# Patient Record
Sex: MALE | Race: WHITE | NOT HISPANIC OR LATINO | Employment: OTHER | URBAN - NONMETROPOLITAN AREA
[De-identification: names, ages, dates, MRNs, and addresses within clinical notes are randomized per-mention and may not be internally consistent; named-entity substitution may affect disease eponyms.]

---

## 2017-04-25 ENCOUNTER — TRANSCRIBE ORDERS (OUTPATIENT)
Dept: ADMINISTRATIVE | Facility: HOSPITAL | Age: 57
End: 2017-04-25

## 2017-04-25 DIAGNOSIS — R10.32 LLQ ABDOMINAL PAIN: ICD-10-CM

## 2017-04-25 DIAGNOSIS — R93.2 ABNORMAL FINDINGS ON IMAGING OF BILIARY TRACT: Primary | ICD-10-CM

## 2017-05-10 ENCOUNTER — HOSPITAL ENCOUNTER (OUTPATIENT)
Dept: CT IMAGING | Facility: HOSPITAL | Age: 57
Discharge: HOME | End: 2017-05-10
Attending: INTERNAL MEDICINE | Admitting: INTERNAL MEDICINE

## 2017-05-10 DIAGNOSIS — R93.2 ABNORMAL FINDINGS ON IMAGING OF BILIARY TRACT: ICD-10-CM

## 2017-05-10 DIAGNOSIS — R10.32 LLQ ABDOMINAL PAIN: ICD-10-CM

## 2017-05-10 LAB — CREAT BLDA-MCNC: 0.8 MG/DL (ref 0.6–1.3)

## 2017-05-10 PROCEDURE — 82565 ASSAY OF CREATININE: CPT

## 2017-05-10 PROCEDURE — 0 IOPAMIDOL 61 % SOLUTION: Performed by: INTERNAL MEDICINE

## 2017-05-10 PROCEDURE — 74170 CT ABD WO CNTRST FLWD CNTRST: CPT

## 2017-05-10 PROCEDURE — 74170 CT ABD WO CNTRST FLWD CNTRST: CPT | Performed by: RADIOLOGY

## 2017-05-10 RX ADMIN — IOPAMIDOL 100 ML: 612 INJECTION, SOLUTION INTRAVENOUS at 09:30

## 2018-02-12 ENCOUNTER — OFFICE VISIT (OUTPATIENT)
Dept: UROLOGY | Facility: CLINIC | Age: 58
End: 2018-02-12

## 2018-02-12 VITALS — HEIGHT: 68 IN | BODY MASS INDEX: 46.11 KG/M2 | WEIGHT: 304.24 LBS

## 2018-02-12 DIAGNOSIS — N13.8 BPH WITH OBSTRUCTION/LOWER URINARY TRACT SYMPTOMS: ICD-10-CM

## 2018-02-12 DIAGNOSIS — N40.1 BPH WITH OBSTRUCTION/LOWER URINARY TRACT SYMPTOMS: ICD-10-CM

## 2018-02-12 DIAGNOSIS — R53.83 FATIGUE, UNSPECIFIED TYPE: ICD-10-CM

## 2018-02-12 DIAGNOSIS — N42.9 DISORDER OF PROSTATE: ICD-10-CM

## 2018-02-12 DIAGNOSIS — N52.02 CORPORO-VENOUS OCCLUSIVE ERECTILE DYSFUNCTION: ICD-10-CM

## 2018-02-12 DIAGNOSIS — R35.0 FREQUENT URINATION: Primary | ICD-10-CM

## 2018-02-12 DIAGNOSIS — R79.89 LOW TESTOSTERONE IN MALE: ICD-10-CM

## 2018-02-12 LAB
ANION GAP SERPL CALCULATED.3IONS-SCNC: 3.8 MMOL/L (ref 3.6–11.2)
BILIRUB BLD-MCNC: NEGATIVE MG/DL
BUN BLD-MCNC: 12 MG/DL (ref 7–21)
BUN/CREAT SERPL: 17.1 (ref 7–25)
CALCIUM SPEC-SCNC: 8.5 MG/DL (ref 7.7–10)
CHLORIDE SERPL-SCNC: 106 MMOL/L (ref 99–112)
CLARITY, POC: CLEAR
CO2 SERPL-SCNC: 31.2 MMOL/L (ref 24.3–31.9)
COLOR UR: YELLOW
CREAT BLD-MCNC: 0.7 MG/DL (ref 0.43–1.29)
GFR SERPL CREATININE-BSD FRML MDRD: 117 ML/MIN/1.73
GLUCOSE BLD-MCNC: 95 MG/DL (ref 70–110)
GLUCOSE UR STRIP-MCNC: NEGATIVE MG/DL
KETONES UR QL: NEGATIVE
LEUKOCYTE EST, POC: NEGATIVE
NITRITE UR-MCNC: NEGATIVE MG/ML
OSMOLALITY SERPL CALC.SUM OF ELEC: 280.8 MOSM/KG (ref 273–305)
PH UR: 6 [PH] (ref 5–8)
POTASSIUM BLD-SCNC: 4.3 MMOL/L (ref 3.5–5.3)
PROT UR STRIP-MCNC: NEGATIVE MG/DL
PSA SERPL-MCNC: 0.23 NG/ML (ref 0–4)
RBC # UR STRIP: NEGATIVE /UL
SODIUM BLD-SCNC: 141 MMOL/L (ref 135–153)
SP GR UR: 1 (ref 1–1.03)
TESTOST SERPL-MCNC: 357.01 NG/DL (ref 86.98–780.1)
UROBILINOGEN UR QL: NORMAL

## 2018-02-12 PROCEDURE — 80048 BASIC METABOLIC PNL TOTAL CA: CPT | Performed by: UROLOGY

## 2018-02-12 PROCEDURE — 99204 OFFICE O/P NEW MOD 45 MIN: CPT | Performed by: UROLOGY

## 2018-02-12 PROCEDURE — 81002 URINALYSIS NONAUTO W/O SCOPE: CPT | Performed by: UROLOGY

## 2018-02-12 PROCEDURE — 84153 ASSAY OF PSA TOTAL: CPT | Performed by: UROLOGY

## 2018-02-12 PROCEDURE — 84403 ASSAY OF TOTAL TESTOSTERONE: CPT | Performed by: UROLOGY

## 2018-02-12 RX ORDER — TAMSULOSIN HYDROCHLORIDE 0.4 MG/1
1 CAPSULE ORAL NIGHTLY
Qty: 30 CAPSULE | Refills: 3 | Status: SHIPPED | OUTPATIENT
Start: 2018-02-12

## 2018-02-12 RX ORDER — TAMSULOSIN HYDROCHLORIDE 0.4 MG/1
1 CAPSULE ORAL NIGHTLY
Qty: 30 CAPSULE | Refills: 3 | Status: SHIPPED | OUTPATIENT
Start: 2018-02-12 | End: 2018-02-12 | Stop reason: SDUPTHER

## 2018-02-12 NOTE — PROGRESS NOTES
"Chief Complaint:          Chief Complaint   Patient presents with   • Urinary Urgency   • Urinary Incontinence       HPI:   57 y.o. male.  57-year-old white male who is referred for evaluation of frequent urination and dribbling.  He gets up 2 times at night for the last 2 years.  He has no dysuria, he has daytime frequency in the range of 4 times.  What is most troublesome is to consistent dribbling and decreased force of stream with hesitancy he's never had treatment.  He is not a smoker.  He's never tried alpha blockade.  He never had a PSA.  He has no family history of prostate cancer.  His uncle has colon polyps.  He had a gastric sleeve surgery 8 months ago and is lost 70 pounds he has no significant medical problems he's concerned about signs and symptoms consistent with low testosterone. KATALINA-androgen deficiency in the age male questionnaire  The patient was queried regarding the androgen deficiency in the age male questionnaire.  This is a validated questionnaire that was performed on a set of 314 [place] male physicians when it was positive it correlated directly with a 94% chance of low testosterone.  Patient indicates there is a decrease in libido or sex drive, a lack of energy, Decreased  strength and endurance, a decreased \"enjoyment of life\", sad and grumpy feelings with significant difficulty maintaining erections.  He is also been a recent deterioration regarding work performance.  I'm going to check a testosterone level as well.He has not had intermittent relations with his wife for years.Erectile dysfunction-we discussed the anatomy and physiology of the penis and the endothelium.  We discussed the various forms of erectile dysfunction including peripheral vascular occlusive disease, postoperative, secondary to radiation treatments of the prostate, and arterial inflow.  We discussed the various treatment options available including oral medication and its various forms.  We discussed the use of " both generic and non-generic Viagra.  We discussed Cialis and a longer half-life of 17 hours as well as the other 2 medications.  We discussed cost involved with this including the fact that the generic is much cheaper but is taken has multiple pills because they are 20 mg dosages.  We did discuss the other alternatives including Penile injections, vacuum erection devices and surgical intervention reserved for only the most severe cases.  We discussed the need for testosterone in about 20% of cases of erectile dysfunction.    Past Medical History:        Past Medical History:   Diagnosis Date   • Diabetes mellitus    • Hypertension          Current Meds:     Current Outpatient Prescriptions   Medication Sig Dispense Refill   • amLODIPine (NORVASC) 10 MG tablet Take  by mouth Daily.     • baclofen (LIORESAL) 20 MG tablet Take  by mouth 3 (Three) Times a Day.     • glipiZIDE (GLUCOTROL) 10 MG tablet Take  by mouth Daily.     • HYDROcodone-acetaminophen (NORCO)  MG per tablet Take  by mouth 4 (Four) Times a Day.     • hydrOXYzine (VISTARIL) 25 MG capsule Take  by mouth 3 (Three) Times a Day.     • Liraglutide (VICTOZA) 18 MG/3ML solution pen-injector Inject  under the skin.     • lisinopril (PRINIVIL,ZESTRIL) 10 MG tablet Take  by mouth Daily.     • metFORMIN (GLUCOPHAGE) 1000 MG tablet Take  by mouth 2 (Two) Times a Day.     • metoprolol tartrate (LOPRESSOR) 50 MG tablet Take  by mouth 2 (Two) Times a Day.     • PARoxetine (PAXIL) 20 MG tablet Take  by mouth.     • rOPINIRole (REQUIP) 2 MG tablet Take  by mouth.     • simvastatin (ZOCOR) 80 MG tablet Take  by mouth Daily.       No current facility-administered medications for this visit.         Allergies:      Allergies   Allergen Reactions   • Codeine Nausea And Vomiting   • Morphine And Related Nausea And Vomiting        Past Surgical History:     No past surgical history on file.      Social History:     Social History     Social History   • Marital status:       Spouse name: N/A   • Number of children: N/A   • Years of education: N/A     Occupational History   • Not on file.     Social History Main Topics   • Smoking status: Not on file   • Smokeless tobacco: Not on file   • Alcohol use Not on file   • Drug use: Not on file   • Sexual activity: Not on file     Other Topics Concern   • Not on file     Social History Narrative       Family History:     History reviewed. No pertinent family history.    Review of Systems:     Review of Systems   Constitutional: Positive for fatigue.   HENT: Negative.    Eyes: Negative.    Respiratory: Negative.    Cardiovascular: Negative.    Gastrointestinal: Negative.    Endocrine: Negative.    Genitourinary: Positive for urgency.   Musculoskeletal: Negative.    Allergic/Immunologic: Negative.    Neurological: Negative.    Hematological: Negative.    Psychiatric/Behavioral: Negative.    All other systems reviewed and are negative.      Physical Exam:     Physical Exam   Constitutional: He is oriented to person, place, and time. He appears well-developed and well-nourished.   HENT:   Head: Normocephalic and atraumatic.   Eyes: Conjunctivae and EOM are normal. Pupils are equal, round, and reactive to light.   Neck: Normal range of motion.   Cardiovascular: Normal rate, regular rhythm, normal heart sounds and intact distal pulses.    Pulmonary/Chest: Effort normal and breath sounds normal.   Abdominal: Soft. Bowel sounds are normal.   Genitourinary: Rectum normal, prostate normal and penis normal.   Genitourinary Comments: Soft nontender abdomen with no organomegaly, rigidity, or tenderness.  He has normal external genitalia and uncircumcised phallus with a freely movable foreskin bilaterally descended testes without masses there is no inguinal hernias adenopathy or abnormalities he had good rectal tone and a large smooth firm prostate.  There is no nodularity or any suspicious rectal abnormalities.  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   Neurological: He is alert and oriented to person, place, and time. He has normal reflexes.   Skin: Skin is warm and dry.   Psychiatric: He has a normal mood and affect. His behavior is normal. Judgment and thought content normal.   Nursing note and vitals reviewed.      I have reviewed the following portions of the patient's history: allergies, current medications, past family history, past medical history, past social history, past surgical history, problem list and ROS and confirm it's accurate.    Procedure:       Assessment/Plan:   BPH: Discussed the pathophysiology of BPH and obstruction.  We discussed the static and dynamic effect effects of BPH as well as using 5 alpha reductase inhibitors versus alpha blockade.  We discussed the indications for transurethral surgery as well.  And/ or other therapeutic options available including all of the newer techniques.  I'm going to start him on Flomax with a discussion of the risks, benefits and side effects including retrograde ejaculation  Erectile dysfunction-we discussed the anatomy and physiology of the penis and the endothelium.  We discussed the various forms of erectile dysfunction including peripheral vascular occlusive disease, postoperative, secondary to radiation treatments of the prostate, and arterial inflow.  We discussed the various treatment options available including oral medication and its various forms.  We discussed the use of both generic and non-generic Viagra.  We discussed Cialis and a longer half-life of 17 hours as well as the other 2 medications.  We discussed cost involved with this including the fact that the generic is much cheaper but is taken has multiple pills because they are 20 mg dosages.  We did discuss the other alternatives including Penile injections, vacuum erection devices and surgical intervention reserved for only the most severe cases.   We discussed the need for testosterone in about 20% of cases of erectile dysfunction.  Low TestosteroneThis pleasant male patient presents today with signs and symptoms that are consistent with low testosterone he has positive Sher questionnaire by history this includes both the sexual and nonsexual side effects.  Sexual side effects include inability to achieve and maintain an erection, in ability to maintain his erection and decreased interest and sexual activity.  Nonsexual symptomatology includes fatigue, difficulty completing a job, tiredness.  He has a discussion of the various forms testosterone available including parenteral, topical, and the form of a patch.  We discussed the efficacy of the gels, and the injections.  As well as the cost and benefits analysis.  We discussed the the studies a talked about heart disease and its effect on prostate cancer both of which are negligible.  He gives verbal consent to proceed with treatment.  He understands the risks and benefits of length he also completed his attempts at fertility he understands the partial effect on spermatogenesis.  PSA testing-I am recommending a PSA blood test that stands for prostate specific antigen.  I discussed the pathophysiology of PSA testing indicating its use in the diagnosis and management of prostate cancer.  I discussed the normal range being 0-4 but more appropriately being much closer to 0-2 in a normal male.  I discussed the fact that after certain age we don't recommend PSA testing especially in view of numerous comorbidities.  That this will not be a useful test.  I discussed many of the things that can artificially raised PSA including a recent infection, urinary tract infection, recent sexual intercourse.  Where even the type of movement such as manipulation of the prostate  riding a bicycle.  After all this is taken into account when the test is reviewed  the most important use of PSA which is the velocity measurement in  other words the change of PSA with time as a very important factor in the use and that we look for greater than 20% rise over a year to help us make the prediction of prostate cancer.  I also discussed that the use with prostate cancer indicating that after a radical prostatectomy the PSA should be 0 and any rise indicates an early biochemical recurrence  BMI PACKET GIVEN          This document has been electronically signed by KERI MARTINEZ MD February 12, 2018 11:09 AM

## 2018-02-26 ENCOUNTER — OFFICE VISIT (OUTPATIENT)
Dept: UROLOGY | Facility: CLINIC | Age: 58
End: 2018-02-26

## 2018-02-26 VITALS — BODY MASS INDEX: 37.13 KG/M2 | HEIGHT: 68 IN | WEIGHT: 245 LBS

## 2018-02-26 DIAGNOSIS — N52.02 CORPORO-VENOUS OCCLUSIVE ERECTILE DYSFUNCTION: Primary | ICD-10-CM

## 2018-02-26 DIAGNOSIS — N13.8 BPH WITH OBSTRUCTION/LOWER URINARY TRACT SYMPTOMS: ICD-10-CM

## 2018-02-26 DIAGNOSIS — N40.1 BPH WITH OBSTRUCTION/LOWER URINARY TRACT SYMPTOMS: ICD-10-CM

## 2018-02-26 PROCEDURE — 99214 OFFICE O/P EST MOD 30 MIN: CPT | Performed by: UROLOGY

## 2018-02-26 NOTE — PROGRESS NOTES
"Chief Complaint:          Chief Complaint   Patient presents with   • Urinary Frequency       HPI:   57 y.o. male.  57-year-old white male who is referred for evaluation of frequent urination and dribbling.  He gets up 2 times at night for the last 2 years.  He has no dysuria, he has daytime frequency in the range of 4 times.  What is most troublesome is to consistent dribbling and decreased force of stream with hesitancy he's never had treatment.  He is not a smoker.  He's never tried alpha blockade.  He never had a PSA.  He has no family history of prostate cancer.  His uncle has colon polyps.  He had a gastric sleeve surgery 8 months ago and is lost 70 pounds he has no significant medical problems he's concerned about signs and symptoms consistent with low testosterone. KATALINA-androgen deficiency in the age male questionnaire  The patient was queried regarding the androgen deficiency in the age male questionnaire.  This is a validated questionnaire that was performed on a set of 314 [place] male physicians when it was positive it correlated directly with a 94% chance of low testosterone.  Patient indicates there is a decrease in libido or sex drive, a lack of energy, Decreased  strength and endurance, a decreased \"enjoyment of life\", sad and grumpy feelings with significant difficulty maintaining erections.  He is also been a recent deterioration regarding work performance.  I'm going to check a testosterone level as well.He has not had intermittent relations with his wife for years.Erectile dysfunction-we discussed the anatomy and physiology of the penis and the endothelium.  We discussed the various forms of erectile dysfunction including peripheral vascular occlusive disease, postoperative, secondary to radiation treatments of the prostate, and arterial inflow.  We discussed the various treatment options available including oral medication and its various forms.  We discussed the use of both generic and " non-generic Viagra.  We discussed Cialis and a longer half-life of 17 hours as well as the other 2 medications.  We discussed cost involved with this including the fact that the generic is much cheaper but is taken has multiple pills because they are 20 mg dosages.  We did discuss the other alternatives including Penile injections, vacuum erection devices and surgical intervention reserved for only the most severe cases.  We discussed the need for testosterone in about 20% of cases of erectile dysfunction.  He returns today for follow-up.  He has urinary frequency.  I reviewed his labs showing a PSA of 0.230, a testosterone of 357, a creatinine of 0.7, CT showing 2 benign liver hemangiomas but nothing else with the Flomax is been dramatically gets up once at night he still can't get an erection after discussion he wants to proceed with penile injections.Penile injection--the patient is interested in an attempt at a pharmacological penile injection for severe organic erectile dysfunction having failed the traditional oral therapy and having been offered a vacuum erection device.  After an appropriate informed consent including the significant risk of priapism, pain,, and lack of efficacy of the penis was prepped and draped.  Appropriate anatomy was demonstrated including the dorsal nerve complex at 12:00 on the dorsum of the penis and the urethra at 6:00 we recommend injections between 9 and 10:00 on the right side and to 3:00 on the left side up and down the shaft into the corporal body we utilized a 27-gauge needle and began our dose of 0.1 cc of Trymex compound.  Observed for 30 minutes before its results.  We discussed the grading on a 10 scale indicating a 5 would be tumescence with poor axial rigidity.  I indicated that when the patient goes home though be about a 30-40% improvement.  It was emphasized that the erection should last no more than 2 hours and anything more than that should prompt an immediate call  to the office.  We also talked about pharmacological manipulation if the erection lasts for prolonged period including oral Sudafed or terbutaline.  We talked about pharmacological reduction of the erection using phenylephrine.  The patient wishes to proceed.    Past Medical History:        Past Medical History:   Diagnosis Date   • Diabetes mellitus    • Hypertension          Current Meds:     Current Outpatient Prescriptions   Medication Sig Dispense Refill   • amLODIPine (NORVASC) 10 MG tablet Take  by mouth Daily.     • baclofen (LIORESAL) 20 MG tablet Take  by mouth 3 (Three) Times a Day.     • glipiZIDE (GLUCOTROL) 10 MG tablet Take  by mouth Daily.     • HYDROcodone-acetaminophen (NORCO)  MG per tablet Take  by mouth 4 (Four) Times a Day.     • hydrOXYzine (VISTARIL) 25 MG capsule Take  by mouth 3 (Three) Times a Day.     • Liraglutide (VICTOZA) 18 MG/3ML solution pen-injector Inject  under the skin.     • lisinopril (PRINIVIL,ZESTRIL) 10 MG tablet Take  by mouth Daily.     • metFORMIN (GLUCOPHAGE) 1000 MG tablet Take  by mouth 2 (Two) Times a Day.     • metoprolol tartrate (LOPRESSOR) 50 MG tablet Take  by mouth 2 (Two) Times a Day.     • PARoxetine (PAXIL) 20 MG tablet Take  by mouth.     • rOPINIRole (REQUIP) 2 MG tablet Take  by mouth.     • simvastatin (ZOCOR) 80 MG tablet Take  by mouth Daily.     • tamsulosin (FLOMAX) 0.4 MG capsule 24 hr capsule Take 1 capsule by mouth Every Night. 30 capsule 3     No current facility-administered medications for this visit.         Allergies:      Allergies   Allergen Reactions   • Codeine Nausea And Vomiting   • Morphine And Related Nausea And Vomiting        Past Surgical History:     No past surgical history on file.      Social History:     Social History     Social History   • Marital status:      Spouse name: N/A   • Number of children: N/A   • Years of education: N/A     Occupational History   • Not on file.     Social History Main Topics   •  Smoking status: Not on file   • Smokeless tobacco: Not on file   • Alcohol use Not on file   • Drug use: Not on file   • Sexual activity: Not on file     Other Topics Concern   • Not on file     Social History Narrative       Family History:     History reviewed. No pertinent family history.    Review of Systems:     Review of Systems   Constitutional: Negative.    HENT: Negative.    Eyes: Negative.    Respiratory: Negative.    Cardiovascular: Negative.    Gastrointestinal: Negative.    Endocrine: Negative.    Musculoskeletal: Negative.    Allergic/Immunologic: Negative.    Neurological: Negative.    Hematological: Negative.    Psychiatric/Behavioral: Negative.        Physical Exam:     Physical Exam   Constitutional: He is oriented to person, place, and time. He appears well-developed and well-nourished.   HENT:   Head: Normocephalic and atraumatic.   Eyes: Conjunctivae and EOM are normal. Pupils are equal, round, and reactive to light.   Neck: Normal range of motion.   Cardiovascular: Normal rate, regular rhythm, normal heart sounds and intact distal pulses.    Pulmonary/Chest: Effort normal and breath sounds normal.   Abdominal: Soft. Bowel sounds are normal.   Musculoskeletal: Normal range of motion.   Neurological: He is alert and oriented to person, place, and time. He has normal reflexes.   Skin: Skin is warm and dry.   Psychiatric: He has a normal mood and affect. His behavior is normal. Judgment and thought content normal.   Nursing note and vitals reviewed.      I have reviewed the following portions of the patient's history: allergies, current medications, past family history, past medical history, past social history, past surgical history, problem list and ROS and confirm it's accurate.      Procedure:       Assessment/Plan:   Erectile dysfunction-we discussed the anatomy and physiology of the penis and the endothelium.  We discussed the various forms of erectile dysfunction including peripheral  vascular occlusive disease, postoperative, secondary to radiation treatments of the prostate, and arterial inflow.  We discussed the various treatment options available including oral medication and its various forms.  We discussed the use of both generic and non-generic Viagra.  We discussed Cialis and a longer half-life of 17 hours as well as the other 2 medications.  We discussed cost involved with this including the fact that the generic is much cheaper but is taken has multiple pills because they are 20 mg dosages.  We did discuss the other alternatives including Penile injections, vacuum erection devices and surgical intervention reserved for only the most severe cases.  We discussed the need for testosterone in about 20% of cases of erectile dysfunction.Penile injection--the patient is interested in an attempt at a pharmacological penile injection for severe organic erectile dysfunction having failed the traditional oral therapy and having been offered a vacuum erection device.  After an appropriate informed consent including the significant risk of priapism, pain,, and lack of efficacy of the penis was prepped and draped.  Appropriate anatomy was demonstrated including the dorsal nerve complex at 12:00 on the dorsum of the penis and the urethra at 6:00 we recommend injections between 9 and 10:00 on the right side and to 3:00 on the left side up and down the shaft into the corporal body we utilized a 27-gauge needle and began our dose of 0.1 cc of Trymex compound.  Observed for 30 minutes before its results.  We discussed the grading on a 10 scale indicating a 5 would be tumescence with poor axial rigidity.  I indicated that when the patient goes home though be about a 30-40% improvement.  It was emphasized that the erection should last no more than 2 hours and anything more than that should prompt an immediate call to the office.  We also talked about pharmacological manipulation if the erection lasts for  prolonged period including oral Sudafed or terbutaline.  We talked about pharmacological reduction of the erection using phenylephrine.  The patient wishes to proceed.  BPH: Discussed the pathophysiology of BPH and obstruction.  We discussed the static and dynamic effect effects of BPH as well as using 5 alpha reductase inhibitors versus alpha blockade.  We discussed the indications for transurethral surgery as well.  And/ or other therapeutic options available including all of the newer techniques.  Continue alpha blockade.             This document has been electronically signed by KERI MARTINEZ MD February 26, 2018 9:45 AM

## 2018-03-12 ENCOUNTER — PROCEDURE VISIT (OUTPATIENT)
Dept: UROLOGY | Facility: CLINIC | Age: 58
End: 2018-03-12

## 2018-03-12 DIAGNOSIS — I86.1 SCROTAL VARICES: Primary | ICD-10-CM

## 2018-03-12 PROCEDURE — 17110 DESTRUCTION B9 LES UP TO 14: CPT | Performed by: UROLOGY

## 2018-03-12 NOTE — PROGRESS NOTES
Chief Complaint:          Chief Complaint   Patient presents with   • Erectile Dysfunction       HPI:   57 y.o. male.  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 They cause considerable pain when they bleed intermittently.  An informed consent was obtained please see procedure note.  He is unable to afford the penile injection medication.    Past Medical History:        Past Medical History:   Diagnosis Date   • Diabetes mellitus    • Hypertension          Current Meds:     Current Outpatient Prescriptions   Medication Sig Dispense Refill   • amLODIPine (NORVASC) 10 MG tablet Take  by mouth Daily.     • baclofen (LIORESAL) 20 MG tablet Take  by mouth 3 (Three) Times a Day.     • glipiZIDE (GLUCOTROL) 10 MG tablet Take  by mouth Daily.     • HYDROcodone-acetaminophen (NORCO)  MG per tablet Take  by mouth 4 (Four) Times a Day.     • hydrOXYzine (VISTARIL) 25 MG capsule Take  by mouth 3 (Three) Times a Day.     • Liraglutide (VICTOZA) 18 MG/3ML solution pen-injector Inject  under the skin.     • lisinopril (PRINIVIL,ZESTRIL) 10 MG tablet Take  by mouth Daily.     • metFORMIN (GLUCOPHAGE) 1000 MG tablet Take  by mouth 2 (Two) Times a Day.     • metoprolol tartrate (LOPRESSOR) 50 MG tablet Take  by mouth 2 (Two) Times a Day.     • PARoxetine (PAXIL) 20 MG tablet Take  by mouth.     • rOPINIRole (REQUIP) 2 MG tablet Take  by mouth.     • simvastatin (ZOCOR) 80 MG tablet Take  by mouth Daily.     • tamsulosin (FLOMAX) 0.4 MG capsule 24 hr capsule Take 1 capsule by mouth Every Night. 30 capsule 3     No current facility-administered medications for this visit.         Allergies:      Allergies   Allergen Reactions   • Codeine Nausea And Vomiting   • Morphine And Related Nausea And Vomiting        Past Surgical History:     No past surgical history on file.      Social History:     Social History     Social History   • Marital status:       Spouse name: N/A   • Number of children: N/A   • Years of education: N/A     Occupational History   • Not on file.     Social History Main Topics   • Smoking status: Never Smoker   • Smokeless tobacco: Not on file   • Alcohol use No   • Drug use: No   • Sexual activity: Not on file     Other Topics Concern   • Not on file     Social History Narrative   • No narrative on file       Family History:     Family History   Problem Relation Age of Onset   • No Known Problems Father    • No Known Problems Mother        Review of Systems:     Review of Systems   Constitutional: Negative.    HENT: Negative.    Eyes: Negative.    Respiratory: Negative.    Cardiovascular: Negative.    Gastrointestinal: Negative.    Endocrine: Negative.    Musculoskeletal: Negative.    Allergic/Immunologic: Negative.    Neurological: Negative.    Hematological: Negative.    Psychiatric/Behavioral: Negative.        Physical Exam:     Physical Exam   Constitutional: He is oriented to person, place, and time. He appears well-developed and well-nourished.   HENT:   Head: Normocephalic and atraumatic.   Eyes: Conjunctivae and EOM are normal. Pupils are equal, round, and reactive to light.   Neck: Normal range of motion.   Cardiovascular: Normal rate, regular rhythm, normal heart sounds and intact distal pulses.    Pulmonary/Chest: Effort normal and breath sounds normal.   Abdominal: Soft. Bowel sounds are normal.   Musculoskeletal: Normal range of motion.   Neurological: He is alert and oriented to person, place, and time. He has normal reflexes.   Skin: Skin is warm and dry.   Psychiatric: He has a normal mood and affect. His behavior is normal. Judgment and thought content normal.   Nursing note and vitals reviewed.      I have reviewed the following portions of the patient's history: allergies, current medications, past family history, past medical history, past social history, past surgical history, problem list and ROS and confirm  it's accurate.      Procedure:     After prep and drape in a sterile fashion and appropriate informed consent including the risk of anesthesia, bleeding, infection etc. he is brought to the operative suite in the office.  The area was prepped and anesthetized with 10 cc of 1% plain Xylocaine and I fulgurated 12 angiocatheter tome was without complication bacitracin was applied there was no bleeding seen back in 2 weeks  Assessment/Plan:   Angiokeratoma's of Chandrakant on the scrotal sac status post fulguration without complication           This document has been electronically signed by KERI MARTINEZ MD March 12, 2018 8:16 AM

## 2018-03-15 PROBLEM — I86.1 SCROTAL VARICES: Status: ACTIVE | Noted: 2018-03-15

## 2018-03-19 ENCOUNTER — OFFICE VISIT (OUTPATIENT)
Dept: UROLOGY | Facility: CLINIC | Age: 58
End: 2018-03-19

## 2018-03-19 VITALS
BODY MASS INDEX: 37.09 KG/M2 | DIASTOLIC BLOOD PRESSURE: 89 MMHG | WEIGHT: 244.71 LBS | HEIGHT: 68 IN | HEART RATE: 87 BPM | SYSTOLIC BLOOD PRESSURE: 128 MMHG

## 2018-03-19 DIAGNOSIS — I86.1 SCROTAL VARICES: Primary | ICD-10-CM

## 2018-03-19 PROCEDURE — 99024 POSTOP FOLLOW-UP VISIT: CPT | Performed by: UROLOGY

## 2018-03-19 NOTE — PROGRESS NOTES
Chief Complaint:          Chief Complaint   Patient presents with   • Erectile Dysfunction     2 week follow up       HPI:   57 y.o. male.  57-year-old white male status post fulguration of 12 angiokeratoma of Chandrakant he is doing well without complication.  There is no erythema, induration or tenderness.  He still is tired with many of the manifestations of a positive Sher questionnaire but his testosterone was in the  lower normal range at 357 I do not recommend replacement.  Clearly, he has 70 pound weight loss as a result of his gastric bypass and he will even better after he reaches his goal.    Past Medical History:        Past Medical History:   Diagnosis Date   • Diabetes mellitus    • Hypertension          Current Meds:     Current Outpatient Prescriptions   Medication Sig Dispense Refill   • amLODIPine (NORVASC) 10 MG tablet Take  by mouth Daily.     • baclofen (LIORESAL) 20 MG tablet Take  by mouth 3 (Three) Times a Day.     • glipiZIDE (GLUCOTROL) 10 MG tablet Take  by mouth Daily.     • HYDROcodone-acetaminophen (NORCO)  MG per tablet Take  by mouth 4 (Four) Times a Day.     • hydrOXYzine (VISTARIL) 25 MG capsule Take  by mouth 3 (Three) Times a Day.     • Liraglutide (VICTOZA) 18 MG/3ML solution pen-injector Inject  under the skin.     • lisinopril (PRINIVIL,ZESTRIL) 10 MG tablet Take  by mouth Daily.     • metFORMIN (GLUCOPHAGE) 1000 MG tablet Take  by mouth 2 (Two) Times a Day.     • metoprolol tartrate (LOPRESSOR) 50 MG tablet Take  by mouth 2 (Two) Times a Day.     • PARoxetine (PAXIL) 20 MG tablet Take  by mouth.     • rOPINIRole (REQUIP) 2 MG tablet Take  by mouth.     • simvastatin (ZOCOR) 80 MG tablet Take  by mouth Daily.     • tamsulosin (FLOMAX) 0.4 MG capsule 24 hr capsule Take 1 capsule by mouth Every Night. 30 capsule 3     No current facility-administered medications for this visit.         Allergies:      Allergies   Allergen Reactions   • Codeine Nausea And Vomiting   • Morphine  And Related Nausea And Vomiting        Past Surgical History:     No past surgical history on file.      Social History:     Social History     Social History   • Marital status:      Spouse name: N/A   • Number of children: N/A   • Years of education: N/A     Occupational History   • Not on file.     Social History Main Topics   • Smoking status: Never Smoker   • Smokeless tobacco: Not on file   • Alcohol use No   • Drug use: No   • Sexual activity: Not on file     Other Topics Concern   • Not on file     Social History Narrative   • No narrative on file       Family History:     Family History   Problem Relation Age of Onset   • No Known Problems Father    • No Known Problems Mother        Review of Systems:     Review of Systems   Constitutional: Negative.    HENT: Negative.    Eyes: Negative.    Respiratory: Negative.    Cardiovascular: Negative.    Gastrointestinal: Negative.    Endocrine: Negative.    Musculoskeletal: Negative.    Allergic/Immunologic: Negative.    Neurological: Negative.    Hematological: Negative.    Psychiatric/Behavioral: Negative.        Physical Exam:     Physical Exam   Constitutional: He is oriented to person, place, and time. He appears well-developed and well-nourished.   HENT:   Head: Normocephalic and atraumatic.   Eyes: Conjunctivae and EOM are normal. Pupils are equal, round, and reactive to light.   Neck: Normal range of motion.   Cardiovascular: Normal rate, regular rhythm, normal heart sounds and intact distal pulses.    Pulmonary/Chest: Effort normal and breath sounds normal.   Abdominal: Soft. Bowel sounds are normal.   Genitourinary: Penis normal.   Genitourinary Comments: Healing scrotal sac   Musculoskeletal: Normal range of motion.   Neurological: He is alert and oriented to person, place, and time. He has normal reflexes.   Skin: Skin is warm and dry.   Psychiatric: He has a normal mood and affect. His behavior is normal. Judgment and thought content normal.    Nursing note and vitals reviewed.      I have reviewed the following portions of the patient's history: allergies, current medications, past family history, past medical history, past social history, past surgical history, problem list and ROS and confirm it's accurate.      Procedure:       Assessment/Plan:   Status post fulguration of scrotal varices     Discussed the patient's BMI with him. BMI is above normal parameters. Follow-up plan includes:  educational material.        This document has been electronically signed by KERI MARTINEZ MD March 19, 2018 8:52 AM

## 2018-04-20 ENCOUNTER — TRANSCRIBE ORDERS (OUTPATIENT)
Dept: ADMINISTRATIVE | Facility: HOSPITAL | Age: 58
End: 2018-04-20

## 2018-04-20 DIAGNOSIS — M54.16 LUMBAR RADICULOPATHY: Primary | ICD-10-CM

## 2018-04-21 ENCOUNTER — HOSPITAL ENCOUNTER (OUTPATIENT)
Dept: MRI IMAGING | Facility: HOSPITAL | Age: 58
Discharge: HOME | End: 2018-04-21
Admitting: NURSE PRACTITIONER

## 2018-04-21 DIAGNOSIS — M54.16 LUMBAR RADICULOPATHY: ICD-10-CM

## 2018-04-21 PROCEDURE — 72148 MRI LUMBAR SPINE W/O DYE: CPT | Performed by: RADIOLOGY

## 2018-04-21 PROCEDURE — 72148 MRI LUMBAR SPINE W/O DYE: CPT

## 2018-07-13 DIAGNOSIS — M25.562 LEFT KNEE PAIN, UNSPECIFIED CHRONICITY: Primary | ICD-10-CM

## 2018-07-17 ENCOUNTER — HOSPITAL ENCOUNTER (OUTPATIENT)
Dept: GENERAL RADIOLOGY | Facility: HOSPITAL | Age: 58
Discharge: HOME | End: 2018-07-17
Attending: ORTHOPAEDIC SURGERY | Admitting: ORTHOPAEDIC SURGERY

## 2018-07-17 ENCOUNTER — OFFICE VISIT (OUTPATIENT)
Dept: ORTHOPEDIC SURGERY | Facility: CLINIC | Age: 58
End: 2018-07-17

## 2018-07-17 VITALS
BODY MASS INDEX: 37.13 KG/M2 | WEIGHT: 245 LBS | HEART RATE: 60 BPM | DIASTOLIC BLOOD PRESSURE: 72 MMHG | SYSTOLIC BLOOD PRESSURE: 120 MMHG | HEIGHT: 68 IN

## 2018-07-17 DIAGNOSIS — M17.32 POST-TRAUMATIC OSTEOARTHRITIS OF LEFT KNEE: Primary | ICD-10-CM

## 2018-07-17 DIAGNOSIS — M25.562 PAIN IN BOTH KNEES, UNSPECIFIED CHRONICITY: ICD-10-CM

## 2018-07-17 DIAGNOSIS — M25.562 LEFT KNEE PAIN, UNSPECIFIED CHRONICITY: ICD-10-CM

## 2018-07-17 DIAGNOSIS — M25.561 PAIN IN BOTH KNEES, UNSPECIFIED CHRONICITY: ICD-10-CM

## 2018-07-17 PROCEDURE — 73560 X-RAY EXAM OF KNEE 1 OR 2: CPT

## 2018-07-17 PROCEDURE — 73560 X-RAY EXAM OF KNEE 1 OR 2: CPT | Performed by: RADIOLOGY

## 2018-07-17 PROCEDURE — 99204 OFFICE O/P NEW MOD 45 MIN: CPT | Performed by: ORTHOPAEDIC SURGERY

## 2018-07-17 RX ORDER — GABAPENTIN 600 MG/1
600 TABLET ORAL 3 TIMES DAILY
COMMUNITY
End: 2021-03-18 | Stop reason: DRUGHIGH

## 2018-07-17 RX ORDER — OMEPRAZOLE 20 MG/1
20 CAPSULE, DELAYED RELEASE ORAL DAILY
COMMUNITY
End: 2022-04-05 | Stop reason: HOSPADM

## 2018-07-17 RX ORDER — FERROUS SULFATE 325(65) MG
325 TABLET ORAL
COMMUNITY
End: 2020-01-29 | Stop reason: ALTCHOICE

## 2018-07-17 RX ORDER — URSODIOL 300 MG/1
300 CAPSULE ORAL 2 TIMES DAILY
COMMUNITY
End: 2020-01-29 | Stop reason: ALTCHOICE

## 2018-07-17 RX ORDER — MULTIVIT WITH MINERALS/LUTEIN
250 TABLET ORAL DAILY
COMMUNITY
End: 2020-07-07

## 2018-07-17 RX ORDER — MELOXICAM 7.5 MG/1
7.5 TABLET ORAL 2 TIMES DAILY
COMMUNITY
End: 2021-03-18

## 2018-07-17 RX ORDER — AMITRIPTYLINE HYDROCHLORIDE 50 MG/1
50 TABLET, FILM COATED ORAL NIGHTLY
Status: ON HOLD | COMMUNITY
End: 2020-07-20

## 2018-07-17 NOTE — PROGRESS NOTES
Patient: Ang Jackson    YOB: 1960        History of Present Illness: 57-year-old white male who presents for evaluation of his left knee.  She had a lifetime history of problems left knee status post a injury during high school athletics.  Apparently at that time he had surgery.  But he always had complaints of his knee, giving out popping out of place.  While over the last 10 years or so it doesn't pop out as much she just has increasing pain and catching of the knee.  States she gets significant swelling of It for Too Long.  He's Had Multiple Injections Which Would Give Him Temporary Relief but Never Taken Leg Pain Completely.  He takes Mobic daily.  He is also on Norco 10 4 times a day to a pain clinic because of back problems along with his left hip and knee.  He's had a right hip replacement done in the past which sounds like he was complicated by a postoperative infection.  Denies any specific paresthesias or swelling in his feet.  Patientl apparently has undergone gastric bypass surgery and is lost a significant amount of weight over the past year and it's helped his knee and hip some but he still finding his left knee increasingly unstable and debilitating    Past Medical History:   Diagnosis Date   • Diabetes mellitus (CMS/HCC)    • Hypertension         Social History     Social History   • Marital status:      Spouse name: N/A   • Number of children: N/A   • Years of education: N/A     Occupational History   • Not on file.     Social History Main Topics   • Smoking status: Never Smoker   • Smokeless tobacco: Never Used   • Alcohol use No   • Drug use: No   • Sexual activity: Defer     Other Topics Concern   • Not on file     Social History Narrative   • No narrative on file           Physical Exam: 58 y.o. male  General Appearance:    Alert and oriented x 3, cooperative, in no acute distress                   Vitals:    07/17/18 1414   BP: 120/72   Pulse: 60   Weight: 111 kg (245  "lb)   Height: 172.7 cm (68\")          Somewhat overweight white male no apparent acute distress.  His left knee has a range of motion about 5° to 110°.  His right knee goes into full extension with flexion to about 110°.  Is no varus or valgus instability bilaterally.  He has a positive Lockman's grossly on the left knee compared to the right.  There is no focal motor deficits are noted he has a well-healed transverse scar on the medial aspect of his left knee.  There is no obvious effusion noted in either knee today.  He does have positive crepitus of the left knee.  There is no calf tenderness or lower extremity swelling noted bilaterally.  Feet are grossly neurovascularly intact.  He does have limited range of motion with pain at extremes of his left hip      Radiology:     Patient brought x-rays with him today which I reviewed of the left showed hip shows moderate to severe osteoarthritis no acute findings.  The left knee shows again moderate to severe osteoarthritis without acute findings.  He does have a varus deformity.  I repeated 1 x-ray today just to get a standing view and is essentially bone-on-bone of the medial compartment of his left knee along with the other compartmental changes        Assessment/Plan: Chronic left knee pain and instability likely secondary to severe posttraumatic arthritis from an old anterior crucial ligament injury when he was a teenager.  Have discussed the situation with the patient.  Patient would like to proceed with a total knee replacement.  We discussed the risk and benefits including infection, bleeding, prolonged irritation, stiffness, failure to relieve all his symptoms, failure of prosthesis, blood loss, local nerve damage, blood clots, loss of life and limb etc. patient appears be where the risk and benefits and would like to proceed.  We are going to get medical clearance from his family practitioner if we get that we will attentively plan to do this on August " 6.          Discussion/Summary:                This chart was completed utilizing the dragon speech recognition software.  Grammatical errors, random word insertions, pronoun errors, and incomplete sentences or occasional consequences of the system due to software limitations, ambient noise, and hardware issues.  Any questions or concerns about the content, text, or information contained within the body of this dictation should be directly addressed to the physician for clarification        This document was signed by Nico Bazan M.D. July 17, 2018 3:19 PM

## 2018-07-20 ENCOUNTER — TELEPHONE (OUTPATIENT)
Dept: ORTHOPEDIC SURGERY | Facility: CLINIC | Age: 58
End: 2018-07-20

## 2018-07-20 NOTE — TELEPHONE ENCOUNTER
Left message for patient to call back, he is scheduled for surgery on 8-6-18.  We need to get his medical clearance, he was to see his PCP Mamta Ortega.  Cannot move forward with surgery with out clearance.  Spoke with Mamta Ortega's office, patient has an appointment  On  7-24-18.

## 2018-07-25 ENCOUNTER — PREP FOR SURGERY (OUTPATIENT)
Dept: OTHER | Facility: HOSPITAL | Age: 58
End: 2018-07-25

## 2018-07-25 DIAGNOSIS — M17.12 PRIMARY OSTEOARTHRITIS OF LEFT KNEE: Primary | ICD-10-CM

## 2018-07-25 RX ORDER — OXYCODONE HCL 10 MG/1
10 TABLET, FILM COATED, EXTENDED RELEASE ORAL ONCE
Status: CANCELLED | OUTPATIENT
Start: 2018-08-06 | End: 2018-08-06

## 2018-07-25 RX ORDER — PREGABALIN 75 MG/1
75 CAPSULE ORAL ONCE
Status: CANCELLED | OUTPATIENT
Start: 2018-08-06 | End: 2018-08-06

## 2018-07-25 NOTE — H&P
History and Physical    Patient: Ang Jackson  YOB: 1960  Date of encounter: 07/25/2018      History of Present Illness:   Ang Jackson is a 58 y.o. white male who presents for evaluation of his left knee.  She had a lifetime history of problems left knee status post a injury during high school athletics.  Apparently at that time he had surgery.  But he always had complaints of his knee, giving out popping out of place.  While over the last 10 years or so it doesn't pop out as much she just has increasing pain and catching of the knee.  States she gets significant swelling of It for Too Long.  He's Had Multiple Injections Which Would Give Him Temporary Relief but Never Taken Leg Pain Completely.  He takes Mobic daily.  He is also on Norco 10 4 times a day to a pain clinic because of back problems along with his left hip and knee.  He's had a right hip replacement done in the past which sounds like he was complicated by a postoperative infection.  Denies any specific paresthesias or swelling in his feet.  Patientl apparently has undergone gastric bypass surgery and is lost a significant amount of weight over the past year and it's helped his knee and hip some but he still finding his left knee increasingly unstable and debilitating    PMH:   Patient Active Problem List   Diagnosis   • Diabetes mellitus (CMS/HCC)   • Hypertension   • Pre-op evaluation   • BPH with obstruction/lower urinary tract symptoms   • Corporo-venous occlusive erectile dysfunction   • Low testosterone in male   • Scrotal varices   • Post-traumatic osteoarthritis of left knee       PSH:  Past Surgical History:   Procedure Laterality Date   • GASTRIC SLEEVE LAPAROSCOPIC     • HIP SURGERY Right 2009   • KNEE SURGERY Left     arthroscopy        Allergies:     Allergies   Allergen Reactions   • Codeine Nausea And Vomiting   • Morphine And Related Nausea And Vomiting       Medications:     Current Outpatient Prescriptions:   •   amitriptyline (ELAVIL) 50 MG tablet, Take 50 mg by mouth Every Night., Disp: , Rfl:   •  amLODIPine (NORVASC) 10 MG tablet, Take  by mouth Daily., Disp: , Rfl:   •  baclofen (LIORESAL) 20 MG tablet, Take  by mouth 3 (Three) Times a Day., Disp: , Rfl:   •  Calcium Carbonate-Vit D-Min (CALCIUM 1200 PO), Take  by mouth., Disp: , Rfl:   •  ferrous sulfate 325 (65 FE) MG tablet, Take 325 mg by mouth Daily With Breakfast., Disp: , Rfl:   •  gabapentin (NEURONTIN) 600 MG tablet, Take 600 mg by mouth 3 (Three) Times a Day., Disp: , Rfl:   •  HYDROcodone-acetaminophen (NORCO)  MG per tablet, Take  by mouth 4 (Four) Times a Day., Disp: , Rfl:   •  hydrOXYzine (VISTARIL) 25 MG capsule, Take  by mouth 3 (Three) Times a Day., Disp: , Rfl:   •  lisinopril (PRINIVIL,ZESTRIL) 10 MG tablet, Take  by mouth Daily., Disp: , Rfl:   •  LORATADINE PO, Take  by mouth., Disp: , Rfl:   •  meloxicam (MOBIC) 7.5 MG tablet, Take 7.5 mg by mouth Daily., Disp: , Rfl:   •  metFORMIN (GLUCOPHAGE) 1000 MG tablet, Take  by mouth 2 (Two) Times a Day., Disp: , Rfl:   •  metoprolol tartrate (LOPRESSOR) 50 MG tablet, Take  by mouth 2 (Two) Times a Day., Disp: , Rfl:   •  Multiple Vitamins-Minerals (MENS MULTIPLUS PO), Take  by mouth., Disp: , Rfl:   •  omeprazole (priLOSEC) 20 MG capsule, Take 20 mg by mouth Daily., Disp: , Rfl:   •  rOPINIRole (REQUIP) 2 MG tablet, Take  by mouth., Disp: , Rfl:   •  tamsulosin (FLOMAX) 0.4 MG capsule 24 hr capsule, Take 1 capsule by mouth Every Night., Disp: 30 capsule, Rfl: 3  •  ursodiol (ACTIGALL) 300 MG capsule, Take 300 mg by mouth 2 (Two) Times a Day., Disp: , Rfl:   •  vitamin C (ASCORBIC ACID) 250 MG tablet, Take 250 mg by mouth Daily., Disp: , Rfl:     Social History:     Social History     Occupational History   • Not on file.     Social History Main Topics   • Smoking status: Never Smoker   • Smokeless tobacco: Never Used   • Alcohol use No   • Drug use: No   • Sexual activity: Defer      Social History  "    Social History Narrative   • No narrative on file       Family History:     Family History   Problem Relation Age of Onset   • Heart disease Father    • Hypertension Father    • Cancer Mother    • Diabetes Brother    • Hypertension Brother    • Gout Paternal Grandmother    • Diabetes Paternal Grandmother        Review of Systems:   Review of Systems   Constitutional: Negative.    HENT: Positive for hearing loss.    Eyes: Negative.    Respiratory: Positive for apnea.    Cardiovascular: Negative.    Gastrointestinal: Negative.    Endocrine: Negative.    Genitourinary: Negative.    Musculoskeletal: Positive for arthralgias, gait problem, joint swelling and myalgias.   Skin: Negative.    Neurological: Positive for numbness.   Hematological: Negative.    Psychiatric/Behavioral: Positive for dysphoric mood and sleep disturbance. The patient is nervous/anxious.        Physical Exam:   Constitutional: Patient is oriented to person, place, and time. Patient appears well-developed and well-nourished. No acute distress.   Vitals:     07/17/18 1414   BP: 120/72   Pulse: 60   Weight: 111 kg (245 lb)   Height: 172.7 cm (68\")       HENT:   Head: Normocephalic and atraumatic.   Right Ear: External ear normal.   Left Ear: External ear normal.   Eyes: EOM are normal. Right eye exhibits no discharge. Left eye exhibits no discharge.   Neck: Normal range of motion. Neck supple.   Cardiovascular: Regular rhythm and normal heart sounds.    No murmur heard.  Pulmonary/Chest: Effort normal. No respiratory distress.Clear to ascultation bilaterally.  Abdominal: Soft.   Musculoskeletal:His left knee has a range of motion about 5° to 110°.  His right knee goes into full extension with flexion to about 110°.  Is no varus or valgus instability bilaterally.  He has a positive Lockman's grossly on the left knee compared to the right.  There is no focal motor deficits are noted he has a well-healed transverse scar on the medial aspect of his " left knee.  There is no obvious effusion noted in either knee today.  He does have positive crepitus of the left knee.  There is no calf tenderness or lower extremity swelling noted bilaterally.  Feet are grossly neurovascularly intact.  He does have limited range of motion with pain at extremes of his left hip    Neurological: Patient is alert and oriented to person, place, and time.   Skin: Skin is warm and dry. No rash noted. Patient is not diaphoretic.   Psychiatric: Patinet has a normal mood and affect. Patients behavior is normal. Thought content normal.     Radiology:     Patient brought x-rays with him today which I reviewed of the left showed hip shows moderate to severe osteoarthritis no acute findings.  The left knee shows again moderate to severe osteoarthritis without acute findings.  He does have a varus deformity.  I repeated 1 x-ray today just to get a standing view and is essentially bone-on-bone of the medial compartment of his left knee along with the other compartmental changes    Assessment    ICD-10-CM ICD-9-CM   1. Primary osteoarthritis of left knee M17.12 715.16       Plan:  Chronic left knee pain and instability likely secondary to severe posttraumatic arthritis from an old anterior crucial ligament injury when he was a teenager.  Have discussed the situation with the patient.  Patient would like to proceed with a total knee replacement.  We discussed the risk and benefits including infection, bleeding, prolonged irritation, stiffness, failure to relieve all his symptoms, failure of prosthesis, blood loss, local nerve damage, blood clots, loss of life and limb etc. patient appears be where the risk and benefits and would like to proceed.  We are going to get medical clearance from his family practitioner if we get that we will attentively plan to do this on August 6.    Written by, Tosin YAO, acting as a scribe for Dr. Bazan

## 2018-07-27 ENCOUNTER — TELEPHONE (OUTPATIENT)
Dept: ORTHOPEDIC SURGERY | Facility: CLINIC | Age: 58
End: 2018-07-27

## 2018-07-27 NOTE — TELEPHONE ENCOUNTER
Patient called and cancelled SX scheduled for 8/6/18, he wants to wait till Sept to reschedule.   Anabell in SX Scheduling was notified of the cancellation. Patient is scheduled 9-4-18 @ 1:00 for SX update. A voicemail was left for patient to return my call 7/27/18 for the SX update appointment.

## 2018-08-01 ENCOUNTER — APPOINTMENT (OUTPATIENT)
Dept: PREADMISSION TESTING | Facility: HOSPITAL | Age: 58
End: 2018-08-01

## 2018-08-16 ENCOUNTER — OFFICE VISIT (OUTPATIENT)
Dept: NEUROSURGERY | Facility: CLINIC | Age: 58
End: 2018-08-16

## 2018-08-16 VITALS
SYSTOLIC BLOOD PRESSURE: 113 MMHG | DIASTOLIC BLOOD PRESSURE: 69 MMHG | HEIGHT: 68 IN | RESPIRATION RATE: 20 BRPM | HEART RATE: 53 BPM | BODY MASS INDEX: 35.61 KG/M2 | WEIGHT: 235 LBS | OXYGEN SATURATION: 99 % | TEMPERATURE: 97.3 F

## 2018-08-16 DIAGNOSIS — M50.30 DEGENERATIVE DISC DISEASE, CERVICAL: ICD-10-CM

## 2018-08-16 DIAGNOSIS — M51.36 DEGENERATIVE DISC DISEASE, LUMBAR: ICD-10-CM

## 2018-08-16 DIAGNOSIS — M48.061 SPINAL STENOSIS OF LUMBAR REGION WITHOUT NEUROGENIC CLAUDICATION: Primary | ICD-10-CM

## 2018-08-16 PROCEDURE — 99203 OFFICE O/P NEW LOW 30 MIN: CPT | Performed by: NEUROLOGICAL SURGERY

## 2018-08-16 NOTE — PATIENT INSTRUCTIONS
call Dr. Gan on a Monday or Tuesday with an update.   Ask for  Thais  and leave a message for  Dr. Gan.  He will call you back at the end of the day as soon as he can.     [phone]

## 2018-08-16 NOTE — PROGRESS NOTES
Ang Jackson  1960  [id number]      Chief Complaint   Patient presents with   • Neck Pain   • Back Pain   • Arm Pain   • Tingling       HISTORY OF PRESENT ILLNESS:  [This is a 58-year-old male who is well-known to my neurosurgical service.  I've seen him intermittently over the past years.  His diagnosis at been primarily that of degenerative disc disease.  He has a genetic predisposition.  The symptoms now have progressed.  He has significant degenerative change in his left knee.  It has been proposed that he had this replaced.  He also has developed symptoms of neurogenic claudication.  Lumbar MRI was performed is referred for neurosurgical consultation once again. ]    Past Medical History:   Diagnosis Date   • Arthritis    • Diabetes mellitus (CMS/HCC)    • Headache    • Hypertension    • Low back pain        Past Surgical History:   Procedure Laterality Date   • GASTRIC SLEEVE LAPAROSCOPIC     • HIP SURGERY Right 2009   • KNEE SURGERY Left     arthroscopy        Family History   Problem Relation Age of Onset   • Heart disease Father    • Hypertension Father    • Cancer Mother    • Diabetes Brother    • Hypertension Brother    • Gout Paternal Grandmother    • Diabetes Paternal Grandmother        Social History     Social History   • Marital status:      Spouse name: N/A   • Number of children: N/A   • Years of education: N/A     Occupational History   • Not on file.     Social History Main Topics   • Smoking status: Never Smoker   • Smokeless tobacco: Never Used   • Alcohol use No   • Drug use: No   • Sexual activity: Defer     Other Topics Concern   • Not on file     Social History Narrative   • No narrative on file       Allergies   Allergen Reactions   • Codeine Nausea And Vomiting   • Morphine And Related Nausea And Vomiting         Current Outpatient Prescriptions:   •  amitriptyline (ELAVIL) 50 MG tablet, Take 50 mg by mouth Every Night., Disp: , Rfl:   •  amLODIPine (NORVASC) 10 MG tablet,  Take  by mouth Daily., Disp: , Rfl:   •  baclofen (LIORESAL) 20 MG tablet, Take  by mouth 3 (Three) Times a Day., Disp: , Rfl:   •  Calcium Carbonate-Vit D-Min (CALCIUM 1200 PO), Take  by mouth., Disp: , Rfl:   •  ferrous sulfate 325 (65 FE) MG tablet, Take 325 mg by mouth Daily With Breakfast., Disp: , Rfl:   •  gabapentin (NEURONTIN) 600 MG tablet, Take 600 mg by mouth 3 (Three) Times a Day., Disp: , Rfl:   •  HYDROcodone-acetaminophen (NORCO)  MG per tablet, Take  by mouth 4 (Four) Times a Day., Disp: , Rfl:   •  hydrOXYzine (VISTARIL) 25 MG capsule, Take  by mouth 3 (Three) Times a Day., Disp: , Rfl:   •  lisinopril (PRINIVIL,ZESTRIL) 10 MG tablet, Take  by mouth Daily., Disp: , Rfl:   •  LORATADINE PO, Take  by mouth., Disp: , Rfl:   •  meloxicam (MOBIC) 7.5 MG tablet, Take 7.5 mg by mouth Daily., Disp: , Rfl:   •  metFORMIN (GLUCOPHAGE) 1000 MG tablet, Take  by mouth 2 (Two) Times a Day., Disp: , Rfl:   •  metoprolol tartrate (LOPRESSOR) 50 MG tablet, Take  by mouth 2 (Two) Times a Day., Disp: , Rfl:   •  Multiple Vitamins-Minerals (MENS MULTIPLUS PO), Take  by mouth., Disp: , Rfl:   •  omeprazole (priLOSEC) 20 MG capsule, Take 20 mg by mouth Daily., Disp: , Rfl:   •  rOPINIRole (REQUIP) 2 MG tablet, Take  by mouth., Disp: , Rfl:   •  tamsulosin (FLOMAX) 0.4 MG capsule 24 hr capsule, Take 1 capsule by mouth Every Night., Disp: 30 capsule, Rfl: 3  •  ursodiol (ACTIGALL) 300 MG capsule, Take 300 mg by mouth 2 (Two) Times a Day., Disp: , Rfl:   •  vitamin C (ASCORBIC ACID) 250 MG tablet, Take 250 mg by mouth Daily., Disp: , Rfl:     Review of Systems   Constitutional: Positive for activity change.   HENT: Positive for tinnitus.    Respiratory: Positive for apnea.    Musculoskeletal: Positive for arthralgias, back pain, joint swelling, myalgias and neck pain.   Neurological: Positive for speech difficulty and headaches.   Psychiatric/Behavioral: The patient is nervous/anxious.    All other systems reviewed  "and are negative.      Vitals:    08/16/18 1010   BP: 113/69   BP Location: Left arm   Patient Position: Sitting   Pulse: 53   Resp: 20   Temp: 97.3 °F (36.3 °C)   SpO2: 99%   Weight: 107 kg (235 lb)   Height: 172.7 cm (68\")       Neurological Examination: Mental status/speech: The patient is alert and oriented.  Speech is clear without aphysia or dysarthria.  No overt cognitive deficits.    Cranial nerve examination:    Olfaction: Smell is intact.  Vision: Vision is intact without visual field abnormalities.  Funduscopic examination is normal.  No pupillary irregularity.  Ocular motor examination: The extraocular muscles are intact.  There is no diplopia.  The pupil is round and reactive to both light and accommodation.  There is no nystagmus.  Facial movement/sensation: There is no facial weakness.  Sensation is intact in the first, second, and third divisions of the trigeminal nerve.  The corneal reflex is intact.  Auditory: Hearing is intact to finger rub bilaterally.  Cranial nerves IX, X, XI, XII: Phonation is normal.  No dysphagia.  Tongue is protruded in the midline without atrophy.  The gag reflex is intact.  Shoulder shrug is normal.    Musculoligamentous ligamentous examination: He has limitation range of motion as anticipated.  His pain with active and passive range of motion of his left knee.  His strength is excellent.  There is no weakness, sensory loss or reflex asymmetry.            Medical Decision Making:     Diagnostic Data Set:  The lumbar MRI is most notable.  He has high-grade spinal stenosis L4-L5 and L5-S1      Assessment:  Neurogenic claudication secondary to spinal stenosis          Recommendations:  I have discussed his therapeutic options which are quite few.  Surgically would necessitate laminectomies of L4 and L5 which would be quite helpful and alleviate his symptoms.  He needs to get his left knee replaced first.  Following recovery should his symptoms warrant I would recommend " laminectomies.  He will call and let me know.  Unfortunately there is little less to do for him as he has a surgically correctable abnormality.        I greatly appreciate the opportunity to see and evaluate this individual.  If you have questions or concerns regarding issues that I may have overlooked please call me at any time: [phone].  Cornelio Gan M.D.  [facility]  [address] [place] [address].  [place] [id number]

## 2018-09-12 ENCOUNTER — TELEPHONE (OUTPATIENT)
Dept: ORTHOPEDIC SURGERY | Facility: CLINIC | Age: 58
End: 2018-09-12

## 2018-09-12 NOTE — TELEPHONE ENCOUNTER
Patient called on 9-5-18 stating that he was putting SX on hold, Left Total Knee Arthroplasty. He will call and schedule SX update  when he is ready to proceed.

## 2019-06-15 ENCOUNTER — APPOINTMENT (OUTPATIENT)
Dept: CT IMAGING | Facility: HOSPITAL | Age: 59
End: 2019-06-15

## 2019-06-15 ENCOUNTER — APPOINTMENT (OUTPATIENT)
Dept: GENERAL RADIOLOGY | Facility: HOSPITAL | Age: 59
End: 2019-06-15

## 2019-06-15 ENCOUNTER — HOSPITAL ENCOUNTER (EMERGENCY)
Facility: HOSPITAL | Age: 59
Discharge: HOME | End: 2019-06-15
Attending: FAMILY MEDICINE | Admitting: FAMILY MEDICINE

## 2019-06-15 VITALS
RESPIRATION RATE: 18 BRPM | HEIGHT: 68 IN | HEART RATE: 68 BPM | TEMPERATURE: 98.2 F | BODY MASS INDEX: 30.31 KG/M2 | SYSTOLIC BLOOD PRESSURE: 138 MMHG | DIASTOLIC BLOOD PRESSURE: 31 MMHG | WEIGHT: 200 LBS | OXYGEN SATURATION: 100 %

## 2019-06-15 DIAGNOSIS — S89.92XA INJURY OF LEFT PATELLA, INITIAL ENCOUNTER: Primary | ICD-10-CM

## 2019-06-15 PROCEDURE — 99283 EMERGENCY DEPT VISIT LOW MDM: CPT

## 2019-06-15 PROCEDURE — 73564 X-RAY EXAM KNEE 4 OR MORE: CPT | Performed by: RADIOLOGY

## 2019-06-15 PROCEDURE — 73564 X-RAY EXAM KNEE 4 OR MORE: CPT

## 2019-06-15 PROCEDURE — 96372 THER/PROPH/DIAG INJ SC/IM: CPT

## 2019-06-15 PROCEDURE — 25010000002 MORPHINE PER 10 MG: Performed by: FAMILY MEDICINE

## 2019-06-15 PROCEDURE — 25010000002 KETOROLAC TROMETHAMINE PER 15 MG: Performed by: FAMILY MEDICINE

## 2019-06-15 PROCEDURE — 73700 CT LOWER EXTREMITY W/O DYE: CPT | Performed by: RADIOLOGY

## 2019-06-15 PROCEDURE — 73700 CT LOWER EXTREMITY W/O DYE: CPT

## 2019-06-15 PROCEDURE — 25010000002 BUTORPHANOL PER 1 MG: Performed by: FAMILY MEDICINE

## 2019-06-15 RX ORDER — MORPHINE SULFATE 2 MG/ML
4 INJECTION, SOLUTION INTRAMUSCULAR; INTRAVENOUS ONCE
Status: DISCONTINUED | OUTPATIENT
Start: 2019-06-15 | End: 2019-06-15 | Stop reason: HOSPADM

## 2019-06-15 RX ORDER — KETOROLAC TROMETHAMINE 30 MG/ML
60 INJECTION, SOLUTION INTRAMUSCULAR; INTRAVENOUS ONCE
Status: COMPLETED | OUTPATIENT
Start: 2019-06-15 | End: 2019-06-15

## 2019-06-15 RX ADMIN — KETOROLAC TROMETHAMINE 60 MG: 60 INJECTION, SOLUTION INTRAMUSCULAR at 17:20

## 2019-06-15 RX ADMIN — BUTORPHANOL TARTRATE 4 MG: 2 INJECTION, SOLUTION INTRAMUSCULAR; INTRAVENOUS at 20:31

## 2019-10-27 ENCOUNTER — TRANSCRIBE ORDERS (OUTPATIENT)
Dept: ADMINISTRATIVE | Facility: HOSPITAL | Age: 59
End: 2019-10-27

## 2019-10-27 ENCOUNTER — HOSPITAL ENCOUNTER (OUTPATIENT)
Dept: GENERAL RADIOLOGY | Facility: HOSPITAL | Age: 59
Discharge: HOME | End: 2019-10-27
Admitting: PHYSICIAN ASSISTANT

## 2019-10-27 ENCOUNTER — HOSPITAL ENCOUNTER (OUTPATIENT)
Dept: GENERAL RADIOLOGY | Facility: HOSPITAL | Age: 59
End: 2019-10-27

## 2019-10-27 ENCOUNTER — HOSPITAL ENCOUNTER (OUTPATIENT)
Dept: GENERAL RADIOLOGY | Facility: HOSPITAL | Age: 59
Discharge: HOME | End: 2019-10-27

## 2019-10-27 DIAGNOSIS — M25.569 KNEE PAIN, UNSPECIFIED CHRONICITY, UNSPECIFIED LATERALITY: ICD-10-CM

## 2019-10-27 DIAGNOSIS — M25.552 PAIN OF LEFT HIP JOINT: ICD-10-CM

## 2019-10-27 DIAGNOSIS — M25.551 RIGHT HIP PAIN: Primary | ICD-10-CM

## 2019-10-27 PROCEDURE — 73522 X-RAY EXAM HIPS BI 3-4 VIEWS: CPT | Performed by: RADIOLOGY

## 2019-10-27 PROCEDURE — 73562 X-RAY EXAM OF KNEE 3: CPT | Performed by: RADIOLOGY

## 2019-10-27 PROCEDURE — 73522 X-RAY EXAM HIPS BI 3-4 VIEWS: CPT

## 2019-10-27 PROCEDURE — 73562 X-RAY EXAM OF KNEE 3: CPT

## 2020-01-07 ENCOUNTER — APPOINTMENT (OUTPATIENT)
Dept: GENERAL RADIOLOGY | Facility: HOSPITAL | Age: 60
End: 2020-01-07

## 2020-01-07 ENCOUNTER — HOSPITAL ENCOUNTER (EMERGENCY)
Facility: HOSPITAL | Age: 60
Discharge: HOME | End: 2020-01-07
Attending: EMERGENCY MEDICINE | Admitting: EMERGENCY MEDICINE

## 2020-01-07 VITALS
RESPIRATION RATE: 20 BRPM | OXYGEN SATURATION: 97 % | HEIGHT: 68 IN | HEART RATE: 78 BPM | SYSTOLIC BLOOD PRESSURE: 124 MMHG | DIASTOLIC BLOOD PRESSURE: 66 MMHG | BODY MASS INDEX: 30.54 KG/M2 | WEIGHT: 201.5 LBS | TEMPERATURE: 99.7 F

## 2020-01-07 DIAGNOSIS — J10.1 INFLUENZA A: Primary | ICD-10-CM

## 2020-01-07 LAB
ALBUMIN SERPL-MCNC: 4.17 G/DL (ref 3.5–5.2)
ALBUMIN/GLOB SERPL: 1.6 G/DL
ALP SERPL-CCNC: 63 U/L (ref 39–117)
ALT SERPL W P-5'-P-CCNC: 21 U/L (ref 1–41)
ANION GAP SERPL CALCULATED.3IONS-SCNC: 11 MMOL/L (ref 5–15)
AST SERPL-CCNC: 24 U/L (ref 1–40)
BASOPHILS # BLD AUTO: 0.01 10*3/MM3 (ref 0–0.2)
BASOPHILS NFR BLD AUTO: 0.3 % (ref 0–1.5)
BILIRUB SERPL-MCNC: 0.4 MG/DL (ref 0.2–1.2)
BUN BLD-MCNC: 14 MG/DL (ref 6–20)
BUN/CREAT SERPL: 19.7 (ref 7–25)
CALCIUM SPEC-SCNC: 8.7 MG/DL (ref 8.6–10.5)
CHLORIDE SERPL-SCNC: 102 MMOL/L (ref 98–107)
CO2 SERPL-SCNC: 26 MMOL/L (ref 22–29)
CREAT BLD-MCNC: 0.71 MG/DL (ref 0.76–1.27)
DEPRECATED RDW RBC AUTO: 45.3 FL (ref 37–54)
EOSINOPHIL # BLD AUTO: 0.02 10*3/MM3 (ref 0–0.4)
EOSINOPHIL NFR BLD AUTO: 0.5 % (ref 0.3–6.2)
ERYTHROCYTE [DISTWIDTH] IN BLOOD BY AUTOMATED COUNT: 13.2 % (ref 12.3–15.4)
FLUAV AG NPH QL: POSITIVE
FLUBV AG NPH QL IA: NEGATIVE
GFR SERPL CREATININE-BSD FRML MDRD: 114 ML/MIN/1.73
GLOBULIN UR ELPH-MCNC: 2.6 GM/DL
GLUCOSE BLD-MCNC: 76 MG/DL (ref 65–99)
HCT VFR BLD AUTO: 38.1 % (ref 37.5–51)
HGB BLD-MCNC: 12.6 G/DL (ref 13–17.7)
HOLD SPECIMEN: NORMAL
HOLD SPECIMEN: NORMAL
IMM GRANULOCYTES # BLD AUTO: 0.02 10*3/MM3 (ref 0–0.05)
IMM GRANULOCYTES NFR BLD AUTO: 0.5 % (ref 0–0.5)
LYMPHOCYTES # BLD AUTO: 0.62 10*3/MM3 (ref 0.7–3.1)
LYMPHOCYTES NFR BLD AUTO: 16.3 % (ref 19.6–45.3)
MCH RBC QN AUTO: 30.4 PG (ref 26.6–33)
MCHC RBC AUTO-ENTMCNC: 33.1 G/DL (ref 31.5–35.7)
MCV RBC AUTO: 92 FL (ref 79–97)
MONOCYTES # BLD AUTO: 0.57 10*3/MM3 (ref 0.1–0.9)
MONOCYTES NFR BLD AUTO: 15 % (ref 5–12)
NEUTROPHILS # BLD AUTO: 2.57 10*3/MM3 (ref 1.7–7)
NEUTROPHILS NFR BLD AUTO: 67.4 % (ref 42.7–76)
NRBC BLD AUTO-RTO: 0 /100 WBC (ref 0–0.2)
PLATELET # BLD AUTO: 147 10*3/MM3 (ref 140–450)
PMV BLD AUTO: 9.2 FL (ref 6–12)
POTASSIUM BLD-SCNC: 3.6 MMOL/L (ref 3.5–5.2)
PROT SERPL-MCNC: 6.8 G/DL (ref 6–8.5)
RBC # BLD AUTO: 4.14 10*6/MM3 (ref 4.14–5.8)
SODIUM BLD-SCNC: 139 MMOL/L (ref 136–145)
TROPONIN T SERPL-MCNC: <0.01 NG/ML (ref 0–0.03)
WBC NRBC COR # BLD: 3.81 10*3/MM3 (ref 3.4–10.8)
WHOLE BLOOD HOLD SPECIMEN: NORMAL
WHOLE BLOOD HOLD SPECIMEN: NORMAL

## 2020-01-07 PROCEDURE — 85025 COMPLETE CBC W/AUTO DIFF WBC: CPT | Performed by: EMERGENCY MEDICINE

## 2020-01-07 PROCEDURE — 71046 X-RAY EXAM CHEST 2 VIEWS: CPT | Performed by: RADIOLOGY

## 2020-01-07 PROCEDURE — 84484 ASSAY OF TROPONIN QUANT: CPT | Performed by: EMERGENCY MEDICINE

## 2020-01-07 PROCEDURE — 87804 INFLUENZA ASSAY W/OPTIC: CPT | Performed by: PHYSICIAN ASSISTANT

## 2020-01-07 PROCEDURE — 25010000002 KETOROLAC TROMETHAMINE PER 15 MG: Performed by: PHYSICIAN ASSISTANT

## 2020-01-07 PROCEDURE — 80053 COMPREHEN METABOLIC PANEL: CPT | Performed by: EMERGENCY MEDICINE

## 2020-01-07 PROCEDURE — 99283 EMERGENCY DEPT VISIT LOW MDM: CPT

## 2020-01-07 PROCEDURE — 93005 ELECTROCARDIOGRAM TRACING: CPT | Performed by: EMERGENCY MEDICINE

## 2020-01-07 PROCEDURE — 71046 X-RAY EXAM CHEST 2 VIEWS: CPT

## 2020-01-07 PROCEDURE — 96374 THER/PROPH/DIAG INJ IV PUSH: CPT

## 2020-01-07 RX ORDER — OSELTAMIVIR PHOSPHATE 75 MG/1
75 CAPSULE ORAL ONCE
Status: COMPLETED | OUTPATIENT
Start: 2020-01-07 | End: 2020-01-07

## 2020-01-07 RX ORDER — SODIUM CHLORIDE 0.9 % (FLUSH) 0.9 %
10 SYRINGE (ML) INJECTION AS NEEDED
Status: DISCONTINUED | OUTPATIENT
Start: 2020-01-07 | End: 2020-01-07 | Stop reason: HOSPADM

## 2020-01-07 RX ORDER — KETOROLAC TROMETHAMINE 30 MG/ML
30 INJECTION, SOLUTION INTRAMUSCULAR; INTRAVENOUS ONCE
Status: COMPLETED | OUTPATIENT
Start: 2020-01-07 | End: 2020-01-07

## 2020-01-07 RX ORDER — ASPIRIN 325 MG
325 TABLET ORAL ONCE
Status: COMPLETED | OUTPATIENT
Start: 2020-01-07 | End: 2020-01-07

## 2020-01-07 RX ORDER — OSELTAMIVIR PHOSPHATE 75 MG/1
75 CAPSULE ORAL 2 TIMES DAILY
Qty: 10 CAPSULE | Refills: 0 | Status: SHIPPED | OUTPATIENT
Start: 2020-01-07 | End: 2020-01-29

## 2020-01-07 RX ADMIN — OSELTAMIVIR PHOSPHATE 75 MG: 75 CAPSULE ORAL at 19:39

## 2020-01-07 RX ADMIN — SODIUM CHLORIDE 1000 ML: 9 INJECTION, SOLUTION INTRAVENOUS at 19:39

## 2020-01-07 RX ADMIN — ASPIRIN 325 MG: 325 TABLET ORAL at 18:50

## 2020-01-07 RX ADMIN — KETOROLAC TROMETHAMINE 30 MG: 30 INJECTION, SOLUTION INTRAMUSCULAR; INTRAVENOUS at 19:39

## 2020-01-08 NOTE — ED PROVIDER NOTES
Subjective     History provided by:  Patient  Chest Pain   Pain location:  Substernal area  Pain quality: aching and tightness    Pain radiates to:  Does not radiate  Pain severity:  Mild  Onset quality:  Sudden  Duration:  2 days  Timing:  Intermittent  Progression:  Waxing and waning  Chronicity:  New  Context: at rest    Relieved by:  Nothing  Associated symptoms: cough and fever    Associated symptoms: no abdominal pain    Associated symptoms comment:  Body aches.       Review of Systems   Constitutional: Positive for fever.   HENT: Negative.    Respiratory: Positive for cough.    Cardiovascular: Positive for chest pain.   Gastrointestinal: Negative.  Negative for abdominal pain.   Endocrine: Negative.    Genitourinary: Negative.  Negative for dysuria.   Musculoskeletal: Positive for myalgias.   Skin: Negative.    Neurological: Negative.    Psychiatric/Behavioral: Negative.    All other systems reviewed and are negative.      Past Medical History:   Diagnosis Date   • Arthritis    • Diabetes mellitus (CMS/HCC)    • Headache    • Hypertension    • Low back pain        Allergies   Allergen Reactions   • Codeine Nausea And Vomiting   • Morphine And Related Nausea And Vomiting       Past Surgical History:   Procedure Laterality Date   • GASTRIC SLEEVE LAPAROSCOPIC     • HIP SURGERY Right 2009   • KNEE SURGERY Left     arthroscopy        Family History   Problem Relation Age of Onset   • Heart disease Father    • Hypertension Father    • Cancer Mother    • Diabetes Brother    • Hypertension Brother    • Gout Paternal Grandmother    • Diabetes Paternal Grandmother        Social History     Socioeconomic History   • Marital status:      Spouse name: Not on file   • Number of children: Not on file   • Years of education: Not on file   • Highest education level: Not on file   Tobacco Use   • Smoking status: Never Smoker   • Smokeless tobacco: Never Used   Substance and Sexual Activity   • Alcohol use: No   • Drug  use: No   • Sexual activity: Defer           Objective   Physical Exam   Constitutional: He is oriented to person, place, and time. He appears well-developed and well-nourished. No distress.   HENT:   Head: Normocephalic and atraumatic.   Right Ear: External ear normal.   Left Ear: External ear normal.   Nose: Nose normal.   Eyes: Conjunctivae are normal.   Neck: Normal range of motion. Neck supple. No JVD present. No tracheal deviation present.   Cardiovascular: Normal rate, regular rhythm and normal heart sounds.   No murmur heard.  Pulmonary/Chest: Effort normal and breath sounds normal. No respiratory distress. He has no wheezes.   Abdominal: Soft. Bowel sounds are normal. There is no tenderness.   Musculoskeletal: Normal range of motion. He exhibits no edema or deformity.   Neurological: He is alert and oriented to person, place, and time. No cranial nerve deficit.   Skin: Skin is warm and dry. No rash noted. He is not diaphoretic. No erythema. No pallor.   Psychiatric: He has a normal mood and affect. His behavior is normal. Thought content normal.   Nursing note and vitals reviewed.      Procedures           ED Course  ED Course as of Jan 08 1543   Tue Jan 07, 2020   1849 CXR rad interpreted:  No radiographic evidence of acute cardiac or pulmonary disease.    [RB]   1950 Secondary to acuity requested attending Dr. Tse to examine patient.     [RB]   2001 I have seen patient and agree with plan    [ROSMERY]      ED Course User Index  [ROSMERY] Heron Tse MD  [RB] Taz Cervantes PA                                             HEART Score (for prediction of 6-week risk of major adverse cardiac event) reviewed and/or performed as part of the patient evaluation and treatment planning process.  The result associated with this review/performance is: 2       MDM  Number of Diagnoses or Management Options  Influenza A: new and requires workup     Amount and/or Complexity of Data Reviewed  Clinical lab tests: ordered  and reviewed  Tests in the radiology section of CPT®: ordered and reviewed  Discuss the patient with other providers: yes    Risk of Complications, Morbidity, and/or Mortality  Presenting problems: moderate  Diagnostic procedures: moderate  Management options: low    Patient Progress  Patient progress: stable      Final diagnoses:   Influenza A            Taz Cervantes PA  01/07/20 1957       Taz Cervantes PA  01/08/20 1544

## 2020-01-29 ENCOUNTER — LAB (OUTPATIENT)
Dept: LAB | Facility: HOSPITAL | Age: 60
End: 2020-01-29

## 2020-01-29 ENCOUNTER — OFFICE VISIT (OUTPATIENT)
Dept: ORTHOPEDIC SURGERY | Facility: CLINIC | Age: 60
End: 2020-01-29

## 2020-01-29 VITALS
HEIGHT: 68 IN | HEART RATE: 62 BPM | DIASTOLIC BLOOD PRESSURE: 77 MMHG | SYSTOLIC BLOOD PRESSURE: 132 MMHG | BODY MASS INDEX: 30.62 KG/M2 | WEIGHT: 202 LBS

## 2020-01-29 DIAGNOSIS — M25.551 CHRONIC HIP PAIN, RIGHT: ICD-10-CM

## 2020-01-29 DIAGNOSIS — G89.29 CHRONIC HIP PAIN, RIGHT: ICD-10-CM

## 2020-01-29 DIAGNOSIS — Z96.641 STATUS POST TOTAL REPLACEMENT OF RIGHT HIP: ICD-10-CM

## 2020-01-29 DIAGNOSIS — Z96.641 STATUS POST TOTAL REPLACEMENT OF RIGHT HIP: Primary | ICD-10-CM

## 2020-01-29 PROCEDURE — 82495 ASSAY OF CHROMIUM: CPT

## 2020-01-29 PROCEDURE — 36415 COLL VENOUS BLD VENIPUNCTURE: CPT

## 2020-01-29 PROCEDURE — 83018 HEAVY METAL QUAN EACH NES: CPT

## 2020-01-29 PROCEDURE — 99213 OFFICE O/P EST LOW 20 MIN: CPT | Performed by: ORTHOPAEDIC SURGERY

## 2020-01-29 NOTE — PROGRESS NOTES
Follow-up Visit         Patient: Ang Jackson  YOB: 1960  Date of Encounter: 01/29/2020      Chief  Complaint:   Chief Complaint   Patient presents with   • Right Knee - Follow-up, Pain   • Left Knee - Follow-up, Pain   • Right Hip - Follow-up, Pain   • Left Hip - new-problem, Pain         HPI:  Ang Jackson, 59 y.o. male presents for evaluation of bilateral knee pain primarily left and bilateral hip pain.  He is undergone right total hip arthroplasty in [place] about 5 years ago.  He had staph infection following this required readmission with surgery to his right hip unclear whether it was exchanged.  He was maintained on IV antibiotics approximately 6 months.  His infection eventually cleared.  He has had serum chromium level drawn slightly which was elevated.  Second problem is left hip pain and he is known to have advanced osteoarthritis left hip.  He is considering left hip replacement.  Bilateral knee pain with left worse than the right.  He has had ACL injury in the past and is gone on to develop significant traumatic arthritis.  Wishes to consider left hip replacement and left knee replacement.  He remains concerned about his elevated chromium level.    Medical History:  Patient Active Problem List   Diagnosis   • Diabetes mellitus (CMS/HCC)   • Hypertension   • Pre-op evaluation   • BPH with obstruction/lower urinary tract symptoms   • Corporo-venous occlusive erectile dysfunction   • Low testosterone in male   • Scrotal varices   • Post-traumatic osteoarthritis of left knee   • Primary osteoarthritis of left knee     Past Medical History:   Diagnosis Date   • Arthritis    • Diabetes mellitus (CMS/HCC)    • Headache    • Hypertension    • Low back pain        Social History:  Social History     Socioeconomic History   • Marital status:      Spouse name: Not on file   • Number of children: Not on file   • Years of education: Not on file   • Highest education level: Not on file    Tobacco Use   • Smoking status: Never Smoker   • Smokeless tobacco: Never Used   Substance and Sexual Activity   • Alcohol use: No   • Drug use: No   • Sexual activity: Defer       Surgical History:  Past Surgical History:   Procedure Laterality Date   • CARPAL TUNNEL RELEASE Bilateral    • GASTRIC SLEEVE LAPAROSCOPIC     • HIP SURGERY Right 2009   • KNEE SURGERY Left     arthroscopy        Radiology:   Previous radiographs of pelvis shows advanced osteoarthritis left hip and right total hip arthroplasty which does not appear to be low on metal.  No obvious loosening.  Radiographs of left hip show advanced osteoarthritis from previous.  Radiographs of bilateral knees shows advanced osteoarthritis left knee mild osteoarthritis right knee.  Examination:   Examination right hip reveals mild discomfort at the extremes of rotation he has equal leg lengths full flexion.  Left hip evaluation reveals moderate discomfort with flexion and limited internal and external rotation of 30 degrees.  Knee evaluation shows mild flexion contracture further flexion to 120 degrees he has positive Lockman.  Mild swelling and moderate medial lateral joint line tenderness.  Neurovascular grossly intact.  Assessment & Plan:   59 y.o. male presents with concerns of right hip status post total hip arthroplasty we will obtain serum chromium and cobalt levels and obtain his operative reports from his hip surgery.  He is a candidate for both left total hip arthroplasty and left total knee replacement.  He is not certain at this point which one he would prefer to do first.  We will request serum cobalt and chrome levels and give him an opportunity to decision REguarding possible knee replacement possible hip replacement.  He has experienced significant weight loss approximately 120 pounds after gastric procedure.         Diagnosis Plan   1. Status post total replacement of right hip  Chromium Level    [place]   2. Chronic hip pain, right   Chromium Level    [place]         Procedures        Cc:  Mamta Ortega PA-C              This document has been electronically signed by Ang Boudreaux MD   January 29, 2020 9:04 AM

## 2020-01-31 LAB
COBALT SERPL-MCNC: 4.7 UG/L (ref 0–0.9)
CR SERPL-MCNC: 2.4 UG/L (ref 0.1–2.1)

## 2020-04-01 ENCOUNTER — OFFICE VISIT (OUTPATIENT)
Dept: ORTHOPEDIC SURGERY | Facility: CLINIC | Age: 60
End: 2020-04-01

## 2020-04-01 VITALS — HEIGHT: 68 IN | WEIGHT: 201.94 LBS | BODY MASS INDEX: 30.61 KG/M2

## 2020-04-01 DIAGNOSIS — G89.29 CHRONIC HIP PAIN, RIGHT: ICD-10-CM

## 2020-04-01 DIAGNOSIS — M17.32 POST-TRAUMATIC OSTEOARTHRITIS OF LEFT KNEE: ICD-10-CM

## 2020-04-01 DIAGNOSIS — M25.551 CHRONIC HIP PAIN, RIGHT: ICD-10-CM

## 2020-04-01 DIAGNOSIS — Z96.641 STATUS POST TOTAL REPLACEMENT OF RIGHT HIP: Primary | ICD-10-CM

## 2020-04-01 PROCEDURE — 99213 OFFICE O/P EST LOW 20 MIN: CPT | Performed by: ORTHOPAEDIC SURGERY

## 2020-04-01 NOTE — PROGRESS NOTES
Follow-up Visit         Patient: Ang Jackson  YOB: 1960  Date of Encounter: 04/01/2020      Chief  Complaint:   Chief Complaint   Patient presents with   • Right Hip - Pain, Follow-up   • Left Knee - Pain, Follow-up           HPI:  Ang Jackson, 59 y.o. male presents in follow-up with right hip pain left hip pain left knee pain.  He had right hip replaced in [place] per Dr. Zhou about 5 years ago he had staph infection following this.  He continues to experience pain right hip he localizes anteriorly and laterally.  He is unsure of the type of hip implant 5 years ago at [facility].  Last visit we requested cobalt chromium levels.  His second complaint is left hip pain and he is known to have osteoarthritis.  He has been considering left hip replacement he also has arthritis left knee.        Medical History:  Patient Active Problem List   Diagnosis   • Diabetes mellitus (CMS/HCC)   • Hypertension   • Pre-op evaluation   • BPH with obstruction/lower urinary tract symptoms   • Corporo-venous occlusive erectile dysfunction   • Low testosterone in male   • Scrotal varices   • Post-traumatic osteoarthritis of left knee   • Primary osteoarthritis of left knee     Past Medical History:   Diagnosis Date   • Arthritis    • Diabetes mellitus (CMS/HCC)    • Headache    • Hypertension    • Low back pain            Social History:  Social History     Socioeconomic History   • Marital status:      Spouse name: Not on file   • Number of children: Not on file   • Years of education: Not on file   • Highest education level: Not on file   Tobacco Use   • Smoking status: Never Smoker   • Smokeless tobacco: Never Used   Substance and Sexual Activity   • Alcohol use: No   • Drug use: No   • Sexual activity: Defer           Surgical History:  Past Surgical History:   Procedure Laterality Date   • CARPAL TUNNEL RELEASE Bilateral    • GASTRIC SLEEVE LAPAROSCOPIC     • HIP SURGERY Right 2009   • KNEE  SURGERY Left     arthroscopy          Examination:   Examination right hip reveals mild discomfort at the extremes of motion with equal leg lengths.  He also has moderate internal and external rotation left hip.      Radiographs:  EXAMINATION: XR HIPS BILATERAL W OR WO PELVIS 3-4 VIEW-      CLINICAL INDICATION:HIP PAIN; M25.551-Pain in right hip     COMPARISON: None      TECHNIQUE: AP pelvis, 2 view bilateral hip series     FINDINGS:   Right hip arthroplasty. No radiographic evidence of hardware  complication.     Severe osteoarthritis left hip joint. Sacroiliitis changes.     IMPRESSION:     1. No fracture or dislocation.  2. Severe osteoarthritis left hip.  3. Right hip arthroplasty.     This report was finalized on 10/27/2019 3:23 PM by Dr. Jason Kruse MD.       Labs:  Chromium Level   Specimen Information: Blood        Component  Ref Range & Units 2mo ago   Chromium, Plasma  0.1 - 2.1 ug/L 2.4High     Comment:                                 Detection Limit = 0.1   Resulting Agency [facility]      Narrative   Performed by: [facility]   Test(s) 071546-Chromium, Plasma  was developed and its performance characteristics determined  by [facility]. It has not been cleared or approved by the Food  and Drug Administration.  Performed at:  [address]  [facility]  [id number]  : Meli Bryan MD, Phone:  [phone]      Specimen Collected: 01/29/20 09:41 Last Resulted: 01/31/20 18:07            [place]   Specimen Information: Blood        Component  Ref Range & Units 2mo ago   Cobalt  0.0 - 0.9 ug/L 4.7High     Comment:                        Occupational Exposure: LEO 1.0                                   Detection Limit = 1.0   Resulting Agency [facility]      Narrative   Performed by: [facility]   Test(s) 339764-Sdckzs, Plasma  was developed and its performance characteristics determined  by [facility]. It has not been cleared or approved by the Food  and Drug Administration.  Performed  at:  01 - [facility]  [address] [place], [place]  [id number]  : Meil Bryan MD, Phone:  [phone]      Specimen Collected: 01/29/20 09:41 Last Resulted: 01/31/20 16:09             Assessment & Plan:   59 y.o. male presents with chronic pain to his right hip following total hip arthroplasty with postoperative infection.  He has moderate osteoarthritis left hip and moderate osteoarthritis left knee.  Regarding possible left total hip arthroplasty he is referred to Dr. Patel [facility].  He will also obtain Dr. Patel's opinion regarding chronic right hip pain with moderately elevated cobalt chromium levels.  Reviewing his operative report and radiagraphs he does not appear to have metal-on-metal hip replacement.         Diagnosis Plan   1. Chronic hip pain, right                 Cc:  Mamta Ortega PA-C              This document has been electronically signed by Ang Boudreaux MD   April 1, 2020 11:15

## 2020-05-22 ENCOUNTER — LAB (OUTPATIENT)
Dept: LAB | Facility: HOSPITAL | Age: 60
End: 2020-05-22

## 2020-05-22 ENCOUNTER — OFFICE VISIT (OUTPATIENT)
Dept: ORTHOPEDIC SURGERY | Facility: CLINIC | Age: 60
End: 2020-05-22

## 2020-05-22 VITALS — OXYGEN SATURATION: 99 % | BODY MASS INDEX: 31.07 KG/M2 | HEIGHT: 68 IN | HEART RATE: 80 BPM | WEIGHT: 205 LBS

## 2020-05-22 DIAGNOSIS — Z96.649 PAIN DUE TO TOTAL HIP REPLACEMENT, INITIAL ENCOUNTER (HCC): Primary | ICD-10-CM

## 2020-05-22 DIAGNOSIS — Z96.641 HISTORY OF TOTAL RIGHT HIP REPLACEMENT: ICD-10-CM

## 2020-05-22 DIAGNOSIS — T84.84XA PAIN DUE TO TOTAL HIP REPLACEMENT, INITIAL ENCOUNTER (HCC): Primary | ICD-10-CM

## 2020-05-22 DIAGNOSIS — T84.84XA PAIN DUE TO TOTAL HIP REPLACEMENT, INITIAL ENCOUNTER (HCC): ICD-10-CM

## 2020-05-22 DIAGNOSIS — Z96.641 STATUS POST TOTAL REPLACEMENT OF RIGHT HIP: ICD-10-CM

## 2020-05-22 DIAGNOSIS — Z96.649 PAIN DUE TO TOTAL HIP REPLACEMENT, INITIAL ENCOUNTER (HCC): ICD-10-CM

## 2020-05-22 LAB
BASOPHILS # BLD AUTO: 0.02 10*3/MM3 (ref 0–0.2)
BASOPHILS NFR BLD AUTO: 0.4 % (ref 0–1.5)
CRP SERPL-MCNC: 0.04 MG/DL (ref 0–0.5)
DEPRECATED RDW RBC AUTO: 45.2 FL (ref 37–54)
EOSINOPHIL # BLD AUTO: 0.2 10*3/MM3 (ref 0–0.4)
EOSINOPHIL NFR BLD AUTO: 3.8 % (ref 0.3–6.2)
ERYTHROCYTE [DISTWIDTH] IN BLOOD BY AUTOMATED COUNT: 13.1 % (ref 12.3–15.4)
ERYTHROCYTE [SEDIMENTATION RATE] IN BLOOD: <1 MM/HR (ref 0–20)
HCT VFR BLD AUTO: 40.6 % (ref 37.5–51)
HGB BLD-MCNC: 12.9 G/DL (ref 13–17.7)
IMM GRANULOCYTES # BLD AUTO: 0.02 10*3/MM3 (ref 0–0.05)
IMM GRANULOCYTES NFR BLD AUTO: 0.4 % (ref 0–0.5)
LYMPHOCYTES # BLD AUTO: 2.2 10*3/MM3 (ref 0.7–3.1)
LYMPHOCYTES NFR BLD AUTO: 41.7 % (ref 19.6–45.3)
MCH RBC QN AUTO: 30.2 PG (ref 26.6–33)
MCHC RBC AUTO-ENTMCNC: 31.8 G/DL (ref 31.5–35.7)
MCV RBC AUTO: 95.1 FL (ref 79–97)
MONOCYTES # BLD AUTO: 0.44 10*3/MM3 (ref 0.1–0.9)
MONOCYTES NFR BLD AUTO: 8.3 % (ref 5–12)
NEUTROPHILS # BLD AUTO: 2.39 10*3/MM3 (ref 1.7–7)
NEUTROPHILS NFR BLD AUTO: 45.4 % (ref 42.7–76)
NRBC BLD AUTO-RTO: 0 /100 WBC (ref 0–0.2)
PLATELET # BLD AUTO: 183 10*3/MM3 (ref 140–450)
PMV BLD AUTO: 9.9 FL (ref 6–12)
RBC # BLD AUTO: 4.27 10*6/MM3 (ref 4.14–5.8)
WBC NRBC COR # BLD: 5.27 10*3/MM3 (ref 3.4–10.8)

## 2020-05-22 PROCEDURE — 85652 RBC SED RATE AUTOMATED: CPT

## 2020-05-22 PROCEDURE — 85025 COMPLETE CBC W/AUTO DIFF WBC: CPT

## 2020-05-22 PROCEDURE — 99214 OFFICE O/P EST MOD 30 MIN: CPT | Performed by: ORTHOPAEDIC SURGERY

## 2020-05-22 PROCEDURE — 36415 COLL VENOUS BLD VENIPUNCTURE: CPT

## 2020-05-22 PROCEDURE — 86140 C-REACTIVE PROTEIN: CPT

## 2020-05-22 NOTE — PROGRESS NOTES
Orthopaedic Clinic Note: Hip New Patient    Chief Complaint   Patient presents with   • Right Hip - Pain        HPI    Ang Jackson is a 59 y.o. male who presents with right hip pain for 6 year(s).  He originally underwent total hip arthroplasty with metal-on-metal construct by Dr. Joce Zhou in 2009.  He subsequently developed acute postoperative infection and underwent irrigation and debridement without component exchange within 30 days of his index procedure.  He was treated with a prolonged course of antibiotics to treat his infection and subsequently reportedly had no symptoms up until about 6 years ago when he started developing progressive lateral based hip pain with occasional referred pain to the groin.  He denies any gross fevers chills or constitutional symptoms.  He rates the pain a 9/10 on the pain scale.Pain is described as dull, aching and throbbing. Associated symptoms include pain, popping, grinding and stiffness.  The pain is worse with walking, standing, sitting, climbing stairs and sleeping; resting, ice, heat and pain medication and/or NSAID improve the pain. Previous treatments have included: cane/walker, NSAIDS and physical therapy since symptom onset. Although some transient relief was reported with these interventions, these conservative measures have failed and symptoms have persisted. The patient is limited in daily activities and has had a significant decrease in quality of life as a result. He denies fevers, chills, or constitutional symptoms    I have reviewed the following portions of the patient's history:History of Present Illness    Past Medical History:   Diagnosis Date   • Arthritis    • Diabetes mellitus (CMS/HCC)    • Headache    • Hypertension    • Low back pain       Past Surgical History:   Procedure Laterality Date   • CARPAL TUNNEL RELEASE Bilateral    • GASTRIC SLEEVE LAPAROSCOPIC     • HAND SURGERY     • HIP SURGERY Right 2009   • KNEE SURGERY Left     arthroscopy        Family History   Problem Relation Age of Onset   • Heart disease Father    • Hypertension Father    • Osteoarthritis Father    • Cancer Mother    • Diabetes Brother    • Hypertension Brother    • Gout Paternal Grandmother    • Diabetes Paternal Grandmother      Social History     Socioeconomic History   • Marital status:      Spouse name: Not on file   • Number of children: Not on file   • Years of education: Not on file   • Highest education level: Not on file   Tobacco Use   • Smoking status: Never Smoker   • Smokeless tobacco: Never Used   Substance and Sexual Activity   • Alcohol use: No   • Drug use: No   • Sexual activity: Defer      Current Outpatient Medications on File Prior to Visit   Medication Sig Dispense Refill   • gabapentin (NEURONTIN) 600 MG tablet Take 600 mg by mouth 3 (Three) Times a Day.     • HYDROcodone-acetaminophen (NORCO)  MG per tablet Take  by mouth 4 (Four) Times a Day.     • hydrOXYzine (VISTARIL) 25 MG capsule Take  by mouth 3 (Three) Times a Day.     • lisinopril (PRINIVIL,ZESTRIL) 5 MG tablet Take 5 mg by mouth Daily.     • LORATADINE PO Take  by mouth.     • meloxicam (MOBIC) 7.5 MG tablet Take 7.5 mg by mouth Daily.     • omeprazole (priLOSEC) 20 MG capsule Take 20 mg by mouth Daily.     • rOPINIRole (REQUIP) 2 MG tablet Take  by mouth.     • tamsulosin (FLOMAX) 0.4 MG capsule 24 hr capsule Take 1 capsule by mouth Every Night. 30 capsule 3   • amitriptyline (ELAVIL) 50 MG tablet Take 50 mg by mouth Every Night.     • amLODIPine (NORVASC) 10 MG tablet Take  by mouth Daily.     • Multiple Vitamins-Minerals (MENS MULTIPLUS PO) Take  by mouth.     • vitamin C (ASCORBIC ACID) 250 MG tablet Take 250 mg by mouth Daily.       No current facility-administered medications on file prior to visit.       Allergies   Allergen Reactions   • Codeine Nausea And Vomiting   • Morphine And Related Nausea And Vomiting        Review of Systems   Constitutional: Positive for activity  "change and fatigue.   HENT: Negative.    Eyes: Negative.    Respiratory: Negative.    Cardiovascular: Negative.    Gastrointestinal: Negative.    Endocrine: Positive for cold intolerance.   Genitourinary: Negative.    Musculoskeletal: Positive for back pain, joint swelling, neck pain and neck stiffness.   Skin: Negative.    Allergic/Immunologic: Negative.    Neurological: Positive for weakness and numbness.   Hematological: Negative.    Psychiatric/Behavioral: Positive for sleep disturbance. The patient is nervous/anxious.    Answers for HPI/ROS submitted by the patient on 5/20/2020   What is the primary reason for your visit?: Physical       The patient's Review of Systems was personally reviewed and confirmed as accurate.    The following portions of the patient's history were reviewed and updated as appropriate: allergies, current medications, past family history, past medical history, past social history, past surgical history and problem list.    Physical Exam  Pulse 80, height 172.7 cm (67.99\"), weight 93 kg (205 lb), SpO2 99 %.    Body mass index is 31.18 kg/m².    GENERAL APPEARANCE: awake, alert & oriented x 3, in no acute distress and well developed, well nourished  PSYCH: normal affect  LUNGS:  breathing nonlabored  EYES: sclera anicteric  CARDIOVASCULAR: palpable dorsalis pedis, palpable posterior tibial bilaterally. Capillary refill less than 2 seconds  EXTREMITIES: no clubbing, cyanosis  GAIT:  Antalgic           Right Hip Exam:  RANGE OF MOTION:   FLEXION CONTRACTURE: None   FLEXION: 110 degrees   INTERNAL ROTATION: 20 degrees at 90 degrees of flexion   EXTERNAL ROTATION: 40 degrees at 90 degrees of flexion    PAIN WITH HIP MOTION: Yes, localized to groin and lateral trochanter  PAIN WITH LOGROLL: no  STINCHFIELD TEST: Mildly positive    KNEE EXAM: full knee ROM (0-120 degrees), stable to varus and valgus stress at terminal extension and 30 degrees flexion     STRENGTH:  5/5 hip adduction, abduction, " flexion. 5/5 strength knee flexion, extension. 5/5 strength ankle dorsiflexion and plantarflexion.     GREATER TROCHANTER BURSAL PAIN:  no     REFLEXES:   PATELLAR 2+/4   ACHILLES 2+/4    CLONUS: negative  STRAIGHT LEG TEST:   negative    SENSATION TO LIGHT TOUCH:  DEEP PERONEAL/SUPERFICIAL PERONEAL/SURAL/SAPHENOUS/TIBIAL:  intact    EDEMA:   no  ERYTHEMA:  no  WOUNDS/INCISIONS: Well-healed posterior lateral incision.      Left Hip Exam:   RANGE OF MOTION:   FLEXION CONTRACTURE: None  FLEXION: 100 degrees  INTERNAL ROTATION: neutral degrees at 90 degrees of flexion   EXTERNAL ROTATION: 30 degrees at 90 degrees of flexion    PAIN WITH HIP MOTION: yes  PAIN WITH LOGROLL: yes  STINCHFIELD TEST: positive    KNEE EXAM: full knee ROM (0-120 degrees), stable to varus and valgus stress at terminal extension and 30 degrees flexion     STRENGTH:  5/5 hip adduction, abduction, flexion. 5/5 knee flexion, extension. 5/5 ankle dorsiflexion and plantarflexion.     GREATER TROCHANTER BURSAL PAIN:  yes     REFLEXES:   PATELLAR 2+/4   ACHILLES 2+/4    CLONUS: no  STRAIGHT LEG TEST:   negative    SENSATION TO LIGHT TOUCH:  DEEP PERONEAL/SUPERFICIAL PERONEAL/SURAL/SAPHENOUS/TIBIAL:  intact    EDEMA:   no  ERYTHEMA:  no  WOUNDS/INCISIONS:  no      ------------------------------------------------------------------    LEG LENGTHS:  equal  _____________________________________________________  _____________________________________________________    RADIOGRAPHIC FINDINGS:   Indication: Right hip pain    Comparison: Todays xrays were compared to previous xrays from 5/16/2013    AP pelvis, hip 2 views: Right: Radiographs demonstrate a total hip arthroplasty in place with metal-on-metal construct.  There are lucencies adjacent to the proximal portion of the femoral prosthesis.  These lucencies were also present in radiographs in 2013.  The overall appearance of the femoral stem remains grossly unchanged compared to those 2013 films with no  evidence of subsidence.  Acetabular component also remains well-positioned with no evidence of lucency.; Left: advanced, end-stage osteoarthritis with bone on bone articulation, subchondral sclerosis, and subchondral cysts, there are marginal osteophytes visualized at the femoral head-neck junction and acetabular margins      Labs were also personally reviewed.  Cobalt is elevated at 4.7 with a range of 0-0.9 is normal.  Chromium is elevated at 2.4 with a normal range of 0.1-2.1.    Assessment/Plan:   Diagnosis Plan   1. Pain due to total hip replacement, initial encounter (CMS/[facility])  CBC Auto Differential    C-reactive Protein    Sedimentation Rate   2. Status post total replacement of right hip     3. History of total right hip replacement  XR Hip With or Without Pelvis 2 - 3 View Right     I discussed with the patient that he does appear to have intra-articular type of pain with his total joint.  Given his history of an acute postoperative infection, it is unclear what the etiology of his pain is at this point.  He does demonstrate elevated metal ion labs concerning for possible metal-on-metal source of pain.  Alternatively, he could have an occult infection that is resulting in this progressive pain.  He has had no work-up for infection therefore I recommend proceeding with baseline lab work including sed rate, CRP, CBC with differential.  He is agreeable to that.  Based on the results of that, aspiration of the hip joint may need to be performed prior to determining what revision surgery would be warranted.  I explained that he is at high risk for possible future infection with revision surgery due to his prior history of infection.  Nevertheless, given his 6-year progressive and ongoing pain, I do believe that some intervention is warranted at this time after a thorough work-up has been performed.  I will see him back in 1 week to discuss the results of his initial lab work-up.    Jb Patel,  MD  05/22/20  16:43

## 2020-05-29 ENCOUNTER — OFFICE VISIT (OUTPATIENT)
Dept: ORTHOPEDIC SURGERY | Facility: CLINIC | Age: 60
End: 2020-05-29

## 2020-05-29 ENCOUNTER — HOSPITAL ENCOUNTER (EMERGENCY)
Facility: HOSPITAL | Age: 60
Discharge: HOME | End: 2020-05-29
Attending: FAMILY MEDICINE | Admitting: FAMILY MEDICINE

## 2020-05-29 VITALS
WEIGHT: 209 LBS | DIASTOLIC BLOOD PRESSURE: 69 MMHG | TEMPERATURE: 98.7 F | OXYGEN SATURATION: 97 % | SYSTOLIC BLOOD PRESSURE: 148 MMHG | RESPIRATION RATE: 16 BRPM | HEIGHT: 68 IN | HEART RATE: 72 BPM | BODY MASS INDEX: 31.67 KG/M2

## 2020-05-29 DIAGNOSIS — Z96.649 PAIN DUE TO TOTAL HIP REPLACEMENT, SUBSEQUENT ENCOUNTER: Primary | ICD-10-CM

## 2020-05-29 DIAGNOSIS — T84.84XD PAIN DUE TO TOTAL HIP REPLACEMENT, SUBSEQUENT ENCOUNTER: Primary | ICD-10-CM

## 2020-05-29 DIAGNOSIS — L02.11 ABSCESS, NECK: Primary | ICD-10-CM

## 2020-05-29 PROCEDURE — 99283 EMERGENCY DEPT VISIT LOW MDM: CPT

## 2020-05-29 RX ORDER — SULFAMETHOXAZOLE AND TRIMETHOPRIM 800; 160 MG/1; MG/1
1 TABLET ORAL 2 TIMES DAILY
Qty: 20 TABLET | Refills: 0 | Status: SHIPPED | OUTPATIENT
Start: 2020-05-29 | End: 2020-07-07

## 2020-05-29 RX ORDER — HYDROCODONE BITARTRATE AND ACETAMINOPHEN 5; 325 MG/1; MG/1
1 TABLET ORAL ONCE
Status: COMPLETED | OUTPATIENT
Start: 2020-05-29 | End: 2020-05-29

## 2020-05-29 RX ORDER — LIDOCAINE HYDROCHLORIDE 10 MG/ML
5 INJECTION, SOLUTION EPIDURAL; INFILTRATION; INTRACAUDAL; PERINEURAL ONCE
Status: COMPLETED | OUTPATIENT
Start: 2020-05-29 | End: 2020-05-29

## 2020-05-29 RX ORDER — SULFAMETHOXAZOLE AND TRIMETHOPRIM 800; 160 MG/1; MG/1
1 TABLET ORAL ONCE
Status: COMPLETED | OUTPATIENT
Start: 2020-05-29 | End: 2020-05-29

## 2020-05-29 RX ADMIN — SULFAMETHOXAZOLE AND TRIMETHOPRIM 160 MG: 800; 160 TABLET ORAL at 17:41

## 2020-05-29 RX ADMIN — LIDOCAINE HYDROCHLORIDE 5 ML: 10 INJECTION, SOLUTION EPIDURAL; INFILTRATION; INTRACAUDAL; PERINEURAL at 17:41

## 2020-05-29 RX ADMIN — HYDROCODONE BITARTRATE AND ACETAMINOPHEN 1 TABLET: 5; 325 TABLET ORAL at 17:41

## 2020-05-29 NOTE — PROGRESS NOTES
Orthopaedic Clinic Note: Hip Established Patient    Chief Complaint   Patient presents with   • Follow-up     1 week- Pain due to total hip replacement- labs    You have chosen to receive care through a telephone visit. Do you consent to use a telephone visit for your medical care today? Yes      HPI    It has been 1  week(s) since Mr. Jackson's last visit. He returns for telephone encounter today to discuss the results of his lab work.  His sed rate and CRP and white blood cell counts were all within normal limits with sed rate less than 1, CRP 0.04 and white blood count within normal limits.  He does have a history of MSSA infection which was treated with antibiotics and debridement approximately 30 days postoperatively.  He has had progressive ongoing pain for approximately 6 years localized to the groin.  He does have elevated cobalt and chromium labs.  He is wishing to discuss additional treatment for his ongoing pain.      Past Medical History:   Diagnosis Date   • Arthritis    • Diabetes mellitus (CMS/HCC)    • Headache    • Hypertension    • Low back pain       Past Surgical History:   Procedure Laterality Date   • CARPAL TUNNEL RELEASE Bilateral    • GASTRIC SLEEVE LAPAROSCOPIC     • HAND SURGERY     • HIP SURGERY Right 2009   • KNEE SURGERY Left     arthroscopy       Family History   Problem Relation Age of Onset   • Heart disease Father    • Hypertension Father    • Osteoarthritis Father    • Cancer Mother    • Diabetes Brother    • Hypertension Brother    • Gout Paternal Grandmother    • Diabetes Paternal Grandmother      Social History     Socioeconomic History   • Marital status:      Spouse name: Not on file   • Number of children: Not on file   • Years of education: Not on file   • Highest education level: Not on file   Tobacco Use   • Smoking status: Never Smoker   • Smokeless tobacco: Never Used   Substance and Sexual Activity   • Alcohol use: No   • Drug use: No   • Sexual activity: Defer       Current Outpatient Medications on File Prior to Visit   Medication Sig Dispense Refill   • amitriptyline (ELAVIL) 50 MG tablet Take 50 mg by mouth Every Night.     • amLODIPine (NORVASC) 10 MG tablet Take  by mouth Daily.     • gabapentin (NEURONTIN) 600 MG tablet Take 600 mg by mouth 3 (Three) Times a Day.     • HYDROcodone-acetaminophen (NORCO)  MG per tablet Take  by mouth 4 (Four) Times a Day.     • hydrOXYzine (VISTARIL) 25 MG capsule Take  by mouth 3 (Three) Times a Day.     • lisinopril (PRINIVIL,ZESTRIL) 5 MG tablet Take 5 mg by mouth Daily.     • LORATADINE PO Take  by mouth.     • meloxicam (MOBIC) 7.5 MG tablet Take 7.5 mg by mouth Daily.     • Multiple Vitamins-Minerals (MENS MULTIPLUS PO) Take  by mouth.     • omeprazole (priLOSEC) 20 MG capsule Take 20 mg by mouth Daily.     • rOPINIRole (REQUIP) 2 MG tablet Take  by mouth.     • tamsulosin (FLOMAX) 0.4 MG capsule 24 hr capsule Take 1 capsule by mouth Every Night. 30 capsule 3   • vitamin C (ASCORBIC ACID) 250 MG tablet Take 250 mg by mouth Daily.       No current facility-administered medications on file prior to visit.       Allergies   Allergen Reactions   • Codeine Nausea And Vomiting   • Morphine And Related Nausea And Vomiting        Review of Systems   Constitutional: Negative.    HENT: Negative.    Eyes: Negative.    Respiratory: Negative.    Cardiovascular: Negative.    Gastrointestinal: Negative.    Endocrine: Negative.    Genitourinary: Negative.    Musculoskeletal: Positive for arthralgias.   Skin: Negative.    Allergic/Immunologic: Negative.    Neurological: Negative.    Hematological: Negative.    Psychiatric/Behavioral: Negative.         The patient's Review of Systems was personally reviewed and confirmed as accurate.    Physical Exam    no physical exam secondary to telephone encounter    _________________________________________________________________  _________________________________________________________________    RADIOGRAPHIC FINDINGS:   No new imaging today    Assessment/Plan:   Diagnosis Plan   1. Pain due to total hip replacement, subsequent encounter  NM Bone Scan 3 Phase     I reviewed the clinical labs with the patient explained that everything is within normal limits thus indicating low probability of infection.  Given the radiographic appearance of the lysis around the femoral component, I am concerned about femoral component loosening.  I will order a triphasic bone scan to evaluate the femoral component.  I discussed possibilities for revision including head liner exchange versus explant and complete revision versus partial explant of the femur component if it is found to be loose.  We will see him back after the triphasic bone scan to discuss the results and how to proceed.    Review of his primary surgery operative report shows [place] hip system with a 58 cluster cup, size 11 standard stem, 40+1.5 mm neck.    This visit has been rescheduled as a phone visit to comply with patient safety concerns in accordance with CDC recommendations. Total time of discussion was 5 minutes 15 seconds minutes.    Jb Patel MD  05/29/20  15:02

## 2020-06-12 ENCOUNTER — HOSPITAL ENCOUNTER (OUTPATIENT)
Dept: NUCLEAR MEDICINE | Facility: HOSPITAL | Age: 60
Discharge: HOME | End: 2020-06-12

## 2020-06-12 DIAGNOSIS — T84.84XD PAIN DUE TO TOTAL HIP REPLACEMENT, SUBSEQUENT ENCOUNTER: ICD-10-CM

## 2020-06-12 DIAGNOSIS — Z96.649 PAIN DUE TO TOTAL HIP REPLACEMENT, SUBSEQUENT ENCOUNTER: ICD-10-CM

## 2020-06-12 PROCEDURE — 78315 BONE IMAGING 3 PHASE: CPT

## 2020-06-12 PROCEDURE — A9503 TC99M MEDRONATE: HCPCS | Performed by: ORTHOPAEDIC SURGERY

## 2020-06-12 PROCEDURE — 0 TECHNETIUM MEDRONATE KIT: Performed by: ORTHOPAEDIC SURGERY

## 2020-06-12 RX ORDER — TC 99M MEDRONATE 20 MG/10ML
20.5 INJECTION, POWDER, LYOPHILIZED, FOR SOLUTION INTRAVENOUS
Status: COMPLETED | OUTPATIENT
Start: 2020-06-12 | End: 2020-06-12

## 2020-06-12 RX ADMIN — Medication 20.5 MILLICURIE: at 11:50

## 2020-06-18 ENCOUNTER — OFFICE VISIT (OUTPATIENT)
Dept: ORTHOPEDIC SURGERY | Facility: CLINIC | Age: 60
End: 2020-06-18

## 2020-06-18 DIAGNOSIS — Z96.649 PAIN DUE TO TOTAL HIP REPLACEMENT, SUBSEQUENT ENCOUNTER: Primary | ICD-10-CM

## 2020-06-18 DIAGNOSIS — T84.84XD PAIN DUE TO TOTAL HIP REPLACEMENT, SUBSEQUENT ENCOUNTER: Primary | ICD-10-CM

## 2020-06-18 PROBLEM — T84.84XA PAIN DUE TO TOTAL HIP REPLACEMENT: Status: ACTIVE | Noted: 2020-06-18

## 2020-06-18 PROCEDURE — 99442 PR PHYS/QHP TELEPHONE EVALUATION 11-20 MIN: CPT | Performed by: ORTHOPAEDIC SURGERY

## 2020-06-18 RX ORDER — PREGABALIN 75 MG/1
75 CAPSULE ORAL ONCE
Status: CANCELLED | OUTPATIENT
Start: 2020-06-18 | End: 2020-06-18

## 2020-06-18 RX ORDER — MELOXICAM 7.5 MG/1
15 TABLET ORAL ONCE
Status: CANCELLED | OUTPATIENT
Start: 2020-06-18 | End: 2020-06-18

## 2020-06-18 RX ORDER — ACETAMINOPHEN 325 MG/1
1000 TABLET ORAL ONCE
Status: CANCELLED | OUTPATIENT
Start: 2020-06-18 | End: 2020-06-18

## 2020-06-18 NOTE — PROGRESS NOTES
You have chosen to receive care through a telephone visit. Do you consent to use a telephone visit for your medical care today?YES    Orthopaedic Clinic Note: Hip Established Patient    Chief Complaint   Patient presents with   • Follow-up     3 week recheck Bone Scan (6/12/20)- Pain due to total hip replacement        HPI    It has been 3  week(s) since Mr. Jackson's last visit. He returns for telephone encounter today to discuss his bone scan results.  He continues to have significant pain localized to the groin.  He is also having some pain into the thigh region.  He states that this pain has been ongoing since about 2013 and has gotten progressively worse.  He rates his pain a 9/10 on the pain scale.  He is overall doing about the same and is continue to have difficulty with daily activities due to his ongoing pain.     Past Medical History:   Diagnosis Date   • Arthritis    • Diabetes mellitus (CMS/HCC)    • Headache    • Hypertension    • Low back pain       Past Surgical History:   Procedure Laterality Date   • CARPAL TUNNEL RELEASE Bilateral    • GASTRIC SLEEVE LAPAROSCOPIC     • HAND SURGERY     • HIP SURGERY Right 2009   • KNEE SURGERY Left     arthroscopy       Family History   Problem Relation Age of Onset   • Heart disease Father    • Hypertension Father    • Osteoarthritis Father    • Cancer Mother    • Diabetes Brother    • Hypertension Brother    • Gout Paternal Grandmother    • Diabetes Paternal Grandmother      Social History     Socioeconomic History   • Marital status:      Spouse name: Not on file   • Number of children: Not on file   • Years of education: Not on file   • Highest education level: Not on file   Tobacco Use   • Smoking status: Never Smoker   • Smokeless tobacco: Never Used   Substance and Sexual Activity   • Alcohol use: No   • Drug use: No   • Sexual activity: Defer      Current Outpatient Medications on File Prior to Visit   Medication Sig Dispense Refill   • amitriptyline  (ELAVIL) 50 MG tablet Take 50 mg by mouth Every Night.     • amLODIPine (NORVASC) 10 MG tablet Take  by mouth Daily.     • gabapentin (NEURONTIN) 600 MG tablet Take 600 mg by mouth 3 (Three) Times a Day.     • HYDROcodone-acetaminophen (NORCO)  MG per tablet Take  by mouth 4 (Four) Times a Day.     • hydrOXYzine (VISTARIL) 25 MG capsule Take  by mouth 3 (Three) Times a Day.     • lisinopril (PRINIVIL,ZESTRIL) 5 MG tablet Take 5 mg by mouth Daily.     • LORATADINE PO Take  by mouth.     • meloxicam (MOBIC) 7.5 MG tablet Take 7.5 mg by mouth Daily.     • Multiple Vitamins-Minerals (MENS MULTIPLUS PO) Take  by mouth.     • omeprazole (priLOSEC) 20 MG capsule Take 20 mg by mouth Daily.     • rOPINIRole (REQUIP) 2 MG tablet Take  by mouth.     • sulfamethoxazole-trimethoprim (BACTRIM DS,SEPTRA DS) 800-160 MG per tablet Take 1 tablet by mouth 2 (Two) Times a Day. 20 tablet 0   • tamsulosin (FLOMAX) 0.4 MG capsule 24 hr capsule Take 1 capsule by mouth Every Night. 30 capsule 3   • vitamin C (ASCORBIC ACID) 250 MG tablet Take 250 mg by mouth Daily.       No current facility-administered medications on file prior to visit.       Allergies   Allergen Reactions   • Codeine Nausea And Vomiting   • Morphine And Related Nausea And Vomiting        Review of Systems   Constitutional: Negative.    HENT: Negative.    Eyes: Negative.    Respiratory: Negative.    Cardiovascular: Negative.    Gastrointestinal: Negative.    Endocrine: Negative.    Genitourinary: Negative.    Musculoskeletal: Positive for arthralgias.   Skin: Negative.    Allergic/Immunologic: Negative.    Neurological: Negative.    Hematological: Negative.    Psychiatric/Behavioral: Negative.         The patient's Review of Systems was personally reviewed and confirmed as accurate.    Physical Exam  No new imaging  today  _________________________________________________________________  _________________________________________________________________    RADIOGRAPHIC FINDINGS:   Three-phase bone scan for 6/12/2020 was personally reviewed.  Bone scan demonstrates subtle tracer uptake in the proximal femur adjacent to the femoral stem.  Comparison to results from 2013 reveal no significant changes.    Assessment/Plan:   Diagnosis Plan   1. Pain due to total hip replacement, subsequent encounter  Case Request    CBC and Differential    Basic metabolic panel    Protime-INR    APTT    Hemoglobin A1c    Urinalysis With Culture If Indicated -    ECG 12 Lead    Nicotine & Metabolite, Quant    ceFAZolin (ANCEF) 2 g in sodium chloride 0.9 % 100 mL IVPB    acetaminophen (TYLENOL) tablet 975 mg    meloxicam (MOBIC) tablet 15 mg    pregabalin (LYRICA) capsule 75 mg    mupirocin (BACTROBAN) 2 % nasal ointment 1 application    Tranexamic Acid 1,000 mg in sodium chloride 0.9 % 100 mL    Tranexamic Acid 1,000 mg in sodium chloride 0.9 % 100 mL    Case Request     Had an extensive discussion with patient regarding his ongoing pain in his hip.  Although the bone scan does show some tracer uptake about the proximal femur, his femoral stem remains grossly unchanged compared to 2013 radiographs.  There is perhaps some slight subtle increase in the lucency at the proximal femur, but the stem has not subsided or change in position since 2009.  I discussed with him that surgical intervention would at least entail revision of the articulation from a metal-on-metal articulation to likely a ceramic on polyethylene articulation.  At the time of surgery, we could also explore the femoral component to determine if it is loose.  Loosening of the femoral component may be another source of his pain and if so, revision of the femoral stem would also be necessary.  He expressed understanding.  We will schedule him for surgery next available.    The patient has  clinical and radiographic evidence of failed right hip arthroplasty secondary to metal-on-metal articulation and possible aseptic loosening. Conservative measures have been tried, but have failed to adequately treat or improve the patient's symptoms. Pain is restricting the patient's daily activities as well as quality of life. The recommendation at this time is to proceed with a revision right total hip arthroplasty with the goal to improve patient function and pain and address the underlying problems with the current arthroplasty construct. The possible intraoperative acquisition and evaluation process for tissue and fluid cultures and frozen tissue sections by Pathology for acute inflammation was outlined. Despite low clinical evidence for infection pre-operatively, the possibility of finding evidence of infection during or after surgery was discussed. In the case of finding evidence for infection intra-operatively, appropriate treatment would involve removing all the arthroplasty implants then debriding the bone, joint, and synovial lining. An antibiotic spacer would then be placed in the joint space to treat the infection for at least 8-12 weeks, followed by joint reconstruction if/when there is adequate clinical verification the infection has been eradicated. If cultures taken during the procedure show evidence of bacterial growth, then at least a 6 week course of antibiotics will be given. It was explained that positive cultures can also occur when intra-operative findings do not indicate infection, and that antibiotic treatment may be required in some situations after a revision has been done. The risks, benefits, potential complications, and alternatives were discussed with the patient in detail. Risks included but were not limited to bleeding, infection, anesthesia risks, damage to neurovascular structures, osteolysis, aseptic loosening, instability, dislocation, pain, continued pain, iatrogenic fracture,  leg length discrepancy, possible need for future surgery including the potential for amputation, blood clots, myocardial infarction, stroke, and death. Taylor-operative blood management and the potential for blood transfusion were discussed with risks and options clearly outlined. Specific details of the surgical procedure, hospitalization, recovery, rehabilitation, and long-term precautions were also presented. Pre-operative teaching was provided. Implant/prosthesis selection was outlined, and the many options available were explained; the final choice will be made at the time of the procedure to match the anatomy and condition of the bone, ligaments, tendons, and muscles. Given this instruction, the patient elected to proceed with the revision right total hip arthroplasty. The patient will be seen by pre-admission testing for pre-operative optimization and risk assessment and will be scheduled for surgery once this is completed.    The patient is considered standard risk for DVT based on patient risk factors and will be placed on aspirin postoperatively for DVT prophylaxis.      This visit has been rescheduled as a phone visit to comply with patient safety concerns in accordance with CDC recommendations. Total time of discussion was 11 minutes 2 seconds.      Jb Patel MD  06/18/20  07:55

## 2020-06-22 ENCOUNTER — TELEPHONE (OUTPATIENT)
Dept: ORTHOPEDIC SURGERY | Facility: CLINIC | Age: 60
End: 2020-06-22

## 2020-06-22 NOTE — TELEPHONE ENCOUNTER
CALLED PATIENT ON [facility] [employer] [facility] [employer] TO SCHEDULE R VALERI REVISION SURGERY, NO ANSWER, LVM TO RETURN MY CALL.

## 2020-07-07 ENCOUNTER — APPOINTMENT (OUTPATIENT)
Dept: PREADMISSION TESTING | Facility: HOSPITAL | Age: 60
End: 2020-07-07

## 2020-07-07 VITALS — HEIGHT: 68 IN | WEIGHT: 216.5 LBS | BODY MASS INDEX: 32.81 KG/M2

## 2020-07-07 DIAGNOSIS — Z96.649 PAIN DUE TO TOTAL HIP REPLACEMENT, SUBSEQUENT ENCOUNTER: ICD-10-CM

## 2020-07-07 DIAGNOSIS — T84.84XD PAIN DUE TO TOTAL HIP REPLACEMENT, SUBSEQUENT ENCOUNTER: ICD-10-CM

## 2020-07-07 LAB
ANION GAP SERPL CALCULATED.3IONS-SCNC: 9 MMOL/L (ref 5–15)
APTT PPP: 36.5 SECONDS (ref 24–37)
BACTERIA UR QL AUTO: ABNORMAL /HPF
BASOPHILS # BLD AUTO: 0.02 10*3/MM3 (ref 0–0.2)
BASOPHILS NFR BLD AUTO: 0.4 % (ref 0–1.5)
BILIRUB UR QL STRIP: NEGATIVE
BUN SERPL-MCNC: 19 MG/DL (ref 6–20)
BUN/CREAT SERPL: 29.2 (ref 7–25)
CALCIUM SPEC-SCNC: 8.4 MG/DL (ref 8.6–10.5)
CHLORIDE SERPL-SCNC: 107 MMOL/L (ref 98–107)
CLARITY UR: CLEAR
CO2 SERPL-SCNC: 29 MMOL/L (ref 22–29)
COLOR UR: ABNORMAL
CREAT SERPL-MCNC: 0.65 MG/DL (ref 0.76–1.27)
DEPRECATED RDW RBC AUTO: 46.8 FL (ref 37–54)
EOSINOPHIL # BLD AUTO: 0.16 10*3/MM3 (ref 0–0.4)
EOSINOPHIL NFR BLD AUTO: 3.3 % (ref 0.3–6.2)
ERYTHROCYTE [DISTWIDTH] IN BLOOD BY AUTOMATED COUNT: 13.2 % (ref 12.3–15.4)
GFR SERPL CREATININE-BSD FRML MDRD: 126 ML/MIN/1.73
GLUCOSE SERPL-MCNC: 85 MG/DL (ref 65–99)
GLUCOSE UR STRIP-MCNC: NEGATIVE MG/DL
HBA1C MFR BLD: 5.2 % (ref 4.8–5.6)
HCT VFR BLD AUTO: 39.4 % (ref 37.5–51)
HGB BLD-MCNC: 12.6 G/DL (ref 13–17.7)
HGB UR QL STRIP.AUTO: NEGATIVE
HYALINE CASTS UR QL AUTO: ABNORMAL /LPF
IMM GRANULOCYTES # BLD AUTO: 0.01 10*3/MM3 (ref 0–0.05)
IMM GRANULOCYTES NFR BLD AUTO: 0.2 % (ref 0–0.5)
INR PPP: 0.99 (ref 0.85–1.16)
KETONES UR QL STRIP: ABNORMAL
LEUKOCYTE ESTERASE UR QL STRIP.AUTO: ABNORMAL
LYMPHOCYTES # BLD AUTO: 2.08 10*3/MM3 (ref 0.7–3.1)
LYMPHOCYTES NFR BLD AUTO: 42.9 % (ref 19.6–45.3)
MCH RBC QN AUTO: 30.5 PG (ref 26.6–33)
MCHC RBC AUTO-ENTMCNC: 32 G/DL (ref 31.5–35.7)
MCV RBC AUTO: 95.4 FL (ref 79–97)
MONOCYTES # BLD AUTO: 0.49 10*3/MM3 (ref 0.1–0.9)
MONOCYTES NFR BLD AUTO: 10.1 % (ref 5–12)
NEUTROPHILS NFR BLD AUTO: 2.09 10*3/MM3 (ref 1.7–7)
NEUTROPHILS NFR BLD AUTO: 43.1 % (ref 42.7–76)
NITRITE UR QL STRIP: NEGATIVE
NRBC BLD AUTO-RTO: 0 /100 WBC (ref 0–0.2)
PH UR STRIP.AUTO: 7 [PH] (ref 5–8)
PLATELET # BLD AUTO: 180 10*3/MM3 (ref 140–450)
PMV BLD AUTO: 9.6 FL (ref 6–12)
POTASSIUM SERPL-SCNC: 4 MMOL/L (ref 3.5–5.2)
PROT UR QL STRIP: ABNORMAL
PROTHROMBIN TIME: 12.8 SECONDS (ref 11.5–14)
RBC # BLD AUTO: 4.13 10*6/MM3 (ref 4.14–5.8)
RBC # UR: ABNORMAL /HPF
REF LAB TEST METHOD: ABNORMAL
SODIUM SERPL-SCNC: 145 MMOL/L (ref 136–145)
SP GR UR STRIP: 1.03 (ref 1–1.03)
SQUAMOUS #/AREA URNS HPF: ABNORMAL /HPF
UROBILINOGEN UR QL STRIP: ABNORMAL
WBC # BLD AUTO: 4.85 10*3/MM3 (ref 3.4–10.8)
WBC UR QL AUTO: ABNORMAL /HPF

## 2020-07-07 PROCEDURE — 85025 COMPLETE CBC W/AUTO DIFF WBC: CPT | Performed by: ORTHOPAEDIC SURGERY

## 2020-07-07 PROCEDURE — 81001 URINALYSIS AUTO W/SCOPE: CPT | Performed by: ORTHOPAEDIC SURGERY

## 2020-07-07 PROCEDURE — G0480 DRUG TEST DEF 1-7 CLASSES: HCPCS | Performed by: ORTHOPAEDIC SURGERY

## 2020-07-07 PROCEDURE — 87086 URINE CULTURE/COLONY COUNT: CPT | Performed by: ORTHOPAEDIC SURGERY

## 2020-07-07 PROCEDURE — 36415 COLL VENOUS BLD VENIPUNCTURE: CPT

## 2020-07-07 PROCEDURE — 93005 ELECTROCARDIOGRAM TRACING: CPT

## 2020-07-07 PROCEDURE — 85730 THROMBOPLASTIN TIME PARTIAL: CPT | Performed by: ORTHOPAEDIC SURGERY

## 2020-07-07 PROCEDURE — 83036 HEMOGLOBIN GLYCOSYLATED A1C: CPT | Performed by: ORTHOPAEDIC SURGERY

## 2020-07-07 PROCEDURE — 80048 BASIC METABOLIC PNL TOTAL CA: CPT | Performed by: ORTHOPAEDIC SURGERY

## 2020-07-07 PROCEDURE — 93010 ELECTROCARDIOGRAM REPORT: CPT | Performed by: INTERNAL MEDICINE

## 2020-07-07 PROCEDURE — 85610 PROTHROMBIN TIME: CPT | Performed by: ORTHOPAEDIC SURGERY

## 2020-07-07 RX ORDER — MULTIVIT WITH MINERALS/LUTEIN
1000 TABLET ORAL DAILY
COMMUNITY

## 2020-07-07 ASSESSMENT — HOOS JR
HOOS JR SCORE: 39.902
HOOS JR SCORE: 16

## 2020-07-07 NOTE — PAT
Verified with patient that they received a prescription for Bactroban and Chlorhexidine shower from the office.  Reinforced with them to fill the prescription if they haven't already.  Verbal and written instructions given regarding proper use of the Bactroban and Chlorhexidine to patient and/or famlily during PAT visit. Patient/family also instructed to complete checklist and return it to Pre-op on the day of surgery.  Patient and/or family verbalized understanding.    Patient to apply Chlorhexadine wipes  to surgical area (as instructed) the night before procedure and the AM of procedure. Wipes provided.    Per Anesthesia Request, patient instructed not to take their ACE/ARB medications on the AM of surgery.    Patient received joint booklet and education. Gave instructions to patient on how to join [employer] for joint class.

## 2020-07-08 LAB — BACTERIA SPEC AEROBE CULT: NO GROWTH

## 2020-07-11 LAB
COTININE UR-MCNC: <1 NG/ML
NICOTINE SERPL-MCNC: <1 NG/ML

## 2020-07-17 ENCOUNTER — APPOINTMENT (OUTPATIENT)
Dept: PREADMISSION TESTING | Facility: HOSPITAL | Age: 60
End: 2020-07-17

## 2020-07-17 ENCOUNTER — TELEPHONE (OUTPATIENT)
Dept: ORTHOPEDIC SURGERY | Facility: CLINIC | Age: 60
End: 2020-07-17

## 2020-07-17 PROCEDURE — U0002 COVID-19 LAB TEST NON-CDC: HCPCS

## 2020-07-17 PROCEDURE — C9803 HOPD COVID-19 SPEC COLLECT: HCPCS

## 2020-07-17 NOTE — TELEPHONE ENCOUNTER
CALLED PATIENT ON HOME [employer] TO RELAY ARRIVAL TIME OF 10:30AM FOR SURGERY ON 7/20/20 WITH DR. MALDONADO, NO ANSWER.  LEFT MESSAGE WITH DETAILS AND REQUEST TO RETURN MY CALL CONFIRMING RECEIPT OF MESSAGE.

## 2020-07-18 LAB
REF LAB TEST METHOD: NORMAL
SARS-COV-2 RNA RESP QL NAA+PROBE: NOT DETECTED

## 2020-07-20 ENCOUNTER — ANESTHESIA (OUTPATIENT)
Dept: PERIOP | Facility: HOSPITAL | Age: 60
End: 2020-07-20

## 2020-07-20 ENCOUNTER — ANESTHESIA EVENT (OUTPATIENT)
Dept: PERIOP | Facility: HOSPITAL | Age: 60
End: 2020-07-20

## 2020-07-20 ENCOUNTER — HOSPITAL ENCOUNTER (INPATIENT)
Facility: HOSPITAL | Age: 60
LOS: 1 days | Discharge: HOME HEALTH | End: 2020-07-21
Attending: ORTHOPAEDIC SURGERY | Admitting: ORTHOPAEDIC SURGERY

## 2020-07-20 ENCOUNTER — APPOINTMENT (OUTPATIENT)
Dept: GENERAL RADIOLOGY | Facility: HOSPITAL | Age: 60
End: 2020-07-20

## 2020-07-20 DIAGNOSIS — Z74.09 IMPAIRED MOBILITY AND ADLS: ICD-10-CM

## 2020-07-20 DIAGNOSIS — T84.84XD PAIN DUE TO TOTAL HIP REPLACEMENT, SUBSEQUENT ENCOUNTER: ICD-10-CM

## 2020-07-20 DIAGNOSIS — Z96.649 PAIN DUE TO TOTAL HIP REPLACEMENT, SUBSEQUENT ENCOUNTER: ICD-10-CM

## 2020-07-20 DIAGNOSIS — M25.551 RIGHT HIP PAIN: ICD-10-CM

## 2020-07-20 DIAGNOSIS — Z96.641 STATUS POST TOTAL REPLACEMENT OF RIGHT HIP: Primary | ICD-10-CM

## 2020-07-20 DIAGNOSIS — Z78.9 IMPAIRED MOBILITY AND ADLS: ICD-10-CM

## 2020-07-20 PROBLEM — T84.84XA PAIN DUE TO TOTAL HIP REPLACEMENT (HCC): Status: RESOLVED | Noted: 2020-06-18 | Resolved: 2020-07-20

## 2020-07-20 LAB
GLUCOSE BLDC GLUCOMTR-MCNC: 155 MG/DL (ref 70–130)
GLUCOSE BLDC GLUCOMTR-MCNC: 73 MG/DL (ref 70–130)

## 2020-07-20 PROCEDURE — 25010000002 NEOSTIGMINE 10 MG/10ML SOLUTION: Performed by: NURSE ANESTHETIST, CERTIFIED REGISTERED

## 2020-07-20 PROCEDURE — C1776 JOINT DEVICE (IMPLANTABLE): HCPCS | Performed by: ORTHOPAEDIC SURGERY

## 2020-07-20 PROCEDURE — 72170 X-RAY EXAM OF PELVIS: CPT

## 2020-07-20 PROCEDURE — 25010000002 KETOROLAC TROMETHAMINE PER 15 MG: Performed by: ORTHOPAEDIC SURGERY

## 2020-07-20 PROCEDURE — 88305 TISSUE EXAM BY PATHOLOGIST: CPT | Performed by: ORTHOPAEDIC SURGERY

## 2020-07-20 PROCEDURE — 87206 SMEAR FLUORESCENT/ACID STAI: CPT | Performed by: ORTHOPAEDIC SURGERY

## 2020-07-20 PROCEDURE — 27138 REVISE HIP JOINT REPLACEMENT: CPT | Performed by: PHYSICIAN ASSISTANT

## 2020-07-20 PROCEDURE — 87070 CULTURE OTHR SPECIMN AEROBIC: CPT | Performed by: ORTHOPAEDIC SURGERY

## 2020-07-20 PROCEDURE — 87176 TISSUE HOMOGENIZATION CULTR: CPT | Performed by: ORTHOPAEDIC SURGERY

## 2020-07-20 PROCEDURE — 25010000002 PROPOFOL 10 MG/ML EMULSION: Performed by: NURSE ANESTHETIST, CERTIFIED REGISTERED

## 2020-07-20 PROCEDURE — 87205 SMEAR GRAM STAIN: CPT | Performed by: ORTHOPAEDIC SURGERY

## 2020-07-20 PROCEDURE — 25010000002 DEXAMETHASONE PER 1 MG: Performed by: NURSE ANESTHETIST, CERTIFIED REGISTERED

## 2020-07-20 PROCEDURE — 87015 SPECIMEN INFECT AGNT CONCNTJ: CPT | Performed by: ORTHOPAEDIC SURGERY

## 2020-07-20 PROCEDURE — 25010000003 CEFAZOLIN IN DEXTROSE 2-4 GM/100ML-% SOLUTION: Performed by: ORTHOPAEDIC SURGERY

## 2020-07-20 PROCEDURE — 25010000002 PROMETHAZINE PER 50 MG: Performed by: NURSE ANESTHETIST, CERTIFIED REGISTERED

## 2020-07-20 PROCEDURE — 25010000002 HYDROMORPHONE PER 4 MG: Performed by: NURSE ANESTHETIST, CERTIFIED REGISTERED

## 2020-07-20 PROCEDURE — 25010000002 FENTANYL CITRATE (PF) 100 MCG/2ML SOLUTION: Performed by: NURSE ANESTHETIST, CERTIFIED REGISTERED

## 2020-07-20 PROCEDURE — 25010000002 ONDANSETRON PER 1 MG: Performed by: NURSE ANESTHETIST, CERTIFIED REGISTERED

## 2020-07-20 PROCEDURE — 87075 CULTR BACTERIA EXCEPT BLOOD: CPT | Performed by: ORTHOPAEDIC SURGERY

## 2020-07-20 PROCEDURE — 82962 GLUCOSE BLOOD TEST: CPT

## 2020-07-20 PROCEDURE — 27138 REVISE HIP JOINT REPLACEMENT: CPT | Performed by: ORTHOPAEDIC SURGERY

## 2020-07-20 PROCEDURE — 25010000002 MORPHINE PER 10 MG: Performed by: ORTHOPAEDIC SURGERY

## 2020-07-20 PROCEDURE — 87102 FUNGUS ISOLATION CULTURE: CPT | Performed by: ORTHOPAEDIC SURGERY

## 2020-07-20 PROCEDURE — 88300 SURGICAL PATH GROSS: CPT | Performed by: ORTHOPAEDIC SURGERY

## 2020-07-20 PROCEDURE — 87116 MYCOBACTERIA CULTURE: CPT | Performed by: ORTHOPAEDIC SURGERY

## 2020-07-20 PROCEDURE — S0260 H&P FOR SURGERY: HCPCS | Performed by: ORTHOPAEDIC SURGERY

## 2020-07-20 PROCEDURE — 25010000002 ROPIVACAINE PER 1 MG: Performed by: ORTHOPAEDIC SURGERY

## 2020-07-20 DEVICE — PINNACLE HIP SOLUTIONS ALTRX POLYETHYLENE ACETABULAR LINER LIPPED 36MM ID 58MM OD
Type: IMPLANTABLE DEVICE | Site: HIP | Status: FUNCTIONAL
Brand: PINNACLE ALTRX

## 2020-07-20 DEVICE — DEV CONTRL TISS STRATAFIX SPIRAL PDO BIDIR MO4 36X36CM: Type: IMPLANTABLE DEVICE | Site: HIP | Status: FUNCTIONAL

## 2020-07-20 DEVICE — WAX BONE HEMO AESCULAP 2.5GM: Type: IMPLANTABLE DEVICE | Site: HIP | Status: FUNCTIONAL

## 2020-07-20 DEVICE — HD FEM/HIP BIOLOX/DELTA V40 CERAM 36MM PLS2.5: Type: IMPLANTABLE DEVICE | Site: HIP | Status: FUNCTIONAL

## 2020-07-20 DEVICE — IMPLANTABLE DEVICE: Type: IMPLANTABLE DEVICE | Site: HIP | Status: FUNCTIONAL

## 2020-07-20 DEVICE — DEV CONTRL TISS STRATAFIX SPIRAL PDO BIDIR 1 36X36CM: Type: IMPLANTABLE DEVICE | Site: HIP | Status: FUNCTIONAL

## 2020-07-20 DEVICE — CONE BDY HIP RESTOR MOD PROX 23MM PLS0: Type: IMPLANTABLE DEVICE | Site: HIP | Status: FUNCTIONAL

## 2020-07-20 RX ORDER — HYDROMORPHONE HYDROCHLORIDE 1 MG/ML
0.5 INJECTION, SOLUTION INTRAMUSCULAR; INTRAVENOUS; SUBCUTANEOUS
Status: COMPLETED | OUTPATIENT
Start: 2020-07-20 | End: 2020-07-20

## 2020-07-20 RX ORDER — FENTANYL CITRATE 50 UG/ML
50 INJECTION, SOLUTION INTRAMUSCULAR; INTRAVENOUS
Status: DISCONTINUED | OUTPATIENT
Start: 2020-07-20 | End: 2020-07-20

## 2020-07-20 RX ORDER — MAGNESIUM HYDROXIDE 1200 MG/15ML
LIQUID ORAL AS NEEDED
Status: DISCONTINUED | OUTPATIENT
Start: 2020-07-20 | End: 2020-07-20 | Stop reason: HOSPADM

## 2020-07-20 RX ORDER — ESMOLOL HYDROCHLORIDE 10 MG/ML
INJECTION INTRAVENOUS AS NEEDED
Status: DISCONTINUED | OUTPATIENT
Start: 2020-07-20 | End: 2020-07-20 | Stop reason: SURG

## 2020-07-20 RX ORDER — MELOXICAM 15 MG/1
15 TABLET ORAL ONCE
Status: COMPLETED | OUTPATIENT
Start: 2020-07-20 | End: 2020-07-20

## 2020-07-20 RX ORDER — PANTOPRAZOLE SODIUM 40 MG/1
40 TABLET, DELAYED RELEASE ORAL EVERY MORNING
Status: DISCONTINUED | OUTPATIENT
Start: 2020-07-21 | End: 2020-07-21 | Stop reason: HOSPADM

## 2020-07-20 RX ORDER — PREGABALIN 75 MG/1
75 CAPSULE ORAL ONCE
Status: COMPLETED | OUTPATIENT
Start: 2020-07-20 | End: 2020-07-20

## 2020-07-20 RX ORDER — OXYCODONE HYDROCHLORIDE 5 MG/1
5 TABLET ORAL EVERY 4 HOURS PRN
Status: DISCONTINUED | OUTPATIENT
Start: 2020-07-20 | End: 2020-07-21 | Stop reason: HOSPADM

## 2020-07-20 RX ORDER — CEFAZOLIN SODIUM 2 G/100ML
2 INJECTION, SOLUTION INTRAVENOUS EVERY 8 HOURS
Status: COMPLETED | OUTPATIENT
Start: 2020-07-20 | End: 2020-07-21

## 2020-07-20 RX ORDER — FENTANYL CITRATE 50 UG/ML
INJECTION, SOLUTION INTRAMUSCULAR; INTRAVENOUS AS NEEDED
Status: DISCONTINUED | OUTPATIENT
Start: 2020-07-20 | End: 2020-07-20 | Stop reason: SURG

## 2020-07-20 RX ORDER — HYDROXYZINE HYDROCHLORIDE 25 MG/1
25 TABLET, FILM COATED ORAL 3 TIMES DAILY PRN
Status: DISCONTINUED | OUTPATIENT
Start: 2020-07-20 | End: 2020-07-21 | Stop reason: HOSPADM

## 2020-07-20 RX ORDER — LIDOCAINE HYDROCHLORIDE 10 MG/ML
INJECTION, SOLUTION EPIDURAL; INFILTRATION; INTRACAUDAL; PERINEURAL AS NEEDED
Status: DISCONTINUED | OUTPATIENT
Start: 2020-07-20 | End: 2020-07-20 | Stop reason: SURG

## 2020-07-20 RX ORDER — PROPOFOL 10 MG/ML
VIAL (ML) INTRAVENOUS AS NEEDED
Status: DISCONTINUED | OUTPATIENT
Start: 2020-07-20 | End: 2020-07-20 | Stop reason: SURG

## 2020-07-20 RX ORDER — LANOLIN ALCOHOL/MO/W.PET/CERES
1000 CREAM (GRAM) TOPICAL DAILY
COMMUNITY
End: 2021-03-18

## 2020-07-20 RX ORDER — HYDROMORPHONE HCL 110MG/55ML
0.5 PATIENT CONTROLLED ANALGESIA SYRINGE INTRAVENOUS
Status: DISCONTINUED | OUTPATIENT
Start: 2020-07-20 | End: 2020-07-21 | Stop reason: HOSPADM

## 2020-07-20 RX ORDER — EPHEDRINE SULFATE 50 MG/ML
5 INJECTION, SOLUTION INTRAVENOUS ONCE AS NEEDED
Status: DISCONTINUED | OUTPATIENT
Start: 2020-07-20 | End: 2020-07-20

## 2020-07-20 RX ORDER — ACETAMINOPHEN 500 MG
1000 TABLET ORAL EVERY 8 HOURS
Status: DISCONTINUED | OUTPATIENT
Start: 2020-07-20 | End: 2020-07-21 | Stop reason: HOSPADM

## 2020-07-20 RX ORDER — ONDANSETRON 2 MG/ML
INJECTION INTRAMUSCULAR; INTRAVENOUS AS NEEDED
Status: DISCONTINUED | OUTPATIENT
Start: 2020-07-20 | End: 2020-07-20 | Stop reason: SURG

## 2020-07-20 RX ORDER — PROMETHAZINE HYDROCHLORIDE 25 MG/ML
6.25 INJECTION, SOLUTION INTRAMUSCULAR; INTRAVENOUS ONCE AS NEEDED
Status: COMPLETED | OUTPATIENT
Start: 2020-07-20 | End: 2020-07-20

## 2020-07-20 RX ORDER — PROMETHAZINE HYDROCHLORIDE 25 MG/1
25 TABLET ORAL ONCE AS NEEDED
Status: COMPLETED | OUTPATIENT
Start: 2020-07-20 | End: 2020-07-20

## 2020-07-20 RX ORDER — ONDANSETRON 4 MG/1
4 TABLET, FILM COATED ORAL EVERY 6 HOURS PRN
Status: DISCONTINUED | OUTPATIENT
Start: 2020-07-20 | End: 2020-07-21 | Stop reason: HOSPADM

## 2020-07-20 RX ORDER — ROPINIROLE 2 MG/1
2 TABLET, FILM COATED ORAL NIGHTLY
Status: DISCONTINUED | OUTPATIENT
Start: 2020-07-20 | End: 2020-07-21 | Stop reason: HOSPADM

## 2020-07-20 RX ORDER — OXYCODONE HYDROCHLORIDE 5 MG/1
10 TABLET ORAL EVERY 4 HOURS PRN
Status: DISCONTINUED | OUTPATIENT
Start: 2020-07-20 | End: 2020-07-21 | Stop reason: HOSPADM

## 2020-07-20 RX ORDER — LISINOPRIL 5 MG/1
5 TABLET ORAL DAILY
Status: DISCONTINUED | OUTPATIENT
Start: 2020-07-20 | End: 2020-07-21 | Stop reason: HOSPADM

## 2020-07-20 RX ORDER — NEOSTIGMINE METHYLSULFATE 1 MG/ML
INJECTION, SOLUTION INTRAVENOUS AS NEEDED
Status: DISCONTINUED | OUTPATIENT
Start: 2020-07-20 | End: 2020-07-20 | Stop reason: SURG

## 2020-07-20 RX ORDER — ONDANSETRON 2 MG/ML
4 INJECTION INTRAMUSCULAR; INTRAVENOUS EVERY 6 HOURS PRN
Status: DISCONTINUED | OUTPATIENT
Start: 2020-07-20 | End: 2020-07-21 | Stop reason: HOSPADM

## 2020-07-20 RX ORDER — SODIUM CHLORIDE 9 MG/ML
100 INJECTION, SOLUTION INTRAVENOUS CONTINUOUS
Status: DISCONTINUED | OUTPATIENT
Start: 2020-07-20 | End: 2020-07-21 | Stop reason: HOSPADM

## 2020-07-20 RX ORDER — SODIUM CHLORIDE 0.9 % (FLUSH) 0.9 %
10 SYRINGE (ML) INJECTION AS NEEDED
Status: DISCONTINUED | OUTPATIENT
Start: 2020-07-20 | End: 2020-07-20 | Stop reason: HOSPADM

## 2020-07-20 RX ORDER — PROMETHAZINE HYDROCHLORIDE 25 MG/1
25 SUPPOSITORY RECTAL ONCE AS NEEDED
Status: COMPLETED | OUTPATIENT
Start: 2020-07-20 | End: 2020-07-20

## 2020-07-20 RX ORDER — AMLODIPINE BESYLATE 10 MG/1
10 TABLET ORAL DAILY
Status: DISCONTINUED | OUTPATIENT
Start: 2020-07-20 | End: 2020-07-21 | Stop reason: HOSPADM

## 2020-07-20 RX ORDER — LIDOCAINE HYDROCHLORIDE 10 MG/ML
0.5 INJECTION, SOLUTION EPIDURAL; INFILTRATION; INTRACAUDAL; PERINEURAL ONCE AS NEEDED
Status: COMPLETED | OUTPATIENT
Start: 2020-07-20 | End: 2020-07-20

## 2020-07-20 RX ORDER — FAMOTIDINE 10 MG/ML
20 INJECTION, SOLUTION INTRAVENOUS ONCE
Status: CANCELLED | OUTPATIENT
Start: 2020-07-20 | End: 2020-07-20

## 2020-07-20 RX ORDER — TAMSULOSIN HYDROCHLORIDE 0.4 MG/1
0.4 CAPSULE ORAL NIGHTLY
Status: DISCONTINUED | OUTPATIENT
Start: 2020-07-20 | End: 2020-07-21 | Stop reason: HOSPADM

## 2020-07-20 RX ORDER — ACETAMINOPHEN 500 MG
1000 TABLET ORAL ONCE
Status: COMPLETED | OUTPATIENT
Start: 2020-07-20 | End: 2020-07-20

## 2020-07-20 RX ORDER — AMITRIPTYLINE HYDROCHLORIDE 50 MG/1
50 TABLET, FILM COATED ORAL NIGHTLY
Status: DISCONTINUED | OUTPATIENT
Start: 2020-07-20 | End: 2020-07-21 | Stop reason: HOSPADM

## 2020-07-20 RX ORDER — KETOROLAC TROMETHAMINE 30 MG/ML
15 INJECTION, SOLUTION INTRAMUSCULAR; INTRAVENOUS EVERY 6 HOURS PRN
Status: DISCONTINUED | OUTPATIENT
Start: 2020-07-20 | End: 2020-07-21 | Stop reason: HOSPADM

## 2020-07-20 RX ORDER — MELOXICAM 7.5 MG/1
15 TABLET ORAL DAILY
Status: DISCONTINUED | OUTPATIENT
Start: 2020-07-21 | End: 2020-07-21 | Stop reason: HOSPADM

## 2020-07-20 RX ORDER — LABETALOL HYDROCHLORIDE 5 MG/ML
10 INJECTION, SOLUTION INTRAVENOUS EVERY 4 HOURS PRN
Status: DISCONTINUED | OUTPATIENT
Start: 2020-07-20 | End: 2020-07-21 | Stop reason: HOSPADM

## 2020-07-20 RX ORDER — ATRACURIUM BESYLATE 10 MG/ML
INJECTION, SOLUTION INTRAVENOUS AS NEEDED
Status: DISCONTINUED | OUTPATIENT
Start: 2020-07-20 | End: 2020-07-20 | Stop reason: SURG

## 2020-07-20 RX ORDER — FAMOTIDINE 20 MG/1
20 TABLET, FILM COATED ORAL ONCE
Status: COMPLETED | OUTPATIENT
Start: 2020-07-20 | End: 2020-07-20

## 2020-07-20 RX ORDER — SODIUM CHLORIDE 0.9 % (FLUSH) 0.9 %
10 SYRINGE (ML) INJECTION EVERY 12 HOURS SCHEDULED
Status: DISCONTINUED | OUTPATIENT
Start: 2020-07-20 | End: 2020-07-20 | Stop reason: HOSPADM

## 2020-07-20 RX ORDER — GLYCOPYRROLATE 0.2 MG/ML
INJECTION INTRAMUSCULAR; INTRAVENOUS AS NEEDED
Status: DISCONTINUED | OUTPATIENT
Start: 2020-07-20 | End: 2020-07-20 | Stop reason: SURG

## 2020-07-20 RX ORDER — ASPIRIN 325 MG
325 TABLET, DELAYED RELEASE (ENTERIC COATED) ORAL EVERY 12 HOURS SCHEDULED
Status: DISCONTINUED | OUTPATIENT
Start: 2020-07-21 | End: 2020-07-21 | Stop reason: HOSPADM

## 2020-07-20 RX ORDER — GABAPENTIN 300 MG/1
600 CAPSULE ORAL 3 TIMES DAILY
Status: DISCONTINUED | OUTPATIENT
Start: 2020-07-20 | End: 2020-07-21 | Stop reason: HOSPADM

## 2020-07-20 RX ORDER — FERROUS SULFATE 325(65) MG
325 TABLET ORAL
COMMUNITY
End: 2021-01-15

## 2020-07-20 RX ORDER — DEXAMETHASONE SODIUM PHOSPHATE 4 MG/ML
INJECTION, SOLUTION INTRA-ARTICULAR; INTRALESIONAL; INTRAMUSCULAR; INTRAVENOUS; SOFT TISSUE AS NEEDED
Status: DISCONTINUED | OUTPATIENT
Start: 2020-07-20 | End: 2020-07-20 | Stop reason: SURG

## 2020-07-20 RX ORDER — SODIUM CHLORIDE, SODIUM LACTATE, POTASSIUM CHLORIDE, CALCIUM CHLORIDE 600; 310; 30; 20 MG/100ML; MG/100ML; MG/100ML; MG/100ML
9 INJECTION, SOLUTION INTRAVENOUS CONTINUOUS
Status: DISCONTINUED | OUTPATIENT
Start: 2020-07-20 | End: 2020-07-21 | Stop reason: HOSPADM

## 2020-07-20 RX ORDER — NALOXONE HCL 0.4 MG/ML
0.1 VIAL (ML) INJECTION
Status: DISCONTINUED | OUTPATIENT
Start: 2020-07-20 | End: 2020-07-21 | Stop reason: HOSPADM

## 2020-07-20 RX ORDER — CEFAZOLIN SODIUM 2 G/100ML
2 INJECTION, SOLUTION INTRAVENOUS ONCE
Status: COMPLETED | OUTPATIENT
Start: 2020-07-20 | End: 2020-07-20

## 2020-07-20 RX ADMIN — FENTANYL CITRATE 100 MCG: 50 INJECTION, SOLUTION INTRAMUSCULAR; INTRAVENOUS at 15:02

## 2020-07-20 RX ADMIN — FENTANYL CITRATE 50 MCG: 0.05 INJECTION, SOLUTION INTRAMUSCULAR; INTRAVENOUS at 17:28

## 2020-07-20 RX ADMIN — SODIUM CHLORIDE 100 ML/HR: 9 INJECTION, SOLUTION INTRAVENOUS at 18:15

## 2020-07-20 RX ADMIN — EPHEDRINE SULFATE 15 MG: 50 INJECTION INTRAMUSCULAR; INTRAVENOUS; SUBCUTANEOUS at 16:12

## 2020-07-20 RX ADMIN — NEOSTIGMINE 3 MG: 1 INJECTION INTRAVENOUS at 16:31

## 2020-07-20 RX ADMIN — PROPOFOL 25 MCG/KG/MIN: 10 INJECTION, EMULSION INTRAVENOUS at 13:52

## 2020-07-20 RX ADMIN — GABAPENTIN 600 MG: 300 CAPSULE ORAL at 20:18

## 2020-07-20 RX ADMIN — MUPIROCIN 1 APPLICATION: 20 OINTMENT TOPICAL at 12:27

## 2020-07-20 RX ADMIN — LIDOCAINE HYDROCHLORIDE 5 ML: 10 INJECTION, SOLUTION EPIDURAL; INFILTRATION; INTRACAUDAL; PERINEURAL at 13:44

## 2020-07-20 RX ADMIN — LISINOPRIL 5 MG: 5 TABLET ORAL at 18:42

## 2020-07-20 RX ADMIN — FENTANYL CITRATE 50 MCG: 0.05 INJECTION, SOLUTION INTRAMUSCULAR; INTRAVENOUS at 17:40

## 2020-07-20 RX ADMIN — FAMOTIDINE 20 MG: 20 TABLET, FILM COATED ORAL at 12:26

## 2020-07-20 RX ADMIN — CEFAZOLIN SODIUM 2 G: 2 INJECTION, SOLUTION INTRAVENOUS at 13:23

## 2020-07-20 RX ADMIN — TRANEXAMIC ACID 1000 MG: 100 INJECTION, SOLUTION INTRAVENOUS at 13:52

## 2020-07-20 RX ADMIN — CEFAZOLIN SODIUM 2 G: 2 INJECTION, SOLUTION INTRAVENOUS at 20:18

## 2020-07-20 RX ADMIN — PROPOFOL 150 MG: 10 INJECTION, EMULSION INTRAVENOUS at 13:44

## 2020-07-20 RX ADMIN — TAMSULOSIN HYDROCHLORIDE 0.4 MG: 0.4 CAPSULE ORAL at 20:18

## 2020-07-20 RX ADMIN — AMLODIPINE BESYLATE 10 MG: 10 TABLET ORAL at 20:19

## 2020-07-20 RX ADMIN — SODIUM CHLORIDE, POTASSIUM CHLORIDE, SODIUM LACTATE AND CALCIUM CHLORIDE 9 ML/HR: 600; 310; 30; 20 INJECTION, SOLUTION INTRAVENOUS at 12:27

## 2020-07-20 RX ADMIN — FENTANYL CITRATE 50 MCG: 0.05 INJECTION, SOLUTION INTRAMUSCULAR; INTRAVENOUS at 17:10

## 2020-07-20 RX ADMIN — ESMOLOL HYDROCHLORIDE 50 MG: 10 INJECTION, SOLUTION INTRAVENOUS at 13:44

## 2020-07-20 RX ADMIN — PROMETHAZINE HYDROCHLORIDE 6.25 MG: 25 INJECTION INTRAMUSCULAR; INTRAVENOUS at 16:50

## 2020-07-20 RX ADMIN — HYDROMORPHONE HYDROCHLORIDE 0.5 MG: 1 INJECTION, SOLUTION INTRAMUSCULAR; INTRAVENOUS; SUBCUTANEOUS at 16:55

## 2020-07-20 RX ADMIN — ACETAMINOPHEN 1000 MG: 500 TABLET ORAL at 12:26

## 2020-07-20 RX ADMIN — AMITRIPTYLINE HYDROCHLORIDE 50 MG: 50 TABLET, FILM COATED ORAL at 20:19

## 2020-07-20 RX ADMIN — GLYCOPYRROLATE 0.2 MG: 0.2 INJECTION INTRAMUSCULAR; INTRAVENOUS at 16:31

## 2020-07-20 RX ADMIN — HYDROMORPHONE HYDROCHLORIDE 0.5 MG: 1 INJECTION, SOLUTION INTRAMUSCULAR; INTRAVENOUS; SUBCUTANEOUS at 16:50

## 2020-07-20 RX ADMIN — ONDANSETRON 4 MG: 2 INJECTION INTRAMUSCULAR; INTRAVENOUS at 16:18

## 2020-07-20 RX ADMIN — DEXAMETHASONE SODIUM PHOSPHATE 8 MG: 4 INJECTION, SOLUTION INTRAMUSCULAR; INTRAVENOUS at 13:44

## 2020-07-20 RX ADMIN — MELOXICAM 15 MG: 15 TABLET ORAL at 12:26

## 2020-07-20 RX ADMIN — ATRACURIUM BESYLATE 50 MG: 10 INJECTION, SOLUTION INTRAVENOUS at 13:44

## 2020-07-20 RX ADMIN — SODIUM CHLORIDE, POTASSIUM CHLORIDE, SODIUM LACTATE AND CALCIUM CHLORIDE 9 ML/HR: 600; 310; 30; 20 INJECTION, SOLUTION INTRAVENOUS at 16:57

## 2020-07-20 RX ADMIN — ROPINIROLE HYDROCHLORIDE 2 MG: 2 TABLET, FILM COATED ORAL at 20:18

## 2020-07-20 RX ADMIN — PREGABALIN 75 MG: 75 CAPSULE ORAL at 12:26

## 2020-07-20 RX ADMIN — OXYCODONE 10 MG: 5 TABLET ORAL at 20:19

## 2020-07-20 RX ADMIN — KETOROLAC TROMETHAMINE 15 MG: 30 INJECTION, SOLUTION INTRAMUSCULAR at 18:42

## 2020-07-20 RX ADMIN — ACETAMINOPHEN 1000 MG: 500 TABLET, FILM COATED ORAL at 20:18

## 2020-07-20 RX ADMIN — LIDOCAINE HYDROCHLORIDE 0.2 ML: 10 INJECTION, SOLUTION EPIDURAL; INFILTRATION; INTRACAUDAL; PERINEURAL at 12:27

## 2020-07-20 RX ADMIN — HYDROMORPHONE HYDROCHLORIDE 0.5 MG: 1 INJECTION, SOLUTION INTRAMUSCULAR; INTRAVENOUS; SUBCUTANEOUS at 17:15

## 2020-07-20 RX ADMIN — FENTANYL CITRATE 50 MCG: 0.05 INJECTION, SOLUTION INTRAMUSCULAR; INTRAVENOUS at 17:05

## 2020-07-20 RX ADMIN — TRANEXAMIC ACID 1000 MG: 100 INJECTION, SOLUTION INTRAVENOUS at 16:10

## 2020-07-20 RX ADMIN — HYDROMORPHONE HYDROCHLORIDE 0.5 MG: 1 INJECTION, SOLUTION INTRAMUSCULAR; INTRAVENOUS; SUBCUTANEOUS at 17:33

## 2020-07-20 NOTE — ANESTHESIA PROCEDURE NOTES
Spinal Block      Patient reassessed immediately prior to procedure    Patient location during procedure: OR  Indication:at surgeon's request  Performed By  CRNA: Kamran Naik CRNA  Preanesthetic Checklist  Completed: patient identified, site marked, surgical consent, pre-op evaluation, timeout performed, IV checked, risks and benefits discussed and monitors and equipment checked  Spinal Block Prep:  Patient Position:sitting  Sterile Tech:cap, gloves, sterile barriers and mask  Prep:Chloraprep  Patient Monitoring:blood pressure monitoring, continuous pulse oximetry and EKG  Spinal Block Procedure  Approach:midline  Guidance:landmark technique and palpation technique  Location:L4-L5  Needle Type:Sprotte  Needle Gauge:22 G  Paresthesia: no   Post Assessment  Patient Tolerance:patient tolerated the procedure well with no apparent complications  Complications no  Additional Notes  Procedure:  Pt assisted to sitting position, with legs in position of comfort over side of bed.  Pt. instructed in optimal spine presentation, the spine was prepped/ Draped and the skin at insertion site was anesthetized with 1% Lidocaine 2 ml.  The spinal needle was then advanced in a midline fashion. I was unable to place the spinal medicine as I could not pass the needle through the dura. I attempted at 2 levels without success. The patient was subsequently placed supine and provided a general anesthesia.

## 2020-07-20 NOTE — PLAN OF CARE
Problem: Hip Arthroplasty (Total, Partial) (Adult)  Goal: Signs and Symptoms of Listed Potential Problems Will be Absent, Minimized or Managed (Hip Arthroplasty)  Outcome: Ongoing (interventions implemented as appropriate)  Flowsheets (Taken 7/20/2020 6771)  Problems Assessed (Hip Arthroplasty): all  Problems Present (Hip Arthroplasty): postoperative nausea and vomiting; situational response; pain   Pt to floor from PACU

## 2020-07-20 NOTE — ANESTHESIA PREPROCEDURE EVALUATION
Anesthesia Evaluation     Patient summary reviewed and Nursing notes reviewed   NPO Solid Status: > 8 hours  NPO Liquid Status: > 2 hours           Airway   Mallampati: II  TM distance: >3 FB  Neck ROM: full  No difficulty expected  Dental      Pulmonary    (+) sleep apnea,   (-) COPD, asthma, shortness of breath, recent URI, not a smoker  Cardiovascular     ECG reviewed    (+) hypertension well controlled,   (-) past MI, dysrhythmias, angina, cardiac stents    ROS comment: ECG Sb   GXT 2012 result n/a    Neuro/Psych  (+) headaches,     (-) seizures, CVA  GI/Hepatic/Renal/Endo    (+)  GERD,    (-) liver disease, no renal disease, diabetes (A1C 5.2), no thyroid disorder    ROS Comment: SP G SLEEVE    Musculoskeletal     Abdominal    Substance History      OB/GYN          Other   arthritis,      ROS/Med Hx Other: Coags wnl       Phys Exam Other: SP uvuloplasty              Anesthesia Plan    ASA 2     spinal and general     intravenous induction     Anesthetic plan, all risks, benefits, and alternatives have been provided, discussed and informed consent has been obtained with: patient.    Plan discussed with CRNA.

## 2020-07-20 NOTE — H&P
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 He presents to the operating today for surgical intervention with revision.     Review of Systems:  General ROS: negative for chills, fever or skin lesions;  No changes since last office visit.  Neg for recent sick exposure  Cardiovascular ROS: no chest pain or dyspnea on exertion  Respiratory ROS: no cough, shortness of breath, or wheezing    Allergies:   Allergies   Allergen Reactions   • Codeine Nausea And Vomiting   • Morphine And Related Nausea And Vomiting       Home Meds:    No current facility-administered medications on file prior to encounter.      Current Outpatient Medications on File Prior to Encounter   Medication Sig Dispense Refill   • amitriptyline (ELAVIL) 50 MG tablet Take 50 mg by mouth Every Night.     • Chlorhexidine Gluconate 4 % solution Shower daily with solution as directed 5 days prior to surgery. 237 mL 0   • gabapentin (NEURONTIN) 600 MG tablet Take 600 mg by mouth 3 (Three) Times a Day.     • HYDROcodone-acetaminophen (NORCO)  MG per tablet Take  by mouth 4 (Four) Times a Day.     • hydrOXYzine (VISTARIL) 25 MG capsule Take  by mouth 3 (Three) Times a Day.     • LORATADINE PO Take  by mouth.     • Multiple Vitamins-Minerals (MENS MULTIPLUS PO) Take  by mouth.     • mupirocin (BACTROBAN) 2 % ointment Apply pea-sized amount to each nostril twice daily for 5 days prior to surgery 22 g 0   • omeprazole (priLOSEC) 20 MG capsule Take 20 mg by mouth Daily.     • rOPINIRole (REQUIP) 2 MG tablet Take 2 mg by mouth Every Night.     • tamsulosin (FLOMAX) 0.4 MG capsule 24 hr capsule Take 1 capsule by mouth Every Night. 30 capsule 3   • amLODIPine (NORVASC) 10 MG tablet Take  by mouth Daily.     • lisinopril (PRINIVIL,ZESTRIL) 5 MG tablet  "Take 5 mg by mouth Daily.     • meloxicam (MOBIC) 7.5 MG tablet Take 7.5 mg by mouth 2 (two) times a day.         PMH:   Past Medical History:   Diagnosis Date   • Anxiety    • Arthritis    • Diabetes mellitus (CMS/HCC)     diet controlled    • GERD (gastroesophageal reflux disease)    • Headache    • Hypertension    • Low back pain    • Sleep apnea     does not wear machine   • Wears reading eyeglasses      PSH:    Past Surgical History:   Procedure Laterality Date   • CARPAL TUNNEL RELEASE Bilateral    • COLONOSCOPY      5 years ago   • GASTRIC SLEEVE LAPAROSCOPIC     • HIP SURGERY Right times 2   • KNEE SURGERY Left     arthroscopy        Immunization History:  Influenza: 2019  Pneumococcal: patient unsure of date  Tetanus: <10  years    Social History:   Tobacco:   Social History     Tobacco Use   Smoking Status Never Smoker   Smokeless Tobacco Never Used      Alcohol:     Social History     Substance and Sexual Activity   Alcohol Use No       Vitals:           /84 (BP Location: Right arm, Patient Position: Lying)   Pulse 58   Temp 97 °F (36.1 °C) (Temporal)   Resp 16   Ht 172.7 cm (68\")   Wt 98 kg (216 lb)   SpO2 100%   BMI 32.84 kg/m²     Physical Exam:  General Appearance:    Alert, cooperative, no distress, appears stated age   Head:    Normocephalic, without obvious abnormality, atraumatic   Lungs:     Clear to auscultation bilaterally, respirations unlabored    Heart:   Regular rate and rhythm, S1 and S2 normal, no murmur, rub    or gallop    Abdomen:    Soft, nontender.  +bowel sounds   Breast Exam:    deferred   Genitalia:    deferred   Extremities:   Extremities normal, atraumatic, no cyanosis or edema   Skin:   Skin color, texture, turgor normal, no rashes or lesions   Neurologic:   Grossly intact   Results Review  LABS:  Lab Results   Component Value Date    WBC 4.85 07/07/2020    HGB 12.6 (L) 07/07/2020    HCT 39.4 07/07/2020    MCV 95.4 07/07/2020     07/07/2020    NEUTROABS 2.09 " 07/07/2020    GLUCOSE 85 07/07/2020    BUN 19 07/07/2020    CREATININE 0.65 (L) 07/07/2020    EGFRIFNONA 126 07/07/2020     07/07/2020    K 4.0 07/07/2020     07/07/2020    CO2 29.0 07/07/2020    CALCIUM 8.4 (L) 07/07/2020    ALBUMIN 4.17 01/07/2020    AST 24 01/07/2020    ALT 21 01/07/2020    BILITOT 0.4 01/07/2020    PTT 36.5 07/07/2020    INR 0.99 07/07/2020       RADIOLOGY:  Imaging Results (Last 72 Hours)     ** No results found for the last 72 hours. **            Cancer Staging (if applicable)  Cancer Patient: __ yes _x_no __unknown; If yes, clinical stage T:__ N:__M:__, stage group or __N/A    Impression: pain due to right total hip revision    Plan: right total hip arthroplasty revision    Toro Zaidi PA-C   7/20/2020   12:14

## 2020-07-20 NOTE — H&P
Patient Name: Ang Jackson  MRN: [id number]  : 1960  DOS: 2020    Attending: Jb Patel MD    Primary Care Provider: Mamta Ortega PA-C      Chief complaint: Right hip pain    Subjective   Patient is a pleasant 60 y.o. male presented for scheduled surgery by Dr. Patel.  He anticipates a right total hip revision today.  His original hip surgery was about 10 years ago and has continued to be painful for about the last 8 years.  He uses a cane occasionally for ambulation.  He denies recent falls.    When seen preop he is doing well.  His pain is manageable.  He denies nausea, shortness of breath or chest pain.  No history of DVT or PE.    He does have chronic pain and takes chronic narcotics.  He is followed by Dr. Recinos, pain management.    Allergies   Allergen Reactions   • Codeine Nausea And Vomiting   • Morphine And Related Nausea And Vomiting       Meds:  Medications Prior to Admission   Medication Sig Dispense Refill Last Dose   • amitriptyline (ELAVIL) 50 MG tablet Take 50 mg by mouth Every Night.   2020 at    • ascorbic acid (VITAMIN C) 1000 MG tablet Take 1,000 mg by mouth Daily.   2020 at 0900   • gabapentin (NEURONTIN) 600 MG tablet Take 600 mg by mouth 3 (Three) Times a Day.   2020 at    • HYDROcodone-acetaminophen (NORCO)  MG per tablet Take  by mouth 4 (Four) Times a Day.   2020 at    • hydrOXYzine (VISTARIL) 25 MG capsule Take  by mouth 3 (Three) Times a Day.   2020 at    • LORATADINE PO Take  by mouth.   2020 at 0800   • Multiple Vitamins-Minerals (MENS MULTIPLUS PO) Take  by mouth.   2020 at 0909   • omeprazole (priLOSEC) 20 MG capsule Take 20 mg by mouth Daily.   2020 at 0900   • rOPINIRole (REQUIP) 2 MG tablet Take 2 mg by mouth Every Night.   2020 at    • tamsulosin (FLOMAX) 0.4 MG capsule 24 hr capsule Take 1 capsule by mouth Every Night. 30 capsule 3 2020 at    • amLODIPine (NORVASC) 10  "MG tablet Take  by mouth Daily.   7/18/2020   • lisinopril (PRINIVIL,ZESTRIL) 5 MG tablet Take 5 mg by mouth Daily.   7/18/2020   • meloxicam (MOBIC) 7.5 MG tablet Take 7.5 mg by mouth 2 (two) times a day.   7/18/2020         History:   Past Medical History:   Diagnosis Date   • Anxiety    • Arthritis    • Diabetes mellitus (CMS/HCC)     diet controlled    • GERD (gastroesophageal reflux disease)    • Headache    • Hypertension    • Low back pain    • Sleep apnea     does not wear machine   • Wears reading eyeglasses      Past Surgical History:   Procedure Laterality Date   • CARPAL TUNNEL RELEASE Bilateral    • COLONOSCOPY      5 years ago   • GASTRIC SLEEVE LAPAROSCOPIC     • HIP SURGERY Right times 2   • KNEE SURGERY Left     arthroscopy      Family History   Problem Relation Age of Onset   • Heart disease Father    • Hypertension Father    • Osteoarthritis Father    • Cancer Mother    • Diabetes Brother    • Hypertension Brother    • Gout Paternal Grandmother    • Diabetes Paternal Grandmother      Social History     Tobacco Use   • Smoking status: Never Smoker   • Smokeless tobacco: Never Used   Substance Use Topics   • Alcohol use: No   • Drug use: No   He is  with 3 children.  He is disabled.    Review of Systems  Pertinent items are noted in HPI, all other systems reviewed and negative    Vital Signs  /84 (BP Location: Right arm, Patient Position: Lying)   Pulse 58   Temp 97 °F (36.1 °C) (Temporal)   Resp 16   Ht 172.7 cm (68\")   Wt 98 kg (216 lb)   SpO2 100%   BMI 32.84 kg/m²     Physical Exam:    General Appearance:    Alert, cooperative, in no acute distress   Head:    Normocephalic, without obvious abnormality, atraumatic   Eyes:            Lids and lashes normal, conjunctivae and sclerae normal, no   icterus, no pallor, corneas clear,    Ears:    Ears appear intact with no abnormalities noted   Throat:   No oral lesions, no thrush, oral mucosa moist   Neck:   No adenopathy, supple, " trachea midline, no thyromegaly    Lungs:     Clear to auscultation,respirations regular, even and unlabored    Heart:    Regular rhythm and normal rate, normal S1 and S2, no murmur, no gallop   Abdomen:     Normal bowel sounds, no masses, no organomegaly, soft non-tender, non-distended, no guarding, no rebound  tenderness   Genitalia:    Deferred   Extremities:   Moves all extremities well, no edema, no cyanosis, no  redness   Pulses:   Pulses palpable and equal bilaterally   Skin:   No bleeding, bruising or rash   Neurologic:   Cranial nerves 2 - 12 grossly intact. Flexion and dorsiflexion intact bilateral feet.        I reviewed the patient's new clinical results.     Results for EMILY TENA (MRN [id number]) as of 7/20/2020 14:43   Ref. Range 7/7/2020 10:50   Glucose Latest Ref Range: 65 - 99 mg/dL 85   Sodium Latest Ref Range: 136 - 145 mmol/L 145   Potassium Latest Ref Range: 3.5 - 5.2 mmol/L 4.0   CO2 Latest Ref Range: 22.0 - 29.0 mmol/L 29.0   Chloride Latest Ref Range: 98 - 107 mmol/L 107   Anion Gap Latest Ref Range: 5.0 - 15.0 mmol/L 9.0   Creatinine Latest Ref Range: 0.76 - 1.27 mg/dL 0.65 (L)   BUN Latest Ref Range: 6 - 20 mg/dL 19   BUN/Creatinine Ratio Latest Ref Range: 7.0 - 25.0  29.2 (H)   Calcium Latest Ref Range: 8.6 - 10.5 mg/dL 8.4 (L)   eGFR Non  Am Latest Ref Range: >60 mL/min/1.73 126   Hemoglobin A1C Latest Ref Range: 4.80 - 5.60 % 5.20   Protime Latest Ref Range: 11.5 - 14.0 Seconds 12.8   INR Latest Ref Range: 0.85 - 1.16  0.99   PTT Latest Ref Range: 24.0 - 37.0 seconds 36.5   WBC Latest Ref Range: 3.40 - 10.80 10*3/mm3 4.85   RBC Latest Ref Range: 4.14 - 5.80 10*6/mm3 4.13 (L)   Hemoglobin Latest Ref Range: 13.0 - 17.7 g/dL 12.6 (L)   Hematocrit Latest Ref Range: 37.5 - 51.0 % 39.4   RDW Latest Ref Range: 12.3 - 15.4 % 13.2   MCV Latest Ref Range: 79.0 - 97.0 fL 95.4   MCH Latest Ref Range: 26.6 - 33.0 pg 30.5   MCHC Latest Ref Range: 31.5 - 35.7 g/dL 32.0   MPV Latest Ref  Range: 6.0 - 12.0 fL 9.6   Platelets Latest Ref Range: 140 - 450 10*3/mm3 180       Assessment and Plan:     Pain due to total hip replacement (CMS/[facility])    Hypertension    BPH with obstruction/lower urinary tract symptoms      Plan  1. PT/OT- WBAT RLE  2. Pain control-prns   3. IS-encourage  4. DVT proph- Mechs/ASA  5. Bowel regimen  6. Resume home medications as appropriate  7. Monitor post-op labs  8. DC planning for home    HTN  - Continue home Norvasc and lisinopril  - Monitor BP   - Holding parameters for BP meds  - Labetalol PRN for SBP>170    BPH  - Continue home Flomax  - Monitor for postop urinary retention  - Bladder scan/straight cath PRN      TANJA Reddy  07/20/20  14:46     Above is noted, agree.  Mr. Jackson underwent right total hip arthroplasty revision of both components secondary to failed right total hip arthroplasty.  Surgery was done under general anesthesia, and periarticular block was tolerated well.  Seen in PACU, he is doing fairly well, complains of postoperative pain and is receiving pain medication.  No nausea, vomiting, or shortness of breath.  Vital signs with a temperature of 98 heart rate 91 respiratory rate 20 blood pressure 124 /91  Lungs: Clear to station with no wheezes rales  Heart: Regular, S1-S2 no gallops.  Abdomen: Soft, nontender nondistended.  Extremities: No clubbing cyanosis or edema.  Clean dry and intact Aquacel dressing over right hip incision.  Assessment: Status post right total hip arthroplasty revision.  Both components.  Acute postoperative pain of chronic pain.  Discussed with patient postoperative management plan, expressed understanding and agreement  Discussed with Dr. Patel.

## 2020-07-20 NOTE — OP NOTE
OPERATIVE REPORT     DATE OF PROCEDURE: 7/20/2020    SURGEON: bJ Patel M.D.     ASSISTANT(S): Circulator: Nay Victoria RN; Elyssa Tirado RN; Elyssa Treadwell RN  Scrub Person: Ness Naik; Dar Fox  Vendor Representative: Dar Recinos; Markus Smart  Nursing Assistant: Sharri Pineda; Tawny John; Carlos Marshall  Assistant: Mamta Dasilva PA-C    Note-PA was utilized during the case to facilitate positioning the patient, exposure, retraction, placement of final components and definitive closure.    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 Explanted total hip arthroplasty components.     IMPLANTS:   : Clem  Acetabular component: Retained from prior surgery ([place] 58 mm cluster hole cup)  Acetabular screws: None  Acetabular augment: None  Acetabular liner: 15 degree [place] lipped liner positioned at 11:00  Femoral stem: 17 x 155 mm restoration modular conical stem  Femoral body: 23+0 cone body  Femoral head: 36+2.5 mm Biolox ceramic head  Allograft bone: None    DRAINS: None    LOCAL INJECTION: 1 cc Toradol 30mg/ml, 4 cc duramorph 2mg/ml, 20 cc 0.5% ropivicaine, 20 cc 0.5% lidocaine with 1:200,000 epinephrine, 15 cc preservative free normal saline     MODIFIER(S): None    COMPLICATIONS: None apparent    INDICATIONS FOR PROCEDURE: The patient had an a right total hip arthroplasty performed in 2009.  In the acute postoperative phase  approximately 4 weeks postop, he developed a superficial infection.  He underwent irrigation and debridement.  He was subsequently treated with long course of antibiotics..  Upon completion of the antibiotics he was asymptomatic.  He subsequently presented to clinic about 3 to 4 years later complaining of some pain in the groin area.  A thorough work-up in 2013 was performed including, sed rate, CRP, white blood cell count, and CT guided hip aspiration.  Sed rate, CRP, and white blood cell count were all within normal limits.  CT-guided aspiration demonstrated no evidence of infection.  Cobalt and chromium levels were mildly elevated at that time however.  There is also evidence of some mildly increased uptake about the femoral component at that time.  At that time the decision was made to continue observation.  Over the next several years, his pain got progressively worse and he developed some pain into the thigh region.  The hip became uncomfortable with normal activities of daily living and did not allow the patient to return to a reasonable functional level. Motion was limited due to guarding and pain. Clinical evaluation revealed groin pain with concern for metallosis. X-rays revealed implants that were well fixed on the acetabular side with lucencies adjacent to the femoral stem concerning for aseptic loosening. Due to the limited improvement in symptoms or hip joint function with conservative measures including therapy, observation, activity modification, revision of the right hip arthroplasty was recommended at this time. We had a long discussion about the problems associated with the hip and why the joint became problematic. The revision procedure process itself was then outlined and included the possibility of improving soft tissue tension in the joint by changing the polyethylene insert and/or femoral neck length if the remaining implants were found to be well fixed and appropriately positioned. If  problems are identified during the procedure with the integrity of the soft tissues, the position of the implants, or the fixation of the implants to bone, a complete revision of the hip to a more stable construct would be performed.     A specific clinical evaluation protocol was initiated to evaluate the painful right hip arthroplasty for the possibility of prosthetic joint/implant infection. When initially seen in the office, laboratory tests were performed and included a CBC, ESR, and CRP. These acute phase reactants were normal indicating a low probability for ongoing infection. The joint was not aspirated as prior aspiration in 2013 failed to elicit infectious etiology.  Patient symptoms had furthermore remain unchanged in nature and location since 2013 but had progressed in severity.  The possibility of sub-clinical infection of the joint was discussed, and further evaluation for prosthetic implant infection intra-operatively was described. The possible acquisition and evaluation process for tissue and fluid cultures and frozen tissue sections by Pathology for acute inflammation was outlined if deemed necessary intraoperatively. Despite low clinical evidence for infection pre-operatively, the possibility of finding evidence of infection during or after surgery was discussed. In the case of finding evidence for infection intra-operatively, appropriate treatment would involve removing all the arthroplasty implants then debriding the bone, joint, and synovial lining. An antibiotic coated temporary hip arthroplasty would then be placed in the joint space to treat the infection for at least 8-12 weeks, followed by joint reconstruction if/when there is adequate clinical verification the infection has been eradicated. If cultures taken during the procedure show evidence of bacterial growth, then a 6 week course of antibiotics will be given. It was explained that positive cultures can also occur when intra-operative  findings do not indicate infection, and that antibiotic treatment may be required in some situations after a revision has been done.     After discussing various treatment options, the consequences, extensive nature, and the severity of the situation were discussed with the patient and family in detail. Hip revision risks, benefits, and potential complications were outlined in detail. These included but are not limited to fracture, infection, bleeding, anesthesia risks, nerve injury, vessel injury, instability/dislocation, limb length discrepancy, pain, blood clots, possible need for blood transfusion, possible need for further procedures, myocardial infarction, stroke, and death. A long period of post-operative limited weight bearing following the procedure was also outlined for appropriate tissue healing and to redevelop appropriate stability. Implant/prosthesis selection was outlined, and the many options available were explained; the final choice will be made at the time of the procedure to match the anatomy and condition of the bone, ligaments, tendons, and muscles. Understanding of all topics was conveyed to me by the patient pre-operatively, and patient consent was given to proceed with the planned revision total hip procedure. The patient completed preoperative medical optimization and risk assessment, joint arthroplasty education, and MRSA decolonization using a universal decolonization protocol. Perioperative blood management and the potential for blood transfusion were discussed with risks and options clearly outlined.     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  Otherwise healthy-appearing tissue with no gross purulence or evidence of infectious etiology    PROCEDURE: The patient was identified in the preoperative holding area. The operative site was confirmed and marked. CHRISTOPHER hose and a sequential compression device were  placed on the nonoperative leg. The risks, benefits, and alternatives to surgery were again confirmed with the patient and the patient wished to proceed. The patient was brought to the operating room and placed on the operating room table in the supine position. A huddle was performed with the patient and all vital surgical team members to confirm the correct operative site, procedure, anesthesia type, and operative plan with the patient. After anesthesia was performed, the patient was positioned in the lateral decubitus position on the pegboard and secured with the operative side up. An axillary roll was placed in the axilla and all bony prominences and pressure points were checked and padded. A relative leg length assessment was carried out and markers were placed for intraoperative assessment. Intravenous antibiotic prophylaxis was given and confirmed with the anesthesia team.     The operative leg was prepped and draped in the usual sterile fashion. A surgical time out was performed immediately preceding the incision with all personnel in the operating room to confirm patient identity, the correct operative site and extremity, correct radiographic studies, availability of appropriate surgical equipment and agreement on the planned procedure. The hip was exposed utilizing a posterolateral approach. The previous incision was not incorporated into the exposure. The incision was carried through the subcutaneous tissue to the underlying fascia miguel angel and gluteus aurelia fascia, which were incised and split posteriorly over the trochanter in the direction of the fibers. Hemostasis was then obtained with electrocautery. The Charnley retractor was placed after carefully palpating the sciatic nerve which was protected throughout the case. At this point, the hip was taken through range of motion and inspected for problems areas. Inspection revealed no gross concerns at this point in the case. Exposure then continued down to  the neck of the femoral implant, creating a soft tissue sleeve off the posterior greater trochanter and femur.  Upon opening of the capsule, dark appearing fluid was expressed.  This fluid was aspirated and sent for culture.  The acetabular and proximal femoral areas were cleared. Extensile proximal femoral exposure was also carried out. The soft tissue quality and condition were assessed and it was decided that frozen sections and cultures were not needed.  The tissue appeared healthy with only metallosis type pigmented debris deposited in the periarticular joint capsule.  However, samples of the joint capsule were sent for culture. The hip was then dislocated and the modular femoral head removed.     Attention was then turned to the acetabulum. The acetabulum was debrided and exposed circumferentially. Retractors were carefully placed to aid in the exposure. Overgrowth at the acetabular rim was debrided to complete the exposure. The liner was removed. The acetabular component was inspected and found to be well fixed and well-positioned in regards to anteversion with slightly increased abduction angle.  The debris behind the liner was sent for culture.  The decision was made to retain the acetabular component. Once exposure was completed, the new lipped liner was impacted into position in the retained acetabular component with the center of the lip portion at the 11 o'clock position to offset the slightly increased abduction angle.  The liner was then checked for stability.     Attention was then turned to the femoral component. The proximal portion of the stem was completely cleared of all soft tissue at the implant bone interfaces. The femoral component was inspected and found to be likely loose given extensive osteolysis around the proximal metaphyseal portion of the implant.. The decision was made to remove the femoral component. Flexible osteotomes were used to break up the distal spot welds.  The vast  majority of the metaphyseal portion of the stem had minimal bone ingrowth with evidence of fibrous tissue interposition.  A stem extraction device was then placed, and the femoral stem was dis-impacted without complication or fracture. The greater trochanter remained attached to the distal femur, and the lesser trochanter remained attached as well. The canal was irrigated and debrided.  A portion of the fibers canal contents was sent for culture.  The distal pedestal was broken up. Serial conical reamers were utilized to open the canal until cortical chatter was obtained in the femoral diaphysis. Reaming continued until the appropriate stem depth had been obtained with sufficient resistance due to cortical apposition of the reamer. The appropriate length and diameter stem was then selected and impacted into the medullary canal until no further advancement occurred. Excellent press-fit was demonstrated with rotational and axial stability. The proximal cone reamer guidepost was then placed, followed by sequential reaming with the proximal cone reamers until satisfactory contact with the trochanter was visualized. The trial cone body corresponding to this reamer diameter and corresponding height relative to the tip of the greater trochanter was then inserted onto the distal stem and secured with the appropriate anteversion, targeting 15-20 degrees. The standard head trial was then placed and the hip was reduced. Motion and stability were tested and leg lengths were assessed. The final components were chosen to optimize these parameters. The hip was then dislocated and the trials removed. The wound was irrigated with pulsatile saline, suctioned, and inspected for debris before final components were implanted.     After the final components were implanted, a final reduction was performed and again stability and leg lengths were assessed. The hip was stable in full extension and external rotation as well as in flexion  past 90 degrees, 20 degrees adduction and 60-70 degrees of internal rotation. Leg lengths were re-created within millimeters based on the markers and relative measurement. Hemostasis was then obtained with electrocautery. A final irrigation was performed with dilute betadine solution and saline. Any remaining debris was removed and the sciatic nerve was palpated and found to be intact. A pain cocktail was injected into the adgoberto-articular tissues.     Attention was then turned to closure. The posterior tissue sleeve was repaired using 2-0 Ticron through drill holes in the greater trochanter. The fascia miguel angel and gluteal fascia were closed with interrupted #1 Vicryl suture and over sewn with running #2 Stratafix. The deep subcutaneous tissue was closed with running #0 Stratafix sutures and the superficial subcutaneous tissue with interrupted 2-0 Vicryl suture. 3-0 nylon was used to close the skin in interrupted fashion. Irrigation was performed between each layer. A silver impregnated dressing was then placed, followed by CHRISTOPHER hose and a sequential compression device to the operative limb, followed by an abduction pillow. The patient was then returned to a supine position on the operating room table. The patient was sufficiently recovered from anesthesia, transferred to a hospital bed and taken to the PACU in stable condition.     One gram (1000 mg) of intravenous tranexamic acid was administered prior to incision. A second one gram (1000 mg) intravenous dose was given prior to wound closure.    No apparent complications occurred during the procedure Instrument, sponge and needle counts were correct x 2.     The patient underwent risk stratification preoperatively and aspirin was chosen for DVT prophylaxis. Delay in starting chemical prophylaxis for 23 hours from surgical incision was over concerns for hematoma formation and wound related issues.     POSTOPERATIVE PLAN:   Protected weightbearing as tolerated right lower  extremity.   Posterior hip precautions for 3 months.   PT/OT for mobilization and medical equipment needs   Pain control with IV/PO meds   23 hours perioperative antibiotic prophylaxis.  7 days of cefadroxil 500 twice daily.  Keep silver dressing in place for 7 days post op. Change dressing only if saturated.   CHRISTOPHER hose and SCDs to bilateral lower extremities   Social work for discharge planning needs   Follow-up intraoperative cultures and consult infectious disease if cultures become positive  Follow up in 3 weeks for post-operative wound check with XR AP pelvis.

## 2020-07-20 NOTE — ANESTHESIA POSTPROCEDURE EVALUATION
Patient: Ang Jackson    Procedure Summary     Date:  07/20/20 Room / Location:   [facility]    Anesthesia Start:  1323 Anesthesia Stop:  1649    Procedure:  TOTAL HIP ARTHROPLASTY REVISION RIGHT (Right Hip) Diagnosis:       Pain due to total hip replacement, subsequent encounter      (Pain due to total hip replacement, subsequent encounter [T84.84XD, Z96.649])    Surgeon:  Jb Patel MD Provider:  Toro Ying MD    Anesthesia Type:  spinal, general ASA Status:  2          Anesthesia Type: spinal, general    Vitals  Vitals Value Taken Time   /85 7/20/2020  4:44 PM   Temp     Pulse 79 7/20/2020  4:48 PM   Resp     SpO2 99 % 7/20/2020  4:48 PM   Vitals shown include unvalidated device data.        Post Anesthesia Care and Evaluation    Patient location during evaluation: PACU  Patient participation: complete - patient participated  Level of consciousness: awake and alert  Pain score: 0  Pain management: adequate  Airway patency: patent  Anesthetic complications: No anesthetic complications  PONV Status: none  Cardiovascular status: hemodynamically stable and acceptable  Respiratory status: nonlabored ventilation, acceptable and nasal cannula  Hydration status: acceptable

## 2020-07-20 NOTE — DISCHARGE INSTRUCTIONS
"DISCHARGE INSTRUCTIONS   Dr. Patel     Total Hip Replacement/Hip Hemiarthroplasty     Wound Care   1) Keep wound / incision area clean and dry.   2) Dressing to remain in place until post-operative day 7. Upon dressing removal, assess for wound drainage. If no drainage is present, keep wound / incision area open to air as much as possible. If drainage is present, place sterile dressing to cover wound and assess daily. If drainage continues to occur after post-operative day 14, call the office for an urgent appointment. (You should be seen in the clinic within 1-2 days of calling). DO NOT REMOVE SUTURES (IF PRESENT) UNDER ANY CIRCUMSTANCES PRIOR TO FOLLOW UP APPOINTMENT.  3) No baths or swimming until otherwise instructed. The wound must remain dry for 10 days after surgery. After 10 days, you may begin to shower only if no drainage is present. No submerging the wound under standing water until cleared by your physician (no baths, hot tubs, swimming pools, etc). Sponge baths are the best way to perform personal hygiene while at the same time protecting the wound from moisture.   4) Prior to showering, the wound must remain dry for 72 consecutive hours (no drainage whatsoever) prior to showering. If the wound drains or spots, the clock \"resets\" - make sure the wound has been drainage-free for 72 consecutive hours.   5) Once you are allowed to get the wound wet, please use gentle soap to wash the wound area. DO NOT aggressively scrub the wound with a washcloth or bath sponge. Please visually inspect your wound(s) at least once daily. If the wound(s) are in a difficult to see location, please use a mirror or have someone else assist with visual inspection.   6) No scrubbing the wound. You may \"pad dry\" the wound, but do not rub, as this may open up the wound and pre-dispose to wound infection.   7) Do not apply lotions or creams to incision site, unless instructed otherwise.   8) Observe for redness, swelling, or " drainage. Please call the clinic immediately if you have fevers, chills with warmth/redness surrounding wound site or if you notice pus drainage from the wound site     Activity   No heavy lifting objects greater than 10 pounds.   No driving while on narcotic pain medication.   No submerging wound under standing water (pool, bath tub, etc.) until otherwise instructed.   You may be protected weightbearing as tolerated on your operative (right lower) extremity   Use a walker for ambulation for at least 2 weeks after surgery.  May wean from walker after 2 weeks if approved by your therapist.   Posterior hip precautions for 3 months: No bending the hip past 90 degrees. Do not allow the leg to cross the midline of your body (adduction). No twisting motions. Ask your physical therapist to review these precautions with you.     Blood Clot Prophylaxis   (Aspirin vs. Lovenox vs. Eliquis administration is determined by your surgeon and tailored to your specific risk profile. You will be discharged with one of these medications.) You will need to complete a total 4 week course of enteric coated aspirin 325 mg (or 81mg) twice daily or Eliquis 2.5mg twice daily, in order to minimize your risk of blood clots following surgery. You will be supplied with a prescription to obtain this. Alternatively, you will need to compete a total 2 week course of Lovenox after surgery (followed by a 2 week course of aspirin twice daily), in order to minimize the risk of blood clots following surgery. Lovenox requires a single shot in the abdomen, to be taken once daily. You will be supplied with the prescription to obtain this. Prior to your discharge from the hospital, the nursing staff will instruct you on self-administration of the Lovenox, if you will be returning directly home from the hospital.     Discharge Pain Medications   You will be given a prescription for pain medication. You should start taking this the same day after your surgery.  "Wean off as tolerated. Do not wait to take the pain medication until the pain is severe, as it will be difficult to \"catch up\" once this occurs. The pain medication usually reaches its full effect ~1 hour after ingesting. If you have been sent home on Valium, this medication works well for muscle spasms. If you have been sent home on Colace, this medication should be taken until you are off all narcotic (i.e. Norco, Percocet, Oxycodone, etc) pain medications, in order to prevent constipation. Percocet or Norco have Tylenol in their ingredient lists. You must be careful not to exceed 4,000mg (4 grams) of Tylenol, from all sources, within a single 24-hr period. This means that you may not take more than 12 Percocets or Norcos within a 24-hr period. Do NOT take Regular or Extra Strength Tylenol when taking your Percocet or Norco medications.  If you have been sent home with a combination of oxycodone and Tylenol, please take Tylenol as scheduled.  The oxycodone is to be taken as needed for \"breakthrough\" pain.  Some common side effects of the narcotic pain medications (Percocet, Oxycodone, Norco, etc) include nausea and itching. Benadryl is a great over the counter medication that helps calm your stomach, decreases your anxiety levels, and minimizes the itching. You can easily purchase this at your local pharmacy as an over-the-counter medication. Please abide by the instructions as printed on the bottle. If your nausea persists, make sure to take small amounts of crackers or other lighter foods.     Follow-Up   Follow-up with Dr. Patel's office in 3 weeks from the surgery date for a post-operative evaluation. Have the following xrays done upon arrival to the follow-up appointment: AP pelvis. Please call Dr. Patel's office at [phone] for orthopaedic appointments or questions.  "

## 2020-07-20 NOTE — BRIEF OP NOTE
TOTAL HIP ARTHROPLASTY REVISION  Progress Note    Ang Jackson  7/20/2020    Pre-op Diagnosis:   Pain due to total hip replacement, subsequent encounter [T84.84XD, Z96.649]       Post-Op Diagnosis Codes:     * Pain due to total hip replacement, subsequent encounter [T84.84XD, Z96.649]    Procedure/CPT® Codes:  IL REVISE TOTAL HIP REPLACEMENT [33109]    Procedure(s):  TOTAL HIP ARTHROPLASTY REVISION RIGHT    Surgeon(s):  Jb Patel MD    Anesthesia: Spinal    Staff:   Circulator: Nay Victoria RN; Elyssa Tirado RN; Elyssa Treadwell RN  Scrub Person: Ness Naik; Dar Fox  Vendor Representative: Dar Recinos; Markus Smart  Nursing Assistant: Sharri Pineda; Tawny John; Carlos Marshall  Assistant: Mamta Dasilva PA-C    Estimated Blood Loss: 500ml    Urine Voided: * No values recorded between 7/20/2020  1:22 PM and 7/20/2020  4:30 PM *    Specimens:                Specimens     ID Source Type Tests Collected By Collected At Frozen?      1 Hip, Right Synovium · ANAEROBIC CULTURE  · FUNGAL CULTURE  · TISSUE / BONE CULTURE  · AFB CULTURE   Jb Patel MD 7/20/20 1418      Description: right synovium fluid    2 Hip, Right Tissue · ANAEROBIC CULTURE  · FUNGAL CULTURE  · TISSUE / BONE CULTURE  · AFB CULTURE   Jb Patel MD 7/20/20 1424      Description: right hip capsule    3 Hip, Right Bone · FUNGAL CULTURE  · TISSUE / BONE CULTURE  · AFB CULTURE   Jb Patel MD 7/20/20 1447      Description: femur     4 Hip, Right Tissue · ANAEROBIC CULTURE  · FUNGAL CULTURE  · TISSUE / BONE CULTURE  · AFB CULTURE   Jb Patel MD 7/20/20 1456      Description: acetabular tissue                Drains: * No LDAs found *    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

## 2020-07-21 VITALS
HEIGHT: 68 IN | SYSTOLIC BLOOD PRESSURE: 115 MMHG | OXYGEN SATURATION: 99 % | DIASTOLIC BLOOD PRESSURE: 62 MMHG | RESPIRATION RATE: 15 BRPM | BODY MASS INDEX: 32.74 KG/M2 | HEART RATE: 80 BPM | TEMPERATURE: 98.3 F | WEIGHT: 216 LBS

## 2020-07-21 PROBLEM — M25.551 RIGHT HIP PAIN: Status: ACTIVE | Noted: 2020-07-21

## 2020-07-21 PROBLEM — G89.18 ACUTE POSTOPERATIVE PAIN: Status: ACTIVE | Noted: 2020-07-21

## 2020-07-21 PROBLEM — D62 ACUTE BLOOD LOSS ANEMIA: Status: ACTIVE | Noted: 2020-07-21

## 2020-07-21 PROBLEM — Z96.641 STATUS POST TOTAL REPLACEMENT OF RIGHT HIP: Status: ACTIVE | Noted: 2020-07-21

## 2020-07-21 LAB
ANION GAP SERPL CALCULATED.3IONS-SCNC: 5 MMOL/L (ref 5–15)
BUN SERPL-MCNC: 17 MG/DL (ref 6–20)
BUN/CREAT SERPL: 25.8 (ref 7–25)
CALCIUM SPEC-SCNC: 8.1 MG/DL (ref 8.6–10.5)
CHLORIDE SERPL-SCNC: 108 MMOL/L (ref 98–107)
CO2 SERPL-SCNC: 27 MMOL/L (ref 22–29)
CREAT SERPL-MCNC: 0.66 MG/DL (ref 0.76–1.27)
DEPRECATED RDW RBC AUTO: 44.1 FL (ref 37–54)
ERYTHROCYTE [DISTWIDTH] IN BLOOD BY AUTOMATED COUNT: 12.9 % (ref 12.3–15.4)
GFR SERPL CREATININE-BSD FRML MDRD: 124 ML/MIN/1.73
GLUCOSE SERPL-MCNC: 108 MG/DL (ref 65–99)
HCT VFR BLD AUTO: 32.8 % (ref 37.5–51)
HGB BLD-MCNC: 10.7 G/DL (ref 13–17.7)
MCH RBC QN AUTO: 30.8 PG (ref 26.6–33)
MCHC RBC AUTO-ENTMCNC: 32.6 G/DL (ref 31.5–35.7)
MCV RBC AUTO: 94.5 FL (ref 79–97)
PLATELET # BLD AUTO: 163 10*3/MM3 (ref 140–450)
PMV BLD AUTO: 9.9 FL (ref 6–12)
POTASSIUM SERPL-SCNC: 4.3 MMOL/L (ref 3.5–5.2)
RBC # BLD AUTO: 3.47 10*6/MM3 (ref 4.14–5.8)
SODIUM SERPL-SCNC: 140 MMOL/L (ref 136–145)
WBC # BLD AUTO: 9.06 10*3/MM3 (ref 3.4–10.8)

## 2020-07-21 PROCEDURE — 88300 SURGICAL PATH GROSS: CPT | Performed by: ORTHOPAEDIC SURGERY

## 2020-07-21 PROCEDURE — 97110 THERAPEUTIC EXERCISES: CPT

## 2020-07-21 PROCEDURE — 80048 BASIC METABOLIC PNL TOTAL CA: CPT | Performed by: ORTHOPAEDIC SURGERY

## 2020-07-21 PROCEDURE — 0SP90JZ REMOVAL OF SYNTHETIC SUBSTITUTE FROM RIGHT HIP JOINT, OPEN APPROACH: ICD-10-PCS | Performed by: ORTHOPAEDIC SURGERY

## 2020-07-21 PROCEDURE — 97535 SELF CARE MNGMENT TRAINING: CPT

## 2020-07-21 PROCEDURE — 85027 COMPLETE CBC AUTOMATED: CPT | Performed by: NURSE PRACTITIONER

## 2020-07-21 PROCEDURE — 25010000003 CEFAZOLIN IN DEXTROSE 2-4 GM/100ML-% SOLUTION: Performed by: ORTHOPAEDIC SURGERY

## 2020-07-21 PROCEDURE — 97165 OT EVAL LOW COMPLEX 30 MIN: CPT

## 2020-07-21 PROCEDURE — 97116 GAIT TRAINING THERAPY: CPT

## 2020-07-21 PROCEDURE — 0SR904A REPLACEMENT OF RIGHT HIP JOINT WITH CERAMIC ON POLYETHYLENE SYNTHETIC SUBSTITUTE, UNCEMENTED, OPEN APPROACH: ICD-10-PCS | Performed by: ORTHOPAEDIC SURGERY

## 2020-07-21 PROCEDURE — 97162 PT EVAL MOD COMPLEX 30 MIN: CPT

## 2020-07-21 RX ORDER — DOCUSATE SODIUM 100 MG/1
100 CAPSULE, LIQUID FILLED ORAL 2 TIMES DAILY
Qty: 60 CAPSULE | Refills: 0 | Status: SHIPPED | OUTPATIENT
Start: 2020-07-21 | End: 2020-09-01

## 2020-07-21 RX ORDER — CEFADROXIL 500 MG/5ML
500 POWDER, FOR SUSPENSION ORAL 2 TIMES DAILY
Qty: 75 ML | Refills: 0 | Status: SHIPPED | OUTPATIENT
Start: 2020-07-21 | End: 2020-07-21 | Stop reason: HOSPADM

## 2020-07-21 RX ORDER — OXYCODONE HYDROCHLORIDE 5 MG/1
5 TABLET ORAL EVERY 4 HOURS PRN
Qty: 40 TABLET | Refills: 0 | Status: SHIPPED | OUTPATIENT
Start: 2020-07-21 | End: 2020-07-30

## 2020-07-21 RX ORDER — AMLODIPINE BESYLATE 10 MG/1
10 TABLET ORAL DAILY
Start: 2020-07-22 | End: 2021-01-15

## 2020-07-21 RX ORDER — ACETAMINOPHEN 500 MG
1000 TABLET ORAL EVERY 8 HOURS
Qty: 42 TABLET | Refills: 0 | Status: SHIPPED | OUTPATIENT
Start: 2020-07-21 | End: 2020-07-28

## 2020-07-21 RX ORDER — CEFADROXIL 500 MG/1
500 CAPSULE ORAL 2 TIMES DAILY
Qty: 14 CAPSULE | Refills: 0 | Status: SHIPPED | OUTPATIENT
Start: 2020-07-21 | End: 2020-07-28

## 2020-07-21 RX ADMIN — MELOXICAM 15 MG: 7.5 TABLET ORAL at 08:01

## 2020-07-21 RX ADMIN — OXYCODONE 10 MG: 5 TABLET ORAL at 02:55

## 2020-07-21 RX ADMIN — GABAPENTIN 600 MG: 300 CAPSULE ORAL at 08:01

## 2020-07-21 RX ADMIN — LISINOPRIL 5 MG: 5 TABLET ORAL at 08:01

## 2020-07-21 RX ADMIN — OXYCODONE 5 MG: 5 TABLET ORAL at 08:02

## 2020-07-21 RX ADMIN — ACETAMINOPHEN 1000 MG: 500 TABLET, FILM COATED ORAL at 05:54

## 2020-07-21 RX ADMIN — ASPIRIN 325 MG: 325 TABLET, COATED ORAL at 08:01

## 2020-07-21 RX ADMIN — AMLODIPINE BESYLATE 10 MG: 10 TABLET ORAL at 08:01

## 2020-07-21 RX ADMIN — OXYCODONE 10 MG: 5 TABLET ORAL at 11:37

## 2020-07-21 RX ADMIN — PANTOPRAZOLE SODIUM 40 MG: 40 TABLET, DELAYED RELEASE ORAL at 05:54

## 2020-07-21 RX ADMIN — CEFAZOLIN SODIUM 2 G: 2 INJECTION, SOLUTION INTRAVENOUS at 05:54

## 2020-07-21 NOTE — PLAN OF CARE
Problem: Patient Care Overview  Goal: Plan of Care Review  Flowsheets (Taken 7/21/2020 0422)  Progress: no change  Plan of Care Reviewed With: patient  Outcome Summary: VSS, voids well, pt ambulates with assistance, will continue to monitor for changes.     Problem: Pain, Chronic (Adult)  Goal: Acceptable Pain/Comfort Level and Functional Ability  Flowsheets (Taken 7/20/2020 1831 by Venus Ba, RN)  Acceptable Pain/Comfort Level and Functional Ability: making progress toward outcome     Problem: Hip Arthroplasty (Total, Partial) (Adult)  Goal: Signs and Symptoms of Listed Potential Problems Will be Absent, Minimized or Managed (Hip Arthroplasty)  Flowsheets (Taken 7/20/2020 1831 by Venus Ba, RN)  Problems Assessed (Hip Arthroplasty): all  Problems Present (Hip Arthroplasty): postoperative nausea and vomiting;situational response;pain

## 2020-07-21 NOTE — PROGRESS NOTES
Discharge Planning Assessment  Psychiatric     Patient Name: Ang Jackson  MRN: [id number]  Today's Date: 7/21/2020    Admit Date: 7/20/2020    Discharge Needs Assessment     Row Name 07/21/20 1241       Living Environment    Lives With  spouse    Name(s) of Who Lives With Patient  Myla    Current Living Arrangements  home/apartment/condo    Family Caregiver if Needed  spouse    Quality of Family Relationships  helpful;involved;supportive    Able to Return to Prior Arrangements  yes       Resource/Environmental Concerns    Resource/Environmental Concerns  home accessibility    Home Accessibility Concerns  stairs to enter home    Transportation Concerns  car, none       Transition Planning    Patient/Family Anticipates Transition to  home with family    Patient/Family Anticipated Services at Transition  home health care    Transportation Anticipated  family or friend will provide       Discharge Needs Assessment    Readmission Within the Last 30 Days  no previous admission in last 30 days    Equipment Currently Used at Home  cane, straight    Equipment Needed After Discharge  walker, rolling;commode    Outpatient/Agency/Support Group Needs  homecare agency    Discharge Facility/Level of Care Needs  home with home health    Provided Post Acute Provider List?  Yes    Post Acute Provider List  Home Health    Delivered To  Patient    Method of Delivery  In person    Patient's Choice of Community Agency(s)  [facility]        Discharge Plan     Row Name 07/21/20 1243       Plan    Plan  Home with wife's assistance, [facility] and a rolling walker and bedside commode from [employer]    Patient/Family in Agreement with Plan  yes    Plan Comments  Met with Mr. Jackson at the bedside for discharge planning.  Mr. Jackson lives with his wife, Myla in a one story home, 3 steps to enter, in [facility].  He is ambulating with a rolling walker.  He requested a walker and commode for home use and did not have  preference for provider.  Contacted [facility] and they will deliver the DME to the hospital room today.  Discussed physical therapy and Mr. Jackson requested [facility].  Unfortunately, CM was unable to contact the agency, their phone is not going through.  Contacted [facility] and referral called and faxed to April.  Mr. Jackson states that his wife will be available to assist him at home as needed.  Mr. Jackson's daughter will be transporting the patient home when discharged.    CM will continue to follow.    Final Discharge Disposition Code  06 - home with home health care        Destination      Coordination has not been started for this encounter.      Durable Medical Equipment - Selection Complete      Service Provider Request Status Selected Services Address Phone Number Fax Number    [facility] Selected Durable Medical Equipment [address] [address] [phone]      Dialysis/Infusion      Coordination has not been started for this encounter.      Home Medical Care - Selection Complete      Service Provider Request Status Selected Services Address Phone Number Fax Number    [facility] [address] [facility] [address] [phone]      Therapy      Coordination has not been started for this encounter.      Community Resources      Coordination has not been started for this encounter.        Expected Discharge Date and Time     Expected Discharge Date Expected Discharge Time    Jul 21, 2020         Demographic Summary     Row Name 07/21/20 1241       General Information    Admission Type  inpatient    Arrived From  home    Reason for Consult  discharge planning    General Information Comments  PCP:  Mamta Ortega       Contact Information    Permission Granted to Share Info With      Contact Information Comments  Wife:  Myla, ph [phone]         Functional Status     Row Name 07/21/20 1241       Functional Status    Usual Activity Tolerance  moderate    Current Activity Tolerance  -- See PT notes       Functional Status, IADL    Medications  independent    Meal Preparation  independent    Housekeeping  independent    Laundry  independent    Shopping  independent       Mental Status    General Appearance WDL  WDL        Psychosocial    No documentation.       Abuse/Neglect    No documentation.       Legal    No documentation.       Substance Abuse    No documentation.       Patient Forms    No documentation.           Angella De La Fuente RN

## 2020-07-21 NOTE — PLAN OF CARE
Problem: Patient Care Overview  Goal: Plan of Care Review  Outcome: Ongoing (interventions implemented as appropriate)  Flowsheets (Taken 7/21/2020 1011)  Outcome Summary: Patient ambulated 200 feet with RW and step to gait pattern, progressed to step through gait pattern, limited by pain. Patient climbed 3 steps with bilateral handrails with no difficulty. Patient has been d/c home with family and HHPT.

## 2020-07-21 NOTE — DISCHARGE PLACEMENT REQUEST
"Emily Jackson (60 y.o. Male)   Home Health PT referral.  From Angella HIDALGO BSN, Case Management, ph [phone]    Date of Birth Social Security Number Address Home Phone MRN    1960  PO   [facility] [address] [phone] [id number]    Sikhism Marital Status          Other        Admission Date Admission Type Admitting Provider Attending Provider Department, Room/Bed    7/20/20 Elective Jb Patel MD Luckett, Matthew R, MD [facility] 3G, S361/1    Discharge Date Discharge Disposition Discharge Destination         Home or Self Care              Attending Provider:  Jb Patel MD    Allergies:  Codeine, Morphine And Related    Isolation:  None   Infection:  None   Code Status:  CPR    Ht:  172.7 cm (68\")   Wt:  98 kg (216 lb)    Admission Cmt:  None   Principal Problem:  Status post total replacement of right hip [Z96.641]                 Active Insurance as of 7/20/2020     Primary Coverage     Payor Plan Insurance Group Employer/Plan Group    MEDICARE MEDICARE A & B      Payor Plan Address Payor Plan Phone Number Payor Plan Fax Number Effective Dates    [address] [phone]  10/1/2011 - None Entered    [facility] [address]       Subscriber Name Subscriber Birth Date Member ID       EMILY JACKSON 1960 [id number]           Secondary Coverage     Payor Plan Insurance Group Employer/Plan Group    [place]      Payor Plan Address Payor Plan Phone Number Payor Plan Fax Number Effective Dates    [address] [address] [phone]  8/29/2016 - None Entered    [facility] [id number]       Subscriber Name Subscriber Birth Date Member ID       EMILY JACKSON 1960 [id number]                 Emergency Contacts      (Rel.) Home Phone Work Phone Mobile Phone    ManuelMyla (Spouse) [phone] -- --    ROSS DYSON (Daughter) [phone] -- --    CESAR JACKSON (Daughter) [phone] -- --        [facility] " 3G  [address] [facility] [address]  Phone:  [phone]  Fax:   Date: 2020      Ambulatory Referral to Home Health     Patient:  Ang Jackson MRN:  [id number]   PO   [facility] [address] :  1960  SSN:    Phone: [phone] Sex:  M      INSURANCE PAYOR PLAN GROUP # SUBSCRIBER ID   Primary:  Secondary:    [place]ICARE [place] [id number]  [id number]      [id number]  [id number]      Referring Provider Information:  ISELA MALDONADO Phone: [phone] Fax:       Referral Information:   # Visits:  1 Referral Type: Home Health [42]   Urgency:  Routine Referral Reason: Specialty Services Required   Start Date: 2020 End Date:  To be determined by Insurer   Diagnosis: Status post total replacement of right hip (Z96.641 [ICD-10-CM] V43.64 [ICD-9-CM])  Impaired mobility and ADLs (Z74.09,Z78.9 [ICD-10-CM] V49.89 [ICD-9-CM])  Right hip pain (M25.551 [ICD-10-CM] 719.45 [ICD-9-CM])      Refer to Dept:   Refer to Provider:   Refer to Facility:       Face to Face Visit Date: 2020  Follow-up provider for Plan of Care? I treated the patient in an acute care facility and will not continue treatment after discharge.  Follow-up provider: NORA LUEVANO [[id number]]  Reason/Clinical Findings: s/p right hip surgery  Describe mobility limitations that make leaving home difficult: Impaired functional mobility, balance, gait and endurance.  Nursing/Therapeutic Services Requested: Physical Therapy  PT orders: Therapeutic exercise  PT orders: Gait Training  PT orders: Transfer training  PT orders: Strengthening  PT orders: Home safety assessment  Weight Bearing Status: As Tolerated  Frequency: Other     This document serves as a request of services and does not constitute Insurance authorization or approval of services.  To determine eligibility, please contact the members Insurance carrier to verify and review coverage.     If you have medical questions regarding this  "request for services. Please contact [address] at [phone] during normal business hours.       Authorizing Provider:Jb Patel MD  Authorizing Provider's NPI: [id number]  Order Entered By: Angella De La Fuente RN 2020 11:54 AM     Electronically signed by: Jb Patel MD 2020 11:54 AM               Physician Progress Notes (last 24 hours) (Notes from 20 1204 through 20 1204)      Jb Patel MD at 20 0714            SUBJECTIVE  Patient resting comfortably.  Pain well controlled.  No events overnight.  Ambulated with nursing yesterday evening without complication.    OBJECTIVE  Temp (24hrs), Av.9 °F (36.6 °C), Min:97 °F (36.1 °C), Max:98.3 °F (36.8 °C)    Blood pressure 125/77, pulse 80, temperature 98.3 °F (36.8 °C), temperature source Oral, resp. rate 14, height 172.7 cm (68\"), weight 98 kg (216 lb), SpO2 99 %.    Lab Results (last 24 hours)     Procedure Component Value Units Date/Time    Basic Metabolic Panel [[id number]]  (Abnormal) Collected:  20    Specimen:  Blood Updated:  20     Glucose 108 mg/dL      BUN 17 mg/dL      Creatinine 0.66 mg/dL      Sodium 140 mmol/L      Potassium 4.3 mmol/L      Chloride 108 mmol/L      CO2 27.0 mmol/L      Calcium 8.1 mg/dL      eGFR Non African Amer 124 mL/min/1.73      BUN/Creatinine Ratio 25.8     Anion Gap 5.0 mmol/L     Narrative:       GFR Normal >60  Chronic Kidney Disease <60  Kidney Failure <15      CBC (No Diff) [[id number]]  (Abnormal) Collected:  20    Specimen:  Blood Updated:  20     WBC 9.06 10*3/mm3      RBC 3.47 10*6/mm3      Hemoglobin 10.7 g/dL      Hematocrit 32.8 %      MCV 94.5 fL      MCH 30.8 pg      MCHC 32.6 g/dL      RDW 12.9 %      RDW-SD 44.1 fl      MPV 9.9 fL      Platelets 163 10*3/mm3     Tissue / Bone Culture - Tissue, Hip, Right [[id number]] Collected:  20 1424    Specimen:  Tissue from Hip, Right Updated:  20 2143 "     Gram Stain No WBCs or organisms seen    Tissue / Bone Culture - Bone, Hip, Right [[id number]] Collected:  07/20/20 1447    Specimen:  Bone from Hip, Right Updated:  07/20/20 2136     Gram Stain No WBCs or organisms seen    Tissue / Bone Culture - Tissue, Hip, Right [[id number]] Collected:  07/20/20 1456    Specimen:  Tissue from Hip, Right Updated:  07/20/20 2135     Gram Stain No WBCs or organisms seen    Tissue / Bone Culture - Synovium, Hip, Right [[id number]] Collected:  07/20/20 1418    Specimen:  Synovium from Hip, Right Updated:  07/20/20 2134     Gram Stain No WBCs or organisms seen    POC Glucose Once [[id number]]  (Abnormal) Collected:  07/20/20 1648    Specimen:  Blood Updated:  07/20/20 1657     Glucose 155 mg/dL     Anaerobic Culture - Tissue, Hip, Right [[id number]] Collected:  07/20/20 1456    Specimen:  Tissue from Hip, Right Updated:  07/20/20 1509    Fungus Culture - Tissue, Hip, Right [[id number]] Collected:  07/20/20 1456    Specimen:  Tissue from Hip, Right Updated:  07/20/20 1509    AFB Culture - Tissue, Hip, Right [[id number]] Collected:  07/20/20 1456    Specimen:  Tissue from Hip, Right Updated:  07/20/20 1509    Fungus Culture - Bone, Hip, Right [[id number]] Collected:  07/20/20 1447    Specimen:  Bone from Hip, Right Updated:  07/20/20 1509    AFB Culture - Bone, Hip, Right [[id number]] Collected:  07/20/20 1447    Specimen:  Bone from Hip, Right Updated:  07/20/20 1509    Fungus Culture - Synovium, Hip, Right [[id number]] Collected:  07/20/20 1418    Specimen:  Synovium from Hip, Right Updated:  07/20/20 1449    AFB Culture - Synovium, Hip, Right [[id number]] Collected:  07/20/20 1418    Specimen:  Synovium from Hip, Right Updated:  07/20/20 1449    Anaerobic Culture - Synovium, Hip, Right [[id number]] Collected:  07/20/20 1418    Specimen:  Synovium from Hip, Right Updated:  07/20/20 1449    Fungus Culture - Tissue, Hip, Right [[id number]] Collected:  07/20/20 1424    Specimen:  Tissue from Hip,  Right Updated:  07/20/20 1449    AFB Culture - Tissue, Hip, Right [[id number]] Collected:  07/20/20 1424    Specimen:  Tissue from Hip, Right Updated:  07/20/20 1449    Anaerobic Culture - Tissue, Hip, Right [[id number]] Collected:  07/20/20 1424    Specimen:  Tissue from Hip, Right Updated:  07/20/20 1449    POC Glucose Once [[id number]]  (Normal) Collected:  07/20/20 1210    Specimen:  Blood Updated:  07/20/20 1224     Glucose 73 mg/dL             PHYSICAL EXAM  Right lower extremity: Dressing clean, dry and intact.  Leg lengths symmetric.  Intact EHL, FHL, tibialis anterior, and gastrocsoleus. Sensation intact to light touch to deep peroneal, superficial peroneal, sural, saphenous, tibial nerves. 2+ palpable DP and PT pulses.         Hypertension    BPH with obstruction/lower urinary tract symptoms  Painful right hip arthroplasty    PLAN / DISPOSITION:  1 Day Post-Op revision right hip arthroplasty    Protected weight bearing as tolerated right lower extremity, posterior hip precautions x3 months  Pain control  PT/OT for post op mobilization and medical equipment needs   23 hours perioperative antibiotic prophylaxis.  Discharge on cefadroxil 500 twice daily x7 days  Follow-up intraoperative cultures  SCD's bilateral lower extremities   Aspirin for DVT prophylaxis   Social work for discharge planning.  Anticipate discharge home today.  Dressing to remain in place for 7 days. May remove on POD#7. If no drainage, may shower on POD#10. No submerging wound in water. If drainage is noted, sterile dressing should be placed and wound checked daily. No showering until wound has remained dry for 72 consecutive hours.   Follow up in 3 weeks for re-assessment.      Jb Patel MD  07/21/20  07:14           Electronically signed by Jb Patel MD at 07/21/20 0717          Physical Therapy Notes (most recent note)      Lesley Teixeira, PT at 07/21/20 1011  Version 1 of 1         Patient Name: Ang JEFFERSON  Manuel  : 1960    MRN: [id number]                              Today's Date: 2020       Admit Date: 2020    Visit Dx:     ICD-10-CM ICD-9-CM   1. Status post total replacement of right hip Z96.641 V43.64   2. Pain due to total hip replacement, subsequent encounter T84.84XD V58.89    Z96.649 996.77     338.18     V43.64   3. Impaired mobility and ADLs Z74.09 V49.89    Z78.9      Patient Active Problem List   Diagnosis   • Diabetes mellitus (CMS/HCC)   • Hypertension   • Pre-op evaluation   • BPH with obstruction/lower urinary tract symptoms   • Corporo-venous occlusive erectile dysfunction   • Low testosterone in male   • Scrotal varices   • Post-traumatic osteoarthritis of left knee   • Primary osteoarthritis of left knee   • Right hip pain   • Status post total replacement of right hip   • Acute blood loss anemia, mild, asymptomatic   • Acute postoperative pain     Past Medical History:   Diagnosis Date   • Anxiety    • Arthritis    • Diabetes mellitus (CMS/HCC)     diet controlled    • GERD (gastroesophageal reflux disease)    • Headache    • Hypertension    • Low back pain    • Sleep apnea     does not wear machine   • Wears reading eyeglasses      Past Surgical History:   Procedure Laterality Date   • CARPAL TUNNEL RELEASE Bilateral    • COLONOSCOPY      5 years ago   • GASTRIC SLEEVE LAPAROSCOPIC     • HIP SURGERY Right times 2   • KNEE SURGERY Left     arthroscopy    • TOTAL HIP ARTHROPLASTY REVISION Right 2020    Procedure: TOTAL HIP ARTHROPLASTY REVISION RIGHT;  Surgeon: Jb Patel MD;  Location: [facility];  Service: Orthopedics;  Laterality: Right;     General Information     Row Name 20 1011          PT Evaluation Time/Intention    Document Type  evaluation;discharge treatment  -LR     Mode of Treatment  physical therapy;individual therapy  -LR     Row Name 20 1011          General Information    Patient Profile Reviewed?  yes  -LR     Prior Level of Function  min  assist:;all household mobility;community mobility;gait;transfer;bed mobility;ADL's;home management;cooking;cleaning;using stairs;shopping;independent:;driving all mobility limited by pain  -LR     Existing Precautions/Restrictions  fall;right;hip, posterior  -LR     Barriers to Rehab  previous functional deficit  -LR     Row Name 07/21/20 1011          Relationship/Environment    Lives With  spouse family can assist at all times upon d/c home  -LR     Row Name 07/21/20 1011          Resource/Environmental Concerns    Current Living Arrangements  home/apartment/condo  -LR     Row Name 07/21/20 1011          Home Main Entrance    Number of Stairs, Main Entrance  three  -LR     Stair Railings, Main Entrance  railings on both sides of stairs  -LR     Row Name 07/21/20 1011          Stairs Within Home, Primary    Number of Stairs, Within Home, Primary  none  -LR     Row Name 07/21/20 1011          Cognitive Assessment/Intervention- PT/OT    Orientation Status (Cognition)  oriented x 4  -LR     Row Name 07/21/20 1011          Safety Issues, Functional Mobility    Safety Issues Affecting Function (Mobility)  positioning of assistive device;safety precautions follow-through/compliance;sequencing abilities;safety precaution awareness  -LR     Impairments Affecting Function (Mobility)  pain;strength;range of motion (ROM)  -LR       User Key  (r) = Recorded By, (t) = Taken By, (c) = Cosigned By    Initials Name Provider Type    LR Lesley Teixeira, PT Physical Therapist        Mobility     Row Name 07/21/20 1011          Bed Mobility Assessment/Treatment    Scooting/Bridging [place] (Bed Mobility)  not tested  -LR     Supine-Sit [place] (Bed Mobility)  not tested  -LR     Comment (Bed Mobility)  [facility] on arrival and at end of treatment.   -LR     Row Name 07/21/20 1011          Transfer Assessment/Treatment    Comment (Transfers)  Verbal cues to push up from chair to stand and to reach back for chair to lower  into sitting. Verbal cues to step R LE out before t/f for comfort and to avoid flexing past 90 degrees at hip during t/f.   -LR     Row Name 07/21/20 1011          Sit-Stand Transfer    Sit-Stand [place] (Transfers)  verbal cues;stand by assist  -LR     Assistive Device (Sit-Stand Transfers)  walker, front-wheeled  -LR     Row Name 07/21/20 1011          Gait/Stairs Assessment/Training    93097 - Gait Training Minutes   13  -LR     [place] Level (Gait)  verbal cues;contact guard  -LR     Assistive Device (Gait)  walker, front-wheeled  -LR     Distance in Feet (Gait)  200  -LR     Pattern (Gait)  step-through  -LR     Deviations/Abnormal Patterns (Gait)  bilateral deviations;jose decreased;gait speed decreased;stride length decreased;right sided deviations;antalgic  -LR     Bilateral Gait Deviations  forward flexed posture;heel strike decreased  -LR     Right Sided Gait Deviations  weight shift ability decreased  -LR     [place] Level (Stairs)  verbal cues;contact guard  -LR     Handrail Location (Stairs)  both sides  -LR     Number of Steps (Stairs)  3  -LR     Ascending Technique (Stairs)  step-to-step  -LR     Descending Technique (Stairs)  step-to-step  -LR     Comment (Gait/Stairs)  Patient ambulated with step to gait pattern at slow pace initially, with pause after stepping forward with L LE. Able to progress to step through gait pattern with cues for equal step length, increased R LE weight bearing/stance phase, and continuous forward motion of RW. Gait limited by pain. Verbal cues to climb steps one at a time and to step up with strong LE first and down with weak LE first. No LOB or unsteadiness with stairs.   -LR     Row Name 07/21/20 1011          Mobility Assessment/Intervention    Extremity Weight-bearing Status  right lower extremity  -LR     Right Lower Extremity (Weight-bearing Status)  weight-bearing as tolerated (WBAT)  -LR       User Key  (r) = Recorded By, (t) = Taken By, (c) =  Cosigned By    Initials Name Provider Type    Lesley Cano, PT Physical Therapist        Obj/Interventions     Row Name 07/21/20 1011          General ROM    GENERAL ROM COMMENTS  L LE AROM WFL; R hip AROM impaired 25%, d/t pain and weakness  -LR     Row Name 07/21/20 1011          MMT (Manual Muscle Testing)    General MMT Comments  L LE WFL; R hip functionally 4-/5, independent with SLR, no knee buckling with gait  -LR     Row Name 07/21/20 1011          Therapeutic Exercise    Lower Extremity (Therapeutic Exercise)  gluteal sets;heel slides, right;LAQ (long arc quad), right;marching while standing;quad sets, right;SLR (straight leg raise), right;other (see comments) standing calf raises, hip extension  -LR     Lower Extremity Range of Motion (Therapeutic Exercise)  hip abduction/adduction, right;ankle dorsiflexion/plantar flexion, right  -LR     Exercise Type (Therapeutic Exercise)  AROM (active range of motion);AAROM (active assistive range of motion);isometric contraction, static;isotonic contraction, concentric  -LR     Position (Therapeutic Exercise)  seated;standing  -LR     Sets/Reps (Therapeutic Exercise)  x15 reps each  -LR     Comment (Therapeutic Exercise)  cues for technique; CGA for heel slides, hip abd, min assist SLR for multiple reps; could only tolerate 6 reps of hip extension and marching in standing  -LR     Row Name 07/21/20 1011          Sensory Assessment/Intervention    Sensory General Assessment  no sensation deficits identified denies numbness/tingling;light touch equal and intact  -LR       User Key  (r) = Recorded By, (t) = Taken By, (c) = Cosigned By    Initials Name Provider Type    Lesley Cano, PT Physical Therapist        Goals/Plan     Row Name 07/21/20 1011          Bed Mobility Goal 1 (PT)    Activity/Assistive Device (Bed Mobility Goal 1, PT)  sit to supine/supine to sit  -LR     Oral Level/Cues Needed (Bed Mobility Goal 1, PT)  conditional  independence  -LR     Time Frame (Bed Mobility Goal 1, PT)  long term goal (LTG);1 day  -LR     Progress/Outcomes (Bed Mobility Goal 1, PT)  goal not met;discharged from facility  -LR     Row Name 07/21/20 1011          Transfer Goal 1 (PT)    Activity/Assistive Device (Transfer Goal 1, PT)  sit-to-stand/stand-to-sit;walker, rolling  -LR     [place] Level/Cues Needed (Transfer Goal 1, PT)  standby assist  -LR     Time Frame (Transfer Goal 1, PT)  long term goal (LTG);1 day  -LR     Progress/Outcome (Transfer Goal 1, PT)  goal met  -LR     Row Name 07/21/20 1011          Gait Training Goal 1 (PT)    Activity/Assistive Device (Gait Training Goal 1, PT)  gait (walking locomotion);walker, rolling  -LR     [place] Level (Gait Training Goal 1, PT)  contact guard assist  -LR     Distance (Gait Goal 1, PT)  150 feet  -LR     Time Frame (Gait Training Goal 1, PT)  long term goal (LTG);1 day  -LR     Progress/Outcome (Gait Training Goal 1, PT)  goal met  -LR     Row Name 07/21/20 1011          Stairs Goal 1 (PT)    Activity/Assistive Device (Stairs Goal 1, PT)  ascending stairs;descending stairs;step-to-step;using handrail, left;using handrail, right  -LR     [place] Level/Cues Needed (Stairs Goal 1, PT)  contact guard assist  -LR     Number of Stairs (Stairs Goal 1, PT)  3  -LR     Time Frame (Stairs Goal 1, PT)  long term goal (LTG);1 day  -LR     Progress/Outcome (Stairs Goal 1, PT)  goal met  -LR       User Key  (r) = Recorded By, (t) = Taken By, (c) = Cosigned By    Initials Name Provider Type    LR Lesley Teixeira, PT Physical Therapist        Clinical Impression     Row Name 07/21/20 1011          Pain Assessment    Additional Documentation  Pain Scale: Numbers Pre/Post-Treatment (Group)  -LR     Row Name 07/21/20 1011          Pain Scale: Numbers Pre/Post-Treatment    Pain Scale: Numbers, Pretreatment  8/10  -LR     Pain Scale: Numbers, Post-Treatment  8/10  -LR     Pain Location - Side  Right   -LR     Pain Location  hip  -LR     Pain Intervention(s)  Ambulation/increased activity;Repositioned  -LR     Row Name 07/21/20 1011          Plan of Care Review    Plan of Care Reviewed With  patient  -LR     Progress  improving  -LR     Row Name 07/21/20 1011          Physical Therapy Clinical Impression    Patient/Family Goals Statement (PT Clinical Impression)  go home, decrease pain  -LR     Criteria for Skilled Interventions Met (PT Clinical Impression)  yes;treatment indicated  -LR     Rehab Potential (PT Clinical Summary)  good, to achieve stated therapy goals  -LR     Row Name 07/21/20 1011          Positioning and Restraints    Pre-Treatment Position  sitting in chair/recliner  -LR     Post Treatment Position  chair  -LR     In Chair  notified nsg;reclined;sitting;call light within reach;encouraged to call for assist;exit alarm on;legs elevated  -LR       User Key  (r) = Recorded By, (t) = Taken By, (c) = Cosigned By    Initials Name Provider Type    LR Lesley Teixeira, PT Physical Therapist        Outcome Measures     Row Name 07/21/20 1011          How much help from another person do you currently need...    Turning from your back to your side while in flat bed without using bedrails?  3  -LR     Moving from lying on back to sitting on the side of a flat bed without bedrails?  3  -LR     Moving to and from a bed to a chair (including a wheelchair)?  3  -LR     Standing up from a chair using your arms (e.g., wheelchair, bedside chair)?  3  -LR     Climbing 3-5 steps with a railing?  3  -LR     To walk in hospital room?  3  -LR     AM-PAC 6 Clicks Score (PT)  18  -LR     Row Name 07/21/20 1011          PADD    Diagnosis  2  -LR     Gender  2  -LR     Age Group  2  -LR     Gait Distance  1  -LR     Assist Level  1  -LR     Home Support  3  -LR     PADD Score  11  -LR     Patient Preference  home with home health  -LR     Prediction by PADD Score  directly home (with home health or out-patient rehab)   AMBER     Row Name 07/21/20 1011          Functional Assessment    Outcome Measure Options  AM-PAC 6 Clicks Basic Mobility (PT);PADD  -LR       User Key  (r) = Recorded By, (t) = Taken By, (c) = Cosigned By    Initials Name Provider Type    Lesley Cano, PT Physical Therapist        Physical Therapy Education                 Title: PT OT SLP Therapies (In Progress)     Topic: Physical Therapy (Done)     Point: Mobility training (Done)     Description:   Instruct learner(s) on safety and technique for assisting patient out of bed, chair or wheelchair.  Instruct in the proper use of assistive devices, such as walker, crutches, cane or brace.              Patient Friendly Description:   It's important to get you on your feet again, but we need to do so in a way that is safe for you. Falling has serious consequences, and your personal safety is the most important thing of all.        When it's time to get out of bed, one of us or a family member will sit next to you on the bed to give you support.     If your doctor or nurse tells you to use a walker, crutches, a cane, or a brace, be sure you use it every time you get out of bed, even if you think you don't need it.    Learning Progress Summary           Patient Acceptance, E,D,H, VU,DU by LR at 7/21/2020 1011    Comment:  Issued and reviewed written/illustrated HEP. Educated on posterior hip precautions, weight bearing status, correct sit<->stand t/f technique, correct gait mechanics, correct stair training technique, and correct car t/f technique.                   Point: Home exercise program (Done)     Description:   Instruct learner(s) on appropriate technique for monitoring, assisting and/or progressing patient with therapeutic exercises and activities.              Learning Progress Summary           Patient Acceptance, E,D,H, VU,DU by LR at 7/21/2020 1011    Comment:  Issued and reviewed written/illustrated HEP. Educated on posterior hip precautions,  weight bearing status, correct sit<->stand t/f technique, correct gait mechanics, correct stair training technique, and correct car t/f technique.                   Point: Body mechanics (Done)     Description:   Instruct learner(s) on proper positioning and spine alignment for patient and/or caregiver during mobility tasks and/or exercises.              Learning Progress Summary           Patient Acceptance, E,D,H, VU,DU by LR at 7/21/2020 1011    Comment:  Issued and reviewed written/illustrated HEP. Educated on posterior hip precautions, weight bearing status, correct sit<->stand t/f technique, correct gait mechanics, correct stair training technique, and correct car t/f technique.                   Point: Precautions (Done)     Description:   Instruct learner(s) on prescribed precautions during mobility and gait tasks              Learning Progress Summary           Patient Acceptance, E,D,H, VU,DU by LR at 7/21/2020 1011    Comment:  Issued and reviewed written/illustrated HEP. Educated on posterior hip precautions, weight bearing status, correct sit<->stand t/f technique, correct gait mechanics, correct stair training technique, and correct car t/f technique.                               User Key     Initials Effective Dates Name Provider Type Discipline     06/19/15 -  Lesley Teixeira, PT Physical Therapist PT              PT Recommendation and Plan  Planned Therapy Interventions (PT Eval): balance training, bed mobility training, gait training, home exercise program, stair training, ROM (range of motion), patient/family education, strengthening, transfer training  Outcome Summary/Treatment Plan (PT)  Anticipated Equipment Needs at Discharge (PT): front wheeled walker, bedside commode  Anticipated Discharge Disposition (PT): home with assist, home with home health  Plan of Care Reviewed With: patient  Progress: improving  Outcome Summary: Patient ambulated 200 feet with RW and step to gait pattern,  progressed to step through gait pattern, limited by pain. Patient climbed 3 steps with bilateral handrails with no difficulty. Patient has been d/c home with family and HHPT.     Time Calculation:   PT Charges     Row Name 07/21/20 1011             Time Calculation    Start Time  1011  -LR      PT Received On  07/21/20  -LR      PT Goal Re-Cert Due Date  07/31/20  -LR         Time Calculation- PT    Total Timed Code Minutes- PT  23 minute(s)  -LR         Timed Charges    46626 - PT Therapeutic Exercise Minutes  10  -LR      99064 - Gait Training Minutes   13  -LR        User Key  (r) = Recorded By, (t) = Taken By, (c) = Cosigned By    Initials Name Provider Type    LR Lesley Teixeira, PT Physical Therapist        Therapy Charges for Today     Code Description Service Date Service Provider Modifiers Qty    22172138907 HC PT THER PROC EA 15 MIN 7/21/2020 Lesley Teixeira, PT GP 1    27638170292 HC GAIT TRAINING EA 15 MIN 7/21/2020 Lesley Teixeira, PT GP 1    69184080476 HC PT EVAL MOD COMPLEXITY 2 7/21/2020 Lesley Teixeira, PT GP 1          PT G-Codes  Outcome Measure Options: AM-PAC 6 Clicks Basic Mobility (PT), PADD  AM-PAC 6 Clicks Score (PT): 18    PT Discharge Summary  Anticipated Discharge Disposition (PT): home with assist, home with home health  Reason for Discharge: Discharge from facility  Outcomes Achieved: Patient able to partially acheive established goals, Discharge from facility occurred on same date as evluation  Discharge Destination: Home with assist, Home with home health    Lesley Teixeira, VON  7/21/2020         Electronically signed by Lesley Teixeira, PT at 07/21/20 2919

## 2020-07-21 NOTE — THERAPY DISCHARGE NOTE
Acute Care - Occupational Therapy Initial Eval/Discharge  [facility]     Patient Name: Ang Jackson  : 1960  MRN: [id number]  Today's Date: 2020     Date of Referral to OT: 20         Admit Date: 2020       ICD-10-CM ICD-9-CM   1. Status post total replacement of right hip Z96.641 V43.64   2. Pain due to total hip replacement, subsequent encounter T84.84XD V58.89    Z96.649 996.77     338.18     V43.64   3. Impaired mobility and ADLs Z74.09 V49.89    Z78.9    4. Right hip pain M25.551 719.45     Patient Active Problem List   Diagnosis   • Diabetes mellitus (CMS/HCC)   • Hypertension   • Pre-op evaluation   • BPH with obstruction/lower urinary tract symptoms   • Corporo-venous occlusive erectile dysfunction   • Low testosterone in male   • Scrotal varices   • Post-traumatic osteoarthritis of left knee   • Primary osteoarthritis of left knee   • Right hip pain   • Status post total replacement of right hip   • Acute blood loss anemia, mild, asymptomatic   • Acute postoperative pain     Past Medical History:   Diagnosis Date   • Anxiety    • Arthritis    • Diabetes mellitus (CMS/HCC)     diet controlled    • GERD (gastroesophageal reflux disease)    • Headache    • Hypertension    • Low back pain    • Sleep apnea     does not wear machine   • Wears reading eyeglasses      Past Surgical History:   Procedure Laterality Date   • CARPAL TUNNEL RELEASE Bilateral    • COLONOSCOPY      5 years ago   • GASTRIC SLEEVE LAPAROSCOPIC     • HIP SURGERY Right times 2   • KNEE SURGERY Left     arthroscopy    • TOTAL HIP ARTHROPLASTY REVISION Right 2020    Procedure: TOTAL HIP ARTHROPLASTY REVISION RIGHT;  Surgeon: Jb Patel MD;  Location: [facility];  Service: Orthopedics;  Laterality: Right;          OT ASSESSMENT FLOWSHEET (last 12 hours)      Occupational Therapy Evaluation     Row Name 20 0836                   OT Evaluation Time/Intention    Subjective Information  complains of;pain   -HK        Document Type  discharge evaluation/summary  -HK        Mode of Treatment  occupational therapy  -HK        Patient Effort  excellent  -HK        Symptoms Noted During/After Treatment  none  -HK           General Information    Patient Profile Reviewed?  yes  -HK        Prior Level of Function  min assist:;all household mobility;community mobility;gait;transfer;bed mobility;ADL's  -HK        Equipment Currently Used at Home  none  -HK        Existing Precautions/Restrictions  fall;right;hip, posterior  -HK        Equipment Issued to Patient  long handled sponge;reacher;sock aid;other (see comments) horudy horn  -HK        Risks Reviewed  patient:;LOB;nausea/vomiting;dizziness;increased discomfort;change in vital signs;increased drainage;lines disloged  -HK        Benefits Reviewed  patient:;improve function;increase independence;increase strength;decrease pain;increase balance;decrease risk of DVT;improve skin integrity;increase knowledge  -HK        Barriers to Rehab  previous functional deficit  -HK           Relationship/Environment    Primary Source of Support/Comfort  spouse  -HK        Lives With  spouse  -HK           Resource/Environmental Concerns    Current Living Arrangements  home/apartment/condo  -HK           Home Main Entrance    Number of Stairs, Main Entrance  three  -HK        Stair Railings, Main Entrance  railings on both sides of stairs  -HK           Cognitive Assessment/Interventions    Additional Documentation  Cognitive Assessment/Intervention (Group)  -HK           Cognitive Assessment/Intervention- PT/OT    Affect/Mental Status (Cognitive)  WNL  -HK        Orientation Status (Cognition)  oriented x 4  -HK        Follows Commands (Cognition)  follows one step commands;over 90% accuracy  -HK        Cognitive Function (Cognitive)  safety deficit  -HK        Safety Deficit (Cognitive)  mild deficit;safety precautions awareness;safety precautions follow-through/compliance  -HK            Safety Issues, Functional Mobility    Safety Issues Affecting Function (Mobility)  safety precautions follow-through/compliance;safety precaution awareness;insight into deficits/self awareness;awareness of need for assistance  -HK        Impairments Affecting Function (Mobility)  balance;pain;strength;range of motion (ROM)  -HK           Mobility Assessment/Treatment    Extremity Weight-bearing Status  right lower extremity  -HK        Right Lower Extremity (Weight-bearing Status)  weight-bearing as tolerated (WBAT)  -HK           Bed Mobility Assessment/Treatment    Bed Mobility Assessment/Treatment  scooting/bridging;supine-sit  -HK        Scooting/Bridging [place] (Bed Mobility)  conditional independence  -HK        Supine-Sit [place] (Bed Mobility)  conditional independence  -HK        Bed Mobility, Safety Issues  decreased use of legs for bridging/pushing  -HK        Assistive Device (Bed Mobility)  leg ;head of bed elevated;bed rails  -HK        Comment (Bed Mobility)  OT issued leg  and educated pt on use.   -HK           Functional Mobility    Functional Mobility- Comment  OT reviewed TTWB status and how to maintain during FM.   -HK           Transfer Assessment/Treatment    Transfer Assessment/Treatment  sit-stand transfer;stand-sit transfer  -HK        Maintains Weight-bearing Status (Transfers)  verbal cues to maintain  -HK        Comment (Transfers)  Verbal cues for safe hand placement and to slide R LE out prior to transfers. Pt declined to attempt tub transfers this date. OT issued hand out on tub transfer bench and educated pt on use.  Pt educated on all hip precautions and how to maintain during all mobility and transfers.    -HK           Sit-Stand Transfer    Sit-Stand [place] (Transfers)  contact guard;verbal cues  -HK        Assistive Device (Sit-Stand Transfers)  walker, front-wheeled  -HK           Stand-Sit Transfer    Stand-Sit [place] (Transfers)  contact  guard;verbal cues  -HK        Assistive Device (Stand-Sit Transfers)  walker, front-wheeled  -HK           ADL Assessment/Intervention    BADL Assessment/Intervention  bathing;upper body dressing;lower body dressing  -HK           Bathing Assessment/Intervention    Comment (Bathing)  OT issued LH sponge and educated pt on use.   -HK           Upper Body Dressing Assessment/Training    Upper Body Dressing [place] Level  don;pull-over garment;set up  -HK        Upper Body Dressing Position  unsupported standing  -HK           Lower Body Dressing Assessment/Training    Lower Body Dressing [place] Level  doff;don;socks;undergarment;pants/bottoms;shoes/slippers;minimum assist (75% patient effort);verbal cues  -HK        Assistive Devices (Lower Body Dressing)  reacher;sock-aid;long-handled shoe horn  -HK        Lower Body Dressing Position  unsupported sitting  -HK        Comment (Lower Body Dressing)  OT issued reacher, sock aid, and shoe horn and educated pt on use. Pt donned underwear, pants, socks, and shoes with Jluis and use of AE.   -HK           BADL Safety/Performance    Impairments, BADL Safety/Performance  balance;pain;strength;range of motion  -HK        Skilled BADL Treatment/Intervention  BADL process/adaptation training;adaptive equipment training  -HK           General ROM    RT Upper Ext  Rt Shoulder Flexion  -HK        LT Upper Ext  Lt Shoulder Flexion  -HK           Right Upper Ext    Rt Shoulder Flexion AROM  WFL for eval  -HK           Left Upper Ext    Lt Shoulder Flexion AROM  WFL for eval   -HK           MMT (Manual Muscle Testing)    Rt Upper Ext  Rt Shoulder WFL  -HK        Lt Upper Ext  Lt Shoulder WFL  -HK        General MMT Comments  WFL for eval   -HK           Motor Assessment/Interventions    Additional Documentation  Balance (Group)  -HK           Balance    Balance  static sitting balance;static standing balance;dynamic sitting balance;dynamic standing balance  -HK            Static Sitting Balance    Level of [place] (Unsupported Sitting, Static Balance)  independent  -HK        Sitting Position (Unsupported Sitting, Static Balance)  sitting on edge of bed  -HK        Time Able to Maintain Position (Unsupported Sitting, Static Balance)  more than 5 minutes  -HK           Dynamic Sitting Balance    Level of [place], Reaches Outside Midline (Sitting, Dynamic Balance)  independent  -HK        Sitting Position, Reaches Outside Midline (Sitting, Dynamic Balance)  sitting on edge of bed  -HK        Comment, Reaches Outside Midline (Sitting, Dynamic Balance)  completing LBD with use of AE   -HK           Static Standing Balance    Level of [place] (Supported Standing, Static Balance)  contact guard assist  -HK        Time Able to Maintain Position (Supported Standing, Static Balance)  1 to 2 minutes  -HK        Assistive Device Utilized (Supported Standing, Static Balance)  walker, rolling  -HK           Dynamic Standing Balance    Level of [place], Reaches Outside Midline (Standing, Dynamic Balance)  contact guard assist  -HK        Time Able to Maintain Position, Reaches Outside Midline (Standing, Dynamic Balance)  2 to 3 minutes  -HK        Assistive Device Utilized (Supported Standing, Dynamic Balance)  walker, rolling  -HK           Sensory Assessment/Intervention    Sensory General Assessment  no sensation deficits identified  -HK           Positioning and Restraints    Pre-Treatment Position  in bed  -HK        Post Treatment Position  chair  -HK        In Chair  notified nsg;reclined;call light within reach;encouraged to call for assist;exit alarm on  -HK           Pain Assessment    Additional Documentation  Pain Scale: Numbers Pre/Post-Treatment (Group)  -HK           Pain Scale: Numbers Pre/Post-Treatment    Pain Scale: Numbers, Pretreatment  8/10  -HK        Pain Scale: Numbers, Post-Treatment  8/10  -HK        Pain Location - Side  Right  -HK        Pain  Location - Orientation  incisional  -HK        Pain Location  hip  -HK        Pain Intervention(s)  Repositioned;Ambulation/increased activity  -HK           Wound 07/20/20 Right anterior hip Incision    Wound - Properties Group Date first assessed: 07/20/20  -TV Present on Hospital Admission: N  -TV Side: Right  -TV Orientation: anterior  -TV Location: hip  -TV Primary Wound Type: Incision  -TV       Coping    Observed Emotional State  accepting;calm;cooperative  -HK           Plan of Care Review    Plan of Care Reviewed With  patient  -HK        Progress  improving  -HK        Outcome Summary  OT eval complete. Pt completes bed mobility with leg  and CI. Pt completes transfers with CGA. OT issued AE and educated on completed of all LBD and LBB while maintaining hip precautions. Recommend d/c home with assist and HH.    -HK           Clinical Impression (OT)    Date of Referral to OT  07/20/20  -HK        OT Diagnosis  Decreased independence with ADLS and mobility   -HK        Patient/Family Goals Statement (OT Eval)  Pt would like to improve and return home.   -HK        Criteria for Skilled Therapeutic Interventions Met (OT Eval)  yes;treatment indicated  -HK        Rehab Potential (OT Eval)  good, to achieve stated therapy goals  -HK        Therapy Frequency (OT Eval)  daily  -HK        Care Plan Review (OT)  evaluation/treatment results reviewed;care plan/treatment goals reviewed;risks/benefits reviewed;patient/other agree to care plan  -HK        Anticipated Discharge Disposition (OT)  home with assist;home with home health  -HK           Vital Signs    Pre Systolic BP Rehab  -- RN cleared for tx; VSS   -HK        Pre Patient Position  Supine  -HK        Intra Patient Position  Standing  -HK        Post Patient Position  Sitting  -HK           OT Goals    Bed Mobility Goal Selection (OT)  bed mobility, OT goal 1  -HK        Transfer Goal Selection (OT)  transfer, OT goal 1  -HK        Dressing Goal  Selection (OT)  dressing, OT goal 1  -HK           Bed Mobility Goal 1 (OT)    Activity/Assistive Device (Bed Mobility Goal 1, OT)  sit to supine/supine to sit;scooting;leg   -HK        [place] Level/Cues Needed (Bed Mobility Goal 1, OT)  conditional independence  -HK        Time Frame (Bed Mobility Goal 1, OT)  5 days  -HK        Progress/Outcomes (Bed Mobility Goal 1, OT)  goal met  -HK           Transfer Goal 1 (OT)    Activity/Assistive Device (Transfer Goal 1, OT)  sit-to-stand/stand-to-sit  -HK        [place] Level/Cues Needed (Transfer Goal 1, OT)  contact guard assist  -HK        Time Frame (Transfer Goal 1, OT)  5 days  -HK        Progress/Outcome (Transfer Goal 1, OT)  goal met  -HK           Dressing Goal 1 (OT)    Activity/Assistive Device (Dressing Goal 1, OT)  lower body dressing  -HK        [place]/Cues Needed (Dressing Goal 1, OT)  minimum assist (75% or more patient effort);verbal cues required  -HK        Time Frame (Dressing Goal 1, OT)  5 days  -HK        Progress/Outcome (Dressing Goal 1, OT)  goal met  -HK           Discharge Summary (Occupational Therapy)    Additional Documentation  Discharge Summary, OT Eval (Group)  -HK           Discharge Summary, OT Eval    Reason for Discharge (OT Discharge Summary)  patient met all goals and outcomes  -HK        Outcomes Achieved Upon Discharge (OT Discharge Summary)  able to achieve all goals within established timeline  -HK        Transfer to Another Level of Care or Facility (OT Discharge Summary)  patient will continue therapy goals following discharge  -HK           Living Environment    Home Accessibility  stairs to enter home;tub/shower is not walk in  -HK          User Key  (r) = Recorded By, (t) = Taken By, (c) = Cosigned By    Initials Name Effective Dates    TV Elyssa Treadwell RN 06/16/16 -     HK Rosa Galindo OT 03/07/18 -           Occupational Therapy Education                 Title: PT OT SLP Therapies (In  Progress)     Topic: Occupational Therapy (In Progress)     Point: ADL training (Done)     Description:   Instruct learner(s) on proper safety adaptation and remediation techniques during self care or transfers.   Instruct in proper use of assistive devices.              Learning Progress Summary           Patient Acceptance, E,TB,D, VU by  at 7/21/2020 0836    Comment:  Pt educated on ADL retraining with LBD and LBB, safety precautions, and appropriate body mechanics.                   Point: Home exercise program (Not Started)     Description:   Instruct learner(s) on appropriate technique for monitoring, assisting and/or progressing therapeutic exercises/activities.              Learner Progress:   Not documented in this visit.          Point: Precautions (Done)     Description:   Instruct learner(s) on prescribed precautions during self-care and functional transfers.              Learning Progress Summary           Patient Acceptance, E,TB,D, VU by  at 7/21/2020 0836    Comment:  Pt educated on ADL retraining with LBD and LBB, safety precautions, and appropriate body mechanics.                   Point: Body mechanics (Done)     Description:   Instruct learner(s) on proper positioning and spine alignment during self-care, functional mobility activities and/or exercises.              Learning Progress Summary           Patient Acceptance, E,TB,D, VU by  at 7/21/2020 0836    Comment:  Pt educated on ADL retraining with LBD and LBB, safety precautions, and appropriate body mechanics.                               User Key     Initials Effective Dates Name Provider Type Discipline     03/07/18 -  Rosa Galindo, OT Occupational Therapist OT                OT Recommendation and Plan  Outcome Summary/Treatment Plan (OT)  Anticipated Discharge Disposition (OT): home with assist, home with home health  Reason for Discharge (OT Discharge Summary): patient met all goals and outcomes  Therapy Frequency (OT Eval):  daily  Plan of Care Review  Plan of Care Reviewed With: patient  Plan of Care Reviewed With: patient  Outcome Summary: OT eval complete. Pt completes bed mobility with leg  and CI. Pt completes transfers with CGA. OT issued AE and educated on completed of all LBD and LBB while maintaining hip precautions. Recommend d/c home with assist and HH.       Rehab Goal Summary     Row Name 07/21/20 1011 07/21/20 0836          Bed Mobility Goal 1 (PT)    Activity/Assistive Device (Bed Mobility Goal 1, PT)  sit to supine/supine to sit  -LR  --     [place] Level/Cues Needed (Bed Mobility Goal 1, PT)  conditional independence  -LR  --     Time Frame (Bed Mobility Goal 1, PT)  long term goal (LTG);1 day  -LR  --     Progress/Outcomes (Bed Mobility Goal 1, PT)  goal not met;discharged from facility  -LR  --        Transfer Goal 1 (PT)    Activity/Assistive Device (Transfer Goal 1, PT)  sit-to-stand/stand-to-sit;walker, rolling  -LR  --     [place] Level/Cues Needed (Transfer Goal 1, PT)  standby assist  -LR  --     Time Frame (Transfer Goal 1, PT)  long term goal (LTG);1 day  -LR  --     Progress/Outcome (Transfer Goal 1, PT)  goal met  -LR  --        Gait Training Goal 1 (PT)    Activity/Assistive Device (Gait Training Goal 1, PT)  gait (walking locomotion);walker, rolling  -LR  --     [place] Level (Gait Training Goal 1, PT)  contact guard assist  -LR  --     Distance (Gait Goal 1, PT)  150 feet  -LR  --     Time Frame (Gait Training Goal 1, PT)  long term goal (LTG);1 day  -LR  --     Progress/Outcome (Gait Training Goal 1, PT)  goal met  -LR  --        Stairs Goal 1 (PT)    Activity/Assistive Device (Stairs Goal 1, PT)  ascending stairs;descending stairs;step-to-step;using handrail, left;using handrail, right  -LR  --     [place] Level/Cues Needed (Stairs Goal 1, PT)  contact guard assist  -LR  --     Number of Stairs (Stairs Goal 1, PT)  3  -LR  --     Time Frame (Stairs Goal 1, PT)  long term goal  (LTG);1 day  -LR  --     Progress/Outcome (Stairs Goal 1, PT)  goal met  -LR  --        Occupational Therapy Goals    Bed Mobility Goal Selection (OT)  --  bed mobility, OT goal 1  -HK     Transfer Goal Selection (OT)  --  transfer, OT goal 1  -HK     Dressing Goal Selection (OT)  --  dressing, OT goal 1  -HK        Bed Mobility Goal 1 (OT)    Activity/Assistive Device (Bed Mobility Goal 1, OT)  --  sit to supine/supine to sit;scooting;leg   -HK     [place] Level/Cues Needed (Bed Mobility Goal 1, OT)  --  conditional independence  -HK     Time Frame (Bed Mobility Goal 1, OT)  --  5 days  -HK     Progress/Outcomes (Bed Mobility Goal 1, OT)  --  goal met  -HK        Transfer Goal 1 (OT)    Activity/Assistive Device (Transfer Goal 1, OT)  --  sit-to-stand/stand-to-sit  -HK     [place] Level/Cues Needed (Transfer Goal 1, OT)  --  contact guard assist  -HK     Time Frame (Transfer Goal 1, OT)  --  5 days  -HK     Progress/Outcome (Transfer Goal 1, OT)  --  goal met  -HK        Dressing Goal 1 (OT)    Activity/Assistive Device (Dressing Goal 1, OT)  --  lower body dressing  -HK     [place]/Cues Needed (Dressing Goal 1, OT)  --  minimum assist (75% or more patient effort);verbal cues required  -HK     Time Frame (Dressing Goal 1, OT)  --  5 days  -HK     Progress/Outcome (Dressing Goal 1, OT)  --  goal met  -HK       User Key  (r) = Recorded By, (t) = Taken By, (c) = Cosigned By    Initials Name Provider Type Discipline    LR Lesley Teixeira, PT Physical Therapist PT    HK Rosa Galindo, OT Occupational Therapist OT              Time Calculation:   Time Calculation- OT     Row Name 07/21/20 1011 07/21/20 0836          Time Calculation- OT    OT Start Time  --  0836  -HK     OT Received On  --  07/21/20  -HK     OT Goal Re-Cert Due Date  --  07/31/20  -HK        Timed Charges    92529 - Gait Training Minutes   13  -LR  --     04084 - OT Self Care/Mgmt Minutes  --  25  -HK       User Key  (r) =  Recorded By, (t) = Taken By, (c) = Cosigned By    Initials Name Provider Type    LR Lesley Teixeira, PT Physical Therapist    HK Rosa Galindo, OT Occupational Therapist        Therapy Suggested Charges     Code   Minutes Charges    76190 (CPT®) Hc Ot Neuromusc Re Education Ea 15 Min      37676 (CPT®) Hc Ot Ther Proc Ea 15 Min      96648 (CPT®) Hc Ot Therapeutic Act Ea 15 Min      07853 (CPT®) Hc Ot Manual Therapy Ea 15 Min      95182 (CPT®) Hc Ot Iontophoresis Ea 15 Min      77862 (CPT®) Hc Ot Elec Stim Ea-Per 15 Min      35970 (CPT®) Hc Ot Ultrasound Ea 15 Min      94648 (CPT®) Hc Ot Self Care/Mgmt/Train Ea 15 Min 25 2    Total  25 2        Therapy Charges for Today     Code Description Service Date Service Provider Modifiers Qty    80387949889 HC OT SELF CARE/MGMT/TRAIN EA 15 MIN 7/21/2020 Rosa Galindo, OT GO 2    39052377528 HC OT EVAL LOW COMPLEXITY 2 7/21/2020 Rosa Galindo, OT GO 1               OT Discharge Summary  Anticipated Discharge Disposition (OT): home with assist, home with home health    Rosa Galindo OT  7/21/2020

## 2020-07-21 NOTE — PROGRESS NOTES
"  SUBJECTIVE  Patient resting comfortably.  Pain well controlled.  No events overnight.  Ambulated with nursing yesterday evening without complication.    OBJECTIVE  Temp (24hrs), Av.9 °F (36.6 °C), Min:97 °F (36.1 °C), Max:98.3 °F (36.8 °C)    Blood pressure 125/77, pulse 80, temperature 98.3 °F (36.8 °C), temperature source Oral, resp. rate 14, height 172.7 cm (68\"), weight 98 kg (216 lb), SpO2 99 %.    Lab Results (last 24 hours)     Procedure Component Value Units Date/Time    Basic Metabolic Panel [[id number]]  (Abnormal) Collected:  20    Specimen:  Blood Updated:  20 0611     Glucose 108 mg/dL      BUN 17 mg/dL      Creatinine 0.66 mg/dL      Sodium 140 mmol/L      Potassium 4.3 mmol/L      Chloride 108 mmol/L      CO2 27.0 mmol/L      Calcium 8.1 mg/dL      eGFR Non African Amer 124 mL/min/1.73      BUN/Creatinine Ratio 25.8     Anion Gap 5.0 mmol/L     Narrative:       GFR Normal >60  Chronic Kidney Disease <60  Kidney Failure <15      CBC (No Diff) [[id number]]  (Abnormal) Collected:  20    Specimen:  Blood Updated:  20 0525     WBC 9.06 10*3/mm3      RBC 3.47 10*6/mm3      Hemoglobin 10.7 g/dL      Hematocrit 32.8 %      MCV 94.5 fL      MCH 30.8 pg      MCHC 32.6 g/dL      RDW 12.9 %      RDW-SD 44.1 fl      MPV 9.9 fL      Platelets 163 10*3/mm3     Tissue / Bone Culture - Tissue, Hip, Right [[id number]] Collected:  20 1424    Specimen:  Tissue from Hip, Right Updated:  20 2143     Gram Stain No WBCs or organisms seen    Tissue / Bone Culture - Bone, Hip, Right [[id number]] Collected:  20 1447    Specimen:  Bone from Hip, Right Updated:  20 2136     Gram Stain No WBCs or organisms seen    Tissue / Bone Culture - Tissue, Hip, Right [[id number]] Collected:  20 1456    Specimen:  Tissue from Hip, Right Updated:  20 2130     Gram Stain No WBCs or organisms seen    Tissue / Bone Culture - Synovium, Hip, Right [[id number]] Collected:  " 07/20/20 1418    Specimen:  Synovium from Hip, Right Updated:  07/20/20 2134     Gram Stain No WBCs or organisms seen    POC Glucose Once [[id number]]  (Abnormal) Collected:  07/20/20 1648    Specimen:  Blood Updated:  07/20/20 1657     Glucose 155 mg/dL     Anaerobic Culture - Tissue, Hip, Right [[id number]] Collected:  07/20/20 1456    Specimen:  Tissue from Hip, Right Updated:  07/20/20 1509    Fungus Culture - Tissue, Hip, Right [[id number]] Collected:  07/20/20 1456    Specimen:  Tissue from Hip, Right Updated:  07/20/20 1509    AFB Culture - Tissue, Hip, Right [[id number]] Collected:  07/20/20 1456    Specimen:  Tissue from Hip, Right Updated:  07/20/20 1509    Fungus Culture - Bone, Hip, Right [[id number]] Collected:  07/20/20 1447    Specimen:  Bone from Hip, Right Updated:  07/20/20 1509    AFB Culture - Bone, Hip, Right [[id number]] Collected:  07/20/20 1447    Specimen:  Bone from Hip, Right Updated:  07/20/20 1509    Fungus Culture - Synovium, Hip, Right [[id number]] Collected:  07/20/20 1418    Specimen:  Synovium from Hip, Right Updated:  07/20/20 1449    AFB Culture - Synovium, Hip, Right [[id number]] Collected:  07/20/20 1418    Specimen:  Synovium from Hip, Right Updated:  07/20/20 1449    Anaerobic Culture - Synovium, Hip, Right [[id number]] Collected:  07/20/20 1418    Specimen:  Synovium from Hip, Right Updated:  07/20/20 1449    Fungus Culture - Tissue, Hip, Right [[id number]] Collected:  07/20/20 1424    Specimen:  Tissue from Hip, Right Updated:  07/20/20 1449    AFB Culture - Tissue, Hip, Right [[id number]] Collected:  07/20/20 1424    Specimen:  Tissue from Hip, Right Updated:  07/20/20 1449    Anaerobic Culture - Tissue, Hip, Right [[id number]] Collected:  07/20/20 1424    Specimen:  Tissue from Hip, Right Updated:  07/20/20 1449    POC Glucose Once [[id number]]  (Normal) Collected:  07/20/20 1210    Specimen:  Blood Updated:  07/20/20 1224     Glucose 73 mg/dL             PHYSICAL EXAM  Right lower  extremity: Dressing clean, dry and intact.  Leg lengths symmetric.  Intact EHL, FHL, tibialis anterior, and gastrocsoleus. Sensation intact to light touch to deep peroneal, superficial peroneal, sural, saphenous, tibial nerves. 2+ palpable DP and PT pulses.         Hypertension    BPH with obstruction/lower urinary tract symptoms  Painful right hip arthroplasty    PLAN / DISPOSITION:  1 Day Post-Op revision right hip arthroplasty    Protected weight bearing as tolerated right lower extremity, posterior hip precautions x3 months  Pain control  PT/OT for post op mobilization and medical equipment needs   23 hours perioperative antibiotic prophylaxis.  Discharge on cefadroxil 500 twice daily x7 days  Follow-up intraoperative cultures  SCD's bilateral lower extremities   Aspirin for DVT prophylaxis   Social work for discharge planning.  Anticipate discharge home today.  Dressing to remain in place for 7 days. May remove on POD#7. If no drainage, may shower on POD#10. No submerging wound in water. If drainage is noted, sterile dressing should be placed and wound checked daily. No showering until wound has remained dry for 72 consecutive hours.   Follow up in 3 weeks for re-assessment.      Jb Patel MD  07/21/20  07:14

## 2020-07-21 NOTE — DISCHARGE SUMMARY
Patient Name: Ang Jackson  MRN: [id number]  : 1960  DOS: 2020    Attending: Jb Patel MD    Primary Care Provider: Mamta Ortega PA-C    Date of Admission:.2020 10:02 AM    Date of Discharge:  2020    Discharge Diagnosis:   Status post total replacement of right hip    Right hip pain    Hypertension    BPH with obstruction/lower urinary tract symptoms    Acute blood loss anemia, mild, asymptomatic    Acute postoperative pain      Hospital Course    At admit:    Patient is a pleasant 60 y.o. male presented for scheduled surgery by Dr. Patel.  He underwent a right total hip revision under GA. He tolerated surgery well and was admitted for further medical management.  His original hip surgery was about 10 years ago and has continued to be painful for about the last 8 years.  He uses a cane occasionally for ambulation.  He denies recent falls.     He does have chronic pain and takes chronic narcotics.  He is followed by Dr. Recinos, pain management.     After admit:    Patient was provided pain medications as needed for pain control.  Adjustments were made to pain medications to optimize postop pain management when indicated. Risks and benefits of opiate medications discussed with patient.    He was seen by PT and has progressed well over his stay.    The patient was placed on DVT prophylaxis including aspirin. The patient was encouraged to use IS for atelectasis prophylaxis.   The patient was placed on a bowel regimen to prevent constipation while on pain medication.   The patient's H/H was monitored with a slight decrease that remained asymptomatic.    It is felt by all involved that the patient can discharge home at this time, and the patient has no further questions       Procedures Performed    DATE OF PROCEDURE: 2020     SURGEON: Jb Patel M.D.     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   Results from last 7 days   Lab Units 20  0433   WBC 10*3/mm3 9.06   HEMOGLOBIN g/dL 10.7*   HEMATOCRIT % 32.8*   PLATELETS 10*3/mm3 163     Results for EMILY TENA (MRN [id number]) as of 2020 10:39   Ref. Range 2020 10:50   Hemoglobin Latest Ref Range: 13.0 - 17.7 g/dL 12.6 (L)   Hematocrit Latest Ref Range: 37.5 - 51.0 % 39.4     Results from last 7 days   Lab Units 20  0433   SODIUM mmol/L 140   POTASSIUM mmol/L 4.3   CHLORIDE mmol/L 108*   CO2 mmol/L 27.0   BUN mg/dL 17   CREATININE mg/dL 0.66*   CALCIUM mg/dL 8.1*   GLUCOSE mg/dL 108*     I reviewed the patient's new imaging including images and reports.      Physical therapy: Patient ambulated 200 feet with RW and step to gait pattern, progressed to step through gait pattern, limited by pain. Patient climbed 3 steps with bilateral handrails with no difficulty. Patient has been d/c home with family and HHPT.    Discharge Assessment:      Visit Vitals  /62 (BP Location: Right arm, Patient Position: Lying)   Pulse 80   Temp 98.3 °F (36.8 °C) (Oral)   Resp 15   Ht 172.7 cm (68\")   Wt 98 kg (216 lb)   SpO2 99%   BMI 32.84 kg/m²     Temp (24hrs), Av °F (36.7 °C), Min:97 °F (36.1 °C), Max:98.3 °F (36.8 °C)      General Appearance:    Alert, cooperative, in no acute distress   Lungs:     Clear to auscultation,respirations regular, even and  unlabored    Heart:    Regular rhythm and normal rate, normal S1 and S2   Abdomen:     Normal bowel sounds, no masses, no organomegaly, soft non-tender, non-distended, no guarding, no rebound  tenderness   Extremities:   CDI dressing right hip. Moves all extremities well, no edema, no cyanosis, no redness   Pulses:   Pulses palpable and equal bilaterally   Skin:   No bleeding, bruising or rash "   Neurologic:   Cranial nerves 2 - 12 grossly intact, sensation intact, Flexion and dorsiflexion intact bilateral feet.       Discharge Disposition: Home         Discharge Medications      New Medications      Instructions Start Date   acetaminophen 500 MG tablet  Commonly known as:  TYLENOL   1,000 mg, Oral, Every 8 Hours      aspirin 325 MG EC tablet   325 mg, Oral, Every 12 Hours Scheduled, For 1 month      cefadroxil 500 MG capsule  Commonly known as:  DURICEF   500 mg, Oral, 2 Times Daily      docusate sodium 100 MG capsule  Commonly known as:  Colace   100 mg, Oral, 2 Times Daily      oxyCODONE 5 MG immediate release tablet  Commonly known as:  ROXICODONE   5 mg, Oral, Every 4 Hours PRN         Changes to Medications      Instructions Start Date   amLODIPine 10 MG tablet  Commonly known as:  NORVASC  What changed:  how much to take   10 mg, Oral, Daily   Start Date:  July 22, 2020        Continue These Medications      Instructions Start Date   ascorbic acid 1000 MG tablet  Commonly known as:  VITAMIN C   1,000 mg, Oral, Daily      ferrous sulfate 325 (65 FE) MG tablet   325 mg, Oral, Daily With Breakfast      gabapentin 600 MG tablet  Commonly known as:  NEURONTIN   600 mg, Oral, 3 Times Daily      hydrOXYzine pamoate 25 MG capsule  Commonly known as:  VISTARIL   50 mg, Oral, 4 Times Daily      lisinopril 5 MG tablet  Commonly known as:  PRINIVIL,ZESTRIL   5 mg, Oral, Daily      LORATADINE PO   10 mg, Oral, Daily      meloxicam 7.5 MG tablet  Commonly known as:  MOBIC   7.5 mg, Oral, 2 times daily      MENS MULTIPLUS PO   Oral      omeprazole 20 MG capsule  Commonly known as:  priLOSEC   20 mg, Oral, Daily      Requip 2 MG tablet  Generic drug:  rOPINIRole   2 mg, Oral, Nightly      tamsulosin 0.4 MG capsule 24 hr capsule  Commonly known as:  Flomax   0.4 mg, Oral, Nightly      vitamin B-12 1000 MCG tablet  Commonly known as:  CYANOCOBALAMIN   1,000 mcg, Oral, Daily         Stop These Medications     HYDROcodone-acetaminophen  MG per tablet  Commonly known as:  NORCO            Discharge Diet: Regular diet      Activity at Discharge: WBAT RLE      Follow-up Appointments  Dr. Patel per his orders       TANJA Reddy  07/21/20  11:17

## 2020-07-21 NOTE — PLAN OF CARE
Problem: Patient Care Overview  Goal: Plan of Care Review  7/21/2020 1001 by Rosa Galindo OT  Flowsheets (Taken 7/21/2020 5241)  Outcome Summary: OT eval complete. Pt completes bed mobility with leg  and CI. Pt completes transfers with CGA. OT issued AE and educated on completed of all LBD and LBB while maintaining hip precautions. Recommend d/c home with assist and HH.

## 2020-07-21 NOTE — THERAPY DISCHARGE NOTE
Patient Name: Ang Jackson  : 1960    MRN: [id number]                              Today's Date: 2020       Admit Date: 2020    Visit Dx:     ICD-10-CM ICD-9-CM   1. Status post total replacement of right hip Z96.641 V43.64   2. Pain due to total hip replacement, subsequent encounter T84.84XD V58.89    Z96.649 996.77     338.18     V43.64   3. Impaired mobility and ADLs Z74.09 V49.89    Z78.9      Patient Active Problem List   Diagnosis   • Diabetes mellitus (CMS/HCC)   • Hypertension   • Pre-op evaluation   • BPH with obstruction/lower urinary tract symptoms   • Corporo-venous occlusive erectile dysfunction   • Low testosterone in male   • Scrotal varices   • Post-traumatic osteoarthritis of left knee   • Primary osteoarthritis of left knee   • Right hip pain   • Status post total replacement of right hip   • Acute blood loss anemia, mild, asymptomatic   • Acute postoperative pain     Past Medical History:   Diagnosis Date   • Anxiety    • Arthritis    • Diabetes mellitus (CMS/HCC)     diet controlled    • GERD (gastroesophageal reflux disease)    • Headache    • Hypertension    • Low back pain    • Sleep apnea     does not wear machine   • Wears reading eyeglasses      Past Surgical History:   Procedure Laterality Date   • CARPAL TUNNEL RELEASE Bilateral    • COLONOSCOPY      5 years ago   • GASTRIC SLEEVE LAPAROSCOPIC     • HIP SURGERY Right times 2   • KNEE SURGERY Left     arthroscopy    • TOTAL HIP ARTHROPLASTY REVISION Right 2020    Procedure: TOTAL HIP ARTHROPLASTY REVISION RIGHT;  Surgeon: Jb Patel MD;  Location: [facility];  Service: Orthopedics;  Laterality: Right;     General Information     Row Name 20 1011          PT Evaluation Time/Intention    Document Type  evaluation;discharge treatment  -LR     Mode of Treatment  physical therapy;individual therapy  -LR     Row Name 20 1011          General Information    Patient Profile Reviewed?  yes  -LR     Prior  Level of Function  min assist:;all household mobility;community mobility;gait;transfer;bed mobility;ADL's;home management;cooking;cleaning;using stairs;shopping;independent:;driving all mobility limited by pain  -LR     Existing Precautions/Restrictions  fall;right;hip, posterior  -LR     Barriers to Rehab  previous functional deficit  -LR     Row Name 07/21/20 1011          Relationship/Environment    Lives With  spouse family can assist at all times upon d/c home  -LR     Row Name 07/21/20 1011          Resource/Environmental Concerns    Current Living Arrangements  home/apartment/condo  -LR     Row Name 07/21/20 1011          Home Main Entrance    Number of Stairs, Main Entrance  three  -LR     Stair Railings, Main Entrance  railings on both sides of stairs  -LR     Row Name 07/21/20 1011          Stairs Within Home, Primary    Number of Stairs, Within Home, Primary  none  -LR     Row Name 07/21/20 1011          Cognitive Assessment/Intervention- PT/OT    Orientation Status (Cognition)  oriented x 4  -LR     Row Name 07/21/20 1011          Safety Issues, Functional Mobility    Safety Issues Affecting Function (Mobility)  positioning of assistive device;safety precautions follow-through/compliance;sequencing abilities;safety precaution awareness  -LR     Impairments Affecting Function (Mobility)  pain;strength;range of motion (ROM)  -LR       User Key  (r) = Recorded By, (t) = Taken By, (c) = Cosigned By    Initials Name Provider Type    LR Lesley Teixeira, PT Physical Therapist        Mobility     Row Name 07/21/20 1011          Bed Mobility Assessment/Treatment    Scooting/Bridging [place] (Bed Mobility)  not tested  -LR     Supine-Sit [place] (Bed Mobility)  not tested  -LR     Comment (Bed Mobility)  UIC on arrival and at end of treatment.   -LR     Row Name 07/21/20 1011          Transfer Assessment/Treatment    Comment (Transfers)  Verbal cues to push up from chair to stand and to reach back  for chair to lower into sitting. Verbal cues to step R LE out before t/f for comfort and to avoid flexing past 90 degrees at hip during t/f.   -LR     Row Name 07/21/20 1011          Sit-Stand Transfer    Sit-Stand [place] (Transfers)  verbal cues;stand by assist  -LR     Assistive Device (Sit-Stand Transfers)  walker, front-wheeled  -LR     Row Name 07/21/20 1011          Gait/Stairs Assessment/Training    62054 - Gait Training Minutes   13  -LR     [place] Level (Gait)  verbal cues;contact guard  -LR     Assistive Device (Gait)  walker, front-wheeled  -LR     Distance in Feet (Gait)  200  -LR     Pattern (Gait)  step-through  -LR     Deviations/Abnormal Patterns (Gait)  bilateral deviations;jose decreased;gait speed decreased;stride length decreased;right sided deviations;antalgic  -LR     Bilateral Gait Deviations  forward flexed posture;heel strike decreased  -LR     Right Sided Gait Deviations  weight shift ability decreased  -LR     [place] Level (Stairs)  verbal cues;contact guard  -LR     Handrail Location (Stairs)  both sides  -LR     Number of Steps (Stairs)  3  -LR     Ascending Technique (Stairs)  step-to-step  -LR     Descending Technique (Stairs)  step-to-step  -LR     Comment (Gait/Stairs)  Patient ambulated with step to gait pattern at slow pace initially, with pause after stepping forward with L LE. Able to progress to step through gait pattern with cues for equal step length, increased R LE weight bearing/stance phase, and continuous forward motion of RW. Gait limited by pain. Verbal cues to climb steps one at a time and to step up with strong LE first and down with weak LE first. No LOB or unsteadiness with stairs.   -LR     Row Name 07/21/20 1011          Mobility Assessment/Intervention    Extremity Weight-bearing Status  right lower extremity  -LR     Right Lower Extremity (Weight-bearing Status)  weight-bearing as tolerated (WBAT)  -LR       User Key  (r) = Recorded By, (t) =  Taken By, (c) = Cosigned By    Initials Name Provider Type    LR Lesley Teixeira, PT Physical Therapist        Obj/Interventions     Row Name 07/21/20 1011          General ROM    GENERAL ROM COMMENTS  L LE AROM WFL; R hip AROM impaired 25%, d/t pain and weakness  -LR     Row Name 07/21/20 1011          MMT (Manual Muscle Testing)    General MMT Comments  L LE WFL; R hip functionally 4-/5, independent with SLR, no knee buckling with gait  -LR     Row Name 07/21/20 1011          Therapeutic Exercise    Lower Extremity (Therapeutic Exercise)  gluteal sets;heel slides, right;LAQ (long arc quad), right;marching while standing;quad sets, right;SLR (straight leg raise), right;other (see comments) standing calf raises, hip extension  -LR     Lower Extremity Range of Motion (Therapeutic Exercise)  hip abduction/adduction, right;ankle dorsiflexion/plantar flexion, right  -LR     Exercise Type (Therapeutic Exercise)  AROM (active range of motion);AAROM (active assistive range of motion);isometric contraction, static;isotonic contraction, concentric  -LR     Position (Therapeutic Exercise)  seated;standing  -LR     Sets/Reps (Therapeutic Exercise)  x15 reps each  -LR     Comment (Therapeutic Exercise)  cues for technique; CGA for heel slides, hip abd, min assist SLR for multiple reps; could only tolerate 6 reps of hip extension and marching in standing  -LR     Row Name 07/21/20 1011          Sensory Assessment/Intervention    Sensory General Assessment  no sensation deficits identified denies numbness/tingling;light touch equal and intact  -LR       User Key  (r) = Recorded By, (t) = Taken By, (c) = Cosigned By    Initials Name Provider Type    Lesley Cano, PT Physical Therapist        Goals/Plan     Row Name 07/21/20 1011          Bed Mobility Goal 1 (PT)    Activity/Assistive Device (Bed Mobility Goal 1, PT)  sit to supine/supine to sit  -LR     [place] Level/Cues Needed (Bed Mobility Goal 1, PT)   conditional independence  -LR     Time Frame (Bed Mobility Goal 1, PT)  long term goal (LTG);1 day  -LR     Progress/Outcomes (Bed Mobility Goal 1, PT)  goal not met;discharged from facility  -LR     Row Name 07/21/20 1011          Transfer Goal 1 (PT)    Activity/Assistive Device (Transfer Goal 1, PT)  sit-to-stand/stand-to-sit;walker, rolling  -LR     [place] Level/Cues Needed (Transfer Goal 1, PT)  standby assist  -LR     Time Frame (Transfer Goal 1, PT)  long term goal (LTG);1 day  -LR     Progress/Outcome (Transfer Goal 1, PT)  goal met  -LR     Row Name 07/21/20 1011          Gait Training Goal 1 (PT)    Activity/Assistive Device (Gait Training Goal 1, PT)  gait (walking locomotion);walker, rolling  -LR     [place] Level (Gait Training Goal 1, PT)  contact guard assist  -LR     Distance (Gait Goal 1, PT)  150 feet  -LR     Time Frame (Gait Training Goal 1, PT)  long term goal (LTG);1 day  -LR     Progress/Outcome (Gait Training Goal 1, PT)  goal met  -LR     Row Name 07/21/20 1011          Stairs Goal 1 (PT)    Activity/Assistive Device (Stairs Goal 1, PT)  ascending stairs;descending stairs;step-to-step;using handrail, left;using handrail, right  -LR     [place] Level/Cues Needed (Stairs Goal 1, PT)  contact guard assist  -LR     Number of Stairs (Stairs Goal 1, PT)  3  -LR     Time Frame (Stairs Goal 1, PT)  long term goal (LTG);1 day  -LR     Progress/Outcome (Stairs Goal 1, PT)  goal met  -LR       User Key  (r) = Recorded By, (t) = Taken By, (c) = Cosigned By    Initials Name Provider Type    LR Lesley Teixeira, PT Physical Therapist        Clinical Impression     Row Name 07/21/20 1011          Pain Assessment    Additional Documentation  Pain Scale: Numbers Pre/Post-Treatment (Group)  -     Row Name 07/21/20 1011          Pain Scale: Numbers Pre/Post-Treatment    Pain Scale: Numbers, Pretreatment  8/10  -LR     Pain Scale: Numbers, Post-Treatment  8/10  -LR     Pain Location -  Side  Right  -LR     Pain Location  hip  -LR     Pain Intervention(s)  Ambulation/increased activity;Repositioned  -LR     Row Name 07/21/20 1011          Plan of Care Review    Plan of Care Reviewed With  patient  -LR     Progress  improving  -LR     Row Name 07/21/20 1011          Physical Therapy Clinical Impression    Patient/Family Goals Statement (PT Clinical Impression)  go home, decrease pain  -LR     Criteria for Skilled Interventions Met (PT Clinical Impression)  yes;treatment indicated  -LR     Rehab Potential (PT Clinical Summary)  good, to achieve stated therapy goals  -LR     Row Name 07/21/20 1011          Positioning and Restraints    Pre-Treatment Position  sitting in chair/recliner  -LR     Post Treatment Position  chair  -LR     In Chair  notified nsg;reclined;sitting;call light within reach;encouraged to call for assist;exit alarm on;legs elevated  -LR       User Key  (r) = Recorded By, (t) = Taken By, (c) = Cosigned By    Initials Name Provider Type    LR Lesley Teixeira, PT Physical Therapist        Outcome Measures     Row Name 07/21/20 1011          How much help from another person do you currently need...    Turning from your back to your side while in flat bed without using bedrails?  3  -LR     Moving from lying on back to sitting on the side of a flat bed without bedrails?  3  -LR     Moving to and from a bed to a chair (including a wheelchair)?  3  -LR     Standing up from a chair using your arms (e.g., wheelchair, bedside chair)?  3  -LR     Climbing 3-5 steps with a railing?  3  -LR     To walk in hospital room?  3  -LR     AM-PAC 6 Clicks Score (PT)  18  -LR     Row Name 07/21/20 1011          PADD    Diagnosis  2  -LR     Gender  2  -LR     Age Group  2  -LR     Gait Distance  1  -LR     Assist Level  1  -LR     Home Support  3  -LR     PADD Score  11  -LR     Patient Preference  home with home health  -LR     Prediction by PADD Score  directly home (with home health or  out-patient rehab)  -     Row Name 07/21/20 1011          Functional Assessment    Outcome Measure Options  AM-PAC 6 Clicks Basic Mobility (PT);PADD  -LR       User Key  (r) = Recorded By, (t) = Taken By, (c) = Cosigned By    Initials Name Provider Type    Lesley Cano, PT Physical Therapist        Physical Therapy Education                 Title: PT OT SLP Therapies (In Progress)     Topic: Physical Therapy (Done)     Point: Mobility training (Done)     Description:   Instruct learner(s) on safety and technique for assisting patient out of bed, chair or wheelchair.  Instruct in the proper use of assistive devices, such as walker, crutches, cane or brace.              Patient Friendly Description:   It's important to get you on your feet again, but we need to do so in a way that is safe for you. Falling has serious consequences, and your personal safety is the most important thing of all.        When it's time to get out of bed, one of us or a family member will sit next to you on the bed to give you support.     If your doctor or nurse tells you to use a walker, crutches, a cane, or a brace, be sure you use it every time you get out of bed, even if you think you don't need it.    Learning Progress Summary           Patient Acceptance, E,D,H, VU,DU by LR at 7/21/2020 1011    Comment:  Issued and reviewed written/illustrated HEP. Educated on posterior hip precautions, weight bearing status, correct sit<->stand t/f technique, correct gait mechanics, correct stair training technique, and correct car t/f technique.                   Point: Home exercise program (Done)     Description:   Instruct learner(s) on appropriate technique for monitoring, assisting and/or progressing patient with therapeutic exercises and activities.              Learning Progress Summary           Patient Acceptance, E,D,H, VU,DU by LR at 7/21/2020 1011    Comment:  Issued and reviewed written/illustrated HEP. Educated on  posterior hip precautions, weight bearing status, correct sit<->stand t/f technique, correct gait mechanics, correct stair training technique, and correct car t/f technique.                   Point: Body mechanics (Done)     Description:   Instruct learner(s) on proper positioning and spine alignment for patient and/or caregiver during mobility tasks and/or exercises.              Learning Progress Summary           Patient Acceptance, E,D,H, VU,DU by LR at 7/21/2020 1011    Comment:  Issued and reviewed written/illustrated HEP. Educated on posterior hip precautions, weight bearing status, correct sit<->stand t/f technique, correct gait mechanics, correct stair training technique, and correct car t/f technique.                   Point: Precautions (Done)     Description:   Instruct learner(s) on prescribed precautions during mobility and gait tasks              Learning Progress Summary           Patient Acceptance, E,D,H, VU,DU by LR at 7/21/2020 1011    Comment:  Issued and reviewed written/illustrated HEP. Educated on posterior hip precautions, weight bearing status, correct sit<->stand t/f technique, correct gait mechanics, correct stair training technique, and correct car t/f technique.                               User Key     Initials Effective Dates Name Provider Type Discipline     06/19/15 -  Lesley Teixeira, PT Physical Therapist PT              PT Recommendation and Plan  Planned Therapy Interventions (PT Eval): balance training, bed mobility training, gait training, home exercise program, stair training, ROM (range of motion), patient/family education, strengthening, transfer training  Outcome Summary/Treatment Plan (PT)  Anticipated Equipment Needs at Discharge (PT): front wheeled walker, bedside commode  Anticipated Discharge Disposition (PT): home with assist, home with home health  Plan of Care Reviewed With: patient  Progress: improving  Outcome Summary: Patient ambulated 200 feet with RW  and step to gait pattern, progressed to step through gait pattern, limited by pain. Patient climbed 3 steps with bilateral handrails with no difficulty. Patient has been d/c home with family and HHPT.     Time Calculation:   PT Charges     Row Name 07/21/20 1011             Time Calculation    Start Time  1011  -LR      PT Received On  07/21/20  -LR      PT Goal Re-Cert Due Date  07/31/20  -LR         Time Calculation- PT    Total Timed Code Minutes- PT  23 minute(s)  -LR         Timed Charges    03551 - PT Therapeutic Exercise Minutes  10  -LR      69056 - Gait Training Minutes   13  -LR        User Key  (r) = Recorded By, (t) = Taken By, (c) = Cosigned By    Initials Name Provider Type    LR Lesley Teixeira, PT Physical Therapist        Therapy Charges for Today     Code Description Service Date Service Provider Modifiers Qty    54875467567  PT THER PROC EA 15 MIN 7/21/2020 Lesley Teixeira, PT GP 1    [id number] HC GAIT TRAINING EA 15 MIN 7/21/2020 Lesley Teixeira, PT GP 1    [id number] HC PT EVAL MOD COMPLEXITY 2 7/21/2020 Lesley Teixeira, PT GP 1          PT G-Codes  Outcome Measure Options: AM-PAC 6 Clicks Basic Mobility (PT), PADD  AM-PAC 6 Clicks Score (PT): 18    PT Discharge Summary  Anticipated Discharge Disposition (PT): home with assist, home with home health  Reason for Discharge: Discharge from facility  Outcomes Achieved: Patient able to partially acheive established goals, Discharge from facility occurred on same date as evluation  Discharge Destination: Home with assist, Home with home health    Lesley Teixeira, PT  7/21/2020

## 2020-07-23 LAB
CYTO UR: NORMAL
LAB AP CASE REPORT: NORMAL
LAB AP CLINICAL INFORMATION: NORMAL
PATH REPORT.FINAL DX SPEC: NORMAL
PATH REPORT.GROSS SPEC: NORMAL

## 2020-07-25 LAB
BACTERIA SPEC AEROBE CULT: NORMAL
BACTERIA SPEC ANAEROBE CULT: NORMAL
GRAM STN SPEC: NORMAL

## 2020-08-11 ENCOUNTER — OFFICE VISIT (OUTPATIENT)
Dept: ORTHOPEDIC SURGERY | Facility: CLINIC | Age: 60
End: 2020-08-11

## 2020-08-11 ENCOUNTER — TELEPHONE (OUTPATIENT)
Dept: ORTHOPEDIC SURGERY | Facility: CLINIC | Age: 60
End: 2020-08-11

## 2020-08-11 DIAGNOSIS — Z96.649 S/P REVISION OF TOTAL HIP: Primary | ICD-10-CM

## 2020-08-11 PROCEDURE — 99024 POSTOP FOLLOW-UP VISIT: CPT | Performed by: ORTHOPAEDIC SURGERY

## 2020-08-11 NOTE — PROGRESS NOTES
Orthopaedic Clinic Note:  Hip Post Op    Chief Complaint   Patient presents with   • Post-op     3 weeks s/p revision (R) VALERI 07/20/2020        HPI    Mr. Jackson is 3  week(s) s/p revision right hip arthroplasty. Rates pain 7/10. He is ambulating with a cane and is taking nothing for pain control. He denies fevers, chills, or constitutional symptoms. He is continuing home health PT. Patient is improving overall.  He is extremely happy with his progress and states his hip is much better than it was prior to surgery.    I have reviewed the following portions of the patient's history:History of Present Illness    Past Medical History:   Diagnosis Date   • Anxiety    • Arthritis    • Diabetes mellitus (CMS/[facility])     diet controlled    • GERD (gastroesophageal reflux disease)    • Headache    • Hypertension    • Low back pain    • Sleep apnea     does not wear machine   • Wears reading eyeglasses       Past Surgical History:   Procedure Laterality Date   • CARPAL TUNNEL RELEASE Bilateral    • COLONOSCOPY      5 years ago   • GASTRIC SLEEVE LAPAROSCOPIC     • HIP SURGERY Right times 2   • KNEE SURGERY Left     arthroscopy    • TOTAL HIP ARTHROPLASTY REVISION Right 7/20/2020    Procedure: TOTAL HIP ARTHROPLASTY REVISION RIGHT;  Surgeon: Jb Patel MD;  Location: [facility];  Service: Orthopedics;  Laterality: Right;      Family History   Problem Relation Age of Onset   • Heart disease Father    • Hypertension Father    • Osteoarthritis Father    • Cancer Mother    • Diabetes Brother    • Hypertension Brother    • Gout Paternal Grandmother    • Diabetes Paternal Grandmother      Social History     Socioeconomic History   • Marital status:      Spouse name: Not on file   • Number of children: Not on file   • Years of education: Not on file   • Highest education level: Not on file   Tobacco Use   • Smoking status: Never Smoker   • Smokeless tobacco: Never Used   Substance and Sexual Activity   • Alcohol use: No    • Drug use: No   • Sexual activity: Defer      Current Outpatient Medications on File Prior to Visit   Medication Sig Dispense Refill   • amLODIPine (NORVASC) 10 MG tablet Take 1 tablet by mouth Daily.     • ascorbic acid (VITAMIN C) 1000 MG tablet Take 1,000 mg by mouth Daily.     • aspirin 325 MG EC tablet Take 1 tablet by mouth Every 12 (Twelve) Hours. For 1 month 60 tablet 0   • docusate sodium (Colace) 100 MG capsule Take 1 capsule by mouth 2 (Two) Times a Day. 60 capsule 0   • ferrous sulfate 325 (65 FE) MG tablet Take 325 mg by mouth Daily With Breakfast.     • gabapentin (NEURONTIN) 600 MG tablet Take 600 mg by mouth 3 (Three) Times a Day.     • hydrOXYzine (VISTARIL) 25 MG capsule Take 50 mg by mouth 4 (Four) Times a Day.     • lisinopril (PRINIVIL,ZESTRIL) 5 MG tablet Take 5 mg by mouth Daily.     • LORATADINE PO Take 10 mg by mouth Daily.     • meloxicam (MOBIC) 7.5 MG tablet Take 7.5 mg by mouth 2 (two) times a day.     • Multiple Vitamins-Minerals (MENS MULTIPLUS PO) Take  by mouth.     • omeprazole (priLOSEC) 20 MG capsule Take 20 mg by mouth Daily.     • rOPINIRole (REQUIP) 2 MG tablet Take 2 mg by mouth Every Night.     • tamsulosin (FLOMAX) 0.4 MG capsule 24 hr capsule Take 1 capsule by mouth Every Night. 30 capsule 3   • vitamin B-12 (CYANOCOBALAMIN) 1000 MCG tablet Take 1,000 mcg by mouth Daily.       No current facility-administered medications on file prior to visit.       Allergies   Allergen Reactions   • Codeine Nausea And Vomiting   • Morphine And Related Nausea And Vomiting        Review of Systems   Constitutional: Negative.    HENT: Negative.    Eyes: Negative.    Respiratory: Negative.    Cardiovascular: Negative.    Gastrointestinal: Negative.    Endocrine: Negative.    Genitourinary: Negative.    Musculoskeletal: Positive for arthralgias.   Skin: Negative.    Allergic/Immunologic: Negative.    Neurological: Negative.    Hematological: Negative.    Psychiatric/Behavioral: Negative.          Physical Exam  There were no vitals taken for this visit.    There is no height or weight on file to calculate BMI.    GENERAL APPEARANCE: awake, alert, oriented, in no acute distress and well developed, well nourished  LUNGS:  breathing nonlabored  EXTREMITIES: no clubbing, cyanosis  PERIPHERAL PULSES: palpable dorsalis pedis and posterior tibial pulses bilaterally.    GAIT:  Trendelenberg            Hip Exam:  Right    RANGE OF MOTION:  EXTENSION/FLEXION:  normal (0-110 degrees)  IR:  20  ER:  35  PAIN WITH HIP MOTION:  no  PAIN WITH LOGROLL:  no     STRENGTH:  ABDUCTOR:  4/5  ADDUCTOR:  5/5  HIP FLEXION:  5/5    GREATER TROCHANTER BURSAL PAIN:  yes    SENSATION TO LIGHT TOUCH:  DEEP PERONEAL/SUPERFICIAL PERONEAL/SURAL/SAPHENOUS/TIBIAL:   intact    EDEMA:  no  ERYTHEMA:  no  WOUNDS/INCISIONS:   yes, well healed surgical incision without evidence of erythema or drainage  _______________________________________________________________  _______________________________________________________________    RADIOGRAPHIC FINDINGS:   Indication: Status post revision right hip arthroplasty    Comparison: Todays xrays were compared to previous xrays from 7/20/2020    AP pelvis: Right: Demonstrate a well positioned revision total hip without evidence of wear, loosening, fracture or osteolysis, femoral head is concentrically reduced within the acetabulum and No significant changes compared to prior radiographs.;Left: advanced, end-stage osteoarthritis with bone on bone articulation, subchondral sclerosis, and subchondral cysts, there are marginal osteophytes visualized at the femoral head-neck junction and acetabular margins and No significant changes compared to prior radiographs.        Assessment/Plan:   Diagnosis Plan   1. S/P revision of total hip  XR Pelvis 1 or 2 View     Patient is doing well 3-week status post revision right hip arthroplasty.  I recommended continued activity as tolerated.  He is to continue  physical therapy for hip strengthening to limit his Trendelenburg gait.  I will see him back in 3 weeks for repeat evaluation.  If he is doing well at that time, we will plan on scheduling left total hip arthroplasty.  X-ray P pelvis on return.    Jb Patel MD  08/11/20  12:21

## 2020-08-12 RX ORDER — OXYCODONE HYDROCHLORIDE 5 MG/1
5 TABLET ORAL EVERY 4 HOURS PRN
Qty: 30 TABLET | Refills: 0 | Status: SHIPPED | OUTPATIENT
Start: 2020-08-12 | End: 2020-09-01

## 2020-08-31 LAB
FUNGUS WND CULT: NORMAL
MYCOBACTERIUM SPEC CULT: NORMAL
NIGHT BLUE STAIN TISS: NORMAL

## 2020-09-01 ENCOUNTER — OFFICE VISIT (OUTPATIENT)
Dept: ORTHOPEDIC SURGERY | Facility: CLINIC | Age: 60
End: 2020-09-01

## 2020-09-01 DIAGNOSIS — Z96.649 S/P REVISION OF TOTAL HIP: Primary | ICD-10-CM

## 2020-09-01 PROCEDURE — 99024 POSTOP FOLLOW-UP VISIT: CPT | Performed by: ORTHOPAEDIC SURGERY

## 2020-09-01 RX ORDER — HYDROCODONE BITARTRATE AND ACETAMINOPHEN 10; 325 MG/1; MG/1
1 TABLET ORAL EVERY 6 HOURS PRN
Status: ON HOLD | COMMUNITY
Start: 2020-08-28 | End: 2021-01-26 | Stop reason: SDUPTHER

## 2020-09-01 NOTE — PROGRESS NOTES
Orthopaedic Clinic Note:  Hip Post Op    Chief Complaint   Patient presents with   • Post-op     3 week follow up; 6 weeks s/p revision (R) VALERI 07/20/2020        HPI    Mr. Jackson is 6  week(s) s/p revision right hip arthroplasty.  Rates his pain 6/10 on the pain scale.  He states that his right hip is doing much better.  He is ambulating with assistance of a cane.  Denies fevers chills or constitutional symptoms.  His main complaint today is left hip pain.  Overall he is doing much better.    Past Medical History:   Diagnosis Date   • Anxiety    • Arthritis    • Diabetes mellitus (CMS/HCC)     diet controlled    • GERD (gastroesophageal reflux disease)    • Headache    • Hypertension    • Low back pain    • Sleep apnea     does not wear machine   • Wears reading eyeglasses       Past Surgical History:   Procedure Laterality Date   • CARPAL TUNNEL RELEASE Bilateral    • COLONOSCOPY      5 years ago   • GASTRIC SLEEVE LAPAROSCOPIC     • HIP SURGERY Right times 2   • KNEE SURGERY Left     arthroscopy    • TOTAL HIP ARTHROPLASTY REVISION Right 7/20/2020    Procedure: TOTAL HIP ARTHROPLASTY REVISION RIGHT;  Surgeon: Jb Patel MD;  Location: [facility];  Service: Orthopedics;  Laterality: Right;      Family History   Problem Relation Age of Onset   • Heart disease Father    • Hypertension Father    • Osteoarthritis Father    • Cancer Mother    • Diabetes Brother    • Hypertension Brother    • Gout Paternal Grandmother    • Diabetes Paternal Grandmother      Social History     Socioeconomic History   • Marital status:      Spouse name: Not on file   • Number of children: Not on file   • Years of education: Not on file   • Highest education level: Not on file   Tobacco Use   • Smoking status: Never Smoker   • Smokeless tobacco: Never Used   Substance and Sexual Activity   • Alcohol use: No   • Drug use: No   • Sexual activity: Defer      Current Outpatient Medications on File Prior to Visit   Medication Sig  Dispense Refill   • amLODIPine (NORVASC) 10 MG tablet Take 1 tablet by mouth Daily.     • ascorbic acid (VITAMIN C) 1000 MG tablet Take 1,000 mg by mouth Daily.     • aspirin 325 MG EC tablet Take 1 tablet by mouth Every 12 (Twelve) Hours. For 1 month 60 tablet 0   • ferrous sulfate 325 (65 FE) MG tablet Take 325 mg by mouth Daily With Breakfast.     • gabapentin (NEURONTIN) 600 MG tablet Take 600 mg by mouth 3 (Three) Times a Day.     • HYDROcodone-acetaminophen (NORCO)  MG per tablet      • hydrOXYzine (VISTARIL) 25 MG capsule Take 50 mg by mouth 4 (Four) Times a Day.     • lisinopril (PRINIVIL,ZESTRIL) 5 MG tablet Take 5 mg by mouth Daily.     • LORATADINE PO Take 10 mg by mouth Daily.     • meloxicam (MOBIC) 7.5 MG tablet Take 7.5 mg by mouth 2 (two) times a day.     • Multiple Vitamins-Minerals (MENS MULTIPLUS PO) Take  by mouth.     • omeprazole (priLOSEC) 20 MG capsule Take 20 mg by mouth Daily.     • rOPINIRole (REQUIP) 2 MG tablet Take 2 mg by mouth Every Night.     • tamsulosin (FLOMAX) 0.4 MG capsule 24 hr capsule Take 1 capsule by mouth Every Night. 30 capsule 3   • vitamin B-12 (CYANOCOBALAMIN) 1000 MCG tablet Take 1,000 mcg by mouth Daily.     • [DISCONTINUED] docusate sodium (Colace) 100 MG capsule Take 1 capsule by mouth 2 (Two) Times a Day. 60 capsule 0   • [DISCONTINUED] oxyCODONE (ROXICODONE) 5 MG immediate release tablet Take 1 tablet by mouth Every 4 (Four) Hours As Needed for Moderate Pain . 30 tablet 0     No current facility-administered medications on file prior to visit.       Allergies   Allergen Reactions   • Codeine Nausea And Vomiting   • Morphine And Related Nausea And Vomiting        Review of Systems   HENT: Negative.    Eyes: Negative.    Respiratory: Negative.    Cardiovascular: Negative.    Gastrointestinal: Negative.    Endocrine: Negative.    Genitourinary: Negative.    Musculoskeletal: Positive for arthralgias, back pain, joint swelling, neck pain and neck stiffness.    Skin: Negative.    Allergic/Immunologic: Negative.    Neurological: Positive for weakness and numbness.   Hematological: Negative.    Psychiatric/Behavioral: Positive for sleep disturbance. The patient is nervous/anxious.         Physical Exam  There were no vitals taken for this visit.    There is no height or weight on file to calculate BMI.    GENERAL APPEARANCE: awake, alert, oriented, in no acute distress and well developed, well nourished  LUNGS:  breathing nonlabored  EXTREMITIES: no clubbing, cyanosis  PERIPHERAL PULSES: palpable dorsalis pedis and posterior tibial pulses bilaterally.    GAIT:  Antalgic            Hip Exam:  Right    RANGE OF MOTION:  EXTENSION/FLEXION:  normal (0-110 degrees)  IR:  20  ER:  40  PAIN WITH HIP MOTION:  no  PAIN WITH LOGROLL:  no     STRENGTH:  ABDUCTOR:  4/5  ADDUCTOR:  5/5  HIP FLEXION:  5/5    GREATER TROCHANTER BURSAL PAIN:  yes    SENSATION TO LIGHT TOUCH:  DEEP PERONEAL/SUPERFICIAL PERONEAL/SURAL/SAPHENOUS/TIBIAL:   intact    EDEMA:  no  ERYTHEMA:  no  WOUNDS/INCISIONS:   yes, well healed surgical incision without evidence of erythema or drainage  _______________________________________________________________  _______________________________________________________________    RADIOGRAPHIC FINDINGS:   Indication: Status post revision right hip arthroplasty    Comparison: Todays xrays were compared to previous xrays from 8/11/2020    AP pelvis: Right: Demonstrate a well positioned revision total hip without evidence of wear, loosening, fracture or osteolysis, femoral head is concentrically reduced within the acetabulum and No significant changes compared to prior radiographs.;Left: advanced, end-stage osteoarthritis with bone on bone articulation, subchondral sclerosis, and subchondral cysts, there are marginal osteophytes visualized at the femoral head-neck junction and acetabular margins and No significant changes compared to prior  radiographs.        Assessment/Plan:   Diagnosis Plan   1. S/P revision of total hip  XR Pelvis 1 or 2 View     Patient is doing well 6-week status post revision right hip arthroplasty.  I discussed potentially proceeding to left hip arthroplasty.  At this time, he is not interested as he has several work items that need to be done at home.  In addition of this, he wants to rehab the right hip a little more before proceeding to left hip surgery.  We will see him back in 1 month for repeat assessment with x-ray AP pelvis for templating purposes.  At that time we will likely schedule left total hip arthroplasty.    Jb Patel MD  09/01/20  13:20

## 2020-10-01 ENCOUNTER — OFFICE VISIT (OUTPATIENT)
Dept: ORTHOPEDIC SURGERY | Facility: CLINIC | Age: 60
End: 2020-10-01

## 2020-10-01 DIAGNOSIS — Z96.649 S/P REVISION OF TOTAL HIP: Primary | ICD-10-CM

## 2020-10-01 PROCEDURE — 99024 POSTOP FOLLOW-UP VISIT: CPT | Performed by: ORTHOPAEDIC SURGERY

## 2020-10-01 NOTE — PROGRESS NOTES
Orthopaedic Clinic Note:  Hip Post Op    Chief Complaint   Patient presents with   • Post-op Follow-up     4 week f/u, 2 month S/P revision of total right hip 07/20/20        HPI    Mr. Jackson is 10  week(s) s/p revision right hip arthroplasty.  He comes to clinic today complaining of increased pain along the lateral aspect of his right hip.  He isolates it to the trochanter region.  Rates it a 9/10 on the pain scale.  Denies any groin pain.  He is ambulating with the assistance of a cane.  He denies fevers chills or constitutional symptoms.  He has stopped formal rehab but does state he is doing some home exercises..  He does state that his physical activity has significantly increased over the past several weeks.    Past Medical History:   Diagnosis Date   • Anxiety    • Arthritis    • Diabetes mellitus (CMS/[facility])     diet controlled    • GERD (gastroesophageal reflux disease)    • Headache    • Hypertension    • Low back pain    • Sleep apnea     does not wear machine   • Wears reading eyeglasses       Past Surgical History:   Procedure Laterality Date   • CARPAL TUNNEL RELEASE Bilateral    • COLONOSCOPY      5 years ago   • GASTRIC SLEEVE LAPAROSCOPIC     • HIP SURGERY Right times 2   • KNEE SURGERY Left     arthroscopy    • TOTAL HIP ARTHROPLASTY REVISION Right 7/20/2020    Procedure: TOTAL HIP ARTHROPLASTY REVISION RIGHT;  Surgeon: Jb Patel MD;  Location: [facility];  Service: Orthopedics;  Laterality: Right;      Family History   Problem Relation Age of Onset   • Heart disease Father    • Hypertension Father    • Osteoarthritis Father    • Cancer Mother    • Diabetes Brother    • Hypertension Brother    • Gout Paternal Grandmother    • Diabetes Paternal Grandmother      Social History     Socioeconomic History   • Marital status:      Spouse name: Not on file   • Number of children: Not on file   • Years of education: Not on file   • Highest education level: Not on file   Tobacco Use   • Smoking  status: Never Smoker   • Smokeless tobacco: Never Used   Substance and Sexual Activity   • Alcohol use: No   • Drug use: No   • Sexual activity: Defer      Current Outpatient Medications on File Prior to Visit   Medication Sig Dispense Refill   • amLODIPine (NORVASC) 10 MG tablet Take 1 tablet by mouth Daily.     • ascorbic acid (VITAMIN C) 1000 MG tablet Take 1,000 mg by mouth Daily.     • aspirin 325 MG EC tablet Take 1 tablet by mouth Every 12 (Twelve) Hours. For 1 month 60 tablet 0   • ferrous sulfate 325 (65 FE) MG tablet Take 325 mg by mouth Daily With Breakfast.     • gabapentin (NEURONTIN) 600 MG tablet Take 600 mg by mouth 3 (Three) Times a Day.     • HYDROcodone-acetaminophen (NORCO)  MG per tablet      • hydrOXYzine (VISTARIL) 25 MG capsule Take 50 mg by mouth 4 (Four) Times a Day.     • lisinopril (PRINIVIL,ZESTRIL) 5 MG tablet Take 5 mg by mouth Daily.     • LORATADINE PO Take 10 mg by mouth Daily.     • meloxicam (MOBIC) 7.5 MG tablet Take 7.5 mg by mouth 2 (two) times a day.     • Multiple Vitamins-Minerals (MENS MULTIPLUS PO) Take  by mouth.     • omeprazole (priLOSEC) 20 MG capsule Take 20 mg by mouth Daily.     • rOPINIRole (REQUIP) 2 MG tablet Take 2 mg by mouth Every Night.     • tamsulosin (FLOMAX) 0.4 MG capsule 24 hr capsule Take 1 capsule by mouth Every Night. 30 capsule 3   • vitamin B-12 (CYANOCOBALAMIN) 1000 MCG tablet Take 1,000 mcg by mouth Daily.       No current facility-administered medications on file prior to visit.       Allergies   Allergen Reactions   • Codeine Nausea And Vomiting   • Morphine And Related Nausea And Vomiting        Review of Systems   Constitutional: Negative.    HENT: Negative.    Eyes: Negative.    Respiratory: Negative.    Cardiovascular: Negative.    Gastrointestinal: Negative.    Endocrine: Negative.    Genitourinary: Negative.    Musculoskeletal: Positive for arthralgias.   Skin: Negative.    Allergic/Immunologic: Negative.    Neurological: Negative.     Hematological: Negative.    Psychiatric/Behavioral: Negative.         Physical Exam  There were no vitals taken for this visit.    There is no height or weight on file to calculate BMI.    GENERAL APPEARANCE: awake, alert, oriented, in no acute distress and well developed, well nourished  LUNGS:  breathing nonlabored  EXTREMITIES: no clubbing, cyanosis  PERIPHERAL PULSES: palpable dorsalis pedis and posterior tibial pulses bilaterally.    GAIT:  Trendelenberg            Hip Exam:  Right    RANGE OF MOTION:  EXTENSION/FLEXION:  normal (0-110 degrees)  IR:  20  ER:  40  PAIN WITH HIP MOTION:  no  PAIN WITH LOGROLL:  no     STRENGTH:  ABDUCTOR:  4/5  ADDUCTOR:  5/5  HIP FLEXION:  4/5    GREATER TROCHANTER BURSAL PAIN:  yes    SENSATION TO LIGHT TOUCH:  DEEP PERONEAL/SUPERFICIAL PERONEAL/SURAL/SAPHENOUS/TIBIAL:   intact    EDEMA:  no  ERYTHEMA:  no  WOUNDS/INCISIONS:   yes, well healed surgical incision without evidence of erythema or drainage  _______________________________________________________________  _______________________________________________________________    RADIOGRAPHIC FINDINGS:   Indication: Status post revision right hip arthroplasty    Comparison: Todays xrays were compared to previous xrays from 9/1/2020    AP pelvis: Right: Demonstrate a well positioned total hip without evidence of wear, loosening, fracture or osteolysis, femoral head is concentrically reduced within the acetabulum and No significant changes compared to prior radiographs.;Left: advanced, end-stage osteoarthritis with bone on bone articulation, subchondral sclerosis, and subchondral cysts, there are marginal osteophytes visualized at the femoral head-neck junction and acetabular margins and No significant changes compared to prior radiographs.    Assessment/Plan:   Diagnosis Plan   1. S/P revision of total hip  XR Pelvis 1 or 2 View    Ambulatory Referral to Physical Therapy Evaluate and treat     I discussed with the patient  that I believe his source of pain is due to increased physical activity.  He has no pain in the groin with passive range of motion of the hip.  His incision is well-healed with no evidence of infectious etiology.  I explained his x-ray is also stable and do not indicate any hardware complication.  Majority of his pain is at the lateral trochanter.  I believe this is due to increased muscle fatigue.  He has not engaged in formal outpatient physical therapy and I believe this is why he is having the problems currently.  I recommend referral to outpatient physical therapy for hip muscle strengthening.  He is agreeable to this.  He will follow-up in 4 weeks.    Jb Patel MD  10/01/20  12:19 EDT

## 2020-11-03 ENCOUNTER — TELEPHONE (OUTPATIENT)
Dept: ORTHOPEDIC SURGERY | Facility: CLINIC | Age: 60
End: 2020-11-03

## 2020-11-03 NOTE — TELEPHONE ENCOUNTER
I am happy to see him if he is concerned about the hip replacement itself.  My suspicion is is that his back is contributing to the pain.  I can see him either before or after the MRI to further discuss his hip pain.

## 2020-11-03 NOTE — TELEPHONE ENCOUNTER
Dr. Patel-please see message below and advise:    Spoke with pt regarding previous message; He reports he is still having quite a bit of pain despite going to therapy 3x/week; Says he feel like the therapy has helped some but says he is still far from where he feels like he needs to be before having the other hip replaced; He states some of the pain is radiating down and an MRI of his back has been ordered but he is wanting to know if he should wait to come in to see MRL until after he has had his MRI of his back in case the back is the cause of his pain. I told him I'd send MRL a message and get back with him once I had a response.    Sophie

## 2020-11-03 NOTE — TELEPHONE ENCOUNTER
PATIENT CALLED STATED RIGHT HIP IS HAVING A GREAT DEAL OF PAIN. PATIENT IS UNABLE TO WALK ON RIGHT HIP. PATIENT STATED HE IS HAVING LOWER BACK PAIN AS WELL. PATIENT STATED FAMILY PHYSICIAN  ORDERED MRI OF BACK. PATIENT IS AWAITING THE APPOINTMENT FOR MRI, PATIENT WOULD LIKE TO KNOW IF HE IS TO COME IN ON Thursday OR SHOULD WAIT UNTIL HE HAS MRI OF BACK. PATIENT WOULD LIKE A CALL BACK [phone].

## 2020-11-03 NOTE — TELEPHONE ENCOUNTER
Spoke with pt about MRL's response; Pt agrees that his back is what's causing his pain so he would like to cancel his Thurs appt until MRI of back has been complete then call us back to re-schedule his follow-up.    Sophie

## 2020-11-18 ENCOUNTER — TRANSCRIBE ORDERS (OUTPATIENT)
Dept: ADMINISTRATIVE | Facility: HOSPITAL | Age: 60
End: 2020-11-18

## 2020-11-18 DIAGNOSIS — M54.50 LUMBAR PAIN: Primary | ICD-10-CM

## 2020-11-23 ENCOUNTER — HOSPITAL ENCOUNTER (EMERGENCY)
Facility: HOSPITAL | Age: 60
Discharge: HOME | End: 2020-11-23
Attending: EMERGENCY MEDICINE | Admitting: EMERGENCY MEDICINE

## 2020-11-23 ENCOUNTER — APPOINTMENT (OUTPATIENT)
Dept: MRI IMAGING | Facility: HOSPITAL | Age: 60
End: 2020-11-23

## 2020-11-23 VITALS
DIASTOLIC BLOOD PRESSURE: 85 MMHG | RESPIRATION RATE: 18 BRPM | WEIGHT: 216 LBS | BODY MASS INDEX: 32.74 KG/M2 | SYSTOLIC BLOOD PRESSURE: 137 MMHG | OXYGEN SATURATION: 99 % | HEART RATE: 76 BPM | HEIGHT: 68 IN | TEMPERATURE: 98 F

## 2020-11-23 DIAGNOSIS — M48.062 SPINAL STENOSIS OF LUMBAR REGION WITH NEUROGENIC CLAUDICATION: Primary | ICD-10-CM

## 2020-11-23 LAB
ALBUMIN SERPL-MCNC: 4.06 G/DL (ref 3.5–5.2)
ALBUMIN/GLOB SERPL: 1.7 G/DL
ALP SERPL-CCNC: 87 U/L (ref 39–117)
ALT SERPL W P-5'-P-CCNC: 13 U/L (ref 1–41)
ANION GAP SERPL CALCULATED.3IONS-SCNC: 8.7 MMOL/L (ref 5–15)
AST SERPL-CCNC: 17 U/L (ref 1–40)
BASOPHILS # BLD AUTO: 0.02 10*3/MM3 (ref 0–0.2)
BASOPHILS NFR BLD AUTO: 0.4 % (ref 0–1.5)
BILIRUB SERPL-MCNC: 0.3 MG/DL (ref 0–1.2)
BUN SERPL-MCNC: 20 MG/DL (ref 6–20)
BUN/CREAT SERPL: 29.9 (ref 7–25)
CALCIUM SPEC-SCNC: 8.8 MG/DL (ref 8.6–10.5)
CHLORIDE SERPL-SCNC: 106 MMOL/L (ref 98–107)
CO2 SERPL-SCNC: 27.3 MMOL/L (ref 22–29)
CREAT SERPL-MCNC: 0.67 MG/DL (ref 0.76–1.27)
CRP SERPL-MCNC: 0.05 MG/DL (ref 0–0.5)
DEPRECATED RDW RBC AUTO: 42.3 FL (ref 37–54)
EOSINOPHIL # BLD AUTO: 0.24 10*3/MM3 (ref 0–0.4)
EOSINOPHIL NFR BLD AUTO: 4.8 % (ref 0.3–6.2)
ERYTHROCYTE [DISTWIDTH] IN BLOOD BY AUTOMATED COUNT: 12.9 % (ref 12.3–15.4)
ERYTHROCYTE [SEDIMENTATION RATE] IN BLOOD: 5 MM/HR (ref 0–20)
GFR SERPL CREATININE-BSD FRML MDRD: 121 ML/MIN/1.73
GLOBULIN UR ELPH-MCNC: 2.4 GM/DL
GLUCOSE SERPL-MCNC: 97 MG/DL (ref 65–99)
HCT VFR BLD AUTO: 41.5 % (ref 37.5–51)
HGB BLD-MCNC: 13.3 G/DL (ref 13–17.7)
IMM GRANULOCYTES # BLD AUTO: 0.01 10*3/MM3 (ref 0–0.05)
IMM GRANULOCYTES NFR BLD AUTO: 0.2 % (ref 0–0.5)
LYMPHOCYTES # BLD AUTO: 1.75 10*3/MM3 (ref 0.7–3.1)
LYMPHOCYTES NFR BLD AUTO: 35 % (ref 19.6–45.3)
MCH RBC QN AUTO: 29.1 PG (ref 26.6–33)
MCHC RBC AUTO-ENTMCNC: 32 G/DL (ref 31.5–35.7)
MCV RBC AUTO: 90.8 FL (ref 79–97)
MONOCYTES # BLD AUTO: 0.46 10*3/MM3 (ref 0.1–0.9)
MONOCYTES NFR BLD AUTO: 9.2 % (ref 5–12)
NEUTROPHILS NFR BLD AUTO: 2.52 10*3/MM3 (ref 1.7–7)
NEUTROPHILS NFR BLD AUTO: 50.4 % (ref 42.7–76)
NRBC BLD AUTO-RTO: 0 /100 WBC (ref 0–0.2)
PLATELET # BLD AUTO: 200 10*3/MM3 (ref 140–450)
PMV BLD AUTO: 9.1 FL (ref 6–12)
POTASSIUM SERPL-SCNC: 4.3 MMOL/L (ref 3.5–5.2)
PROT SERPL-MCNC: 6.5 G/DL (ref 6–8.5)
RBC # BLD AUTO: 4.57 10*6/MM3 (ref 4.14–5.8)
SODIUM SERPL-SCNC: 142 MMOL/L (ref 136–145)
WBC # BLD AUTO: 5 10*3/MM3 (ref 3.4–10.8)

## 2020-11-23 PROCEDURE — 72148 MRI LUMBAR SPINE W/O DYE: CPT

## 2020-11-23 PROCEDURE — 36415 COLL VENOUS BLD VENIPUNCTURE: CPT

## 2020-11-23 PROCEDURE — 72148 MRI LUMBAR SPINE W/O DYE: CPT | Performed by: RADIOLOGY

## 2020-11-23 PROCEDURE — 99283 EMERGENCY DEPT VISIT LOW MDM: CPT

## 2020-11-23 PROCEDURE — 85025 COMPLETE CBC W/AUTO DIFF WBC: CPT | Performed by: PHYSICIAN ASSISTANT

## 2020-11-23 PROCEDURE — 25010000002 METHYLPREDNISOLONE PER 40 MG: Performed by: EMERGENCY MEDICINE

## 2020-11-23 PROCEDURE — 25010000002 ORPHENADRINE CITRATE PER 60 MG: Performed by: PHYSICIAN ASSISTANT

## 2020-11-23 PROCEDURE — 96372 THER/PROPH/DIAG INJ SC/IM: CPT

## 2020-11-23 PROCEDURE — 80053 COMPREHEN METABOLIC PANEL: CPT | Performed by: PHYSICIAN ASSISTANT

## 2020-11-23 PROCEDURE — 86140 C-REACTIVE PROTEIN: CPT | Performed by: PHYSICIAN ASSISTANT

## 2020-11-23 PROCEDURE — 85652 RBC SED RATE AUTOMATED: CPT | Performed by: PHYSICIAN ASSISTANT

## 2020-11-23 PROCEDURE — 25010000002 KETOROLAC TROMETHAMINE PER 15 MG: Performed by: PHYSICIAN ASSISTANT

## 2020-11-23 RX ORDER — KETOROLAC TROMETHAMINE 30 MG/ML
30 INJECTION, SOLUTION INTRAMUSCULAR; INTRAVENOUS ONCE
Status: COMPLETED | OUTPATIENT
Start: 2020-11-23 | End: 2020-11-23

## 2020-11-23 RX ORDER — ORPHENADRINE CITRATE 30 MG/ML
60 INJECTION INTRAMUSCULAR; INTRAVENOUS ONCE
Status: COMPLETED | OUTPATIENT
Start: 2020-11-23 | End: 2020-11-23

## 2020-11-23 RX ORDER — METHYLPREDNISOLONE SODIUM SUCCINATE 125 MG/2ML
80 INJECTION, POWDER, LYOPHILIZED, FOR SOLUTION INTRAMUSCULAR; INTRAVENOUS ONCE
Status: DISCONTINUED | OUTPATIENT
Start: 2020-11-23 | End: 2020-11-23

## 2020-11-23 RX ORDER — METHYLPREDNISOLONE 4 MG/1
TABLET ORAL
Qty: 21 TABLET | Refills: 0 | Status: SHIPPED | OUTPATIENT
Start: 2020-11-23 | End: 2021-01-15

## 2020-11-23 RX ORDER — ORPHENADRINE CITRATE 100 MG/1
100 TABLET, EXTENDED RELEASE ORAL 2 TIMES DAILY PRN
Qty: 14 TABLET | Refills: 0 | Status: SHIPPED | OUTPATIENT
Start: 2020-11-23 | End: 2021-01-15

## 2020-11-23 RX ORDER — METHYLPREDNISOLONE SODIUM SUCCINATE 40 MG/ML
80 INJECTION, POWDER, LYOPHILIZED, FOR SOLUTION INTRAMUSCULAR; INTRAVENOUS ONCE
Status: COMPLETED | OUTPATIENT
Start: 2020-11-23 | End: 2020-11-23

## 2020-11-23 RX ORDER — HYDROCODONE BITARTRATE AND ACETAMINOPHEN 7.5; 325 MG/1; MG/1
1 TABLET ORAL EVERY 6 HOURS PRN
Qty: 12 TABLET | Refills: 0 | Status: SHIPPED | OUTPATIENT
Start: 2020-11-23 | End: 2021-01-15

## 2020-11-23 RX ADMIN — KETOROLAC TROMETHAMINE 30 MG: 30 INJECTION, SOLUTION INTRAMUSCULAR; INTRAVENOUS at 17:04

## 2020-11-23 RX ADMIN — ORPHENADRINE CITRATE 60 MG: 30 INJECTION INTRAMUSCULAR; INTRAVENOUS at 17:04

## 2020-11-23 RX ADMIN — METHYLPREDNISOLONE SODIUM SUCCINATE 80 MG: 40 INJECTION, POWDER, FOR SOLUTION INTRAMUSCULAR; INTRAVENOUS at 17:04

## 2020-11-23 NOTE — ED PROVIDER NOTES
Subjective   60-year-old male presents to the emergency room with low back pain.  Patient states he has had worsening back pain over the past 2 months, but over the past few days it has gotten so severe that he can no longer take it.  He is followed with his PCP who ordered an MRI for the 18th, but states he cannot wait that long.  He does state that the pain radiates to his right buttock and posterior thigh then wraps around to the anterior side of his lower leg.  He denies urinary retention, fecal incontinence, saddle anesthesia, fever, history of or present drug abuse, history of trauma, or history of malignancy.  Aggravating factors include prolonged standing and sitting on the right side.  Denies any alleviating factors.      History provided by:  Patient   used: No        Review of Systems   Constitutional: Negative.  Negative for fever.   HENT: Negative.    Respiratory: Negative.    Cardiovascular: Negative.  Negative for chest pain.   Gastrointestinal: Negative.  Negative for abdominal pain.   Endocrine: Negative.    Genitourinary: Negative.  Negative for dysuria.   Musculoskeletal: Positive for back pain.   Skin: Negative.    Neurological: Negative.    Psychiatric/Behavioral: Negative.    All other systems reviewed and are negative.      Past Medical History:   Diagnosis Date   • Anxiety    • Arthritis    • Diabetes mellitus (CMS/[facility])     diet controlled    • GERD (gastroesophageal reflux disease)    • Headache    • Hypertension    • Low back pain    • Sleep apnea     does not wear machine   • Wears reading eyeglasses        Allergies   Allergen Reactions   • Codeine Nausea And Vomiting   • Morphine And Related Nausea And Vomiting       Past Surgical History:   Procedure Laterality Date   • CARPAL TUNNEL RELEASE Bilateral    • COLONOSCOPY      5 years ago   • GASTRIC SLEEVE LAPAROSCOPIC     • HIP SURGERY Right times 2   • KNEE SURGERY Left     arthroscopy    • TOTAL HIP ARTHROPLASTY  REVISION Right 7/20/2020    Procedure: TOTAL HIP ARTHROPLASTY REVISION RIGHT;  Surgeon: Jb Patel MD;  Location: [facility];  Service: Orthopedics;  Laterality: Right;       Family History   Problem Relation Age of Onset   • Heart disease Father    • Hypertension Father    • Osteoarthritis Father    • Cancer Mother    • Diabetes Brother    • Hypertension Brother    • Gout Paternal Grandmother    • Diabetes Paternal Grandmother        Social History     Socioeconomic History   • Marital status:      Spouse name: Not on file   • Number of children: Not on file   • Years of education: Not on file   • Highest education level: Not on file   Tobacco Use   • Smoking status: Never Smoker   • Smokeless tobacco: Never Used   Substance and Sexual Activity   • Alcohol use: No   • Drug use: No   • Sexual activity: Defer           Objective   Physical Exam  Vitals signs and nursing note reviewed.   Constitutional:       General: He is not in acute distress.     Appearance: He is well-developed. He is not diaphoretic.   HENT:      Head: Normocephalic and atraumatic.      Right Ear: External ear normal.      Left Ear: External ear normal.      Nose: Nose normal.   Eyes:      Conjunctiva/sclera: Conjunctivae normal.      Pupils: Pupils are equal, round, and reactive to light.   Neck:      Musculoskeletal: Normal range of motion and neck supple.      Vascular: No JVD.      Trachea: No tracheal deviation.   Cardiovascular:      Rate and Rhythm: Normal rate and regular rhythm.      Heart sounds: Normal heart sounds. No murmur.   Pulmonary:      Effort: Pulmonary effort is normal. No respiratory distress.      Breath sounds: Normal breath sounds. No wheezing.   Abdominal:      General: Bowel sounds are normal.      Palpations: Abdomen is soft.      Tenderness: There is no abdominal tenderness.   Musculoskeletal: Normal range of motion.         General: No deformity.      Lumbar back: He exhibits tenderness and pain. He  exhibits no bony tenderness.   Skin:     General: Skin is warm and dry.      Coloration: Skin is not pale.      Findings: No erythema or rash.   Neurological:      Mental Status: He is alert and oriented to person, place, and time.      Cranial Nerves: No cranial nerve deficit.   Psychiatric:         Behavior: Behavior normal.         Thought Content: Thought content normal.         Procedures           ED Course  ED Course as of Nov 23 1919   Mon Nov 23, 2020 1812 MRI Lumbar Spine Without Contrast [TK]   1914 Spoke with Dr. Dupont neurosurgeon at [facility] who recommends patient follow-up as an outpatient.    [TK]      ED Course User Index  [TK] Tucker Goldman PA-C                                           MDM  Number of Diagnoses or Management Options  Spinal stenosis of lumbar region with neurogenic claudication: new and requires workup     Amount and/or Complexity of Data Reviewed  Clinical lab tests: reviewed and ordered  Tests in the radiology section of CPT®: reviewed and ordered  Discuss the patient with other providers: yes (Cresencio Yip)    Risk of Complications, Morbidity, and/or Mortality  Presenting problems: moderate  Diagnostic procedures: moderate  Management options: moderate    Patient Progress  Patient progress: stable      Final diagnoses:   Spinal stenosis of lumbar region with neurogenic claudication            Tucker Goldman PA-C  11/23/20 1919

## 2020-11-23 NOTE — ED NOTES
Called [facility] for a consult from the [facility] on call per MAXIMILIAN Shaffer.     Kam Mattson  11/23/20 1856

## 2020-11-25 ENCOUNTER — HOSPITAL ENCOUNTER (OUTPATIENT)
Dept: MRI IMAGING | Facility: HOSPITAL | Age: 60
Discharge: HOME | End: 2020-11-25

## 2020-11-25 DIAGNOSIS — M54.50 LUMBAR PAIN: ICD-10-CM

## 2020-12-02 ENCOUNTER — APPOINTMENT (OUTPATIENT)
Dept: MRI IMAGING | Facility: HOSPITAL | Age: 60
End: 2020-12-02

## 2021-01-13 ENCOUNTER — TELEPHONE (OUTPATIENT)
Dept: ORTHOPEDIC SURGERY | Facility: CLINIC | Age: 61
End: 2021-01-13

## 2021-01-13 NOTE — TELEPHONE ENCOUNTER
If he is wanting to schedule surgery, he needs to be physically in clinic. If he is wanting to get the pinched nerve in his back taken care of first before hip replacement, we can do a telephone encounter. Surgery scheduling/Paperwork will need to be done in person to schedule surgery.

## 2021-01-13 NOTE — TELEPHONE ENCOUNTER
PATIENT WOULD LIKE TO CHANGE APPOINTMENT THIS Friday, 1/15, TO A TELEPHONE VISIT. PATIENT FOUND OUT HE HAD A PINCHED NERVE IN HIS BACK AND WOULD LIKE TO DISCUSS HIP REPLACEMENT SURGERY DURING THIS VISIT. IS THIS OKAY TO SCHEDULE FOR A TELEPHONE VISIT OR WOULD YOU LIKE TO SEE HIM IN CLINIC?

## 2021-01-14 NOTE — TELEPHONE ENCOUNTER
SPOKE TO PATIENT, HE IS GOING TO COME IN FOR VISIT BECAUSE HE WOULD LIKE TO START PROCESS OF SCHEDULING HIS SURGERY.

## 2021-01-15 ENCOUNTER — TELEPHONE (OUTPATIENT)
Dept: ORTHOPEDIC SURGERY | Facility: CLINIC | Age: 61
End: 2021-01-15

## 2021-01-15 ENCOUNTER — OFFICE VISIT (OUTPATIENT)
Dept: ORTHOPEDIC SURGERY | Facility: CLINIC | Age: 61
End: 2021-01-15

## 2021-01-15 ENCOUNTER — APPOINTMENT (OUTPATIENT)
Dept: PREADMISSION TESTING | Facility: HOSPITAL | Age: 61
End: 2021-01-15

## 2021-01-15 VITALS — WEIGHT: 229.5 LBS | BODY MASS INDEX: 34.78 KG/M2 | HEIGHT: 68 IN

## 2021-01-15 VITALS — HEART RATE: 94 BPM | BODY MASS INDEX: 33.49 KG/M2 | WEIGHT: 221 LBS | OXYGEN SATURATION: 99 % | HEIGHT: 68 IN

## 2021-01-15 DIAGNOSIS — Z96.649 S/P REVISION OF TOTAL HIP: ICD-10-CM

## 2021-01-15 DIAGNOSIS — M16.12 PRIMARY OSTEOARTHRITIS OF LEFT HIP: ICD-10-CM

## 2021-01-15 DIAGNOSIS — M16.12 PRIMARY OSTEOARTHRITIS OF LEFT HIP: Primary | ICD-10-CM

## 2021-01-15 DIAGNOSIS — M25.552 LEFT HIP PAIN: ICD-10-CM

## 2021-01-15 LAB
ANION GAP SERPL CALCULATED.3IONS-SCNC: 8 MMOL/L (ref 5–15)
APTT PPP: 37.1 SECONDS (ref 24–37)
BACTERIA UR QL AUTO: NORMAL /HPF
BASOPHILS # BLD AUTO: 0.02 10*3/MM3 (ref 0–0.2)
BASOPHILS NFR BLD AUTO: 0.5 % (ref 0–1.5)
BILIRUB UR QL STRIP: NEGATIVE
BUN SERPL-MCNC: 23 MG/DL (ref 6–20)
BUN/CREAT SERPL: 33.8 (ref 7–25)
CALCIUM SPEC-SCNC: 8.6 MG/DL (ref 8.6–10.5)
CHLORIDE SERPL-SCNC: 106 MMOL/L (ref 98–107)
CLARITY UR: CLEAR
CO2 SERPL-SCNC: 26 MMOL/L (ref 22–29)
COLOR UR: ABNORMAL
CREAT SERPL-MCNC: 0.68 MG/DL (ref 0.76–1.27)
DEPRECATED RDW RBC AUTO: 44 FL (ref 37–54)
EOSINOPHIL # BLD AUTO: 0.19 10*3/MM3 (ref 0–0.4)
EOSINOPHIL NFR BLD AUTO: 4.6 % (ref 0.3–6.2)
ERYTHROCYTE [DISTWIDTH] IN BLOOD BY AUTOMATED COUNT: 12.9 % (ref 12.3–15.4)
GFR SERPL CREATININE-BSD FRML MDRD: 119 ML/MIN/1.73
GLUCOSE SERPL-MCNC: 102 MG/DL (ref 65–99)
GLUCOSE UR STRIP-MCNC: NEGATIVE MG/DL
HBA1C MFR BLD: 5 % (ref 4.8–5.6)
HCT VFR BLD AUTO: 41.2 % (ref 37.5–51)
HGB BLD-MCNC: 13 G/DL (ref 13–17.7)
HGB UR QL STRIP.AUTO: NEGATIVE
HYALINE CASTS UR QL AUTO: NORMAL /LPF
IMM GRANULOCYTES # BLD AUTO: 0.01 10*3/MM3 (ref 0–0.05)
IMM GRANULOCYTES NFR BLD AUTO: 0.2 % (ref 0–0.5)
INR PPP: 1.03 (ref 0.85–1.16)
KETONES UR QL STRIP: ABNORMAL
LEUKOCYTE ESTERASE UR QL STRIP.AUTO: NEGATIVE
LYMPHOCYTES # BLD AUTO: 1.59 10*3/MM3 (ref 0.7–3.1)
LYMPHOCYTES NFR BLD AUTO: 38.5 % (ref 19.6–45.3)
MCH RBC QN AUTO: 29.3 PG (ref 26.6–33)
MCHC RBC AUTO-ENTMCNC: 31.6 G/DL (ref 31.5–35.7)
MCV RBC AUTO: 92.8 FL (ref 79–97)
MONOCYTES # BLD AUTO: 0.45 10*3/MM3 (ref 0.1–0.9)
MONOCYTES NFR BLD AUTO: 10.9 % (ref 5–12)
NEUTROPHILS NFR BLD AUTO: 1.87 10*3/MM3 (ref 1.7–7)
NEUTROPHILS NFR BLD AUTO: 45.3 % (ref 42.7–76)
NITRITE UR QL STRIP: NEGATIVE
NRBC BLD AUTO-RTO: 0 /100 WBC (ref 0–0.2)
PH UR STRIP.AUTO: <=5 [PH] (ref 5–8)
PLATELET # BLD AUTO: 205 10*3/MM3 (ref 140–450)
PMV BLD AUTO: 9.4 FL (ref 6–12)
POTASSIUM SERPL-SCNC: 4.4 MMOL/L (ref 3.5–5.2)
PROT UR QL STRIP: NEGATIVE
PROTHROMBIN TIME: 13.2 SECONDS (ref 11.5–14)
QT INTERVAL: 396 MS
QTC INTERVAL: 421 MS
RBC # BLD AUTO: 4.44 10*6/MM3 (ref 4.14–5.8)
RBC # UR: NORMAL /HPF
REF LAB TEST METHOD: NORMAL
SODIUM SERPL-SCNC: 140 MMOL/L (ref 136–145)
SP GR UR STRIP: 1.04 (ref 1–1.03)
SQUAMOUS #/AREA URNS HPF: NORMAL /HPF
UROBILINOGEN UR QL STRIP: ABNORMAL
WBC # BLD AUTO: 4.13 10*3/MM3 (ref 3.4–10.8)
WBC UR QL AUTO: NORMAL /HPF

## 2021-01-15 PROCEDURE — 80048 BASIC METABOLIC PNL TOTAL CA: CPT

## 2021-01-15 PROCEDURE — 36415 COLL VENOUS BLD VENIPUNCTURE: CPT

## 2021-01-15 PROCEDURE — 93010 ELECTROCARDIOGRAM REPORT: CPT | Performed by: INTERNAL MEDICINE

## 2021-01-15 PROCEDURE — 85730 THROMBOPLASTIN TIME PARTIAL: CPT

## 2021-01-15 PROCEDURE — 83036 HEMOGLOBIN GLYCOSYLATED A1C: CPT

## 2021-01-15 PROCEDURE — G0480 DRUG TEST DEF 1-7 CLASSES: HCPCS

## 2021-01-15 PROCEDURE — 99214 OFFICE O/P EST MOD 30 MIN: CPT | Performed by: ORTHOPAEDIC SURGERY

## 2021-01-15 PROCEDURE — 81001 URINALYSIS AUTO W/SCOPE: CPT

## 2021-01-15 PROCEDURE — 85610 PROTHROMBIN TIME: CPT

## 2021-01-15 PROCEDURE — 93005 ELECTROCARDIOGRAM TRACING: CPT

## 2021-01-15 PROCEDURE — 85025 COMPLETE CBC W/AUTO DIFF WBC: CPT

## 2021-01-15 RX ORDER — ACETAMINOPHEN 325 MG/1
1000 TABLET ORAL ONCE
Status: CANCELLED | OUTPATIENT
Start: 2021-01-15 | End: 2021-01-15

## 2021-01-15 RX ORDER — PREGABALIN 75 MG/1
75 CAPSULE ORAL ONCE
Status: CANCELLED | OUTPATIENT
Start: 2021-01-15 | End: 2021-01-15

## 2021-01-15 RX ORDER — LORATADINE 10 MG/1
10 TABLET ORAL DAILY
COMMUNITY
Start: 2020-11-18

## 2021-01-15 RX ORDER — MELOXICAM 7.5 MG/1
15 TABLET ORAL ONCE
Status: CANCELLED | OUTPATIENT
Start: 2021-01-15 | End: 2021-01-15

## 2021-01-15 ASSESSMENT — HOOS JR
HOOS JR SCORE: 29.009
HOOS JR SCORE: 19

## 2021-01-15 NOTE — TELEPHONE ENCOUNTER
PATIENT IS GETTING AN INJECTION FOR SCIATIC NERVE 3 DAYS PRIOR TO SURGERY. PATIENT WOULD LIKE TO KNOW IF THAT IS FINE.

## 2021-01-15 NOTE — H&P (VIEW-ONLY)
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 Jackson's last visit. He returns to clinic today for follow-up revision right hip arthroplasty as well as left hip pain.  He is 6 months out from revision right hip arthroplasty.  He is doing well in regards to that hip.  He has some minor discomfort localized lateral trochanter.  Otherwise he is doing well.  Denies any pain in the groin.  His main complaint today is left hip pain which she rates a 9/10 on the pain scale.  He is ambulating with the assistance of a cane.  He has failed conservative treatment with anti-inflammatories.  He is having severe limitations in daily activities including walking, standing, climbing stairs.  He is ready to pursue aggressive intervention due to his worsening hip pain.    Past Medical History:   Diagnosis Date   • Anxiety    • Arthritis    • Diabetes mellitus (CMS/HCC)     diet controlled    • GERD (gastroesophageal reflux disease)    • Headache    • Hypertension    • Low back pain    • Sleep apnea     does not wear machine   • Wears reading eyeglasses       Past Surgical History:   Procedure Laterality Date   • CARPAL TUNNEL RELEASE Bilateral    • COLONOSCOPY      5 years ago   • GASTRIC SLEEVE LAPAROSCOPIC     • HIP SURGERY Right times 2   • KNEE SURGERY Left     arthroscopy    • TOTAL HIP ARTHROPLASTY REVISION Right 7/20/2020    Procedure: TOTAL HIP ARTHROPLASTY REVISION RIGHT;  Surgeon: Jb Patel MD;  Location: [facility];  Service: Orthopedics;  Laterality: Right;      Family History   Problem Relation Age of Onset   • Heart disease Father    • Hypertension Father    • Osteoarthritis Father    • Cancer Mother    • Diabetes Brother    • Hypertension Brother    • Gout Paternal Grandmother    • Diabetes Paternal Grandmother      Social History      Socioeconomic History   • Marital status:      Spouse name: Not on file   • Number of children: Not on file   • Years of education: Not on file   • Highest education level: Not on file   Tobacco Use   • Smoking status: Never Smoker   • Smokeless tobacco: Never Used   Substance and Sexual Activity   • Alcohol use: No   • Drug use: No   • Sexual activity: Defer      Current Outpatient Medications on File Prior to Visit   Medication Sig Dispense Refill   • amLODIPine (NORVASC) 10 MG tablet Take 1 tablet by mouth Daily.     • ascorbic acid (VITAMIN C) 1000 MG tablet Take 1,000 mg by mouth Daily.     • aspirin 325 MG EC tablet Take 1 tablet by mouth Every 12 (Twelve) Hours. For 1 month 60 tablet 0   • ferrous sulfate 325 (65 FE) MG tablet Take 325 mg by mouth Daily With Breakfast.     • gabapentin (NEURONTIN) 600 MG tablet Take 600 mg by mouth 3 (Three) Times a Day.     • HYDROcodone-acetaminophen (NORCO)  MG per tablet      • hydrOXYzine (VISTARIL) 25 MG capsule Take 50 mg by mouth 4 (Four) Times a Day.     • lisinopril (PRINIVIL,ZESTRIL) 5 MG tablet Take 5 mg by mouth Daily.     • loratadine (CLARITIN) 10 MG tablet      • meloxicam (MOBIC) 7.5 MG tablet Take 7.5 mg by mouth 2 (two) times a day.     • methylPREDNISolone (MEDROL) 4 MG dose pack Take as directed on package instructions. 21 tablet 0   • Multiple Vitamins-Minerals (MENS MULTIPLUS PO) Take  by mouth.     • omeprazole (priLOSEC) 20 MG capsule Take 20 mg by mouth Daily.     • orphenadrine (NORFLEX) 100 MG 12 hr tablet Take 1 tablet by mouth 2 (Two) Times a Day As Needed for Muscle Spasms or Mild Pain . 14 tablet 0   • rOPINIRole (REQUIP) 2 MG tablet Take 2 mg by mouth Every Night.     • tamsulosin (FLOMAX) 0.4 MG capsule 24 hr capsule Take 1 capsule by mouth Every Night. 30 capsule 3   • vitamin B-12 (CYANOCOBALAMIN) 1000 MCG tablet Take 1,000 mcg by mouth Daily.     • [DISCONTINUED] HYDROcodone-acetaminophen (NORCO) 7.5-325 MG per tablet Take  "1 tablet by mouth Every 6 (Six) Hours As Needed for Moderate Pain . 12 tablet 0   • [DISCONTINUED] LORATADINE PO Take 10 mg by mouth Daily.       No current facility-administered medications on file prior to visit.       Allergies   Allergen Reactions   • Codeine Nausea And Vomiting   • Morphine And Related Nausea And Vomiting        Review of Systems   Constitutional: Negative.    HENT: Negative.    Eyes: Negative.    Respiratory: Negative.    Cardiovascular: Negative.    Gastrointestinal: Negative.    Endocrine: Negative.    Genitourinary: Negative.    Musculoskeletal: Positive for arthralgias.   Skin: Negative.    Allergic/Immunologic: Negative.    Neurological: Negative.    Hematological: Negative.    Psychiatric/Behavioral: Negative.         The patient's Review of Systems was personally reviewed and confirmed as accurate.    Physical Exam  Pulse 94, height 172.7 cm (68\"), weight 100 kg (221 lb), SpO2 99 %.    Body mass index is 33.6 kg/m².    GENERAL APPEARANCE: awake, alert, oriented, in no acute distress and well developed, well nourished  LUNGS:  breathing nonlabored  EXTREMITIES: no clubbing, cyanosis  PERIPHERAL PULSES: palpable dorsalis pedis and posterior tibial pulses bilaterally.    GAIT:  Antalgic           Hip Exam:  Right     RANGE OF MOTION:  EXTENSION/FLEXION:  normal (0-110 degrees)  IR:  20  ER:  40  PAIN WITH HIP MOTION:  no  PAIN WITH LOGROLL:  no      STRENGTH:  ABDUCTOR:    4/5  ADDUCTOR:  5/5  HIP FLEXION:  4/5     GREATER TROCHANTER BURSAL PAIN:  yes    SENSATION TO LIGHT TOUCH:  DEEP PERONEAL/SUPERFICIAL PERONEAL/SURAL/SAPHENOUS/TIBIAL:   intact    EDEMA:  no  ERYTHEMA:  no  WOUNDS/INCISIONS:   yes, well healed surgical incision without evidence of erythema or drainage  ------------------------   Hip Exam:  Left    RANGE OF MOTION:  EXTENSION/FLEXION:  normal (0-110 degrees), decreased (5-90 degrees)  IR (at 90 degrees of flexion):  -5  ER (at 90 degrees of flexion):  20  PAIN WITH HIP " MOTION:  yes, Localized to groin  PAIN WITH LOGROLL:  no     [facility]    STRENGTH:  ABDUCTOR:  4/5  ADDUCTOR:  5/5  HIP FLEXION:  5/5    GREATER TROCHANTER BURSAL PAIN:  yes    SENSATION TO LIGHT TOUCH:  DEEP PERONEAL/SUPERFICIAL PERONEAL/SURAL/SAPHENOUS/TIBIAL:   intact    EDEMA:  no  ERYTHEMA:  no  WOUNDS/INCISIONS:   no  _________________________________________________________________  _________________________________________________________________    RADIOGRAPHIC FINDINGS:   Indication: Status post revision right hip arthroplasty, left hip pain    Comparison: Todays xrays were compared to previous xrays from 10/1/2020    AP pelvis: Right: Demonstrate a well positioned revision total hip without evidence of wear, loosening, fracture or osteolysis, femoral head is concentrically reduced within the acetabulum and No significant changes compared to prior radiographs.;Left: advanced, end-stage osteoarthritis with bone on bone articulation, subchondral sclerosis, and subchondral cysts, there are marginal osteophytes visualized at the femoral head-neck junction and acetabular margins and No significant changes compared to prior radiographs.      Assessment/Plan:   Diagnosis Plan   1. Primary osteoarthritis of left hip  Case Request    CBC and Differential    Basic metabolic panel    Protime-INR    APTT    Hemoglobin A1c    Urinalysis With Culture If Indicated -    ECG 12 Lead    Nicotine & Metabolite, Quant    Tranexamic Acid 1,000 mg in sodium chloride 0.9 % 100 mL    Tranexamic Acid 1,000 mg in sodium chloride 0.9 % 100 mL    ceFAZolin (ANCEF) 2 g in sodium chloride 0.9 % 100 mL IVPB    acetaminophen (TYLENOL) tablet 975 mg    meloxicam (MOBIC) tablet 15 mg    pregabalin (LYRICA) capsule 75 mg    mupirocin (BACTROBAN) 2 % nasal ointment 1 application    Case Request   2. Left hip pain  XR Hips Bilateral With or Without Pelvis 3-4 View   3. S/P revision of total hip       Patient is doing well  6-month status post revision right hip arthroplasty.  I see no clinical radiographic evidence of complication.  He does have some trace trochanteric bursitis which is likely due to his altered gait pattern due to ongoing hip pain on the left.  At this time he has failed conservative treatment and is a candidate for total joint arthroplasty on the left side.  He is agreeable to pursuing total hip arthroplasty.    The patient has clinical and radiographic evidence of end-stage left hip joint degeneration. Conservative measures have been tried for 3 months or longer, but have failed to adequately treat or improve the patient's symptoms. Pain is restricting the patient's daily activities as well as quality of life. The recommendation at this time is to proceed with a left total hip arthroplasty with the goal to improve patient function and pain. The risks, benefits, potential complications, and alternatives were discussed with the patient in detail. Risks included but were not limited to bleeding, infection, anesthesia risks, damage to neurovascular structures, osteolysis, aseptic loosening, instability, dislocation, pain, continued pain, iatrogenic fracture, leg length discrepancy, possible need for future surgery including the potential for amputation, blood clots, myocardial infarction, stroke, and death. Taylor-operative blood management and the potential for blood transfusion were discussed with risks and options clearly outlined. Specific details of the surgical procedure, hospitalization, recovery, rehabilitation, and long-term precautions were also presented. Pre-operative teaching was provided. Implant/prosthesis selection was outlined, and the many options available were explained; the final choice will be made at the time of the procedure to match the anatomy and condition of the bone, ligaments, tendons, and muscles. Given this instruction, the patient elected to proceed with the left total hip arthroplasty.  The patient will be seen by pre-admission testing for pre-operative optimization and risk assessment and will be scheduled for surgery once this is completed.    The patient is considered standard risk for DVT based on patient risk factors and will be placed on aspirin postoperatively for DVT prophylaxis.      Jb Patel MD  01/15/21  11:08 EST

## 2021-01-15 NOTE — PAT
Verified with patient that they received a prescription for Bactroban and Chlorhexidine shower from the office.  Reinforced with them to fill the prescription if they haven't already.  Verbal and written instructions given regarding proper use of the Bactroban and Chlorhexidine to patient and/or famlily during PAT visit. Patient/family also instructed to complete checklist and return it to Pre-op on the day of surgery.  Patient and/or family verbalized understanding.    Patient to apply Chlorhexadine wipes  to surgical area (as instructed) the night before procedure and the AM of procedure. Wipes provided.    Patient instructed to drink 20 ounces (or until full) of Gatorade and it needs to be completed 1 hour before given arrival time for procedure (NO RED Gatorade)    Patient verbalized understanding.    Patient did not attend joint class during PAT visit because the in person class has been temporarily suspended due to COVID 19 restrictions.   Joint replacement handbook given to patient during PAT visit if they have not received a copy within the last one to two years.  Encouraged patient/family to read handbook thoroughly and to notify PAT staff with any questions or concerns.  Additionally a handout was provided directing patient to links to watch online videos related to joint replacement surgery on the [facility] website.  The handout also gives detailed instructions for joining an online joint replacement class that is offered on Tuesdays and Thursdays.  If patient agreed to watch the joint replacement teaching videos and/or join an online joint replacement class then joint replacement instruction guide not reviewed. But if patient would not agree to watch videos or join a class, the nurse guide that reviews highlights of the joint replacement class was reviewed with patient during PAT visit. Patient verbalized understanding.   Per patient report he did online education 7-2020 and was instructed per   chelita no need to repeat. Educational book given for patient and caregiver/s to review.     Patient requested to preserve the stem of his hip to take home, Laurence in dr Merlos's office notified and spoke with dr merlos, unable to do and patient notified

## 2021-01-15 NOTE — PROGRESS NOTES
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 Jackson's last visit. He returns to clinic today for follow-up revision right hip arthroplasty as well as left hip pain.  He is 6 months out from revision right hip arthroplasty.  He is doing well in regards to that hip.  He has some minor discomfort localized lateral trochanter.  Otherwise he is doing well.  Denies any pain in the groin.  His main complaint today is left hip pain which she rates a 9/10 on the pain scale.  He is ambulating with the assistance of a cane.  He has failed conservative treatment with anti-inflammatories.  He is having severe limitations in daily activities including walking, standing, climbing stairs.  He is ready to pursue aggressive intervention due to his worsening hip pain.    Past Medical History:   Diagnosis Date   • Anxiety    • Arthritis    • Diabetes mellitus (CMS/HCC)     diet controlled    • GERD (gastroesophageal reflux disease)    • Headache    • Hypertension    • Low back pain    • Sleep apnea     does not wear machine   • Wears reading eyeglasses       Past Surgical History:   Procedure Laterality Date   • CARPAL TUNNEL RELEASE Bilateral    • COLONOSCOPY      5 years ago   • GASTRIC SLEEVE LAPAROSCOPIC     • HIP SURGERY Right times 2   • KNEE SURGERY Left     arthroscopy    • TOTAL HIP ARTHROPLASTY REVISION Right 7/20/2020    Procedure: TOTAL HIP ARTHROPLASTY REVISION RIGHT;  Surgeon: Jb Patel MD;  Location: [facility];  Service: Orthopedics;  Laterality: Right;      Family History   Problem Relation Age of Onset   • Heart disease Father    • Hypertension Father    • Osteoarthritis Father    • Cancer Mother    • Diabetes Brother    • Hypertension Brother    • Gout Paternal Grandmother    • Diabetes Paternal Grandmother      Social History      Socioeconomic History   • Marital status:      Spouse name: Not on file   • Number of children: Not on file   • Years of education: Not on file   • Highest education level: Not on file   Tobacco Use   • Smoking status: Never Smoker   • Smokeless tobacco: Never Used   Substance and Sexual Activity   • Alcohol use: No   • Drug use: No   • Sexual activity: Defer      Current Outpatient Medications on File Prior to Visit   Medication Sig Dispense Refill   • amLODIPine (NORVASC) 10 MG tablet Take 1 tablet by mouth Daily.     • ascorbic acid (VITAMIN C) 1000 MG tablet Take 1,000 mg by mouth Daily.     • aspirin 325 MG EC tablet Take 1 tablet by mouth Every 12 (Twelve) Hours. For 1 month 60 tablet 0   • ferrous sulfate 325 (65 FE) MG tablet Take 325 mg by mouth Daily With Breakfast.     • gabapentin (NEURONTIN) 600 MG tablet Take 600 mg by mouth 3 (Three) Times a Day.     • HYDROcodone-acetaminophen (NORCO)  MG per tablet      • hydrOXYzine (VISTARIL) 25 MG capsule Take 50 mg by mouth 4 (Four) Times a Day.     • lisinopril (PRINIVIL,ZESTRIL) 5 MG tablet Take 5 mg by mouth Daily.     • loratadine (CLARITIN) 10 MG tablet      • meloxicam (MOBIC) 7.5 MG tablet Take 7.5 mg by mouth 2 (two) times a day.     • methylPREDNISolone (MEDROL) 4 MG dose pack Take as directed on package instructions. 21 tablet 0   • Multiple Vitamins-Minerals (MENS MULTIPLUS PO) Take  by mouth.     • omeprazole (priLOSEC) 20 MG capsule Take 20 mg by mouth Daily.     • orphenadrine (NORFLEX) 100 MG 12 hr tablet Take 1 tablet by mouth 2 (Two) Times a Day As Needed for Muscle Spasms or Mild Pain . 14 tablet 0   • rOPINIRole (REQUIP) 2 MG tablet Take 2 mg by mouth Every Night.     • tamsulosin (FLOMAX) 0.4 MG capsule 24 hr capsule Take 1 capsule by mouth Every Night. 30 capsule 3   • vitamin B-12 (CYANOCOBALAMIN) 1000 MCG tablet Take 1,000 mcg by mouth Daily.     • [DISCONTINUED] HYDROcodone-acetaminophen (NORCO) 7.5-325 MG per tablet Take  "1 tablet by mouth Every 6 (Six) Hours As Needed for Moderate Pain . 12 tablet 0   • [DISCONTINUED] LORATADINE PO Take 10 mg by mouth Daily.       No current facility-administered medications on file prior to visit.       Allergies   Allergen Reactions   • Codeine Nausea And Vomiting   • Morphine And Related Nausea And Vomiting        Review of Systems   Constitutional: Negative.    HENT: Negative.    Eyes: Negative.    Respiratory: Negative.    Cardiovascular: Negative.    Gastrointestinal: Negative.    Endocrine: Negative.    Genitourinary: Negative.    Musculoskeletal: Positive for arthralgias.   Skin: Negative.    Allergic/Immunologic: Negative.    Neurological: Negative.    Hematological: Negative.    Psychiatric/Behavioral: Negative.         The patient's Review of Systems was personally reviewed and confirmed as accurate.    Physical Exam  Pulse 94, height 172.7 cm (68\"), weight 100 kg (221 lb), SpO2 99 %.    Body mass index is 33.6 kg/m².    GENERAL APPEARANCE: awake, alert, oriented, in no acute distress and well developed, well nourished  LUNGS:  breathing nonlabored  EXTREMITIES: no clubbing, cyanosis  PERIPHERAL PULSES: palpable dorsalis pedis and posterior tibial pulses bilaterally.    GAIT:  Antalgic           Hip Exam:  Right     RANGE OF MOTION:  EXTENSION/FLEXION:  normal (0-110 degrees)  IR:  20  ER:  40  PAIN WITH HIP MOTION:  no  PAIN WITH LOGROLL:  no      STRENGTH:  ABDUCTOR:    4/5  ADDUCTOR:  5/5  HIP FLEXION:  4/5     GREATER TROCHANTER BURSAL PAIN:  yes    SENSATION TO LIGHT TOUCH:  DEEP PERONEAL/SUPERFICIAL PERONEAL/SURAL/SAPHENOUS/TIBIAL:   intact    EDEMA:  no  ERYTHEMA:  no  WOUNDS/INCISIONS:   yes, well healed surgical incision without evidence of erythema or drainage  ------------------------   Hip Exam:  Left    RANGE OF MOTION:  EXTENSION/FLEXION:  normal (0-110 degrees), decreased (5-90 degrees)  IR (at 90 degrees of flexion):  -5  ER (at 90 degrees of flexion):  20  PAIN WITH HIP " MOTION:  yes, Localized to groin  PAIN WITH LOGROLL:  no     [facility] positive    STRENGTH:  ABDUCTOR:  4/5  ADDUCTOR:  5/5  HIP FLEXION:  5/5    GREATER TROCHANTER BURSAL PAIN:  yes    SENSATION TO LIGHT TOUCH:  DEEP PERONEAL/SUPERFICIAL PERONEAL/SURAL/SAPHENOUS/TIBIAL:   intact    EDEMA:  no  ERYTHEMA:  no  WOUNDS/INCISIONS:   no  _________________________________________________________________  _________________________________________________________________    RADIOGRAPHIC FINDINGS:   Indication: Status post revision right hip arthroplasty, left hip pain    Comparison: Todays xrays were compared to previous xrays from 10/1/2020    AP pelvis: Right: Demonstrate a well positioned revision total hip without evidence of wear, loosening, fracture or osteolysis, femoral head is concentrically reduced within the acetabulum and No significant changes compared to prior radiographs.;Left: advanced, end-stage osteoarthritis with bone on bone articulation, subchondral sclerosis, and subchondral cysts, there are marginal osteophytes visualized at the femoral head-neck junction and acetabular margins and No significant changes compared to prior radiographs.      Assessment/Plan:   Diagnosis Plan   1. Primary osteoarthritis of left hip  Case Request    CBC and Differential    Basic metabolic panel    Protime-INR    APTT    Hemoglobin A1c    Urinalysis With Culture If Indicated -    ECG 12 Lead    Nicotine & Metabolite, Quant    Tranexamic Acid 1,000 mg in sodium chloride 0.9 % 100 mL    Tranexamic Acid 1,000 mg in sodium chloride 0.9 % 100 mL    ceFAZolin (ANCEF) 2 g in sodium chloride 0.9 % 100 mL IVPB    acetaminophen (TYLENOL) tablet 975 mg    meloxicam (MOBIC) tablet 15 mg    pregabalin (LYRICA) capsule 75 mg    mupirocin (BACTROBAN) 2 % nasal ointment 1 application    Case Request   2. Left hip pain  XR Hips Bilateral With or Without Pelvis 3-4 View   3. S/P revision of total hip       Patient is doing well  6-month status post revision right hip arthroplasty.  I see no clinical radiographic evidence of complication.  He does have some trace trochanteric bursitis which is likely due to his altered gait pattern due to ongoing hip pain on the left.  At this time he has failed conservative treatment and is a candidate for total joint arthroplasty on the left side.  He is agreeable to pursuing total hip arthroplasty.    The patient has clinical and radiographic evidence of end-stage left hip joint degeneration. Conservative measures have been tried for 3 months or longer, but have failed to adequately treat or improve the patient's symptoms. Pain is restricting the patient's daily activities as well as quality of life. The recommendation at this time is to proceed with a left total hip arthroplasty with the goal to improve patient function and pain. The risks, benefits, potential complications, and alternatives were discussed with the patient in detail. Risks included but were not limited to bleeding, infection, anesthesia risks, damage to neurovascular structures, osteolysis, aseptic loosening, instability, dislocation, pain, continued pain, iatrogenic fracture, leg length discrepancy, possible need for future surgery including the potential for amputation, blood clots, myocardial infarction, stroke, and death. Taylor-operative blood management and the potential for blood transfusion were discussed with risks and options clearly outlined. Specific details of the surgical procedure, hospitalization, recovery, rehabilitation, and long-term precautions were also presented. Pre-operative teaching was provided. Implant/prosthesis selection was outlined, and the many options available were explained; the final choice will be made at the time of the procedure to match the anatomy and condition of the bone, ligaments, tendons, and muscles. Given this instruction, the patient elected to proceed with the left total hip arthroplasty.  The patient will be seen by pre-admission testing for pre-operative optimization and risk assessment and will be scheduled for surgery once this is completed.    The patient is considered standard risk for DVT based on patient risk factors and will be placed on aspirin postoperatively for DVT prophylaxis.      Jb Patel MD  01/15/21  11:08 EST

## 2021-01-15 NOTE — TELEPHONE ENCOUNTER
I would prefer that he avoid Any steroid injection for at least two weeks prior to and two weeks after the surgery.He is a former diabetic in this will increase his blood glucose which increases risk of infection around surgical time frame.

## 2021-01-19 ENCOUNTER — TELEPHONE (OUTPATIENT)
Dept: ORTHOPEDIC SURGERY | Facility: CLINIC | Age: 61
End: 2021-01-19

## 2021-01-19 NOTE — TELEPHONE ENCOUNTER
----- Message from Jb Patel MD sent at 1/18/2021  9:54 AM EST -----  No.  He is a former diabetic.  I do not want any steroid injections done for at least 2 weeks prior to surgery and 2 weeks after surgery in order to optimize wound healing and decrease risk of surgical complication.  ----- Message -----  From: Denisse Briones  Sent: 1/18/2021   9:46 AM EST  To: Jb Patel MD    PATIENT CALLED WANTING TO KNOW IF OK THE HAVE A STEROID INJECTION IN HIS SPINE FOR A SCIATIC NERVE ISSUE. HE IS SCHEDULED ON 1/22/21.  DOS 1/25/21    THANKS

## 2021-01-19 NOTE — TELEPHONE ENCOUNTER
CALLED PATIENT TO LET HIM KNOW DR MALDONADO SAID NO ON THE STEROID INJECTION PRIOR TO SX. PT WAS IN AGREEMENT.

## 2021-01-22 ENCOUNTER — APPOINTMENT (OUTPATIENT)
Dept: PREADMISSION TESTING | Facility: HOSPITAL | Age: 61
End: 2021-01-22

## 2021-01-22 PROCEDURE — C9803 HOPD COVID-19 SPEC COLLECT: HCPCS

## 2021-01-22 PROCEDURE — U0004 COV-19 TEST NON-CDC HGH THRU: HCPCS

## 2021-01-23 LAB
COTININE SERPL-MCNC: <1 NG/ML
NICOTINE SERPL-MCNC: <1 NG/ML
SARS-COV-2 RNA RESP QL NAA+PROBE: NOT DETECTED

## 2021-01-25 ENCOUNTER — HOSPITAL ENCOUNTER (OUTPATIENT)
Facility: HOSPITAL | Age: 61
LOS: 1 days | Discharge: HOME | End: 2021-01-26
Attending: ORTHOPAEDIC SURGERY | Admitting: ORTHOPAEDIC SURGERY

## 2021-01-25 ENCOUNTER — ANESTHESIA (OUTPATIENT)
Dept: PERIOP | Facility: HOSPITAL | Age: 61
End: 2021-01-25

## 2021-01-25 ENCOUNTER — ANESTHESIA EVENT (OUTPATIENT)
Dept: PERIOP | Facility: HOSPITAL | Age: 61
End: 2021-01-25

## 2021-01-25 ENCOUNTER — APPOINTMENT (OUTPATIENT)
Dept: GENERAL RADIOLOGY | Facility: HOSPITAL | Age: 61
End: 2021-01-25

## 2021-01-25 DIAGNOSIS — Z74.09 IMPAIRED FUNCTIONAL MOBILITY, BALANCE, GAIT, AND ENDURANCE: ICD-10-CM

## 2021-01-25 DIAGNOSIS — Z96.642 STATUS POST TOTAL HIP REPLACEMENT, LEFT: Primary | ICD-10-CM

## 2021-01-25 DIAGNOSIS — M16.12 PRIMARY OSTEOARTHRITIS OF LEFT HIP: ICD-10-CM

## 2021-01-25 PROBLEM — N40.0 BPH (BENIGN PROSTATIC HYPERPLASIA): Status: ACTIVE | Noted: 2021-01-25

## 2021-01-25 PROBLEM — E66.9 OBESITY: Status: ACTIVE | Noted: 2021-01-25

## 2021-01-25 PROBLEM — K21.9 GERD (GASTROESOPHAGEAL REFLUX DISEASE): Status: ACTIVE | Noted: 2021-01-25

## 2021-01-25 LAB
ABO GROUP BLD: NORMAL
BLD GP AB SCN SERPL QL: NEGATIVE
GLUCOSE BLDC GLUCOMTR-MCNC: 153 MG/DL (ref 70–130)
GLUCOSE BLDC GLUCOMTR-MCNC: 98 MG/DL (ref 70–130)
RH BLD: POSITIVE
T&S EXPIRATION DATE: NORMAL

## 2021-01-25 PROCEDURE — 25010000002 MORPHINE PER 10 MG: Performed by: ORTHOPAEDIC SURGERY

## 2021-01-25 PROCEDURE — 25010000002 DEXAMETHASONE PER 1 MG: Performed by: NURSE ANESTHETIST, CERTIFIED REGISTERED

## 2021-01-25 PROCEDURE — 27130 TOTAL HIP ARTHROPLASTY: CPT | Performed by: ORTHOPAEDIC SURGERY

## 2021-01-25 PROCEDURE — 97116 GAIT TRAINING THERAPY: CPT

## 2021-01-25 PROCEDURE — A9270 NON-COVERED ITEM OR SERVICE: HCPCS | Performed by: ORTHOPAEDIC SURGERY

## 2021-01-25 PROCEDURE — 25010000002 FENTANYL CITRATE (PF) 100 MCG/2ML SOLUTION: Performed by: NURSE ANESTHETIST, CERTIFIED REGISTERED

## 2021-01-25 PROCEDURE — 72170 X-RAY EXAM OF PELVIS: CPT

## 2021-01-25 PROCEDURE — 25010000003 CEFAZOLIN IN DEXTROSE 2-4 GM/100ML-% SOLUTION: Performed by: ORTHOPAEDIC SURGERY

## 2021-01-25 PROCEDURE — 25010000002 VANCOMYCIN 10 G RECONSTITUTED SOLUTION: Performed by: ORTHOPAEDIC SURGERY

## 2021-01-25 PROCEDURE — 25010000002 ROPIVACAINE PER 1 MG: Performed by: ORTHOPAEDIC SURGERY

## 2021-01-25 PROCEDURE — 25010000002 ONDANSETRON PER 1 MG: Performed by: NURSE ANESTHETIST, CERTIFIED REGISTERED

## 2021-01-25 PROCEDURE — 63710000001 GABAPENTIN 300 MG CAPSULE: Performed by: ORTHOPAEDIC SURGERY

## 2021-01-25 PROCEDURE — 86900 BLOOD TYPING SEROLOGIC ABO: CPT | Performed by: ORTHOPAEDIC SURGERY

## 2021-01-25 PROCEDURE — 27130 TOTAL HIP ARTHROPLASTY: CPT | Performed by: PHYSICIAN ASSISTANT

## 2021-01-25 PROCEDURE — 63710000001 TAMSULOSIN 0.4 MG CAPSULE: Performed by: ORTHOPAEDIC SURGERY

## 2021-01-25 PROCEDURE — 86901 BLOOD TYPING SEROLOGIC RH(D): CPT | Performed by: ORTHOPAEDIC SURGERY

## 2021-01-25 PROCEDURE — 63710000001 ACETAMINOPHEN 500 MG TABLET: Performed by: ORTHOPAEDIC SURGERY

## 2021-01-25 PROCEDURE — 25010000002 PROPOFOL 10 MG/ML EMULSION: Performed by: NURSE ANESTHETIST, CERTIFIED REGISTERED

## 2021-01-25 PROCEDURE — 63710000001 OXYCODONE 5 MG TABLET: Performed by: ORTHOPAEDIC SURGERY

## 2021-01-25 PROCEDURE — 25010000002 CEFAZOLIN PER 500 MG: Performed by: ORTHOPAEDIC SURGERY

## 2021-01-25 PROCEDURE — 97161 PT EVAL LOW COMPLEX 20 MIN: CPT

## 2021-01-25 PROCEDURE — 25010000002 FENTANYL CITRATE (PF) 100 MCG/2ML SOLUTION: Performed by: ANESTHESIOLOGY

## 2021-01-25 PROCEDURE — 86850 RBC ANTIBODY SCREEN: CPT | Performed by: ORTHOPAEDIC SURGERY

## 2021-01-25 PROCEDURE — C1776 JOINT DEVICE (IMPLANTABLE): HCPCS | Performed by: ORTHOPAEDIC SURGERY

## 2021-01-25 PROCEDURE — 25010000002 HYDROMORPHONE PER 4 MG: Performed by: NURSE ANESTHETIST, CERTIFIED REGISTERED

## 2021-01-25 PROCEDURE — 25010000002 NEOSTIGMINE 10 MG/10ML SOLUTION: Performed by: NURSE ANESTHETIST, CERTIFIED REGISTERED

## 2021-01-25 PROCEDURE — 25010000002 KETOROLAC TROMETHAMINE PER 15 MG: Performed by: ORTHOPAEDIC SURGERY

## 2021-01-25 PROCEDURE — 82962 GLUCOSE BLOOD TEST: CPT

## 2021-01-25 DEVICE — SCRW HEX LP TRIDENT2 6.5X30MM: Type: IMPLANTABLE DEVICE | Site: HIP | Status: FUNCTIONAL

## 2021-01-25 DEVICE — WAX BONE HEMO AESCULAP 2.5GM: Type: IMPLANTABLE DEVICE | Site: HIP | Status: FUNCTIONAL

## 2021-01-25 DEVICE — STEM FEM ACCOLADE2 V40 127D 37X114X50 SZ7: Type: IMPLANTABLE DEVICE | Site: HIP | Status: FUNCTIONAL

## 2021-01-25 DEVICE — SHLL TRIDENT2 TRITANIUM C/HL 52MM: Type: IMPLANTABLE DEVICE | Site: HIP | Status: FUNCTIONAL

## 2021-01-25 DEVICE — DEV CONTRL TISS STRATAFIX SPIRAL PDO BIDIR MO4 36X36CM: Type: IMPLANTABLE DEVICE | Site: HIP | Status: FUNCTIONAL

## 2021-01-25 DEVICE — HD FEM/HIP BIOLOX/DELTA V40 CERAM 36MM: Type: IMPLANTABLE DEVICE | Site: HIP | Status: FUNCTIONAL

## 2021-01-25 DEVICE — SCRW HEX LP TRIDENT2 6.5X40MM: Type: IMPLANTABLE DEVICE | Site: HIP | Status: FUNCTIONAL

## 2021-01-25 DEVICE — TOTL HIP HI DEMAND STRYKER: Type: IMPLANTABLE DEVICE | Site: HIP | Status: FUNCTIONAL

## 2021-01-25 DEVICE — DEV CONTRL TISS STRATAFIX SPIRAL PDO BIDIR 1 36X36CM: Type: IMPLANTABLE DEVICE | Site: HIP | Status: FUNCTIONAL

## 2021-01-25 DEVICE — INSRT TRIDENT X3 0D 36MM SZE: Type: IMPLANTABLE DEVICE | Site: HIP | Status: FUNCTIONAL

## 2021-01-25 RX ORDER — LISINOPRIL 5 MG/1
5 TABLET ORAL DAILY
Status: DISCONTINUED | OUTPATIENT
Start: 2021-01-26 | End: 2021-01-26 | Stop reason: HOSPADM

## 2021-01-25 RX ORDER — TAMSULOSIN HYDROCHLORIDE 0.4 MG/1
0.4 CAPSULE ORAL NIGHTLY
Status: DISCONTINUED | OUTPATIENT
Start: 2021-01-25 | End: 2021-01-26 | Stop reason: HOSPADM

## 2021-01-25 RX ORDER — HYDROMORPHONE HCL 110MG/55ML
0.5 PATIENT CONTROLLED ANALGESIA SYRINGE INTRAVENOUS
Status: DISCONTINUED | OUTPATIENT
Start: 2021-01-25 | End: 2021-01-26 | Stop reason: HOSPADM

## 2021-01-25 RX ORDER — PANTOPRAZOLE SODIUM 40 MG/1
40 TABLET, DELAYED RELEASE ORAL EVERY MORNING
Status: DISCONTINUED | OUTPATIENT
Start: 2021-01-26 | End: 2021-01-26 | Stop reason: HOSPADM

## 2021-01-25 RX ORDER — OXYCODONE HYDROCHLORIDE 5 MG/1
5 TABLET ORAL EVERY 4 HOURS PRN
Status: DISCONTINUED | OUTPATIENT
Start: 2021-01-25 | End: 2021-01-26 | Stop reason: HOSPADM

## 2021-01-25 RX ORDER — PREGABALIN 75 MG/1
75 CAPSULE ORAL ONCE
Status: COMPLETED | OUTPATIENT
Start: 2021-01-25 | End: 2021-01-25

## 2021-01-25 RX ORDER — GABAPENTIN 300 MG/1
300 CAPSULE ORAL 3 TIMES DAILY
Status: DISCONTINUED | OUTPATIENT
Start: 2021-01-25 | End: 2021-01-26 | Stop reason: HOSPADM

## 2021-01-25 RX ORDER — FAMOTIDINE 20 MG/1
20 TABLET, FILM COATED ORAL ONCE
Status: COMPLETED | OUTPATIENT
Start: 2021-01-25 | End: 2021-01-25

## 2021-01-25 RX ORDER — LIDOCAINE HYDROCHLORIDE 10 MG/ML
0.5 INJECTION, SOLUTION EPIDURAL; INFILTRATION; INTRACAUDAL; PERINEURAL ONCE AS NEEDED
Status: COMPLETED | OUTPATIENT
Start: 2021-01-25 | End: 2021-01-25

## 2021-01-25 RX ORDER — FAMOTIDINE 10 MG/ML
20 INJECTION, SOLUTION INTRAVENOUS ONCE
Status: CANCELLED | OUTPATIENT
Start: 2021-01-25 | End: 2021-01-25

## 2021-01-25 RX ORDER — SODIUM CHLORIDE 0.9 % (FLUSH) 0.9 %
10 SYRINGE (ML) INJECTION EVERY 12 HOURS SCHEDULED
Status: CANCELLED | OUTPATIENT
Start: 2021-01-25

## 2021-01-25 RX ORDER — ONDANSETRON 4 MG/1
4 TABLET, FILM COATED ORAL EVERY 6 HOURS PRN
Status: DISCONTINUED | OUTPATIENT
Start: 2021-01-25 | End: 2021-01-26 | Stop reason: HOSPADM

## 2021-01-25 RX ORDER — LIDOCAINE HYDROCHLORIDE 10 MG/ML
INJECTION, SOLUTION EPIDURAL; INFILTRATION; INTRACAUDAL; PERINEURAL AS NEEDED
Status: DISCONTINUED | OUTPATIENT
Start: 2021-01-25 | End: 2021-01-25 | Stop reason: SURG

## 2021-01-25 RX ORDER — ONDANSETRON 2 MG/ML
4 INJECTION INTRAMUSCULAR; INTRAVENOUS EVERY 6 HOURS PRN
Status: DISCONTINUED | OUTPATIENT
Start: 2021-01-25 | End: 2021-01-26 | Stop reason: HOSPADM

## 2021-01-25 RX ORDER — DEXAMETHASONE SODIUM PHOSPHATE 4 MG/ML
INJECTION, SOLUTION INTRA-ARTICULAR; INTRALESIONAL; INTRAMUSCULAR; INTRAVENOUS; SOFT TISSUE AS NEEDED
Status: DISCONTINUED | OUTPATIENT
Start: 2021-01-25 | End: 2021-01-25 | Stop reason: SURG

## 2021-01-25 RX ORDER — FENTANYL CITRATE 50 UG/ML
25 INJECTION, SOLUTION INTRAMUSCULAR; INTRAVENOUS ONCE
Status: COMPLETED | OUTPATIENT
Start: 2021-01-25 | End: 2021-01-25

## 2021-01-25 RX ORDER — ROCURONIUM BROMIDE 10 MG/ML
INJECTION, SOLUTION INTRAVENOUS AS NEEDED
Status: DISCONTINUED | OUTPATIENT
Start: 2021-01-25 | End: 2021-01-25 | Stop reason: SURG

## 2021-01-25 RX ORDER — ASPIRIN 325 MG
325 TABLET, DELAYED RELEASE (ENTERIC COATED) ORAL EVERY 12 HOURS SCHEDULED
Status: DISCONTINUED | OUTPATIENT
Start: 2021-01-26 | End: 2021-01-26 | Stop reason: HOSPADM

## 2021-01-25 RX ORDER — HYDROMORPHONE HYDROCHLORIDE 1 MG/ML
0.5 INJECTION, SOLUTION INTRAMUSCULAR; INTRAVENOUS; SUBCUTANEOUS
Status: COMPLETED | OUTPATIENT
Start: 2021-01-25 | End: 2021-01-25

## 2021-01-25 RX ORDER — GLYCOPYRROLATE 0.2 MG/ML
INJECTION INTRAMUSCULAR; INTRAVENOUS AS NEEDED
Status: DISCONTINUED | OUTPATIENT
Start: 2021-01-25 | End: 2021-01-25 | Stop reason: SURG

## 2021-01-25 RX ORDER — KETOROLAC TROMETHAMINE 30 MG/ML
15 INJECTION, SOLUTION INTRAMUSCULAR; INTRAVENOUS EVERY 6 HOURS PRN
Status: DISCONTINUED | OUTPATIENT
Start: 2021-01-25 | End: 2021-01-26 | Stop reason: HOSPADM

## 2021-01-25 RX ORDER — EPHEDRINE SULFATE 50 MG/ML
5 INJECTION, SOLUTION INTRAVENOUS ONCE AS NEEDED
Status: DISCONTINUED | OUTPATIENT
Start: 2021-01-25 | End: 2021-01-25

## 2021-01-25 RX ORDER — OXYCODONE HYDROCHLORIDE 5 MG/1
10 TABLET ORAL EVERY 4 HOURS PRN
Status: DISCONTINUED | OUTPATIENT
Start: 2021-01-25 | End: 2021-01-26 | Stop reason: HOSPADM

## 2021-01-25 RX ORDER — MELOXICAM 7.5 MG/1
15 TABLET ORAL DAILY
Status: DISCONTINUED | OUTPATIENT
Start: 2021-01-26 | End: 2021-01-26 | Stop reason: HOSPADM

## 2021-01-25 RX ORDER — FENTANYL CITRATE 50 UG/ML
INJECTION, SOLUTION INTRAMUSCULAR; INTRAVENOUS AS NEEDED
Status: DISCONTINUED | OUTPATIENT
Start: 2021-01-25 | End: 2021-01-25 | Stop reason: SURG

## 2021-01-25 RX ORDER — ONDANSETRON 2 MG/ML
4 INJECTION INTRAMUSCULAR; INTRAVENOUS ONCE AS NEEDED
Status: DISCONTINUED | OUTPATIENT
Start: 2021-01-25 | End: 2021-01-25

## 2021-01-25 RX ORDER — SODIUM CHLORIDE 0.9 % (FLUSH) 0.9 %
10 SYRINGE (ML) INJECTION AS NEEDED
Status: CANCELLED | OUTPATIENT
Start: 2021-01-25

## 2021-01-25 RX ORDER — NALOXONE HCL 0.4 MG/ML
0.4 VIAL (ML) INJECTION AS NEEDED
Status: DISCONTINUED | OUTPATIENT
Start: 2021-01-25 | End: 2021-01-25

## 2021-01-25 RX ORDER — MAGNESIUM HYDROXIDE 1200 MG/15ML
LIQUID ORAL AS NEEDED
Status: DISCONTINUED | OUTPATIENT
Start: 2021-01-25 | End: 2021-01-25 | Stop reason: HOSPADM

## 2021-01-25 RX ORDER — ROPINIROLE 2 MG/1
2 TABLET, FILM COATED ORAL NIGHTLY
Status: DISCONTINUED | OUTPATIENT
Start: 2021-01-25 | End: 2021-01-25

## 2021-01-25 RX ORDER — PROPOFOL 10 MG/ML
VIAL (ML) INTRAVENOUS AS NEEDED
Status: DISCONTINUED | OUTPATIENT
Start: 2021-01-25 | End: 2021-01-25 | Stop reason: SURG

## 2021-01-25 RX ORDER — FENTANYL CITRATE 50 UG/ML
50 INJECTION, SOLUTION INTRAMUSCULAR; INTRAVENOUS
Status: COMPLETED | OUTPATIENT
Start: 2021-01-25 | End: 2021-01-25

## 2021-01-25 RX ORDER — SODIUM CHLORIDE, SODIUM LACTATE, POTASSIUM CHLORIDE, CALCIUM CHLORIDE 600; 310; 30; 20 MG/100ML; MG/100ML; MG/100ML; MG/100ML
100 INJECTION, SOLUTION INTRAVENOUS CONTINUOUS
Status: DISCONTINUED | OUTPATIENT
Start: 2021-01-25 | End: 2021-01-26 | Stop reason: HOSPADM

## 2021-01-25 RX ORDER — HYDROXYZINE HYDROCHLORIDE 25 MG/1
50 TABLET, FILM COATED ORAL EVERY 6 HOURS PRN
Status: DISCONTINUED | OUTPATIENT
Start: 2021-01-25 | End: 2021-01-26 | Stop reason: HOSPADM

## 2021-01-25 RX ORDER — MEPERIDINE HYDROCHLORIDE 25 MG/ML
12.5 INJECTION INTRAMUSCULAR; INTRAVENOUS; SUBCUTANEOUS
Status: DISCONTINUED | OUTPATIENT
Start: 2021-01-25 | End: 2021-01-25

## 2021-01-25 RX ORDER — ONDANSETRON 2 MG/ML
INJECTION INTRAMUSCULAR; INTRAVENOUS AS NEEDED
Status: DISCONTINUED | OUTPATIENT
Start: 2021-01-25 | End: 2021-01-25 | Stop reason: SURG

## 2021-01-25 RX ORDER — CEFAZOLIN SODIUM 2 G/100ML
2 INJECTION, SOLUTION INTRAVENOUS EVERY 8 HOURS
Status: COMPLETED | OUTPATIENT
Start: 2021-01-25 | End: 2021-01-26

## 2021-01-25 RX ORDER — EPHEDRINE SULFATE 50 MG/ML
INJECTION, SOLUTION INTRAVENOUS AS NEEDED
Status: DISCONTINUED | OUTPATIENT
Start: 2021-01-25 | End: 2021-01-25 | Stop reason: SURG

## 2021-01-25 RX ORDER — ACETAMINOPHEN 500 MG
1000 TABLET ORAL ONCE
Status: COMPLETED | OUTPATIENT
Start: 2021-01-25 | End: 2021-01-25

## 2021-01-25 RX ORDER — SODIUM CHLORIDE, SODIUM LACTATE, POTASSIUM CHLORIDE, CALCIUM CHLORIDE 600; 310; 30; 20 MG/100ML; MG/100ML; MG/100ML; MG/100ML
9 INJECTION, SOLUTION INTRAVENOUS CONTINUOUS
Status: DISCONTINUED | OUTPATIENT
Start: 2021-01-25 | End: 2021-01-26 | Stop reason: HOSPADM

## 2021-01-25 RX ORDER — NALOXONE HCL 0.4 MG/ML
0.1 VIAL (ML) INJECTION
Status: DISCONTINUED | OUTPATIENT
Start: 2021-01-25 | End: 2021-01-26 | Stop reason: HOSPADM

## 2021-01-25 RX ORDER — NEOSTIGMINE METHYLSULFATE 1 MG/ML
INJECTION, SOLUTION INTRAVENOUS AS NEEDED
Status: DISCONTINUED | OUTPATIENT
Start: 2021-01-25 | End: 2021-01-25 | Stop reason: SURG

## 2021-01-25 RX ORDER — CEFAZOLIN SODIUM 2 G/100ML
2 INJECTION, SOLUTION INTRAVENOUS ONCE
Status: COMPLETED | OUTPATIENT
Start: 2021-01-25 | End: 2021-01-25

## 2021-01-25 RX ORDER — SODIUM CHLORIDE 9 MG/ML
100 INJECTION, SOLUTION INTRAVENOUS CONTINUOUS
Status: DISCONTINUED | OUTPATIENT
Start: 2021-01-25 | End: 2021-01-26 | Stop reason: HOSPADM

## 2021-01-25 RX ORDER — MELOXICAM 15 MG/1
15 TABLET ORAL ONCE
Status: COMPLETED | OUTPATIENT
Start: 2021-01-25 | End: 2021-01-25

## 2021-01-25 RX ORDER — ACETAMINOPHEN 500 MG
1000 TABLET ORAL EVERY 8 HOURS
Status: DISCONTINUED | OUTPATIENT
Start: 2021-01-25 | End: 2021-01-26 | Stop reason: HOSPADM

## 2021-01-25 RX ADMIN — FENTANYL CITRATE 50 MCG: 50 INJECTION, SOLUTION INTRAMUSCULAR; INTRAVENOUS at 15:36

## 2021-01-25 RX ADMIN — MUPIROCIN 1 APPLICATION: 20 OINTMENT TOPICAL at 10:47

## 2021-01-25 RX ADMIN — EPHEDRINE SULFATE 10 MG: 50 INJECTION, SOLUTION INTRAVENOUS at 13:45

## 2021-01-25 RX ADMIN — TAMSULOSIN HYDROCHLORIDE 0.4 MG: 0.4 CAPSULE ORAL at 20:22

## 2021-01-25 RX ADMIN — SODIUM CHLORIDE, POTASSIUM CHLORIDE, SODIUM LACTATE AND CALCIUM CHLORIDE: 600; 310; 30; 20 INJECTION, SOLUTION INTRAVENOUS at 13:49

## 2021-01-25 RX ADMIN — ONDANSETRON 4 MG: 2 INJECTION INTRAMUSCULAR; INTRAVENOUS at 14:44

## 2021-01-25 RX ADMIN — FENTANYL CITRATE 50 MCG: 50 INJECTION, SOLUTION INTRAMUSCULAR; INTRAVENOUS at 15:52

## 2021-01-25 RX ADMIN — Medication 1000 MG: at 12:50

## 2021-01-25 RX ADMIN — OXYCODONE 10 MG: 5 TABLET ORAL at 19:57

## 2021-01-25 RX ADMIN — FENTANYL CITRATE 50 MCG: 50 INJECTION, SOLUTION INTRAMUSCULAR; INTRAVENOUS at 16:02

## 2021-01-25 RX ADMIN — LIDOCAINE HYDROCHLORIDE 0.2 ML: 10 INJECTION, SOLUTION EPIDURAL; INFILTRATION; INTRACAUDAL; PERINEURAL at 10:30

## 2021-01-25 RX ADMIN — FENTANYL CITRATE 50 MCG: 50 INJECTION, SOLUTION INTRAMUSCULAR; INTRAVENOUS at 16:26

## 2021-01-25 RX ADMIN — FENTANYL CITRATE 25 MCG: 50 INJECTION, SOLUTION INTRAMUSCULAR; INTRAVENOUS at 12:13

## 2021-01-25 RX ADMIN — LIDOCAINE HYDROCHLORIDE 50 MG: 10 INJECTION, SOLUTION EPIDURAL; INFILTRATION; INTRACAUDAL; PERINEURAL at 12:43

## 2021-01-25 RX ADMIN — FAMOTIDINE 20 MG: 20 TABLET, FILM COATED ORAL at 10:47

## 2021-01-25 RX ADMIN — VANCOMYCIN HYDROCHLORIDE 1500 MG: 100 INJECTION, POWDER, LYOPHILIZED, FOR SOLUTION INTRAVENOUS at 11:06

## 2021-01-25 RX ADMIN — Medication 1000 MG: at 14:45

## 2021-01-25 RX ADMIN — SODIUM CHLORIDE, POTASSIUM CHLORIDE, SODIUM LACTATE AND CALCIUM CHLORIDE: 600; 310; 30; 20 INJECTION, SOLUTION INTRAVENOUS at 14:52

## 2021-01-25 RX ADMIN — VANCOMYCIN HYDROCHLORIDE 1500 MG: 100 INJECTION, POWDER, LYOPHILIZED, FOR SOLUTION INTRAVENOUS at 22:16

## 2021-01-25 RX ADMIN — CEFAZOLIN 2 G: 10 INJECTION, POWDER, FOR SOLUTION INTRAVENOUS at 20:22

## 2021-01-25 RX ADMIN — PROPOFOL 200 MG: 10 INJECTION, EMULSION INTRAVENOUS at 12:43

## 2021-01-25 RX ADMIN — SODIUM CHLORIDE, POTASSIUM CHLORIDE, SODIUM LACTATE AND CALCIUM CHLORIDE: 600; 310; 30; 20 INJECTION, SOLUTION INTRAVENOUS at 15:27

## 2021-01-25 RX ADMIN — ROCURONIUM BROMIDE 50 MG: 10 INJECTION INTRAVENOUS at 12:43

## 2021-01-25 RX ADMIN — ACETAMINOPHEN 1000 MG: 500 TABLET ORAL at 22:15

## 2021-01-25 RX ADMIN — CEFAZOLIN SODIUM 2 G: 2 INJECTION, SOLUTION INTRAVENOUS at 12:39

## 2021-01-25 RX ADMIN — HYDROMORPHONE HYDROCHLORIDE 0.5 MG: 1 INJECTION, SOLUTION INTRAMUSCULAR; INTRAVENOUS; SUBCUTANEOUS at 16:12

## 2021-01-25 RX ADMIN — NEOSTIGMINE 3 MG: 1 INJECTION INTRAVENOUS at 15:05

## 2021-01-25 RX ADMIN — MELOXICAM 15 MG: 15 TABLET ORAL at 10:47

## 2021-01-25 RX ADMIN — SODIUM CHLORIDE, POTASSIUM CHLORIDE, SODIUM LACTATE AND CALCIUM CHLORIDE 9 ML/HR: 600; 310; 30; 20 INJECTION, SOLUTION INTRAVENOUS at 10:30

## 2021-01-25 RX ADMIN — ACETAMINOPHEN 1000 MG: 500 TABLET ORAL at 10:47

## 2021-01-25 RX ADMIN — DEXAMETHASONE SODIUM PHOSPHATE 8 MG: 4 INJECTION, SOLUTION INTRA-ARTICULAR; INTRALESIONAL; INTRAMUSCULAR; INTRAVENOUS; SOFT TISSUE at 12:43

## 2021-01-25 RX ADMIN — HYDROMORPHONE HYDROCHLORIDE 0.5 MG: 1 INJECTION, SOLUTION INTRAMUSCULAR; INTRAVENOUS; SUBCUTANEOUS at 17:38

## 2021-01-25 RX ADMIN — FENTANYL CITRATE 100 MCG: 50 INJECTION, SOLUTION INTRAMUSCULAR; INTRAVENOUS at 12:43

## 2021-01-25 RX ADMIN — HYDROMORPHONE HYDROCHLORIDE 0.5 MG: 1 INJECTION, SOLUTION INTRAMUSCULAR; INTRAVENOUS; SUBCUTANEOUS at 16:03

## 2021-01-25 RX ADMIN — OXYCODONE 10 MG: 5 TABLET ORAL at 16:18

## 2021-01-25 RX ADMIN — HYDROMORPHONE HYDROCHLORIDE 0.5 MG: 1 INJECTION, SOLUTION INTRAMUSCULAR; INTRAVENOUS; SUBCUTANEOUS at 16:47

## 2021-01-25 RX ADMIN — PREGABALIN 75 MG: 75 CAPSULE ORAL at 10:47

## 2021-01-25 RX ADMIN — SODIUM CHLORIDE, POTASSIUM CHLORIDE, SODIUM LACTATE AND CALCIUM CHLORIDE: 600; 310; 30; 20 INJECTION, SOLUTION INTRAVENOUS at 14:21

## 2021-01-25 RX ADMIN — GABAPENTIN 300 MG: 300 CAPSULE ORAL at 20:22

## 2021-01-25 RX ADMIN — GLYCOPYRROLATE 0.4 MG: 0.4 INJECTION INTRAMUSCULAR; INTRAVENOUS at 15:05

## 2021-01-25 RX ADMIN — FENTANYL CITRATE 100 MCG: 50 INJECTION, SOLUTION INTRAMUSCULAR; INTRAVENOUS at 15:41

## 2021-01-25 RX ADMIN — ROCURONIUM BROMIDE 20 MG: 10 INJECTION INTRAVENOUS at 13:51

## 2021-01-25 NOTE — BRIEF OP NOTE
TOTAL HIP ARTHROPLASTY  Progress Note    Ang Jackson  1/25/2021    Pre-op Diagnosis:   Primary osteoarthritis of left hip [M16.12]       Post-Op Diagnosis Codes:     * Primary osteoarthritis of left hip [M16.12]    Procedure/CPT® Codes:  RI TOTAL HIP ARTHROPLASTY [08153]      Procedure(s):  TOTAL HIP ARTHROPLASTY LEFT    Surgeon(s):  Jb Patel MD    Anesthesia: Spinal    Staff:   Circulator: Williams Silverman RN; Elyssa Treadwell RN  Radiology Technologist: Guy Crow  Scrub Person: Kelsy Ny  Vendor Representative: Andrea Avery  Nursing Assistant: Cheikh Chacon  Assistant: Mamta Dasilva PA-C  Assistant: Mamta Dasilva PA-C      Estimated Blood Loss: 900 mL    Urine Voided: * No values recorded between 1/25/2021 12:39 PM and 1/25/2021  3:21 PM *    Specimens:                None          Drains: * No LDAs found *    Findings: End-stage osteoarthritis left hip    Complications: None apparent    Assistant: Mamta Dasilva PA-C  was responsible for performing the following activities: Retraction, Suction, Irrigation, Suturing, Closing and Placing Dressing and their skilled assistance was necessary for the success of this case.    Jb Patel MD     Date: 1/25/2021  Time: 15:21 EST

## 2021-01-25 NOTE — ANESTHESIA PREPROCEDURE EVALUATION
Anesthesia Evaluation     Patient summary reviewed and Nursing notes reviewed   no history of anesthetic complications:  NPO Solid Status: > 8 hours  NPO Liquid Status: > 8 hours           Airway   Mallampati: II  TM distance: >3 FB  Neck ROM: full  No difficulty expected  Dental      Pulmonary - normal exam   (+) sleep apnea,   Cardiovascular - normal exam    (+) hypertension,       Neuro/Psych  (+) headaches, psychiatric history,     GI/Hepatic/Renal/Endo    (+)  GERD,  diabetes mellitus,     Musculoskeletal     Abdominal    Substance History      OB/GYN          Other   arthritis,                      Anesthesia Plan    ASA 2     general   (Pateitn preference)  intravenous induction     Anesthetic plan, all risks, benefits, and alternatives have been provided, discussed and informed consent has been obtained with: patient.    Plan discussed with CRNA.

## 2021-01-25 NOTE — PLAN OF CARE
Goal Outcome Evaluation:  Plan of Care Reviewed With: patient  Progress: improving :  Recent post-op patient for left hip surgery.  Has ambulated with PT in PACU.  Arrived on floor @ 1815.  Baseline pain level 7 to 8.  Complains of no pain greater than this.  Continue to monitor for increased pain, parathesias, of changes in vitals.

## 2021-01-25 NOTE — THERAPY EVALUATION
Patient Name: Ang Jackson  : 1960    MRN: [id number]                              Today's Date: 2021       Admit Date: 2021    Visit Dx:     ICD-10-CM ICD-9-CM   1. Primary osteoarthritis of left hip  M16.12 715.15     Patient Active Problem List   Diagnosis   • Diabetes mellitus (CMS/HCC)   • Hypertension   • Pre-op evaluation   • BPH with obstruction/lower urinary tract symptoms   • Corporo-venous occlusive erectile dysfunction   • Low testosterone in male   • Scrotal varices   • Post-traumatic osteoarthritis of left knee   • Primary osteoarthritis of left knee   • Right hip pain   • Status post total replacement of right hip   • Acute blood loss anemia, mild, asymptomatic   • Acute postoperative pain   • Primary osteoarthritis of left hip   • BPH (benign prostatic hyperplasia)   • GERD (gastroesophageal reflux disease)   • Obesity     Past Medical History:   Diagnosis Date   • Anesthesia     diffculty adminster spinal block, patient stated with last hip surgery  several attempts per awilda and rin lucsridhar, resulted in general anesthesia    • Anxiety    • Arthritis    • Diabetes mellitus (CMS/HCC)     diet controlled, resolved per pt report with weight loss with gastric sleeve   • GERD (gastroesophageal reflux disease)    • Headache    • Hypertension    • Low back pain    • Sleep apnea     does not wear machine, MILD   • Wears reading eyeglasses      Past Surgical History:   Procedure Laterality Date   • CARPAL TUNNEL RELEASE Bilateral    • COLONOSCOPY      5 years ago   • GASTRIC SLEEVE LAPAROSCOPIC     • HIP SURGERY Right times 2   • KNEE SURGERY Left     arthroscopy    • TOTAL HIP ARTHROPLASTY REVISION Right 2020    Procedure: TOTAL HIP ARTHROPLASTY REVISION RIGHT;  Surgeon: Jb Patel MD;  Location: [facility];  Service: Orthopedics;  Laterality: Right;     General Information     Row Name 21 8580          Physical Therapy Time and Intention    Document Type   evaluation  -VERONICA     Mode of Treatment  individual therapy;physical therapy  -     Row Name 01/25/21 1720          General Information    Patient Profile Reviewed  yes  -VERONICA     Prior Level of Function  min assist:;all household mobility;transfer;bed mobility;ADL's  -VERONICA     Existing Precautions/Restrictions  fall;left;hip, posterior  -VERONICA     Barriers to Rehab  none identified  -VERONICA     Row Name 01/25/21 1720          Living Environment    Lives With  spouse  -     Row Name 01/25/21 1720          Home Main Entrance    Number of Stairs, Main Entrance  three  -VERONICA     Stair Railings, Main Entrance  railings on both sides of stairs  -VERONICA     Row Name 01/25/21 1720          Stairs Within Home, Primary    Stairs, Within Home, Primary  0  -VERONICA     Number of Stairs, Within Home, Primary  none  -VERONICA     Row Name 01/25/21 1720          Cognition    Orientation Status (Cognition)  oriented x 4  -VERONICA     Row Name 01/25/21 1720          Safety Issues, Functional Mobility    Safety Issues Affecting Function (Mobility)  safety precaution awareness;safety precautions follow-through/compliance  -VERONICA     Impairments Affecting Function (Mobility)  endurance/activity tolerance;strength;pain;range of motion (ROM)  -VERONICA       User Key  (r) = Recorded By, (t) = Taken By, (c) = Cosigned By    Initials Name Provider Type    VERONICA Ryan Navarrete PT Physical Therapist        Mobility     Row Name 01/25/21 1720          Bed Mobility    Bed Mobility  scooting/bridging;supine-sit;sit-supine  -VERONICA     Scooting/Bridging [place] (Bed Mobility)  supervision;verbal cues  -VERONICA     Supine-Sit [place] (Bed Mobility)  supervision;verbal cues  -VERONICA     Sit-Supine [place] (Bed Mobility)  supervision;verbal cues  -VERONICA     Assistive Device (Bed Mobility)  bed rails;head of bed elevated  -VERONICA     Comment (Bed Mobility)  Verbal cues for LE sequencing off of EOB and trunk control into sitting  -VERONICA     Row Name 01/25/21 1720          Transfers    Comment  (Transfers)  Verbal cues for safe hand placement during standing/sitting and moving L LE out for comfort prior to sitting  -VERONICA     Row Name 01/25/21 1720          Sit-Stand Transfer    Sit-Stand [place] (Transfers)  verbal cues;contact guard  -VERONICA     Assistive Device (Sit-Stand Transfers)  walker, front-wheeled  -VERONICA     Row Name 01/25/21 1720          Gait/Stairs (Locomotion)    [place] Level (Gait)  verbal cues;contact guard;1 person to manage equipment  -VERONICA     Assistive Device (Gait)  walker, front-wheeled  -VERONICA     Distance in Feet (Gait)  300 feet  -VERONICA     Deviations/Abnormal Patterns (Gait)  bilateral deviations;jose decreased;gait speed decreased;stride length decreased  -VERONICA     Bilateral Gait Deviations  forward flexed posture  -VERONICA     Left Sided Gait Deviations  heel strike decreased;weight shift ability decreased  -VERONICA     [place] Level (Stairs)  verbal cues;contact guard  -VERONICA     Assistive Device (Stairs)  other (see comments) none  -VERONICA     Handrail Location (Stairs)  both sides  -VERONICA     Number of Steps (Stairs)  3  -VERONICA     Ascending Technique (Stairs)  step-to-step  -VERONICA     Descending Technique (Stairs)  step-to-step  -VERONICA     Comment (Gait/Stairs)  Pt ambulated with step through pattern and decreased speed. Verbal cues for maintaining upright posture, body within walker, increase step length, and WB through LEs. Pt ascended/descended 3 steps using bilateral HR and CGA for safety. Verbal cues for LE sequencing. Gait/stair training limited by fatigue. No knee buckling noted with ambulation.  -     Row Name 01/25/21 1720          Mobility    Extremity Weight-bearing Status  left lower extremity  -VERONICA     Left Lower Extremity (Weight-bearing Status)  weight-bearing as tolerated (WBAT)  -       User Key  (r) = Recorded By, (t) = Taken By, (c) = Cosigned By    Initials Name Provider Type    Ryan Lagunas, PT Physical Therapist        Obj/Interventions     Row Name 01/25/21 1720           Range of Motion Comprehensive    General Range of Motion  lower extremity range of motion deficits identified  -     Comment, General Range of Motion  R LE AROM WFL; L LE AROM impaired 25%; able to actively DF/PF  -     Row Name 01/25/21 1720          Strength Comprehensive (MMT)    General Manual Muscle Testing (MMT) Assessment  lower extremity strength deficits identified  -     Comment, General Manual Muscle Testing (MMT) Assessment  R LE functionally 4+/5; L LE functionally 4-/5  -     Row Name 01/25/21 1720          Motor Skills    Therapeutic Exercise  knee;hip;ankle  -[facility] Name 01/25/21 1720          Hip (Therapeutic Exercise)    Hip (Therapeutic Exercise)  isometric exercises  -     Hip Isometrics (Therapeutic Exercise)  gluteal sets;10 repetitions  -[facility] Name 01/25/21 1720          Knee (Therapeutic Exercise)    Knee (Therapeutic Exercise)  isometric exercises  -     Knee Isometrics (Therapeutic Exercise)  quad sets;10 repetitions  -[facility] Name 01/25/21 1720          Ankle (Therapeutic Exercise)    Ankle (Therapeutic Exercise)  AROM (active range of motion)  -     Ankle AROM (Therapeutic Exercise)  bilateral;dorsiflexion;plantarflexion;10 repetitions  -[facility] Name 01/25/21 1720          Sensory Assessment (Somatosensory)    Sensory Assessment (Somatosensory)  LE sensation intact  -       User Key  (r) = Recorded By, (t) = Taken By, (c) = Cosigned By    Initials Name Provider Type    Ryan Lagunas, PT Physical Therapist        Goals/Plan     Row Name 01/25/21 1720          Bed Mobility Goal 1 (PT)    Activity/Assistive Device (Bed Mobility Goal 1, PT)  sit to supine/supine to sit  -     [place] Level/Cues Needed (Bed Mobility Goal 1, PT)  modified independence  -     Time Frame (Bed Mobility Goal 1, PT)  long term goal (LTG);3 days  -     Row Name 01/25/21 1720          Transfer Goal 1 (PT)    Activity/Assistive Device (Transfer Goal 1, PT)   sit-to-stand/stand-to-sit  -VERONICA     [place] Level/Cues Needed (Transfer Goal 1, PT)  modified independence  -VERONICA     Time Frame (Transfer Goal 1, PT)  long term goal (LTG);3 days  -VERONICA     Row Name 01/25/21 1720          Gait Training Goal 1 (PT)    Activity/Assistive Device (Gait Training Goal 1, PT)  gait (walking locomotion);walker, rolling  -VERONICA     [place] Level (Gait Training Goal 1, PT)  modified independence  -VERONICA     Distance (Gait Training Goal 1, PT)  500 feet  -VERONICA     Time Frame (Gait Training Goal 1, PT)  long term goal (LTG);3 days  -VERONICA     Row Name 01/25/21 1720          Stairs Goal 1 (PT)    Activity/Assistive Device (Stairs Goal 1, PT)  stairs, all skills;using handrail, left;using handrail, right  -VERONICA     [place] Level/Cues Needed (Stairs Goal 1, PT)  modified independence  -VERONICA     Number of Stairs (Stairs Goal 1, PT)  3  -VERONICA     Time Frame (Stairs Goal 1, PT)  long term goal (LTG);3 days  -VERONICA       User Key  (r) = Recorded By, (t) = Taken By, (c) = Cosigned By    Initials Name Provider Type    VERONICA Ryan Navarrete, PT Physical Therapist        Clinical Impression     Row Name 01/25/21 1720          Pain    Additional Documentation  Pain Scale: Numbers Pre/Post-Treatment (Group)  -VERONICA     Row Name 01/25/21 1720          Pain Scale: Numbers Pre/Post-Treatment    Pretreatment Pain Rating  7/10  -VERONICA     Posttreatment Pain Rating  8/10  -VERONICA     Pain Location - Side  Left  -VERONICA     Pain Location - Orientation  incisional  -VERONICA     Pain Location  hip  -VERONICA     Pain Intervention(s)  Cold applied;Repositioned;Ambulation/increased activity  -     Row Name 01/25/21 1720          Therapy Assessment/Plan (PT)    Patient/Family Therapy Goals Statement (PT)  To return home  -VERONICA     Rehab Potential (PT)  good, to achieve stated therapy goals  -     Criteria for Skilled Interventions Met (PT)  yes;meets criteria;skilled treatment is necessary  -     Row Name 01/25/21 1720          Positioning and Restraints     Pre-Treatment Position  in bed  -VERONICA     Post Treatment Position  bed  -VERONICA     In Bed  notified nsg;supine;call light within reach;encouraged to call for assist;exit alarm on;with nsg;ABD pillow  -       User Key  (r) = Recorded By, (t) = Taken By, (c) = Cosigned By    Initials Name Provider Type    Ryan Lagunas, PT Physical Therapist        Outcome Measures     Row Name 01/25/21 1720          How much help from another person do you currently need...    Turning from your back to your side while in flat bed without using bedrails?  4  -VERONICA     Moving from lying on back to sitting on the side of a flat bed without bedrails?  4  -VERONICA     Moving to and from a bed to a chair (including a wheelchair)?  3  -VERONICA     Standing up from a chair using your arms (e.g., wheelchair, bedside chair)?  3  -VERONICA     Climbing 3-5 steps with a railing?  3  -VERONICA     To walk in hospital room?  3  -VERONICA     AM-PAC 6 Clicks Score (PT)  20  -     Row Name 01/25/21 1720          PADD    Diagnosis  2  -VERONICA     Gender  2  -VERONICA     Age Group  2  -VERONICA     Gait Distance  1  -VERONICA     Assist Level  1  -VERONICA     Home Support  3  -VERONICA     PADD Score  11  -VERONICA     Patient Preference  home with home health  -VERONICA     Prediction by PADD Score  directly home (with home health or out-patient rehab)  -     Row Name 01/25/21 1720          Functional Assessment    Outcome Measure Options  AM-PAC 6 Clicks Basic Mobility (PT);PADD  -       User Key  (r) = Recorded By, (t) = Taken By, (c) = Cosigned By    Initials Name Provider Type    Ryan Lagunas, PT Physical Therapist        Physical Therapy Education                 Title: PT OT SLP Therapies (In Progress)     Topic: Physical Therapy (Done)     Point: Mobility training (Done)     Learning Progress Summary           Patient Acceptance, E,D,H, VU by VERONICA at 1/25/2021 1720    Comment: Educated on safe sequencing with bed mobility, ambulatory/car transfers, gait, and stair training. Reviewed HEP, posterior hip  precautions, and use of sock aid.                   Point: Home exercise program (Done)     Learning Progress Summary           Patient Acceptance, E,D,H, VU by VERONICA at 1/25/2021 1720    Comment: Educated on safe sequencing with bed mobility, ambulatory/car transfers, gait, and stair training. Reviewed HEP, posterior hip precautions, and use of sock aid.                   Point: Body mechanics (Done)     Learning Progress Summary           Patient Acceptance, E,D,H, VU by VERONICA at 1/25/2021 1720    Comment: Educated on safe sequencing with bed mobility, ambulatory/car transfers, gait, and stair training. Reviewed HEP, posterior hip precautions, and use of sock aid.                   Point: Precautions (Done)     Learning Progress Summary           Patient Acceptance, E,D,H, VU by VERONICA at 1/25/2021 1720    Comment: Educated on safe sequencing with bed mobility, ambulatory/car transfers, gait, and stair training. Reviewed HEP, posterior hip precautions, and use of sock aid.                               User Key     Initials Effective Dates Name Provider Type Discipline    VERONICA 09/10/19 -  Ryan Navarrete, PT Physical Therapist PT              PT Recommendation and Plan  Planned Therapy Interventions (PT): balance training, bed mobility training, gait training, home exercise program, patient/family education, transfer training, ROM (range of motion), stair training, strengthening  Plan of Care Reviewed With: patient  Progress: improving  Outcome Summary: PT eval complete. Pt ambulated 300 feet using RW, CGA, and one person to manage equipment. Pt ascended/descended 3 steps using bilateral HRs and CGA for safety. Gait/stair training limited by fatigue. Bed mobility performed with supervision and STS with CGA. No knee buckling noted with ambulation. Reviewed HEP and posterior hip precautions via handout. Educated on car transfers. PADD score = 11. ADLs assessed, pt not requiring OT at this time prior to potential d/c. Functionally,  pt safe to d/c home with assist from a PT perspective. Recommend HHPT.     Time Calculation:   PT Charges     Row Name 01/25/21 1720             Time Calculation    Start Time  1720  -      PT Received On  01/25/21  -      PT Goal Re-Cert Due Date  02/04/21  -         Time Calculation- PT    Total Timed Code Minutes- PT  10 minute(s)  -         Timed Charges    09815 - PT Therapeutic Exercise Minutes  2  -VERONICA      73215 - Gait Training Minutes   8  -VERONICA        User Key  (r) = Recorded By, (t) = Taken By, (c) = Cosigned By    Initials Name Provider Type    Ryan Lagunas, PT Physical Therapist        Therapy Charges for Today     Code Description Service Date Service Provider Modifiers Qty    08552005413 HC GAIT TRAINING EA 15 MIN 1/25/2021 Ryan Navarrete, PT GP 1    59143191638 HC PT EVAL LOW COMPLEXITY 3 1/25/2021 Ryan Navarrete, PT GP 1    25228958855 HC PT THER SUPP EA 15 MIN 1/25/2021 Ryan Navarrete, PT GP 2          PT G-Codes  Outcome Measure Options: AM-PAC 6 Clicks Basic Mobility (PT), PADD  AM-PAC 6 Clicks Score (PT): 20    Ryan Navarrete PT  1/25/2021

## 2021-01-25 NOTE — ANESTHESIA POSTPROCEDURE EVALUATION
Patient: Ang Jackson    Procedure Summary     Date: 01/25/21 [facility]    Anesthesia Start: 1239 Anesthesia Stop:     Procedure: TOTAL HIP ARTHROPLASTY LEFT (Left Hip) Diagnosis:       Primary osteoarthritis of left hip      (Primary osteoarthritis of left hip [M16.12])    Surgeon: Jb Patel MD Provider: Toro Ying MD    Anesthesia Type: general ASA Status: 2          Anesthesia Type: general    Vitals  Vitals Value Taken Time   BP     Temp     Pulse     Resp     SpO2 99 % 01/25/21 1539   Vitals shown include unvalidated device data.        Post Anesthesia Care and Evaluation    Patient location during evaluation: PACU  Patient participation: complete - patient participated  Level of consciousness: awake and alert  Pain score: 2  Pain management: adequate  Airway patency: patent  Anesthetic complications: No anesthetic complications  PONV Status: none  Cardiovascular status: hemodynamically stable and acceptable  Respiratory status: nonlabored ventilation, acceptable and nasal cannula  Hydration status: acceptable

## 2021-01-25 NOTE — DISCHARGE INSTRUCTIONS
"DISCHARGE INSTRUCTIONS   Dr. Patel     Total Hip Replacement/Hip Hemiarthroplasty     Wound Care   1) Keep wound / incision area clean and dry.   2) Dressing to remain in place until post-operative day 7. Upon dressing removal, assess for wound drainage. If no drainage is present, keep wound / incision area open to air as much as possible. If drainage is present, place sterile dressing to cover wound and assess daily. If drainage continues to occur after post-operative day 14, call the office for an urgent appointment. (You should be seen in the clinic within 1-2 days of calling). DO NOT REMOVE SUTURES (IF PRESENT) UNDER ANY CIRCUMSTANCES PRIOR TO FOLLOW UP APPOINTMENT.  3) No baths or swimming until otherwise instructed. The wound must remain dry for 10 days after surgery. After 10 days, you may begin to shower only if no drainage is present. No submerging the wound under standing water until cleared by your physician (no baths, hot tubs, swimming pools, etc). Sponge baths are the best way to perform personal hygiene while at the same time protecting the wound from moisture.   4) Prior to showering, the wound must remain dry for 72 consecutive hours (no drainage whatsoever) prior to showering. If the wound drains or spots, the clock \"resets\" - make sure the wound has been drainage-free for 72 consecutive hours.   5) Once you are allowed to get the wound wet, please use gentle soap to wash the wound area. DO NOT aggressively scrub the wound with a washcloth or bath sponge. Please visually inspect your wound(s) at least once daily. If the wound(s) are in a difficult to see location, please use a mirror or have someone else assist with visual inspection.   6) No scrubbing the wound. You may \"pad dry\" the wound, but do not rub, as this may open up the wound and pre-dispose to wound infection.   7) Do not apply lotions or creams to incision site, unless instructed otherwise.   8) Observe for redness, swelling, or " drainage. Please call the clinic immediately if you have fevers, chills with warmth/redness surrounding wound site or if you notice pus drainage from the wound site     Activity   No heavy lifting objects greater than 10 pounds.   No driving while on narcotic pain medication.   No submerging wound under standing water (pool, bath tub, etc.) until otherwise instructed.   You may be protected weightbearing as tolerated on your operative (left lower) extremity   Use a walker for ambulation for at least 2 weeks after surgery.  May wean from walker after 2 weeks if approved by your therapist.   Posterior hip precautions for 6 weeks: No bending the hip past 90 degrees. Do not allow the leg to cross the midline of your body (adduction). No twisting motions. Ask your physical therapist to review these precautions with you.     Blood Clot Prophylaxis   (Aspirin vs. Lovenox vs. Eliquis administration is determined by your surgeon and tailored to your specific risk profile. You will be discharged with one of these medications.) You will need to complete a total 4 week course of enteric coated aspirin 325 mg (or 81mg) twice daily or Eliquis 2.5mg twice daily, in order to minimize your risk of blood clots following surgery. You will be supplied with a prescription to obtain this. Alternatively, you will need to compete a total 2 week course of Lovenox after surgery (followed by a 2 week course of aspirin twice daily), in order to minimize the risk of blood clots following surgery. Lovenox requires a single shot in the abdomen, to be taken once daily. You will be supplied with the prescription to obtain this. Prior to your discharge from the hospital, the nursing staff will instruct you on self-administration of the Lovenox, if you will be returning directly home from the hospital.     Discharge Pain Medications   You will be given a prescription for pain medication. You should start taking this the same day after your surgery.  "Wean off as tolerated. Do not wait to take the pain medication until the pain is severe, as it will be difficult to \"catch up\" once this occurs. The pain medication usually reaches its full effect ~1 hour after ingesting. If you have been sent home on Valium, this medication works well for muscle spasms. If you have been sent home on Colace, this medication should be taken until you are off all narcotic (i.e. Norco, Percocet, Oxycodone, etc) pain medications, in order to prevent constipation. Percocet or Norco have Tylenol in their ingredient lists. You must be careful not to exceed 4,000mg (4 grams) of Tylenol, from all sources, within a single 24-hr period. This means that you may not take more than 12 Percocets or Norcos within a 24-hr period. Do NOT take Regular or Extra Strength Tylenol when taking your Percocet or Norco medications.  If you have been sent home with a combination of oxycodone and Tylenol, please take Tylenol as scheduled.  The oxycodone is to be taken as needed for \"breakthrough\" pain.  Some common side effects of the narcotic pain medications (Percocet, Oxycodone, Norco, etc) include nausea and itching. Benadryl is a great over the counter medication that helps calm your stomach, decreases your anxiety levels, and minimizes the itching. You can easily purchase this at your local pharmacy as an over-the-counter medication. Please abide by the instructions as printed on the bottle. If your nausea persists, make sure to take small amounts of crackers or other lighter foods.     Follow-Up   Follow-up with Dr. Patel's office in 3 weeks from the surgery date for a post-operative evaluation. Have the following xrays done upon arrival to the follow-up appointment: AP pelvis. Please call Dr. Patel's office at [phone] for orthopaedic appointments or questions.  "

## 2021-01-25 NOTE — H&P
Patient Name: Ang Jackson  MRN: [id number]  : 1960  DOS: 2021    Attending: Jb Patel MD    Primary Care Provider: Mamta Ortega PA-C      Chief complaint: Left hip pain    Subjective   Patient is a pleasant 60 y.o. male presented for scheduled surgery by Dr. Patel    Patient is known to me from prior hospitalization in 2021 he had right total hip arthroplasty.    Per his note (This patient has a history of progressive left hip pain and arthritis. The hip pain is severe with activity and has progressed significantly. Non-operative treatment has been attempted, but has not improved or controlled symptoms during normal daily activities. Motion has become limited and rotation severely restricted. X-rays reveal moderate-to-severe eburnation of articular cartilage on the superior weight bearing surface of the hip with circumferential acetabular and femoral neck osteophytes consistent with advanced hip osteoarthritis. A total hip arthroplasty was recommended at this time.).    Patient underwent left total hip arthroplasty under general anesthesia, tolerated well, is admitted for further management.    Seen in PACU, he is doing fairly well.  Some postoperative pain.  No complaints of nausea, vomiting, or shortness of breath.      He has achieved a good amount of weight loss following gastric sleeve procedure.  He has no history of DVT or PE.     Allergies   Allergen Reactions   • Codeine Nausea And Vomiting   • Morphine And Related Nausea And Vomiting       Meds:  Medications Prior to Admission   Medication Sig Dispense Refill Last Dose   • ascorbic acid (VITAMIN C) 1000 MG tablet Take 1,000 mg by mouth Daily.   2021 at Unknown time   • Chlorhexidine Gluconate 4 % solution Shower daily with hibiclens solution as directed 5 days prior to surgery. 237 mL 0 2021 at Unknown time   • gabapentin (NEURONTIN) 600 MG tablet Take 600 mg by mouth 3 (Three) Times a Day.   2021 at  Unknown time   • HYDROcodone-acetaminophen (NORCO)  MG per tablet Take 1 tablet by mouth Every 6 (Six) Hours As Needed for Moderate Pain .   1/24/2021 at Unknown time   • hydrOXYzine (VISTARIL) 25 MG capsule Take 50 mg by mouth Every 6 (Six) Hours As Needed for Anxiety.   1/24/2021 at Unknown time   • lisinopril (PRINIVIL,ZESTRIL) 5 MG tablet Take 5 mg by mouth Daily.   1/25/2021 at 0700   • loratadine (CLARITIN) 10 MG tablet Take 10 mg by mouth Daily.   1/24/2021 at Unknown time   • Multiple Vitamins-Minerals (MENS MULTIPLUS PO) Take 1 tablet by mouth Daily.   1/24/2021 at Unknown time   • mupirocin (BACTROBAN) 2 % ointment Apply pea-sized amount to each nostril twice daily for 5 days prior to surgery 22 g 0 1/24/2021 at Unknown time   • omeprazole (priLOSEC) 20 MG capsule Take 20 mg by mouth Daily.   1/24/2021 at Unknown time   • tamsulosin (FLOMAX) 0.4 MG capsule 24 hr capsule Take 1 capsule by mouth Every Night. 30 capsule 3 1/24/2021 at Unknown time   • vitamin B-12 (CYANOCOBALAMIN) 1000 MCG tablet Take 1,000 mcg by mouth Daily.   1/24/2021 at Unknown time   • Cyanocobalamin 1000 MCG/ML kit Inject 1 dose as directed Every 30 (Thirty) Days.   12/25/2020   • meloxicam (MOBIC) 7.5 MG tablet Take 7.5 mg by mouth 2 (two) times a day.   1/18/2021   • rOPINIRole (Requip) 2 MG tablet Take 2 mg by mouth Every Night.   More than a month at Unknown time         History:   Past Medical History:   Diagnosis Date   • Anesthesia     diffculty adminster spinal block, patient stated with last hip surgery 7-2020 several attempts per awilda and no luck, resulted in general anesthesia    • Anxiety    • Arthritis    • Diabetes mellitus (CMS/[facility])     diet controlled, resolved per pt report with weight loss with gastric sleeve   • GERD (gastroesophageal reflux disease)    • Headache    • Hypertension    • Low back pain    • Sleep apnea     does not wear machine, MILD   • Wears reading eyeglasses      Past Surgical History:  "  Procedure Laterality Date   • CARPAL TUNNEL RELEASE Bilateral    • COLONOSCOPY      5 years ago   • GASTRIC SLEEVE LAPAROSCOPIC     • HIP SURGERY Right times 2   • KNEE SURGERY Left     arthroscopy    • TOTAL HIP ARTHROPLASTY REVISION Right 7/20/2020    Procedure: TOTAL HIP ARTHROPLASTY REVISION RIGHT;  Surgeon: Jb Patel MD;  Location: [facility];  Service: Orthopedics;  Laterality: Right;     Family History   Problem Relation Age of Onset   • Heart disease Father    • Hypertension Father    • Osteoarthritis Father    • Cancer Mother    • Diabetes Brother    • Hypertension Brother    • Gout Paternal Grandmother    • Diabetes Paternal Grandmother      Social History     Tobacco Use   • Smoking status: Never Smoker   • Smokeless tobacco: Never Used   Substance Use Topics   • Alcohol use: No   • Drug use: No   , has 3 children, disabled.    Review of Systems  Pertinent items are noted in HPI, all other systems reviewed and negative    Vital Signs  /75 (BP Location: Right arm, Patient Position: Sitting)   Pulse 69   Temp 96.9 °F (36.1 °C) (Temporal)   Resp 18   Ht 172.7 cm (68\")   Wt 104 kg (229 lb)   SpO2 99%   BMI 34.82 kg/m²     Physical Exam:    General Appearance:    Alert, cooperative, in no acute distress   Head:    Normocephalic, without obvious abnormality, atraumatic   Eyes:            Lids and lashes normal, conjunctivae and sclerae normal, no   icterus, no pallor, corneas clear    Ears:    Ears appear intact with no abnormalities noted   Throat:   No oral lesions, no thrush, oral mucosa moist   Neck:   No adenopathy, supple, trachea midline, no thyromegaly         Lungs:     Clear to auscultation,respirations regular, even and   unlabored. No wheezes or rales.    Heart:    Regular rhythm and normal rate, normal S1 and S2, no murmur, no gallop   Abdomen:     Normal bowel sounds, no masses, no organomegaly, soft        non-tender, non-distended, no guarding, no rebound           "       tenderness   Genitalia:    Deferred   Extremities:  Left LE, CDI dressing Aquacel dressing over left hip.  Clubbing, cyanosis, or edema distally.   Pulses:   Pulses palpable and equal bilaterally   Skin:   No bleeding, bruising or rash   Neurologic:   Cranial nerves 2 - 12 grossly intact, intact flexion dorsiflexion bilateral feet.      I reviewed the patient's new clinical results.             Invalid input(s): NEUTOPHILPCT,  EOSPCT        Invalid input(s): LABALBU, PROT  Lab Results   Component Value Date    HGBA1C 5.00 01/15/2021     Results for EMILY TENA (MRN [id number]) as of 1/25/2021 14:36   Ref. Range 1/15/2021 12:20   Glucose Latest Ref Range: 65 - 99 mg/dL 102 (H)   Sodium Latest Ref Range: 136 - 145 mmol/L 140   Potassium Latest Ref Range: 3.5 - 5.2 mmol/L 4.4   CO2 Latest Ref Range: 22.0 - 29.0 mmol/L 26.0   Chloride Latest Ref Range: 98 - 107 mmol/L 106   Anion Gap Latest Ref Range: 5.0 - 15.0 mmol/L 8.0   Creatinine Latest Ref Range: 0.76 - 1.27 mg/dL 0.68 (L)   BUN Latest Ref Range: 6 - 20 mg/dL 23 (H)   BUN/Creatinine Ratio Latest Ref Range: 7.0 - 25.0  33.8 (H)   Calcium Latest Ref Range: 8.6 - 10.5 mg/dL 8.6   eGFR Non  Am Latest Ref Range: >60 mL/min/1.73 119     Results for EMILY TENA (MRN [id number]) as of 1/25/2021 14:36   Ref. Range 1/15/2021 12:20   WBC Latest Ref Range: 3.40 - 10.80 10*3/mm3 4.13   RBC Latest Ref Range: 4.14 - 5.80 10*6/mm3 4.44   Hemoglobin Latest Ref Range: 13.0 - 17.7 g/dL 13.0   Hematocrit Latest Ref Range: 37.5 - 51.0 % 41.2   RDW Latest Ref Range: 12.3 - 15.4 % 12.9   MCV Latest Ref Range: 79.0 - 97.0 fL 92.8   MCH Latest Ref Range: 26.6 - 33.0 pg 29.3   MCHC Latest Ref Range: 31.5 - 35.7 g/dL 31.6   MPV Latest Ref Range: 6.0 - 12.0 fL 9.4   Platelets Latest Ref Range: 140 - 450 10*3/mm3 205       Assessment and Plan:   Status post left total hip replacement.    Primary osteoarthritis of left hip    Diabetes mellitus (CMS/HCC)     Hypertension    BPH (benign prostatic hyperplasia)    GERD (gastroesophageal reflux disease)    Obesity      Plan:    1. PT/OT, protected weight bearing as tolerated left lower extremity  With posterior hip precautions for 6 weeks.  2. Pain control-prns  3. IS-encourage  4. DVT proph- Mechanicals and aspirin  5. Bowel regimen  6. Resume home medications as appropriate  7. Monitor post-op labs  8. DC planning for home    - Hypertension:  Resume home medications as appropriate, formulary substitution when indicated.  Holding parameters.  Prn medications for elevated blood pressure.     -GERD:  Resume PPI.  Formulary substitution when indicated.      -BPH: resume Flomax.  Monitor for postop urinary retention.    Discussed with patient postop management plan, he expressed understanding and agreement.      Dragon disclaimer:  Part of this encounter note is an electronic transcription/translation of spoken language to printed text. The electronic translation of spoken language may permit erroneous, or at times, nonsensical words or phrases to be inadvertently transcribed; Although I have reviewed the note for such errors, some may still exist.    Chirag Padgett MD  01/25/21  14:35 EST

## 2021-01-25 NOTE — OP NOTE
OPERATIVE REPORT     DATE OF PROCEDURE: 1/25/2021    SURGEON: Jb Patel M.D.     ASSISTANT(S): Circulator: Williams Silverman RN; Elyssa Treadwell RN  Radiology Technologist: Guy Crow  Scrub Person: Kelsy Ny  Vendor Representative: Andrea Avery  Nursing Assistant: Cheikh Chacon  Assistant: Mamta Dasilva PA-C  Assistant: Mamta Dasilva PA-C    Note-PA was utilized during the case to facilitate positioning the patient, exposure, retraction, placement of final components and definitive closure.    PREOPERATIVE DIAGNOSIS: Advanced degenerative joint disease of the left hip secondary to osteoarthritis    POSTOPERATIVE DIAGNOSIS: same     PROCEDURE: Left total Hip Arthroplasty     SURGICAL DETAILS:     APPROACH: Posterior    ANESTHESIA: General plus local periarticular block    PREOPERATIVE ANTIBIOTICS: Ancef 2 g IV, vancomycin 1500 mg IV    TRANEXAMIC ACID: IV    ESTIMATED BLOOD LOSS: 9 cc     SPECIMENS: [femoral head]     IMPLANTS:   : Clem  Acetabular component: 52 mm Trident 2   Acetabular screws: 2  Acetabular liner: 0 degree X3   Femoral component: Accolade  degree size 7  Femoral head: 36+0 Biolox ceramic     DRAINS: None    LOCAL INJECTION: 1 cc Toradol 30mg/ml, 4 cc duramorph 2mg/ml, 20 cc 0.5% ropivicaine, 20 cc 0.5% lidocaine with 1:200,000 epinephrine, 15 cc preservative free normal saline     MODIFIER(S): None    COMPLICATIONS: None apparent    INDICATIONS FOR PROCEDURE: This patient has a history of progressive left hip pain and arthritis. The hip pain is severe with activity and has progressed significantly. Non-operative treatment has been attempted, but has not improved or controlled symptoms during normal daily activities. Motion has become limited and rotation severely restricted. X-rays reveal moderate-to-severe eburnation of articular cartilage on the superior weight bearing surface of the hip with circumferential acetabular and femoral neck  osteophytes consistent with advanced hip osteoarthritis. A total hip arthroplasty was recommended at this time. The risks, benefits, alternatives, and potential complications of the arthroplasty surgery were discussed with the patient in detail to include but not limited to infection, bleeding, anesthesia risks, sciatic nerve palsy, instability/dislocation, limb length discrepancy, aseptic loosening, osteolysis, blood clots, continued pain, iatrogenic fracture, myocardial infarction, stroke, and death. Specific details of the procedure, hospitalization, recovery, rehabilitation, and long-term precautions were also provided. Pre-operative teaching was provided. Implant/prosthesis selection was outlined, and the many options available were explained; the final choice will be made at the time of the procedure to match the anatomy and condition of the bone, ligaments, tendons, and muscles. Understanding of all topics was conveyed to me by the patient, and consent was given to proceed with a left total hip arthroplasty. The patient completed preoperative medical optimization and risk assessment, joint arthroplasty education, and MRSA decolonization using a universal decolonization protocol. Perioperative blood management and the potential for blood transfusion were discussed with risks and options clearly outlined.     INTRAOPERATIVE FINDINGS: End-stage osteoarthritis left hip    PROCEDURE: The patient was identified in the preoperative holding area. The operative site was confirmed and marked. CHRISTOPHER hose and a sequential compression device were placed on the nonoperative leg. The risks, benefits, and alternatives to surgery were again confirmed with the patient and the patient wished to proceed. The patient was brought to the operating room and placed on the operating room table in the supine position. A huddle was performed with the patient and all vital surgical team members to confirm the correct operative site,  procedure, anesthesia type, and operative plan with the patient. After anesthesia was performed, the patient was positioned in the lateral decubitus position on the pegboard and secured with the operative side up. An axillary roll was placed in the axilla and all bony prominences and pressure points were checked and padded. A relative leg length assessment was carried out and markers were placed for intraoperative assessment. Intravenous antibiotic prophylaxis was given and confirmed with the anesthesia team.     The operative leg was prepped and draped in the usual sterile fashion. A surgical time out was performed immediately preceding the incision with all personnel in the operating room to again confirm patient identity, the correct operative site and extremity, correct radiographic studies, availability of appropriate surgical equipment and agreement on the planned procedure. A posterolateral approach to the hip was performed through an incision centered over the greater trochanter. The incision was carried through the subcutaneous tissue to the underlying fascia miguel angel and gluteus aurelia fascia, which were incised and split posteriorly over the trochanter in the direction of the fibers. Hemostasis was obtained with electrocautery. The Charnley retractor was placed after carefully palpating the sciatic nerve which was protected throughout the case.     A standard posterior approach to the hip was performed by releasing the piriformis, short external rotators and posterior capsule and reflecting them posteriorly as a rectangular flap. The superior capsule was scarred down; it was released and excised. The labrum was split and the femoral head mobilized. The hip was then flexed, internally rotated and dislocated from the acetabulum without excessive force. Assessment of the femoral head revealed eburnation of the articular cartilage with complete loss of the weight bearing chondral surface. Osteophytes were  present as well. Careful measurements were performed using the center of the femoral head and the lesser trochanter as markers and a femoral neck cut was made according to the preoperative plan.     Attention was then turned to the acetabulum. Retractors were placed circumferentially for wide acetabular exposure. The labrum and osteophytes were debrided from the rim, and the medial wall was identified and the depth of the socket assessed by excising the pulvinar. Bleeders were controlled, especially the area of the obturator artery with the electrocautery. Acetabular reaming was then started with the hemispherical instrument matching the size of the excised femoral head. Sequential reaming of the acetabulum was then performed by increasing size in 2 mm increments.  Reaming was performed line to line. The reamers created an excellent hemispherical bed of bleeding cancellous bone. The cup was impacted into position, targeting 40-45 degrees of abduction and 20-25 degrees of anteversion, with an excellent press-fit. The press-fit was firm, stable, and apically seated.  2 screw(s) were used for additional support of the fixation. Further osteophyte debridement was done around the socket. All impinging soft tissue was removed from the edges of the socket. The polyethylene bearing/liner was then impacted into place and checked for stability.     Attention was then turned to the femur. The leg was positioned so access did not result in soft-tissue injury. The femoral preparation was started with a box osteotome. The medullary cavity of the femur was then entered and opened with hand reamers. Femoral stem broaches were then employed in an incremental fashion up to the final size, targeting 15-20 degrees of anteversion. The final broach was fully seated, had good rotational and axial stability, and was seated at the appropriate height in relation to the greater trochanter and the preoperative plan. Trial reduction was done.  Excellent stability and range of motion was achieved without impingement at any position. The hip was stable in full extension and external rotation as well as in flexion past 90 degrees, 20 degrees adduction and 60-70 degrees of internal rotation. Leg lengths were re-created within millimeters based on the markers and relative measurement. The hip was then dislocated and the trials removed. The wound was copiously irrigated, and the permanent femoral stem was then impacted down in approximately 15-20 degrees of anteversion. The press-fit was firm, and stable to axial and rotational force in all planes. The permanent femoral head was then impacted on the clean trunnion. The socket and wound were irrigated, suctioned, and inspected for debris. The final reduction was performed, and again leg length assessment and stability assessment of the hip were performed to confirm optimal component selection and stability in all planes without impingement when stressed to the extremes.     The wound was irrigated with dilute betadine solution as well as saline, and hemostasis obtained with electrocautery. A pain cocktail was injected into the pericapsular tissues. The posterior capsule, piriformis, and short external rotators were repaired utilizing #2 Ticron through drill holes in the greater trochanter. The sciatic nerve was palpated and found to be intact and the wound was irrigated. Instrument and sponge counts were completed and confirmed correct. The fascia miguel angel and gluteal fascia were closed with interrupted #1 Vicryl suture and oversewn with #2 Stratafix. The deep subcutaneous tissue was closed with running #1 Stratafix suture and the superficial subcutaneous tissue with interrupted 2-0 Vicryl suture. A 3-0 monocryl running stitch was used to close skin followed by skin glue adhesive to seal the wound. A silver impregnated dressing was then placed, followed by CHRISTOPHER hose and a sequential compression device to the  operative limb, followed by an abduction pillow. The patient was then returned to a supine position on the operating room table. The patient was sufficiently recovered from anesthesia, transferred to a hospital bed and taken to the PACU in stable condition.     One gram (1000 mg) of intravenous tranexamic acid was administered prior to incision. A second one gram (1000 mg) intravenous dose was given prior to wound closure.    No apparent complications occurred during the procedure Instrument, sponge and needle counts were correct x 2.     The patient underwent risk stratification preoperatively and aspirin was chosen for DVT prophylaxis. Delay in starting chemical prophylaxis for 23 hours from surgical incision was over concerns for hematoma formation and wound related issues.     POST OPERATIVE PLAN:   Protected weight bearing as tolerated   Posterior hip precautions x 6 weeks   PT/OT for mobilization and medical equipment needs   23 hours perioperative antibiotic prophylaxis   Pain control with PO/IV meds   Keep silver dressing in place for 7 days post op. Change dressing only if saturated.   CHRISTOPHER hose and SCD's to bilateral lower extremities   Social work for discharge planning needs   Follow up in 3 weeks for post operative wound check with XR AP pelvis.

## 2021-01-25 NOTE — PLAN OF CARE
Problem: Adult Inpatient Plan of Care  Goal: Plan of Care Review  Flowsheets (Taken 1/25/2021 1720)  Progress: improving  Plan of Care Reviewed With: patient  Outcome Summary: PT eval complete. Pt ambulated 300 feet using RW, CGA, and one person to manage equipment. Pt ascended/descended 3 steps using bilateral HRs and CGA for safety. Gait/stair training limited by fatigue. Bed mobility performed with supervision and STS with CGA. No knee buckling noted with ambulation. Reviewed HEP and posterior hip precautions via handout. Educated on car transfers. PADD score = 11. ADLs assessed, pt not requiring OT at this time prior to potential d/c. Functionally, pt safe to d/c home with assist from a PT perspective. Recommend HHPT.   Goal Outcome Evaluation:  Plan of Care Reviewed With: patient  Progress: improving  Outcome Summary: PT eval complete. Pt ambulated 300 feet using RW, CGA, and one person to manage equipment. Pt ascended/descended 3 steps using bilateral HRs and CGA for safety. Gait/stair training limited by fatigue. Bed mobility performed with supervision and STS with CGA. No knee buckling noted with ambulation. Reviewed HEP and posterior hip precautions via handout. Educated on car transfers. PADD score = 11. ADLs assessed, pt not requiring OT at this time prior to potential d/c. Functionally, pt safe to d/c home with assist from a PT perspective. Recommend HHPT.

## 2021-01-25 NOTE — ANESTHESIA PROCEDURE NOTES
Airway  Urgency: elective    Date/Time: 1/25/2021 12:44 PM  Airway not difficult    General Information and Staff    Patient location during procedure: OR  CRNA: Toro Kwong CRNA    Indications and Patient Condition  Indications for airway management: airway protection    Preoxygenated: yes  MILS not maintained throughout  Mask difficulty assessment: 1 - vent by mask    Final Airway Details  Final airway type: endotracheal airway      Successful airway: ETT  Cuffed: yes   Successful intubation technique: direct laryngoscopy  Endotracheal tube insertion site: oral  Blade: Leung  Blade size: 2  ETT size (mm): 7.5  Cormack-Lehane Classification: grade I - full view of glottis  Placement verified by: chest auscultation and capnometry   Cuff volume (mL): 5  Measured from: lips  ETT/EBT  to lips (cm): 23  Number of attempts at approach: 1  Assessment: lips, teeth, and gum same as pre-op and atraumatic intubation    Additional Comments  Negative epigastric sounds, Breath sound equal bilaterally with symmetric chest rise and fall

## 2021-01-26 VITALS
DIASTOLIC BLOOD PRESSURE: 63 MMHG | TEMPERATURE: 98.2 F | RESPIRATION RATE: 18 BRPM | WEIGHT: 229 LBS | SYSTOLIC BLOOD PRESSURE: 115 MMHG | BODY MASS INDEX: 34.71 KG/M2 | HEIGHT: 68 IN | OXYGEN SATURATION: 97 % | HEART RATE: 82 BPM

## 2021-01-26 LAB
ANION GAP SERPL CALCULATED.3IONS-SCNC: 8 MMOL/L (ref 5–15)
BUN SERPL-MCNC: 19 MG/DL (ref 6–20)
BUN/CREAT SERPL: 23.8 (ref 7–25)
CALCIUM SPEC-SCNC: 7.6 MG/DL (ref 8.6–10.5)
CHLORIDE SERPL-SCNC: 102 MMOL/L (ref 98–107)
CO2 SERPL-SCNC: 25 MMOL/L (ref 22–29)
CREAT SERPL-MCNC: 0.8 MG/DL (ref 0.76–1.27)
GFR SERPL CREATININE-BSD FRML MDRD: 99 ML/MIN/1.73
GLUCOSE SERPL-MCNC: 182 MG/DL (ref 65–99)
HCT VFR BLD AUTO: 31.1 % (ref 37.5–51)
HGB BLD-MCNC: 9.9 G/DL (ref 13–17.7)
POTASSIUM SERPL-SCNC: 4.2 MMOL/L (ref 3.5–5.2)
SODIUM SERPL-SCNC: 135 MMOL/L (ref 136–145)

## 2021-01-26 PROCEDURE — A9270 NON-COVERED ITEM OR SERVICE: HCPCS | Performed by: ORTHOPAEDIC SURGERY

## 2021-01-26 PROCEDURE — 63710000001 OXYCODONE 5 MG TABLET: Performed by: ORTHOPAEDIC SURGERY

## 2021-01-26 PROCEDURE — 63710000001 LISINOPRIL 5 MG TABLET: Performed by: ORTHOPAEDIC SURGERY

## 2021-01-26 PROCEDURE — 63710000001 ACETAMINOPHEN 500 MG TABLET: Performed by: ORTHOPAEDIC SURGERY

## 2021-01-26 PROCEDURE — 85018 HEMOGLOBIN: CPT | Performed by: ORTHOPAEDIC SURGERY

## 2021-01-26 PROCEDURE — 63710000001 MELOXICAM 7.5 MG TABLET: Performed by: ORTHOPAEDIC SURGERY

## 2021-01-26 PROCEDURE — 85014 HEMATOCRIT: CPT | Performed by: ORTHOPAEDIC SURGERY

## 2021-01-26 PROCEDURE — 97165 OT EVAL LOW COMPLEX 30 MIN: CPT

## 2021-01-26 PROCEDURE — 99024 POSTOP FOLLOW-UP VISIT: CPT | Performed by: ORTHOPAEDIC SURGERY

## 2021-01-26 PROCEDURE — 97110 THERAPEUTIC EXERCISES: CPT

## 2021-01-26 PROCEDURE — 63710000001 ASPIRIN EC 325 MG TABLET DELAYED-RELEASE: Performed by: ORTHOPAEDIC SURGERY

## 2021-01-26 PROCEDURE — 63710000001 GABAPENTIN 300 MG CAPSULE: Performed by: ORTHOPAEDIC SURGERY

## 2021-01-26 PROCEDURE — 97116 GAIT TRAINING THERAPY: CPT

## 2021-01-26 PROCEDURE — 94799 UNLISTED PULMONARY SVC/PX: CPT

## 2021-01-26 PROCEDURE — 25010000002 CEFAZOLIN PER 500 MG: Performed by: ORTHOPAEDIC SURGERY

## 2021-01-26 PROCEDURE — 97535 SELF CARE MNGMENT TRAINING: CPT

## 2021-01-26 PROCEDURE — 63710000001 PANTOPRAZOLE 40 MG TABLET DELAYED-RELEASE: Performed by: ORTHOPAEDIC SURGERY

## 2021-01-26 PROCEDURE — 80048 BASIC METABOLIC PNL TOTAL CA: CPT | Performed by: ORTHOPAEDIC SURGERY

## 2021-01-26 RX ORDER — DOCUSATE SODIUM 100 MG/1
100 CAPSULE, LIQUID FILLED ORAL 2 TIMES DAILY
Qty: 30 CAPSULE | Refills: 0 | Status: SHIPPED | OUTPATIENT
Start: 2021-01-26 | End: 2021-02-10

## 2021-01-26 RX ORDER — HYDROCODONE BITARTRATE AND ACETAMINOPHEN 10; 325 MG/1; MG/1
1 TABLET ORAL EVERY 6 HOURS PRN
Start: 2021-01-31 | End: 2021-02-19

## 2021-01-26 RX ORDER — ASPIRIN 325 MG
325 TABLET, DELAYED RELEASE (ENTERIC COATED) ORAL DAILY
Qty: 30 TABLET | Refills: 0 | Status: SHIPPED | OUTPATIENT
Start: 2021-01-26 | End: 2021-02-25

## 2021-01-26 RX ORDER — ACETAMINOPHEN 500 MG
1000 TABLET ORAL EVERY 6 HOURS
Qty: 40 TABLET | Refills: 0
Start: 2021-01-26 | End: 2021-01-31

## 2021-01-26 RX ORDER — OXYCODONE HYDROCHLORIDE 5 MG/1
10 TABLET ORAL EVERY 4 HOURS PRN
Qty: 40 TABLET | Refills: 0 | Status: SHIPPED | OUTPATIENT
Start: 2021-01-26 | End: 2021-02-01 | Stop reason: SDUPTHER

## 2021-01-26 RX ADMIN — ASPIRIN 325 MG: 325 TABLET, COATED ORAL at 08:08

## 2021-01-26 RX ADMIN — OXYCODONE 10 MG: 5 TABLET ORAL at 08:10

## 2021-01-26 RX ADMIN — OXYCODONE 10 MG: 5 TABLET ORAL at 00:58

## 2021-01-26 RX ADMIN — LISINOPRIL 5 MG: 5 TABLET ORAL at 08:08

## 2021-01-26 RX ADMIN — CEFAZOLIN 2 G: 10 INJECTION, POWDER, FOR SOLUTION INTRAVENOUS at 04:07

## 2021-01-26 RX ADMIN — OXYCODONE 10 MG: 5 TABLET ORAL at 11:42

## 2021-01-26 RX ADMIN — GABAPENTIN 300 MG: 300 CAPSULE ORAL at 08:08

## 2021-01-26 RX ADMIN — OXYCODONE 10 MG: 5 TABLET ORAL at 04:40

## 2021-01-26 RX ADMIN — PANTOPRAZOLE SODIUM 40 MG: 40 TABLET, DELAYED RELEASE ORAL at 06:24

## 2021-01-26 RX ADMIN — MELOXICAM 15 MG: 7.5 TABLET ORAL at 08:08

## 2021-01-26 RX ADMIN — ACETAMINOPHEN 1000 MG: 500 TABLET ORAL at 06:24

## 2021-01-26 NOTE — PROGRESS NOTES
Discharge Planning Assessment  [facility]     Patient Name: Ang Jackson  MRN: [id number]  Today's Date: 1/26/2021    Admit Date: 1/25/2021    Discharge Needs Assessment     Row Name 01/26/21 2692       Living Environment    Lives With  spouse    Name(s) of Who Lives With Patient  Myla  (spouse)    Current Living Arrangements  home/apartment/condo    Primary Care Provided by  self    Provides Primary Care For  no one    Family Caregiver if Needed  spouse    Quality of Family Relationships  supportive    Able to Return to Prior Arrangements  yes       Resource/Environmental Concerns    Resource/Environmental Concerns  home accessibility    Home Accessibility Concerns  stairs to enter home    Transportation Concerns  car, none       Transition Planning    Patient/Family Anticipates Transition to  home with family    Patient/Family Anticipated Services at Transition  home health care    Transportation Anticipated  family or friend will provide       Discharge Needs Assessment    Readmission Within the Last 30 Days  no previous admission in last 30 days    Concerns to be Addressed  basic needs;discharge planning    Equipment Needed After Discharge  walker, rolling    Outpatient/Agency/Support Group Needs  homecare agency    Discharge Facility/Level of Care Needs  home with home health        Discharge Plan     Row Name 01/26/21 2325       Plan    Plan  Home with HH    Patient/Family in Agreement with Plan  yes    Plan Comments  I met with Mr Jackson to discuss d/c plan. he lives with [place] in [place] [facility] [place]. Prior to admit he was independent with all ADLs, uses no asssitive equipment. we discussed Home Health PT, he has requested [facility], since he used them after prior surgery. I spoke with Dione at [phone] who accepted referral for home PT, they will plan to see him at home tomorrow. He obtained a walker this past year ( under his Medicare), but stated he gave it away. Offered to arrange for a new  walker ( pvt pay). He has declined and stated he would purchase a walker and raised toilet seat at a store near his home. No other d/c needs identified    Final Discharge Disposition Code  06 - home with home health care        Continued Care and Services - Discharged on 1/26/2021 Admission date: 1/25/2021 - Discharge disposition: Home or Self Care    Home Medical Care Coordination complete    Service Provider Request Status Selected Services Address Phone Fax Patient Preferred    [facility] - [facility] [place], operated by [facility] [address] [id number] [phone] --              Expected Discharge Date and Time     Expected Discharge Date Expected Discharge Time    Jan 26, 2021         Demographic Summary     Row Name 01/26/21 1425       General Information    Admission Type  observation    Arrived From  home    Referral Source  physician    Reason for Consult  discharge planning    Preferred Language  English    General Information Comments  PCP- Mamta Ortega       Contact Information    Permission Granted to Share Info With          Functional Status     Row Name 01/26/21 1427       Functional Status    Usual Activity Tolerance  good    Current Activity Tolerance  -- see therapy notes       Functional Status, IADL    Medications  independent    Meal Preparation  independent    Housekeeping  independent    Laundry  independent    Shopping  independent       Mental Status    General Appearance WDL  WDL       Mental Status Summary    Recent Changes in Mental Status/Cognitive Functioning  no changes       Employment/    Employment Status  disabled    Employment/ Comments  insurance- Medicare and [employer]        Psychosocial    No documentation.       Abuse/Neglect    No documentation.       Legal    No documentation.       Substance Abuse    No documentation.       Patient Forms    No documentation.           Sonja C Kellerman,  RN

## 2021-01-26 NOTE — PLAN OF CARE
Goal Outcome Evaluation:  Plan of Care Reviewed With: patient  Progress: improving   Pt is alert and oriented x4. VSS on room air. PRN pain meds were given as ordered for pain. Pt ambulates to the bathroom with an assist x1 and is voiding spontaneously. Aquacel is to hip is clean, dry, and intact. Pt has slept most of the shift and is eager to be discharged. Will continue to monitor.

## 2021-01-26 NOTE — THERAPY DISCHARGE NOTE
Acute Care - Occupational Therapy Discharge  [facility]    Patient Name: Ang Jackson  : 1960    MRN: [id number]                              Today's Date: 2021       Admit Date: 2021    Visit Dx:     ICD-10-CM ICD-9-CM   1. Primary osteoarthritis of left hip  M16.12 715.15     Patient Active Problem List   Diagnosis   • Diabetes mellitus (CMS/HCC)   • Hypertension   • Pre-op evaluation   • BPH with obstruction/lower urinary tract symptoms   • Corporo-venous occlusive erectile dysfunction   • Low testosterone in male   • Scrotal varices   • Post-traumatic osteoarthritis of left knee   • Primary osteoarthritis of left knee   • Right hip pain   • Status post total replacement of right hip   • Acute blood loss anemia, mild, asymptomatic   • Acute postoperative pain   • Primary osteoarthritis of left hip   • BPH (benign prostatic hyperplasia)   • GERD (gastroesophageal reflux disease)   • Obesity   • Status post total hip replacement, left     Past Medical History:   Diagnosis Date   • Anesthesia     diffculty adminster spinal block, patient stated with last hip surgery  several attempts per awilda and rin molina, resulted in general anesthesia    • Anxiety    • Arthritis    • Diabetes mellitus (CMS/HCC)     diet controlled, resolved per pt report with weight loss with gastric sleeve   • GERD (gastroesophageal reflux disease)    • Headache    • Hypertension    • Low back pain    • Sleep apnea     does not wear machine, MILD   • Wears reading eyeglasses      Past Surgical History:   Procedure Laterality Date   • CARPAL TUNNEL RELEASE Bilateral    • COLONOSCOPY      5 years ago   • GASTRIC SLEEVE LAPAROSCOPIC     • HIP SURGERY Right times 2   • KNEE SURGERY Left     arthroscopy    • TOTAL HIP ARTHROPLASTY Left 2021    Procedure: TOTAL HIP ARTHROPLASTY LEFT;  Surgeon: Jb Patel MD;  Location: [facility];  Service: Orthopedics;  Laterality: Left;   • TOTAL HIP ARTHROPLASTY  REVISION Right 7/20/2020    Procedure: TOTAL HIP ARTHROPLASTY REVISION RIGHT;  Surgeon: Jb Patel MD;  Location: [facility];  Service: Orthopedics;  Laterality: Right;     General Information     Row Name 01/26/21 1033          OT Time and Intention    Document Type  discharge evaluation/summary  -JY     Mode of Treatment  occupational therapy;individual therapy  -JY     Row Name 01/26/21 1033          General Information    Patient Profile Reviewed  yes  -JY     Prior Level of Function  min assist:;all household mobility;transfer;bed mobility;bathing;independent:;feeding;grooming;dressing;driving;using stairs;home management;cooking;cleaning spouse A for IADLs, limted by L hip pain, recent R hip sx that decreased pain and improved mobility at R side  -JY     Existing Precautions/Restrictions  fall;left;hip, posterior;other (see comments) protected WBAT  -JY     Barriers to Rehab  none identified  -JY     Row Name 01/26/21 1033          Living Environment    Lives With  spouse spouse readily able to assist at d/c  -JY     Row Name 01/26/21 1033          Home Main Entrance    Number of Stairs, Main Entrance  three  -JY     Stair Railings, Main Entrance  railings on both sides of stairs  -JY     Row Name 01/26/21 1033          Stairs Within Home, Primary    Stairs, Within Home, Primary  0  -JY     Number of Stairs, Within Home, Primary  none  -JY     Stair Railings, Within Home, Primary  none  -JY     Row Name 01/26/21 1033          Cognition    Orientation Status (Cognition)  oriented x 4  -JY     Row Name 01/26/21 1033          Safety Issues, Functional Mobility    Safety Issues Affecting Function (Mobility)  safety precaution awareness;safety precautions follow-through/compliance  -JY     Impairments Affecting Function (Mobility)  endurance/activity tolerance;strength;pain;range of motion (ROM)  -JY     Comment, Safety Issues/Impairments (Mobility)  pt alert, follows commands, reviewed with pt posterior  hip precautions initially as pt originally able to verbalize 2/3; emphasized adherence in mobility and no pivoting at L hip in turning, transitioning  -JY       User Key  (r) = Recorded By, (t) = Taken By, (c) = Cosigned By    Initials Name Provider Type    Shonda Devlin OT Occupational Therapist        Mobility/ADL's     Row Name 01/26/21 1036          Bed Mobility    Bed Mobility  scooting/bridging;supine-sit  -JY     Scooting/Bridging [place] (Bed Mobility)  independent  -JY     Sit-Supine [place] (Bed Mobility)  modified independence  -JY     Assistive Device (Bed Mobility)  bed rails;head of bed elevated  -JY     Comment (Bed Mobility)  pt demonstrated supine > sitting and scooting to EOB with MI with HOB elevated and intermittent bed rail use; pt adhered to PHP throughout  -JY     Row Name 01/26/21 1036          Transfers    Transfers  sit-stand transfer;toilet transfer  -JY     Comment (Transfers)  skilled cues for optimal hand placement for controlled ascend, descend and further to move LLE out forward prior to sitting for comfort and PHP adherence; cued to monitor decreased forward flexion in sit < > stand at toilet with recommendation for BSC or riser on toilet at home to increase height for optimal alignment  -JY     Sit-Stand [place] (Transfers)  supervision  -JY     [place] Level (Toilet Transfer)  supervision;verbal cues  -JY     Assistive Device (Toilet Transfer)  commode;raised toilet seat;walker, front-wheeled  -JY     Row Name 01/26/21 1036          Sit-Stand Transfer    Assistive Device (Sit-Stand Transfers)  walker, front-wheeled  -JY     Row Name 01/26/21 1036          Toilet Transfer    Type (Toilet Transfer)  stand pivot/stand step  -JY     Row Name 01/26/21 1036          Functional Mobility    Functional Mobility- Ind. Level  standby assist;verbal cues required  -JY     Functional Mobility- Device  rolling walker  -JY     Functional Mobility-Distance (Feet)  -- in  room distances for ADLs  -JY     Functional Mobility-Maintain WBing  able to maintain weight bearing status  -JY     Functional Mobility- Comment  pt grossly demonstrates only need for SBA in fxl mobility for intermittent cue provision and ensure safety awareness with LLE; utilized FWW throughout; reiterated no pivoting at L hip, LLE but to take small, segmental stepping to turn, transition  -     Row Name 01/26/21 1036          Activities of Daily Living    BADL Assessment/Intervention  upper body dressing;lower body dressing;toileting  -     Row Name 01/26/21 1036          Mobility    Extremity Weight-bearing Status  left lower extremity  -JY     Left Lower Extremity (Weight-bearing Status)  weight-bearing as tolerated (WBAT)  -     Row Name 01/26/21 1036          Upper Body Dressing Assessment/Training    [place] Level (Upper Body Dressing)  doff;pajama/robe;don;pull-over garment;independent  -JY     Position (Upper Body Dressing)  supported sitting  -     Row Name 01/26/21 1036          Lower Body Dressing Assessment/Training    [place] Level (Lower Body Dressing)  doff;don;pants/bottoms;other (see comments);socks;shoes/slippers;supervision;verbal cues undergarments  -JY     Assistive Devices (Lower Body Dressing)  reacher;sock-aid  -JY     Comment (Lower Body Dressing)  Pt with some AE from prior sx however w/o sock aid and reacher and req'd use for LBD with adherence to PHP. OT educated pt on use and pt able to return demo use with increased safety and I. Further educated on optimal seq for threading/unthreading.  -     Row Name 01/26/21 1036          Toileting Assessment/Training    [place] Level (Toileting)  adjust/manage clothing;standby assist;perform perineal hygiene;supervision;verbal cues  -JY     Assistive Devices (Toileting)  commode;raised toilet seat  -JY     Position (Toileting)  unsupported sitting;supported standing  -JY     Comment (Toileting)  reiterated importance of  PHP during toileting hygiene largely monitored forward flexion for dagoberto care or posterior hygiene; pt demonstrated safe approach; discussed with pt various options for elevated toileting system at home and further communicated to CM, emphasized pros/cons for arm rests vs open platform for access for hygiene  -       User Key  (r) = Recorded By, (t) = Taken By, (c) = Cosigned By    Initials Name Provider Type    Shonda Devlin OT Occupational Therapist        Obj/Interventions     Row Name 01/26/21 1046          Sensory Assessment (Somatosensory)    Sensory Assessment (Somatosensory)  bilateral UE;sensation intact  -     Bilateral UE Sensory Assessment  general sensation;light touch awareness;light touch localization  -     Row Name 01/26/21 1046          Sensory Interventions    Comment, Sensory Intervention  pt denies any pain and able to identify all LT stimuli at BUEs  -[facility] Name 01/26/21 1046          Vision Assessment/Intervention    Visual Impairment/Limitations  corrective lenses for reading  -[facility] Name 01/26/21 1046          Range of Motion Comprehensive    General Range of Motion  bilateral upper extremity ROM WNL  -     Comment, General Range of Motion  BUE AROM WNL  -[facility] Name 01/26/21 1046          Strength Comprehensive (MMT)    General Manual Muscle Testing (MMT) Assessment  no strength deficits identified  -     Comment, General Manual Muscle Testing (MMT) Assessment  BUEs grossly 4+/5 to 5/5 per MMT  -[facility] Name 01/26/21 1046          Balance    Balance Assessment  sitting static balance;sitting dynamic balance;standing static balance;standing dynamic balance  -     Static Sitting Balance  WNL;supported;sitting in chair;unsupported;sitting, edge of bed  -J     Dynamic Sitting Balance  WNL;supported;sitting in chair;unsupported;sitting, edge of bed;other (see comments) toilet for simulated toileting tasks  -J     Static Standing Balance  WFL;supported;standing   -JY     Dynamic Standing Balance  WFL;supported;standing;other (see comments) fxl transfer, mobility  -JY     Balance Interventions  sitting;standing;static;dynamic;sit to stand;supported;occupation based/functional task  -JY     Comment, Balance  pt did not present with any LOB in sitting or standing task, utilized FWW throughout standing tasks  -JY       User Key  (r) = Recorded By, (t) = Taken By, (c) = Cosigned By    Initials Name Provider Type    Shonda Devlin OT Occupational Therapist        Goals/Plan     Row Name 01/26/21 1058          Transfer Goal 1 (OT)    Activity/Assistive Device (Transfer Goal 1, OT)  sit-to-stand/stand-to-sit;bed-to-chair/chair-to-bed;toilet;commode;walker, rolling;other (see comments) w/ adherence to posterior hip precautions  -JY     [place] Level/Cues Needed (Transfer Goal 1, OT)  standby assist;supervision required;verbal cues required  -JY     Time Frame (Transfer Goal 1, OT)  long term goal (LTG);by discharge  -JY     Progress/Outcome (Transfer Goal 1, OT)  goal met  -     Row Name 01/26/21 1058          Dressing Goal 1 (OT)    Activity/Device (Dressing Goal 1, OT)  lower body dressing;other (see comments) d/d socks, undergarments, shoes, pants w/ AE PRN in order to adhere to posterior hip precautions  -JY     [place]/Cues Needed (Dressing Goal 1, OT)  supervision required;verbal cues required  -JY     Time Frame (Dressing Goal 1, OT)  long term goal (LTG);by discharge  -JY     Progress/Outcome (Dressing Goal 1, OT)  goal met  -Y     Row Name 01/26/21 1058          Toileting Goal 1 (OT)    Activity/Device (Toileting Goal 1, OT)  adjust/manage clothing;perform perineal hygiene;commode;grab bar/safety frame;raised toilet seat;other (see comments) w/ adherence to posterior hip precautions  -JY     [place] Level/Cues Needed (Toileting Goal 1, OT)  standby assist;verbal cues required  -JY     Time Frame (Toileting Goal 1, OT)  long term goal (LTG);by discharge   -JY     Progress/Outcome (Toileting Goal 1, OT)  goal met  -JY       User Key  (r) = Recorded By, (t) = Taken By, (c) = Cosigned By    Initials Name Provider Type    Shonda Devlin OT Occupational Therapist        Clinical Impression     Row Name 01/26/21 1049          Pain Assessment    Additional Documentation  Pain Scale: Numbers Pre/Post-Treatment (Group)  -JY     Row Name 01/26/21 1049          Pain Scale: Numbers Pre/Post-Treatment    Pretreatment Pain Rating  7/10  -JY     Posttreatment Pain Rating  8/10  -JY     Pain Location - Side  Left  -JY     Pain Location - Orientation  incisional  -JY     Pain Location  hip  -JY     Pre/Posttreatment Pain Comment  pt reported increase in pain with fxl mobility, resolved to lower numeric pain rating with seated rest  -JY     Pain Intervention(s)  Cold applied;Repositioned;Ambulation/increased activity;Cold pack;Rest  -JY     Row Name 01/26/21 1049          Plan of Care Review    Plan of Care Reviewed With  patient  -JY     Progress  improving  -JY     Outcome Summary  OT IE completed post chart review. Pt initially presented with decreased I in ADLs, related t/f however post OT training presented with improved occupational I and safety. Pt initially educated on posterior hip precautions as pt able to verbalize 2/3. Pt completed bed mobility i.e. supine > sitting, scooting to EOB with pv with HOB elevated and bed rails used, STS at EOB, SPT at toilet w/ spv and fxl mobility with SBA and FWW throughout. Pt able to complete toileting cloth mgmt with SBA, hygiene with spv, UBD with I, LBD with spv. OT issued LH reacher and sock aid to assist with LBD in order to adhere to posterior hip prec and maximize I. Pt able to return demo use and utilized in aforementioned levels of A. OT reviewed optimal seq for threading, unthreading LB garments. Pt presents with fxl strength and ROM at BUEs. Pt does not require further skilled IPOT POC. Recommend return home with A when  medically ready.  -JY     Row Name 01/26/21 1049          Therapy Assessment/Plan (OT)    Patient/Family Therapy Goal Statement (OT)  to increase I in ADLs, related t/f, return to PLOF  -JY     Rehab Potential (OT)  good, to achieve stated therapy goals  -JY     Criteria for Skilled Therapeutic Interventions Met (OT)  yes;skilled treatment is necessary  -JY     Therapy Frequency (OT)  evaluation only  -JY     Row Name 01/26/21 1049          Therapy Plan Review/Discharge Plan (OT)    Equipment Needs Upon Discharge (OT)  dressing equipment;commode chair;walker, rolling  -JY     Anticipated Discharge Disposition (OT)  home with assist  -JY     Row Name 01/26/21 1049          Vital Signs    Pre Systolic BP Rehab  111  -JY     Pre Treatment Diastolic BP  63  -JY     Post Systolic BP Rehab  115  -JY     Post Treatment Diastolic BP  63  -JY     Pretreatment Heart Rate (beats/min)  87  -JY     Pre SpO2 (%)  98  -JY     O2 Delivery Pre Treatment  room air  -JY     O2 Delivery Intra Treatment  room air  -JY     Post SpO2 (%)  97  -JY     O2 Delivery Post Treatment  room air  -JY     Pre Patient Position  Supine  -JY     Intra Patient Position  Standing  -JY     Post Patient Position  Sitting  -JY     Row Name 01/26/21 1049          Positioning and Restraints    Pre-Treatment Position  in bed  -JY     Post Treatment Position  chair  -JY     In Chair  notified nsg;reclined;call light within reach;encouraged to call for assist;exit alarm on;legs elevated ice pack donned, pt deferred SCDs thus educated on ankle pumps  -JY       User Key  (r) = Recorded By, (t) = Taken By, (c) = Cosigned By    Initials Name Provider Type    Shonda Devlin, OT Occupational Therapist        Outcome Measures     Row Name 01/26/21 1101          How much help from another is currently needed...    Putting on and taking off regular lower body clothing?  3  -JY     Bathing (including washing, rinsing, and drying)  3  -JY     Toileting (which includes  using toilet bed pan or urinal)  3  -JY     Putting on and taking off regular upper body clothing  4  -JY     Taking care of personal grooming (such as brushing teeth)  4  -JY     Eating meals  4  -JY     AM-PAC 6 Clicks Score (OT)  21  -JY     Row Name 01/26/21 1101          Functional Assessment    Outcome Measure Options  AM-PAC 6 Clicks Daily Activity (OT)  -J       User Key  (r) = Recorded By, (t) = Taken By, (c) = Cosigned By    Initials Name Provider Type    Shonda Devlin OT Occupational Therapist        Occupational Therapy Education                 Title: PT OT SLP Therapies (In Progress)     Topic: Occupational Therapy (In Progress)     Point: ADL training (Done)     Description:   Instruct learner(s) on proper safety adaptation and remediation techniques during self care or transfers.   Instruct in proper use of assistive devices.              Learning Progress Summary           Patient Acceptance, E,D, JUSTIN,TALIA by GARDENIA at 1/26/2021 0819                   Point: Home exercise program (Not Started)     Description:   Instruct learner(s) on appropriate technique for monitoring, assisting and/or progressing therapeutic exercises/activities.              Learner Progress:  Not documented in this visit.          Point: Precautions (Done)     Description:   Instruct learner(s) on prescribed precautions during self-care and functional transfers.              Learning Progress Summary           Patient Acceptance, E,D, JUSTIN,DU by GARDENIA at 1/26/2021 0819                   Point: Body mechanics (Done)     Description:   Instruct learner(s) on proper positioning and spine alignment during self-care, functional mobility activities and/or exercises.              Learning Progress Summary           Patient Acceptance, E,D, JUSTIN,TALIA by GARDENIA at 1/26/2021 0819                               User Key     Initials Effective Dates Name Provider Type Discipline    GARDENIA 01/29/20 -  Shonda New OT Occupational Therapist OT               OT Recommendation and Plan  Retired Outcome Summary/Treatment Plan (OT)  Anticipated Discharge Disposition (OT): home with assist  Therapy Frequency (OT): evaluation only  Plan of Care Review  Plan of Care Reviewed With: patient  Progress: improving  Outcome Summary: OT IE completed post chart review. Pt initially presented with decreased I in ADLs, related t/f however post OT training presented with improved occupational I and safety. Pt initially educated on posterior hip precautions as pt able to verbalize 2/3. Pt completed bed mobility i.e. supine > sitting, scooting to EOB with pv with HOB elevated and bed rails used, STS at EOB, SPT at toilet w/ spv and fxl mobility with SBA and FWW throughout. Pt able to complete toileting cloth mgmt with SBA, hygiene with spv, UBD with I, LBD with spv. OT issued LH reacher and sock aid to assist with LBD in order to adhere to posterior hip prec and maximize I. Pt able to return demo use and utilized in aforementioned levels of A. OT reviewed optimal seq for threading, unthreading LB garments. Pt presents with fxl strength and ROM at BUEs. Pt does not require further skilled IPOT POC. Recommend return home with A when medically ready.  Plan of Care Reviewed With: patient  Outcome Summary: OT IE completed post chart review. Pt initially presented with decreased I in ADLs, related t/f however post OT training presented with improved occupational I and safety. Pt initially educated on posterior hip precautions as pt able to verbalize 2/3. Pt completed bed mobility i.e. supine > sitting, scooting to EOB with pv with HOB elevated and bed rails used, STS at EOB, SPT at toilet w/ spv and fxl mobility with SBA and FWW throughout. Pt able to complete toileting cloth mgmt with SBA, hygiene with spv, UBD with I, LBD with spv. OT issued LH reacher and sock aid to assist with LBD in order to adhere to posterior hip prec and maximize I. Pt able to return demo use and utilized in  aforementioned levels of A. OT reviewed optimal seq for threading, unthreading LB garments. Pt presents with fxl strength and ROM at BUEs. Pt does not require further skilled IPOT POC. Recommend return home with A when medically ready.     Time Calculation:   Time Calculation- OT     Row Name 01/26/21 1102 01/26/21 0916          Time Calculation- OT    OT Start Time  0819  -JY  --     OT Received On  01/26/21  -JY  --     OT Goal Re-Cert Due Date  02/05/21  -JY  --        Timed Charges    84989 - Gait Training Minutes   --  15  -SC     09545 - OT Self Care/Mgmt Minutes  16  -JY  --       User Key  (r) = Recorded By, (t) = Taken By, (c) = Cosigned By    Initials Name Provider Type    SC Viki Dominique, PT Physical Therapist    Shonda Devlin OT Occupational Therapist        Therapy Charges for Today     Code Description Service Date Service Provider Modifiers Qty    92393771738 HC OT SELF CARE/MGMT/TRAIN EA 15 MIN 1/26/2021 Shonda New OT GO 1    [id number]  OT EVAL LOW COMPLEXITY 4 1/26/2021 Shonda New OT GO 1               Shonda New OT  1/26/2021

## 2021-01-26 NOTE — PROGRESS NOTES
"  SUBJECTIVE  Patient resting comfortably.  Pain well controlled.  No events overnight.    PHYSICAL THERAPY PROGRESS  Outcome Summary: PT eval complete. Pt ambulated 300 feet using RW, CGA, and one person to manage equipment. Pt ascended/descended 3 steps using bilateral HRs and CGA for safety. Gait/stair training limited by fatigue. Bed mobility performed with supervision and STS with CGA. No knee buckling noted with ambulation. Reviewed HEP and posterior hip precautions via handout. Educated on car transfers. PADD score = 11. ADLs assessed, pt not requiring OT at this time prior to potential d/c. Functionally, pt safe to d/c home with assist from a PT perspective. Recommend HHPT. (21 1720)     OBJECTIVE  Temp (24hrs), Av.3 °F (36.3 °C), Min:96.9 °F (36.1 °C), Max:98.3 °F (36.8 °C)    Blood pressure 111/66, pulse 99, temperature 98.3 °F (36.8 °C), temperature source Oral, resp. rate 16, height 172.7 cm (68\"), weight 104 kg (229 lb), SpO2 97 %.    Lab Results (last 24 hours)     Procedure Component Value Units Date/Time    Hemoglobin & Hematocrit, Blood [[id number]]  (Abnormal) Collected: 21    Specimen: Blood Updated: 21 0522     Hemoglobin 9.9 g/dL      Hematocrit 31.1 %     Basic Metabolic Panel [[id number]] Collected: 21 0452    Specimen: Blood Updated: 21 0510    POC Glucose Once [[id number]]  (Abnormal) Collected: 21 1600    Specimen: Blood Updated: 21 1612     Glucose 153 mg/dL     POC Glucose Once [[id number]]  (Normal) Collected: 21 1033    Specimen: Blood Updated: 21 1036     Glucose 98 mg/dL             PHYSICAL EXAM  Left lower extremity: Dressing clean, dry and intact.  Leg length symmetric.  Intact EHL, FHL, tibialis anterior, and gastrocsoleus. Sensation intact to light touch to deep peroneal, superficial peroneal, sural, saphenous, tibial nerves. 2+ palpable DP and PT pulses.         Status post total hip replacement, left    Diabetes " mellitus (CMS/HCC)    Hypertension    Primary osteoarthritis of left hip    BPH (benign prostatic hyperplasia)    GERD (gastroesophageal reflux disease)    Obesity      PLAN / DISPOSITION:  1 Day Post-Op left total hip arthroplasty    Projected weight bearing as tolerated left lower extremity, posterior precautions x6 weeks  Pain control  PT/OT for post op mobilization and medical equipment needs   23 hours perioperative antibiotic prophylaxis   SCD's bilateral lower extremities   Aspirin for DVT prophylaxis   Social work for discharge planning.  Anticipate discharge home today.  Dressing to remain in place for 7 days. May remove on POD#7. If no drainage, may shower on POD#10. No submerging wound in water. If drainage is noted, sterile dressing should be placed and wound checked daily. No showering until wound has remained dry for 72 consecutive hours.   Follow up in 3 weeks for re-assessment.      Future Appointments   Date Time Provider Department Center   2/16/2021  2:20 PM Mamta Dasilva PA-C [facility]   3/18/2021  9:30 AM Gareth Gan MD [facility]       Jb Patel MD  01/26/21  06:55 EST

## 2021-01-26 NOTE — DISCHARGE PLACEMENT REQUEST
"Emily Jackson (60 y.o. Male)     Date of Birth Social Security Number Address Home Phone MRN    1960  [address]   [facility] [address] [phone] [id number]    Sabianism Marital Status          Other        Admission Date Admission Type Admitting Provider Attending Provider Department, Room/Bed    1/25/21 Elective Jb Patel MD [place], [place], MD [address], [address]/1    Discharge Date Discharge Disposition Discharge Destination         Home or Self Care              Attending Provider: Chirag Padgett MD    Allergies: Codeine, Morphine And Related    Isolation: None   Infection: None   Code Status: CPR    Ht: 172.7 cm (68\")   Wt: 104 kg (229 lb)    Admission Cmt: None   Principal Problem: Status post total hip replacement, left [Z96.642]                 Active Insurance as of 1/25/2021     Primary Coverage     Payor Plan Insurance Group Employer/Plan Group    MEDICARE MEDICARE A & B      Payor Plan Address Payor Plan Phone Number Payor Plan Fax Number Effective Dates    [address] [phone]  10/1/2011 - None Entered    [facility] [id number]       Subscriber Name Subscriber Birth Date Member ID       EMILY JACKSON 1960 [id number]           Secondary Coverage     Payor Plan Insurance Group Employer/Plan Group    [place]LL[place] WELLCARE [place]ICAID [id number]     Payor Plan Address Payor Plan Phone Number Payor Plan Fax Number Effective Dates    [address] [address] [phone]  8/29/2016 - None Entered    [facility] [address]       Subscriber Name Subscriber Birth Date Member ID       EMILY JACKSON 1960 [id number]                 Emergency Contacts      (Rel.) Home Phone Work Phone Mobile Phone    ManuelMyla (Spouse) [phone] -- [phone]    ROSS DYSON (Daughter) [phone] -- --    MANUELCESAR (Daughter) [phone] -- --            Insurance Information                MEDICARE/MEDICARE A & B Phone: [phone]    " Subscriber: Ang Jackson Subscriber#: [id number]    Group#:  Precert#:         [facility]/WELLCARE MEDICAID Phone: [phone]    Subscriber: Ang Jackson Subscriber#: [id number]    Group#: [id number] Precert#:              History & Physical      Chirag Padgett MD at 21 [phone]          Patient Name: Ang Jackson  MRN: [id number]  : 1960  DOS: 2021    Attending: Jb Patel MD    Primary Care Provider: Mamta Ortega PA-C      Chief complaint: Left hip pain    Subjective   Patient is a pleasant 60 y.o. male presented for scheduled surgery by Dr. Patel    Patient is known to me from prior hospitalization in 2021 he had right total hip arthroplasty.    Per his note (This patient has a history of progressive left hip pain and arthritis. The hip pain is severe with activity and has progressed significantly. Non-operative treatment has been attempted, but has not improved or controlled symptoms during normal daily activities. Motion has become limited and rotation severely restricted. X-rays reveal moderate-to-severe eburnation of articular cartilage on the superior weight bearing surface of the hip with circumferential acetabular and femoral neck osteophytes consistent with advanced hip osteoarthritis. A total hip arthroplasty was recommended at this time.).    Patient underwent left total hip arthroplasty under general anesthesia, tolerated well, is admitted for further management.    Seen in PACU, he is doing fairly well.  Some postoperative pain.  No complaints of nausea, vomiting, or shortness of breath.      He has achieved a good amount of weight loss following gastric sleeve procedure.  He has no history of DVT or PE.     Allergies   Allergen Reactions   • Codeine Nausea And Vomiting   • Morphine And Related Nausea And Vomiting       Meds:  Medications Prior to Admission   Medication Sig Dispense Refill Last Dose   • ascorbic acid (VITAMIN C) 1000 MG  tablet Take 1,000 mg by mouth Daily.   1/24/2021 at Unknown time   • Chlorhexidine Gluconate 4 % solution Shower daily with hibiclens solution as directed 5 days prior to surgery. 237 mL 0 1/24/2021 at Unknown time   • gabapentin (NEURONTIN) 600 MG tablet Take 600 mg by mouth 3 (Three) Times a Day.   1/24/2021 at Unknown time   • HYDROcodone-acetaminophen (NORCO)  MG per tablet Take 1 tablet by mouth Every 6 (Six) Hours As Needed for Moderate Pain .   1/24/2021 at Unknown time   • hydrOXYzine (VISTARIL) 25 MG capsule Take 50 mg by mouth Every 6 (Six) Hours As Needed for Anxiety.   1/24/2021 at Unknown time   • lisinopril (PRINIVIL,ZESTRIL) 5 MG tablet Take 5 mg by mouth Daily.   1/25/2021 at 0700   • loratadine (CLARITIN) 10 MG tablet Take 10 mg by mouth Daily.   1/24/2021 at Unknown time   • Multiple Vitamins-Minerals (MENS MULTIPLUS PO) Take 1 tablet by mouth Daily.   1/24/2021 at Unknown time   • mupirocin (BACTROBAN) 2 % ointment Apply pea-sized amount to each nostril twice daily for 5 days prior to surgery 22 g 0 1/24/2021 at Unknown time   • omeprazole (priLOSEC) 20 MG capsule Take 20 mg by mouth Daily.   1/24/2021 at Unknown time   • tamsulosin (FLOMAX) 0.4 MG capsule 24 hr capsule Take 1 capsule by mouth Every Night. 30 capsule 3 1/24/2021 at Unknown time   • vitamin B-12 (CYANOCOBALAMIN) 1000 MCG tablet Take 1,000 mcg by mouth Daily.   1/24/2021 at Unknown time   • Cyanocobalamin 1000 MCG/ML kit Inject 1 dose as directed Every 30 (Thirty) Days.   12/25/2020   • meloxicam (MOBIC) 7.5 MG tablet Take 7.5 mg by mouth 2 (two) times a day.   1/18/2021   • rOPINIRole (Requip) 2 MG tablet Take 2 mg by mouth Every Night.   More than a month at Unknown time         History:   Past Medical History:   Diagnosis Date   • Anesthesia     diffculty adminster spinal block, patient stated with last hip surgery 7-2020 several attempts per anthesia and no luck, resulted in general anesthesia    • Anxiety    • Arthritis    "  • Diabetes mellitus (CMS/[facility])     diet controlled, resolved per pt report with weight loss with gastric sleeve   • GERD (gastroesophageal reflux disease)    • Headache    • Hypertension    • Low back pain    • Sleep apnea     does not wear machine, MILD   • Wears reading eyeglasses      Past Surgical History:   Procedure Laterality Date   • CARPAL TUNNEL RELEASE Bilateral    • COLONOSCOPY      5 years ago   • GASTRIC SLEEVE LAPAROSCOPIC     • HIP SURGERY Right times 2   • KNEE SURGERY Left     arthroscopy    • TOTAL HIP ARTHROPLASTY REVISION Right 7/20/2020    Procedure: TOTAL HIP ARTHROPLASTY REVISION RIGHT;  Surgeon: Jb Patel MD;  Location: [facility];  Service: Orthopedics;  Laterality: Right;     Family History   Problem Relation Age of Onset   • Heart disease Father    • Hypertension Father    • Osteoarthritis Father    • Cancer Mother    • Diabetes Brother    • Hypertension Brother    • Gout Paternal Grandmother    • Diabetes Paternal Grandmother      Social History     Tobacco Use   • Smoking status: Never Smoker   • Smokeless tobacco: Never Used   Substance Use Topics   • Alcohol use: No   • Drug use: No   , has 3 children, disabled.    Review of Systems  Pertinent items are noted in HPI, all other systems reviewed and negative    Vital Signs  /75 (BP Location: Right arm, Patient Position: Sitting)   Pulse 69   Temp 96.9 °F (36.1 °C) (Temporal)   Resp 18   Ht 172.7 cm (68\")   Wt 104 kg (229 lb)   SpO2 99%   BMI 34.82 kg/m²     Physical Exam:    General Appearance:    Alert, cooperative, in no acute distress   Head:    Normocephalic, without obvious abnormality, atraumatic   Eyes:            Lids and lashes normal, conjunctivae and sclerae normal, no   icterus, no pallor, corneas clear    Ears:    Ears appear intact with no abnormalities noted   Throat:   No oral lesions, no thrush, oral mucosa moist   Neck:   No adenopathy, supple, trachea midline, no thyromegaly       "   Lungs:     Clear to auscultation,respirations regular, even and   unlabored. No wheezes or rales.    Heart:    Regular rhythm and normal rate, normal S1 and S2, no murmur, no gallop   Abdomen:     Normal bowel sounds, no masses, no organomegaly, soft        non-tender, non-distended, no guarding, no rebound                 tenderness   Genitalia:    Deferred   Extremities:  Left LE, CDI dressing Aquacel dressing over left hip.  Clubbing, cyanosis, or edema distally.   Pulses:   Pulses palpable and equal bilaterally   Skin:   No bleeding, bruising or rash   Neurologic:   Cranial nerves 2 - 12 grossly intact, intact flexion dorsiflexion bilateral feet.      I reviewed the patient's new clinical results.             Invalid input(s): NEUTOPHILPCT,  EOSPCT        Invalid input(s): LABALBU, PROT  Lab Results   Component Value Date    HGBA1C 5.00 01/15/2021     Results for EMILY TENA (MRN [id number]) as of 1/25/2021 14:36   Ref. Range 1/15/2021 12:20   Glucose Latest Ref Range: 65 - 99 mg/dL 102 (H)   Sodium Latest Ref Range: 136 - 145 mmol/L 140   Potassium Latest Ref Range: 3.5 - 5.2 mmol/L 4.4   CO2 Latest Ref Range: 22.0 - 29.0 mmol/L 26.0   Chloride Latest Ref Range: 98 - 107 mmol/L 106   Anion Gap Latest Ref Range: 5.0 - 15.0 mmol/L 8.0   Creatinine Latest Ref Range: 0.76 - 1.27 mg/dL 0.68 (L)   BUN Latest Ref Range: 6 - 20 mg/dL 23 (H)   BUN/Creatinine Ratio Latest Ref Range: 7.0 - 25.0  33.8 (H)   Calcium Latest Ref Range: 8.6 - 10.5 mg/dL 8.6   eGFR Non  Am Latest Ref Range: >60 mL/min/1.73 119     Results for EMILY TENA (MRN [id number]) as of 1/25/2021 14:36   Ref. Range 1/15/2021 12:20   WBC Latest Ref Range: 3.40 - 10.80 10*3/mm3 4.13   RBC Latest Ref Range: 4.14 - 5.80 10*6/mm3 4.44   Hemoglobin Latest Ref Range: 13.0 - 17.7 g/dL 13.0   Hematocrit Latest Ref Range: 37.5 - 51.0 % 41.2   RDW Latest Ref Range: 12.3 - 15.4 % 12.9   MCV Latest Ref Range: 79.0 - 97.0 fL 92.8   MCH Latest  Ref Range: 26.6 - 33.0 pg 29.3   MCHC Latest Ref Range: 31.5 - 35.7 g/dL 31.6   MPV Latest Ref Range: 6.0 - 12.0 fL 9.4   Platelets Latest Ref Range: 140 - 450 10*3/mm3 205       Assessment and Plan:   Status post left total hip replacement.    Primary osteoarthritis of left hip    Diabetes mellitus (CMS/HCC)    Hypertension    BPH (benign prostatic hyperplasia)    GERD (gastroesophageal reflux disease)    Obesity      Plan:    1. PT/OT, protected weight bearing as tolerated left lower extremity  With posterior hip precautions for 6 weeks.  2. Pain control-prns  3. IS-encourage  4. DVT proph- Mechanicals and aspirin  5. Bowel regimen  6. Resume home medications as appropriate  7. Monitor post-op labs  8. DC planning for home    - Hypertension:  Resume home medications as appropriate, formulary substitution when indicated.  Holding parameters.  Prn medications for elevated blood pressure.     -GERD:  Resume PPI.  Formulary substitution when indicated.      -BPH: resume Flomax.  Monitor for postop urinary retention.    Discussed with patient postop management plan, he expressed understanding and agreement.      Dragon disclaimer:  Part of this encounter note is an electronic transcription/translation of spoken language to printed text. The electronic translation of spoken language may permit erroneous, or at times, nonsensical words or phrases to be inadvertently transcribed; Although I have reviewed the note for such errors, some may still exist.    Chirag Padgett MD  01/25/21  14:35 EST            Electronically signed by Chirag Padgett MD at 01/25/21 2055     Ludivina Corey APRN at 01/25/21 1054     Attestation signed by Jb Patel MD at 01/25/21 1212    Agree.  Proceed with left total hip arthroplasty.                  Psychiatric Pre-op    Full history and physical note from office is attached.    /75 (BP Location: Right arm, Patient Position: Sitting)   Pulse 69   Temp  "96.9 °F (36.1 °C) (Temporal)   Resp 18   Ht 172.7 cm (68\")   Wt 104 kg (229 lb)   SpO2 99%   BMI 34.82 kg/m²     Immunizations:  Influenza:  2020  Pneumococcal:  UTD  Tetanus:  UTD    LAB Results:  Lab Results   Component Value Date    WBC 4.13 01/15/2021    HGB 13.0 01/15/2021    HCT 41.2 01/15/2021    MCV 92.8 01/15/2021     01/15/2021    NEUTROABS 1.87 01/15/2021    GLUCOSE 102 (H) 01/15/2021    BUN 23 (H) 01/15/2021    CREATININE 0.68 (L) 01/15/2021    EGFRIFNONA 119 01/15/2021     01/15/2021    K 4.4 01/15/2021     01/15/2021    CO2 26.0 01/15/2021    CALCIUM 8.6 01/15/2021    ALBUMIN 4.06 11/23/2020    AST 17 11/23/2020    ALT 13 11/23/2020    BILITOT 0.3 11/23/2020    PTT 37.1 (H) 01/15/2021    INR 1.03 01/15/2021     1/15/21:  Study Result    Indication: Status post revision right hip arthroplasty, left hip pain     Comparison: Todays xrays were compared to previous xrays from 10/1/2020    AP pelvis: Right: Demonstrate a well positioned revision total hip without evidence of wear, loosening, fracture or osteolysis, femoral head is concentrically reduced within the acetabulum and No significant changes compared to prior radiographs.;Left: advanced, end-stage osteoarthritis with bone on bone articulation, subchondral sclerosis, and subchondral cysts, there are marginal osteophytes visualized at the femoral head-neck junction and acetabular margins and No significant changes compared to prior radiographs.       Cancer Staging (if applicable)  Cancer Patient: __ yes __no __unknown__N/A; If yes, clinical stage T:__ N:__M:__, stage group or __N/A      Impression: Primary osteoarthritis of left hip      Plan: TOTAL HIP ARTHROPLASTY LEFT      Ludivina Corey, APRN   1/25/2021   10:55 EST    Electronically signed by Jb Patel MD at 01/25/21 1212   Source Note          Orthopaedic Clinic Note: Hip Established Patient    Chief Complaint   Patient presents with   • Left Hip - Pain   • " Follow-up     6 month S/P revision of total right hip 07/20/20        HPI    It has been 3  month(s) since Mr. Jackson's last visit. He returns to clinic today for follow-up revision right hip arthroplasty as well as left hip pain.  He is 6 months out from revision right hip arthroplasty.  He is doing well in regards to that hip.  He has some minor discomfort localized lateral trochanter.  Otherwise he is doing well.  Denies any pain in the groin.  His main complaint today is left hip pain which she rates a 9/10 on the pain scale.  He is ambulating with the assistance of a cane.  He has failed conservative treatment with anti-inflammatories.  He is having severe limitations in daily activities including walking, standing, climbing stairs.  He is ready to pursue aggressive intervention due to his worsening hip pain.    Past Medical History:   Diagnosis Date   • Anxiety    • Arthritis    • Diabetes mellitus (CMS/[facility])     diet controlled    • GERD (gastroesophageal reflux disease)    • Headache    • Hypertension    • Low back pain    • Sleep apnea     does not wear machine   • Wears reading eyeglasses       Past Surgical History:   Procedure Laterality Date   • CARPAL TUNNEL RELEASE Bilateral    • COLONOSCOPY      5 years ago   • GASTRIC SLEEVE LAPAROSCOPIC     • HIP SURGERY Right times 2   • KNEE SURGERY Left     arthroscopy    • TOTAL HIP ARTHROPLASTY REVISION Right 7/20/2020    Procedure: TOTAL HIP ARTHROPLASTY REVISION RIGHT;  Surgeon: Jb Patel MD;  Location: [facility];  Service: Orthopedics;  Laterality: Right;      Family History   Problem Relation Age of Onset   • Heart disease Father    • Hypertension Father    • Osteoarthritis Father    • Cancer Mother    • Diabetes Brother    • Hypertension Brother    • Gout Paternal Grandmother    • Diabetes Paternal Grandmother      Social History     Socioeconomic History   • Marital status:      Spouse name: Not on file   • Number of children: Not on file    • Years of education: Not on file   • Highest education level: Not on file   Tobacco Use   • Smoking status: Never Smoker   • Smokeless tobacco: Never Used   Substance and Sexual Activity   • Alcohol use: No   • Drug use: No   • Sexual activity: Defer      Current Outpatient Medications on File Prior to Visit   Medication Sig Dispense Refill   • amLODIPine (NORVASC) 10 MG tablet Take 1 tablet by mouth Daily.     • ascorbic acid (VITAMIN C) 1000 MG tablet Take 1,000 mg by mouth Daily.     • aspirin 325 MG EC tablet Take 1 tablet by mouth Every 12 (Twelve) Hours. For 1 month 60 tablet 0   • ferrous sulfate 325 (65 FE) MG tablet Take 325 mg by mouth Daily With Breakfast.     • gabapentin (NEURONTIN) 600 MG tablet Take 600 mg by mouth 3 (Three) Times a Day.     • HYDROcodone-acetaminophen (NORCO)  MG per tablet      • hydrOXYzine (VISTARIL) 25 MG capsule Take 50 mg by mouth 4 (Four) Times a Day.     • lisinopril (PRINIVIL,ZESTRIL) 5 MG tablet Take 5 mg by mouth Daily.     • loratadine (CLARITIN) 10 MG tablet      • meloxicam (MOBIC) 7.5 MG tablet Take 7.5 mg by mouth 2 (two) times a day.     • methylPREDNISolone (MEDROL) 4 MG dose pack Take as directed on package instructions. 21 tablet 0   • Multiple Vitamins-Minerals (MENS MULTIPLUS PO) Take  by mouth.     • omeprazole (priLOSEC) 20 MG capsule Take 20 mg by mouth Daily.     • orphenadrine (NORFLEX) 100 MG 12 hr tablet Take 1 tablet by mouth 2 (Two) Times a Day As Needed for Muscle Spasms or Mild Pain . 14 tablet 0   • rOPINIRole (REQUIP) 2 MG tablet Take 2 mg by mouth Every Night.     • tamsulosin (FLOMAX) 0.4 MG capsule 24 hr capsule Take 1 capsule by mouth Every Night. 30 capsule 3   • vitamin B-12 (CYANOCOBALAMIN) 1000 MCG tablet Take 1,000 mcg by mouth Daily.     • [DISCONTINUED] HYDROcodone-acetaminophen (NORCO) 7.5-325 MG per tablet Take 1 tablet by mouth Every 6 (Six) Hours As Needed for Moderate Pain . 12 tablet 0   • [DISCONTINUED] LORATADINE PO Take  "10 mg by mouth Daily.       No current facility-administered medications on file prior to visit.       Allergies   Allergen Reactions   • Codeine Nausea And Vomiting   • Morphine And Related Nausea And Vomiting        Review of Systems   Constitutional: Negative.    HENT: Negative.    Eyes: Negative.    Respiratory: Negative.    Cardiovascular: Negative.    Gastrointestinal: Negative.    Endocrine: Negative.    Genitourinary: Negative.    Musculoskeletal: Positive for arthralgias.   Skin: Negative.    Allergic/Immunologic: Negative.    Neurological: Negative.    Hematological: Negative.    Psychiatric/Behavioral: Negative.         The patient's Review of Systems was personally reviewed and confirmed as accurate.    Physical Exam  Pulse 94, height 172.7 cm (68\"), weight 100 kg (221 lb), SpO2 99 %.    Body mass index is 33.6 kg/m².    GENERAL APPEARANCE: awake, alert, oriented, in no acute distress and well developed, well nourished  LUNGS:  breathing nonlabored  EXTREMITIES: no clubbing, cyanosis  PERIPHERAL PULSES: palpable dorsalis pedis and posterior tibial pulses bilaterally.    GAIT:  Antalgic           Hip Exam:  Right     RANGE OF MOTION:  EXTENSION/FLEXION:  normal (0-110 degrees)  IR:  20  ER:  40  PAIN WITH HIP MOTION:  no  PAIN WITH LOGROLL:  no      STRENGTH:  ABDUCTOR:    4/5  ADDUCTOR:  5/5  HIP FLEXION:  4/5     GREATER TROCHANTER BURSAL PAIN:  yes    SENSATION TO LIGHT TOUCH:  DEEP PERONEAL/SUPERFICIAL PERONEAL/SURAL/SAPHENOUS/TIBIAL:   intact    EDEMA:  no  ERYTHEMA:  no  WOUNDS/INCISIONS:   yes, well healed surgical incision without evidence of erythema or drainage  ------------------------   Hip Exam:  Left    RANGE OF MOTION:  EXTENSION/FLEXION:  normal (0-110 degrees), decreased (5-90 degrees)  IR (at 90 degrees of flexion):  -5  ER (at 90 degrees of flexion):  20  PAIN WITH HIP MOTION:  yes, Localized to groin  PAIN WITH LOGROLL:  no     STINCHFIELD TEST: positive    STRENGTH:  ABDUCTOR:  " 4/5  ADDUCTOR:  5/5  HIP FLEXION:  5/5    GREATER TROCHANTER BURSAL PAIN:  yes    SENSATION TO LIGHT TOUCH:  DEEP PERONEAL/SUPERFICIAL PERONEAL/SURAL/SAPHENOUS/TIBIAL:   intact    EDEMA:  no  ERYTHEMA:  no  WOUNDS/INCISIONS:   no  _________________________________________________________________  _________________________________________________________________    RADIOGRAPHIC FINDINGS:   Indication: Status post revision right hip arthroplasty, left hip pain    Comparison: Todays xrays were compared to previous xrays from 10/1/2020    AP pelvis: Right: Demonstrate a well positioned revision total hip without evidence of wear, loosening, fracture or osteolysis, femoral head is concentrically reduced within the acetabulum and No significant changes compared to prior radiographs.;Left: advanced, end-stage osteoarthritis with bone on bone articulation, subchondral sclerosis, and subchondral cysts, there are marginal osteophytes visualized at the femoral head-neck junction and acetabular margins and No significant changes compared to prior radiographs.      Assessment/Plan:   Diagnosis Plan   1. Primary osteoarthritis of left hip  Case Request    CBC and Differential    Basic metabolic panel    Protime-INR    APTT    Hemoglobin A1c    Urinalysis With Culture If Indicated -    ECG 12 Lead    Nicotine & Metabolite, Quant    Tranexamic Acid 1,000 mg in sodium chloride 0.9 % 100 mL    Tranexamic Acid 1,000 mg in sodium chloride 0.9 % 100 mL    ceFAZolin (ANCEF) 2 g in sodium chloride 0.9 % 100 mL IVPB    acetaminophen (TYLENOL) tablet 975 mg    meloxicam (MOBIC) tablet 15 mg    pregabalin (LYRICA) capsule 75 mg    mupirocin (BACTROBAN) 2 % nasal ointment 1 application    Case Request   2. Left hip pain  XR Hips Bilateral With or Without Pelvis 3-4 View   3. S/P revision of total hip       Patient is doing well 6-month status post revision right hip arthroplasty.  I see no clinical radiographic evidence of complication.  He  does have some trace trochanteric bursitis which is likely due to his altered gait pattern due to ongoing hip pain on the left.  At this time he has failed conservative treatment and is a candidate for total joint arthroplasty on the left side.  He is agreeable to pursuing total hip arthroplasty.    The patient has clinical and radiographic evidence of end-stage left hip joint degeneration. Conservative measures have been tried for 3 months or longer, but have failed to adequately treat or improve the patient's symptoms. Pain is restricting the patient's daily activities as well as quality of life. The recommendation at this time is to proceed with a left total hip arthroplasty with the goal to improve patient function and pain. The risks, benefits, potential complications, and alternatives were discussed with the patient in detail. Risks included but were not limited to bleeding, infection, anesthesia risks, damage to neurovascular structures, osteolysis, aseptic loosening, instability, dislocation, pain, continued pain, iatrogenic fracture, leg length discrepancy, possible need for future surgery including the potential for amputation, blood clots, myocardial infarction, stroke, and death. Taylor-operative blood management and the potential for blood transfusion were discussed with risks and options clearly outlined. Specific details of the surgical procedure, hospitalization, recovery, rehabilitation, and long-term precautions were also presented. Pre-operative teaching was provided. Implant/prosthesis selection was outlined, and the many options available were explained; the final choice will be made at the time of the procedure to match the anatomy and condition of the bone, ligaments, tendons, and muscles. Given this instruction, the patient elected to proceed with the left total hip arthroplasty. The patient will be seen by pre-admission testing for pre-operative optimization and risk assessment and will be  scheduled for surgery once this is completed.    The patient is considered standard risk for DVT based on patient risk factors and will be placed on aspirin postoperatively for DVT prophylaxis.      Jb Patel MD  01/15/21  11:08 EST    Electronically signed by Jb Patel MD at 01/15/21 1109             Jb Patel MD at 01/15/21 1040          Orthopaedic Clinic Note: Hip Established Patient    Chief Complaint   Patient presents with   • Left Hip - Pain   • Follow-up     6 month S/P revision of total right hip 07/20/20        HPI    It has been 3  month(s) since Mr. Jackson's last visit. He returns to clinic today for follow-up revision right hip arthroplasty as well as left hip pain.  He is 6 months out from revision right hip arthroplasty.  He is doing well in regards to that hip.  He has some minor discomfort localized lateral trochanter.  Otherwise he is doing well.  Denies any pain in the groin.  His main complaint today is left hip pain which she rates a 9/10 on the pain scale.  He is ambulating with the assistance of a cane.  He has failed conservative treatment with anti-inflammatories.  He is having severe limitations in daily activities including walking, standing, climbing stairs.  He is ready to pursue aggressive intervention due to his worsening hip pain.    Past Medical History:   Diagnosis Date   • Anxiety    • Arthritis    • Diabetes mellitus (CMS/[facility])     diet controlled    • GERD (gastroesophageal reflux disease)    • Headache    • Hypertension    • Low back pain    • Sleep apnea     does not wear machine   • Wears reading eyeglasses       Past Surgical History:   Procedure Laterality Date   • CARPAL TUNNEL RELEASE Bilateral    • COLONOSCOPY      5 years ago   • GASTRIC SLEEVE LAPAROSCOPIC     • HIP SURGERY Right times 2   • KNEE SURGERY Left     arthroscopy    • TOTAL HIP ARTHROPLASTY REVISION Right 7/20/2020    Procedure: TOTAL HIP ARTHROPLASTY REVISION RIGHT;  Surgeon:  Jb Patel MD;  Location: [facility];  Service: Orthopedics;  Laterality: Right;      Family History   Problem Relation Age of Onset   • Heart disease Father    • Hypertension Father    • Osteoarthritis Father    • Cancer Mother    • Diabetes Brother    • Hypertension Brother    • Gout Paternal Grandmother    • Diabetes Paternal Grandmother      Social History     Socioeconomic History   • Marital status:      Spouse name: Not on file   • Number of children: Not on file   • Years of education: Not on file   • Highest education level: Not on file   Tobacco Use   • Smoking status: Never Smoker   • Smokeless tobacco: Never Used   Substance and Sexual Activity   • Alcohol use: No   • Drug use: No   • Sexual activity: Defer      Current Outpatient Medications on File Prior to Visit   Medication Sig Dispense Refill   • amLODIPine (NORVASC) 10 MG tablet Take 1 tablet by mouth Daily.     • ascorbic acid (VITAMIN C) 1000 MG tablet Take 1,000 mg by mouth Daily.     • aspirin 325 MG EC tablet Take 1 tablet by mouth Every 12 (Twelve) Hours. For 1 month 60 tablet 0   • ferrous sulfate 325 (65 FE) MG tablet Take 325 mg by mouth Daily With Breakfast.     • gabapentin (NEURONTIN) 600 MG tablet Take 600 mg by mouth 3 (Three) Times a Day.     • HYDROcodone-acetaminophen (NORCO)  MG per tablet      • hydrOXYzine (VISTARIL) 25 MG capsule Take 50 mg by mouth 4 (Four) Times a Day.     • lisinopril (PRINIVIL,ZESTRIL) 5 MG tablet Take 5 mg by mouth Daily.     • loratadine (CLARITIN) 10 MG tablet      • meloxicam (MOBIC) 7.5 MG tablet Take 7.5 mg by mouth 2 (two) times a day.     • methylPREDNISolone (MEDROL) 4 MG dose pack Take as directed on package instructions. 21 tablet 0   • Multiple Vitamins-Minerals (MENS MULTIPLUS PO) Take  by mouth.     • omeprazole (priLOSEC) 20 MG capsule Take 20 mg by mouth Daily.     • orphenadrine (NORFLEX) 100 MG 12 hr tablet Take 1 tablet by mouth 2 (Two) Times a Day As Needed for  "Muscle Spasms or Mild Pain . 14 tablet 0   • rOPINIRole (REQUIP) 2 MG tablet Take 2 mg by mouth Every Night.     • tamsulosin (FLOMAX) 0.4 MG capsule 24 hr capsule Take 1 capsule by mouth Every Night. 30 capsule 3   • vitamin B-12 (CYANOCOBALAMIN) 1000 MCG tablet Take 1,000 mcg by mouth Daily.     • [DISCONTINUED] HYDROcodone-acetaminophen (NORCO) 7.5-325 MG per tablet Take 1 tablet by mouth Every 6 (Six) Hours As Needed for Moderate Pain . 12 tablet 0   • [DISCONTINUED] LORATADINE PO Take 10 mg by mouth Daily.       No current facility-administered medications on file prior to visit.       Allergies   Allergen Reactions   • Codeine Nausea And Vomiting   • Morphine And Related Nausea And Vomiting        Review of Systems   Constitutional: Negative.    HENT: Negative.    Eyes: Negative.    Respiratory: Negative.    Cardiovascular: Negative.    Gastrointestinal: Negative.    Endocrine: Negative.    Genitourinary: Negative.    Musculoskeletal: Positive for arthralgias.   Skin: Negative.    Allergic/Immunologic: Negative.    Neurological: Negative.    Hematological: Negative.    Psychiatric/Behavioral: Negative.         The patient's Review of Systems was personally reviewed and confirmed as accurate.    Physical Exam  Pulse 94, height 172.7 cm (68\"), weight 100 kg (221 lb), SpO2 99 %.    Body mass index is 33.6 kg/m².    GENERAL APPEARANCE: awake, alert, oriented, in no acute distress and well developed, well nourished  LUNGS:  breathing nonlabored  EXTREMITIES: no clubbing, cyanosis  PERIPHERAL PULSES: palpable dorsalis pedis and posterior tibial pulses bilaterally.    GAIT:  Antalgic           Hip Exam:  Right     RANGE OF MOTION:  EXTENSION/FLEXION:  normal (0-110 degrees)  IR:  20  ER:  40  PAIN WITH HIP MOTION:  no  PAIN WITH LOGROLL:  no      STRENGTH:  ABDUCTOR:    4/5  ADDUCTOR:  5/5  HIP FLEXION:  4/5     GREATER TROCHANTER BURSAL PAIN:  yes    SENSATION TO LIGHT TOUCH:  DEEP PERONEAL/SUPERFICIAL " PERONEAL/SURAL/SAPHENOUS/TIBIAL:   intact    EDEMA:  no  ERYTHEMA:  no  WOUNDS/INCISIONS:   yes, well healed surgical incision without evidence of erythema or drainage  ------------------------   Hip Exam:  Left    RANGE OF MOTION:  EXTENSION/FLEXION:  normal (0-110 degrees), decreased (5-90 degrees)  IR (at 90 degrees of flexion):  -5  ER (at 90 degrees of flexion):  20  PAIN WITH HIP MOTION:  yes, Localized to groin  PAIN WITH LOGROLL:  no     [facility] FoundationFIELD TEST: positive    STRENGTH:  ABDUCTOR:  4/5  ADDUCTOR:  5/5  HIP FLEXION:  5/5    GREATER TROCHANTER BURSAL PAIN:  yes    SENSATION TO LIGHT TOUCH:  DEEP PERONEAL/SUPERFICIAL PERONEAL/SURAL/SAPHENOUS/TIBIAL:   intact    EDEMA:  no  ERYTHEMA:  no  WOUNDS/INCISIONS:   no  _________________________________________________________________  _________________________________________________________________    RADIOGRAPHIC FINDINGS:   Indication: Status post revision right hip arthroplasty, left hip pain    Comparison: Todays xrays were compared to previous xrays from 10/1/2020    AP pelvis: Right: Demonstrate a well positioned revision total hip without evidence of wear, loosening, fracture or osteolysis, femoral head is concentrically reduced within the acetabulum and No significant changes compared to prior radiographs.;Left: advanced, end-stage osteoarthritis with bone on bone articulation, subchondral sclerosis, and subchondral cysts, there are marginal osteophytes visualized at the femoral head-neck junction and acetabular margins and No significant changes compared to prior radiographs.      Assessment/Plan:   Diagnosis Plan   1. Primary osteoarthritis of left hip  Case Request    CBC and Differential    Basic metabolic panel    Protime-INR    APTT    Hemoglobin A1c    Urinalysis With Culture If Indicated -    ECG 12 Lead    Nicotine & Metabolite, Quant    Tranexamic Acid 1,000 mg in sodium chloride 0.9 % 100 mL    Tranexamic Acid 1,000 mg in sodium chloride 0.9  % 100 mL    ceFAZolin (ANCEF) 2 g in sodium chloride 0.9 % 100 mL IVPB    acetaminophen (TYLENOL) tablet 975 mg    meloxicam (MOBIC) tablet 15 mg    pregabalin (LYRICA) capsule 75 mg    mupirocin (BACTROBAN) 2 % nasal ointment 1 application    Case Request   2. Left hip pain  XR Hips Bilateral With or Without Pelvis 3-4 View   3. S/P revision of total hip       Patient is doing well 6-month status post revision right hip arthroplasty.  I see no clinical radiographic evidence of complication.  He does have some trace trochanteric bursitis which is likely due to his altered gait pattern due to ongoing hip pain on the left.  At this time he has failed conservative treatment and is a candidate for total joint arthroplasty on the left side.  He is agreeable to pursuing total hip arthroplasty.    The patient has clinical and radiographic evidence of end-stage left hip joint degeneration. Conservative measures have been tried for 3 months or longer, but have failed to adequately treat or improve the patient's symptoms. Pain is restricting the patient's daily activities as well as quality of life. The recommendation at this time is to proceed with a left total hip arthroplasty with the goal to improve patient function and pain. The risks, benefits, potential complications, and alternatives were discussed with the patient in detail. Risks included but were not limited to bleeding, infection, anesthesia risks, damage to neurovascular structures, osteolysis, aseptic loosening, instability, dislocation, pain, continued pain, iatrogenic fracture, leg length discrepancy, possible need for future surgery including the potential for amputation, blood clots, myocardial infarction, stroke, and death. Taylor-operative blood management and the potential for blood transfusion were discussed with risks and options clearly outlined. Specific details of the surgical procedure, hospitalization, recovery, rehabilitation, and long-term  precautions were also presented. Pre-operative teaching was provided. Implant/prosthesis selection was outlined, and the many options available were explained; the final choice will be made at the time of the procedure to match the anatomy and condition of the bone, ligaments, tendons, and muscles. Given this instruction, the patient elected to proceed with the left total hip arthroplasty. The patient will be seen by pre-admission testing for pre-operative optimization and risk assessment and will be scheduled for surgery once this is completed.    The patient is considered standard risk for DVT based on patient risk factors and will be placed on aspirin postoperatively for DVT prophylaxis.      Jb Patel MD  01/15/21  11:08 EST    Electronically signed by Jb Patel MD at 01/15/21 1109          Operative/Procedure Notes (all)      Jb Patel MD at 01/25/21 1309  Version 1 of 1       TOTAL HIP ARTHROPLASTY  Progress Note    Ang Martinez Manuel  1/25/2021    Pre-op Diagnosis:   Primary osteoarthritis of left hip [M16.12]       Post-Op Diagnosis Codes:     * Primary osteoarthritis of left hip [M16.12]    Procedure/CPT® Codes:  [place] TOTAL HIP ARTHROPLASTY [35388]      Procedure(s):  TOTAL HIP ARTHROPLASTY LEFT    Surgeon(s):  Jb Patel MD    Anesthesia: Spinal    Staff:   Circulator: Williams Silverman RN; Elyssa Treadwell RN  Radiology Technologist: Guy Crow  Scrub Person: Kelsy Ny  Vendor Representative: Andrea Avery  Nursing Assistant: Cheikh Chacon  Assistant: Mamta Dasilva PA-C  Assistant: Mamta Dasilva PA-C      Estimated Blood Loss: 900 mL    Urine Voided: * No values recorded between 1/25/2021 12:39 PM and 1/25/2021  3:21 PM *    Specimens:                None          Drains: * No LDAs found *    Findings: End-stage osteoarthritis left hip    Complications: None apparent    Assistant: Mamta Dasilva PA-C  was responsible for performing the  following activities: Retraction, Suction, Irrigation, Suturing, Closing and Placing Dressing and their skilled assistance was necessary for the success of this case.    Jb Patel MD     Date: 1/25/2021  Time: 15:21 EST        Electronically signed by Jb Patel MD at 01/25/21 4786     Jb Patel MD at 01/25/21 1309  Version 1 of 1       OPERATIVE REPORT     DATE OF PROCEDURE: 1/25/2021    SURGEON: Jb Patel M.D.     ASSISTANT(S): Circulator: Williams Silverman RN; Elyssa Treadwell RN  Radiology Technologist: Guy Crow  Scrub Person: Kelsy Ny  Vendor Representative: Andrea Avery  Nursing Assistant: Cheikh Chacon  Assistant: Mamta Dasilva PA-C  Assistant: Mamta Dasliva PA-C    Note-PA was utilized during the case to facilitate positioning the patient, exposure, retraction, placement of final components and definitive closure.    PREOPERATIVE DIAGNOSIS: Advanced degenerative joint disease of the left hip secondary to osteoarthritis    POSTOPERATIVE DIAGNOSIS: same     PROCEDURE: Left total Hip Arthroplasty     SURGICAL DETAILS:     APPROACH: Posterior    ANESTHESIA: General plus local periarticular block    PREOPERATIVE ANTIBIOTICS: Ancef 2 g IV, vancomycin 1500 mg IV    TRANEXAMIC ACID: IV    ESTIMATED BLOOD LOSS: 9 cc     SPECIMENS: [femoral head]     IMPLANTS:   : [place]  Acetabular component: 52 mm Trident 2   Acetabular screws: 2  Acetabular liner: 0 degree X3   Femoral component: Accolade  degree size 7  Femoral head: 36+0 Biolox ceramic     DRAINS: None    LOCAL INJECTION: 1 cc Toradol 30mg/ml, 4 cc duramorph 2mg/ml, 20 cc 0.5% ropivicaine, 20 cc 0.5% lidocaine with 1:200,000 epinephrine, 15 cc preservative free normal saline     MODIFIER(S): None    COMPLICATIONS: None apparent    INDICATIONS FOR PROCEDURE: This patient has a history of progressive left hip pain and arthritis. The hip pain is severe with activity and has  progressed significantly. Non-operative treatment has been attempted, but has not improved or controlled symptoms during normal daily activities. Motion has become limited and rotation severely restricted. X-rays reveal moderate-to-severe eburnation of articular cartilage on the superior weight bearing surface of the hip with circumferential acetabular and femoral neck osteophytes consistent with advanced hip osteoarthritis. A total hip arthroplasty was recommended at this time. The risks, benefits, alternatives, and potential complications of the arthroplasty surgery were discussed with the patient in detail to include but not limited to infection, bleeding, anesthesia risks, sciatic nerve palsy, instability/dislocation, limb length discrepancy, aseptic loosening, osteolysis, blood clots, continued pain, iatrogenic fracture, myocardial infarction, stroke, and death. Specific details of the procedure, hospitalization, recovery, rehabilitation, and long-term precautions were also provided. Pre-operative teaching was provided. Implant/prosthesis selection was outlined, and the many options available were explained; the final choice will be made at the time of the procedure to match the anatomy and condition of the bone, ligaments, tendons, and muscles. Understanding of all topics was conveyed to me by the patient, and consent was given to proceed with a left total hip arthroplasty. The patient completed preoperative medical optimization and risk assessment, joint arthroplasty education, and MRSA decolonization using a universal decolonization protocol. Perioperative blood management and the potential for blood transfusion were discussed with risks and options clearly outlined.     INTRAOPERATIVE FINDINGS: End-stage osteoarthritis left hip    PROCEDURE: The patient was identified in the preoperative holding area. The operative site was confirmed and marked. CHRISTOPHER hose and a sequential compression device were placed on  the nonoperative leg. The risks, benefits, and alternatives to surgery were again confirmed with the patient and the patient wished to proceed. The patient was brought to the operating room and placed on the operating room table in the supine position. A huddle was performed with the patient and all vital surgical team members to confirm the correct operative site, procedure, anesthesia type, and operative plan with the patient. After anesthesia was performed, the patient was positioned in the lateral decubitus position on the pegboard and secured with the operative side up. An axillary roll was placed in the axilla and all bony prominences and pressure points were checked and padded. A relative leg length assessment was carried out and markers were placed for intraoperative assessment. Intravenous antibiotic prophylaxis was given and confirmed with the anesthesia team.     The operative leg was prepped and draped in the usual sterile fashion. A surgical time out was performed immediately preceding the incision with all personnel in the operating room to again confirm patient identity, the correct operative site and extremity, correct radiographic studies, availability of appropriate surgical equipment and agreement on the planned procedure. A posterolateral approach to the hip was performed through an incision centered over the greater trochanter. The incision was carried through the subcutaneous tissue to the underlying fascia miguel angel and gluteus aurelia fascia, which were incised and split posteriorly over the trochanter in the direction of the fibers. Hemostasis was obtained with electrocautery. The Charnley retractor was placed after carefully palpating the sciatic nerve which was protected throughout the case.     A standard posterior approach to the hip was performed by releasing the piriformis, short external rotators and posterior capsule and reflecting them posteriorly as a rectangular flap. The superior  capsule was scarred down; it was released and excised. The labrum was split and the femoral head mobilized. The hip was then flexed, internally rotated and dislocated from the acetabulum without excessive force. Assessment of the femoral head revealed eburnation of the articular cartilage with complete loss of the weight bearing chondral surface. Osteophytes were present as well. Careful measurements were performed using the center of the femoral head and the lesser trochanter as markers and a femoral neck cut was made according to the preoperative plan.     Attention was then turned to the acetabulum. Retractors were placed circumferentially for wide acetabular exposure. The labrum and osteophytes were debrided from the rim, and the medial wall was identified and the depth of the socket assessed by excising the pulvinar. Bleeders were controlled, especially the area of the obturator artery with the electrocautery. Acetabular reaming was then started with the hemispherical instrument matching the size of the excised femoral head. Sequential reaming of the acetabulum was then performed by increasing size in 2 mm increments.  Reaming was performed line to line. The reamers created an excellent hemispherical bed of bleeding cancellous bone. The cup was impacted into position, targeting 40-45 degrees of abduction and 20-25 degrees of anteversion, with an excellent press-fit. The press-fit was firm, stable, and apically seated.  2 screw(s) were used for additional support of the fixation. Further osteophyte debridement was done around the socket. All impinging soft tissue was removed from the edges of the socket. The polyethylene bearing/liner was then impacted into place and checked for stability.     Attention was then turned to the femur. The leg was positioned so access did not result in soft-tissue injury. The femoral preparation was started with a box osteotome. The medullary cavity of the femur was then entered  and opened with hand reamers. Femoral stem broaches were then employed in an incremental fashion up to the final size, targeting 15-20 degrees of anteversion. The final broach was fully seated, had good rotational and axial stability, and was seated at the appropriate height in relation to the greater trochanter and the preoperative plan. Trial reduction was done. Excellent stability and range of motion was achieved without impingement at any position. The hip was stable in full extension and external rotation as well as in flexion past 90 degrees, 20 degrees adduction and 60-70 degrees of internal rotation. Leg lengths were re-created within millimeters based on the markers and relative measurement. The hip was then dislocated and the trials removed. The wound was copiously irrigated, and the permanent femoral stem was then impacted down in approximately 15-20 degrees of anteversion. The press-fit was firm, and stable to axial and rotational force in all planes. The permanent femoral head was then impacted on the clean trunnion. The socket and wound were irrigated, suctioned, and inspected for debris. The final reduction was performed, and again leg length assessment and stability assessment of the hip were performed to confirm optimal component selection and stability in all planes without impingement when stressed to the extremes.     The wound was irrigated with dilute betadine solution as well as saline, and hemostasis obtained with electrocautery. A pain cocktail was injected into the pericapsular tissues. The posterior capsule, piriformis, and short external rotators were repaired utilizing #2 Ticron through drill holes in the greater trochanter. The sciatic nerve was palpated and found to be intact and the wound was irrigated. Instrument and sponge counts were completed and confirmed correct. The fascia miguel angel and gluteal fascia were closed with interrupted #1 Vicryl suture and oversewn with #2 Stratafix. The  deep subcutaneous tissue was closed with running #1 Stratafix suture and the superficial subcutaneous tissue with interrupted 2-0 Vicryl suture. A 3-0 monocryl running stitch was used to close skin followed by skin glue adhesive to seal the wound. A silver impregnated dressing was then placed, followed by CHRISTOPHER hose and a sequential compression device to the operative limb, followed by an abduction pillow. The patient was then returned to a supine position on the operating room table. The patient was sufficiently recovered from anesthesia, transferred to a hospital bed and taken to the PACU in stable condition.     One gram (1000 mg) of intravenous tranexamic acid was administered prior to incision. A second one gram (1000 mg) intravenous dose was given prior to wound closure.    No apparent complications occurred during the procedure Instrument, sponge and needle counts were correct x 2.     The patient underwent risk stratification preoperatively and aspirin was chosen for DVT prophylaxis. Delay in starting chemical prophylaxis for 23 hours from surgical incision was over concerns for hematoma formation and wound related issues.     POST OPERATIVE PLAN:   Protected weight bearing as tolerated   Posterior hip precautions x 6 weeks   PT/OT for mobilization and medical equipment needs   23 hours perioperative antibiotic prophylaxis   Pain control with PO/IV meds   Keep silver dressing in place for 7 days post op. Change dressing only if saturated.   CHRISTOPHER hose and SCD's to bilateral lower extremities   Social work for discharge planning needs   Follow up in 3 weeks for post operative wound check with XR AP pelvis.      Electronically signed by Jb Patel MD at 01/25/21 0176          Physician Progress Notes (most recent note)      Jb Patel MD at 01/26/21 0642            SUBJECTIVE  Patient resting comfortably.  Pain well controlled.  No events overnight.    PHYSICAL THERAPY PROGRESS  Outcome Summary:  "PT eval complete. Pt ambulated 300 feet using RW, CGA, and one person to manage equipment. Pt ascended/descended 3 steps using bilateral HRs and CGA for safety. Gait/stair training limited by fatigue. Bed mobility performed with supervision and STS with CGA. No knee buckling noted with ambulation. Reviewed HEP and posterior hip precautions via handout. Educated on car transfers. PADD score = 11. ADLs assessed, pt not requiring OT at this time prior to potential d/c. Functionally, pt safe to d/c home with assist from a PT perspective. Recommend HHPT. (21 1720)     OBJECTIVE  Temp (24hrs), Av.3 °F (36.3 °C), Min:96.9 °F (36.1 °C), Max:98.3 °F (36.8 °C)    Blood pressure 111/66, pulse 99, temperature 98.3 °F (36.8 °C), temperature source Oral, resp. rate 16, height 172.7 cm (68\"), weight 104 kg (229 lb), SpO2 97 %.    Lab Results (last 24 hours)     Procedure Component Value Units Date/Time    Hemoglobin & Hematocrit, Blood [[id number]]  (Abnormal) Collected: 21 045    Specimen: Blood Updated: 21 0522     Hemoglobin 9.9 g/dL      Hematocrit 31.1 %     Basic Metabolic Panel [[id number]] Collected: 21 0452    Specimen: Blood Updated: 21 0510    POC Glucose Once [[id number]]  (Abnormal) Collected: 21 1600    Specimen: Blood Updated: 21 1612     Glucose 153 mg/dL     POC Glucose Once [[id number]]  (Normal) Collected: 21 1033    Specimen: Blood Updated: 21 1036     Glucose 98 mg/dL             PHYSICAL EXAM  Left lower extremity: Dressing clean, dry and intact.  Leg length symmetric.  Intact EHL, FHL, tibialis anterior, and gastrocsoleus. Sensation intact to light touch to deep peroneal, superficial peroneal, sural, saphenous, tibial nerves. 2+ palpable DP and PT pulses.         Status post total hip replacement, left    Diabetes mellitus (CMS/HCC)    Hypertension    Primary osteoarthritis of left hip    BPH (benign prostatic hyperplasia)    GERD (gastroesophageal " reflux disease)    Obesity      PLAN / DISPOSITION:  1 Day Post-Op left total hip arthroplasty    Projected weight bearing as tolerated left lower extremity, posterior precautions x6 weeks  Pain control  PT/OT for post op mobilization and medical equipment needs   23 hours perioperative antibiotic prophylaxis   SCD's bilateral lower extremities   Aspirin for DVT prophylaxis   Social work for discharge planning.  Anticipate discharge home today.  Dressing to remain in place for 7 days. May remove on POD#7. If no drainage, may shower on POD#10. No submerging wound in water. If drainage is noted, sterile dressing should be placed and wound checked daily. No showering until wound has remained dry for 72 consecutive hours.   Follow up in 3 weeks for re-assessment.      Future Appointments   Date Time Provider Department Center   2021  2:20 PM Mamta Dasilva PA-C [facility]   3/18/2021  9:30 AM Gareth Gan MD [facility]       Isela Maldonado MD  21  06:55 EST           Electronically signed by Isela Maldonado MD at 21 0656           [place] [id number]  Phone:  [phone]  Fax:  [phone] Date: 2021      Ambulatory Referral to Home Health     Patient:  Ang Jackson MRN:  [id number]    [address] [place] [address] :  1960  SSN:    Phone: [phone] Sex:  M      INSURANCE PAYOR PLAN GROUP # SUBSCRIBER ID   Primary:  Secondary:    [employer]ICARE [employer] [place] [id number]  [id number]    Q$G [id number]  [id number]      Referring Provider Information:  ISELA MALDONADO Phone: [phone] Fax: [phone]      Referral Information:   # Visits:  1 Referral Type: Home Health [42]   Urgency:  Routine Referral Reason: Specialty Services Required   Start Date: 2021 End Date:  To be determined by Insurer   Diagnosis: Primary osteoarthritis of left hip (M16.12 [ICD-10-CM] 715.15  [ICD-9-CM])  Status post total hip replacement, left (Z96.642 [ICD-10-CM] V43.64 [ICD-9-CM])  Impaired functional mobility, balance, gait, and endurance (Z74.09 [ICD-10-CM] V49.89 [ICD-9-CM])      Refer to Dept:   Refer to Provider:   Refer to Facility:       Face to Face Visit Date: 1/26/2021  Follow-up provider for Plan of Care? I will be treating the patient on an ongoing basis.  Please send me the Plan of Care for signature.  Follow-up provider: ISELA PATEL [[id number]]  Reason/Clinical Findings: s/p left total hip replacement  Describe mobility limitations that make leaving home difficult: s/p THR, impaired functional gait and mobility requuiring use of a walker  Nursing/Therapeutic Services Requested: Physical Therapy  PT orders: Gait Training  PT orders: Total joint pathway  Weight Bearing Status: As Tolerated  Frequency: 1 Week 1     This document serves as a request of services and does not constitute Insurance authorization or approval of services.  To determine eligibility, please contact the members Insurance carrier to verify and review coverage.     If you have medical questions regarding this request for services. Please contact [address] at [phone] during normal business hours.       Authorizing Provider:Isela Patel MD  Authorizing Provider's NPI: [id number]  Order Entered By: Kellerman, Sonja C, RN 1/26/2021 12:51 PM     Electronically signed by: Isela Patel MD 1/26/2021 12:51 PM    : S.Kellerman RN, [phone]

## 2021-01-26 NOTE — THERAPY DISCHARGE NOTE
Patient Name: Ang Jackson  : 1960    MRN: [id number]                              Today's Date: 2021       Admit Date: 2021    Visit Dx:     ICD-10-CM ICD-9-CM   1. Primary osteoarthritis of left hip  M16.12 715.15     Patient Active Problem List   Diagnosis   • Diabetes mellitus (CMS/HCC)   • Hypertension   • Pre-op evaluation   • BPH with obstruction/lower urinary tract symptoms   • Corporo-venous occlusive erectile dysfunction   • Low testosterone in male   • Scrotal varices   • Post-traumatic osteoarthritis of left knee   • Primary osteoarthritis of left knee   • Right hip pain   • Status post total replacement of right hip   • Acute blood loss anemia, mild, asymptomatic   • Acute postoperative pain   • Primary osteoarthritis of left hip   • BPH (benign prostatic hyperplasia)   • GERD (gastroesophageal reflux disease)   • Obesity   • Status post total hip replacement, left     Past Medical History:   Diagnosis Date   • Anesthesia     diffculty adminster spinal block, patient stated with last hip surgery  several attempts per awilda and rin lucsridhar, resulted in general anesthesia    • Anxiety    • Arthritis    • Diabetes mellitus (CMS/HCC)     diet controlled, resolved per pt report with weight loss with gastric sleeve   • GERD (gastroesophageal reflux disease)    • Headache    • Hypertension    • Low back pain    • Sleep apnea     does not wear machine, MILD   • Wears reading eyeglasses      Past Surgical History:   Procedure Laterality Date   • CARPAL TUNNEL RELEASE Bilateral    • COLONOSCOPY      5 years ago   • GASTRIC SLEEVE LAPAROSCOPIC     • HIP SURGERY Right times 2   • KNEE SURGERY Left     arthroscopy    • TOTAL HIP ARTHROPLASTY REVISION Right 2020    Procedure: TOTAL HIP ARTHROPLASTY REVISION RIGHT;  Surgeon: Jb Patel MD;  [facility];  Service: Orthopedics;  Laterality: Right;     General Information     Row Name 21 1009          Physical  Therapy Time and Intention    Document Type  discharge treatment  -SC     Mode of Treatment  individual therapy;physical therapy  -SC     Row Name 01/26/21 1009          General Information    Patient Profile Reviewed  yes  -SC     Existing Precautions/Restrictions  fall;left;hip, posterior  -SC     Row Name 01/26/21 1009          Cognition    Orientation Status (Cognition)  oriented x 4  -SC     Row Name 01/26/21 1009          Safety Issues, Functional Mobility    Comment, Safety Issues/Impairments (Mobility)  alert, following commands  -SC       User Key  (r) = Recorded By, (t) = Taken By, (c) = Cosigned By    Initials Name Provider Type    SC Viki Dominique PT Physical Therapist        Mobility     Row Name 01/26/21 1011          Bed Mobility    Bed Mobility  scooting/bridging  -SC     Scooting/Bridging [place] (Bed Mobility)  independent  -SC     Supine-Sit [place] (Bed Mobility)  independent  -SC     Sit-Supine [place] (Bed Mobility)  --  -SC     Comment (Bed Mobility)  [facility].  -SC     Row Name 01/26/21 1011          Transfers    Comment (Transfers)  cues for hand placement  -SC     Row Name 01/26/21 1011          Bed-Chair Transfer    Bed-Chair [place] (Transfers)  modified independence  -SC     Assistive Device (Bed-Chair Transfers)  walker, front-wheeled  -SC     Row Name 01/26/21 1011          Sit-Stand Transfer    Sit-Stand [place] (Transfers)  modified independence  -SC     Assistive Device (Sit-Stand Transfers)  walker, front-wheeled  -[facility] Name 01/26/21 1011          Gait/Stairs (Locomotion)    [place] Level (Gait)  modified independence  -SC     Assistive Device (Gait)  walker, front-wheeled  -SC     Distance in Feet (Gait)  350  -SC     Deviations/Abnormal Patterns (Gait)  right sided deviations;antalgic  -SC     Right Sided Gait Deviations  weight shift ability decreased  -SC     Assistive Device (Stairs)  cane, straight  -SC     Number of Steps (Stairs)  --  -SC      Comment (Gait/Stairs)  Gt training focused on sequencing walker with step through gait pattern. Demonstrated good technique. No lOB  -SC     Row Name 01/26/21 1011          Mobility    Extremity Weight-bearing Status  left lower extremity  -SC     Left Lower Extremity (Weight-bearing Status)  weight-bearing as tolerated (WBAT)  -SC       User Key  (r) = Recorded By, (t) = Taken By, (c) = Cosigned By    Initials Name Provider Type    SC Viki Dominique PT Physical Therapist        Obj/Interventions     Row Name 01/26/21 1020          Hip (Therapeutic Exercise)    Hip (Therapeutic Exercise)  AROM (active range of motion)  -SC     Hip Isometrics (Therapeutic Exercise)  right;flexion;extension;aBduction;aDduction;10 repetitions standing: hip flex, heel raises, mini squads  -[facility] Name 01/26/21 1020          Knee (Therapeutic Exercise)    Knee (Therapeutic Exercise)  strengthening exercise  -SC     Knee Isometrics (Therapeutic Exercise)  right;quad sets;gluteal sets;10 second hold  -SC     Knee Strengthening (Therapeutic Exercise)  right;LAQ (long arc quad);sitting;10 repetitions;heel slides  -[facility] Name 01/26/21 1020          Balance    Balance Assessment  standing dynamic balance  -SC     Dynamic Standing Balance  WFL;supported  -SC     Comment, Balance  needs walker  -SC       User Key  (r) = Recorded By, (t) = Taken By, (c) = Cosigned By    Initials Name Provider Type    SC Viki Dominique PT Physical Therapist        Goals/Plan     Row Name 01/26/21 1022          Bed Mobility Goal 1 (PT)    Activity/Assistive Device (Bed Mobility Goal 1, PT)  sit to supine/supine to sit  -SC     [place] Level/Cues Needed (Bed Mobility Goal 1, PT)  modified independence  -SC     Time Frame (Bed Mobility Goal 1, PT)  long term goal (LTG);3 days  -SC     Progress/Outcomes (Bed Mobility Goal 1, PT)  goal met per patient  -SC     Row Name 01/26/21 1022          Transfer Goal 1 (PT)    Activity/Assistive Device  (Transfer Goal 1, PT)  sit-to-stand/stand-to-sit  -SC     [place] Level/Cues Needed (Transfer Goal 1, PT)  modified independence  -SC     Time Frame (Transfer Goal 1, PT)  long term goal (LTG);3 days  -SC     Progress/Outcome (Transfer Goal 1, PT)  goal met  -SC     Row Name 01/26/21 1022          Gait Training Goal 1 (PT)    Activity/Assistive Device (Gait Training Goal 1, PT)  gait (walking locomotion);walker, rolling  -SC     [place] Level (Gait Training Goal 1, PT)  modified independence  -SC     Distance (Gait Training Goal 1, PT)  500 feet  -SC     Time Frame (Gait Training Goal 1, PT)  long term goal (LTG);3 days  -SC     Progress/Outcome (Gait Training Goal 1, PT)  goal partially met  -SC     Row Name 01/26/21 1022          Stairs Goal 1 (PT)    Activity/Assistive Device (Stairs Goal 1, PT)  stairs, all skills;using handrail, left;using handrail, right  -SC     [place] Level/Cues Needed (Stairs Goal 1, PT)  modified independence  -SC     Number of Stairs (Stairs Goal 1, PT)  3  -SC     Time Frame (Stairs Goal 1, PT)  long term goal (LTG);3 days  -SC     Progress/Outcome (Stairs Goal 1, PT)  goal met  -SC       User Key  (r) = Recorded By, (t) = Taken By, (c) = Cosigned By    Initials Name Provider Type    SC Viki Dominique, PT Physical Therapist        Clinical Impression     Row Name 01/26/21 1021          Pain    Additional Documentation  Pain Scale: FACES Pre/Post-Treatment (Group)  -[facility] Name 01/26/21 1021          Pain Scale: Numbers Pre/Post-Treatment    Pain Location - Side  Left  -SC     Pain Location - Orientation  lower  -SC     Pain Location  extremity  -SC     Pain Intervention(s)  Repositioned;Cold applied  -[facility] Name 01/26/21 1021          Pain Scale: FACES Pre/Post-Treatment    Pain: FACES Scale, Pretreatment  2-->hurts little bit  -SC     Posttreatment Pain Rating  4-->hurts little more  -SC     Row Name 01/26/21 1021          Plan of Care Review    Plan of Care  Reviewed With  patient  -SC     Progress  improving  -SC     Outcome Summary  Patient demonstrated safe mobility on floor with walker, ambulating 350 feet. No LOB or buckling noted  -SC     Row Name 01/26/21 1021          Therapy Assessment/Plan (PT)    Patient/Family Therapy Goals Statement (PT)  go home  -SC     Rehab Potential (PT)  good, to achieve stated therapy goals  -SC     Criteria for Skilled Interventions Met (PT)  yes;meets criteria;skilled treatment is necessary  -SC     Row Name 01/26/21 1021          Positioning and Restraints    Pre-Treatment Position  sitting in chair/recliner  -SC     Post Treatment Position  chair  -SC     In Chair  notified nsg;reclined;sitting;call light within reach;encouraged to call for assist;exit alarm on  -SC       User Key  (r) = Recorded By, (t) = Taken By, (c) = Cosigned By    Initials Name Provider Type    SC Viki Dominique PT Physical Therapist        Outcome Measures     Row Name 01/26/21 1023          How much help from another person do you currently need...    Turning from your back to your side while in flat bed without using bedrails?  4  -SC     Moving from lying on back to sitting on the side of a flat bed without bedrails?  4  -SC     Moving to and from a bed to a chair (including a wheelchair)?  4  -SC     Standing up from a chair using your arms (e.g., wheelchair, bedside chair)?  4  -SC     Climbing 3-5 steps with a railing?  3  -SC     To walk in hospital room?  3  -SC     AM-PAC 6 Clicks Score (PT)  22  -SC     Row Name 01/26/21 1023          Functional Assessment    Outcome Measure Options  AM-PAC 6 Clicks Basic Mobility (PT)  -SC       User Key  (r) = Recorded By, (t) = Taken By, (c) = Cosigned By    Initials Name Provider Type    SC Viki Dominique, PT Physical Therapist        Physical Therapy Education                 Title: PT OT SLP Therapies (In Progress)     Topic: Physical Therapy (Done)     Point: Mobility training (Done)     Learning  Progress Summary           Patient Eager, E,TB,D,H, VU,DU by SC at 1/26/2021 1024    Comment: reviewed HEP and hip precautions    Acceptance, E,D,H, VU by VERONICA at 1/25/2021 1720    Comment: Educated on safe sequencing with bed mobility, ambulatory/car transfers, gait, and stair training. Reviewed HEP, posterior hip precautions, and use of sock aid.                   Point: Home exercise program (Done)     Learning Progress Summary           Patient Eager, E,TB,D,H, VU,DU by SC at 1/26/2021 1024    Comment: reviewed HEP and hip precautions    Acceptance, E,D,H, VU by VERONICA at 1/25/2021 1720    Comment: Educated on safe sequencing with bed mobility, ambulatory/car transfers, gait, and stair training. Reviewed HEP, posterior hip precautions, and use of sock aid.                   Point: Body mechanics (Done)     Learning Progress Summary           Patient Eager, E,TB,D,H, VU,DU by SC at 1/26/2021 1024    Comment: reviewed HEP and hip precautions    Acceptance, E,D,H, VU by VERONICA at 1/25/2021 1720    Comment: Educated on safe sequencing with bed mobility, ambulatory/car transfers, gait, and stair training. Reviewed HEP, posterior hip precautions, and use of sock aid.                   Point: Precautions (Done)     Learning Progress Summary           Patient Eager, E,TB,D,H, VU,DU by SC at 1/26/2021 1024    Comment: reviewed HEP and hip precautions    Acceptance, E,D,H, VU by  at 1/25/2021 1720    Comment: Educated on safe sequencing with bed mobility, ambulatory/car transfers, gait, and stair training. Reviewed HEP, posterior hip precautions, and use of sock aid.                               User Key     Initials Effective Dates Name Provider Type Discipline    SC 06/19/15 -  Viki Dominique, PT Physical Therapist PT    VERONICA 09/10/19 -  Ryan Navarrete PT Physical Therapist PT              PT Recommendation and Plan     Plan of Care Reviewed With: patient  Progress: improving  Outcome Summary: Patient demonstrated safe mobility on  floor with walker, ambulating 350 feet. No LOB or buckling noted     Time Calculation:   PT Charges     Row Name 01/26/21 0916             Time Calculation    Start Time  0916  -SC      PT Received On  01/26/21  -SC      PT Goal Re-Cert Due Date  02/04/21  -SC         Time Calculation- PT    Total Timed Code Minutes- PT  24 minute(s)  -SC         Timed Charges    65971 - PT Therapeutic Exercise Minutes  9  -SC      34955 - Gait Training Minutes   15  -SC        User Key  (r) = Recorded By, (t) = Taken By, (c) = Cosigned By    Initials Name Provider Type    SC Viki Dominique PT Physical Therapist        Therapy Charges for Today     Code Description Service Date Service Provider Modifiers Qty    46606746161 HC PT THER PROC EA 15 MIN 1/26/2021 Viki Dominique PT GP 1    61966601643 HC GAIT TRAINING EA 15 MIN 1/26/2021 Viki Dominique PT GP 1          PT G-Codes  Outcome Measure Options: AM-PAC 6 Clicks Basic Mobility (PT)  AM-PAC 6 Clicks Score (PT): 22    PT Discharge Summary  Anticipated Discharge Disposition (PT): home with assist, home with home health    Viki Dominique PT  1/26/2021

## 2021-01-26 NOTE — PLAN OF CARE
Goal Outcome Evaluation:  Plan of Care Reviewed With: patient  Progress: improving  Outcome Summary: Patient demonstrated safe mobility on floor with walker, ambulating 350 feet. No LOB or buckling noted

## 2021-01-26 NOTE — DISCHARGE SUMMARY
Patient Name: Ang Jackson  MRN: [id number]  : 1960  DOS: 2021    Attending: Jb Patel MD    Primary Care Provider: Mamta Ortega PA-C    Date of Admission:.2021 10:13 AM    Date of Discharge:  2021    Discharge Diagnosis:   Status post total hip replacement, left    Diabetes mellitus (CMS/HCC)    Hypertension    Acute blood loss anemia, mild, asymptomatic    Primary osteoarthritis of left hip    BPH (benign prostatic hyperplasia)    GERD (gastroesophageal reflux disease)    Obesity      Hospital Course  At admit:  Patient is a pleasant 60 y.o. male presented for scheduled surgery by Dr. Patel     Patient is known to me from prior hospitalization in 2021 he had right total hip arthroplasty.     Per his note (This patient has a history of progressive left hip pain and arthritis. The hip pain is severe with activity and has progressed significantly. Non-operative treatment has been attempted, but has not improved or controlled symptoms during normal daily activities. Motion has become limited and rotation severely restricted. X-rays reveal moderate-to-severe eburnation of articular cartilage on the superior weight bearing surface of the hip with circumferential acetabular and femoral neck osteophytes consistent with advanced hip osteoarthritis. A total hip arthroplasty was recommended at this time.).     Patient underwent left total hip arthroplasty under general anesthesia, tolerated well, is admitted for further management.     Seen in PACU, he is doing fairly well.  Some postoperative pain.  No complaints of nausea, vomiting, or shortness of breath.        He has achieved a good amount of weight loss following gastric sleeve procedure.  He has no history of DVT or PE.     After admit  Patient was provided pain medications as needed for pain control.  Adjustments were made to pain medications to optimize postop pain management when indicated. Risks and benefits of  "opiate medications discussed with patient. YANELI report was reviewed.    He was seen by PT and OT and has progressed well over his stay.    Pt used an IS for atelectasis prophylaxis and ASA along with mechanicals for DVT prophylaxis.    Home medications were resumed as appropriate, and labs were monitored and remained fairly stable.     With the progress he has made,  is ready for DC home today.      Discussed with patient regarding plan and he shows understanding and agreement.    Patient will have HHPT following discharge.        Procedures Performed  Procedure(s):  TOTAL HIP ARTHROPLASTY LEFT       Pertinent Test Results:    I reviewed the patient's new clinical results.   Results from last 7 days   Lab Units 21  0452   HEMOGLOBIN g/dL 9.9*   HEMATOCRIT % 31.1*     Results from last 7 days   Lab Units 21  0452   SODIUM mmol/L 135*   POTASSIUM mmol/L 4.2   CHLORIDE mmol/L 102   CO2 mmol/L 25.0   BUN mg/dL 19   CREATININE mg/dL 0.80   CALCIUM mg/dL 7.6*   GLUCOSE mg/dL 182*     I reviewed the patient's new imaging including images and reports.    Results for EMILY TENA (MRN [id number]) as of 2021 15:39   Ref. Range 1/15/2021 12:20   Hemoglobin Latest Ref Range: 13.0 - 17.7 g/dL 13.0   Hematocrit Latest Ref Range: 37.5 - 51.0 % 41.2         Physical therapy  Plan of Care Reviewed With: patient  Progress: improving  Outcome Summary: Patient demonstrated safe mobility on floor with walker, ambulating 350 feet. No LOB or buckling noted    Discharge Assessment:      Visit Vitals  /63   Pulse 82   Temp 98.2 °F (36.8 °C) (Oral)   Resp 18   Ht 172.7 cm (68\")   Wt 104 kg (229 lb)   SpO2 97%   BMI 34.82 kg/m²     Temp (24hrs), Av.5 °F (36.4 °C), Min:97 °F (36.1 °C), Max:98.3 °F (36.8 °C)      General Appearance:    Alert, cooperative, in no acute distress   Lungs:     Clear to auscultation,respirations regular, even and                   unlabored    Heart:    Regular rhythm and " normal rate, normal S1 and S2   Abdomen:     Normal bowel sounds, no masses, no organomegaly, soft        non-tender, non-distended, no guarding, no rebound                 tenderness   Extremities:   CDI dressing aquacel on left hip incision   Pulses:   Pulses palpable and equal bilaterally   Skin:   No bleeding, bruising or rash   Neurologic:   Cranial nerves 2 - 12 grossly intact, sensation intact, Flexion and dorsiflexion intact bilateral feet.         Discharge Disposition:Home.          Discharge Medications      New Medications      Instructions Start Date   acetaminophen 500 MG tablet  Commonly known as: TYLENOL   1,000 mg, Oral, Every 6 Hours, Take every 6 hours as needed after 1 week.      aspirin  MG tablet   325 mg, Oral, Daily      docusate sodium 100 MG capsule  Commonly known as: COLACE   100 mg, Oral, 2 Times Daily      oxyCODONE 5 MG immediate release tablet  Commonly known as: Roxicodone   10 mg, Oral, Every 4 Hours PRN         Changes to Medications      Instructions Start Date   HYDROcodone-acetaminophen  MG per tablet  Commonly known as: NORCO  What changed: These instructions start on January 31, 2021. If you are unsure what to do until then, ask your doctor or other care provider.   1 tablet, Oral, Every 6 Hours PRN   Start Date: January 31, 2021        Continue These Medications      Instructions Start Date   ascorbic acid 1000 MG tablet  Commonly known as: VITAMIN C   1,000 mg, Oral, Daily      gabapentin 600 MG tablet  Commonly known as: NEURONTIN   600 mg, Oral, 3 Times Daily      hydrOXYzine pamoate 25 MG capsule  Commonly known as: VISTARIL   50 mg, Oral, Every 6 Hours PRN      lisinopril 5 MG tablet  Commonly known as: PRINIVIL,ZESTRIL   5 mg, Oral, Daily      loratadine 10 MG tablet  Commonly known as: CLARITIN   10 mg, Oral, Daily      meloxicam 7.5 MG tablet  Commonly known as: MOBIC   7.5 mg, Oral, 2 times daily      multivitamin with minerals tablet tablet   1 tablet,  Oral, Daily      omeprazole 20 MG capsule  Commonly known as: priLOSEC   20 mg, Oral, Daily      Requip 2 MG tablet  Generic drug: rOPINIRole   2 mg, Oral, Nightly      tamsulosin 0.4 MG capsule 24 hr capsule  Commonly known as: Flomax   0.4 mg, Oral, Nightly      vitamin B-12 1000 MCG tablet  Commonly known as: CYANOCOBALAMIN   1,000 mcg, Oral, Daily      Cyanocobalamin 1000 MCG/ML kit   1 dose, Injection, Every 30 Days         Stop These Medications    Antiseptic Skin Cleanser 4 % solution  Generic drug: Chlorhexidine Gluconate     mupirocin 2 % ointment  Commonly known as: BACTROBAN            Discharge Diet: Resume pre hospital diet     Activity at Discharge:   Projected weight bearing as tolerated left lower extremity, posterior precautions x6 weeks  Follow-up Appointments   , 3 weeks per his orders.     Dragon disclaimer:  Part of this encounter note is an electronic transcription/translation of spoken language to printed text. The electronic translation of spoken language may permit erroneous, or at times, nonsensical words or phrases to be inadvertently transcribed; Although I have reviewed the note for such errors, some may still exist.       Chirag Padgett MD  01/26/21  11:42 EST

## 2021-01-26 NOTE — PLAN OF CARE
Problem: Adult Inpatient Plan of Care  Goal: Plan of Care Review  Recent Flowsheet Documentation  Taken 1/26/2021 1049 by Shonda New OT  Progress: improving  Plan of Care Reviewed With: patient  Outcome Summary: OT IE completed post chart review. Pt initially presented with decreased I in ADLs, related t/f however post OT training presented with improved occupational I and safety. Pt initially educated on posterior hip precautions as pt able to verbalize 2/3. Pt completed bed mobility i.e. supine > sitting, scooting to EOB with pv with HOB elevated and bed rails used, STS at EOB, SPT at toilet w/ spv and fxl mobility with SBA and FWW throughout. Pt able to complete toileting cloth mgmt with SBA, hygiene with spv, UBD with I, LBD with spv. OT issued LH reacher and sock aid to assist with LBD in order to adhere to posterior hip prec and maximize I. Pt able to return demo use and utilized in aforementioned levels of A. OT reviewed optimal seq for threading, unthreading LB garments. Pt presents with fxl strength and ROM at BUEs. Pt does not require further skilled IPOT POC. Recommend return home with A when medically ready.

## 2021-02-01 ENCOUNTER — TELEPHONE (OUTPATIENT)
Dept: ORTHOPEDIC SURGERY | Facility: CLINIC | Age: 61
End: 2021-02-01

## 2021-02-01 DIAGNOSIS — Z96.642 STATUS POST TOTAL HIP REPLACEMENT, LEFT: ICD-10-CM

## 2021-02-01 RX ORDER — OXYCODONE HYDROCHLORIDE 5 MG/1
10 TABLET ORAL EVERY 4 HOURS PRN
Qty: 40 TABLET | Refills: 0 | Status: SHIPPED | OUTPATIENT
Start: 2021-02-01 | End: 2021-02-06

## 2021-02-01 NOTE — TELEPHONE ENCOUNTER
Dr. Patel-please see message below and advise. Thanks!    Spoke with pt regarding previous message; I discussed the fact that it appeared Dr. GRIGGS gave the pt 5 days of the oxycodone then wanted the pt to switch to Raymond. The pt said he was not aware of this at all and denies getting a paper script and it doesn't appear the Raymond was sent in to a pharmacy.  The pt took his last oxycodone this morning and is requesting we refill the oxycodone due to the immediate release. I told the pt that I would send this message to Dr. Patel and get back to him as soon as I had a response.    Sophie

## 2021-02-01 NOTE — TELEPHONE ENCOUNTER
PATIENT CALL REQUESTING A REFILL ON OXYCODONE 5 MG. PATIENT USES [facility] IN [facility]. PATIENT WOULD LIKE A CALL BACK [phone]

## 2021-02-02 NOTE — TELEPHONE ENCOUNTER
LVM with pt letting him know his refill was sent to pharmacy and to call back if he had any further questions or concerns.    Sophie

## 2021-02-19 ENCOUNTER — OFFICE VISIT (OUTPATIENT)
Dept: ORTHOPEDIC SURGERY | Facility: CLINIC | Age: 61
End: 2021-02-19

## 2021-02-19 DIAGNOSIS — Z96.642 STATUS POST TOTAL HIP REPLACEMENT, LEFT: Primary | ICD-10-CM

## 2021-02-19 PROCEDURE — 99441 PR PHYS/QHP TELEPHONE EVALUATION 5-10 MIN: CPT | Performed by: PHYSICIAN ASSISTANT

## 2021-02-19 RX ORDER — OXYCODONE HYDROCHLORIDE 5 MG/1
5 TABLET ORAL EVERY 4 HOURS PRN
Qty: 30 TABLET | Refills: 0 | Status: SHIPPED | OUTPATIENT
Start: 2021-02-19 | End: 2021-03-18

## 2021-02-19 NOTE — PROGRESS NOTES
[facility]    Subjective   You have chosen to receive care through a telephone visit. Do you consent to use a telephone visit for your medical care today? Yes  Patient: Ang Jackson  : 1960    Primary Care Provider: Mamta Ortega PA-C    Requesting Provider: As above    Post-op (3 weeks s/p Left total hip arthroplasty 21)      History    History of Present Illness: Patient presents for telephone visit today 3 weeks status post left total hip arthroplasty with Dr. Patel on 2021.  Patient reports he is doing home health physical therapy.  Patient pain is better than it was prior to surgery but he is still needing occasional oxycodone.  He reports the incision is healing well.  He has been happy with his progress    Current Outpatient Medications on File Prior to Visit   Medication Sig Dispense Refill   • ascorbic acid (VITAMIN C) 1000 MG tablet Take 1,000 mg by mouth Daily.     • aspirin EC (aspirin) 325 MG tablet Take 1 tablet by mouth Daily for 30 days. 30 tablet 0   • Cyanocobalamin 1000 MCG/ML kit Inject 1 dose as directed Every 30 (Thirty) Days.     • gabapentin (NEURONTIN) 600 MG tablet Take 600 mg by mouth 3 (Three) Times a Day.     • hydrOXYzine (VISTARIL) 25 MG capsule Take 50 mg by mouth Every 6 (Six) Hours As Needed for Anxiety.     • lisinopril (PRINIVIL,ZESTRIL) 5 MG tablet Take 5 mg by mouth Daily.     • loratadine (CLARITIN) 10 MG tablet Take 10 mg by mouth Daily.     • meloxicam (MOBIC) 7.5 MG tablet Take 7.5 mg by mouth 2 (two) times a day.     • Multiple Vitamins-Minerals (MENS MULTIPLUS PO) Take 1 tablet by mouth Daily.     • omeprazole (priLOSEC) 20 MG capsule Take 20 mg by mouth Daily.     • rOPINIRole (Requip) 2 MG tablet Take 2 mg by mouth Every Night.     • tamsulosin (FLOMAX) 0.4 MG capsule 24 hr capsule Take 1 capsule by mouth Every Night. 30 capsule 3   • vitamin B-12 (CYANOCOBALAMIN) 1000 MCG tablet Take 1,000 mcg by mouth Daily.      • [DISCONTINUED] HYDROcodone-acetaminophen (NORCO)  MG per tablet Take 1 tablet by mouth Every 6 (Six) Hours As Needed for Moderate Pain .       No current facility-administered medications on file prior to visit.       Allergies   Allergen Reactions   • Codeine Nausea And Vomiting   • Morphine And Related Nausea And Vomiting      Past Medical History:   Diagnosis Date   • Anesthesia     diffculty adminster spinal block, patient stated with last hip surgery 7-2020 several attempts per anthesia and no luck, resulted in general anesthesia    • Anxiety    • Arthritis    • Diabetes mellitus (CMS/[facility])     diet controlled, resolved per pt report with weight loss with gastric sleeve   • GERD (gastroesophageal reflux disease)    • Headache    • Hypertension    • Low back pain    • Sleep apnea     does not wear machine, MILD   • Wears reading eyeglasses      Past Surgical History:   Procedure Laterality Date   • CARPAL TUNNEL RELEASE Bilateral    • COLONOSCOPY      5 years ago   • GASTRIC SLEEVE LAPAROSCOPIC     • HIP SURGERY Right times 2   • KNEE SURGERY Left     arthroscopy    • TOTAL HIP ARTHROPLASTY Left 1/25/2021    Procedure: TOTAL HIP ARTHROPLASTY LEFT;  Surgeon: Jb Patel MD;  Location:  [facility];  Service: Orthopedics;  Laterality: Left;   • TOTAL HIP ARTHROPLASTY REVISION Right 7/20/2020    Procedure: TOTAL HIP ARTHROPLASTY REVISION RIGHT;  Surgeon: Jb Patel MD;  Location:  [facility];  Service: Orthopedics;  Laterality: Right;     Family History   Problem Relation Age of Onset   • Heart disease Father    • Hypertension Father    • Osteoarthritis Father    • Cancer Mother    • Diabetes Brother    • Hypertension Brother    • Gout Paternal Grandmother    • Diabetes Paternal Grandmother       Social History     Socioeconomic History   • Marital status:      Spouse name: Not on file   • Number of children: Not on file   • Years of education: Not on file   • Highest education level:  Not on file   Tobacco Use   • Smoking status: Never Smoker   • Smokeless tobacco: Never Used   Substance and Sexual Activity   • Alcohol use: No   • Drug use: No   • Sexual activity: Defer        Review of Systems   Constitutional: Negative.    HENT: Negative.    Eyes: Negative.    Respiratory: Negative.    Cardiovascular: Negative.    Gastrointestinal: Negative.    Endocrine: Negative.    Genitourinary: Negative.    Musculoskeletal: Positive for arthralgias.   Skin: Negative.    Allergic/Immunologic: Negative.    Neurological: Negative.    Hematological: Negative.    Psychiatric/Behavioral: Negative.        The following portions of the patient's history were reviewed and updated as appropriate: allergies, current medications, past family history, past medical history, past social history, past surgical history and problem list.      Objective      Physical Exam  There were no vitals taken for this visit.    There is no height or weight on file to calculate BMI.    No physical exam secondary to telephone encounter  Medical Decision Making    Data Review:   none    Assessment:  No diagnosis found.    Plan:  Doing well status post left total hip arthroplasty with Dr. Patel on 1/25/2021.  Patient reports pain better than prior to surgery.  He is doing home health physical therapy.  He is hoping to recover from the surgery quickly so he can have a knee replacement.  I encouraged him to begin outpatient physical therapy as soon as he can.  We will refer he will hurt his narcotics today he can return to see Dr. Patel next week as scheduled or sooner if needed.      Mamta Dasilva PA-C  02/19/21  14:06 EST    This visit has been rescheduled as a phone visit to comply with patient safety concerns in accordance with CDC recommendations. Total time of discussion was 5 minutes.

## 2021-02-26 ENCOUNTER — OFFICE VISIT (OUTPATIENT)
Dept: ORTHOPEDIC SURGERY | Facility: CLINIC | Age: 61
End: 2021-02-26

## 2021-02-26 DIAGNOSIS — Z96.642 STATUS POST TOTAL HIP REPLACEMENT, LEFT: Primary | ICD-10-CM

## 2021-02-26 PROCEDURE — 99024 POSTOP FOLLOW-UP VISIT: CPT | Performed by: ORTHOPAEDIC SURGERY

## 2021-02-26 NOTE — PROGRESS NOTES
Orthopaedic Clinic Note:  Hip Post Op    Chief Complaint   Patient presents with   • Post-op     1 week recheck- 4 weeks s/p Left total hip arthroplasty 1/25/21        HPI    Mr. Jackson is 4  week(s) s/p left total hip arthroplasty.  He is doing extremely well regards the left hip rates his pain a 1/10 on the pain scale.  His primary complaint is left knee pain which he rates a 7/10 on the pain scale.  He is ambulating with assistance of a cane.  He denies fevers chills or constitutional symptoms.  Overall he is happy with his progress.      Past Medical History:   Diagnosis Date   • Anesthesia     diffculty adminster spinal block, patient stated with last hip surgery 7-2020 several attempts per awilda and rin molina, resulted in general anesthesia    • Anxiety    • Arthritis    • Diabetes mellitus (CMS/[facility])     diet controlled, resolved per pt report with weight loss with gastric sleeve   • GERD (gastroesophageal reflux disease)    • Headache    • Hypertension    • Low back pain    • Sleep apnea     does not wear machine, MILD   • Wears reading eyeglasses       Past Surgical History:   Procedure Laterality Date   • CARPAL TUNNEL RELEASE Bilateral    • COLONOSCOPY      5 years ago   • GASTRIC SLEEVE LAPAROSCOPIC     • HIP SURGERY Right times 2   • KNEE SURGERY Left     arthroscopy    • TOTAL HIP ARTHROPLASTY Left 1/25/2021    Procedure: TOTAL HIP ARTHROPLASTY LEFT;  Surgeon: Jb Patel MD;  Location:  [facility];  Service: Orthopedics;  Laterality: Left;   • TOTAL HIP ARTHROPLASTY REVISION Right 7/20/2020    Procedure: TOTAL HIP ARTHROPLASTY REVISION RIGHT;  Surgeon: Jb Patel MD;  Location:  [facility];  Service: Orthopedics;  Laterality: Right;      Family History   Problem Relation Age of Onset   • Heart disease Father    • Hypertension Father    • Osteoarthritis Father    • Cancer Mother    • Diabetes Brother    • Hypertension Brother    • Gout Paternal Grandmother    • Diabetes Paternal Grandmother       Social History     Socioeconomic History   • Marital status:      Spouse name: Not on file   • Number of children: Not on file   • Years of education: Not on file   • Highest education level: Not on file   Tobacco Use   • Smoking status: Never Smoker   • Smokeless tobacco: Never Used   Substance and Sexual Activity   • Alcohol use: No   • Drug use: No   • Sexual activity: Defer      Current Outpatient Medications on File Prior to Visit   Medication Sig Dispense Refill   • ascorbic acid (VITAMIN C) 1000 MG tablet Take 1,000 mg by mouth Daily.     • Cyanocobalamin 1000 MCG/ML kit Inject 1 dose as directed Every 30 (Thirty) Days.     • gabapentin (NEURONTIN) 600 MG tablet Take 600 mg by mouth 3 (Three) Times a Day.     • hydrOXYzine (VISTARIL) 25 MG capsule Take 50 mg by mouth Every 6 (Six) Hours As Needed for Anxiety.     • lisinopril (PRINIVIL,ZESTRIL) 5 MG tablet Take 5 mg by mouth Daily.     • loratadine (CLARITIN) 10 MG tablet Take 10 mg by mouth Daily.     • meloxicam (MOBIC) 7.5 MG tablet Take 7.5 mg by mouth 2 (two) times a day.     • Multiple Vitamins-Minerals (MENS MULTIPLUS PO) Take 1 tablet by mouth Daily.     • omeprazole (priLOSEC) 20 MG capsule Take 20 mg by mouth Daily.     • oxyCODONE (ROXICODONE) 5 MG immediate release tablet Take 1 tablet by mouth Every 4 (Four) Hours As Needed for Moderate Pain . 30 tablet 0   • rOPINIRole (Requip) 2 MG tablet Take 2 mg by mouth Every Night.     • tamsulosin (FLOMAX) 0.4 MG capsule 24 hr capsule Take 1 capsule by mouth Every Night. 30 capsule 3   • vitamin B-12 (CYANOCOBALAMIN) 1000 MCG tablet Take 1,000 mcg by mouth Daily.     • [] aspirin EC (aspirin) 325 MG tablet Take 1 tablet by mouth Daily for 30 days. 30 tablet 0     No current facility-administered medications on file prior to visit.       Allergies   Allergen Reactions   • Codeine Nausea And Vomiting   • Morphine And Related Nausea And Vomiting        Review of Systems   Constitutional:  Negative.    HENT: Negative.    Eyes: Negative.    Respiratory: Negative.    Cardiovascular: Negative.    Gastrointestinal: Negative.    Endocrine: Negative.    Genitourinary: Negative.    Musculoskeletal: Positive for arthralgias.   Skin: Negative.    Allergic/Immunologic: Negative.    Neurological: Negative.    Hematological: Negative.    Psychiatric/Behavioral: Negative.         Physical Exam  There were no vitals taken for this visit.    There is no height or weight on file to calculate BMI.    GENERAL APPEARANCE: awake, alert, oriented, in no acute distress and well developed, well nourished  LUNGS:  breathing nonlabored  EXTREMITIES: no clubbing, cyanosis  PERIPHERAL PULSES: palpable dorsalis pedis and posterior tibial pulses bilaterally.    GAIT:  Antalgic            Hip Exam:  Left    RANGE OF MOTION:  EXTENSION/FLEXION:  normal (0-110 degrees)  IR:  20  ER:  40  PAIN WITH HIP MOTION:  no  PAIN WITH LOGROLL:  no     STRENGTH:  ABDUCTOR:  5/5  ADDUCTOR:  5/5  HIP FLEXION:  5/5    GREATER TROCHANTER BURSAL PAIN:  yes    SENSATION TO LIGHT TOUCH:  DEEP PERONEAL/SUPERFICIAL PERONEAL/SURAL/SAPHENOUS/TIBIAL:   intact    EDEMA:  no  ERYTHEMA:  no  WOUNDS/INCISIONS:   yes, well healed surgical incision without evidence of erythema or drainage  _______________________________________________________________  _______________________________________________________________    RADIOGRAPHIC FINDINGS:   Indication: Status post left total hip arthroplasty    Comparison: Todays xrays were compared to previous xrays from 1/25/2021    AP pelvis: Right: Demonstrate a well positioned total hip without evidence of wear, loosening, fracture or osteolysis, femoral head is concentrically reduced within the acetabulum and No significant changes compared to prior radiographs.;Left: Demonstrate a well positioned total hip without evidence of wear, loosening, fracture or osteolysis, femoral head is concentrically reduced within the  acetabulum and No significant changes compared to prior radiographs.        Assessment/Plan:   Diagnosis Plan   1. Status post total hip replacement, left  XR Pelvis 1 or 2 View     Patient is doing well 4 weeks status post left total hip arthroplasty.  He is interested in pursuing surgical intervention for the left knee.  I explained that he needs to continue rehab of the left hip before proceeding to the left knee surgery.  He is continue working on strengthening and rehab and weaning from the cane.  I will see him back in 3 weeks for repeat evaluation with x-ray AP pelvis as well as 4 views of left knee on return.  Provided he is doing well at that time, we will schedule him for a left total knee arthroplasty.    Jb Patel MD  02/26/21  11:54 EST

## 2021-03-16 ENCOUNTER — BULK ORDERING (OUTPATIENT)
Dept: CASE MANAGEMENT | Facility: OTHER | Age: 61
End: 2021-03-16

## 2021-03-16 DIAGNOSIS — Z23 IMMUNIZATION DUE: ICD-10-CM

## 2021-03-18 ENCOUNTER — OFFICE VISIT (OUTPATIENT)
Dept: NEUROSURGERY | Facility: CLINIC | Age: 61
End: 2021-03-18

## 2021-03-18 ENCOUNTER — HOSPITAL ENCOUNTER (OUTPATIENT)
Dept: GENERAL RADIOLOGY | Facility: HOSPITAL | Age: 61
Discharge: HOME | End: 2021-03-18
Admitting: NEUROLOGICAL SURGERY

## 2021-03-18 VITALS
TEMPERATURE: 97.5 F | WEIGHT: 229.8 LBS | BODY MASS INDEX: 34.83 KG/M2 | DIASTOLIC BLOOD PRESSURE: 72 MMHG | SYSTOLIC BLOOD PRESSURE: 112 MMHG | HEIGHT: 68 IN

## 2021-03-18 DIAGNOSIS — M48.062 SPINAL STENOSIS, LUMBAR REGION, WITH NEUROGENIC CLAUDICATION: Primary | ICD-10-CM

## 2021-03-18 PROCEDURE — 99213 OFFICE O/P EST LOW 20 MIN: CPT | Performed by: NEUROLOGICAL SURGERY

## 2021-03-18 PROCEDURE — 72120 X-RAY BEND ONLY L-S SPINE: CPT | Performed by: RADIOLOGY

## 2021-03-18 PROCEDURE — 72120 X-RAY BEND ONLY L-S SPINE: CPT

## 2021-03-18 RX ORDER — HYDROCODONE BITARTRATE AND ACETAMINOPHEN 10; 325 MG/1; MG/1
TABLET ORAL
COMMUNITY
Start: 2021-03-01 | End: 2021-03-19

## 2021-03-18 RX ORDER — GABAPENTIN 800 MG/1
600 TABLET ORAL 3 TIMES DAILY
COMMUNITY
Start: 2021-03-01

## 2021-03-18 RX ORDER — NABUMETONE 750 MG/1
750 TABLET, FILM COATED ORAL 2 TIMES DAILY
Qty: 60 TABLET | Refills: 0 | Status: SHIPPED | OUTPATIENT
Start: 2021-03-18 | End: 2021-04-15

## 2021-03-18 NOTE — PROGRESS NOTES
Ang Jackson  1960  [id number]      Chief Complaint   Patient presents with   • Back Pain   • Leg Pain       HISTORY OF PRESENT ILLNESS: This is a 60-year-old male who has a genetic predisposition for degenerative osteoarthritis presenting with severe pain in his back radiating primarily into his right leg.  Only rarely does have pain in his left leg.  The pain is gotten progressively worse since he had hip surgery performed.  He has had bilateral hip replacements over the past year.  Has a genetic predisposition for degenerative osteoarthritis and diabetes.  Lumbar MRI has been performed is referred for neurosurgical consultation.    He has been to physical therapy and pain management.  Epidural steroid injection gave him only 1 week of relief.  He has been on meloxicam 7.5 mg twice daily for approximately 2 years.    Past Medical History:   Diagnosis Date   • Anesthesia     diffculty adminster spinal block, patient stated with last hip surgery 7-2020 several attempts per awilda and rin molina, resulted in general anesthesia    • Anxiety    • Arthritis    • Diabetes mellitus (CMS/[facility])     diet controlled, resolved per pt report with weight loss with gastric sleeve   • GERD (gastroesophageal reflux disease)    • Headache    • Hypertension    • Low back pain    • Sleep apnea     does not wear machine, MILD   • Wears reading eyeglasses        Past Surgical History:   Procedure Laterality Date   • CARPAL TUNNEL RELEASE Bilateral    • COLONOSCOPY      5 years ago   • GASTRIC SLEEVE LAPAROSCOPIC     • HIP SURGERY Right times 2   • KNEE SURGERY Left     arthroscopy    • TOTAL HIP ARTHROPLASTY Left 1/25/2021    Procedure: TOTAL HIP ARTHROPLASTY LEFT;  Surgeon: Jb Patel MD;  Location:  [facility];  Service: Orthopedics;  Laterality: Left;   • TOTAL HIP ARTHROPLASTY REVISION Right 7/20/2020    Procedure: TOTAL HIP ARTHROPLASTY REVISION RIGHT;  Surgeon: Jb Patel MD;  Location:  [facility];  Service:  Orthopedics;  Laterality: Right;       Family History   Problem Relation Age of Onset   • Heart disease Father    • Hypertension Father    • Osteoarthritis Father    • Cancer Mother    • Diabetes Brother    • Hypertension Brother    • Gout Paternal Grandmother    • Diabetes Paternal Grandmother        Social History     Socioeconomic History   • Marital status:      Spouse name: Not on file   • Number of children: Not on file   • Years of education: Not on file   • Highest education level: Not on file   Tobacco Use   • Smoking status: Never Smoker   • Smokeless tobacco: Never Used   Vaping Use   • Vaping Use: Never used   Substance and Sexual Activity   • Alcohol use: No   • Drug use: No   • Sexual activity: Defer       Allergies   Allergen Reactions   • Codeine Nausea And Vomiting   • Morphine And Related Nausea And Vomiting         Current Outpatient Medications:   •  ascorbic acid (VITAMIN C) 1000 MG tablet, Take 1,000 mg by mouth Daily., Disp: , Rfl:   •  Cyanocobalamin 1000 MCG/ML kit, Inject 1 dose as directed Every 30 (Thirty) Days., Disp: , Rfl:   •  gabapentin (NEURONTIN) 800 MG tablet, , Disp: , Rfl:   •  HYDROcodone-acetaminophen (NORCO)  MG per tablet, , Disp: , Rfl:   •  hydrOXYzine (VISTARIL) 25 MG capsule, Take 50 mg by mouth Every 6 (Six) Hours As Needed for Anxiety., Disp: , Rfl:   •  lisinopril (PRINIVIL,ZESTRIL) 5 MG tablet, Take 5 mg by mouth Daily., Disp: , Rfl:   •  loratadine (CLARITIN) 10 MG tablet, Take 10 mg by mouth Daily., Disp: , Rfl:   •  meloxicam (MOBIC) 7.5 MG tablet, Take 7.5 mg by mouth 2 (two) times a day., Disp: , Rfl:   •  Multiple Vitamins-Minerals (MENS MULTIPLUS PO), Take 1 tablet by mouth Daily., Disp: , Rfl:   •  omeprazole (priLOSEC) 20 MG capsule, Take 20 mg by mouth Daily., Disp: , Rfl:   •  rOPINIRole (Requip) 2 MG tablet, Take 2 mg by mouth Every Night., Disp: , Rfl:   •  tamsulosin (FLOMAX) 0.4 MG capsule 24 hr capsule, Take 1 capsule by mouth Every  Night., Disp: 30 capsule, Rfl: 3    Review of Systems   Constitutional: Positive for activity change. Negative for appetite change, chills, diaphoresis, fatigue, fever and unexpected weight change.   HENT: Negative for congestion, dental problem, drooling, ear discharge, ear pain, facial swelling, hearing loss, mouth sores, nosebleeds, postnasal drip, rhinorrhea, sinus pressure, sneezing, sore throat, tinnitus, trouble swallowing and voice change.    Eyes: Negative for photophobia, pain, discharge, redness, itching and visual disturbance.   Respiratory: Negative for apnea, cough, choking, chest tightness, shortness of breath, wheezing and stridor.    Cardiovascular: Negative for chest pain, palpitations and leg swelling.   Gastrointestinal: Negative for abdominal distention, abdominal pain, anal bleeding, blood in stool, constipation, diarrhea, nausea, rectal pain and vomiting.   Endocrine: Positive for cold intolerance. Negative for heat intolerance, polydipsia, polyphagia and polyuria.   Genitourinary: Negative for decreased urine volume, difficulty urinating, dysuria, enuresis, flank pain, frequency, genital sores, hematuria and urgency.   Musculoskeletal: Positive for back pain, joint swelling, neck pain and neck stiffness. Negative for arthralgias, gait problem and myalgias.   Skin: Negative for color change, pallor, rash and wound.   Allergic/Immunologic: Positive for environmental allergies. Negative for food allergies and immunocompromised state.   Neurological: Positive for numbness. Negative for dizziness, tremors, seizures, syncope, facial asymmetry, speech difficulty, weakness, light-headedness and headaches.   Hematological: Negative for adenopathy. Does not bruise/bleed easily.   Psychiatric/Behavioral: Positive for sleep disturbance. Negative for agitation, behavioral problems, confusion, decreased concentration, dysphoric mood, hallucinations, self-injury and suicidal ideas. The patient is  "nervous/anxious. The patient is not hyperactive.    All other systems reviewed and are negative.      Vitals:    03/18/21 0956   Height: 172.7 cm (68\")       Neurological Examination:    Mental status/speech: The patient is alert and oriented.  Speech is clear without aphysia or dysarthria.  No overt cognitive deficits.    Cranial nerve examination:    Olfaction: Smell is intact.  Vision: Vision is intact without visual field abnormalities.  Funduscopic examination is normal.  No pupillary irregularity.  Ocular motor examination: The extraocular muscles are intact.  There is no diplopia.  The pupil is round and reactive to both light and accommodation.  There is no nystagmus.  Facial movement/sensation: There is no facial weakness.  Sensation is intact in the first, second, and third divisions of the trigeminal nerve.  The corneal reflex is intact.  Auditory: Hearing is intact to finger rub bilaterally.  Cranial nerves IX, X, XI, XII: Phonation is normal.  No dysphagia.  Tongue is protruded in the midline without atrophy.  The gag reflex is intact.  Shoulder shrug is normal.    Musculoligamentous ligamentous examination: BMI 34.9, weight 229 pounds.  He has limited range of motion of the lumbar spine including flexion, extension lateral rotation.  Straight leg raising, Lasègue and flip test negative.  His strength is actually quite good without reflex asymmetry.  Sensation is intact.  He uses a cane for walking.  This is primarily for his hip.    Medical Decision Making:     Diagnostic Data Set: The lumbar MRI has been reviewed.  It shows the presence of high-grade spinal stenosis at L4-L5 and less so yet still present at L5-S1.  He has significant degenerative disc disease at L1-2.  There is no significant compromise at this canal although the left foramen is mildly stenosed.  The major issues are with those of stenosis at L4-5 and L5-S1      Assessment: Neurogenic claudication secondary to spinal stenosis L4-5, " L5-S1          Recommendations: I have given him prescription of Relafen 750 mg twice daily to replace meloxicam to see if this would be more efficacious.  I have ordered flexion-extension lumbar spine x-rays to determine if instability is present.  He has high-grade stenosis for which lumbar decompression is clearly indicated.  His symptoms are primarily on the right side however he has significant disease bilaterally with occasional symptoms on the left.  Therefore I believe he will need to have posterior decompression L4 and possibly L5.  The question will be whether or not he would require PLIF L3-L5.  He will call me after his x-rays; I will review and keep you informed.  Thank you for allow me to see him.        I greatly appreciate the opportunity to see and evaluate this individual.  If you have questions or concerns regarding issues that I may have overlooked please call me at any time: [phone].  Cornelio Gan M.D.  [facility]  [address] [place] [address].  [place] [id number]

## 2021-03-18 NOTE — PATIENT INSTRUCTIONS
Call Dr. Gan on a Monday or Tuesday (ask for Rachelle or Melony) and leave a message.     Dr. Gan will call you back at the end of the day as soon as he can.     [phone] Rachelle    [phone] Melony Stack    Call after x-ray

## 2021-03-19 ENCOUNTER — OFFICE VISIT (OUTPATIENT)
Dept: ORTHOPEDIC SURGERY | Facility: CLINIC | Age: 61
End: 2021-03-19

## 2021-03-19 VITALS
DIASTOLIC BLOOD PRESSURE: 85 MMHG | BODY MASS INDEX: 35.16 KG/M2 | WEIGHT: 224 LBS | SYSTOLIC BLOOD PRESSURE: 134 MMHG | HEIGHT: 67 IN | HEART RATE: 67 BPM

## 2021-03-19 DIAGNOSIS — Z96.642 STATUS POST TOTAL HIP REPLACEMENT, LEFT: ICD-10-CM

## 2021-03-19 DIAGNOSIS — M17.32 POST-TRAUMATIC OSTEOARTHRITIS OF LEFT KNEE: Primary | ICD-10-CM

## 2021-03-19 PROCEDURE — 99214 OFFICE O/P EST MOD 30 MIN: CPT | Performed by: ORTHOPAEDIC SURGERY

## 2021-03-19 RX ORDER — PREGABALIN 75 MG/1
75 CAPSULE ORAL ONCE
Status: CANCELLED | OUTPATIENT
Start: 2021-03-19 | End: 2021-03-19

## 2021-03-19 RX ORDER — MELOXICAM 7.5 MG/1
15 TABLET ORAL ONCE
Status: CANCELLED | OUTPATIENT
Start: 2021-03-19 | End: 2021-03-19

## 2021-03-19 RX ORDER — CHLORHEXIDINE GLUCONATE 4 G/100ML
SOLUTION TOPICAL DAILY PRN
Qty: 236 ML | Refills: 0 | Status: SHIPPED | OUTPATIENT
Start: 2021-03-19 | End: 2022-04-04

## 2021-03-19 RX ORDER — ACETAMINOPHEN 325 MG/1
1000 TABLET ORAL ONCE
Status: CANCELLED | OUTPATIENT
Start: 2021-03-19 | End: 2021-03-19

## 2021-03-19 RX ORDER — HYDROCODONE BITARTRATE AND ACETAMINOPHEN 7.5; 325 MG/1; MG/1
1 TABLET ORAL EVERY 6 HOURS PRN
Qty: 30 TABLET | Refills: 0 | Status: ON HOLD | OUTPATIENT
Start: 2021-03-19 | End: 2023-02-18

## 2021-03-31 ENCOUNTER — TELEPHONE (OUTPATIENT)
Dept: NEUROSURGERY | Facility: CLINIC | Age: 61
End: 2021-03-31

## 2021-03-31 NOTE — TELEPHONE ENCOUNTER
Caller: Ang Jackson    Relationship: Self    Best call back number: [phone], AUTH TO SPEAK WIFE OR LVM    Caller requesting test results: YES    What test was performed: XR    When was the test performed: 03/18/21    Where was the test performed: [facility]    Additional notes: PATIENT/ CAREGIVER CALLED TO CHECK THE STATUS OF RESULTS FROM RECENT IMAGING SINCE NO PREV CALLBACK RECEIVED UPON COMPLETION. HUB CONFIRMS IMAGING REPORT NOW AVAILABLE IN CHART AT THIS TIME. PER LAST OFFICE NOTE, PATIENT IS TO CALL AFTER XR REGARDING RESULTS. Office Visit with Gareth Gan MD (03/18/2021) PATIENT REQUESTING AN APPT. PLEASE ADVISE HOW PROVIDER WOULD LIKE TO PROCEED?    PATIENT/CAREGIVER CAN BE CONTACTED WITH FURTHER INSTRUCTION.

## 2021-04-01 ENCOUNTER — TELEPHONE (OUTPATIENT)
Dept: NEUROSURGERY | Facility: CLINIC | Age: 61
End: 2021-04-01

## 2021-04-01 NOTE — TELEPHONE ENCOUNTER
PATIENT CALLED TO HAVE DR FIELD'S ASST TO GIVE HIM A CALL.  HE WOULD NOT STATE WHAT IT WAS ABOUT.  PLEASE CALL AND ADVISE    [phone]

## 2021-04-02 NOTE — TELEPHONE ENCOUNTER
Tried to contact patient wife states he is not home and will give him a message to return my call.

## 2021-04-15 ENCOUNTER — OFFICE VISIT (OUTPATIENT)
Dept: NEUROSURGERY | Facility: CLINIC | Age: 61
End: 2021-04-15

## 2021-04-15 VITALS
TEMPERATURE: 98.1 F | WEIGHT: 228.8 LBS | SYSTOLIC BLOOD PRESSURE: 128 MMHG | HEIGHT: 67 IN | BODY MASS INDEX: 35.91 KG/M2 | DIASTOLIC BLOOD PRESSURE: 82 MMHG

## 2021-04-15 DIAGNOSIS — M48.062 SPINAL STENOSIS OF LUMBAR REGION WITH NEUROGENIC CLAUDICATION: Primary | ICD-10-CM

## 2021-04-15 PROCEDURE — 99213 OFFICE O/P EST LOW 20 MIN: CPT | Performed by: NEUROLOGICAL SURGERY

## 2021-04-15 RX ORDER — CELECOXIB 200 MG/1
200 CAPSULE ORAL 2 TIMES DAILY
Qty: 60 CAPSULE | Refills: 3 | Status: SHIPPED | OUTPATIENT
Start: 2021-04-15

## 2021-04-15 NOTE — PROGRESS NOTES
Ang Jackson  1960  [id number]                        CHIEF COMPLAINT: Severe back and bilateral leg pain         MEDICAL HISTORY SINCE LAST ENCOUNTER: This 60-year-old male was seen in March 2021 with symptoms consistent with neurogenic claudication.  His pain has worsened since that time with radiation to both lower extremities.  He has generalized and multilevel degenerative osteoarthritic changes.  He has had hip replacements, etc.  Flexion-extension x-rays were performed showing no movement.  He presents today for further discussion of his surgical options.  His symptoms have been unchanged.           Past Medical History:   Diagnosis Date   • Anesthesia     diffculty adminster spinal block, patient stated with last hip surgery 7-2020 several attempts per awilda and rin luck, resulted in general anesthesia    • Anxiety    • Arthritis    • Diabetes mellitus (CMS/HCC)     diet controlled, resolved per pt report with weight loss with gastric sleeve   • GERD (gastroesophageal reflux disease)    • Headache    • Hypertension    • Low back pain    • Sleep apnea     does not wear machine, MILD   • Wears reading eyeglasses               Past Surgical History:   Procedure Laterality Date   • CARPAL TUNNEL RELEASE Bilateral    • COLONOSCOPY      5 years ago   • GASTRIC SLEEVE LAPAROSCOPIC     • HIP SURGERY Right times 2   • KNEE SURGERY Left     arthroscopy    • TOTAL HIP ARTHROPLASTY Left 1/25/2021    Procedure: TOTAL HIP ARTHROPLASTY LEFT;  Surgeon: Jb Patel MD;  Location: [facility] OR;  Service: Orthopedics;  Laterality: Left;   • TOTAL HIP ARTHROPLASTY REVISION Right 7/20/2020    Procedure: TOTAL HIP ARTHROPLASTY REVISION RIGHT;  Surgeon: Jb Patel MD;  Location:  [facility] OR;  Service: Orthopedics;  Laterality: Right;              Family History   Problem Relation Age of Onset   • Heart disease Father    • Hypertension Father    • Osteoarthritis Father    • Cancer Mother    • Diabetes  Brother    • Hypertension Brother    • Gout Paternal Grandmother    • Diabetes Paternal Grandmother               Social History     Socioeconomic History   • Marital status:      Spouse name: Not on file   • Number of children: Not on file   • Years of education: Not on file   • Highest education level: Not on file   Tobacco Use   • Smoking status: Never Smoker   • Smokeless tobacco: Never Used   Vaping Use   • Vaping Use: Never used   Substance and Sexual Activity   • Alcohol use: No   • Drug use: No   • Sexual activity: Defer              Allergies   Allergen Reactions   • Codeine Nausea And Vomiting   • Morphine And Related Nausea And Vomiting              Current Outpatient Medications:   •  ascorbic acid (VITAMIN C) 1000 MG tablet, Take 1,000 mg by mouth Daily., Disp: , Rfl:   •  chlorhexidine (HIBICLENS) 4 % external liquid, Apply  topically to the appropriate area as directed Daily As Needed for Wound Care. Shower daily with hibiclens solution as directed 5 days prior to surgery., Disp: 236 mL, Rfl: 0  •  Cyanocobalamin 1000 MCG/ML kit, Inject 1 dose as directed Every 30 (Thirty) Days., Disp: , Rfl:   •  gabapentin (NEURONTIN) 800 MG tablet, , Disp: , Rfl:   •  HYDROcodone-acetaminophen (Norco) 7.5-325 MG per tablet, Take 1 tablet by mouth Every 6 (Six) Hours As Needed for Moderate Pain ., Disp: 30 tablet, Rfl: 0  •  hydrOXYzine (VISTARIL) 25 MG capsule, Take 50 mg by mouth Every 6 (Six) Hours As Needed for Anxiety., Disp: , Rfl:   •  lisinopril (PRINIVIL,ZESTRIL) 5 MG tablet, Take 5 mg by mouth Daily., Disp: , Rfl:   •  loratadine (CLARITIN) 10 MG tablet, Take 10 mg by mouth Daily., Disp: , Rfl:   •  Multiple Vitamins-Minerals (MENS MULTIPLUS PO), Take 1 tablet by mouth Daily., Disp: , Rfl:   •  mupirocin (Bactroban Nasal) 2 % nasal ointment, into the nostril(s) as directed by provider 2 (Two) Times a Day. Apply pea-sized amount to each nostril twice daily for 5 days prior to surgery, Disp: 22 g,  Rfl: 0  •  nabumetone (RELAFEN) 750 MG tablet, Take 1 tablet by mouth 2 (Two) Times a Day., Disp: 60 tablet, Rfl: 0  •  omeprazole (priLOSEC) 20 MG capsule, Take 20 mg by mouth Daily., Disp: , Rfl:   •  rOPINIRole (Requip) 2 MG tablet, Take 2 mg by mouth Every Night., Disp: , Rfl:   •  tamsulosin (FLOMAX) 0.4 MG capsule 24 hr capsule, Take 1 capsule by mouth Every Night., Disp: 30 capsule, Rfl: 3         Review of Systems   Constitutional: Negative for activity change, appetite change, chills, diaphoresis, fatigue, fever and unexpected weight change.   HENT: Negative.  Negative for congestion, dental problem, drooling, ear discharge, ear pain, facial swelling, hearing loss, mouth sores, nosebleeds, postnasal drip, rhinorrhea, sinus pressure, sneezing, sore throat, tinnitus, trouble swallowing and voice change.    Eyes: Negative.  Negative for photophobia, pain, discharge, redness, itching and visual disturbance.   Respiratory: Positive for apnea. Negative for cough, choking, chest tightness, shortness of breath, wheezing and stridor.    Cardiovascular: Negative.  Negative for chest pain, palpitations and leg swelling.   Gastrointestinal: Negative.  Negative for abdominal distention, abdominal pain, anal bleeding, blood in stool, constipation, diarrhea, nausea, rectal pain and vomiting.   Endocrine: Positive for cold intolerance. Negative for heat intolerance, polydipsia, polyphagia and polyuria.   Genitourinary: Negative.  Negative for decreased urine volume, difficulty urinating, dysuria, enuresis, flank pain, frequency, genital sores, hematuria and urgency.   Musculoskeletal: Positive for arthralgias, back pain, joint swelling, myalgias, neck pain and neck stiffness. Negative for gait problem.   Skin: Negative.  Negative for color change, pallor, rash and wound.   Allergic/Immunologic: Negative.  Negative for environmental allergies, food allergies and immunocompromised state.   Neurological: Positive for weakness  and numbness. Negative for dizziness, tremors, seizures, syncope, facial asymmetry, speech difficulty, light-headedness and headaches.   Hematological: Negative.  Negative for adenopathy. Does not bruise/bleed easily.   Psychiatric/Behavioral: Positive for sleep disturbance. Negative for agitation, behavioral problems, confusion, decreased concentration, dysphoric mood, hallucinations, self-injury and suicidal ideas. The patient is nervous/anxious. The patient is not hyperactive.              There were no vitals filed for this visit.            EXAMINATION: BMI 36.3, weight 228.  He has limited range of motion of the lumbar spine.  The deep tendon reflexes are hypoactive.  His strength is intact however.  Sensation is intact.            MEDICAL DECISION MAKING: The lumbar MRI shows high-grade spinal stenosis primarily at L4-L5 and L5-S1.  He has marked disc degeneration L1-L2.           ASSESSMENT/DISPOSITION: He has neurogenic claudication secondary to high-grade spinal stenosis at L4-5, L5-S1.  I do not see any movement with flexion extension therefore laminectomy with out internal fixation may be sufficient to alleviate his symptoms.  I have made an appoint for him to see Dr. Aldridge in the [place] [facility].  He is at the point, in my opinion that surgery is his best option.              I APPRECIATE THE OPPORTUNITY OF THIS REFERRAL. PLEASE CALL IF ANY       QUESTIONS [phone]

## 2021-05-28 ENCOUNTER — OFFICE VISIT (OUTPATIENT)
Dept: NEUROSURGERY | Facility: CLINIC | Age: 61
End: 2021-05-28

## 2021-05-28 VITALS — WEIGHT: 223.4 LBS | TEMPERATURE: 97.1 F | HEIGHT: 68 IN | BODY MASS INDEX: 33.86 KG/M2

## 2021-05-28 DIAGNOSIS — M48.062 SPINAL STENOSIS OF LUMBAR REGION WITH NEUROGENIC CLAUDICATION: Primary | ICD-10-CM

## 2021-05-28 DIAGNOSIS — M51.36 DDD (DEGENERATIVE DISC DISEASE), LUMBAR: ICD-10-CM

## 2021-05-28 PROCEDURE — 99213 OFFICE O/P EST LOW 20 MIN: CPT | Performed by: NEUROLOGICAL SURGERY

## 2021-05-28 RX ORDER — METHOCARBAMOL 750 MG/1
TABLET, FILM COATED ORAL
COMMUNITY
Start: 2021-05-06 | End: 2023-02-20 | Stop reason: HOSPADM

## 2021-05-28 RX ORDER — HYDROXYZINE PAMOATE 50 MG/1
50 CAPSULE ORAL 3 TIMES DAILY PRN
COMMUNITY
Start: 2021-05-06

## 2021-05-28 NOTE — PROGRESS NOTES
Patient: Ang Jackson  : 1960    Primary Care Provider: Mamta Ortega PA-C    Requesting Provider: As above        History    Chief Complaint: Low back and bilateral lower extremity pain with sensory alteration.    History of Present Illness: Mr. Jackson is a 60-year-old disabled  who has a several year history of progressive pain in his back that extends into his legs particularly on the right.  He has numbness in his legs.  Dr. Gan saw him and thought that he probably needed lumbar decompression.  Symptoms are little better with sitting and lying down.  He has had extensive therapy and injections.    Review of Systems   Constitutional: Positive for fatigue. Negative for activity change, appetite change, chills, diaphoresis, fever and unexpected weight change.   HENT: Negative for congestion, dental problem, drooling, ear discharge, ear pain, facial swelling, hearing loss, mouth sores, nosebleeds, postnasal drip, rhinorrhea, sinus pressure, sinus pain, sneezing, sore throat, tinnitus, trouble swallowing and voice change.    Eyes: Negative for photophobia, pain, discharge, redness, itching and visual disturbance.   Respiratory: Positive for apnea. Negative for cough, choking, chest tightness, shortness of breath, wheezing and stridor.    Cardiovascular: Negative for chest pain, palpitations and leg swelling.   Gastrointestinal: Negative for abdominal distention, abdominal pain, anal bleeding, blood in stool, constipation, diarrhea, nausea, rectal pain and vomiting.   Endocrine: Positive for cold intolerance. Negative for heat intolerance, polydipsia, polyphagia and polyuria.   Genitourinary: Negative for decreased urine volume, difficulty urinating, discharge, dysuria, enuresis, flank pain, frequency, genital sores, hematuria, penile pain, penile swelling, scrotal swelling, testicular pain and urgency.   Musculoskeletal: Positive for arthralgias, back pain, joint swelling, myalgias,  "neck pain and neck stiffness. Negative for gait problem.   Skin: Negative for color change, pallor, rash and wound.   Allergic/Immunologic: Negative for environmental allergies, food allergies and immunocompromised state.   Neurological: Positive for weakness and numbness. Negative for dizziness, tremors, seizures, syncope, facial asymmetry, speech difficulty, light-headedness and headaches.   Hematological: Negative for adenopathy. Does not bruise/bleed easily.   Psychiatric/Behavioral: Negative for agitation, behavioral problems, confusion, decreased concentration, dysphoric mood, hallucinations, self-injury, sleep disturbance and suicidal ideas. The patient is nervous/anxious. The patient is not hyperactive.        The patient's past medical history, past surgical history, family history, and social history have been reviewed at length in the electronic medical record.    Physical Exam:   Temp 97.1 °F (36.2 °C)   Ht 172.7 cm (68\")   Wt 101 kg (223 lb 6.4 oz)   BMI 33.97 kg/m²   MUSCULOSKELETAL:  Straight leg raising is negative.  John's Sign is negative.  ROM in the low back is limited in all directions.  Tenderness in the back to palpation is not observed.  NEUROLOGICAL:  Strength is intact in the lower extremities to direct testing.  Muscle tone is normal throughout.  Station and gait are somewhat limping.  He utilizes a cane.  Sensation is intact to light touch testing throughout.  Deep tendon reflexes are difficult to elicit throughout.  Coordination is intact.      Medical Decision Making    Data Review:   (All imaging studies were personally reviewed unless stated otherwise)  Lumbar MRI study dated 11/23/2020 does demonstrate high-grade stenosis at L4-5 and L5-S1.  There is diffuse facet arthropathy and degenerative disc disease as well.    Flexion-extension films demonstrate extensive spondylosis.  There is just a trace offset of L4 on L5 without overt instability.    Diagnosis:   1.  Lumbar stenosis " with neurogenic claudication.  2.  Mechanical low back pain.    Treatment Options:   I have recommended decompressive laminectomies at L4-5 and L5-S1.  The goal of surgery would be to improve his symptoms.  This would not be a cure-all given that some of his pain is mechanical in nature.  I have outlined what the surgery would entail as well as the potential risks, complications, and limitations.  The patient is going to consider his options.  He may follow-up later in the fall to pursue surgery.  He does not like being down when it is hot out.       Diagnosis Plan   1. Spinal stenosis of lumbar region with neurogenic claudication     2. DDD (degenerative disc disease), lumbar         Scribed for Jaiden Aldridge MD by Lesley Fowler [facility] 5/28/2021 11:41 EDT      I, Dr. Aldridge, personally performed the services described in the documentation, as scribed in my presence, and it is both accurate and complete.

## 2022-03-14 ENCOUNTER — OFFICE VISIT (OUTPATIENT)
Dept: GASTROENTEROLOGY | Facility: CLINIC | Age: 62
End: 2022-03-14

## 2022-03-14 VITALS
HEIGHT: 66 IN | BODY MASS INDEX: 34.07 KG/M2 | OXYGEN SATURATION: 97 % | WEIGHT: 212 LBS | SYSTOLIC BLOOD PRESSURE: 149 MMHG | DIASTOLIC BLOOD PRESSURE: 81 MMHG | HEART RATE: 67 BPM

## 2022-03-14 DIAGNOSIS — D50.9 IRON DEFICIENCY ANEMIA, UNSPECIFIED IRON DEFICIENCY ANEMIA TYPE: Primary | ICD-10-CM

## 2022-03-14 DIAGNOSIS — K21.9 GASTROESOPHAGEAL REFLUX DISEASE WITHOUT ESOPHAGITIS: ICD-10-CM

## 2022-03-14 LAB
ALBUMIN SERPL-MCNC: 3.8 G/DL (ref 3.5–5.2)
ALBUMIN/GLOB SERPL: 1.4 G/DL
ALP SERPL-CCNC: 93 U/L (ref 39–117)
ALT SERPL W P-5'-P-CCNC: 24 U/L (ref 1–41)
ANION GAP SERPL CALCULATED.3IONS-SCNC: 11.3 MMOL/L (ref 5–15)
AST SERPL-CCNC: 22 U/L (ref 1–40)
BILIRUB SERPL-MCNC: 0.2 MG/DL (ref 0–1.2)
BUN SERPL-MCNC: 18 MG/DL (ref 8–23)
BUN/CREAT SERPL: 25.4 (ref 7–25)
CALCIUM SPEC-SCNC: 8.6 MG/DL (ref 8.6–10.5)
CHLORIDE SERPL-SCNC: 106 MMOL/L (ref 98–107)
CO2 SERPL-SCNC: 25.7 MMOL/L (ref 22–29)
CREAT SERPL-MCNC: 0.71 MG/DL (ref 0.76–1.27)
EGFRCR SERPLBLD CKD-EPI 2021: 105 ML/MIN/1.73
FERRITIN SERPL-MCNC: 30.3 NG/ML (ref 30–400)
GLOBULIN UR ELPH-MCNC: 2.8 GM/DL
GLUCOSE SERPL-MCNC: 115 MG/DL (ref 65–99)
IRON 24H UR-MRATE: 59 MCG/DL (ref 59–158)
IRON SATN MFR SERPL: 14 % (ref 20–50)
POTASSIUM SERPL-SCNC: 4.1 MMOL/L (ref 3.5–5.2)
PROT SERPL-MCNC: 6.6 G/DL (ref 6–8.5)
SODIUM SERPL-SCNC: 143 MMOL/L (ref 136–145)
TIBC SERPL-MCNC: 423 MCG/DL (ref 298–536)
TRANSFERRIN SERPL-MCNC: 284 MG/DL (ref 200–360)
VIT B12 BLD-MCNC: 1086 PG/ML (ref 211–946)

## 2022-03-14 PROCEDURE — 80053 COMPREHEN METABOLIC PANEL: CPT | Performed by: PHYSICIAN ASSISTANT

## 2022-03-14 PROCEDURE — 83540 ASSAY OF IRON: CPT | Performed by: PHYSICIAN ASSISTANT

## 2022-03-14 PROCEDURE — 99204 OFFICE O/P NEW MOD 45 MIN: CPT | Performed by: PHYSICIAN ASSISTANT

## 2022-03-14 PROCEDURE — 82607 VITAMIN B-12: CPT | Performed by: PHYSICIAN ASSISTANT

## 2022-03-14 PROCEDURE — 82728 ASSAY OF FERRITIN: CPT | Performed by: PHYSICIAN ASSISTANT

## 2022-03-14 PROCEDURE — 84466 ASSAY OF TRANSFERRIN: CPT | Performed by: PHYSICIAN ASSISTANT

## 2022-03-14 NOTE — PROGRESS NOTES
Chief Complaint   Patient presents with   • Anemia       Ang Jackson is a 61 y.o. male who presents to the office today for evaluation of Anemia  .    HPI  Patient presents the clinic today for evaluation of anemia.  He was referred to us by his primary care who recently did blood work showing a hemoglobin level of 12.4 on 2/16.  Patient states that he has blood work recently and the anemia is new onset-he does have a history of anemia in the past due to prior bariatric surgery.  Patient states that he is feeling generally pretty well and does not have any GI complaints.  He does have regular bowel movements daily or every other day-at times he can get constipated and have some blood on tissue after wiping from straining.  His last colonoscopy was performed roughly 8 years ago by Dr. Cox-at that time no polyps were found.  Patient does have a family history of colon cancer in a paternal uncle unsure of diagnosis age.  Review of Systems   Constitutional: Negative.    HENT: Negative for sore throat and trouble swallowing.    Eyes: Negative.    Respiratory: Negative for chest tightness.    Cardiovascular: Negative for chest pain.   Gastrointestinal: Positive for abdominal distention, anal bleeding and blood in stool. Negative for abdominal pain, constipation, diarrhea, nausea, rectal pain and vomiting.   Endocrine: Negative.    Genitourinary: Negative for difficulty urinating.   Musculoskeletal: Positive for back pain and neck pain.   Skin: Negative.    Allergic/Immunologic: Negative for environmental allergies and food allergies.   Neurological: Negative for dizziness and headaches.   Hematological: Does not bruise/bleed easily.   Psychiatric/Behavioral: Positive for agitation and sleep disturbance. The patient is nervous/anxious.        ACTIVE PROBLEMS:   Specialty Problems        Gastroenterology Problems    GERD (gastroesophageal reflux disease)              PAST MEDICAL HISTORY:  Past Medical History:    Diagnosis Date   • Anesthesia     diffculty adminster spinal block, patient stated with last hip surgery 7-2020 several attempts per awilda and rin molina, resulted in general anesthesia    • Anxiety    • Arthritis    • Diabetes mellitus (HCC)     diet controlled, resolved per pt report with weight loss with gastric sleeve   • GERD (gastroesophageal reflux disease)    • Headache    • Hypertension    • Low back pain    • Sleep apnea     does not wear machine, MILD   • Wears reading eyeglasses        SURGICAL HISTORY:  Past Surgical History:   Procedure Laterality Date   • CARPAL TUNNEL RELEASE Bilateral    • COLONOSCOPY      5 years ago   • GASTRIC SLEEVE LAPAROSCOPIC     • HIP SURGERY Right times 2   • KNEE SURGERY Left     arthroscopy    • TOTAL HIP ARTHROPLASTY Left 1/25/2021    Procedure: TOTAL HIP ARTHROPLASTY LEFT;  Surgeon: Jb Patel MD;  Location:  [facility] OR;  Service: Orthopedics;  Laterality: Left;   • TOTAL HIP ARTHROPLASTY REVISION Right 7/20/2020    Procedure: TOTAL HIP ARTHROPLASTY REVISION RIGHT;  Surgeon: Jb Patel MD;  Location:  [facility] OR;  Service: Orthopedics;  Laterality: Right;       FAMILY HISTORY:  Family History   Problem Relation Age of Onset   • Heart disease Father    • Hypertension Father    • Osteoarthritis Father    • Cancer Mother    • Diabetes Brother    • Hypertension Brother    • Gout Paternal Grandmother    • Diabetes Paternal Grandmother        SOCIAL HISTORY:  Social History     Tobacco Use   • Smoking status: Never Smoker   • Smokeless tobacco: Never Used   Substance Use Topics   • Alcohol use: No       CURRENT MEDICATION:    Current Outpatient Medications:   •  ascorbic acid (VITAMIN C) 1000 MG tablet, Take 1,000 mg by mouth Daily., Disp: , Rfl:   •  celecoxib (CeleBREX) 200 MG capsule, Take 1 capsule by mouth 2 (Two) Times a Day., Disp: 60 capsule, Rfl: 3  •  chlorhexidine (HIBICLENS) 4 % external liquid, Apply  topically to the appropriate area as directed  "Daily As Needed for Wound Care. Shower daily with hibiclens solution as directed 5 days prior to surgery., Disp: 236 mL, Rfl: 0  •  Cyanocobalamin 1000 MCG/ML kit, Inject 1 dose as directed Every 30 (Thirty) Days., Disp: , Rfl:   •  gabapentin (NEURONTIN) 800 MG tablet, , Disp: , Rfl:   •  HYDROcodone-acetaminophen (Norco) 7.5-325 MG per tablet, Take 1 tablet by mouth Every 6 (Six) Hours As Needed for Moderate Pain ., Disp: 30 tablet, Rfl: 0  •  hydrOXYzine pamoate (VISTARIL) 50 MG capsule, , Disp: , Rfl:   •  lisinopril (PRINIVIL,ZESTRIL) 5 MG tablet, Take 5 mg by mouth Daily., Disp: , Rfl:   •  loratadine (CLARITIN) 10 MG tablet, Take 10 mg by mouth Daily., Disp: , Rfl:   •  methocarbamol (ROBAXIN) 750 MG tablet, , Disp: , Rfl:   •  Multiple Vitamins-Minerals (MENS MULTIPLUS PO), Take 1 tablet by mouth Daily., Disp: , Rfl:   •  omeprazole (priLOSEC) 20 MG capsule, Take 20 mg by mouth Daily., Disp: , Rfl:   •  rOPINIRole (Requip) 2 MG tablet, Take 2 mg by mouth Every Night., Disp: , Rfl:   •  tamsulosin (FLOMAX) 0.4 MG capsule 24 hr capsule, Take 1 capsule by mouth Every Night., Disp: 30 capsule, Rfl: 3  •  bisacodyl (DULCOLAX) 5 MG EC tablet, Take 4 tablets by mouth 1 (One) Time for 1 dose. Take 4 tablets at 4:00 PM the day before procedure, Disp: 4 tablet, Rfl: 0  •  hydrOXYzine (VISTARIL) 25 MG capsule, Take 50 mg by mouth Every 6 (Six) Hours As Needed for Anxiety., Disp: , Rfl:   •  magnesium citrate 1.745 GM/30ML solution solution, Take 296 mL by mouth Take As Directed for 2 doses. Take one full bottle at 4:00 PM and take second bottle at 10:00 PM., Disp: 592 mL, Rfl: 0  •  mupirocin (Bactroban Nasal) 2 % nasal ointment, into the nostril(s) as directed by provider 2 (Two) Times a Day. Apply pea-sized amount to each nostril twice daily for 5 days prior to surgery, Disp: 22 g, Rfl: 0    ALLERGIES:  Codeine and Morphine and related    VISIT VITALS:  /81   Pulse 67   Ht 167.6 cm (66\")   Wt 96.2 kg (212 " lb)   SpO2 97%   BMI 34.22 kg/m²   Physical Exam  Constitutional:       General: He is not in acute distress.     Appearance: Normal appearance. He is well-developed.   HENT:      Head: Normocephalic and atraumatic.   Eyes:      Pupils: Pupils are equal, round, and reactive to light.   Cardiovascular:      Rate and Rhythm: Normal rate and regular rhythm.      Heart sounds: Normal heart sounds.   Pulmonary:      Effort: Pulmonary effort is normal. No respiratory distress.      Breath sounds: Normal breath sounds. No wheezing, rhonchi or rales.   Abdominal:      General: Abdomen is flat. Bowel sounds are normal. There is no distension.      Palpations: Abdomen is soft. There is no mass.      Tenderness: There is no abdominal tenderness. There is no guarding or rebound.      Hernia: No hernia is present.   Musculoskeletal:         General: No swelling. Normal range of motion.      Cervical back: Normal range of motion and neck supple.      Right lower leg: No edema.      Left lower leg: No edema.   Skin:     General: Skin is warm and dry.   Neurological:      Mental Status: He is alert and oriented to person, place, and time.   Psychiatric:         Attention and Perception: Attention normal.         Mood and Affect: Mood normal.         Speech: Speech normal.         Behavior: Behavior normal. Behavior is cooperative.         Thought Content: Thought content normal.         Assessment/Plan   Patient does not have a very significant decrease in hemoglobin however he has not had a colonoscopy performed here recently does have a history of bariatric surgery-we will go ahead and get him scheduled to have an EGD/colonoscopy performed.  Both procedures were thoroughly explained to the patient he voiced understanding and agreement to the treatment plan.   Diagnosis Plan   1. Iron deficiency anemia, unspecified iron deficiency anemia type  Vitamin B12    Iron Profile    Ferritin    Comprehensive Metabolic Panel    CBC &  Differential    Case Request    Case Request    bisacodyl (DULCOLAX) 5 MG EC tablet    magnesium citrate 1.745 GM/30ML solution solution   2. Gastroesophageal reflux disease without esophagitis  Case Request    Case Request    bisacodyl (DULCOLAX) 5 MG EC tablet    magnesium citrate 1.745 GM/30ML solution solution       Return After procedure.                   This document has been electronically signed by Inocente Sanchez PA-C  March 15, 2022 14:44 EDT    Part of this note may be an electronic transcription/translation of spoken language to printed text using the Dragon Dictation System.

## 2022-03-15 DIAGNOSIS — D50.9 IRON DEFICIENCY ANEMIA, UNSPECIFIED IRON DEFICIENCY ANEMIA TYPE: ICD-10-CM

## 2022-03-15 DIAGNOSIS — K21.9 GASTROESOPHAGEAL REFLUX DISEASE WITHOUT ESOPHAGITIS: Primary | ICD-10-CM

## 2022-03-15 RX ORDER — MAGNESIUM CARB/ALUMINUM HYDROX 105-160MG
296 TABLET,CHEWABLE ORAL TAKE AS DIRECTED
Qty: 592 ML | Refills: 0 | Status: SHIPPED | OUTPATIENT
Start: 2022-03-15 | End: 2022-04-05 | Stop reason: HOSPADM

## 2022-03-15 RX ORDER — BISACODYL 5 MG/1
20 TABLET, DELAYED RELEASE ORAL ONCE
Qty: 4 TABLET | Refills: 0 | Status: SHIPPED | OUTPATIENT
Start: 2022-03-15 | End: 2022-03-15

## 2022-03-18 ENCOUNTER — PATIENT ROUNDING (BHMG ONLY) (OUTPATIENT)
Dept: GASTROENTEROLOGY | Facility: CLINIC | Age: 62
End: 2022-03-18

## 2022-03-18 NOTE — PROGRESS NOTES
March 18, 2022    Hello, may I speak with Ang Jackson?    My name is April      I am  with [facility]  [facility]  [address] [place] [address] [phone].    Before we get started may I verify your date of birth? 1960    I am calling to officially welcome you to our practice and ask about your recent visit. Is this a good time to talk? yes    Tell me about your visit with us. What things went well?  No complaints.       We're always looking for ways to make our patients' experiences even better. Do you have recommendations on ways we may improve?  no    Overall were you satisfied with your first visit to our practice? yes       I appreciate you taking the time to speak with me today. Is there anything else I can do for you? no      Thank you, and have a great day.

## 2022-03-22 PROBLEM — D50.9 IRON DEFICIENCY ANEMIA: Status: ACTIVE | Noted: 2022-03-22

## 2022-04-01 ENCOUNTER — LAB (OUTPATIENT)
Dept: LAB | Facility: HOSPITAL | Age: 62
End: 2022-04-01

## 2022-04-01 DIAGNOSIS — K21.9 GASTROESOPHAGEAL REFLUX DISEASE WITHOUT ESOPHAGITIS: ICD-10-CM

## 2022-04-01 DIAGNOSIS — D50.9 IRON DEFICIENCY ANEMIA, UNSPECIFIED IRON DEFICIENCY ANEMIA TYPE: ICD-10-CM

## 2022-04-01 LAB — SARS-COV-2 RNA PNL SPEC NAA+PROBE: NOT DETECTED

## 2022-04-01 PROCEDURE — C9803 HOPD COVID-19 SPEC COLLECT: HCPCS

## 2022-04-01 PROCEDURE — U0005 INFEC AGEN DETEC AMPLI PROBE: HCPCS | Performed by: INTERNAL MEDICINE

## 2022-04-01 PROCEDURE — U0004 COV-19 TEST NON-CDC HGH THRU: HCPCS | Performed by: INTERNAL MEDICINE

## 2022-04-01 NOTE — PROGRESS NOTES
Orthopaedic Clinic Note:  Hip Post Op    Chief Complaint   Patient presents with   • Post-op     3 week f/u; 7 weeks s/p Left total hip arthroplasty 1/25/21   Left knee pain    HPI    Mr. Jackson returns to clinic today for follow-up of left knee pain as well as postop left total hip arthroplasty.  He is proximately 7 weeks out from his hip arthroplasty.  Rates the pain a 4/10 on the pain scale primarily due to some lateral based hip pain.  He has been working on rehab and strengthening of the muscles and overall has made significant strides in improvement.  His main limitations today are due to his back and his left knee.  He has has known end-stage arthritis of the left knee.  He rates that pain a 6/10 on the pain scale.  He is having swelling and stiffness in the knee and pain is worse with walking weightbearing activities.  It is been refractory to conservative treatment and is requesting to proceed to joint arthroplasty on the left knee.  He is currently ambulatory with no assistive device.  Denies fevers chills or constitutional symptoms.      Past Medical History:   Diagnosis Date   • Anesthesia     diffculty adminster spinal block, patient stated with last hip surgery 7-2020 several attempts per awilda and rin molina, resulted in general anesthesia    • Anxiety    • Arthritis    • Diabetes mellitus (CMS/[facility])     diet controlled, resolved per pt report with weight loss with gastric sleeve   • GERD (gastroesophageal reflux disease)    • Headache    • Hypertension    • Low back pain    • Sleep apnea     does not wear machine, MILD   • Wears reading eyeglasses       Past Surgical History:   Procedure Laterality Date   • CARPAL TUNNEL RELEASE Bilateral    • COLONOSCOPY      5 years ago   • GASTRIC SLEEVE LAPAROSCOPIC     • HIP SURGERY Right times 2   • KNEE SURGERY Left     arthroscopy    • TOTAL HIP ARTHROPLASTY Left 1/25/2021    Procedure: TOTAL HIP ARTHROPLASTY LEFT;  Surgeon: Jb Patel MD;  Location:   [facility];  Service: Orthopedics;  Laterality: Left;   • TOTAL HIP ARTHROPLASTY REVISION Right 7/20/2020    Procedure: TOTAL HIP ARTHROPLASTY REVISION RIGHT;  Surgeon: Jb Patel MD;  Location:  [facility];  Service: Orthopedics;  Laterality: Right;      Family History   Problem Relation Age of Onset   • Heart disease Father    • Hypertension Father    • Osteoarthritis Father    • Cancer Mother    • Diabetes Brother    • Hypertension Brother    • Gout Paternal Grandmother    • Diabetes Paternal Grandmother      Social History     Socioeconomic History   • Marital status:      Spouse name: Not on file   • Number of children: Not on file   • Years of education: Not on file   • Highest education level: Not on file   Tobacco Use   • Smoking status: Never Smoker   • Smokeless tobacco: Never Used   Vaping Use   • Vaping Use: Never used   Substance and Sexual Activity   • Alcohol use: No   • Drug use: No   • Sexual activity: Defer      Current Outpatient Medications on File Prior to Visit   Medication Sig Dispense Refill   • ascorbic acid (VITAMIN C) 1000 MG tablet Take 1,000 mg by mouth Daily.     • Cyanocobalamin 1000 MCG/ML kit Inject 1 dose as directed Every 30 (Thirty) Days.     • gabapentin (NEURONTIN) 800 MG tablet      • hydrOXYzine (VISTARIL) 25 MG capsule Take 50 mg by mouth Every 6 (Six) Hours As Needed for Anxiety.     • lisinopril (PRINIVIL,ZESTRIL) 5 MG tablet Take 5 mg by mouth Daily.     • loratadine (CLARITIN) 10 MG tablet Take 10 mg by mouth Daily.     • Multiple Vitamins-Minerals (MENS MULTIPLUS PO) Take 1 tablet by mouth Daily.     • nabumetone (RELAFEN) 750 MG tablet Take 1 tablet by mouth 2 (Two) Times a Day. 60 tablet 0   • omeprazole (priLOSEC) 20 MG capsule Take 20 mg by mouth Daily.     • rOPINIRole (Requip) 2 MG tablet Take 2 mg by mouth Every Night.     • tamsulosin (FLOMAX) 0.4 MG capsule 24 hr capsule Take 1 capsule by mouth Every Night. 30 capsule 3   • [DISCONTINUED]  "HYDROcodone-acetaminophen (NORCO)  MG per tablet        No current facility-administered medications on file prior to visit.      Allergies   Allergen Reactions   • Codeine Nausea And Vomiting   • Morphine And Related Nausea And Vomiting        Review of Systems   Constitutional: Negative.    HENT: Negative.    Eyes: Negative.    Respiratory: Negative.    Cardiovascular: Negative.    Gastrointestinal: Negative.    Endocrine: Negative.    Genitourinary: Negative.    Musculoskeletal: Positive for arthralgias.   Skin: Negative.    Allergic/Immunologic: Negative.    Neurological: Negative.    Hematological: Negative.    Psychiatric/Behavioral: Negative.         Physical Exam  Blood pressure 134/85, pulse 67, height 168.9 cm (66.5\"), weight 102 kg (224 lb).    Body mass index is 35.61 kg/m².    GENERAL APPEARANCE: awake, alert, oriented, in no acute distress and well developed, well nourished  LUNGS:  breathing nonlabored  EXTREMITIES: no clubbing, cyanosis  PERIPHERAL PULSES: palpable dorsalis pedis and posterior tibial pulses bilaterally.    GAIT:  Antalgic            Hip Exam:  Bilateral    RANGE OF MOTION:  EXTENSION/FLEXION:  normal (0-110 degrees)  IR:  20  ER:  40  PAIN WITH HIP MOTION:  no  PAIN WITH LOGROLL:  no     STRENGTH:  ABDUCTOR:  4/5  ADDUCTOR:  5/5  HIP FLEXION:  5/5    GREATER TROCHANTER BURSAL PAIN:  yes    SENSATION TO LIGHT TOUCH:  DEEP PERONEAL/SUPERFICIAL PERONEAL/SURAL/SAPHENOUS/TIBIAL:   intact    EDEMA:  no  ERYTHEMA:  no  WOUNDS/INCISIONS:   yes, well healed surgical incision without evidence of erythema or drainage  --------------------  Left knee exam:  ----------  ALIGNMENT: moderate varus, correctible to neutral    RANGE OF MOTION:  Decreased (10 - 115 degrees) with no extensor lag  LIGAMENTOUS STABILITY:   stable to varus and valgus stress at terminal extension and 30 degrees; retensioning of the MCL is appreciated with valgus stress at 30 degrees consistent with medial compartment " degeneration     STRENGTH:  4/5 knee flexion, extension. 5/5 ankle dorsiflexion and plantarflexion.     PAIN WITH PALPATION: global  KNEE EFFUSION: yes, mild effusion  PAIN WITH KNEE ROM: yes, Global  PATELLAR CREPITUS: yes, painful and symptomatic  SPECIAL EXAM FINDINGS:  none    REFLEXES:  PATELLAR 2+/4  ACHILLES 2+/4    CLONUS: no  STRAIGHT LEG TEST:   negative    SENSATION TO LIGHT TOUCH:  DEEP PERONEAL/SUPERFICIAL PERONEAL/SURAL/SAPHENOUS/TIBIAL:   intact    EDEMA:  no  ERYTHEMA:  no  WOUNDS/INCISIONS:  no    _______________________________________________________________  _______________________________________________________________    RADIOGRAPHIC FINDINGS:   Indication: Status post bilateral total hip arthroplasties, left knee pain    Comparison: Todays xrays were compared to previous xrays from 2/26/2021    AP pelvis: Right: Demonstrate a well positioned revision total hip without evidence of wear, loosening, fracture or osteolysis, femoral head is concentrically reduced within the acetabulum and No significant changes compared to prior radiographs.;Left: Demonstrate a well positioned total hip without evidence of wear, loosening, fracture or osteolysis, femoral head is concentrically reduced within the acetabulum and No significant changes compared to prior radiographs.    Left knee 4 views: moderate to severe tricompartmental arthritis with genu varum alignment, periarticular osteophytes visualized in all compartments      Assessment/Plan:   Diagnosis Plan   1. Post-traumatic osteoarthritis of left knee  XR Knee 4+ View Left    Case Request    CBC and Differential    Basic metabolic panel    Protime-INR    APTT    Hemoglobin A1c    ECG 12 Lead    Nicotine & Metabolite, Quant    Tranexamic Acid 1,000 mg in sodium chloride 0.9 % 100 mL    Tranexamic Acid 1,000 mg in sodium chloride 0.9 % 100 mL    ceFAZolin (ANCEF) 2 g in sodium chloride 0.9 % 100 mL IVPB    acetaminophen (TYLENOL) tablet 975 mg     meloxicam (MOBIC) tablet 15 mg    pregabalin (LYRICA) capsule 75 mg    mupirocin (BACTROBAN) 2 % nasal ointment 1 application    Case Request   2. Status post total hip replacement, left  XR Pelvis 1 or 2 View    HYDROcodone-acetaminophen (Norco) 7.5-325 MG per tablet     Patient doing well 7 weeks status post left total hip arthroplasty.  I recommend continued activity as tolerated without restrictions.  Now that he is adequately recovered from his hip replacement, I recommend addressing his other problems.  His main limitations today are due to his knee and back pain.  He has known end-stage arthritis of the knee that is been refractory to conservative treatment.  At this time I recommend proceeding to left total knee arthroplasty.  He is agreeable to this.  I recommend continued work-up by Dr. Gan for his ongoing back pain.    The patient has clinical and radiographic evidence of end-stage left knee joint degeneration. Conservative measures have been tried for 3 months or longer, but have failed to adequately treat or improve the patient's symptoms.  Given the severity of his arthritic changes, any conservative intervention is unlikely to provide any significant relief.  Pain is restricting the patient's daily activities as well as quality of life. The recommendation at this time is to proceed with a left total knee arthroplasty with the goal to improve patient function and pain. The risks, benefits, potential complications, and alternatives were discussed with the patient in detail. Risks included but were not limited to bleeding, infection, anesthesia risks, damage to neurovascular structures, osteolysis, aseptic loosening, instability, dislocation, pain, continued pain, iatrogenic fracture, possible need for future surgery including the potential for amputation, blood clots, myocardial infarction, stroke, and death. Taylor-operative blood management and the potential for blood transfusion were discussed with  risks and options clearly outlined. Specific details of the surgical procedure, hospitalization, recovery, rehabilitation, and long-term precautions were also presented. Pre-operative teaching was provided. Implant/prosthesis selection was outlined, and the many options available were explained; the final choice will be made at the time of the procedure to match the anatomy and condition of the bone, ligaments, tendons, and muscles. Given this instruction, the patient elected to proceed with the left total knee arthroplasty. The patient will be seen by pre-admission testing for pre-operative optimization and risk assessment and will be scheduled for surgery once this is completed.    The patient is considered standard risk for DVT based on patient risk factors and will be placed on aspirin postoperatively for DVT prophylaxis.      Jb Patel MD  03/19/21  12:32 EDT     English

## 2022-04-05 ENCOUNTER — HOSPITAL ENCOUNTER (OUTPATIENT)
Facility: HOSPITAL | Age: 62
Setting detail: HOSPITAL OUTPATIENT SURGERY
Discharge: HOME | End: 2022-04-05
Attending: INTERNAL MEDICINE | Admitting: INTERNAL MEDICINE

## 2022-04-05 ENCOUNTER — ANESTHESIA EVENT (OUTPATIENT)
Dept: PERIOP | Facility: HOSPITAL | Age: 62
End: 2022-04-05

## 2022-04-05 ENCOUNTER — ANESTHESIA (OUTPATIENT)
Dept: PERIOP | Facility: HOSPITAL | Age: 62
End: 2022-04-05

## 2022-04-05 VITALS
SYSTOLIC BLOOD PRESSURE: 141 MMHG | WEIGHT: 212 LBS | TEMPERATURE: 97.6 F | DIASTOLIC BLOOD PRESSURE: 83 MMHG | OXYGEN SATURATION: 97 % | HEART RATE: 71 BPM | HEIGHT: 68 IN | BODY MASS INDEX: 32.13 KG/M2 | RESPIRATION RATE: 18 BRPM

## 2022-04-05 DIAGNOSIS — D50.9 IRON DEFICIENCY ANEMIA, UNSPECIFIED IRON DEFICIENCY ANEMIA TYPE: ICD-10-CM

## 2022-04-05 DIAGNOSIS — K21.9 GASTROESOPHAGEAL REFLUX DISEASE WITHOUT ESOPHAGITIS: ICD-10-CM

## 2022-04-05 PROCEDURE — 88305 TISSUE EXAM BY PATHOLOGIST: CPT | Performed by: INTERNAL MEDICINE

## 2022-04-05 PROCEDURE — 45378 DIAGNOSTIC COLONOSCOPY: CPT | Performed by: INTERNAL MEDICINE

## 2022-04-05 PROCEDURE — 43239 EGD BIOPSY SINGLE/MULTIPLE: CPT | Performed by: INTERNAL MEDICINE

## 2022-04-05 PROCEDURE — 25010000002 PROPOFOL 10 MG/ML EMULSION: Performed by: NURSE ANESTHETIST, CERTIFIED REGISTERED

## 2022-04-05 RX ORDER — PROPOFOL 10 MG/ML
VIAL (ML) INTRAVENOUS AS NEEDED
Status: DISCONTINUED | OUTPATIENT
Start: 2022-04-05 | End: 2022-04-05 | Stop reason: SURG

## 2022-04-05 RX ORDER — PANTOPRAZOLE SODIUM 40 MG/1
40 TABLET, DELAYED RELEASE ORAL DAILY
Qty: 90 TABLET | Refills: 3 | Status: SHIPPED | OUTPATIENT
Start: 2022-04-05 | End: 2023-04-06 | Stop reason: SDUPTHER

## 2022-04-05 RX ORDER — MIDAZOLAM HYDROCHLORIDE 1 MG/ML
1 INJECTION INTRAMUSCULAR; INTRAVENOUS
Status: DISCONTINUED | OUTPATIENT
Start: 2022-04-05 | End: 2022-04-05 | Stop reason: HOSPADM

## 2022-04-05 RX ORDER — SODIUM CHLORIDE 0.9 % (FLUSH) 0.9 %
10 SYRINGE (ML) INJECTION AS NEEDED
Status: DISCONTINUED | OUTPATIENT
Start: 2022-04-05 | End: 2022-04-05 | Stop reason: HOSPADM

## 2022-04-05 RX ORDER — SODIUM CHLORIDE, SODIUM LACTATE, POTASSIUM CHLORIDE, CALCIUM CHLORIDE 600; 310; 30; 20 MG/100ML; MG/100ML; MG/100ML; MG/100ML
125 INJECTION, SOLUTION INTRAVENOUS ONCE
Status: COMPLETED | OUTPATIENT
Start: 2022-04-05 | End: 2022-04-05

## 2022-04-05 RX ORDER — DROPERIDOL 2.5 MG/ML
0.62 INJECTION, SOLUTION INTRAMUSCULAR; INTRAVENOUS ONCE AS NEEDED
Status: DISCONTINUED | OUTPATIENT
Start: 2022-04-05 | End: 2022-04-05 | Stop reason: HOSPADM

## 2022-04-05 RX ORDER — IPRATROPIUM BROMIDE AND ALBUTEROL SULFATE 2.5; .5 MG/3ML; MG/3ML
3 SOLUTION RESPIRATORY (INHALATION) ONCE AS NEEDED
Status: DISCONTINUED | OUTPATIENT
Start: 2022-04-05 | End: 2022-04-05 | Stop reason: HOSPADM

## 2022-04-05 RX ORDER — FENTANYL CITRATE 50 UG/ML
50 INJECTION, SOLUTION INTRAMUSCULAR; INTRAVENOUS
Status: DISCONTINUED | OUTPATIENT
Start: 2022-04-05 | End: 2022-04-05 | Stop reason: HOSPADM

## 2022-04-05 RX ORDER — OXYCODONE HYDROCHLORIDE AND ACETAMINOPHEN 5; 325 MG/1; MG/1
1 TABLET ORAL ONCE AS NEEDED
Status: DISCONTINUED | OUTPATIENT
Start: 2022-04-05 | End: 2022-04-05 | Stop reason: HOSPADM

## 2022-04-05 RX ORDER — SODIUM CHLORIDE, SODIUM LACTATE, POTASSIUM CHLORIDE, CALCIUM CHLORIDE 600; 310; 30; 20 MG/100ML; MG/100ML; MG/100ML; MG/100ML
100 INJECTION, SOLUTION INTRAVENOUS ONCE AS NEEDED
Status: DISCONTINUED | OUTPATIENT
Start: 2022-04-05 | End: 2022-04-05 | Stop reason: HOSPADM

## 2022-04-05 RX ORDER — SODIUM CHLORIDE 0.9 % (FLUSH) 0.9 %
10 SYRINGE (ML) INJECTION EVERY 12 HOURS SCHEDULED
Status: DISCONTINUED | OUTPATIENT
Start: 2022-04-05 | End: 2022-04-05 | Stop reason: HOSPADM

## 2022-04-05 RX ORDER — LIDOCAINE HYDROCHLORIDE 20 MG/ML
INJECTION, SOLUTION INFILTRATION; PERINEURAL AS NEEDED
Status: DISCONTINUED | OUTPATIENT
Start: 2022-04-05 | End: 2022-04-05 | Stop reason: SURG

## 2022-04-05 RX ORDER — MEPERIDINE HYDROCHLORIDE 25 MG/ML
12.5 INJECTION INTRAMUSCULAR; INTRAVENOUS; SUBCUTANEOUS
Status: DISCONTINUED | OUTPATIENT
Start: 2022-04-05 | End: 2022-04-05 | Stop reason: HOSPADM

## 2022-04-05 RX ORDER — ONDANSETRON 2 MG/ML
4 INJECTION INTRAMUSCULAR; INTRAVENOUS AS NEEDED
Status: DISCONTINUED | OUTPATIENT
Start: 2022-04-05 | End: 2022-04-05 | Stop reason: HOSPADM

## 2022-04-05 RX ORDER — KETOROLAC TROMETHAMINE 30 MG/ML
30 INJECTION, SOLUTION INTRAMUSCULAR; INTRAVENOUS EVERY 6 HOURS PRN
Status: DISCONTINUED | OUTPATIENT
Start: 2022-04-05 | End: 2022-04-05 | Stop reason: HOSPADM

## 2022-04-05 RX ORDER — SODIUM CHLORIDE, SODIUM LACTATE, POTASSIUM CHLORIDE, CALCIUM CHLORIDE 600; 310; 30; 20 MG/100ML; MG/100ML; MG/100ML; MG/100ML
125 INJECTION, SOLUTION INTRAVENOUS ONCE
Status: DISCONTINUED | OUTPATIENT
Start: 2022-04-05 | End: 2022-04-05 | Stop reason: HOSPADM

## 2022-04-05 RX ADMIN — PROPOFOL 40 MG: 10 INJECTION, EMULSION INTRAVENOUS at 09:10

## 2022-04-05 RX ADMIN — PROPOFOL 150 MCG/KG/MIN: 10 INJECTION, EMULSION INTRAVENOUS at 09:10

## 2022-04-05 RX ADMIN — SODIUM CHLORIDE, POTASSIUM CHLORIDE, SODIUM LACTATE AND CALCIUM CHLORIDE: 600; 310; 30; 20 INJECTION, SOLUTION INTRAVENOUS at 09:10

## 2022-04-05 RX ADMIN — LIDOCAINE HYDROCHLORIDE 60 MG: 20 INJECTION, SOLUTION INFILTRATION; PERINEURAL at 09:10

## 2022-04-05 NOTE — ANESTHESIA PREPROCEDURE EVALUATION
Anesthesia Evaluation     Patient summary reviewed and Nursing notes reviewed   no history of anesthetic complications:  NPO Solid Status: > 8 hours  NPO Liquid Status: > 8 hours           Airway   Mallampati: II  TM distance: >3 FB  Neck ROM: full  Possible difficult intubation  Comment: Large Beard  Dental    (+) poor dentition    Pulmonary - normal exam   (+) sleep apnea,   Cardiovascular - normal exam    (+) hypertension,       Neuro/Psych  (+) headaches, psychiatric history,    GI/Hepatic/Renal/Endo    (+) obesity, morbid obesity (s/p sleeve), GERD,  diabetes mellitus,     Musculoskeletal     Abdominal  - normal exam   Substance History - negative use     OB/GYN negative ob/gyn ROS         Other   arthritis, blood dyscrasia anemia,                       Anesthesia Plan    ASA 3     general   (Pateitn preference)  intravenous induction     Anesthetic plan, all risks, benefits, and alternatives have been provided, discussed and informed consent has been obtained with: patient.  Use of blood products discussed with patient  Consented to blood products.   Plan discussed with CRNA.        Lab Results   Component Value Date    WBC 4.13 01/15/2021    HGB 9.9 (L) 01/26/2021    HCT 31.1 (L) 01/26/2021    MCV 92.8 01/15/2021     01/15/2021         Lab Results   Component Value Date    GLUCOSE 115 (H) 03/14/2022    BUN 18 03/14/2022    CREATININE 0.71 (L) 03/14/2022    EGFRIFNONA 99 01/26/2021    BCR 25.4 (H) 03/14/2022    K 4.1 03/14/2022    CO2 25.7 03/14/2022    CALCIUM 8.6 03/14/2022    ALBUMIN 3.80 03/14/2022    LABIL2 1.3 (L) 01/15/2016    AST 22 03/14/2022    ALT 24 03/14/2022

## 2022-04-05 NOTE — ANESTHESIA POSTPROCEDURE EVALUATION
Patient: Ang Jackson    Procedure Summary     Date: 04/05/22 Room / Location: [facility] OR [address] [place] [address] COR OR    Anesthesia Start: 0907 Anesthesia Stop: 0948    Procedures:       ESOPHAGOGASTRODUODENOSCOPY WITH BIOPSY (N/A Esophagus)      COLONOSCOPY FOR SCREENING (N/A ) Diagnosis:       Gastroesophageal reflux disease without esophagitis      Iron deficiency anemia, unspecified iron deficiency anemia type      (Gastroesophageal reflux disease without esophagitis [K21.9])      (Iron deficiency anemia, unspecified iron deficiency anemia type [D50.9])    Surgeons: Luz Galvez MD Provider: Kavon Navarrete MD    Anesthesia Type: general ASA Status: 3          Anesthesia Type: general    Vitals  Vitals Value Taken Time   /86 04/05/22 1005   Temp 97.6 °F (36.4 °C) 04/05/22 0950   Pulse 70 04/05/22 1005   Resp 18 04/05/22 1005   SpO2 97 % 04/05/22 1005           Post Anesthesia Care and Evaluation    Patient location during evaluation: PACU  Patient participation: complete - patient participated  Level of consciousness: awake  Pain score: 2  Pain management: adequate  Airway patency: patent  Anesthetic complications: No anesthetic complications  PONV Status: none  Cardiovascular status: acceptable  Respiratory status: acceptable  Hydration status: acceptable

## 2022-04-07 LAB
LAB AP CASE REPORT: NORMAL
PATH REPORT.FINAL DX SPEC: NORMAL

## 2022-04-08 NOTE — PROGRESS NOTES
Your recent colonoscopy was essentially normal.  You did have a redundant colon and the prep quality was fair.  You will need repeat colonoscopy in 1 year.  At the time of your upper endoscopy, biopsies were taken of the esophagus which revealed chronic esophagitis.  I placed you on pantoprazole 40 mg to be taken every morning 30 minutes prior to eating.  This should be taken on an empty stomach.  Biopsies of the stomach were negative for H. pylori gastritis.  Biopsies of the small bowel were negative for celiac disease.  Please keep your follow-up appointment.

## 2022-10-31 NOTE — INTERVAL H&P NOTE
"[facility] Pre-op    Full history and physical note from office is attached.    /75 (BP Location: Right arm, Patient Position: Sitting)   Pulse 69   Temp 96.9 °F (36.1 °C) (Temporal)   Resp 18   Ht 172.7 cm (68\")   Wt 104 kg (229 lb)   SpO2 99%   BMI 34.82 kg/m²     Immunizations:  Influenza:  2020  Pneumococcal:  UTD  Tetanus:  UTD    LAB Results:  Lab Results   Component Value Date    WBC 4.13 01/15/2021    HGB 13.0 01/15/2021    HCT 41.2 01/15/2021    MCV 92.8 01/15/2021     01/15/2021    NEUTROABS 1.87 01/15/2021    GLUCOSE 102 (H) 01/15/2021    BUN 23 (H) 01/15/2021    CREATININE 0.68 (L) 01/15/2021    EGFRIFNONA 119 01/15/2021     01/15/2021    K 4.4 01/15/2021     01/15/2021    CO2 26.0 01/15/2021    CALCIUM 8.6 01/15/2021    ALBUMIN 4.06 11/23/2020    AST 17 11/23/2020    ALT 13 11/23/2020    BILITOT 0.3 11/23/2020    PTT 37.1 (H) 01/15/2021    INR 1.03 01/15/2021     1/15/21:  Study Result    Indication: Status post revision right hip arthroplasty, left hip pain     Comparison: Todays xrays were compared to previous xrays from 10/1/2020    AP pelvis: Right: Demonstrate a well positioned revision total hip without evidence of wear, loosening, fracture or osteolysis, femoral head is concentrically reduced within the acetabulum and No significant changes compared to prior radiographs.;Left: advanced, end-stage osteoarthritis with bone on bone articulation, subchondral sclerosis, and subchondral cysts, there are marginal osteophytes visualized at the femoral head-neck junction and acetabular margins and No significant changes compared to prior radiographs.       Cancer Staging (if applicable)  Cancer Patient: __ yes __no __unknown__N/A; If yes, clinical stage T:__ N:__M:__, stage group or __N/A      Impression: Primary osteoarthritis of left hip      Plan: TOTAL HIP ARTHROPLASTY LEFT      Ludivina Corey, APRN   1/25/2021   10:55 EST  " Other

## 2023-02-18 ENCOUNTER — APPOINTMENT (OUTPATIENT)
Dept: MRI IMAGING | Facility: HOSPITAL | Age: 63
DRG: 472 | End: 2023-02-18
Payer: MEDICARE

## 2023-02-18 ENCOUNTER — HOSPITAL ENCOUNTER (INPATIENT)
Facility: HOSPITAL | Age: 63
LOS: 2 days | Discharge: HOME | DRG: 472 | End: 2023-02-20
Attending: INTERNAL MEDICINE | Admitting: INTERNAL MEDICINE
Payer: MEDICARE

## 2023-02-18 ENCOUNTER — HOSPITAL ENCOUNTER (EMERGENCY)
Facility: HOSPITAL | Age: 63
Discharge: ANOTHER HOSPITAL | DRG: 472 | End: 2023-02-18
Attending: STUDENT IN AN ORGANIZED HEALTH CARE EDUCATION/TRAINING PROGRAM | Admitting: STUDENT IN AN ORGANIZED HEALTH CARE EDUCATION/TRAINING PROGRAM
Payer: MEDICARE

## 2023-02-18 VITALS
WEIGHT: 205 LBS | DIASTOLIC BLOOD PRESSURE: 84 MMHG | RESPIRATION RATE: 16 BRPM | TEMPERATURE: 97.6 F | SYSTOLIC BLOOD PRESSURE: 143 MMHG | BODY MASS INDEX: 31.07 KG/M2 | HEART RATE: 74 BPM | HEIGHT: 68 IN | OXYGEN SATURATION: 99 %

## 2023-02-18 DIAGNOSIS — M48.02 CERVICAL STENOSIS OF SPINAL CANAL: Primary | ICD-10-CM

## 2023-02-18 DIAGNOSIS — M47.12 CERVICAL SPONDYLOSIS WITH MYELOPATHY: Primary | ICD-10-CM

## 2023-02-18 PROBLEM — G89.29 CHRONIC BACK PAIN: Status: ACTIVE | Noted: 2023-02-18

## 2023-02-18 PROBLEM — M48.00 SPINAL STENOSIS: Status: ACTIVE | Noted: 2023-02-18

## 2023-02-18 PROBLEM — G47.33 OSA (OBSTRUCTIVE SLEEP APNEA): Status: ACTIVE | Noted: 2023-02-18

## 2023-02-18 PROBLEM — M54.9 CHRONIC BACK PAIN: Status: ACTIVE | Noted: 2023-02-18

## 2023-02-18 PROBLEM — F41.9 ANXIETY AND DEPRESSION: Status: ACTIVE | Noted: 2023-02-18

## 2023-02-18 PROBLEM — F32.A ANXIETY AND DEPRESSION: Status: ACTIVE | Noted: 2023-02-18

## 2023-02-18 LAB
ALBUMIN SERPL-MCNC: 3.8 G/DL (ref 3.5–5.2)
ALBUMIN/GLOB SERPL: 1.5 G/DL
ALP SERPL-CCNC: 91 U/L (ref 39–117)
ALT SERPL W P-5'-P-CCNC: 22 U/L (ref 1–41)
ANION GAP SERPL CALCULATED.3IONS-SCNC: 6 MMOL/L (ref 5–15)
APTT PPP: 41.2 SECONDS (ref 26.5–34.5)
AST SERPL-CCNC: 30 U/L (ref 1–40)
BASOPHILS # BLD AUTO: 0.02 10*3/MM3 (ref 0–0.2)
BASOPHILS NFR BLD AUTO: 0.5 % (ref 0–1.5)
BILIRUB SERPL-MCNC: 0.4 MG/DL (ref 0–1.2)
BUN SERPL-MCNC: 12 MG/DL (ref 8–23)
BUN/CREAT SERPL: 16.4 (ref 7–25)
CALCIUM SPEC-SCNC: 8.9 MG/DL (ref 8.6–10.5)
CHLORIDE SERPL-SCNC: 106 MMOL/L (ref 98–107)
CO2 SERPL-SCNC: 28 MMOL/L (ref 22–29)
CREAT SERPL-MCNC: 0.73 MG/DL (ref 0.76–1.27)
CRP SERPL-MCNC: <0.3 MG/DL (ref 0–0.5)
DEPRECATED RDW RBC AUTO: 44.5 FL (ref 37–54)
EGFRCR SERPLBLD CKD-EPI 2021: 103.5 ML/MIN/1.73
EOSINOPHIL # BLD AUTO: 0.14 10*3/MM3 (ref 0–0.4)
EOSINOPHIL NFR BLD AUTO: 3.2 % (ref 0.3–6.2)
ERYTHROCYTE [DISTWIDTH] IN BLOOD BY AUTOMATED COUNT: 13 % (ref 12.3–15.4)
GLOBULIN UR ELPH-MCNC: 2.5 GM/DL
GLUCOSE SERPL-MCNC: 100 MG/DL (ref 65–99)
HCT VFR BLD AUTO: 40.3 % (ref 37.5–51)
HGB BLD-MCNC: 13.3 G/DL (ref 13–17.7)
HOLD SPECIMEN: NORMAL
HOLD SPECIMEN: NORMAL
IMM GRANULOCYTES # BLD AUTO: 0.01 10*3/MM3 (ref 0–0.05)
IMM GRANULOCYTES NFR BLD AUTO: 0.2 % (ref 0–0.5)
INR PPP: 0.87 (ref 0.9–1.1)
LYMPHOCYTES # BLD AUTO: 1.73 10*3/MM3 (ref 0.7–3.1)
LYMPHOCYTES NFR BLD AUTO: 39.6 % (ref 19.6–45.3)
MCH RBC QN AUTO: 30.6 PG (ref 26.6–33)
MCHC RBC AUTO-ENTMCNC: 33 G/DL (ref 31.5–35.7)
MCV RBC AUTO: 92.9 FL (ref 79–97)
MONOCYTES # BLD AUTO: 0.41 10*3/MM3 (ref 0.1–0.9)
MONOCYTES NFR BLD AUTO: 9.4 % (ref 5–12)
NEUTROPHILS NFR BLD AUTO: 2.06 10*3/MM3 (ref 1.7–7)
NEUTROPHILS NFR BLD AUTO: 47.1 % (ref 42.7–76)
NRBC BLD AUTO-RTO: 0 /100 WBC (ref 0–0.2)
PLATELET # BLD AUTO: 219 10*3/MM3 (ref 140–450)
PMV BLD AUTO: 9.3 FL (ref 6–12)
POTASSIUM SERPL-SCNC: 4.5 MMOL/L (ref 3.5–5.2)
PROT SERPL-MCNC: 6.3 G/DL (ref 6–8.5)
PROTHROMBIN TIME: 12 SECONDS (ref 12.1–14.7)
RBC # BLD AUTO: 4.34 10*6/MM3 (ref 4.14–5.8)
SODIUM SERPL-SCNC: 140 MMOL/L (ref 136–145)
WBC NRBC COR # BLD: 4.37 10*3/MM3 (ref 3.4–10.8)
WHOLE BLOOD HOLD COAG: NORMAL
WHOLE BLOOD HOLD SPECIMEN: NORMAL

## 2023-02-18 PROCEDURE — 85025 COMPLETE CBC W/AUTO DIFF WBC: CPT | Performed by: STUDENT IN AN ORGANIZED HEALTH CARE EDUCATION/TRAINING PROGRAM

## 2023-02-18 PROCEDURE — 85730 THROMBOPLASTIN TIME PARTIAL: CPT | Performed by: STUDENT IN AN ORGANIZED HEALTH CARE EDUCATION/TRAINING PROGRAM

## 2023-02-18 PROCEDURE — 85610 PROTHROMBIN TIME: CPT | Performed by: STUDENT IN AN ORGANIZED HEALTH CARE EDUCATION/TRAINING PROGRAM

## 2023-02-18 PROCEDURE — 96374 THER/PROPH/DIAG INJ IV PUSH: CPT

## 2023-02-18 PROCEDURE — 86140 C-REACTIVE PROTEIN: CPT | Performed by: STUDENT IN AN ORGANIZED HEALTH CARE EDUCATION/TRAINING PROGRAM

## 2023-02-18 PROCEDURE — 25010000002 DEXAMETHASONE SODIUM PHOSPHATE 10 MG/ML SOLUTION: Performed by: STUDENT IN AN ORGANIZED HEALTH CARE EDUCATION/TRAINING PROGRAM

## 2023-02-18 PROCEDURE — 72148 MRI LUMBAR SPINE W/O DYE: CPT

## 2023-02-18 PROCEDURE — 72141 MRI NECK SPINE W/O DYE: CPT

## 2023-02-18 PROCEDURE — 99223 1ST HOSP IP/OBS HIGH 75: CPT | Performed by: INTERNAL MEDICINE

## 2023-02-18 PROCEDURE — 25010000002 HYDROMORPHONE PER 4 MG: Performed by: STUDENT IN AN ORGANIZED HEALTH CARE EDUCATION/TRAINING PROGRAM

## 2023-02-18 PROCEDURE — 99285 EMERGENCY DEPT VISIT HI MDM: CPT

## 2023-02-18 PROCEDURE — 80053 COMPREHEN METABOLIC PANEL: CPT | Performed by: STUDENT IN AN ORGANIZED HEALTH CARE EDUCATION/TRAINING PROGRAM

## 2023-02-18 PROCEDURE — 96375 TX/PRO/DX INJ NEW DRUG ADDON: CPT

## 2023-02-18 PROCEDURE — 25010000002 KETOROLAC TROMETHAMINE PER 15 MG: Performed by: STUDENT IN AN ORGANIZED HEALTH CARE EDUCATION/TRAINING PROGRAM

## 2023-02-18 PROCEDURE — 25010000002 ONDANSETRON PER 1 MG: Performed by: STUDENT IN AN ORGANIZED HEALTH CARE EDUCATION/TRAINING PROGRAM

## 2023-02-18 RX ORDER — GABAPENTIN 300 MG/1
600 CAPSULE ORAL EVERY 8 HOURS SCHEDULED
Status: DISCONTINUED | OUTPATIENT
Start: 2023-02-19 | End: 2023-02-20 | Stop reason: HOSPADM

## 2023-02-18 RX ORDER — TAMSULOSIN HYDROCHLORIDE 0.4 MG/1
0.4 CAPSULE ORAL NIGHTLY
Status: DISCONTINUED | OUTPATIENT
Start: 2023-02-19 | End: 2023-02-20 | Stop reason: HOSPADM

## 2023-02-18 RX ORDER — HYDROMORPHONE HYDROCHLORIDE 1 MG/ML
0.5 INJECTION, SOLUTION INTRAMUSCULAR; INTRAVENOUS; SUBCUTANEOUS ONCE
Status: COMPLETED | OUTPATIENT
Start: 2023-02-18 | End: 2023-02-18

## 2023-02-18 RX ORDER — CHOLECALCIFEROL (VITAMIN D3) 125 MCG
5 CAPSULE ORAL NIGHTLY PRN
Status: DISCONTINUED | OUTPATIENT
Start: 2023-02-18 | End: 2023-02-20 | Stop reason: HOSPADM

## 2023-02-18 RX ORDER — KETOROLAC TROMETHAMINE 30 MG/ML
15 INJECTION, SOLUTION INTRAMUSCULAR; INTRAVENOUS ONCE
Status: COMPLETED | OUTPATIENT
Start: 2023-02-18 | End: 2023-02-18

## 2023-02-18 RX ORDER — GABAPENTIN 400 MG/1
800 CAPSULE ORAL ONCE
Status: COMPLETED | OUTPATIENT
Start: 2023-02-18 | End: 2023-02-18

## 2023-02-18 RX ORDER — SODIUM CHLORIDE 9 MG/ML
40 INJECTION, SOLUTION INTRAVENOUS AS NEEDED
Status: DISCONTINUED | OUTPATIENT
Start: 2023-02-18 | End: 2023-02-20 | Stop reason: HOSPADM

## 2023-02-18 RX ORDER — ONDANSETRON 2 MG/ML
4 INJECTION INTRAMUSCULAR; INTRAVENOUS ONCE
Status: COMPLETED | OUTPATIENT
Start: 2023-02-18 | End: 2023-02-18

## 2023-02-18 RX ORDER — SODIUM CHLORIDE 0.9 % (FLUSH) 0.9 %
10 SYRINGE (ML) INJECTION EVERY 12 HOURS SCHEDULED
Status: DISCONTINUED | OUTPATIENT
Start: 2023-02-19 | End: 2023-02-20 | Stop reason: HOSPADM

## 2023-02-18 RX ORDER — HYDROCODONE BITARTRATE AND ACETAMINOPHEN 10; 325 MG/1; MG/1
1 TABLET ORAL ONCE
Status: COMPLETED | OUTPATIENT
Start: 2023-02-18 | End: 2023-02-18

## 2023-02-18 RX ORDER — HYDROCODONE BITARTRATE AND ACETAMINOPHEN 10; 325 MG/1; MG/1
1 TABLET ORAL EVERY 6 HOURS PRN
COMMUNITY

## 2023-02-18 RX ORDER — HYDROCODONE BITARTRATE AND ACETAMINOPHEN 10; 325 MG/1; MG/1
1 TABLET ORAL EVERY 6 HOURS PRN
Status: DISCONTINUED | OUTPATIENT
Start: 2023-02-18 | End: 2023-02-20 | Stop reason: HOSPADM

## 2023-02-18 RX ORDER — METHOCARBAMOL 750 MG/1
750 TABLET, FILM COATED ORAL EVERY 6 HOURS PRN
Status: DISCONTINUED | OUTPATIENT
Start: 2023-02-18 | End: 2023-02-20 | Stop reason: HOSPADM

## 2023-02-18 RX ORDER — ACETAMINOPHEN 325 MG/1
650 TABLET ORAL EVERY 4 HOURS PRN
Status: DISCONTINUED | OUTPATIENT
Start: 2023-02-18 | End: 2023-02-19 | Stop reason: SDUPTHER

## 2023-02-18 RX ORDER — PANTOPRAZOLE SODIUM 40 MG/1
40 TABLET, DELAYED RELEASE ORAL DAILY
Status: DISCONTINUED | OUTPATIENT
Start: 2023-02-19 | End: 2023-02-20 | Stop reason: HOSPADM

## 2023-02-18 RX ORDER — ONDANSETRON 2 MG/ML
4 INJECTION INTRAMUSCULAR; INTRAVENOUS EVERY 6 HOURS PRN
Status: DISCONTINUED | OUTPATIENT
Start: 2023-02-18 | End: 2023-02-20 | Stop reason: HOSPADM

## 2023-02-18 RX ORDER — LISINOPRIL 5 MG/1
5 TABLET ORAL DAILY
Status: DISCONTINUED | OUTPATIENT
Start: 2023-02-19 | End: 2023-02-20 | Stop reason: HOSPADM

## 2023-02-18 RX ORDER — DEXAMETHASONE SODIUM PHOSPHATE 10 MG/ML
10 INJECTION, SOLUTION INTRAMUSCULAR; INTRAVENOUS ONCE
Status: COMPLETED | OUTPATIENT
Start: 2023-02-18 | End: 2023-02-18

## 2023-02-18 RX ORDER — SODIUM CHLORIDE 0.9 % (FLUSH) 0.9 %
10 SYRINGE (ML) INJECTION AS NEEDED
Status: DISCONTINUED | OUTPATIENT
Start: 2023-02-18 | End: 2023-02-20 | Stop reason: HOSPADM

## 2023-02-18 RX ADMIN — HYDROCODONE BITARTRATE AND ACETAMINOPHEN 1 TABLET: 10; 325 TABLET ORAL at 23:48

## 2023-02-18 RX ADMIN — KETOROLAC TROMETHAMINE 15 MG: 30 INJECTION, SOLUTION INTRAMUSCULAR; INTRAVENOUS at 14:52

## 2023-02-18 RX ADMIN — GABAPENTIN 600 MG: 300 CAPSULE ORAL at 23:48

## 2023-02-18 RX ADMIN — HYDROMORPHONE HYDROCHLORIDE 0.5 MG: 1 INJECTION, SOLUTION INTRAMUSCULAR; INTRAVENOUS; SUBCUTANEOUS at 18:58

## 2023-02-18 RX ADMIN — DEXAMETHASONE SODIUM PHOSPHATE 10 MG: 10 INJECTION INTRAMUSCULAR; INTRAVENOUS at 14:53

## 2023-02-18 RX ADMIN — GABAPENTIN 800 MG: 400 CAPSULE ORAL at 14:54

## 2023-02-18 RX ADMIN — HYDROCODONE BITARTRATE AND ACETAMINOPHEN 1 TABLET: 10; 325 TABLET ORAL at 14:54

## 2023-02-18 RX ADMIN — Medication 10 ML: at 23:48

## 2023-02-18 RX ADMIN — METHOCARBAMOL TABLETS 750 MG: 750 TABLET, COATED ORAL at 23:48

## 2023-02-18 RX ADMIN — ONDANSETRON 4 MG: 2 INJECTION INTRAMUSCULAR; INTRAVENOUS at 18:58

## 2023-02-18 RX ADMIN — TAMSULOSIN HYDROCHLORIDE 0.4 MG: 0.4 CAPSULE ORAL at 23:48

## 2023-02-18 NOTE — ED PROVIDER NOTES
[facility]  emergency department encounter        Pt Name: Ang Jackson  MRN: [id number]  Birthdate 1960  Date of evaluation: 2/18/2023    Chief Complaint   Patient presents with   • Numbness             HISTORY OF PRESENT ILLNESS:   Ang Jackson is a 62 y.o. male PMH HTN, arthritis, chronic low back pain, GERD, BPH.  Patient reports he was diagnosed and treated for HLD and DM but those illnesses resolved after gastric sleeve surgery.    Patient presents complaining predominantly of pain numbness paresthesias in his bilateral upper and lower extremities.  Patient reports as of 5 weeks ago he developed significant neck pain that is progressively worsening.  Has developed pain and numbness radiating down bilateral arms in the region of the lateral triceps and over the dorsal aspect of the hands with symptoms most predominant in his second and third digits.  Patient reports he has trouble extending second and third digits of his right hand and has been doing so manually with his left hand.  Patient additionally has pain radiating from his low back down the bilateral legs along the posterior distribution and into the toes.  Endorses associated numbness, weakness, difficulty urinating, no incontinence of bowel or bladder.  Patient denies fever chills respiratory complaints chest pain abdominal pain nausea vomiting diarrhea dysuria.  No prior back surgeries.          PCP: Mamta Ortega PA-C          REVIEW OF SYSTEMS:     Review of Systems   Constitutional: Negative for fever.   HENT: Negative for congestion and rhinorrhea.    Eyes: Negative for visual disturbance.   Respiratory: Negative for cough and shortness of breath.    Cardiovascular: Negative for chest pain.   Gastrointestinal: Negative for abdominal pain, nausea and vomiting.   Genitourinary: Positive for difficulty urinating. Negative for dysuria.   Musculoskeletal: Positive for back pain, myalgias and neck pain.   Skin: Negative  for rash.   Neurological: Positive for weakness and numbness. Negative for headaches.   Psychiatric/Behavioral: Negative for confusion.       Review of systems otherwise as per HPI.          PREVIOUS HISTORY:         Past Medical History:   Diagnosis Date   • Anesthesia     diffculty adminster spinal block, patient stated with last hip surgery 7-2020 several attempts per awilda and no luck, resulted in general anesthesia    • Anxiety    • Arthritis    • Diabetes mellitus (HCC)    • GERD (gastroesophageal reflux disease)    • Headache    • Hypertension    • Low back pain    • Sleep apnea     does not wear machine, MILD   • Wears reading eyeglasses            Past Surgical History:   Procedure Laterality Date   • CARPAL TUNNEL RELEASE Bilateral    • COLONOSCOPY      5 years ago   • COLONOSCOPY N/A 4/5/2022    Procedure: COLONOSCOPY FOR SCREENING;  Surgeon: Luz Galvez MD;  Location: [facility];  Service: Gastroenterology;  Laterality: N/A;   • ENDOSCOPY N/A 4/5/2022    Procedure: ESOPHAGOGASTRODUODENOSCOPY WITH BIOPSY;  Surgeon: Luz Galvez MD;  Location: [facility];  Service: Gastroenterology;  Laterality: N/A;   • GASTRIC SLEEVE LAPAROSCOPIC     • HIP SURGERY Right times 2   • KNEE SURGERY Left     arthroscopy    • TOTAL HIP ARTHROPLASTY Left 1/25/2021    Procedure: TOTAL HIP ARTHROPLASTY LEFT;  Surgeon: Jb Patel MD;  Location: [facility];  Service: Orthopedics;  Laterality: Left;   • TOTAL HIP ARTHROPLASTY REVISION Right 7/20/2020    Procedure: TOTAL HIP ARTHROPLASTY REVISION RIGHT;  Surgeon: Jb Patel MD;  Location: [facility] OR;  Service: Orthopedics;  Laterality: Right;           Social History     Socioeconomic History   • Marital status:    Tobacco Use   • Smoking status: Never   • Smokeless tobacco: Never   Vaping Use   • Vaping Use: Never used   Substance and Sexual Activity   • Alcohol use: No   • Drug use: No   • Sexual activity: Defer           Family  History   Problem Relation Age of Onset   • Heart disease Father    • Hypertension Father    • Osteoarthritis Father    • Cancer Mother    • Diabetes Brother    • Hypertension Brother    • Gout Paternal Grandmother    • Diabetes Paternal Grandmother          Current Outpatient Medications   Medication Instructions   • ascorbic acid (VITAMIN C) 1,000 mg, Oral, Daily   • celecoxib (CELEBREX) 200 mg, Oral, 2 Times Daily   • Cyanocobalamin 1000 MCG/ML kit 1 dose, Injection, Every 30 Days   • gabapentin (NEURONTIN) 800 MG tablet No dose, route, or frequency recorded.   • HYDROcodone-acetaminophen (Norco) 7.5-325 MG per tablet 1 tablet, Oral, Every 6 Hours PRN   • hydrOXYzine pamoate (VISTARIL) 50 MG capsule No dose, route, or frequency recorded.   • lisinopril (PRINIVIL,ZESTRIL) 5 mg, Oral, Daily   • loratadine (CLARITIN) 10 mg, Oral, Daily   • methocarbamol (ROBAXIN) 750 MG tablet No dose, route, or frequency recorded.   • Multiple Vitamins-Minerals (MENS MULTIPLUS PO) 1 tablet, Oral, Daily   • pantoprazole (PROTONIX) 40 mg, Oral, Daily   • Requip 2 mg, Oral, Nightly   • tamsulosin (FLOMAX) 0.4 mg, Oral, Nightly         Allergies:  Codeine and Morphine and related          PHYSICAL EXAM:     Patient Vitals for the past 24 hrs:   BP Temp Temp src Pulse Resp SpO2 Height Weight   02/18/23 1804 -- -- -- 72 -- 99 % -- --   02/18/23 1800 154/91 -- -- -- -- -- -- --   02/18/23 1759 -- -- -- -- -- 95 % -- --   02/18/23 1749 -- -- -- 83 -- 92 % -- --   02/18/23 1745 -- -- -- -- -- 95 % -- --   02/18/23 1738 -- -- -- 62 -- 98 % -- --   02/18/23 1732 -- -- -- 63 -- 93 % -- --   02/18/23 1725 -- -- -- 70 -- 97 % -- --   02/18/23 1720 -- -- -- 61 -- 98 % -- --   02/18/23 1713 -- -- -- 64 -- 93 % -- --   02/18/23 1704 -- -- -- 66 -- 99 % -- --   02/18/23 1656 -- -- -- 65 -- 94 % -- --   02/18/23 1655 -- -- -- -- -- 95 % -- --   02/18/23 1601 147/86 -- -- -- -- -- -- --   02/18/23 1539 -- -- -- 75 -- 95 % -- --   02/18/23 1536 -- --  "-- 73 -- 91 % -- --   02/18/23 1529 -- -- -- 62 -- 90 % -- --   02/18/23 1525 -- -- -- -- -- 96 % -- --   02/18/23 1524 -- -- -- -- -- 97 % -- --   02/18/23 1515 -- -- -- -- -- 95 % -- --   02/18/23 1511 -- -- -- -- -- 95 % -- --   02/18/23 1400 133/97 -- -- -- -- 96 % -- --   02/18/23 1359 -- -- -- -- -- 98 % -- --   02/18/23 1326 146/78 97.6 °F (36.4 °C) Infrared 72 16 100 % 172.7 cm (68\") 93 kg (205 lb)       Physical Exam  Vitals and nursing note reviewed.   Constitutional:       General: He is not in acute distress.     Appearance: Normal appearance.   HENT:      Head: Normocephalic and atraumatic.   Eyes:      Extraocular Movements: Extraocular movements intact.      Conjunctiva/sclera: Conjunctivae normal.   Pulmonary:      Effort: Pulmonary effort is normal. No respiratory distress.   Abdominal:      General: Abdomen is flat. There is no distension.      Tenderness: There is no abdominal tenderness. There is no guarding or rebound.   Genitourinary:     Comments: Good rectal tone  Musculoskeletal:         General: No deformity. Normal range of motion.      Cervical back: Normal range of motion. No rigidity.   Skin:     Coloration: Skin is not jaundiced.      Findings: No rash.   Neurological:      General: No focal deficit present.      Mental Status: He is alert and oriented to person, place, and time.      Sensory: Sensory deficit present.      Comments: Patient endorses reduced sensation throughout all nerve distributions of his hands and feet.  Patient has sensation intact to light touch throughout radian ulnar and median nerve distributions of both hands.  He has sensation throughout both feet with the exception of total numbness to light touch in the medial aspect of the feet/big toes bilaterally.  Right patellar reflex 2+, left patellar reflex 4+.  Strength 5/5 in all 4 extremities, able to ambulate with antalgic gait due to back pain.    Bedside physician ultrasound performed, bladder near fully " collapse, no significant urinary retention   Psychiatric:         Mood and Affect: Mood normal.         Behavior: Behavior normal.             COMPLETED DIAGNOSTIC STUDIES AND INTERVENTIONS:     Lab Results (last 24 hours)     Procedure Component Value Units Date/Time    CBC & Differential [[id number]]  (Normal) Collected: 02/18/23 1353    Specimen: Blood from Arm, Left Updated: 02/18/23 1513    Narrative:      The following orders were created for panel order CBC & Differential.  Procedure                               Abnormality         Status                     ---------                               -----------         ------                     CBC Auto Differential[[id number]]        Normal              Final result                 Please view results for these tests on the individual orders.    Comprehensive Metabolic Panel [[id number]]  (Abnormal) Collected: 02/18/23 1353    Specimen: Blood from Arm, Left Updated: 02/18/23 1503     Glucose 100 mg/dL      BUN 12 mg/dL      Creatinine 0.73 mg/dL      Sodium 140 mmol/L      Potassium 4.5 mmol/L      Chloride 106 mmol/L      CO2 28.0 mmol/L      Calcium 8.9 mg/dL      Total Protein 6.3 g/dL      Albumin 3.8 g/dL      ALT (SGPT) 22 U/L      AST (SGOT) 30 U/L      Alkaline Phosphatase 91 U/L      Total Bilirubin 0.4 mg/dL      Globulin 2.5 gm/dL      A/G Ratio 1.5 g/dL      BUN/Creatinine Ratio 16.4     Anion Gap 6.0 mmol/L      eGFR 103.5 mL/min/1.73     Narrative:      GFR Normal >60  Chronic Kidney Disease <60  Kidney Failure <15      Protime-INR [[id number]]  (Abnormal) Collected: 02/18/23 1353    Specimen: Blood from Arm, Left Updated: 02/18/23 1532     Protime 12.0 Seconds      INR 0.87    Narrative:      Suggested INR therapeutic range for stable oral anticoagulant therapy:    Low Intensity therapy:   1.5-2.0  Moderate Intensity therapy:   2.0-3.0  High Intensity therapy:   2.5-4.0    aPTT [[id number]]  (Abnormal) Collected: 02/18/23 1353    Specimen: Blood  from Arm, Left Updated: 02/18/23 1532     PTT 41.2 seconds     Narrative:      PTT Heparin Therapeutic Range:  59 - 95 seconds      C-reactive Protein [[id number]]  (Normal) Collected: 02/18/23 1353    Specimen: Blood from Arm, Left Updated: 02/18/23 1556     C-Reactive Protein <0.30 mg/dL     CBC Auto Differential [[id number]]  (Normal) Collected: 02/18/23 1353    Specimen: Blood from Arm, Left Updated: 02/18/23 1513     WBC 4.37 10*3/mm3      RBC 4.34 10*6/mm3      Hemoglobin 13.3 g/dL      Hematocrit 40.3 %      MCV 92.9 fL      MCH 30.6 pg      MCHC 33.0 g/dL      RDW 13.0 %      RDW-SD 44.5 fl      MPV 9.3 fL      Platelets 219 10*3/mm3      Neutrophil % 47.1 %      Lymphocyte % 39.6 %      Monocyte % 9.4 %      Eosinophil % 3.2 %      Basophil % 0.5 %      Immature Grans % 0.2 %      Neutrophils, Absolute 2.06 10*3/mm3      Lymphocytes, Absolute 1.73 10*3/mm3      Monocytes, Absolute 0.41 10*3/mm3      Eosinophils, Absolute 0.14 10*3/mm3      Basophils, Absolute 0.02 10*3/mm3      Immature Grans, Absolute 0.01 10*3/mm3      nRBC 0.0 /100 WBC             MRI Cervical Spine Without Contrast    (Results Pending)   MRI Lumbar Spine Without Contrast    (Results Pending)         New Medications Ordered This Visit   Medications   • ketorolac (TORADOL) injection 15 mg   • gabapentin (NEURONTIN) capsule 800 mg   • HYDROcodone-acetaminophen (NORCO)  MG per tablet 1 tablet   • dexamethasone sodium phosphate injection 10 mg         Procedures            MEDICAL DECISION-MAKING AND ED COURSE:     ED Course as of 02/18/23 1844   Sat Feb 18, 2023   1446 62-year-old male presents with neck pain and back pain and bilateral paresthesias weakness and numbness in all 4 extremities.  No fever or significant infectious complaints indicating concern for spinal abscess.  Inflammatory markers low and reassuring.  Patient has abnormal neurologic exam with numbness to light touch at the medial aspect of both feet, hyperreflexia of  the left patellar reflex but no urinary retention despite complaints of difficulty urinating, and rectal tone is appropriate.  Patient also complains of significant deficits in the bilateral upper extremities.  MRI cervical and lumbar spine pending.  Treat for pain with patient's home doses of gabapentin hydrocodone, Toradol for additional pain control and anti-inflammation as well as dexamethasone. [KP]   1632 C-Reactive Protein: <0.30 [KP]   1632 WBC: 4.37 [KP]   1743 Care delayed due to technical issues with imaging.  Severe cervical stenosis at C7-T1, awaiting callback from [facility] neurosurgery.   [KP]   1804 Discussed case with Dr. Aldridge, neurosurgery at [facility].  Recommended transfer.  Discussed with patient was agreeable.  Awaiting bed. [KP]   1842 Discussed ambulance transfer however patient family preferring transfer by personal vehicle.  Patient instructed that he may not make any stops, he must go straight to [facility].  Patient and family are agreeable to this prerequisite for POV transfer. [KP]      ED Course User Index  [KP] Jason Alcocer MD       ?      FINAL IMPRESSION:       1. Cervical stenosis of spinal canal         The complaints listed here are new problems to this examiner.       Medication List      No changes were made to your prescriptions during this visit.         FOLLOW-UP  No follow-up provider specified.    DISPOSITION  ED Disposition     ED Disposition   Transfer to Another Facility     Condition   --    Comment   --                 Please note that portions of this note were completed with a voice recognition program.      Jason Alcocer MD  18:44 EST  2/18/2023             Jason Alcocer MD  02/18/23 3574

## 2023-02-19 ENCOUNTER — ANESTHESIA (OUTPATIENT)
Dept: PERIOP | Facility: HOSPITAL | Age: 63
DRG: 472 | End: 2023-02-19
Payer: MEDICARE

## 2023-02-19 ENCOUNTER — APPOINTMENT (OUTPATIENT)
Dept: GENERAL RADIOLOGY | Facility: HOSPITAL | Age: 63
DRG: 472 | End: 2023-02-19
Payer: MEDICARE

## 2023-02-19 ENCOUNTER — ANESTHESIA EVENT (OUTPATIENT)
Dept: PERIOP | Facility: HOSPITAL | Age: 63
DRG: 472 | End: 2023-02-19
Payer: MEDICARE

## 2023-02-19 LAB
ANION GAP SERPL CALCULATED.3IONS-SCNC: 10 MMOL/L (ref 5–15)
BASOPHILS # BLD AUTO: 0.01 10*3/MM3 (ref 0–0.2)
BASOPHILS NFR BLD AUTO: 0.2 % (ref 0–1.5)
BUN SERPL-MCNC: 16 MG/DL (ref 8–23)
BUN/CREAT SERPL: 28.1 (ref 7–25)
CALCIUM SPEC-SCNC: 8.7 MG/DL (ref 8.6–10.5)
CHLORIDE SERPL-SCNC: 104 MMOL/L (ref 98–107)
CO2 SERPL-SCNC: 26 MMOL/L (ref 22–29)
CREAT SERPL-MCNC: 0.57 MG/DL (ref 0.76–1.27)
DEPRECATED RDW RBC AUTO: 41.7 FL (ref 37–54)
EGFRCR SERPLBLD CKD-EPI 2021: 111.5 ML/MIN/1.73
EOSINOPHIL # BLD AUTO: 0 10*3/MM3 (ref 0–0.4)
EOSINOPHIL NFR BLD AUTO: 0 % (ref 0.3–6.2)
ERYTHROCYTE [DISTWIDTH] IN BLOOD BY AUTOMATED COUNT: 12.7 % (ref 12.3–15.4)
GLUCOSE BLDC GLUCOMTR-MCNC: 91 MG/DL (ref 70–130)
GLUCOSE SERPL-MCNC: 96 MG/DL (ref 65–99)
HBA1C MFR BLD: 5.2 % (ref 4.8–5.6)
HCT VFR BLD AUTO: 37.8 % (ref 37.5–51)
HGB BLD-MCNC: 12.6 G/DL (ref 13–17.7)
IMM GRANULOCYTES # BLD AUTO: 0.01 10*3/MM3 (ref 0–0.05)
IMM GRANULOCYTES NFR BLD AUTO: 0.2 % (ref 0–0.5)
INR PPP: 1 (ref 0.84–1.13)
LYMPHOCYTES # BLD AUTO: 0.81 10*3/MM3 (ref 0.7–3.1)
LYMPHOCYTES NFR BLD AUTO: 17.6 % (ref 19.6–45.3)
MCH RBC QN AUTO: 30.1 PG (ref 26.6–33)
MCHC RBC AUTO-ENTMCNC: 33.3 G/DL (ref 31.5–35.7)
MCV RBC AUTO: 90.2 FL (ref 79–97)
MONOCYTES # BLD AUTO: 0.13 10*3/MM3 (ref 0.1–0.9)
MONOCYTES NFR BLD AUTO: 2.8 % (ref 5–12)
NEUTROPHILS NFR BLD AUTO: 3.64 10*3/MM3 (ref 1.7–7)
NEUTROPHILS NFR BLD AUTO: 79.2 % (ref 42.7–76)
NRBC BLD AUTO-RTO: 0 /100 WBC (ref 0–0.2)
PLATELET # BLD AUTO: 218 10*3/MM3 (ref 140–450)
PMV BLD AUTO: 9.6 FL (ref 6–12)
POTASSIUM SERPL-SCNC: 4.6 MMOL/L (ref 3.5–5.2)
PROTHROMBIN TIME: 13.1 SECONDS (ref 11.4–14.4)
QT INTERVAL: 396 MS
QTC INTERVAL: 436 MS
RBC # BLD AUTO: 4.19 10*6/MM3 (ref 4.14–5.8)
SODIUM SERPL-SCNC: 140 MMOL/L (ref 136–145)
WBC NRBC COR # BLD: 4.6 10*3/MM3 (ref 3.4–10.8)

## 2023-02-19 PROCEDURE — 25010000002 DEXAMETHASONE PER 1 MG: Performed by: NURSE ANESTHETIST, CERTIFIED REGISTERED

## 2023-02-19 PROCEDURE — C1713 ANCHOR/SCREW BN/BN,TIS/BN: HCPCS | Performed by: NEUROLOGICAL SURGERY

## 2023-02-19 PROCEDURE — 93010 ELECTROCARDIOGRAM REPORT: CPT | Performed by: INTERNAL MEDICINE

## 2023-02-19 PROCEDURE — 25010000002 HYDROMORPHONE PER 4 MG: Performed by: NEUROLOGICAL SURGERY

## 2023-02-19 PROCEDURE — 25010000002 HYDROMORPHONE 1 MG/ML SOLUTION: Performed by: INTERNAL MEDICINE

## 2023-02-19 PROCEDURE — 25010000002 PROPOFOL 10 MG/ML EMULSION: Performed by: NURSE ANESTHETIST, CERTIFIED REGISTERED

## 2023-02-19 PROCEDURE — 25010000002 CEFAZOLIN PER 500 MG: Performed by: NURSE ANESTHETIST, CERTIFIED REGISTERED

## 2023-02-19 PROCEDURE — 76000 FLUOROSCOPY <1 HR PHYS/QHP: CPT

## 2023-02-19 PROCEDURE — 99222 1ST HOSP IP/OBS MODERATE 55: CPT | Performed by: NEUROLOGICAL SURGERY

## 2023-02-19 PROCEDURE — 0RG10K0 FUSION OF CERVICAL VERTEBRAL JOINT WITH NONAUTOLOGOUS TISSUE SUBSTITUTE, ANTERIOR APPROACH, ANTERIOR COLUMN, OPEN APPROACH: ICD-10-PCS | Performed by: NEUROLOGICAL SURGERY

## 2023-02-19 PROCEDURE — 22551 ARTHRD ANT NTRBDY CERVICAL: CPT | Performed by: NEUROLOGICAL SURGERY

## 2023-02-19 PROCEDURE — 93005 ELECTROCARDIOGRAM TRACING: CPT | Performed by: INTERNAL MEDICINE

## 2023-02-19 PROCEDURE — 82962 GLUCOSE BLOOD TEST: CPT

## 2023-02-19 PROCEDURE — 25010000002 HYDROMORPHONE PER 4 MG: Performed by: INTERNAL MEDICINE

## 2023-02-19 PROCEDURE — 22845 INSERT SPINE FIXATION DEVICE: CPT | Performed by: NEUROLOGICAL SURGERY

## 2023-02-19 PROCEDURE — 99232 SBSQ HOSP IP/OBS MODERATE 35: CPT | Performed by: FAMILY MEDICINE

## 2023-02-19 PROCEDURE — 25010000002 HYDROMORPHONE 1 MG/ML SOLUTION

## 2023-02-19 PROCEDURE — 80048 BASIC METABOLIC PNL TOTAL CA: CPT | Performed by: PHYSICIAN ASSISTANT

## 2023-02-19 PROCEDURE — 25010000002 FENTANYL CITRATE (PF) 50 MCG/ML SOLUTION: Performed by: NURSE ANESTHETIST, CERTIFIED REGISTERED

## 2023-02-19 PROCEDURE — 25010000002 ONDANSETRON PER 1 MG: Performed by: NURSE ANESTHETIST, CERTIFIED REGISTERED

## 2023-02-19 PROCEDURE — 25010000002 CEFAZOLIN IN DEXTROSE 2-4 GM/100ML-% SOLUTION: Performed by: NEUROLOGICAL SURGERY

## 2023-02-19 PROCEDURE — 85610 PROTHROMBIN TIME: CPT | Performed by: INTERNAL MEDICINE

## 2023-02-19 PROCEDURE — 83036 HEMOGLOBIN GLYCOSYLATED A1C: CPT | Performed by: PHYSICIAN ASSISTANT

## 2023-02-19 PROCEDURE — 0RT30ZZ RESECTION OF CERVICAL VERTEBRAL DISC, OPEN APPROACH: ICD-10-PCS | Performed by: NEUROLOGICAL SURGERY

## 2023-02-19 PROCEDURE — 85025 COMPLETE CBC W/AUTO DIFF WBC: CPT | Performed by: INTERNAL MEDICINE

## 2023-02-19 PROCEDURE — 25010000002 FENTANYL CITRATE (PF) 100 MCG/2ML SOLUTION: Performed by: NURSE ANESTHETIST, CERTIFIED REGISTERED

## 2023-02-19 PROCEDURE — 20931 SP BONE ALGRFT STRUCT ADD-ON: CPT | Performed by: NEUROLOGICAL SURGERY

## 2023-02-19 PROCEDURE — 22551 ARTHRD ANT NTRBDY CERVICAL: CPT

## 2023-02-19 PROCEDURE — 22845 INSERT SPINE FIXATION DEVICE: CPT

## 2023-02-19 DEVICE — HEMOST ABS SURGIFOAM SZ100 8X12 10MM: Type: IMPLANTABLE DEVICE | Site: SPINE CERVICAL | Status: FUNCTIONAL

## 2023-02-19 DEVICE — PLATE 3001017 ZEVO 17MM 1 LVL
Type: IMPLANTABLE DEVICE | Site: SPINE CERVICAL | Status: FUNCTIONAL
Brand: ZEVO™ ANTERIOR CERVICAL PLATE SYSTEM

## 2023-02-19 DEVICE — FLOSEAL HEMOSTATIC MATRIX, 10ML
Type: IMPLANTABLE DEVICE | Site: SPINE CERVICAL | Status: FUNCTIONAL
Brand: FLOSEAL HEMOSTATIC MATRIX

## 2023-02-19 DEVICE — CLIP LIGAT VASC HORIZON TI MD BLU 6CT: Type: IMPLANTABLE DEVICE | Site: SPINE CERVICAL | Status: FUNCTIONAL

## 2023-02-19 DEVICE — ALLOGRFT BONE CORNERSTONE L/ASR FZD 6DEG 7X14X11M: Type: IMPLANTABLE DEVICE | Site: SPINE CERVICAL | Status: FUNCTIONAL

## 2023-02-19 DEVICE — CLIP LIGAT VASC HORIZON TI SM YEL 6CT: Type: IMPLANTABLE DEVICE | Site: SPINE CERVICAL | Status: FUNCTIONAL

## 2023-02-19 RX ORDER — MAGNESIUM HYDROXIDE 1200 MG/15ML
LIQUID ORAL AS NEEDED
Status: DISCONTINUED | OUTPATIENT
Start: 2023-02-19 | End: 2023-02-19 | Stop reason: HOSPADM

## 2023-02-19 RX ORDER — FENTANYL CITRATE 50 UG/ML
50 INJECTION, SOLUTION INTRAMUSCULAR; INTRAVENOUS
Status: DISCONTINUED | OUTPATIENT
Start: 2023-02-19 | End: 2023-02-19 | Stop reason: HOSPADM

## 2023-02-19 RX ORDER — SODIUM CHLORIDE 9 MG/ML
INJECTION, SOLUTION INTRAVENOUS AS NEEDED
Status: DISCONTINUED | OUTPATIENT
Start: 2023-02-19 | End: 2023-02-19 | Stop reason: HOSPADM

## 2023-02-19 RX ORDER — LIDOCAINE HYDROCHLORIDE 10 MG/ML
0.5 INJECTION, SOLUTION EPIDURAL; INFILTRATION; INTRACAUDAL; PERINEURAL ONCE AS NEEDED
Status: CANCELLED | OUTPATIENT
Start: 2023-02-19

## 2023-02-19 RX ORDER — SODIUM CHLORIDE 0.9 % (FLUSH) 0.9 %
3 SYRINGE (ML) INJECTION EVERY 12 HOURS SCHEDULED
Status: DISCONTINUED | OUTPATIENT
Start: 2023-02-19 | End: 2023-02-20 | Stop reason: HOSPADM

## 2023-02-19 RX ORDER — NICOTINE POLACRILEX 4 MG
15 LOZENGE BUCCAL
Status: DISCONTINUED | OUTPATIENT
Start: 2023-02-19 | End: 2023-02-20 | Stop reason: HOSPADM

## 2023-02-19 RX ORDER — SODIUM CHLORIDE 0.9 % (FLUSH) 0.9 %
10 SYRINGE (ML) INJECTION AS NEEDED
Status: CANCELLED | OUTPATIENT
Start: 2023-02-19

## 2023-02-19 RX ORDER — SODIUM CHLORIDE, SODIUM LACTATE, POTASSIUM CHLORIDE, CALCIUM CHLORIDE 600; 310; 30; 20 MG/100ML; MG/100ML; MG/100ML; MG/100ML
INJECTION, SOLUTION INTRAVENOUS CONTINUOUS PRN
Status: DISCONTINUED | OUTPATIENT
Start: 2023-02-19 | End: 2023-02-19 | Stop reason: SURG

## 2023-02-19 RX ORDER — FAMOTIDINE 20 MG/1
20 TABLET, FILM COATED ORAL
OUTPATIENT
Start: 2023-02-19

## 2023-02-19 RX ORDER — ACETAMINOPHEN 325 MG/1
650 TABLET ORAL EVERY 4 HOURS PRN
Status: DISCONTINUED | OUTPATIENT
Start: 2023-02-19 | End: 2023-02-20 | Stop reason: HOSPADM

## 2023-02-19 RX ORDER — SODIUM CHLORIDE 0.9 % (FLUSH) 0.9 %
10 SYRINGE (ML) INJECTION EVERY 12 HOURS SCHEDULED
Status: CANCELLED | OUTPATIENT
Start: 2023-02-19

## 2023-02-19 RX ORDER — ROCURONIUM BROMIDE 10 MG/ML
INJECTION, SOLUTION INTRAVENOUS AS NEEDED
Status: DISCONTINUED | OUTPATIENT
Start: 2023-02-19 | End: 2023-02-19 | Stop reason: SURG

## 2023-02-19 RX ORDER — CEFAZOLIN SODIUM 2 G/100ML
2 INJECTION, SOLUTION INTRAVENOUS EVERY 8 HOURS
Status: COMPLETED | OUTPATIENT
Start: 2023-02-19 | End: 2023-02-20

## 2023-02-19 RX ORDER — DEXTROSE MONOHYDRATE 25 G/50ML
25 INJECTION, SOLUTION INTRAVENOUS
Status: DISCONTINUED | OUTPATIENT
Start: 2023-02-19 | End: 2023-02-20 | Stop reason: HOSPADM

## 2023-02-19 RX ORDER — IPRATROPIUM BROMIDE AND ALBUTEROL SULFATE 2.5; .5 MG/3ML; MG/3ML
3 SOLUTION RESPIRATORY (INHALATION) ONCE AS NEEDED
Status: DISCONTINUED | OUTPATIENT
Start: 2023-02-19 | End: 2023-02-19 | Stop reason: HOSPADM

## 2023-02-19 RX ORDER — ONDANSETRON 2 MG/ML
INJECTION INTRAMUSCULAR; INTRAVENOUS AS NEEDED
Status: DISCONTINUED | OUTPATIENT
Start: 2023-02-19 | End: 2023-02-19 | Stop reason: SURG

## 2023-02-19 RX ORDER — LIDOCAINE HYDROCHLORIDE 40 MG/ML
SOLUTION TOPICAL AS NEEDED
Status: DISCONTINUED | OUTPATIENT
Start: 2023-02-19 | End: 2023-02-19 | Stop reason: SURG

## 2023-02-19 RX ORDER — DEXAMETHASONE SODIUM PHOSPHATE 4 MG/ML
INJECTION, SOLUTION INTRA-ARTICULAR; INTRALESIONAL; INTRAMUSCULAR; INTRAVENOUS; SOFT TISSUE AS NEEDED
Status: DISCONTINUED | OUTPATIENT
Start: 2023-02-19 | End: 2023-02-19 | Stop reason: SURG

## 2023-02-19 RX ORDER — CEFAZOLIN SODIUM 2 G/100ML
2 INJECTION, SOLUTION INTRAVENOUS ONCE
OUTPATIENT
Start: 2023-02-19 | End: 2023-02-19

## 2023-02-19 RX ORDER — ONDANSETRON 2 MG/ML
4 INJECTION INTRAMUSCULAR; INTRAVENOUS ONCE AS NEEDED
Status: DISCONTINUED | OUTPATIENT
Start: 2023-02-19 | End: 2023-02-19 | Stop reason: HOSPADM

## 2023-02-19 RX ORDER — HYDROMORPHONE HYDROCHLORIDE 1 MG/ML
0.25 INJECTION, SOLUTION INTRAMUSCULAR; INTRAVENOUS; SUBCUTANEOUS
Status: DISCONTINUED | OUTPATIENT
Start: 2023-02-19 | End: 2023-02-20 | Stop reason: HOSPADM

## 2023-02-19 RX ORDER — MIDAZOLAM HYDROCHLORIDE 1 MG/ML
1 INJECTION INTRAMUSCULAR; INTRAVENOUS
Status: CANCELLED | OUTPATIENT
Start: 2023-02-19

## 2023-02-19 RX ORDER — SODIUM CHLORIDE 9 MG/ML
40 INJECTION, SOLUTION INTRAVENOUS AS NEEDED
Status: CANCELLED | OUTPATIENT
Start: 2023-02-19

## 2023-02-19 RX ORDER — CEFAZOLIN SODIUM 1 G/3ML
INJECTION, POWDER, FOR SOLUTION INTRAMUSCULAR; INTRAVENOUS AS NEEDED
Status: DISCONTINUED | OUTPATIENT
Start: 2023-02-19 | End: 2023-02-19 | Stop reason: SURG

## 2023-02-19 RX ORDER — NALOXONE HCL 0.4 MG/ML
0.4 VIAL (ML) INJECTION AS NEEDED
Status: DISCONTINUED | OUTPATIENT
Start: 2023-02-19 | End: 2023-02-20 | Stop reason: HOSPADM

## 2023-02-19 RX ORDER — PROPOFOL 10 MG/ML
VIAL (ML) INTRAVENOUS AS NEEDED
Status: DISCONTINUED | OUTPATIENT
Start: 2023-02-19 | End: 2023-02-19 | Stop reason: SURG

## 2023-02-19 RX ORDER — FENTANYL CITRATE 50 UG/ML
INJECTION, SOLUTION INTRAMUSCULAR; INTRAVENOUS
Status: DISPENSED
Start: 2023-02-19 | End: 2023-02-20

## 2023-02-19 RX ORDER — IBUPROFEN 600 MG/1
600 TABLET ORAL EVERY 8 HOURS SCHEDULED
Status: DISCONTINUED | OUTPATIENT
Start: 2023-02-19 | End: 2023-02-20 | Stop reason: HOSPADM

## 2023-02-19 RX ORDER — FAMOTIDINE 20 MG/1
20 TABLET, FILM COATED ORAL ONCE
Status: CANCELLED | OUTPATIENT
Start: 2023-02-19 | End: 2023-02-19

## 2023-02-19 RX ORDER — SODIUM CHLORIDE 9 MG/ML
40 INJECTION, SOLUTION INTRAVENOUS AS NEEDED
Status: DISCONTINUED | OUTPATIENT
Start: 2023-02-19 | End: 2023-02-20 | Stop reason: HOSPADM

## 2023-02-19 RX ORDER — DIPHENHYDRAMINE HCL 25 MG
25 CAPSULE ORAL NIGHTLY PRN
Status: DISCONTINUED | OUTPATIENT
Start: 2023-02-19 | End: 2023-02-20 | Stop reason: HOSPADM

## 2023-02-19 RX ORDER — FAMOTIDINE 10 MG/ML
20 INJECTION, SOLUTION INTRAVENOUS ONCE
Status: CANCELLED | OUTPATIENT
Start: 2023-02-19 | End: 2023-02-19

## 2023-02-19 RX ORDER — LIDOCAINE HYDROCHLORIDE 10 MG/ML
INJECTION, SOLUTION EPIDURAL; INFILTRATION; INTRACAUDAL; PERINEURAL AS NEEDED
Status: DISCONTINUED | OUTPATIENT
Start: 2023-02-19 | End: 2023-02-19 | Stop reason: SURG

## 2023-02-19 RX ORDER — SODIUM CHLORIDE 0.9 % (FLUSH) 0.9 %
10 SYRINGE (ML) INJECTION AS NEEDED
Status: DISCONTINUED | OUTPATIENT
Start: 2023-02-19 | End: 2023-02-20 | Stop reason: HOSPADM

## 2023-02-19 RX ORDER — FENTANYL CITRATE 50 UG/ML
INJECTION, SOLUTION INTRAMUSCULAR; INTRAVENOUS AS NEEDED
Status: DISCONTINUED | OUTPATIENT
Start: 2023-02-19 | End: 2023-02-19 | Stop reason: SURG

## 2023-02-19 RX ORDER — SODIUM CHLORIDE, SODIUM LACTATE, POTASSIUM CHLORIDE, CALCIUM CHLORIDE 600; 310; 30; 20 MG/100ML; MG/100ML; MG/100ML; MG/100ML
75 INJECTION, SOLUTION INTRAVENOUS CONTINUOUS
Status: DISCONTINUED | OUTPATIENT
Start: 2023-02-19 | End: 2023-02-20 | Stop reason: HOSPADM

## 2023-02-19 RX ORDER — SODIUM CHLORIDE, SODIUM LACTATE, POTASSIUM CHLORIDE, CALCIUM CHLORIDE 600; 310; 30; 20 MG/100ML; MG/100ML; MG/100ML; MG/100ML
9 INJECTION, SOLUTION INTRAVENOUS CONTINUOUS
Status: CANCELLED | OUTPATIENT
Start: 2023-02-19

## 2023-02-19 RX ORDER — FAMOTIDINE 10 MG/ML
INJECTION, SOLUTION INTRAVENOUS
Status: COMPLETED
Start: 2023-02-19 | End: 2023-02-19

## 2023-02-19 RX ORDER — HYDROXYZINE HYDROCHLORIDE 25 MG/1
25 TABLET, FILM COATED ORAL 3 TIMES DAILY PRN
Status: DISCONTINUED | OUTPATIENT
Start: 2023-02-19 | End: 2023-02-20 | Stop reason: HOSPADM

## 2023-02-19 RX ADMIN — CEFAZOLIN SODIUM 2 G: 2 INJECTION, SOLUTION INTRAVENOUS at 17:08

## 2023-02-19 RX ADMIN — SODIUM CHLORIDE, POTASSIUM CHLORIDE, SODIUM LACTATE AND CALCIUM CHLORIDE 75 ML/HR: 600; 310; 30; 20 INJECTION, SOLUTION INTRAVENOUS at 13:20

## 2023-02-19 RX ADMIN — METHOCARBAMOL TABLETS 750 MG: 750 TABLET, COATED ORAL at 06:03

## 2023-02-19 RX ADMIN — HYDROMORPHONE HYDROCHLORIDE 0.5 MG: 1 INJECTION, SOLUTION INTRAMUSCULAR; INTRAVENOUS; SUBCUTANEOUS at 12:34

## 2023-02-19 RX ADMIN — IBUPROFEN 600 MG: 600 TABLET ORAL at 20:28

## 2023-02-19 RX ADMIN — GABAPENTIN 600 MG: 300 CAPSULE ORAL at 06:03

## 2023-02-19 RX ADMIN — SUGAMMADEX 200 MG: 100 INJECTION, SOLUTION INTRAVENOUS at 11:58

## 2023-02-19 RX ADMIN — ONDANSETRON 4 MG: 2 INJECTION INTRAMUSCULAR; INTRAVENOUS at 11:55

## 2023-02-19 RX ADMIN — DEXAMETHASONE SODIUM PHOSPHATE 12 MG: 4 INJECTION, SOLUTION INTRAMUSCULAR; INTRAVENOUS at 10:07

## 2023-02-19 RX ADMIN — HYDROCODONE BITARTRATE AND ACETAMINOPHEN 1 TABLET: 10; 325 TABLET ORAL at 13:35

## 2023-02-19 RX ADMIN — SODIUM CHLORIDE, POTASSIUM CHLORIDE, SODIUM LACTATE AND CALCIUM CHLORIDE: 600; 310; 30; 20 INJECTION, SOLUTION INTRAVENOUS at 11:46

## 2023-02-19 RX ADMIN — CEFAZOLIN SODIUM 2 G: 1 INJECTION, POWDER, FOR SOLUTION INTRAMUSCULAR; INTRAVENOUS at 10:07

## 2023-02-19 RX ADMIN — IBUPROFEN 600 MG: 600 TABLET ORAL at 13:35

## 2023-02-19 RX ADMIN — PROPOFOL 200 MG: 10 INJECTION, EMULSION INTRAVENOUS at 10:02

## 2023-02-19 RX ADMIN — FENTANYL CITRATE 100 MCG: 50 INJECTION, SOLUTION INTRAMUSCULAR; INTRAVENOUS at 10:02

## 2023-02-19 RX ADMIN — LIDOCAINE HYDROCHLORIDE 50 MG: 10 INJECTION, SOLUTION EPIDURAL; INFILTRATION; INTRACAUDAL; PERINEURAL at 10:02

## 2023-02-19 RX ADMIN — LIDOCAINE HYDROCHLORIDE 1 EACH: 40 SOLUTION TOPICAL at 10:04

## 2023-02-19 RX ADMIN — GABAPENTIN 600 MG: 300 CAPSULE ORAL at 13:35

## 2023-02-19 RX ADMIN — HYDROCODONE BITARTRATE AND ACETAMINOPHEN 1 TABLET: 10; 325 TABLET ORAL at 06:03

## 2023-02-19 RX ADMIN — HYDROMORPHONE HYDROCHLORIDE 0.25 MG: 1 INJECTION, SOLUTION INTRAMUSCULAR; INTRAVENOUS; SUBCUTANEOUS at 04:39

## 2023-02-19 RX ADMIN — FENTANYL CITRATE 50 MCG: 50 INJECTION, SOLUTION INTRAMUSCULAR; INTRAVENOUS at 12:41

## 2023-02-19 RX ADMIN — SODIUM CHLORIDE, POTASSIUM CHLORIDE, SODIUM LACTATE AND CALCIUM CHLORIDE: 600; 310; 30; 20 INJECTION, SOLUTION INTRAVENOUS at 09:57

## 2023-02-19 RX ADMIN — ROCURONIUM BROMIDE 90 MG: 10 INJECTION INTRAVENOUS at 10:02

## 2023-02-19 RX ADMIN — FAMOTIDINE 20 MG: 10 INJECTION INTRAVENOUS at 09:41

## 2023-02-19 RX ADMIN — HYDROMORPHONE HYDROCHLORIDE 1 MG: 1 INJECTION, SOLUTION INTRAMUSCULAR; INTRAVENOUS; SUBCUTANEOUS at 02:14

## 2023-02-19 RX ADMIN — HYDROCODONE BITARTRATE AND ACETAMINOPHEN 1 TABLET: 10; 325 TABLET ORAL at 20:28

## 2023-02-19 RX ADMIN — GABAPENTIN 600 MG: 300 CAPSULE ORAL at 20:28

## 2023-02-19 RX ADMIN — HYDROMORPHONE HYDROCHLORIDE 0.25 MG: 1 INJECTION, SOLUTION INTRAMUSCULAR; INTRAVENOUS; SUBCUTANEOUS at 17:08

## 2023-02-19 RX ADMIN — TAMSULOSIN HYDROCHLORIDE 0.4 MG: 0.4 CAPSULE ORAL at 20:27

## 2023-02-19 RX ADMIN — Medication 0.5 MG: at 12:34

## 2023-02-19 RX ADMIN — HYDROMORPHONE HYDROCHLORIDE 0.25 MG: 1 INJECTION, SOLUTION INTRAMUSCULAR; INTRAVENOUS; SUBCUTANEOUS at 08:10

## 2023-02-19 NOTE — ANESTHESIA PROCEDURE NOTES
Airway  Urgency: elective    Date/Time: 2/19/2023 10:04 AM  Airway not difficult    General Information and Staff    Patient location during procedure: OR  CRNA/CAA: Shamar Philip CRNA    Indications and Patient Condition  Indications for airway management: airway protection    Preoxygenated: yes  MILS not maintained throughout  Mask difficulty assessment: 2 - vent by mask + OA or adjuvant +/- NMBA    Final Airway Details  Final airway type: endotracheal airway      Successful airway: ETT  Cuffed: yes   Successful intubation technique: video laryngoscopy  Facilitating devices/methods: intubating stylet  Endotracheal tube insertion site: oral  Blade: Liz  Blade size: 4  ETT size (mm): 7.0  Cormack-Lehane Classification: grade I - full view of glottis  Placement verified by: chest auscultation and capnometry   Cuff volume (mL): 6  Measured from: lips  ETT/EBT  to lips (cm): 20  Number of attempts at approach: 1  Assessment: lips, teeth, and gum same as pre-op and atraumatic intubation    Additional Comments  Negative epigastric sounds, Breath sound equal bilaterally with symmetric chest rise and fall. Liz utilized due to limited neck ROM. Head and neck maintained neutral throughout intubation.

## 2023-02-19 NOTE — ANESTHESIA POSTPROCEDURE EVALUATION
Patient: Ang Jackson    Procedure Summary     Date: 02/19/23 Room / Location:  [facility] /  [facility]    Anesthesia Start: 0957 Anesthesia Stop: 1214    Procedure: CERVICAL DISCECTOMY ANTERIOR WITH FUSION (Right: Spine Cervical) Diagnosis:     Surgeons: Jaiden Aldridge MD Provider: Giancarlo Hall MD    Anesthesia Type: general ASA Status: 3 - Emergent          Anesthesia Type: general    Vitals  Vitals Value Taken Time   BP     Temp     Pulse 101 02/19/23 1213   Resp     SpO2 99 % 02/19/23 1213   Vitals shown include unvalidated device data.        Post Anesthesia Care and Evaluation    Patient location during evaluation: PACU  Patient participation: complete - patient participated  Level of consciousness: awake and alert  Pain management: adequate    Airway patency: patent  Anesthetic complications: No anesthetic complications  PONV Status: none  Cardiovascular status: hemodynamically stable and acceptable  Respiratory status: nonlabored ventilation, acceptable and nasal cannula  Hydration status: acceptable

## 2023-02-19 NOTE — ANESTHESIA PREPROCEDURE EVALUATION
Anesthesia Evaluation                  Airway   Mallampati: I  TM distance: >3 FB  Neck ROM: full  No difficulty expected  Dental      Pulmonary    (+) sleep apnea,   Cardiovascular     ECG reviewed    (+) hypertension,       Neuro/Psych  (+) headaches, psychiatric history,    GI/Hepatic/Renal/Endo    (+) obesity,   diabetes mellitus,     Musculoskeletal     Abdominal    Substance History      OB/GYN          Other   arthritis,                      Anesthesia Plan    ASA 3 - emergent     general     intravenous induction     Anesthetic plan, risks, benefits, and alternatives have been provided, discussed and informed consent has been obtained with: patient.    Plan discussed with CRNA.        CODE STATUS:    Level Of Support Discussed With: Patient  Code Status (Patient has no pulse and is not breathing): CPR (Attempt to Resuscitate)  Medical Interventions (Patient has pulse or is breathing): Full Support

## 2023-02-20 ENCOUNTER — TELEPHONE (OUTPATIENT)
Dept: NEUROSURGERY | Facility: CLINIC | Age: 63
End: 2023-02-20
Payer: MEDICARE

## 2023-02-20 VITALS
RESPIRATION RATE: 17 BRPM | HEART RATE: 77 BPM | TEMPERATURE: 98 F | DIASTOLIC BLOOD PRESSURE: 95 MMHG | SYSTOLIC BLOOD PRESSURE: 154 MMHG | OXYGEN SATURATION: 93 %

## 2023-02-20 DIAGNOSIS — Z98.1 S/P CERVICAL SPINAL FUSION: Primary | ICD-10-CM

## 2023-02-20 PROBLEM — M48.02 CERVICAL STENOSIS OF SPINAL CANAL: Status: RESOLVED | Noted: 2023-02-18 | Resolved: 2023-02-20

## 2023-02-20 LAB
GLUCOSE BLDC GLUCOMTR-MCNC: 110 MG/DL (ref 70–130)
GLUCOSE BLDC GLUCOMTR-MCNC: 213 MG/DL (ref 70–130)
GLUCOSE BLDC GLUCOMTR-MCNC: 93 MG/DL (ref 70–130)

## 2023-02-20 PROCEDURE — 97110 THERAPEUTIC EXERCISES: CPT

## 2023-02-20 PROCEDURE — 99239 HOSP IP/OBS DSCHRG MGMT >30: CPT | Performed by: NURSE PRACTITIONER

## 2023-02-20 PROCEDURE — 97535 SELF CARE MNGMENT TRAINING: CPT | Performed by: OCCUPATIONAL THERAPIST

## 2023-02-20 PROCEDURE — 82962 GLUCOSE BLOOD TEST: CPT

## 2023-02-20 PROCEDURE — 97161 PT EVAL LOW COMPLEX 20 MIN: CPT

## 2023-02-20 PROCEDURE — 99024 POSTOP FOLLOW-UP VISIT: CPT | Performed by: NEUROLOGICAL SURGERY

## 2023-02-20 PROCEDURE — 25010000002 CEFAZOLIN IN DEXTROSE 2-4 GM/100ML-% SOLUTION: Performed by: NEUROLOGICAL SURGERY

## 2023-02-20 PROCEDURE — 97165 OT EVAL LOW COMPLEX 30 MIN: CPT | Performed by: OCCUPATIONAL THERAPIST

## 2023-02-20 RX ORDER — ACETAMINOPHEN 325 MG/1
650 TABLET ORAL EVERY 4 HOURS PRN
Start: 2023-02-20 | End: 2023-02-20 | Stop reason: HOSPADM

## 2023-02-20 RX ORDER — HYDROCODONE BITARTRATE AND ACETAMINOPHEN 10; 325 MG/1; MG/1
1 TABLET ORAL ONCE AS NEEDED
Status: COMPLETED | OUTPATIENT
Start: 2023-02-20 | End: 2023-02-20

## 2023-02-20 RX ORDER — HYDROCODONE BITARTRATE AND ACETAMINOPHEN 10; 325 MG/1; MG/1
1 TABLET ORAL ONCE AS NEEDED
Status: DISCONTINUED | OUTPATIENT
Start: 2023-02-20 | End: 2023-02-20 | Stop reason: SDUPTHER

## 2023-02-20 RX ADMIN — IBUPROFEN 600 MG: 600 TABLET ORAL at 13:00

## 2023-02-20 RX ADMIN — CEFAZOLIN SODIUM 2 G: 2 INJECTION, SOLUTION INTRAVENOUS at 02:41

## 2023-02-20 RX ADMIN — LISINOPRIL 5 MG: 5 TABLET ORAL at 08:44

## 2023-02-20 RX ADMIN — HYDROCODONE BITARTRATE AND ACETAMINOPHEN 1 TABLET: 10; 325 TABLET ORAL at 08:50

## 2023-02-20 RX ADMIN — HYDROCODONE BITARTRATE AND ACETAMINOPHEN 1 TABLET: 10; 325 TABLET ORAL at 13:40

## 2023-02-20 RX ADMIN — IBUPROFEN 600 MG: 600 TABLET ORAL at 06:29

## 2023-02-20 RX ADMIN — Medication 10 ML: at 08:51

## 2023-02-20 RX ADMIN — PANTOPRAZOLE SODIUM 40 MG: 40 TABLET, DELAYED RELEASE ORAL at 08:44

## 2023-02-20 RX ADMIN — GABAPENTIN 600 MG: 300 CAPSULE ORAL at 13:00

## 2023-02-20 RX ADMIN — GABAPENTIN 600 MG: 300 CAPSULE ORAL at 06:29

## 2023-02-20 RX ADMIN — HYDROCODONE BITARTRATE AND ACETAMINOPHEN 1 TABLET: 10; 325 TABLET ORAL at 02:57

## 2023-02-20 NOTE — TELEPHONE ENCOUNTER
Pt is scheduled for Post Op on 03/15/23 w/ Sydnee Joe PA-C. Pt was advised to get Xray prior to appt. Mailed appt reminder.

## 2023-02-20 NOTE — TELEPHONE ENCOUNTER
Can someone please place an order for AP and lateral cervical spine x-rays needs for 3 wks Post Cervical Discectomy w/ Fusion. Thank you.

## 2023-02-21 ENCOUNTER — READMISSION MANAGEMENT (OUTPATIENT)
Dept: CALL CENTER | Facility: HOSPITAL | Age: 63
End: 2023-02-21
Payer: MEDICARE

## 2023-02-21 NOTE — OUTREACH NOTE
Prep Survey    Flowsheet Row Responses   [facility] facility patient discharged from? [place]   Is LACE score < 7 ? No   Eligibility Readm Mgmt   Discharge diagnosis Anterior cervical discectomy with microdissection   Does the patient have one of the following disease processes/diagnoses(primary or secondary)? Other   Does the patient have Home health ordered? No   Prep survey completed? Yes          Alexa NIEVES - Registered Nurse

## 2023-03-01 ENCOUNTER — READMISSION MANAGEMENT (OUTPATIENT)
Dept: CALL CENTER | Facility: HOSPITAL | Age: 63
End: 2023-03-01
Payer: MEDICARE

## 2023-03-01 NOTE — OUTREACH NOTE
Medical Week 2 Survey    Flowsheet Row Responses   [facility] patient discharged from? [place]   Does the patient have one of the following disease processes/diagnoses(primary or secondary)? Other   Week 2 attempt successful? Yes   Call start time 0952   Discharge diagnosis Anterior cervical discectomy with microdissection   Call end time 0955   Meds reviewed with patient/caregiver? Yes   Is the patient having any side effects they believe may be caused by any medication additions or changes? No   Does the patient have all medications ordered at discharge? Yes   Is the patient taking all medications as directed (includes completed medication regime)? Yes   Does the patient have a primary care provider?  Yes   Does the patient have an appointment with their PCP within 7 days of discharge? Yes   Has the patient kept scheduled appointments due by today? Yes   Has home health visited the patient within 72 hours of discharge? N/A   Psychosocial issues? No   Did the patient receive a copy of their discharge instructions? Yes   Nursing interventions Reviewed instructions with patient   What is the patient's perception of their health status since discharge? Improving   Is the patient/caregiver able to teach back signs and symptoms related to disease process for when to call PCP? Yes   Is the patient/caregiver able to teach back signs and symptoms related to disease process for when to call 911? Yes   Is the patient/caregiver able to teach back the hierarchy of who to call/visit for symptoms/problems? PCP, Specialist, Home health nurse, Urgent Care, ED, 911 Yes   If the patient is a current smoker, are they able to teach back resources for cessation? Not a smoker   Additional teach back comments states is free of pain, incision healing well, free of infection, post op care reviewed   Week 2 Call Completed? Yes          Rafaela NIEVES - Registered Nurse

## 2023-03-08 ENCOUNTER — READMISSION MANAGEMENT (OUTPATIENT)
Dept: CALL CENTER | Facility: HOSPITAL | Age: 63
End: 2023-03-08
Payer: MEDICARE

## 2023-03-08 NOTE — OUTREACH NOTE
Medical Week 3 Survey    Flowsheet Row Responses   [facility] patient discharged from? [place]   Does the patient have one of the following disease processes/diagnoses(primary or secondary)? Other   Week 3 attempt successful? Yes   Call start time 1452   Call end time 1454   Discharge diagnosis Anterior cervical discectomy with microdissection   Meds reviewed with patient/caregiver? Yes   Is the patient having any side effects they believe may be caused by any medication additions or changes? No   Does the patient have all medications ordered at discharge? Yes   Is the patient taking all medications as directed (includes completed medication regime)? Yes   Does the patient have a primary care provider?  Yes   Does the patient have an appointment with their PCP within 7 days of discharge? Yes   Has the patient kept scheduled appointments due by today? Yes   Has home health visited the patient within 72 hours of discharge? N/A   Psychosocial issues? No   Did the patient receive a copy of their discharge instructions? Yes   Nursing interventions Reviewed instructions with patient   What is the patient's perception of their health status since discharge? Improving   Is the patient/caregiver able to teach back signs and symptoms related to disease process for when to call PCP? Yes   Is the patient/caregiver able to teach back signs and symptoms related to disease process for when to call 911? Yes   Is the patient/caregiver able to teach back the hierarchy of who to call/visit for symptoms/problems? PCP, Specialist, Home health nurse, Urgent Care, ED, 911 Yes   If the patient is a current smoker, are they able to teach back resources for cessation? Not a smoker   Additional teach back comments Doing his exercises, no pain, incision looks good.   Week 3 Call Completed? Yes   Graduated Yes   Is the patient interested in additional calls from an ambulatory ?  NOTE:  applies to high risk patients requiring  additional follow-up. No   Did the patient feel the follow up calls were helpful during their recovery period? Yes   Was the number of calls appropriate? Yes   Graduated/Revoked comments Improving.   Wrap up additional comments Looking for another tens unit.          Mel VELAZQUEZ - Registered Nurse

## 2023-03-13 ENCOUNTER — HOSPITAL ENCOUNTER (OUTPATIENT)
Dept: GENERAL RADIOLOGY | Facility: HOSPITAL | Age: 63
Discharge: HOME | End: 2023-03-13
Admitting: NEUROLOGICAL SURGERY
Payer: MEDICARE

## 2023-03-13 DIAGNOSIS — Z98.1 S/P CERVICAL SPINAL FUSION: ICD-10-CM

## 2023-03-13 PROCEDURE — 72040 X-RAY EXAM NECK SPINE 2-3 VW: CPT

## 2023-03-13 PROCEDURE — 72040 X-RAY EXAM NECK SPINE 2-3 VW: CPT | Performed by: RADIOLOGY

## 2023-03-15 ENCOUNTER — OFFICE VISIT (OUTPATIENT)
Dept: NEUROSURGERY | Facility: CLINIC | Age: 63
End: 2023-03-15
Payer: MEDICARE

## 2023-03-15 VITALS — RESPIRATION RATE: 17 BRPM | WEIGHT: 213.2 LBS | TEMPERATURE: 97.7 F | BODY MASS INDEX: 32.31 KG/M2 | HEIGHT: 68 IN

## 2023-03-15 DIAGNOSIS — Z98.1 S/P CERVICAL SPINAL FUSION: ICD-10-CM

## 2023-03-15 DIAGNOSIS — G56.03 BILATERAL CARPAL TUNNEL SYNDROME: ICD-10-CM

## 2023-03-15 DIAGNOSIS — M47.12 CERVICAL SPONDYLOSIS WITH MYELOPATHY: Primary | ICD-10-CM

## 2023-03-15 PROCEDURE — 99024 POSTOP FOLLOW-UP VISIT: CPT | Performed by: PHYSICIAN ASSISTANT

## 2023-03-15 PROCEDURE — 1159F MED LIST DOCD IN RCRD: CPT | Performed by: PHYSICIAN ASSISTANT

## 2023-03-15 PROCEDURE — 1160F RVW MEDS BY RX/DR IN RCRD: CPT | Performed by: PHYSICIAN ASSISTANT

## 2023-03-15 NOTE — PROGRESS NOTES
"Patient: Ang Jackson  : 1960  Chart #: [id number]    Date of Service: 03/15/2023    CHIEF COMPLAINT: Cervical spondylosis with myelopathy    History of Present Illness Mr. Jackson is a 62-year-old disabled RHD  who is known to our clinic for lumbar difficulties.  More recently, he presented with progressive weakness and sensory alteration involving his extremities.  Studies demonstrated substantial disc osteophyte complex at C6-7 with lyla spinal cord compromise.  Given his progressive myelopathy, on 2023 he underwent anterior cervical discectomy with allografting and fusion C6/7.    Today he is 3 weeks postop.  He is no longer experiencing \"electric\" sensations into his arm. His gait is still a little unsteady.  His main concern is numbness in this hands. R>L. He is right hand dominant.       Past Medical History:   Diagnosis Date   • Anesthesia     diffculty adminster spinal block, patient stated with last hip surgery  several attempts per awilda and rin luck, resulted in general anesthesia    • Anxiety    • Arthritis    • Diabetes mellitus (HCC)    • GERD (gastroesophageal reflux disease)    • Headache    • Hypertension    • Low back pain    • Sleep apnea     does not wear machine, MILD   • Wears reading eyeglasses          Current Outpatient Medications:   •  ascorbic acid (VITAMIN C) 1000 MG tablet, Take 1,000 mg by mouth Daily., Disp: , Rfl:   •  celecoxib (CeleBREX) 200 MG capsule, Take 1 capsule by mouth 2 (Two) Times a Day., Disp: 60 capsule, Rfl: 3  •  Cyanocobalamin 1000 MCG/ML kit, Inject 1 dose as directed Every 30 (Thirty) Days., Disp: , Rfl:   •  gabapentin (NEURONTIN) 800 MG tablet, 600 mg 3 (Three) Times a Day., Disp: , Rfl:   •  HYDROcodone-acetaminophen (NORCO)  MG per tablet, Take 1 tablet by mouth Every 6 (Six) Hours As Needed for Moderate Pain., Disp: , Rfl:   •  hydrOXYzine pamoate (VISTARIL) 50 MG capsule, 50 mg 3 (Three) Times a Day As " Needed., Disp: , Rfl:   •  lisinopril (PRINIVIL,ZESTRIL) 5 MG tablet, Take 5 mg by mouth Daily., Disp: , Rfl:   •  loratadine (CLARITIN) 10 MG tablet, Take 10 mg by mouth Daily., Disp: , Rfl:   •  Multiple Vitamins-Minerals (MENS MULTIPLUS PO), Take 1 tablet by mouth Daily., Disp: , Rfl:   •  pantoprazole (PROTONIX) 40 MG EC tablet, Take 1 tablet by mouth Daily., Disp: 90 tablet, Rfl: 3  •  tamsulosin (FLOMAX) 0.4 MG capsule 24 hr capsule, Take 1 capsule by mouth Every Night., Disp: 30 capsule, Rfl: 3    Past Surgical History:   Procedure Laterality Date   • ANTERIOR CERVICAL DISCECTOMY W/ FUSION Right 2/19/2023    Procedure: CERVICAL DISCECTOMY ANTERIOR WITH FUSION C6-7;  Surgeon: Jaiden Aldridge MD;  Location: [facility] OR;  Service: Neurosurgery;  Laterality: Right;   • CARPAL TUNNEL RELEASE Bilateral    • COLONOSCOPY      5 years ago   • COLONOSCOPY N/A 4/5/2022    Procedure: COLONOSCOPY FOR SCREENING;  Surgeon: Luz Galvez MD;  Location: [facility] OR;  Service: Gastroenterology;  Laterality: N/A;   • ENDOSCOPY N/A 4/5/2022    Procedure: ESOPHAGOGASTRODUODENOSCOPY WITH BIOPSY;  Surgeon: Luz Galvez MD;  Location: [facility] OR;  Service: Gastroenterology;  Laterality: N/A;   • GASTRIC SLEEVE LAPAROSCOPIC     • HIP SURGERY Right times 2   • KNEE SURGERY Left     arthroscopy    • TOTAL HIP ARTHROPLASTY Left 1/25/2021    Procedure: TOTAL HIP ARTHROPLASTY LEFT;  Surgeon: Jb Patel MD;  Location:  [facility] OR;  Service: Orthopedics;  Laterality: Left;   • TOTAL HIP ARTHROPLASTY REVISION Right 7/20/2020    Procedure: TOTAL HIP ARTHROPLASTY REVISION RIGHT;  Surgeon: Jb Patel MD;  Location:  [facility] OR;  Service: Orthopedics;  Laterality: Right;       Social History     Socioeconomic History   • Marital status:    Tobacco Use   • Smoking status: Never   • Smokeless tobacco: Never   Vaping Use   • Vaping Use: Never used   Substance and Sexual Activity   • Alcohol use: No   •  Drug use: Never   • Sexual activity: Yes         Review of Systems   Constitutional: Negative for activity change, appetite change, chills, diaphoresis, fatigue, fever and unexpected weight change.   HENT: Negative for congestion, dental problem, drooling, ear discharge, ear pain, facial swelling, hearing loss, mouth sores, nosebleeds, postnasal drip, rhinorrhea, sinus pressure, sneezing, sore throat, tinnitus, trouble swallowing and voice change.    Eyes: Negative for photophobia, pain, discharge, redness, itching and visual disturbance.   Respiratory: Negative for apnea, cough, choking, chest tightness, shortness of breath, wheezing and stridor.    Cardiovascular: Negative for chest pain, palpitations and leg swelling.   Gastrointestinal: Negative for abdominal distention, abdominal pain, anal bleeding, blood in stool, constipation, diarrhea, nausea, rectal pain and vomiting.   Endocrine: Negative for cold intolerance, heat intolerance, polydipsia, polyphagia and polyuria.   Genitourinary: Negative for decreased urine volume, difficulty urinating, dysuria, enuresis, flank pain, frequency, genital sores, hematuria and urgency.   Musculoskeletal: Negative for arthralgias, back pain, gait problem, joint swelling, myalgias, neck pain and neck stiffness.   Skin: Negative for color change, pallor, rash and wound.   Allergic/Immunologic: Negative for environmental allergies, food allergies and immunocompromised state.   Neurological: Negative for dizziness, tremors, seizures, syncope, facial asymmetry, speech difficulty, weakness, light-headedness, numbness and headaches.   Hematological: Negative for adenopathy. Does not bruise/bleed easily.   Psychiatric/Behavioral: Negative for agitation, behavioral problems, confusion, decreased concentration, dysphoric mood, hallucinations, self-injury, sleep disturbance and suicidal ideas. The patient is not nervous/anxious and is not hyperactive.    All other systems reviewed and  "are negative.      Objective   Vital Signs: Temperature 97.7 °F (36.5 °C), temperature source Infrared, resp. rate 17, height 172.7 cm (68\"), weight 96.7 kg (213 lb 3.2 oz).  Physical Exam  Vitals and nursing note reviewed.   Constitutional:       General: He is not in acute distress.     Appearance: He is well-developed.   Skin:     Comments: Well-healed anterior neck incision   Psychiatric:         Behavior: Behavior normal.         Thought Content: Thought content normal.     Kota sign positive on the right.  intact.        Independent review of radiographic imaging: Plain films of the cervical spine demonstrate construct intact at C6/7    Electrodiagnostic studies performed by Dr. Recinos in [place] on 2/6/2023 demonstrate severe bilateral median neuropathy at the wrist, right ulnar neuropathy, most likely at or above the elbow    Assessment & Plan   Diagnosis:   1.  Cervical spondylosis with myelopathy status post ACDF C6/7  2.  Severe bilateral carpal tunnel syndrome  3.  Right ulnar neuropathy at the elbow    Medical Decision Making: Patient is recovering nicely from his surgery. He may slowly advance his activities. He is concerned about the ongoing numbness in his hands. I believe he has two things going on here that are source for his symptoms. I'd like to give him some more time to heal up from surgery. I prescribed wrist splints to be used at night. When he follows up with Dr Aldridge in 4-6 weeks they can discuss his carpal tunnel syndrome. Call the office in the interim with any questions or concerns.         Diagnoses and all orders for this visit:    1. Cervical spondylosis with myelopathy (Primary)    2. S/P cervical spinal fusion    3. Bilateral carpal tunnel syndrome  -     Miscellaneous DME                          Margarita Joe PA-C  Patient Care Team:  Mamta Ortega PA-C as PCP - General (Physician Assistant)  Karen Reicnos MD as Referring Physician (Physical Medicine " and Rehabilitation)

## 2023-04-06 RX ORDER — PANTOPRAZOLE SODIUM 40 MG/1
40 TABLET, DELAYED RELEASE ORAL DAILY
Qty: 90 TABLET | Refills: 3 | Status: SHIPPED | OUTPATIENT
Start: 2023-04-06

## 2023-05-24 ENCOUNTER — PREP FOR SURGERY (OUTPATIENT)
Dept: OTHER | Facility: HOSPITAL | Age: 63
End: 2023-05-24
Payer: MEDICARE

## 2023-05-24 ENCOUNTER — OFFICE VISIT (OUTPATIENT)
Dept: NEUROSURGERY | Facility: CLINIC | Age: 63
End: 2023-05-24
Payer: MEDICARE

## 2023-05-24 VITALS — HEIGHT: 68 IN | WEIGHT: 203.4 LBS | TEMPERATURE: 97.3 F | BODY MASS INDEX: 30.83 KG/M2

## 2023-05-24 DIAGNOSIS — G56.03 BILATERAL CARPAL TUNNEL SYNDROME: ICD-10-CM

## 2023-05-24 DIAGNOSIS — G56.01 CARPAL TUNNEL SYNDROME OF RIGHT WRIST: Primary | ICD-10-CM

## 2023-05-24 DIAGNOSIS — Z98.1 S/P CERVICAL SPINAL FUSION: Primary | ICD-10-CM

## 2023-05-24 DIAGNOSIS — M47.12 CERVICAL SPONDYLOSIS WITH MYELOPATHY: ICD-10-CM

## 2023-05-24 RX ORDER — CEFAZOLIN SODIUM 2 G/100ML
2 INJECTION, SOLUTION INTRAVENOUS ONCE
OUTPATIENT
Start: 2023-05-24 | End: 2023-05-24

## 2023-05-24 RX ORDER — CYANOCOBALAMIN 1000 UG/ML
INJECTION, SOLUTION INTRAMUSCULAR; SUBCUTANEOUS
COMMUNITY
Start: 2023-04-26

## 2023-05-24 RX ORDER — OXYCODONE AND ACETAMINOPHEN 7.5; 325 MG/1; MG/1
TABLET ORAL
COMMUNITY
Start: 2023-04-03

## 2023-05-24 NOTE — H&P
Patient: Ang Jackson  : 1960     Primary Care Provider: Mamta Ortega PA-C     Requesting Provider: As above           History     Chief Complaint: Progressive weakness and sensory alteration involving the extremities with gait imbalance.     History of Present Illness: Mr. Jackson is a 62-year-old disabled RHD  who is known to our clinic for lumbar difficulties.  More recently, he presented with progressive weakness and sensory alteration involving his extremities.  Studies demonstrated substantial disc osteophyte complex at C6-7 with lyla spinal cord compromise.  Given his progressive myelopathy, on 2023 he underwent anterior cervical discectomy with allografting and fusion C6/7.  His generalized weakness is much better.  His gait is better.  He continues to have numbness in his hands and fingers.  The median nerve innervated fingers of the right hand are most afflicted.  Remotely he underwent bilateral carpal tunnel release.  More recent electrodiagnostic studies demonstrate severe ongoing bilateral carpal tunnel syndrome as well as a right ulnar neuropathy at or near the elbow.     Review of Systems   Constitutional: Positive for fatigue. Negative for activity change, appetite change, chills, diaphoresis, fever and unexpected weight change.   HENT: Negative for congestion, dental problem, drooling, ear discharge, ear pain, facial swelling, hearing loss, mouth sores, nosebleeds, postnasal drip, rhinorrhea, sinus pressure, sinus pain, sneezing, sore throat, tinnitus, trouble swallowing and voice change.    Eyes: Negative for photophobia, pain, discharge, redness, itching and visual disturbance.   Respiratory: Negative for apnea, cough, choking, chest tightness, shortness of breath, wheezing and stridor.    Cardiovascular: Negative for chest pain, palpitations and leg swelling.   Gastrointestinal: Negative for abdominal distention, abdominal pain, anal bleeding, blood in stool,  constipation, diarrhea, nausea, rectal pain and vomiting.   Endocrine: Negative for cold intolerance, heat intolerance, polydipsia, polyphagia and polyuria.   Genitourinary: Negative for decreased urine volume, difficulty urinating, dysuria, enuresis, flank pain, frequency, genital sores, hematuria, penile discharge, penile pain, penile swelling, scrotal swelling, testicular pain and urgency.   Musculoskeletal: Positive for arthralgias, back pain, joint swelling, myalgias, neck pain and neck stiffness. Negative for gait problem.   Skin: Negative for color change, pallor, rash and wound.   Allergic/Immunologic: Negative for environmental allergies, food allergies and immunocompromised state.   Neurological: Positive for headaches. Negative for dizziness, tremors, seizures, syncope, facial asymmetry, speech difficulty, weakness, light-headedness and numbness.   Hematological: Negative for adenopathy. Does not bruise/bleed easily.   Psychiatric/Behavioral: Negative for agitation, behavioral problems, confusion, decreased concentration, dysphoric mood, hallucinations, self-injury, sleep disturbance and suicidal ideas. The patient is nervous/anxious. The patient is not hyperactive.          The patient's past medical history, past surgical history, family history, and social history have been reviewed at length in the electronic medical record.  Past Medical History:   Diagnosis Date   • Anesthesia     diffculty adminster spinal block, patient stated with last hip surgery 7-2020 several attempts per awilda and no luck, resulted in general anesthesia    • Anxiety    • Arthritis    • Diabetes mellitus    • GERD (gastroesophageal reflux disease)    • Headache    • Hypertension    • Low back pain    • Sleep apnea     does not wear machine, MILD   • Wears reading eyeglasses      Past Surgical History:   Procedure Laterality Date   • ANTERIOR CERVICAL DISCECTOMY W/ FUSION Right 2/19/2023    Procedure: CERVICAL DISCECTOMY  ANTERIOR WITH FUSION C6-7;  Surgeon: Jaiden Aldridge MD;  Location:  [facility] OR;  Service: Neurosurgery;  Laterality: Right;   • CARPAL TUNNEL RELEASE Bilateral    • COLONOSCOPY      5 years ago   • COLONOSCOPY N/A 4/5/2022    Procedure: COLONOSCOPY FOR SCREENING;  Surgeon: Luz Galvez MD;  Location:  [facility] OR;  Service: Gastroenterology;  Laterality: N/A;   • ENDOSCOPY N/A 4/5/2022    Procedure: ESOPHAGOGASTRODUODENOSCOPY WITH BIOPSY;  Surgeon: Luz Galvez MD;  Location:  [facility] OR;  Service: Gastroenterology;  Laterality: N/A;   • GASTRIC SLEEVE LAPAROSCOPIC     • HIP SURGERY Right times 2   • KNEE SURGERY Left     arthroscopy    • TOTAL HIP ARTHROPLASTY Left 1/25/2021    Procedure: TOTAL HIP ARTHROPLASTY LEFT;  Surgeon: Jb Patel MD;  Location:  [facility] OR;  Service: Orthopedics;  Laterality: Left;   • TOTAL HIP ARTHROPLASTY REVISION Right 7/20/2020    Procedure: TOTAL HIP ARTHROPLASTY REVISION RIGHT;  Surgeon: Jb Patel MD;  Location:  [facility] OR;  Service: Orthopedics;  Laterality: Right;     Family History   Problem Relation Age of Onset   • Heart disease Father    • Hypertension Father    • Osteoarthritis Father    • Cancer Mother    • Diabetes Brother    • Hypertension Brother    • Gout Paternal Grandmother    • Diabetes Paternal Grandmother      Social History     Socioeconomic History   • Marital status:    Tobacco Use   • Smoking status: Never   • Smokeless tobacco: Never   Vaping Use   • Vaping Use: Never used   Substance and Sexual Activity   • Alcohol use: No   • Drug use: Never   • Sexual activity: Yes           Allergies   Allergen Reactions   • Codeine Nausea And Vomiting   • Morphine And Related Nausea And Vomiting       Current Outpatient Medications on File Prior to Visit   Medication Sig Dispense Refill   • ascorbic acid (VITAMIN C) 1000 MG tablet Take 1 tablet by mouth Daily.     • celecoxib (CeleBREX) 200 MG capsule Take 1 capsule by mouth 2  "(Two) Times a Day. 60 capsule 3   • cyanocobalamin 1000 MCG/ML injection      • Cyanocobalamin 1000 MCG/ML kit Inject 1 dose as directed Every 30 (Thirty) Days.     • gabapentin (NEURONTIN) 800 MG tablet 600 mg 3 (Three) Times a Day.     • HYDROcodone-acetaminophen (NORCO)  MG per tablet Take 1 tablet by mouth Every 6 (Six) Hours As Needed for Moderate Pain.     • hydrOXYzine pamoate (VISTARIL) 50 MG capsule 1 capsule 3 (Three) Times a Day As Needed.     • lisinopril (PRINIVIL,ZESTRIL) 5 MG tablet Take 1 tablet by mouth Daily.     • loratadine (CLARITIN) 10 MG tablet Take 1 tablet by mouth Daily.     • Multiple Vitamins-Minerals (MENS MULTIPLUS PO) Take 1 tablet by mouth Daily.     • oxyCODONE-acetaminophen (PERCOCET) 7.5-325 MG per tablet  (Patient not taking: Reported on 5/24/2023)     • pantoprazole (PROTONIX) 40 MG EC tablet Take 1 tablet by mouth Daily. 90 tablet 3   • tamsulosin (FLOMAX) 0.4 MG capsule 24 hr capsule Take 1 capsule by mouth Every Night. 30 capsule 3     No current facility-administered medications on file prior to visit.            Physical Exam:   Temp 97.3 °F (36.3 °C) (Infrared)   Ht 172.7 cm (68\")   Wt 92.3 kg (203 lb 6.4 oz)   BMI 30.93 kg/m²   Right anterior cervical incision looks quite good.  His gait is independent and balance.     Medical Decision Making     Data Review:   (All imaging studies were personally reviewed unless stated otherwise)  MRI of the lumbar spine demonstrates diffuse degenerative disc disease and spondylosis.  There is generous grade stenosis at L3-4 and L4-5 and more moderate stenosis at other levels.     MRI of the cervical spine demonstrates a large disc protrusion at C6-7 with cord compromise.  There is lyla signal change within the cord.  There is some central disc bulging at C7-T1 without cord compromise.     Diagnosis:   1.  Cervical spondylosis with myelopathy status post ACDF C6-7.  2.  Severe bilateral carpal tunnel syndrome.  3.  Right ulnar " neuropathy near the elbow.     Treatment Options:   The patient is doing well in terms of his cervical myelopathy.  The ongoing hand numbness may be related to the myelopathy or could be due to the ongoing carpal tunnel syndrome.  At this juncture I have recommended staged carpal tunnel release beginning on the right.  The nature of the procedure as well as the potential risks, complications, limitations, and alternatives to the procedure were discussed at length with the patient and the patient has agreed to proceed with surgery.          Diagnosis Plan   1. S/P cervical spinal fusion          2. Cervical spondylosis with myelopathy          3. Bilateral carpal tunnel syndrome

## 2023-05-24 NOTE — PROGRESS NOTES
Patient: Ang Jackson  : 1960    Primary Care Provider: Mamta Ortega PA-C    Requesting Provider: As above        History    Chief Complaint: Progressive weakness and sensory alteration involving the extremities with gait imbalance.    History of Present Illness: Mr. Jackson is a 62-year-old disabled RHD  who is known to our clinic for lumbar difficulties.  More recently, he presented with progressive weakness and sensory alteration involving his extremities.  Studies demonstrated substantial disc osteophyte complex at C6-7 with lyla spinal cord compromise.  Given his progressive myelopathy, on 2023 he underwent anterior cervical discectomy with allografting and fusion C6/7.  His generalized weakness is much better.  His gait is better.  He continues to have numbness in his hands and fingers.  The median nerve innervated fingers of the right hand are most afflicted.  Remotely he underwent bilateral carpal tunnel release.  More recent electrodiagnostic studies demonstrate severe ongoing bilateral carpal tunnel syndrome as well as a right ulnar neuropathy at or near the elbow.    Review of Systems   Constitutional: Positive for fatigue. Negative for activity change, appetite change, chills, diaphoresis, fever and unexpected weight change.   HENT: Negative for congestion, dental problem, drooling, ear discharge, ear pain, facial swelling, hearing loss, mouth sores, nosebleeds, postnasal drip, rhinorrhea, sinus pressure, sinus pain, sneezing, sore throat, tinnitus, trouble swallowing and voice change.    Eyes: Negative for photophobia, pain, discharge, redness, itching and visual disturbance.   Respiratory: Negative for apnea, cough, choking, chest tightness, shortness of breath, wheezing and stridor.    Cardiovascular: Negative for chest pain, palpitations and leg swelling.   Gastrointestinal: Negative for abdominal distention, abdominal pain, anal bleeding, blood in stool,  "constipation, diarrhea, nausea, rectal pain and vomiting.   Endocrine: Negative for cold intolerance, heat intolerance, polydipsia, polyphagia and polyuria.   Genitourinary: Negative for decreased urine volume, difficulty urinating, dysuria, enuresis, flank pain, frequency, genital sores, hematuria, penile discharge, penile pain, penile swelling, scrotal swelling, testicular pain and urgency.   Musculoskeletal: Positive for arthralgias, back pain, joint swelling, myalgias, neck pain and neck stiffness. Negative for gait problem.   Skin: Negative for color change, pallor, rash and wound.   Allergic/Immunologic: Negative for environmental allergies, food allergies and immunocompromised state.   Neurological: Positive for headaches. Negative for dizziness, tremors, seizures, syncope, facial asymmetry, speech difficulty, weakness, light-headedness and numbness.   Hematological: Negative for adenopathy. Does not bruise/bleed easily.   Psychiatric/Behavioral: Negative for agitation, behavioral problems, confusion, decreased concentration, dysphoric mood, hallucinations, self-injury, sleep disturbance and suicidal ideas. The patient is nervous/anxious. The patient is not hyperactive.        The patient's past medical history, past surgical history, family history, and social history have been reviewed at length in the electronic medical record.      Physical Exam:   Temp 97.3 °F (36.3 °C) (Infrared)   Ht 172.7 cm (68\")   Wt 92.3 kg (203 lb 6.4 oz)   BMI 30.93 kg/m²   Right anterior cervical incision looks quite good.  His gait is independent and balance.    Medical Decision Making    Data Review:   (All imaging studies were personally reviewed unless stated otherwise)  MRI of the lumbar spine demonstrates diffuse degenerative disc disease and spondylosis.  There is generous grade stenosis at L3-4 and L4-5 and more moderate stenosis at other levels.     MRI of the cervical spine demonstrates a large disc protrusion at " C6-7 with cord compromise.  There is lyla signal change within the cord.  There is some central disc bulging at C7-T1 without cord compromise.    Diagnosis:   1.  Cervical spondylosis with myelopathy status post ACDF C6-7.  2.  Severe bilateral carpal tunnel syndrome.  3.  Right ulnar neuropathy near the elbow.    Treatment Options:   The patient is doing well in terms of his cervical myelopathy.  The ongoing hand numbness may be related to the myelopathy or could be due to the ongoing carpal tunnel syndrome.  At this juncture I have recommended staged carpal tunnel release beginning on the right.  The nature of the procedure as well as the potential risks, complications, limitations, and alternatives to the procedure were discussed at length with the patient and the patient has agreed to proceed with surgery.       Diagnosis Plan   1. S/P cervical spinal fusion        2. Cervical spondylosis with myelopathy        3. Bilateral carpal tunnel syndrome            Scribed for Jaiden Aldridge MD by Lesley Fowler [facility] 5/24/2023 12:08 EDT      I, Dr. Aldridge, personally performed the services described in the documentation, as scribed in my presence, and it is both accurate and complete.

## 2023-07-11 PROBLEM — G56.03 BILATERAL CARPAL TUNNEL SYNDROME: Status: ACTIVE | Noted: 2023-05-24

## 2023-11-27 ENCOUNTER — HOSPITAL ENCOUNTER (EMERGENCY)
Facility: HOSPITAL | Age: 63
Discharge: HOME | End: 2023-11-27
Attending: EMERGENCY MEDICINE | Admitting: EMERGENCY MEDICINE
Payer: MEDICARE

## 2023-11-27 ENCOUNTER — APPOINTMENT (OUTPATIENT)
Dept: MRI IMAGING | Facility: HOSPITAL | Age: 63
End: 2023-11-27
Payer: MEDICARE

## 2023-11-27 VITALS
OXYGEN SATURATION: 99 % | SYSTOLIC BLOOD PRESSURE: 137 MMHG | DIASTOLIC BLOOD PRESSURE: 81 MMHG | WEIGHT: 208 LBS | RESPIRATION RATE: 18 BRPM | BODY MASS INDEX: 31.52 KG/M2 | HEART RATE: 65 BPM | TEMPERATURE: 98.5 F | HEIGHT: 68 IN

## 2023-11-27 DIAGNOSIS — M48.061 SPINAL STENOSIS OF LUMBAR REGION, UNSPECIFIED WHETHER NEUROGENIC CLAUDICATION PRESENT: Primary | ICD-10-CM

## 2023-11-27 LAB
ALBUMIN SERPL-MCNC: 4.1 G/DL (ref 3.5–5.2)
ALBUMIN/GLOB SERPL: 1.7 G/DL
ALP SERPL-CCNC: 79 U/L (ref 39–117)
ALT SERPL W P-5'-P-CCNC: 16 U/L (ref 1–41)
ANION GAP SERPL CALCULATED.3IONS-SCNC: 6.6 MMOL/L (ref 5–15)
AST SERPL-CCNC: 20 U/L (ref 1–40)
BASOPHILS # BLD AUTO: 0.01 10*3/MM3 (ref 0–0.2)
BASOPHILS NFR BLD AUTO: 0.2 % (ref 0–1.5)
BILIRUB SERPL-MCNC: 0.4 MG/DL (ref 0–1.2)
BUN SERPL-MCNC: 16 MG/DL (ref 8–23)
BUN/CREAT SERPL: 23.9 (ref 7–25)
CALCIUM SPEC-SCNC: 8.8 MG/DL (ref 8.6–10.5)
CHLORIDE SERPL-SCNC: 108 MMOL/L (ref 98–107)
CO2 SERPL-SCNC: 28.4 MMOL/L (ref 22–29)
CREAT SERPL-MCNC: 0.67 MG/DL (ref 0.76–1.27)
CRP SERPL-MCNC: <0.3 MG/DL (ref 0–0.5)
DEPRECATED RDW RBC AUTO: 45.5 FL (ref 37–54)
EGFRCR SERPLBLD CKD-EPI 2021: 105.6 ML/MIN/1.73
EOSINOPHIL # BLD AUTO: 0.13 10*3/MM3 (ref 0–0.4)
EOSINOPHIL NFR BLD AUTO: 2.9 % (ref 0.3–6.2)
ERYTHROCYTE [DISTWIDTH] IN BLOOD BY AUTOMATED COUNT: 13.1 % (ref 12.3–15.4)
ERYTHROCYTE [SEDIMENTATION RATE] IN BLOOD: 1 MM/HR (ref 0–20)
GLOBULIN UR ELPH-MCNC: 2.4 GM/DL
GLUCOSE SERPL-MCNC: 92 MG/DL (ref 65–99)
HCT VFR BLD AUTO: 39.9 % (ref 37.5–51)
HGB BLD-MCNC: 12.5 G/DL (ref 13–17.7)
HOLD SPECIMEN: NORMAL
HOLD SPECIMEN: NORMAL
IMM GRANULOCYTES # BLD AUTO: 0.01 10*3/MM3 (ref 0–0.05)
IMM GRANULOCYTES NFR BLD AUTO: 0.2 % (ref 0–0.5)
LYMPHOCYTES # BLD AUTO: 1.78 10*3/MM3 (ref 0.7–3.1)
LYMPHOCYTES NFR BLD AUTO: 39.3 % (ref 19.6–45.3)
MCH RBC QN AUTO: 29.6 PG (ref 26.6–33)
MCHC RBC AUTO-ENTMCNC: 31.3 G/DL (ref 31.5–35.7)
MCV RBC AUTO: 94.3 FL (ref 79–97)
MONOCYTES # BLD AUTO: 0.39 10*3/MM3 (ref 0.1–0.9)
MONOCYTES NFR BLD AUTO: 8.6 % (ref 5–12)
NEUTROPHILS NFR BLD AUTO: 2.21 10*3/MM3 (ref 1.7–7)
NEUTROPHILS NFR BLD AUTO: 48.8 % (ref 42.7–76)
NRBC BLD AUTO-RTO: 0 /100 WBC (ref 0–0.2)
PLATELET # BLD AUTO: 193 10*3/MM3 (ref 140–450)
PMV BLD AUTO: 9.3 FL (ref 6–12)
POTASSIUM SERPL-SCNC: 4.1 MMOL/L (ref 3.5–5.2)
PROT SERPL-MCNC: 6.5 G/DL (ref 6–8.5)
RBC # BLD AUTO: 4.23 10*6/MM3 (ref 4.14–5.8)
SODIUM SERPL-SCNC: 143 MMOL/L (ref 136–145)
WBC NRBC COR # BLD AUTO: 4.53 10*3/MM3 (ref 3.4–10.8)
WHOLE BLOOD HOLD COAG: NORMAL
WHOLE BLOOD HOLD SPECIMEN: NORMAL

## 2023-11-27 PROCEDURE — 36415 COLL VENOUS BLD VENIPUNCTURE: CPT

## 2023-11-27 PROCEDURE — 99284 EMERGENCY DEPT VISIT MOD MDM: CPT

## 2023-11-27 PROCEDURE — 86140 C-REACTIVE PROTEIN: CPT | Performed by: PHYSICIAN ASSISTANT

## 2023-11-27 PROCEDURE — 72148 MRI LUMBAR SPINE W/O DYE: CPT

## 2023-11-27 PROCEDURE — 80053 COMPREHEN METABOLIC PANEL: CPT | Performed by: PHYSICIAN ASSISTANT

## 2023-11-27 PROCEDURE — 85652 RBC SED RATE AUTOMATED: CPT | Performed by: PHYSICIAN ASSISTANT

## 2023-11-27 PROCEDURE — 85025 COMPLETE CBC W/AUTO DIFF WBC: CPT | Performed by: PHYSICIAN ASSISTANT

## 2023-11-27 RX ORDER — METHYLPREDNISOLONE 4 MG/1
TABLET ORAL
Qty: 21 TABLET | Refills: 0 | Status: SHIPPED | OUTPATIENT
Start: 2023-11-27

## 2023-11-27 NOTE — ED PROVIDER NOTES
Subjective   History of Present Illness  63-year-old male presents secondary to worsening low back pain with weakness in his right foot.  Patient denies any falls or injuries.  No fever.  No history of IV drug use.  Patient states he has had problems with his low back for several months however this is progressively worsened recently and he has developed weakness in his right foot.  Patient reports numbness tingling down the back of his right leg/foot.  He has a past medical history of acid reflux, hypertension, diabetes which is currently diet controlled, sleep apnea, cervical disc disease.  Patient had surgery on his neck approximately 8 months ago per Dr. Aldridge and has done well there.        Review of Systems   Constitutional: Negative.  Negative for fever.   HENT: Negative.     Respiratory: Negative.     Cardiovascular: Negative.  Negative for chest pain.   Gastrointestinal: Negative.  Negative for abdominal pain.   Endocrine: Negative.    Genitourinary: Negative.  Negative for dysuria.   Musculoskeletal:  Positive for back pain.   Skin: Negative.    Neurological: Negative.    Psychiatric/Behavioral: Negative.     All other systems reviewed and are negative.      Past Medical History:   Diagnosis Date    Anesthesia     diffculty adminster spinal block, patient stated with last hip surgery 7-2020 several attempts per awilda and no luck, resulted in general anesthesia     Anxiety     Arthritis     Rheumatoid    COVID     2020    GERD (gastroesophageal reflux disease)     Hypertension     Low back pain     Sleep apnea     does not wear machine, MILD, DOES NOT HAVE AFTER WGT LOSS SURGERY    Wears reading eyeglasses        Allergies   Allergen Reactions    Codeine Nausea And Vomiting    Morphine And Related Nausea And Vomiting       Past Surgical History:   Procedure Laterality Date    ANTERIOR CERVICAL DISCECTOMY W/ FUSION Right 02/19/2023    Procedure: CERVICAL DISCECTOMY ANTERIOR WITH FUSION C6-7;  Surgeon:  Jaiden Aldridge MD;  Location:  [facility];  Service: Neurosurgery;  Laterality: Right;    CARPAL TUNNEL RELEASE Bilateral     CARPAL TUNNEL RELEASE Right 6/29/2023    Procedure: CARPAL TUNNEL RELEASE RIGHT;  Surgeon: Jaiden Aldridge MD;  Location:  [facility];  Service: Neurosurgery;  Laterality: Right;    COLONOSCOPY      5 years ago    COLONOSCOPY N/A 04/05/2022    Procedure: COLONOSCOPY FOR SCREENING;  Surgeon: Luz Galvez MD;  Location:  [facility];  Service: Gastroenterology;  Laterality: N/A;    ENDOSCOPY N/A 04/05/2022    Procedure: ESOPHAGOGASTRODUODENOSCOPY WITH BIOPSY;  Surgeon: Luz Galvez MD;  Location:  [facility];  Service: Gastroenterology;  Laterality: N/A;    GASTRIC SLEEVE LAPAROSCOPIC      2017    HIP SURGERY Right times 2    KNEE ARTHROSCOPY Left     TOTAL HIP ARTHROPLASTY Left 01/25/2021    Procedure: TOTAL HIP ARTHROPLASTY LEFT;  Surgeon: Jb Patel MD;  Location:  [facility];  Service: Orthopedics;  Laterality: Left;    TOTAL HIP ARTHROPLASTY REVISION Right 07/20/2020    Procedure: TOTAL HIP ARTHROPLASTY REVISION RIGHT;  Surgeon: Jb Patel MD;  Location:  [facility];  Service: Orthopedics;  Laterality: Right;       Family History   Problem Relation Age of Onset    Heart disease Father     Hypertension Father     Osteoarthritis Father     Cancer Mother     Diabetes Brother     Hypertension Brother     Gout Paternal Grandmother     Diabetes Paternal Grandmother        Social History     Socioeconomic History    Marital status:    Tobacco Use    Smoking status: Never    Smokeless tobacco: Never   Vaping Use    Vaping Use: Never used   Substance and Sexual Activity    Alcohol use: No    Drug use: Never    Sexual activity: Yes           Objective   Physical Exam  Vitals and nursing note reviewed.   Constitutional:       General: He is not in acute distress.     Appearance: He is well-developed. He is not diaphoretic.   HENT:      Head: Normocephalic  and atraumatic.      Right Ear: External ear normal.      Left Ear: External ear normal.      Nose: Nose normal.   Eyes:      Conjunctiva/sclera: Conjunctivae normal.      Pupils: Pupils are equal, round, and reactive to light.   Neck:      Vascular: No JVD.      Trachea: No tracheal deviation.   Cardiovascular:      Rate and Rhythm: Normal rate and regular rhythm.      Heart sounds: Normal heart sounds. No murmur heard.  Pulmonary:      Effort: Pulmonary effort is normal. No respiratory distress.      Breath sounds: Normal breath sounds. No wheezing.   Abdominal:      General: Bowel sounds are normal.      Palpations: Abdomen is soft.      Tenderness: There is no abdominal tenderness.   Musculoskeletal:         General: No deformity. Normal range of motion.      Cervical back: Normal range of motion and neck supple.   Skin:     General: Skin is warm and dry.      Coloration: Skin is not pale.      Findings: No erythema or rash.   Neurological:      Mental Status: He is alert and oriented to person, place, and time.      Cranial Nerves: No cranial nerve deficit.   Psychiatric:         Behavior: Behavior normal.         Thought Content: Thought content normal.         Procedures           ED Course  ED Course as of 11/27/23 1607   Mon Nov 27, 2023   1247 MRI back.  Reaching out to Dr. Louis [JI]      ED Course User Index  [JI] Nico Hall PA                                           Medical Decision Making  63-year-old male presents secondary to worsening low back pain with weakness in his right foot.  Patient denies any falls or injuries.  No fever.  No history of IV drug use.  Patient states he has had problems with his low back for several months however this is progressively worsened recently and he has developed weakness in his right foot.  Patient reports numbness tingling down the back of his right leg/foot.  He has a past medical history of acid reflux, hypertension, diabetes which is currently diet  controlled, sleep apnea, cervical disc disease.  Patient had surgery on his neck approximately 8 months ago per Dr. Aldridge and has done well there.    Problems Addressed:  Spinal stenosis of lumbar region, unspecified whether neurogenic claudication present: complicated acute illness or injury    Amount and/or Complexity of Data Reviewed  Labs: ordered.  Radiology: ordered.  Discussion of management or test interpretation with external provider(s): Dr Louis who recommends following up in the office.  Patient was placed on steroids.  He was counseled at the signs and symptoms worsening on appropriate follow-up.  He voices understanding    Risk  Prescription drug management.        Final diagnoses:   Spinal stenosis of lumbar region, unspecified whether neurogenic claudication present       ED Disposition  ED Disposition       ED Disposition   Discharge    Condition   Stable    Comment   --               Mamta Ortega PA-C  [address]  [address]  [facility] [id number] [phone]    Schedule an appointment as soon as possible for a visit       Jaiden Aldridge MD  [address]  [address]  [facility] [id number] [phone]    Schedule an appointment as soon as possible for a visit            Medication List        New Prescriptions      methylPREDNISolone 4 MG dose pack  Commonly known as: MEDROL  Take as directed on package instructions.               Where to Get Your Medications        You can get these medications from any pharmacy    Bring a paper prescription for each of these medications  methylPREDNISolone 4 MG dose pack            Nico Hall PA  11/27/23 9322

## 2023-11-27 NOTE — ED NOTES
VELASQUEZ MILLER contacted per Isidoro Hall PA-C, For consult with Dr. Louis, will call back once page is answered.

## 2023-12-06 ENCOUNTER — HOSPITAL ENCOUNTER (OUTPATIENT)
Dept: GENERAL RADIOLOGY | Facility: HOSPITAL | Age: 63
Discharge: HOME | End: 2023-12-06
Admitting: PHYSICAL MEDICINE & REHABILITATION
Payer: MEDICARE

## 2023-12-06 ENCOUNTER — TRANSCRIBE ORDERS (OUTPATIENT)
Dept: ADMINISTRATIVE | Facility: HOSPITAL | Age: 63
End: 2023-12-06
Payer: MEDICARE

## 2023-12-06 DIAGNOSIS — M25.551 RIGHT HIP PAIN: ICD-10-CM

## 2023-12-06 DIAGNOSIS — M25.551 RIGHT HIP PAIN: Primary | ICD-10-CM

## 2023-12-06 PROCEDURE — 73502 X-RAY EXAM HIP UNI 2-3 VIEWS: CPT

## 2023-12-15 ENCOUNTER — TELEPHONE (OUTPATIENT)
Dept: NEUROSURGERY | Facility: CLINIC | Age: 63
End: 2023-12-15
Payer: MEDICARE

## 2023-12-15 NOTE — TELEPHONE ENCOUNTER
Received new referral from [facility] ([facility]). Pt is Est. DLS 07/11/23 by Clarence Wang PA-C. Dr. Aldridge did CTR Sx  6/29/23 and C-spine Sx 02/19/23. Records will be given to Dr. Aldridge for review.

## 2024-01-05 ENCOUNTER — TELEPHONE (OUTPATIENT)
Dept: NEUROSURGERY | Facility: CLINIC | Age: 64
End: 2024-01-05
Payer: MEDICARE

## 2024-01-05 DIAGNOSIS — Z98.1 S/P CERVICAL SPINAL FUSION: Primary | ICD-10-CM

## 2024-01-05 NOTE — TELEPHONE ENCOUNTER
Caller: Ang Jackson    Relationship to patient: Self    Best call back number: [phone]    Patient is needing: PATIENT CALLED, PATIENT WOULD LIKE TO KNOW IF AMALIA CAN PUT IN ORDER FOR AN XR CERVICAL SO HE CAN SPEAK WITH AMALIA ABOUT HIS LOW BACK AND NECK AT HIS NEXT APPT. PATIENT STATES HE IS STARTING TO HAVE SYMPTOMS AGAIN IN HIS NECK THAT HE HAD BEFORE HIS SX. PLEASE CALL PATIENT TO ADVISE.    THANK YOU

## 2024-01-23 ENCOUNTER — HOSPITAL ENCOUNTER (OUTPATIENT)
Dept: GENERAL RADIOLOGY | Facility: HOSPITAL | Age: 64
Discharge: HOME | End: 2024-01-23
Admitting: PHYSICIAN ASSISTANT
Payer: MEDICARE

## 2024-01-23 DIAGNOSIS — Z98.1 S/P CERVICAL SPINAL FUSION: ICD-10-CM

## 2024-01-23 PROCEDURE — 72040 X-RAY EXAM NECK SPINE 2-3 VW: CPT

## 2024-01-23 PROCEDURE — 72040 X-RAY EXAM NECK SPINE 2-3 VW: CPT | Performed by: RADIOLOGY

## 2024-01-30 ENCOUNTER — OFFICE VISIT (OUTPATIENT)
Dept: NEUROSURGERY | Facility: CLINIC | Age: 64
End: 2024-01-30
Payer: MEDICARE

## 2024-01-30 VITALS — HEIGHT: 68 IN | TEMPERATURE: 97.5 F | BODY MASS INDEX: 31.86 KG/M2 | WEIGHT: 210.2 LBS

## 2024-01-30 DIAGNOSIS — M51.36 DDD (DEGENERATIVE DISC DISEASE), LUMBAR: ICD-10-CM

## 2024-01-30 DIAGNOSIS — M48.062 SPINAL STENOSIS, LUMBAR REGION, WITH NEUROGENIC CLAUDICATION: Primary | ICD-10-CM

## 2024-01-30 PROCEDURE — 1159F MED LIST DOCD IN RCRD: CPT | Performed by: NEUROLOGICAL SURGERY

## 2024-01-30 PROCEDURE — 99213 OFFICE O/P EST LOW 20 MIN: CPT | Performed by: NEUROLOGICAL SURGERY

## 2024-01-30 PROCEDURE — 1160F RVW MEDS BY RX/DR IN RCRD: CPT | Performed by: NEUROLOGICAL SURGERY

## 2024-01-30 NOTE — PROGRESS NOTES
Patient: Ang Jackson  : 1960    Primary Care Provider: Deepak Perez    Requesting Provider: As above        History    Chief Complaint:   1.  Chronic low back pain.  2.  History of progressive weakness and sensory alteration involving his extremities with gait and balance.    History of Present Illness: Mr. Jackson is a 63-year-old disabled  who is well-known to our service.  He has had chronic low back difficulties.  He had presented with myelopathic complaints and severe cord compromise and as such on 2023 underwent ACDF at C6-7.  He has done fairly well with that.  His main complaint today is pain in his back that occurs with any activity or any position.  He does not describe a syndrome of neurogenic claudication.  He is followed in a pain clinic.  Injections were last performed in his back about a year ago.    Review of Systems   Constitutional:  Negative for activity change, appetite change, chills, diaphoresis, fatigue, fever and unexpected weight change.   HENT:  Negative for congestion, dental problem, drooling, ear discharge, ear pain, facial swelling, hearing loss, mouth sores, nosebleeds, postnasal drip, rhinorrhea, sinus pressure, sinus pain, sneezing, sore throat, tinnitus, trouble swallowing and voice change.    Eyes:  Negative for photophobia, pain, discharge, redness, itching and visual disturbance.   Respiratory:  Negative for apnea, cough, choking, chest tightness, shortness of breath, wheezing and stridor.    Cardiovascular:  Negative for chest pain, palpitations and leg swelling.   Gastrointestinal:  Negative for abdominal distention, abdominal pain, anal bleeding, blood in stool, constipation, diarrhea, nausea, rectal pain and vomiting.   Endocrine: Negative for cold intolerance, heat intolerance, polydipsia, polyphagia and polyuria.   Genitourinary:  Negative for decreased urine volume, difficulty urinating, dysuria, enuresis, flank pain, frequency, genital  "sores, hematuria and urgency.   Musculoskeletal:  Positive for arthralgias, back pain, joint swelling, myalgias, neck pain and neck stiffness. Negative for gait problem.   Skin:  Negative for color change, pallor, rash and wound.   Allergic/Immunologic: Negative for environmental allergies, food allergies and immunocompromised state.   Neurological:  Positive for weakness. Negative for dizziness, tremors, seizures, syncope, facial asymmetry, speech difficulty, light-headedness, numbness and headaches.   Hematological:  Negative for adenopathy. Does not bruise/bleed easily.   Psychiatric/Behavioral:  Positive for sleep disturbance. Negative for agitation, behavioral problems, confusion, decreased concentration, dysphoric mood, hallucinations, self-injury and suicidal ideas. The patient is nervous/anxious. The patient is not hyperactive.      The patient's past medical history, past surgical history, family history, and social history have been reviewed at length in the electronic medical record.      Physical Exam:   Temp 97.5 °F (36.4 °C) (Infrared)   Ht 172.7 cm (68\")   Wt 95.3 kg (210 lb 3.2 oz)   BMI 31.96 kg/m²   MUSCULOSKELETAL:  Straight leg raising is negative.  John's Sign is negative.  ROM in the low back is normal.  Tenderness in the back to palpation is not observed.  NEUROLOGICAL:  Strength is intact in the lower extremities to direct testing.  Muscle tone is normal throughout.  Station and gait are normal.  Sensation is intact to light touch testing throughout.  Deep tendon reflexes are 1+ and symmetrical.  Coordination is intact.      Medical Decision Making    Data Review:   (All imaging studies were personally reviewed unless stated otherwise)  MRI of the lumbar spine dated 11/27/2023 demonstrates lumbar stenosis at L3-4, L4-5, and L5-S1.  Bilateral joint effusions are noted at L3-4.  There is a very low-grade offset of L4 and L5 that was present previously.    Cervical spine x-ray dated " 1/23/2024 demonstrates good positioning of his construct at C6-7 with fusion.    Diagnosis:   1.  Mechanical low back pain.  2.  Lumbar stenosis without neurogenic claudication.    Treatment Options:   Presently, I do not think there is a role for surgical intervention.  I would probably attempt some injections once again.  If that is not helpful then I would probably pursue a trial of spinal cord stimulation.  He will follow-up with neurosurgery on an as-needed basis.      Scribed for Jaiden Aldridge MD by Rachelle Macias MA on 1/30/2024 12:52 EST      I, Dr. Aldridge, personally performed the services described in the documentation, as scribed in my presence, and it is both accurate and complete.

## 2024-08-20 ENCOUNTER — HOSPITAL ENCOUNTER (OUTPATIENT)
Dept: GENERAL RADIOLOGY | Facility: HOSPITAL | Age: 64
Discharge: HOME | End: 2024-08-20
Admitting: PHYSICIAN ASSISTANT
Payer: MEDICARE

## 2024-08-20 ENCOUNTER — TRANSCRIBE ORDERS (OUTPATIENT)
Dept: ADMINISTRATIVE | Facility: HOSPITAL | Age: 64
End: 2024-08-20
Payer: MEDICARE

## 2024-08-20 DIAGNOSIS — M25.532 LEFT WRIST PAIN: ICD-10-CM

## 2024-08-20 DIAGNOSIS — M25.532 LEFT WRIST PAIN: Primary | ICD-10-CM

## 2024-08-20 PROCEDURE — 73110 X-RAY EXAM OF WRIST: CPT

## 2025-01-13 ENCOUNTER — TRANSCRIBE ORDERS (OUTPATIENT)
Dept: ADMINISTRATIVE | Facility: HOSPITAL | Age: 65
End: 2025-01-13
Payer: MEDICARE

## 2025-01-13 DIAGNOSIS — M25.532 PAIN IN LEFT WRIST: Primary | ICD-10-CM

## 2025-01-15 ENCOUNTER — LAB REQUISITION (OUTPATIENT)
Dept: LAB | Facility: HOSPITAL | Age: 65
End: 2025-01-15
Payer: MEDICARE

## 2025-01-15 DIAGNOSIS — E11.65 TYPE 2 DIABETES MELLITUS WITH HYPERGLYCEMIA: ICD-10-CM

## 2025-01-15 LAB
ALBUMIN SERPL-MCNC: 3.2 G/DL (ref 3.5–5.2)
ALBUMIN/GLOB SERPL: 1.1 G/DL
ALP SERPL-CCNC: 79 U/L (ref 39–117)
ALT SERPL W P-5'-P-CCNC: 8 U/L (ref 1–41)
ANION GAP SERPL CALCULATED.3IONS-SCNC: 8.4 MMOL/L (ref 5–15)
AST SERPL-CCNC: 15 U/L (ref 1–40)
BACTERIA UR QL AUTO: ABNORMAL /HPF
BASOPHILS # BLD AUTO: 0.02 10*3/MM3 (ref 0–0.2)
BASOPHILS NFR BLD AUTO: 0.3 % (ref 0–1.5)
BILIRUB SERPL-MCNC: 0.3 MG/DL (ref 0–1.2)
BILIRUB UR QL STRIP: ABNORMAL
BUN SERPL-MCNC: 21 MG/DL (ref 8–23)
BUN/CREAT SERPL: 31.3 (ref 7–25)
CALCIUM SPEC-SCNC: 9.3 MG/DL (ref 8.6–10.5)
CHLORIDE SERPL-SCNC: 103 MMOL/L (ref 98–107)
CHOLEST SERPL-MCNC: 144 MG/DL (ref 0–200)
CLARITY UR: ABNORMAL
CO2 SERPL-SCNC: 30.6 MMOL/L (ref 22–29)
COD CRY URNS QL: ABNORMAL /HPF
COLOR UR: ABNORMAL
CREAT SERPL-MCNC: 0.67 MG/DL (ref 0.76–1.27)
CRP SERPL-MCNC: <0.3 MG/DL (ref 0–0.5)
DEPRECATED RDW RBC AUTO: 45.2 FL (ref 37–54)
EGFRCR SERPLBLD CKD-EPI 2021: 104.9 ML/MIN/1.73
EOSINOPHIL # BLD AUTO: 0.25 10*3/MM3 (ref 0–0.4)
EOSINOPHIL NFR BLD AUTO: 4.4 % (ref 0.3–6.2)
ERYTHROCYTE [DISTWIDTH] IN BLOOD BY AUTOMATED COUNT: 13.8 % (ref 12.3–15.4)
ERYTHROCYTE [SEDIMENTATION RATE] IN BLOOD: 18 MM/HR (ref 0–20)
GLOBULIN UR ELPH-MCNC: 3 GM/DL
GLUCOSE SERPL-MCNC: 91 MG/DL (ref 65–99)
GLUCOSE UR STRIP-MCNC: NEGATIVE MG/DL
GRAN CASTS URNS QL MICRO: ABNORMAL /LPF
HBA1C MFR BLD: 6.6 % (ref 4.8–5.6)
HCT VFR BLD AUTO: 39.4 % (ref 37.5–51)
HDLC SERPL-MCNC: 37 MG/DL (ref 40–60)
HGB BLD-MCNC: 12 G/DL (ref 13–17.7)
HGB UR QL STRIP.AUTO: ABNORMAL
HOLD SPECIMEN: NORMAL
HYALINE CASTS UR QL AUTO: ABNORMAL /LPF
IMM GRANULOCYTES # BLD AUTO: 0.03 10*3/MM3 (ref 0–0.05)
IMM GRANULOCYTES NFR BLD AUTO: 0.5 % (ref 0–0.5)
KETONES UR QL STRIP: ABNORMAL
LDLC SERPL CALC-MCNC: 86 MG/DL (ref 0–100)
LDLC/HDLC SERPL: 2.28 {RATIO}
LEUKOCYTE ESTERASE UR QL STRIP.AUTO: ABNORMAL
LYMPHOCYTES # BLD AUTO: 1.68 10*3/MM3 (ref 0.7–3.1)
LYMPHOCYTES NFR BLD AUTO: 29.3 % (ref 19.6–45.3)
MCH RBC QN AUTO: 27.4 PG (ref 26.6–33)
MCHC RBC AUTO-ENTMCNC: 30.5 G/DL (ref 31.5–35.7)
MCV RBC AUTO: 90 FL (ref 79–97)
MONOCYTES # BLD AUTO: 0.45 10*3/MM3 (ref 0.1–0.9)
MONOCYTES NFR BLD AUTO: 7.8 % (ref 5–12)
NEUTROPHILS NFR BLD AUTO: 3.31 10*3/MM3 (ref 1.7–7)
NEUTROPHILS NFR BLD AUTO: 57.7 % (ref 42.7–76)
NITRITE UR QL STRIP: NEGATIVE
NRBC BLD AUTO-RTO: 0 /100 WBC (ref 0–0.2)
PH UR STRIP.AUTO: 5.5 [PH] (ref 5–8)
PLATELET # BLD AUTO: 198 10*3/MM3 (ref 140–450)
PMV BLD AUTO: 9.8 FL (ref 6–12)
POTASSIUM SERPL-SCNC: 4 MMOL/L (ref 3.5–5.2)
PROT SERPL-MCNC: 6.2 G/DL (ref 6–8.5)
PROT UR QL STRIP: ABNORMAL
RBC # BLD AUTO: 4.38 10*6/MM3 (ref 4.14–5.8)
RBC # UR STRIP: ABNORMAL /HPF
REF LAB TEST METHOD: ABNORMAL
SODIUM SERPL-SCNC: 142 MMOL/L (ref 136–145)
SP GR UR STRIP: >=1.03 (ref 1–1.03)
SQUAMOUS #/AREA URNS HPF: ABNORMAL /HPF
TRIGL SERPL-MCNC: 113 MG/DL (ref 0–150)
TSH SERPL DL<=0.05 MIU/L-ACNC: 1.35 UIU/ML (ref 0.27–4.2)
UROBILINOGEN UR QL STRIP: ABNORMAL
VLDLC SERPL-MCNC: 21 MG/DL (ref 5–40)
WBC # UR STRIP: ABNORMAL /HPF
WBC NRBC COR # BLD AUTO: 5.74 10*3/MM3 (ref 3.4–10.8)
YEAST URNS QL MICRO: ABNORMAL /HPF

## 2025-01-15 PROCEDURE — 82306 VITAMIN D 25 HYDROXY: CPT | Performed by: PHYSICIAN ASSISTANT

## 2025-01-15 PROCEDURE — 85652 RBC SED RATE AUTOMATED: CPT | Performed by: PHYSICIAN ASSISTANT

## 2025-01-15 PROCEDURE — 84153 ASSAY OF PSA TOTAL: CPT | Performed by: PHYSICIAN ASSISTANT

## 2025-01-15 PROCEDURE — 83036 HEMOGLOBIN GLYCOSYLATED A1C: CPT | Performed by: PHYSICIAN ASSISTANT

## 2025-01-15 PROCEDURE — 87086 URINE CULTURE/COLONY COUNT: CPT | Performed by: PHYSICIAN ASSISTANT

## 2025-01-15 PROCEDURE — 85025 COMPLETE CBC W/AUTO DIFF WBC: CPT | Performed by: PHYSICIAN ASSISTANT

## 2025-01-15 PROCEDURE — 81001 URINALYSIS AUTO W/SCOPE: CPT | Performed by: PHYSICIAN ASSISTANT

## 2025-01-15 PROCEDURE — 80061 LIPID PANEL: CPT | Performed by: PHYSICIAN ASSISTANT

## 2025-01-15 PROCEDURE — 86140 C-REACTIVE PROTEIN: CPT | Performed by: PHYSICIAN ASSISTANT

## 2025-01-15 PROCEDURE — 80053 COMPREHEN METABOLIC PANEL: CPT | Performed by: PHYSICIAN ASSISTANT

## 2025-01-15 PROCEDURE — 84443 ASSAY THYROID STIM HORMONE: CPT | Performed by: PHYSICIAN ASSISTANT

## 2025-01-16 LAB
25(OH)D3 SERPL-MCNC: 36.1 NG/ML (ref 30–100)
PSA SERPL-MCNC: 0.78 NG/ML (ref 0–4)

## 2025-01-17 LAB — BACTERIA SPEC AEROBE CULT: ABNORMAL

## 2025-01-24 ENCOUNTER — HOSPITAL ENCOUNTER (OUTPATIENT)
Dept: MRI IMAGING | Facility: HOSPITAL | Age: 65
Discharge: HOME | End: 2025-01-24
Payer: MEDICARE

## 2025-01-24 DIAGNOSIS — M25.532 PAIN IN LEFT WRIST: ICD-10-CM

## 2025-01-31 ENCOUNTER — LAB REQUISITION (OUTPATIENT)
Dept: LAB | Facility: HOSPITAL | Age: 65
End: 2025-01-31
Payer: MEDICARE

## 2025-01-31 DIAGNOSIS — N39.0 URINARY TRACT INFECTION, SITE NOT SPECIFIED: ICD-10-CM

## 2025-01-31 LAB
BACTERIA UR QL AUTO: ABNORMAL /HPF
BILIRUB UR QL STRIP: NEGATIVE
CLARITY UR: ABNORMAL
COLOR UR: ABNORMAL
GLUCOSE UR STRIP-MCNC: NEGATIVE MG/DL
HGB UR QL STRIP.AUTO: ABNORMAL
HYALINE CASTS UR QL AUTO: ABNORMAL /LPF
KETONES UR QL STRIP: ABNORMAL
LEUKOCYTE ESTERASE UR QL STRIP.AUTO: ABNORMAL
NITRITE UR QL STRIP: NEGATIVE
PH UR STRIP.AUTO: 5.5 [PH] (ref 5–8)
PROT UR QL STRIP: ABNORMAL
RBC # UR STRIP: ABNORMAL /HPF
REF LAB TEST METHOD: ABNORMAL
SP GR UR STRIP: 1.02 (ref 1–1.03)
SQUAMOUS #/AREA URNS HPF: ABNORMAL /HPF
UROBILINOGEN UR QL STRIP: ABNORMAL
WBC # UR STRIP: ABNORMAL /HPF

## 2025-01-31 PROCEDURE — 87086 URINE CULTURE/COLONY COUNT: CPT | Performed by: PHYSICIAN ASSISTANT

## 2025-01-31 PROCEDURE — 81001 URINALYSIS AUTO W/SCOPE: CPT | Performed by: PHYSICIAN ASSISTANT

## 2025-02-02 LAB — BACTERIA SPEC AEROBE CULT: NO GROWTH

## 2025-02-05 ENCOUNTER — TRANSCRIBE ORDERS (OUTPATIENT)
Dept: ADMINISTRATIVE | Facility: HOSPITAL | Age: 65
End: 2025-02-05
Payer: MEDICARE

## 2025-02-05 DIAGNOSIS — M25.532 LEFT WRIST PAIN: Primary | ICD-10-CM

## 2025-02-20 ENCOUNTER — TRANSCRIBE ORDERS (OUTPATIENT)
Dept: ADMINISTRATIVE | Facility: HOSPITAL | Age: 65
End: 2025-02-20
Payer: MEDICARE

## 2025-02-20 DIAGNOSIS — R55 SYNCOPE AND COLLAPSE: Primary | ICD-10-CM

## 2025-03-05 ENCOUNTER — HOSPITAL ENCOUNTER (OUTPATIENT)
Dept: CT IMAGING | Facility: HOSPITAL | Age: 65
Discharge: HOME | End: 2025-03-05
Payer: MEDICARE

## 2025-03-05 ENCOUNTER — HOSPITAL ENCOUNTER (OUTPATIENT)
Dept: MRI IMAGING | Facility: HOSPITAL | Age: 65
Discharge: HOME | End: 2025-03-05
Payer: MEDICARE

## 2025-03-05 DIAGNOSIS — M25.532 LEFT WRIST PAIN: ICD-10-CM

## 2025-03-05 DIAGNOSIS — R55 SYNCOPE AND COLLAPSE: ICD-10-CM

## 2025-03-05 PROCEDURE — 70551 MRI BRAIN STEM W/O DYE: CPT

## 2025-03-05 PROCEDURE — 73200 CT UPPER EXTREMITY W/O DYE: CPT

## 2025-03-26 ENCOUNTER — HOSPITAL ENCOUNTER (EMERGENCY)
Facility: HOSPITAL | Age: 65
Discharge: HOME | End: 2025-03-26
Attending: STUDENT IN AN ORGANIZED HEALTH CARE EDUCATION/TRAINING PROGRAM
Payer: MEDICARE

## 2025-03-26 ENCOUNTER — APPOINTMENT (OUTPATIENT)
Dept: CT IMAGING | Facility: HOSPITAL | Age: 65
End: 2025-03-26
Payer: MEDICARE

## 2025-03-26 ENCOUNTER — LAB REQUISITION (OUTPATIENT)
Dept: LAB | Facility: HOSPITAL | Age: 65
End: 2025-03-26
Payer: MEDICARE

## 2025-03-26 VITALS
OXYGEN SATURATION: 98 % | HEIGHT: 68 IN | SYSTOLIC BLOOD PRESSURE: 132 MMHG | WEIGHT: 205 LBS | RESPIRATION RATE: 16 BRPM | HEART RATE: 68 BPM | TEMPERATURE: 98.7 F | DIASTOLIC BLOOD PRESSURE: 77 MMHG | BODY MASS INDEX: 31.07 KG/M2

## 2025-03-26 DIAGNOSIS — I10 ESSENTIAL (PRIMARY) HYPERTENSION: ICD-10-CM

## 2025-03-26 DIAGNOSIS — R31.0 GROSS HEMATURIA: Primary | ICD-10-CM

## 2025-03-26 LAB
ALBUMIN SERPL-MCNC: 2.9 G/DL (ref 3.5–5.2)
ALBUMIN/GLOB SERPL: 1.1 G/DL
ALP SERPL-CCNC: 83 U/L (ref 39–117)
ALT SERPL W P-5'-P-CCNC: 10 U/L (ref 1–41)
ANION GAP SERPL CALCULATED.3IONS-SCNC: 5 MMOL/L (ref 5–15)
ANION GAP SERPL CALCULATED.3IONS-SCNC: 5.7 MMOL/L (ref 5–15)
APTT PPP: 37.1 SECONDS (ref 24.5–35.9)
AST SERPL-CCNC: 13 U/L (ref 1–40)
BACTERIA UR QL AUTO: ABNORMAL /HPF
BACTERIA UR QL AUTO: ABNORMAL /HPF
BASOPHILS # BLD AUTO: 0.01 10*3/MM3 (ref 0–0.2)
BASOPHILS # BLD AUTO: 0.02 10*3/MM3 (ref 0–0.2)
BASOPHILS NFR BLD AUTO: 0.2 % (ref 0–1.5)
BASOPHILS NFR BLD AUTO: 0.4 % (ref 0–1.5)
BILIRUB SERPL-MCNC: 0.3 MG/DL (ref 0–1.2)
BILIRUB UR QL STRIP: ABNORMAL
BILIRUB UR QL STRIP: NEGATIVE
BUN SERPL-MCNC: 20 MG/DL (ref 8–23)
BUN SERPL-MCNC: 22 MG/DL (ref 8–23)
BUN/CREAT SERPL: 48.8 (ref 7–25)
BUN/CREAT SERPL: 55 (ref 7–25)
CALCIUM SPEC-SCNC: 8.3 MG/DL (ref 8.6–10.5)
CALCIUM SPEC-SCNC: 8.8 MG/DL (ref 8.6–10.5)
CHLORIDE SERPL-SCNC: 107 MMOL/L (ref 98–107)
CHLORIDE SERPL-SCNC: 108 MMOL/L (ref 98–107)
CLARITY UR: ABNORMAL
CLARITY UR: ABNORMAL
CO2 SERPL-SCNC: 32.3 MMOL/L (ref 22–29)
CO2 SERPL-SCNC: 33 MMOL/L (ref 22–29)
COLOR UR: ABNORMAL
COLOR UR: ABNORMAL
CREAT SERPL-MCNC: 0.4 MG/DL (ref 0.76–1.27)
CREAT SERPL-MCNC: 0.41 MG/DL (ref 0.76–1.27)
CRP SERPL-MCNC: 0.49 MG/DL (ref 0–0.5)
CRP SERPL-MCNC: 0.56 MG/DL (ref 0–0.5)
D-LACTATE SERPL-SCNC: 1 MMOL/L (ref 0.5–2)
DEPRECATED RDW RBC AUTO: 55.7 FL (ref 37–54)
DEPRECATED RDW RBC AUTO: 55.7 FL (ref 37–54)
EGFRCR SERPLBLD CKD-EPI 2021: 121.7 ML/MIN/1.73
EGFRCR SERPLBLD CKD-EPI 2021: 122.6 ML/MIN/1.73
EOSINOPHIL # BLD AUTO: 0.31 10*3/MM3 (ref 0–0.4)
EOSINOPHIL # BLD AUTO: 0.34 10*3/MM3 (ref 0–0.4)
EOSINOPHIL NFR BLD AUTO: 5.8 % (ref 0.3–6.2)
EOSINOPHIL NFR BLD AUTO: 6.3 % (ref 0.3–6.2)
ERYTHROCYTE [DISTWIDTH] IN BLOOD BY AUTOMATED COUNT: 16.4 % (ref 12.3–15.4)
ERYTHROCYTE [DISTWIDTH] IN BLOOD BY AUTOMATED COUNT: 16.4 % (ref 12.3–15.4)
GLOBULIN UR ELPH-MCNC: 2.7 GM/DL
GLUCOSE SERPL-MCNC: 79 MG/DL (ref 65–99)
GLUCOSE SERPL-MCNC: 80 MG/DL (ref 65–99)
GLUCOSE UR STRIP-MCNC: NEGATIVE MG/DL
GLUCOSE UR STRIP-MCNC: NEGATIVE MG/DL
HCT VFR BLD AUTO: 34.6 % (ref 37.5–51)
HCT VFR BLD AUTO: 36.9 % (ref 37.5–51)
HGB BLD-MCNC: 10.7 G/DL (ref 13–17.7)
HGB BLD-MCNC: 11.4 G/DL (ref 13–17.7)
HGB UR QL STRIP.AUTO: ABNORMAL
HGB UR QL STRIP.AUTO: ABNORMAL
HOLD SPECIMEN: NORMAL
HOLD SPECIMEN: NORMAL
HYALINE CASTS UR QL AUTO: ABNORMAL /LPF
HYALINE CASTS UR QL AUTO: ABNORMAL /LPF
IMM GRANULOCYTES # BLD AUTO: 0.01 10*3/MM3 (ref 0–0.05)
IMM GRANULOCYTES # BLD AUTO: 0.02 10*3/MM3 (ref 0–0.05)
IMM GRANULOCYTES NFR BLD AUTO: 0.2 % (ref 0–0.5)
IMM GRANULOCYTES NFR BLD AUTO: 0.4 % (ref 0–0.5)
INR PPP: 1.12 (ref 0.9–1.1)
KETONES UR QL STRIP: ABNORMAL
KETONES UR QL STRIP: ABNORMAL
LEUKOCYTE ESTERASE UR QL STRIP.AUTO: ABNORMAL
LEUKOCYTE ESTERASE UR QL STRIP.AUTO: ABNORMAL
LYMPHOCYTES # BLD AUTO: 1.49 10*3/MM3 (ref 0.7–3.1)
LYMPHOCYTES # BLD AUTO: 1.5 10*3/MM3 (ref 0.7–3.1)
LYMPHOCYTES NFR BLD AUTO: 27.4 % (ref 19.6–45.3)
LYMPHOCYTES NFR BLD AUTO: 28.1 % (ref 19.6–45.3)
MAGNESIUM SERPL-MCNC: 1.7 MG/DL (ref 1.6–2.4)
MCH RBC QN AUTO: 28.3 PG (ref 26.6–33)
MCH RBC QN AUTO: 28.4 PG (ref 26.6–33)
MCHC RBC AUTO-ENTMCNC: 30.9 G/DL (ref 31.5–35.7)
MCHC RBC AUTO-ENTMCNC: 30.9 G/DL (ref 31.5–35.7)
MCV RBC AUTO: 91.6 FL (ref 79–97)
MCV RBC AUTO: 91.8 FL (ref 79–97)
MONOCYTES # BLD AUTO: 0.34 10*3/MM3 (ref 0.1–0.9)
MONOCYTES # BLD AUTO: 0.46 10*3/MM3 (ref 0.1–0.9)
MONOCYTES NFR BLD AUTO: 6.4 % (ref 5–12)
MONOCYTES NFR BLD AUTO: 8.5 % (ref 5–12)
NEUTROPHILS NFR BLD AUTO: 3.11 10*3/MM3 (ref 1.7–7)
NEUTROPHILS NFR BLD AUTO: 3.16 10*3/MM3 (ref 1.7–7)
NEUTROPHILS NFR BLD AUTO: 57 % (ref 42.7–76)
NEUTROPHILS NFR BLD AUTO: 59.3 % (ref 42.7–76)
NITRITE UR QL STRIP: NEGATIVE
NITRITE UR QL STRIP: NEGATIVE
NRBC BLD AUTO-RTO: 0 /100 WBC (ref 0–0.2)
NRBC BLD AUTO-RTO: 0 /100 WBC (ref 0–0.2)
PH UR STRIP.AUTO: 7 [PH] (ref 5–8)
PH UR STRIP.AUTO: 8 [PH] (ref 5–8)
PLATELET # BLD AUTO: 168 10*3/MM3 (ref 140–450)
PLATELET # BLD AUTO: 169 10*3/MM3 (ref 140–450)
PMV BLD AUTO: 9.2 FL (ref 6–12)
PMV BLD AUTO: 9.5 FL (ref 6–12)
POTASSIUM SERPL-SCNC: 3.9 MMOL/L (ref 3.5–5.2)
POTASSIUM SERPL-SCNC: 4.1 MMOL/L (ref 3.5–5.2)
PROCALCITONIN SERPL-MCNC: 0.08 NG/ML (ref 0–0.25)
PROT SERPL-MCNC: 5.6 G/DL (ref 6–8.5)
PROT UR QL STRIP: ABNORMAL
PROT UR QL STRIP: ABNORMAL
PROTHROMBIN TIME: 14.6 SECONDS (ref 11.6–15.1)
RBC # BLD AUTO: 3.77 10*6/MM3 (ref 4.14–5.8)
RBC # BLD AUTO: 4.03 10*6/MM3 (ref 4.14–5.8)
RBC # UR STRIP: ABNORMAL /HPF
RBC # UR STRIP: ABNORMAL /HPF
REF LAB TEST METHOD: ABNORMAL
REF LAB TEST METHOD: ABNORMAL
SODIUM SERPL-SCNC: 145 MMOL/L (ref 136–145)
SODIUM SERPL-SCNC: 146 MMOL/L (ref 136–145)
SP GR UR STRIP: 1.02 (ref 1–1.03)
SP GR UR STRIP: 1.02 (ref 1–1.03)
SQUAMOUS #/AREA URNS HPF: ABNORMAL /HPF
SQUAMOUS #/AREA URNS HPF: ABNORMAL /HPF
UROBILINOGEN UR QL STRIP: ABNORMAL
UROBILINOGEN UR QL STRIP: ABNORMAL
WBC # UR STRIP: ABNORMAL /HPF
WBC # UR STRIP: ABNORMAL /HPF
WBC NRBC COR # BLD AUTO: 5.33 10*3/MM3 (ref 3.4–10.8)
WBC NRBC COR # BLD AUTO: 5.44 10*3/MM3 (ref 3.4–10.8)
WHOLE BLOOD HOLD COAG: NORMAL
WHOLE BLOOD HOLD SPECIMEN: NORMAL

## 2025-03-26 PROCEDURE — 85025 COMPLETE CBC W/AUTO DIFF WBC: CPT | Performed by: PHYSICIAN ASSISTANT

## 2025-03-26 PROCEDURE — 74176 CT ABD & PELVIS W/O CONTRAST: CPT | Performed by: RADIOLOGY

## 2025-03-26 PROCEDURE — 25010000002 CEFTRIAXONE PER 250 MG: Performed by: PHYSICIAN ASSISTANT

## 2025-03-26 PROCEDURE — 84145 PROCALCITONIN (PCT): CPT | Performed by: PHYSICIAN ASSISTANT

## 2025-03-26 PROCEDURE — 83735 ASSAY OF MAGNESIUM: CPT | Performed by: PHYSICIAN ASSISTANT

## 2025-03-26 PROCEDURE — 85730 THROMBOPLASTIN TIME PARTIAL: CPT | Performed by: PHYSICIAN ASSISTANT

## 2025-03-26 PROCEDURE — 96376 TX/PRO/DX INJ SAME DRUG ADON: CPT

## 2025-03-26 PROCEDURE — 74176 CT ABD & PELVIS W/O CONTRAST: CPT

## 2025-03-26 PROCEDURE — 87086 URINE CULTURE/COLONY COUNT: CPT | Performed by: PHYSICIAN ASSISTANT

## 2025-03-26 PROCEDURE — 83605 ASSAY OF LACTIC ACID: CPT | Performed by: PHYSICIAN ASSISTANT

## 2025-03-26 PROCEDURE — 81001 URINALYSIS AUTO W/SCOPE: CPT | Performed by: PHYSICIAN ASSISTANT

## 2025-03-26 PROCEDURE — 96375 TX/PRO/DX INJ NEW DRUG ADDON: CPT

## 2025-03-26 PROCEDURE — 96365 THER/PROPH/DIAG IV INF INIT: CPT

## 2025-03-26 PROCEDURE — 86140 C-REACTIVE PROTEIN: CPT | Performed by: PHYSICIAN ASSISTANT

## 2025-03-26 PROCEDURE — 99284 EMERGENCY DEPT VISIT MOD MDM: CPT

## 2025-03-26 PROCEDURE — 25010000002 ONDANSETRON PER 1 MG: Performed by: STUDENT IN AN ORGANIZED HEALTH CARE EDUCATION/TRAINING PROGRAM

## 2025-03-26 PROCEDURE — 87040 BLOOD CULTURE FOR BACTERIA: CPT | Performed by: PHYSICIAN ASSISTANT

## 2025-03-26 PROCEDURE — 80053 COMPREHEN METABOLIC PANEL: CPT | Performed by: PHYSICIAN ASSISTANT

## 2025-03-26 PROCEDURE — 25010000002 HYDROMORPHONE PER 4 MG: Performed by: STUDENT IN AN ORGANIZED HEALTH CARE EDUCATION/TRAINING PROGRAM

## 2025-03-26 PROCEDURE — 85610 PROTHROMBIN TIME: CPT | Performed by: PHYSICIAN ASSISTANT

## 2025-03-26 PROCEDURE — 25010000002 HYDROMORPHONE PER 4 MG: Performed by: EMERGENCY MEDICINE

## 2025-03-26 RX ORDER — SODIUM CHLORIDE 0.9 % (FLUSH) 0.9 %
10 SYRINGE (ML) INJECTION AS NEEDED
Status: DISCONTINUED | OUTPATIENT
Start: 2025-03-26 | End: 2025-03-26 | Stop reason: HOSPADM

## 2025-03-26 RX ORDER — PHENAZOPYRIDINE HYDROCHLORIDE 200 MG/1
200 TABLET, FILM COATED ORAL 3 TIMES DAILY PRN
Qty: 21 TABLET | Refills: 0 | Status: SHIPPED | OUTPATIENT
Start: 2025-03-26

## 2025-03-26 RX ORDER — PHENAZOPYRIDINE HYDROCHLORIDE 200 MG/1
200 TABLET, FILM COATED ORAL ONCE
Status: COMPLETED | OUTPATIENT
Start: 2025-03-26 | End: 2025-03-26

## 2025-03-26 RX ORDER — CIPROFLOXACIN 500 MG/1
500 TABLET, FILM COATED ORAL 2 TIMES DAILY
Qty: 20 TABLET | Refills: 0 | Status: SHIPPED | OUTPATIENT
Start: 2025-03-26

## 2025-03-26 RX ORDER — HYDROMORPHONE HYDROCHLORIDE 1 MG/ML
0.5 INJECTION, SOLUTION INTRAMUSCULAR; INTRAVENOUS; SUBCUTANEOUS ONCE
Refills: 0 | Status: COMPLETED | OUTPATIENT
Start: 2025-03-26 | End: 2025-03-26

## 2025-03-26 RX ORDER — ONDANSETRON 2 MG/ML
4 INJECTION INTRAMUSCULAR; INTRAVENOUS ONCE
Status: COMPLETED | OUTPATIENT
Start: 2025-03-26 | End: 2025-03-26

## 2025-03-26 RX ADMIN — ONDANSETRON 4 MG: 2 INJECTION INTRAMUSCULAR; INTRAVENOUS at 14:56

## 2025-03-26 RX ADMIN — HYDROMORPHONE HYDROCHLORIDE 0.5 MG: 1 INJECTION, SOLUTION INTRAMUSCULAR; INTRAVENOUS; SUBCUTANEOUS at 17:03

## 2025-03-26 RX ADMIN — PHENAZOPYRIDINE HYDROCHLORIDE 200 MG: 200 TABLET ORAL at 16:41

## 2025-03-26 RX ADMIN — HYDROMORPHONE HYDROCHLORIDE 0.5 MG: 1 INJECTION, SOLUTION INTRAMUSCULAR; INTRAVENOUS; SUBCUTANEOUS at 14:57

## 2025-03-26 RX ADMIN — CEFTRIAXONE 2000 MG: 2 INJECTION, POWDER, FOR SOLUTION INTRAMUSCULAR; INTRAVENOUS at 16:10

## 2025-03-26 NOTE — ED PROVIDER NOTES
Subjective   History of Present Illness  64-year-old male presents to the ER with chief complaint of hematuria.  Patient was having his Chin catheter changed by home health when they noticed a lot of blood.  They were unable to replace the Chin catheter.  Secondary to the bleeding EMS was called as the patient is nonambulatory.  Patient's family does state that he had a moderate amount of gross hematuria.  Currently does not have a catheter placed.  Denying any other symptoms at this time.        Review of Systems   Constitutional: Negative.  Negative for fever.   HENT: Negative.     Respiratory: Negative.     Cardiovascular: Negative.  Negative for chest pain.   Gastrointestinal: Negative.  Negative for abdominal pain.   Endocrine: Negative.    Genitourinary:  Positive for hematuria. Negative for dysuria.   Musculoskeletal:  Positive for gait problem.   Skin: Negative.    Psychiatric/Behavioral: Negative.     All other systems reviewed and are negative.      Past Medical History:   Diagnosis Date    Anesthesia     diffculty adminster spinal block, patient stated with last hip surgery 7-2020 several attempts per anthesia and no luck, resulted in general anesthesia     Anxiety     Arthritis     Rheumatoid    COVID     2020    GERD (gastroesophageal reflux disease)     Hypertension     Low back pain     Sleep apnea     does not wear machine, MILD, DOES NOT HAVE AFTER WGT LOSS SURGERY    Wears reading eyeglasses        Allergies   Allergen Reactions    Codeine Nausea And Vomiting    Morphine And Codeine Nausea And Vomiting       Past Surgical History:   Procedure Laterality Date    ANTERIOR CERVICAL DISCECTOMY W/ FUSION Right 02/19/2023    Procedure: CERVICAL DISCECTOMY ANTERIOR WITH FUSION C6-7;  Surgeon: Jaiden Aldridge MD;  Location: [facility];  Service: Neurosurgery;  Laterality: Right;    CARPAL TUNNEL RELEASE Bilateral     CARPAL TUNNEL RELEASE Right 6/29/2023    Procedure: CARPAL TUNNEL RELEASE RIGHT;   Surgeon: Jaiden Aldridge MD;  Location:  [facility];  Service: Neurosurgery;  Laterality: Right;    COLONOSCOPY      5 years ago    COLONOSCOPY N/A 04/05/2022    Procedure: COLONOSCOPY FOR SCREENING;  Surgeon: Luz Galvez MD;  Location:  [facility];  Service: Gastroenterology;  Laterality: N/A;    ENDOSCOPY N/A 04/05/2022    Procedure: ESOPHAGOGASTRODUODENOSCOPY WITH BIOPSY;  Surgeon: Luz Galvez MD;  Location:  [facility];  Service: Gastroenterology;  Laterality: N/A;    GASTRIC SLEEVE LAPAROSCOPIC      2017    HIP SURGERY Right times 2    KNEE ARTHROSCOPY Left     TOTAL HIP ARTHROPLASTY Left 01/25/2021    Procedure: TOTAL HIP ARTHROPLASTY LEFT;  Surgeon: Jb Patel MD;  Location:  [facility];  Service: Orthopedics;  Laterality: Left;    TOTAL HIP ARTHROPLASTY REVISION Right 07/20/2020    Procedure: TOTAL HIP ARTHROPLASTY REVISION RIGHT;  Surgeon: Jb Patel MD;  Location:  [facility];  Service: Orthopedics;  Laterality: Right;       Family History   Problem Relation Age of Onset    Heart disease Father     Hypertension Father     Osteoarthritis Father     Cancer Mother     Diabetes Brother     Hypertension Brother     Gout Paternal Grandmother     Diabetes Paternal Grandmother        Social History     Socioeconomic History    Marital status:    Tobacco Use    Smoking status: Never    Smokeless tobacco: Never   Vaping Use    Vaping status: Never Used   Substance and Sexual Activity    Alcohol use: No    Drug use: Never    Sexual activity: Yes           Objective   Physical Exam  Vitals and nursing note reviewed.   Constitutional:       General: He is not in acute distress.     Appearance: He is well-developed. He is not diaphoretic.   HENT:      Head: Normocephalic and atraumatic.      Right Ear: External ear normal.      Left Ear: External ear normal.      Nose: Nose normal.   Eyes:      Conjunctiva/sclera: Conjunctivae normal.   Neck:      Vascular: No JVD.       Trachea: No tracheal deviation.   Cardiovascular:      Rate and Rhythm: Normal rate and regular rhythm.      Heart sounds: Normal heart sounds. No murmur heard.  Pulmonary:      Effort: Pulmonary effort is normal. No respiratory distress.      Breath sounds: Normal breath sounds. No wheezing.   Abdominal:      Palpations: Abdomen is soft.      Tenderness: There is no abdominal tenderness.   Genitourinary:     Comments: Replaced Chin catheter.  There was some difficulty in this that the nurses observed.  Felt this was either a stricture or a clot.  Bladder was irrigated for a short time to flush the bladder.  Patient tolerated well.  Musculoskeletal:         General: No deformity. Normal range of motion.      Cervical back: Normal range of motion and neck supple.   Skin:     General: Skin is warm and dry.      Coloration: Skin is not pale.      Findings: No erythema or rash.   Neurological:      Mental Status: He is alert and oriented to person, place, and time.      Cranial Nerves: No cranial nerve deficit.   Psychiatric:         Behavior: Behavior normal.         Thought Content: Thought content normal.         Procedures           ED Course  ED Course as of 03/26/25 1632   Wed Mar 26, 2025   1506 CT abd pelvis rad itnepretd:  1. Nonobstructing stones in both kidneys     2. Anasarca     3. Large stool burden     4. Other findings as above   [RB]   1601 Patient was able to produce urine that was normal.  Feel this is a good sign that any bleeding may have stopped.  Patient does have urology follow-up on April 4.  Explained to patient to continue his anticoagulation.  That benefits outweigh the risk at this point in time. [RB]      ED Course User Index  [RB] Taz Cervantes II, PA                                                       Medical Decision Making  Amount and/or Complexity of Data Reviewed  Labs: ordered.  Radiology: ordered.    Risk  Prescription drug management.        Final diagnoses:   Gross hematuria        ED Disposition  ED Disposition       ED Disposition   Discharge    Condition   Stable    Comment   --               Maximus Mullins MD  [address] [facility] [address]  [place] [address]  [phone]    Go on 4/4/2025           Medication List        New Prescriptions      ciprofloxacin 500 MG tablet  Commonly known as: CIPRO  Take 1 tablet by mouth 2 (Two) Times a Day.     phenazopyridine 200 MG tablet  Commonly known as: PYRIDIUM  Take 1 tablet by mouth 3 (Three) Times a Day As Needed for Bladder Spasms.               Where to Get Your Medications        These medications were sent to [facility] - [place] - [address] [facility] [address] - [phone]  - [phone]   [address] [facility] [address]      Phone: [phone]   ciprofloxacin 500 MG tablet  phenazopyridine 200 MG tablet            Taz Cervantes II, PA  03/26/25 1797

## 2025-03-28 LAB
BACTERIA SPEC AEROBE CULT: ABNORMAL
BACTERIA SPEC AEROBE CULT: NO GROWTH

## 2025-03-31 LAB — BACTERIA SPEC AEROBE CULT: NORMAL

## 2025-04-04 ENCOUNTER — TRANSCRIBE ORDERS (OUTPATIENT)
Dept: WOUND CARE | Facility: HOSPITAL | Age: 65
End: 2025-04-04
Payer: MEDICARE

## 2025-04-04 DIAGNOSIS — L89.159 PRESSURE INJURY OF SKIN OF SACRAL REGION, UNSPECIFIED INJURY STAGE: Primary | ICD-10-CM

## 2025-04-09 ENCOUNTER — HOSPITAL ENCOUNTER (OUTPATIENT)
Dept: WOUND CARE | Facility: HOSPITAL | Age: 65
Discharge: HOME | End: 2025-04-09
Payer: MEDICARE

## 2025-04-09 VITALS
OXYGEN SATURATION: 98 % | SYSTOLIC BLOOD PRESSURE: 133 MMHG | BODY MASS INDEX: 31.07 KG/M2 | DIASTOLIC BLOOD PRESSURE: 74 MMHG | HEART RATE: 70 BPM | HEIGHT: 68 IN | TEMPERATURE: 98.3 F | WEIGHT: 205 LBS

## 2025-04-09 DIAGNOSIS — T14.8XXA OPEN WOUND: Primary | ICD-10-CM

## 2025-04-09 PROCEDURE — 63710000001 ALUMINUM-MAGNESIUM HYDROXIDE-SIMETHICONE 200-200-20 MG/5ML SUSPENSION: Performed by: NURSE PRACTITIONER

## 2025-04-09 PROCEDURE — A9270 NON-COVERED ITEM OR SERVICE: HCPCS | Performed by: NURSE PRACTITIONER

## 2025-04-09 PROCEDURE — 63710000001 COLLAGENASE 250 UNIT/GM OINTMENT 30 G TUBE: Performed by: NURSE PRACTITIONER

## 2025-04-09 PROCEDURE — 63710000001 A&D OINTMENT 425 G JAR: Performed by: NURSE PRACTITIONER

## 2025-04-09 PROCEDURE — G0463 HOSPITAL OUTPT CLINIC VISIT: HCPCS

## 2025-04-09 PROCEDURE — 63710000001 CLOTRIMAZOLE 1 % CREAM 30 G TUBE: Performed by: NURSE PRACTITIONER

## 2025-04-09 PROCEDURE — 63710000001 ZINC OXIDE 20 % OINTMENT 454 G JAR: Performed by: NURSE PRACTITIONER

## 2025-04-09 RX ORDER — SODIUM HYPOCHLORITE 2.5 MG/ML
1 SOLUTION TOPICAL AS NEEDED
Status: DISCONTINUED | OUTPATIENT
Start: 2025-04-09 | End: 2025-04-10 | Stop reason: HOSPADM

## 2025-04-09 RX ORDER — LIDOCAINE HYDROCHLORIDE 20 MG/ML
JELLY TOPICAL AS NEEDED
Status: CANCELLED | OUTPATIENT
Start: 2025-04-09

## 2025-04-09 RX ORDER — DIAPER,BRIEF,INFANT-TODD,DISP
1 EACH MISCELLANEOUS ONCE
OUTPATIENT
Start: 2025-04-09 | End: 2025-04-09

## 2025-04-09 RX ORDER — SILVER SULFADIAZINE 10 MG/G
1 CREAM TOPICAL AS NEEDED
Status: CANCELLED | OUTPATIENT
Start: 2025-04-09

## 2025-04-09 RX ORDER — SILVER SULFADIAZINE 10 MG/G
1 CREAM TOPICAL AS NEEDED
OUTPATIENT
Start: 2025-04-09

## 2025-04-09 RX ORDER — LIDOCAINE HYDROCHLORIDE 20 MG/ML
1 JELLY TOPICAL ONCE
Status: CANCELLED | OUTPATIENT
Start: 2025-04-09 | End: 2025-04-09

## 2025-04-09 RX ORDER — MUPIROCIN 20 MG/G
1 OINTMENT TOPICAL AS NEEDED
Status: CANCELLED | OUTPATIENT
Start: 2025-04-09

## 2025-04-09 RX ORDER — CASTOR OIL AND BALSAM, PERU 788; 87 MG/G; MG/G
1 OINTMENT TOPICAL AS NEEDED
OUTPATIENT
Start: 2025-04-09

## 2025-04-09 RX ORDER — LIDOCAINE HYDROCHLORIDE AND EPINEPHRINE BITARTRATE 20; .01 MG/ML; MG/ML
10 INJECTION, SOLUTION SUBCUTANEOUS ONCE
OUTPATIENT
Start: 2025-04-09 | End: 2025-04-09

## 2025-04-09 RX ORDER — NYSTATIN 100000 [USP'U]/G
1 POWDER TOPICAL ONCE
Status: CANCELLED | OUTPATIENT
Start: 2025-04-09 | End: 2025-04-09

## 2025-04-09 RX ORDER — DIAPER,BRIEF,INFANT-TODD,DISP
1 EACH MISCELLANEOUS ONCE
Status: CANCELLED | OUTPATIENT
Start: 2025-04-09 | End: 2025-04-09

## 2025-04-09 RX ORDER — LIDOCAINE HYDROCHLORIDE 20 MG/ML
10 INJECTION, SOLUTION INFILTRATION; PERINEURAL ONCE
Status: CANCELLED | OUTPATIENT
Start: 2025-04-09 | End: 2025-04-09

## 2025-04-09 RX ORDER — LIDOCAINE HYDROCHLORIDE 20 MG/ML
10 INJECTION, SOLUTION INFILTRATION; PERINEURAL ONCE
OUTPATIENT
Start: 2025-04-09 | End: 2025-04-09

## 2025-04-09 RX ORDER — MUPIROCIN 20 MG/G
1 OINTMENT TOPICAL AS NEEDED
OUTPATIENT
Start: 2025-04-09

## 2025-04-09 RX ORDER — LIDOCAINE HYDROCHLORIDE 20 MG/ML
JELLY TOPICAL AS NEEDED
OUTPATIENT
Start: 2025-04-09

## 2025-04-09 RX ORDER — CASTOR OIL AND BALSAM, PERU 788; 87 MG/G; MG/G
1 OINTMENT TOPICAL AS NEEDED
Status: CANCELLED | OUTPATIENT
Start: 2025-04-09

## 2025-04-09 RX ORDER — LIDOCAINE HYDROCHLORIDE AND EPINEPHRINE BITARTRATE 20; .01 MG/ML; MG/ML
10 INJECTION, SOLUTION SUBCUTANEOUS ONCE
Status: CANCELLED | OUTPATIENT
Start: 2025-04-09 | End: 2025-04-09

## 2025-04-09 RX ORDER — LIDOCAINE HYDROCHLORIDE 20 MG/ML
1 JELLY TOPICAL ONCE
Status: COMPLETED | OUTPATIENT
Start: 2025-04-09 | End: 2025-04-09

## 2025-04-09 RX ORDER — NYSTATIN 100000 [USP'U]/G
1 POWDER TOPICAL ONCE
OUTPATIENT
Start: 2025-04-09 | End: 2025-04-09

## 2025-04-09 RX ORDER — SODIUM HYPOCHLORITE 1.25 MG/ML
1 SOLUTION TOPICAL AS NEEDED
OUTPATIENT
Start: 2025-04-09

## 2025-04-09 RX ORDER — SODIUM HYPOCHLORITE 1.25 MG/ML
1 SOLUTION TOPICAL AS NEEDED
Status: CANCELLED | OUTPATIENT
Start: 2025-04-09

## 2025-04-09 RX ORDER — SODIUM HYPOCHLORITE 2.5 MG/ML
1 SOLUTION TOPICAL AS NEEDED
OUTPATIENT
Start: 2025-04-09

## 2025-04-09 RX ADMIN — VITAMINS A AND D OINTMENT 1 APPLICATION: 15.5; 53.4 OINTMENT TOPICAL at 09:16

## 2025-04-09 RX ADMIN — COLLAGENASE SANTYL 1 APPLICATION: 250 OINTMENT TOPICAL at 09:17

## 2025-04-09 RX ADMIN — LIDOCAINE HYDROCHLORIDE 1 ML: 20 JELLY TOPICAL at 09:16

## 2025-04-09 NOTE — PROGRESS NOTES
Wound Clinic Note  Patient Identification:  Name:  Ang Jackson  Age:  64 y.o.  Sex:  male  :  1960  MRN:  [id number]   Visit Number:  [id number]  Primary Care Physician:  Deepak Perez     Subjective     Chief complaint:     Multiple pressure injuries     History of presenting illness:     Patient is a 64 y.o. male with past medical history significant for obesity, parplegia,  that presented today for evaluation of bilateral gluteal and sacral pressure injuries.  Reports areas have been present for over a month. He is paraplegic. Has been applying santyl and hydrofera blue to wounds. Denies any fever or chills. Moderate drainage without odor. Low air loss mattress has been ordered by PCP, should be delivered this week.  Denies history of diabetes, or smoking. Finished dose of cipro.     ---------------------------------------------------------------------------------------------------------------------   Review of Systems ***  ---------------------------------------------------------------------------------------------------------------------   Past Medical History:   Diagnosis Date    Anesthesia     diffculty adminster spinal block, patient stated with last hip surgery  several attempts per awilda and no luck, resulted in general anesthesia     Anxiety     Arthritis     Rheumatoid    COV2020    GERD (gastroesophageal reflux disease)     Hypertension     Low back pain     Sleep apnea     does not wear machine, MILD, DOES NOT HAVE AFTER WGT LOSS SURGERY    Wears reading eyeglasses      Past Surgical History:   Procedure Laterality Date    ANTERIOR CERVICAL DISCECTOMY W/ FUSION Right 2023    Procedure: CERVICAL DISCECTOMY ANTERIOR WITH FUSION C6-7;  Surgeon: Jaiden Aldridge MD;  Location: [facility];  Service: Neurosurgery;  Laterality: Right;    CARPAL TUNNEL RELEASE Bilateral     CARPAL TUNNEL RELEASE Right 2023    Procedure: CARPAL TUNNEL RELEASE RIGHT;  Surgeon:  Jaiden Aldridge MD;  Location:  [facility];  Service: Neurosurgery;  Laterality: Right;    COLONOSCOPY      5 years ago    COLONOSCOPY N/A 04/05/2022    Procedure: COLONOSCOPY FOR SCREENING;  Surgeon: Luz Galvez MD;  Location:  [facility];  Service: Gastroenterology;  Laterality: N/A;    ENDOSCOPY N/A 04/05/2022    Procedure: ESOPHAGOGASTRODUODENOSCOPY WITH BIOPSY;  Surgeon: Luz Galvez MD;  Location:  [facility];  Service: Gastroenterology;  Laterality: N/A;    GASTRIC SLEEVE LAPAROSCOPIC      2017    HIP SURGERY Right times 2    KNEE ARTHROSCOPY Left     TOTAL HIP ARTHROPLASTY Left 01/25/2021    Procedure: TOTAL HIP ARTHROPLASTY LEFT;  Surgeon: Jb Patel MD;  Location:  [facility];  Service: Orthopedics;  Laterality: Left;    TOTAL HIP ARTHROPLASTY REVISION Right 07/20/2020    Procedure: TOTAL HIP ARTHROPLASTY REVISION RIGHT;  Surgeon: Jb Patel MD;  Location:  [facility];  Service: Orthopedics;  Laterality: Right;     Family History   Problem Relation Age of Onset    Heart disease Father     Hypertension Father     Osteoarthritis Father     Cancer Mother     Diabetes Brother     Hypertension Brother     Gout Paternal Grandmother     Diabetes Paternal Grandmother      Social History     Socioeconomic History    Marital status:    Tobacco Use    Smoking status: Never    Smokeless tobacco: Never   Vaping Use    Vaping status: Never Used   Substance and Sexual Activity    Alcohol use: No    Drug use: Never    Sexual activity: Yes     ---------------------------------------------------------------------------------------------------------------------   Allergies:  Sulfa antibiotics, Codeine, and Morphine and codeine  ---------------------------------------------------------------------------------------------------------------------  Objective     ---------------------------------------------------------------------------------------------------------------------  "  Vital Signs:  /74   Pulse 70   Temp 98.3 °F (36.8 °C) (Infrared)   Ht 172.7 cm (68\")   Wt 93 kg (205 lb)   SpO2 98%   BMI 31.17 kg/m²   Estimated body mass index is 31.17 kg/m² as calculated from the following:    Height as of this encounter: 172.7 cm (68\").    Weight as of this encounter: 93 kg (205 lb).  Body mass index is 31.17 kg/m².  Wt Readings from Last 3 Encounters:   04/09/25 93 kg (205 lb)   03/26/25 93 kg (205 lb)   01/30/24 95.3 kg (210 lb 3.2 oz)       ---------------------------------------------------------------------------------------------------------------------   Physical Exam  Wound Assessment:  Location: sacrum  Wound Measurements: Length:  Width:   Depth:  Tunneling: {yes and no:38334} Undermining: {yes and no:92260}  Dressing Appearance: {dressingappearance:90121}  Closure: {wound closure:34534}  Changes since last exam: {wound changes :36574}  Etiology and classification: MASD  Wound bed structures/characteristics: {wound structures:81108}  Edges {wound drainage :55758}  Periwound characteristics: {periwound characteristics:96876}  Periwound Temperature: {skin temperature :05188}  Drainage characteristics: {wound drainage :71948}  Perfusion characteristics: {perfusion characteristics:68863}    Wound Assessment:  Location: Left, gluteal  Wound Measurements: Length:  Width:   Depth:  Tunneling: {yes and no:64069} Undermining: {yes and no:70926}  Dressing Appearance: {dressingappearance:09070}  Closure: {wound closure:98971}  Changes since last exam: {wound changes :36574}  Etiology and classification:  Unstageable Pressure injury   Wound bed structures/characteristics: {wound structures:62588}  Edges {wound drainage :66715}  Periwound characteristics: {periwound characteristics:96425}  Periwound Temperature: {skin temperature :13046}  Drainage characteristics: {wound drainage :91919}  Perfusion characteristics: {perfusion characteristics:98816}    Wound Assessment:  Location: " Right, gluteal  Wound Measurements: 4 X 6 X 0.2cm   Tunneling: {yes and no:96830} Undermining: {yes and no:22199}  Dressing Appearance: {dressingappearance:61627}  Closure: {wound closure:64195}  Changes since last exam: {wound changes :96028}  Etiology and classification: stage 3 pressure ulcer  Wound bed structures/characteristics: {wound structures:91655}  Edges {wound drainage :40717}  Periwound characteristics: {periwound characteristics:97343}  Periwound Temperature: {skin temperature :46611}  Drainage characteristics: {wound drainage :38599}  Perfusion characteristics: {perfusion characteristics:21541}    Wound Goal (s):{Blank Multiple:10164}  Assessment & Plan      Patient Active Problem List    Diagnosis     Open wound [T14.8XXA]     Bilateral carpal tunnel syndrome [G56.03]     Anxiety and depression [F41.9, F32.A]     GAEL (obstructive sleep apnea) [G47.33]     Chronic back pain [M54.9, G89.29]     Iron deficiency anemia [D50.9]     BPH (benign prostatic hyperplasia) [N40.0]     GERD (gastroesophageal reflux disease) [K21.9]     Obesity [E66.9]     Status post total hip replacement, left [Z96.642]     Primary osteoarthritis of left hip [M16.12]     Right hip pain [M25.551]     Status post total replacement of right hip [Z96.641]     Acute blood loss anemia, mild, asymptomatic [D62]     Acute postoperative pain [G89.18]     Primary osteoarthritis of left knee [M17.12]     Post-traumatic osteoarthritis of left knee [M17.32]     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 The patient is at high-risk for additional pressure injury, requiring a high level of vigilance and coordination of care, and frequent re-assessment to prevent adverse outcomes.     Management of this condition is inherently complex, requiring ongoing optimization of many factors to assure the highest likelihood of a favorable outcome, including pressure relief, bioburden, multiple aspects of nutrition, infection management, and moisture and mechanical factors relevant to wound healing.        Dermatitis associated with moisture [L30.8], Perirectal skin irritation [K62.89]  -Clean with wound cleanser, apply magic barrier to area and leave open to air  -Offloading measures discussed   -keep area clean and dry   -High risk for worsening due to incontinent of bowel and bladder  -Chin catheter is in place and appears to be functioning        Obesity [E66.9]  -An obese person?is at greater risk for wound infection and dehiscence or evisceration.       Diabetes mellitus [E11.9]  --Recommend adequate glycemic control to help promote wound healing  -Poor glycemic control increases risk of prolonged healing, infection, loss of limb and premature death       Quadriplegia  -Complicates all aspects of care  -Increases risk of wound failure due to decreased mobility        Wound Care Debridement Note   Pre-Procedure  Pre-Procedure Diagnosis: Pressure injury of left gluteal with fat layer exposed  Checked for Allergies: yes  Consent:Consent obtained, consent given by Patient,Risks Discussed, Alternatives Discussed  Indication: slough, necrotic tissue, and biofilm  Vascular status:ANGELICA testing is not relevant to this wound, as it is not located on the lower extremity.  Time out was called prior to procedure.   Pre procedure Pain assessment: a 1 on a scale of 0-10  Pre debridement measurements: 9.8 X 2.8 X 1.4cm   sinus/tunnelNo, undermining No    Post Procedure  Post-Procedure  Diagnosis: Pressure injury of left gluteal with fat layer exposed  Post debridement measurements: 9.9 X 2.9 X 1.5cm   sinus/tunnelNo, undermining No  Post procedure Pain assessment: a 1 on a scale of 0-10  Graft/Implant/Prosthetics/Implanted Device/Transplants:  None  Complication(s):  None    Procedure details:  Method of Debridement: excissional (Surgical removal or cutting away, outside or beyond the wound margin devitalized tissue, necrosis or slough.)  Procedure: The site was prepared using clean techniques, subcutaneous tissue was removed by surgical excision.  Instrument(s) used: Curette 4mm  Anesthesia:After checking patient allergies,lidocaine topical 2% was administered to provide anesthesia.  Tissue removed: subuctaneous, Percent Removed 70%  Culture or Biopsy: culture and sensitivity  Estimated Blood Loss: Small  Hemostasis Obtained: pressure    Clinical Impression:Moderate Complexity    Follow-up: 1 week    TANJA Mejias,CWS  [facility]  04/09/25  08:43 EDT

## 2025-04-09 NOTE — LETTER
April 9, 2025     Patient: Ang Jackson   YOB: 1960   Date of Visit: 4/9/2025       To Whom It May Concern:    It is my medical opinion that Ang Jackson may return to light duty immediately with the following restrictions: Recommends he wears offloading boot and needs to be off his foot as much as possible. If you have any questions please call our clinic. Thank you .           Sincerely,      TANJA Serrano

## 2025-04-17 ENCOUNTER — HOSPITAL ENCOUNTER (OUTPATIENT)
Dept: WOUND CARE | Facility: HOSPITAL | Age: 65
Discharge: HOME | End: 2025-04-17
Admitting: NURSE PRACTITIONER
Payer: MEDICARE

## 2025-04-17 VITALS
DIASTOLIC BLOOD PRESSURE: 47 MMHG | BODY MASS INDEX: 31.07 KG/M2 | TEMPERATURE: 98.7 F | WEIGHT: 205 LBS | SYSTOLIC BLOOD PRESSURE: 70 MMHG | HEART RATE: 83 BPM | HEIGHT: 68 IN

## 2025-04-17 DIAGNOSIS — T14.8XXA OPEN WOUND: Primary | ICD-10-CM

## 2025-04-17 PROCEDURE — 63710000001 ZINC OXIDE 20 % OINTMENT 454 G JAR: Performed by: NURSE PRACTITIONER

## 2025-04-17 PROCEDURE — 63710000001 ALUMINUM-MAGNESIUM HYDROXIDE-SIMETHICONE 200-200-20 MG/5ML SUSPENSION: Performed by: NURSE PRACTITIONER

## 2025-04-17 PROCEDURE — A9270 NON-COVERED ITEM OR SERVICE: HCPCS | Performed by: NURSE PRACTITIONER

## 2025-04-17 PROCEDURE — 63710000001 CLOTRIMAZOLE 1 % CREAM 30 G TUBE: Performed by: NURSE PRACTITIONER

## 2025-04-17 PROCEDURE — 97602 WOUND(S) CARE NON-SELECTIVE: CPT

## 2025-04-17 PROCEDURE — 63710000001 A&D OINTMENT 425 G JAR: Performed by: NURSE PRACTITIONER

## 2025-04-17 RX ORDER — MUPIROCIN 20 MG/G
1 OINTMENT TOPICAL AS NEEDED
OUTPATIENT
Start: 2025-04-17

## 2025-04-17 RX ORDER — LIDOCAINE HYDROCHLORIDE 20 MG/ML
JELLY TOPICAL AS NEEDED
OUTPATIENT
Start: 2025-04-17

## 2025-04-17 RX ORDER — DIAPER,BRIEF,INFANT-TODD,DISP
1 EACH MISCELLANEOUS ONCE
OUTPATIENT
Start: 2025-04-17 | End: 2025-04-17

## 2025-04-17 RX ORDER — CASTOR OIL AND BALSAM, PERU 788; 87 MG/G; MG/G
1 OINTMENT TOPICAL AS NEEDED
OUTPATIENT
Start: 2025-04-17

## 2025-04-17 RX ORDER — SODIUM HYPOCHLORITE 1.25 MG/ML
1 SOLUTION TOPICAL AS NEEDED
OUTPATIENT
Start: 2025-04-17

## 2025-04-17 RX ORDER — NYSTATIN 100000 [USP'U]/G
1 POWDER TOPICAL ONCE
OUTPATIENT
Start: 2025-04-17 | End: 2025-04-17

## 2025-04-17 RX ORDER — LIDOCAINE HYDROCHLORIDE 20 MG/ML
1 JELLY TOPICAL ONCE
Status: COMPLETED | OUTPATIENT
Start: 2025-04-17 | End: 2025-04-17

## 2025-04-17 RX ORDER — LIDOCAINE HYDROCHLORIDE 20 MG/ML
1 JELLY TOPICAL ONCE
OUTPATIENT
Start: 2025-04-17 | End: 2025-04-17

## 2025-04-17 RX ORDER — SODIUM HYPOCHLORITE 2.5 MG/ML
1 SOLUTION TOPICAL AS NEEDED
OUTPATIENT
Start: 2025-04-17

## 2025-04-17 RX ORDER — SILVER SULFADIAZINE 10 MG/G
1 CREAM TOPICAL AS NEEDED
OUTPATIENT
Start: 2025-04-17

## 2025-04-17 RX ORDER — LIDOCAINE HYDROCHLORIDE AND EPINEPHRINE BITARTRATE 20; .01 MG/ML; MG/ML
10 INJECTION, SOLUTION SUBCUTANEOUS ONCE
OUTPATIENT
Start: 2025-04-17 | End: 2025-04-17

## 2025-04-17 RX ORDER — LIDOCAINE HYDROCHLORIDE 20 MG/ML
10 INJECTION, SOLUTION INFILTRATION; PERINEURAL ONCE
OUTPATIENT
Start: 2025-04-17 | End: 2025-04-17

## 2025-04-17 RX ADMIN — LIDOCAINE HYDROCHLORIDE 1 ML: 20 JELLY TOPICAL at 14:05

## 2025-04-17 RX ADMIN — VITAMINS A AND D OINTMENT 1 APPLICATION: 15.5; 53.4 OINTMENT TOPICAL at 14:31

## 2025-04-17 NOTE — LETTER
04/17/2025  Ang Jackson, 1960        Wound Care Instructions:    Left Lower Gluteal- Clean with wound cleanser or normal saline, pat dry. Apply santyl to 4x4 gauze then lay on wound and cover with ABD pad and secure with tape.    Right Lower Gluteal- Clean with wound cleanser or normal saline, pat dry. Apply magic barrier cream or zgaurd to wound and periwound. Cover with dry sheet and secure with tape.     Perirectal- Clean with wound cleanser or normal saline, pat dry. Apply santyl to 4x4 gauze then on lay wound and cover with ABD pad and secure with tape.     This needs to be done daily. If you have any questions please call our clinic. Patient will need supplies to last him through the weeks. He is seen in clinic once a week.       Thank you,     TANJA Serrano

## 2025-04-18 PROBLEM — L30.8 DERMATITIS ASSOCIATED WITH MOISTURE: Status: ACTIVE | Noted: 2025-04-18

## 2025-04-18 PROBLEM — K62.89 PERIRECTAL SKIN IRRITATION: Status: ACTIVE | Noted: 2025-04-18

## 2025-04-18 PROBLEM — L89.320 PRESSURE INJURY OF LEFT BUTTOCK, UNSTAGEABLE: Status: ACTIVE | Noted: 2025-04-18

## 2025-04-18 PROBLEM — L89.43: Status: ACTIVE | Noted: 2025-04-18

## 2025-04-18 PROBLEM — L89.313 PRESSURE INJURY OF RIGHT BUTTOCK, STAGE 3: Status: ACTIVE | Noted: 2025-04-18

## 2025-04-18 PROBLEM — L89.95 PRESSURE INJURY, UNSTAGEABLE: Status: ACTIVE | Noted: 2025-04-18

## 2025-04-18 NOTE — PROGRESS NOTES
Wound Clinic Note  Patient Identification:  Name:  Ang Jackson  Age:  64 y.o.  Sex:  male  :  1960  MRN:  [id number]   Visit Number:  [id number]  Primary Care Physician:  Deepak Perez     Subjective     Chief complaint:     Multiple pressure injuries     History of presenting illness:     Patient is a 64 y.o. male with past medical history significant for obesity, parplegia,  that presented today for evaluation of bilateral gluteal and sacral pressure injuries.  Reports areas have been present for over a month. He is paraplegic. Has been applying santyl and hydrofera blue to wounds. Denies any fever or chills. Moderate drainage without odor. Low air loss mattress has been ordered by PCP, should be delivered this week.  Denies history of diabetes, or smoking. Finished dose of cipro.     Interval History:   2025: Seen in clinic today for follow-up to pressure injuries to right and left gluteal, and perirectal MASD. Right gluteal wound has worsened, now with DTI down to right groin area. He is quadpraplegic, complicating wound prgoression. He is incontinent of bowel and bladder. Chin catheter in place. Awaiting approval for low-air loss mattress. Denies any fever or chills. No other issues or concerns reported.  ---------------------------------------------------------------------------------------------------------------------   Review of Systems   Constitutional:  Negative for chills and fever.   Respiratory:  Negative for cough and shortness of breath.    Cardiovascular:  Positive for leg swelling. Negative for chest pain.   Gastrointestinal:  Negative for nausea and vomiting.   Musculoskeletal:  Positive for back pain and gait problem.   Skin:  Positive for wound.      ---------------------------------------------------------------------------------------------------------------------   Past Medical History:   Diagnosis Date    Anesthesia     diffculty adminster spinal block, patient  stated with last hip surgery 7-2020 several attempts per awilda and rin molina, resulted in general anesthesia     Anxiety     Arthritis     Rheumatoid    COVID     2020    GERD (gastroesophageal reflux disease)     Hypertension     Low back pain     Sleep apnea     does not wear machine, MILD, DOES NOT HAVE AFTER WGT LOSS SURGERY    Wears reading eyeglasses      Past Surgical History:   Procedure Laterality Date    ANTERIOR CERVICAL DISCECTOMY W/ FUSION Right 02/19/2023    Procedure: CERVICAL DISCECTOMY ANTERIOR WITH FUSION C6-7;  Surgeon: Jaiden Aldridge MD;  Location:  [facility];  Service: Neurosurgery;  Laterality: Right;    CARPAL TUNNEL RELEASE Bilateral     CARPAL TUNNEL RELEASE Right 6/29/2023    Procedure: CARPAL TUNNEL RELEASE RIGHT;  Surgeon: Jaiden Aldridge MD;  Location:  [facility];  Service: Neurosurgery;  Laterality: Right;    COLONOSCOPY      5 years ago    COLONOSCOPY N/A 04/05/2022    Procedure: COLONOSCOPY FOR SCREENING;  Surgeon: Luz Galvez MD;  Location:  [facility];  Service: Gastroenterology;  Laterality: N/A;    ENDOSCOPY N/A 04/05/2022    Procedure: ESOPHAGOGASTRODUODENOSCOPY WITH BIOPSY;  Surgeon: Luz Galvez MD;  Location:  [facility];  Service: Gastroenterology;  Laterality: N/A;    GASTRIC SLEEVE LAPAROSCOPIC      2017    HIP SURGERY Right times 2    KNEE ARTHROSCOPY Left     TOTAL HIP ARTHROPLASTY Left 01/25/2021    Procedure: TOTAL HIP ARTHROPLASTY LEFT;  Surgeon: Jb Patel MD;  Location:  [facility];  Service: Orthopedics;  Laterality: Left;    TOTAL HIP ARTHROPLASTY REVISION Right 07/20/2020    Procedure: TOTAL HIP ARTHROPLASTY REVISION RIGHT;  Surgeon: Jb Patel MD;  Location:  [facility];  Service: Orthopedics;  Laterality: Right;     Family History   Problem Relation Age of Onset    Heart disease Father     Hypertension Father     Osteoarthritis Father     Cancer Mother     Diabetes Brother     Hypertension Brother     Gout Paternal  "Grandmother     Diabetes Paternal Grandmother      Social History     Socioeconomic History    Marital status:    Tobacco Use    Smoking status: Never    Smokeless tobacco: Never   Vaping Use    Vaping status: Never Used   Substance and Sexual Activity    Alcohol use: No    Drug use: Never    Sexual activity: Yes     ---------------------------------------------------------------------------------------------------------------------   Allergies:  Sulfa antibiotics, Codeine, and Morphine and codeine  ---------------------------------------------------------------------------------------------------------------------  Objective     ---------------------------------------------------------------------------------------------------------------------   Vital Signs:  BP (!) 70/47 Comment: Daughter stated that this is normal for him  Pulse 83   Temp 98.7 °F (37.1 °C) (Infrared)   Ht 172.7 cm (68\")   Wt 93 kg (205 lb)   BMI 31.17 kg/m²   Estimated body mass index is 31.17 kg/m² as calculated from the following:    Height as of this encounter: 172.7 cm (68\").    Weight as of this encounter: 93 kg (205 lb).  Body mass index is 31.17 kg/m².  Wt Readings from Last 3 Encounters:   04/17/25 93 kg (205 lb)   04/09/25 93 kg (205 lb)   03/26/25 93 kg (205 lb)       ---------------------------------------------------------------------------------------------------------------------   Physical Exam  Wound Assessment:  Location: Perirectal   Wound Measurements: 5 X 5 X 0.2cm   Tunneling: No Undermining: No  Dressing Appearance: dressingappearance: clean  Closure: NA  Changes since last exam: no changes  Etiology and classification: MASD  Wound bed structures/characteristics: partial thickness (subcutaneous tissue is not exposed), red  Edges moist  Periwound characteristics: excoriated  Periwound Temperature: normal turgor and temperature  Drainage characteristics: moderate, serous   Perfusion characteristics: NA    Wound " Assessment:  Location: Left, gluteal  Wound Measurements: 11 X 3 X 0.9cm   Tunneling: No Undermining: No  Dressing Appearance: dressingappearance: clean  Closure: NA  Changes since last exam: no changes  Etiology and classification:  Unstageable Pressure injury   Wound bed structures/characteristics: full-thickness (subcutaneous tissue is exposed in at least a portion of the wound), black eschar, moist, pink  Edges moist  Periwound characteristics: intact  Periwound Temperature: normal turgor and temperature  Drainage characteristics: moderate, serous   Perfusion characteristics: NA    Wound Assessment:  Location: Right, gluteal  Wound Measurements: 7 X 8 X 0.2cm   Tunneling: No Undermining: No  Dressing Appearance: dressingappearance: clean  Closure: NA  Changes since last exam: ulcer is worsening   Etiology and classification: stage 3 pressure ulcer  Wound bed structures/characteristics: full-thickness (subcutaneous tissue is exposed in at least a portion of the wound), moist, pink, yellow  Edges moist  Periwound characteristics:  Purple, non-blanchable  Periwound Temperature: normal turgor and temperature  Drainage characteristics: moderate, serous   Perfusion characteristics: NA    Wound Goal (s):Free of infection and No further symptoms  Assessment & Plan      Patient Active Problem List    Diagnosis     Pressure injury of right buttock, stage 3 [L89.313]  -Clean with wound cleanser, apply magic barrier to area and cover with suberabsorbent and secure with tape  -Offloading pressure induction measures discussed  -Ordered high protein diet 120g/day along with vitamin C 2000mg/day, vitamin A 5000 Units/day, vitamin D3 5000 Units/day, zinc 50mg/day to help promote wound healing   -Will order low-air loss mattress as wounds are continuing to worsen and will benefit from added support       Pressure injury of left buttock unstageable [L89.320]  -clean with wound cleanser, apply santyl to area and cover with silicone  border dressing daily and PRN'  -Offloading measures discussed   -Will order low-air loss mattress   -Ordered high protein diet 120g/day along with vitamin C 2000mg/day, vitamin A 5000 Units/day, vitamin D3 5000 Units/day, zinc 50mg/day to help promote wound healing   -This condition is associated with a high risk for non-healing, infection, sepsis, loss of function, reduced quality of life, and increased risk of premature mortality. The patient is at high-risk for additional pressure injury, requiring a high level of vigilance and coordination of care, and frequent re-assessment to prevent adverse outcomes.     Management of this condition is inherently complex, requiring ongoing optimization of many factors to assure the highest likelihood of a favorable outcome, including pressure relief, bioburden, multiple aspects of nutrition, infection management, and moisture and mechanical factors relevant to wound healing.        Dermatitis associated with moisture [L30.8], Perirectal skin irritation [K62.89]  -Clean with wound cleanser, apply magic barrier to area and leave open to air  -Offloading measures discussed   -keep area clean and dry   -High risk for worsening due to incontinent of bowel and bladder  -Chin catheter is in place and appears to be functioning        Obesity [E66.9]  -An obese person?is at greater risk for wound infection and dehiscence or evisceration.       Diabetes mellitus [E11.9]  --Recommend adequate glycemic control to help promote wound healing  -Poor glycemic control increases risk of prolonged healing, infection, loss of limb and premature death       Quadriplegia  -Complicates all aspects of care  -Increases risk of wound failure due to decreased mobility        Clinical Impression:Moderate Complexity    Follow-up: 1 week    TANJA Mejias,CWS  [facility]  04/17/2025  [id number]

## 2025-04-24 ENCOUNTER — HOSPITAL ENCOUNTER (OUTPATIENT)
Dept: WOUND CARE | Facility: HOSPITAL | Age: 65
Discharge: HOME | End: 2025-04-24
Admitting: NURSE PRACTITIONER
Payer: MEDICARE

## 2025-04-24 VITALS
DIASTOLIC BLOOD PRESSURE: 63 MMHG | SYSTOLIC BLOOD PRESSURE: 105 MMHG | RESPIRATION RATE: 18 BRPM | HEART RATE: 87 BPM | TEMPERATURE: 98.9 F | OXYGEN SATURATION: 94 %

## 2025-04-24 DIAGNOSIS — T14.8XXA OPEN WOUND: Primary | ICD-10-CM

## 2025-04-24 PROCEDURE — A9270 NON-COVERED ITEM OR SERVICE: HCPCS | Performed by: NURSE PRACTITIONER

## 2025-04-24 PROCEDURE — 63710000001 SODIUM HYPOCHLORITE 0.25 % SOLUTION 473 ML BOTTLE: Performed by: NURSE PRACTITIONER

## 2025-04-24 PROCEDURE — 97602 WOUND(S) CARE NON-SELECTIVE: CPT

## 2025-04-24 PROCEDURE — 63710000001 CLOTRIMAZOLE 1 % CREAM 30 G TUBE: Performed by: NURSE PRACTITIONER

## 2025-04-24 PROCEDURE — 63710000001 A&D OINTMENT 425 G JAR: Performed by: NURSE PRACTITIONER

## 2025-04-24 PROCEDURE — 63710000001 ZINC OXIDE 20 % OINTMENT 454 G JAR: Performed by: NURSE PRACTITIONER

## 2025-04-24 PROCEDURE — 63710000001 ALUMINUM-MAGNESIUM HYDROXIDE-SIMETHICONE 200-200-20 MG/5ML SUSPENSION: Performed by: NURSE PRACTITIONER

## 2025-04-24 PROCEDURE — 63710000001 COLLAGENASE 250 UNIT/GM OINTMENT 30 G TUBE: Performed by: NURSE PRACTITIONER

## 2025-04-24 RX ORDER — SILVER SULFADIAZINE 10 MG/G
1 CREAM TOPICAL AS NEEDED
OUTPATIENT
Start: 2025-04-24

## 2025-04-24 RX ORDER — CASTOR OIL AND BALSAM, PERU 788; 87 MG/G; MG/G
1 OINTMENT TOPICAL AS NEEDED
OUTPATIENT
Start: 2025-04-24

## 2025-04-24 RX ORDER — SODIUM HYPOCHLORITE 1.25 MG/ML
1 SOLUTION TOPICAL AS NEEDED
OUTPATIENT
Start: 2025-04-24

## 2025-04-24 RX ORDER — DIAPER,BRIEF,INFANT-TODD,DISP
1 EACH MISCELLANEOUS ONCE
OUTPATIENT
Start: 2025-04-24 | End: 2025-04-24

## 2025-04-24 RX ORDER — LIDOCAINE 50 MG/G
1 OINTMENT TOPICAL DAILY
Qty: 50 G | Refills: 3 | Status: SHIPPED | OUTPATIENT
Start: 2025-04-24

## 2025-04-24 RX ORDER — SODIUM HYPOCHLORITE 2.5 MG/ML
1 SOLUTION TOPICAL AS NEEDED
OUTPATIENT
Start: 2025-04-24

## 2025-04-24 RX ORDER — NYSTATIN 100000 [USP'U]/G
1 POWDER TOPICAL ONCE
OUTPATIENT
Start: 2025-04-24 | End: 2025-04-24

## 2025-04-24 RX ORDER — LIDOCAINE HYDROCHLORIDE 20 MG/ML
JELLY TOPICAL AS NEEDED
OUTPATIENT
Start: 2025-04-24

## 2025-04-24 RX ORDER — LIDOCAINE HYDROCHLORIDE 20 MG/ML
10 INJECTION, SOLUTION INFILTRATION; PERINEURAL ONCE
OUTPATIENT
Start: 2025-04-24 | End: 2025-04-24

## 2025-04-24 RX ORDER — LIDOCAINE HYDROCHLORIDE AND EPINEPHRINE BITARTRATE 20; .01 MG/ML; MG/ML
10 INJECTION, SOLUTION SUBCUTANEOUS ONCE
OUTPATIENT
Start: 2025-04-24 | End: 2025-04-24

## 2025-04-24 RX ORDER — MUPIROCIN 20 MG/G
1 OINTMENT TOPICAL AS NEEDED
OUTPATIENT
Start: 2025-04-24

## 2025-04-24 RX ORDER — LIDOCAINE HYDROCHLORIDE 20 MG/ML
1 JELLY TOPICAL ONCE
Status: COMPLETED | OUTPATIENT
Start: 2025-04-24 | End: 2025-04-24

## 2025-04-24 RX ORDER — SODIUM HYPOCHLORITE 2.5 MG/ML
1 SOLUTION TOPICAL AS NEEDED
Status: DISCONTINUED | OUTPATIENT
Start: 2025-04-24 | End: 2025-04-26 | Stop reason: HOSPADM

## 2025-04-24 RX ORDER — LIDOCAINE HYDROCHLORIDE 20 MG/ML
1 JELLY TOPICAL ONCE
OUTPATIENT
Start: 2025-04-24 | End: 2025-04-24

## 2025-04-24 RX ADMIN — COLLAGENASE SANTYL 1 APPLICATION: 250 OINTMENT TOPICAL at 14:26

## 2025-04-24 RX ADMIN — LIDOCAINE HYDROCHLORIDE 1 ML: 20 JELLY TOPICAL at 14:26

## 2025-04-24 RX ADMIN — VITAMINS A AND D OINTMENT 1 APPLICATION: 15.5; 53.4 OINTMENT TOPICAL at 14:26

## 2025-04-25 NOTE — PROGRESS NOTES
Wound Clinic Note  Patient Identification:  Name:  Ang Jackson  Age:  64 y.o.  Sex:  male  :  1960  MRN:  [id number]   Visit Number:  [id number]  Primary Care Physician:  Deepak Perez     Subjective     Chief complaint:     Multiple pressure injuries     History of presenting illness:     Patient is a 64 y.o. male with past medical history significant for obesity, parplegia,  that presented today for evaluation of bilateral gluteal and sacral pressure injuries.  Reports areas have been present for over a month. He is paraplegic. Has been applying santyl and hydrofera blue to wounds. Denies any fever or chills. Moderate drainage without odor. Low air loss mattress has been ordered by PCP, should be delivered this week.  Denies history of diabetes, or smoking. Finished dose of cipro.     Interval History:   2025: Seen in clinic today for follow-up to pressure injuries to right and left gluteal, and perirectal MASD. Right gluteal wound has worsened, now with DTI down to right groin area. He is quadpraplegic, complicating wound prgoression. He is incontinent of bowel and bladder. Chin catheter in place. Awaiting approval for low-air loss mattress. Denies any fever or chills. No other issues or concerns reported.    2025: Seen in clinic today for follow-up to pressure injuries to right and left gluteal, and perirectal MASD. Right gluteal wound has worsened, now with DTI down to right groin area. He is quadpraplegic, complicating wound prgoression. He is incontinent of bowel and bladder. Chin catheter in place. Awaiting approval for low-air loss mattress. Denies any fever or chills. No other issues or concerns reported.  ---------------------------------------------------------------------------------------------------------------------   Review of Systems   Constitutional:  Negative for chills and fever.   Respiratory:  Negative for cough and shortness of breath.    Cardiovascular:   Positive for leg swelling. Negative for chest pain.   Gastrointestinal:  Negative for nausea and vomiting.   Musculoskeletal:  Positive for back pain and gait problem.   Skin:  Positive for wound.      ---------------------------------------------------------------------------------------------------------------------   Past Medical History:   Diagnosis Date    Anesthesia     diffculty adminster spinal block, patient stated with last hip surgery 7-2020 several attempts per awilda and rin molina, resulted in general anesthesia     Anxiety     Arthritis     Rheumatoid    COVID     2020    GERD (gastroesophageal reflux disease)     Hypertension     Low back pain     Sleep apnea     does not wear machine, MILD, DOES NOT HAVE AFTER WGT LOSS SURGERY    Wears reading eyeglasses      Past Surgical History:   Procedure Laterality Date    ANTERIOR CERVICAL DISCECTOMY W/ FUSION Right 02/19/2023    Procedure: CERVICAL DISCECTOMY ANTERIOR WITH FUSION C6-7;  Surgeon: Jaiden Aldridge MD;  Location:  [facility];  Service: Neurosurgery;  Laterality: Right;    CARPAL TUNNEL RELEASE Bilateral     CARPAL TUNNEL RELEASE Right 6/29/2023    Procedure: CARPAL TUNNEL RELEASE RIGHT;  Surgeon: Jaiden Aldridge MD;  Location:  [facility];  Service: Neurosurgery;  Laterality: Right;    COLONOSCOPY      5 years ago    COLONOSCOPY N/A 04/05/2022    Procedure: COLONOSCOPY FOR SCREENING;  Surgeon: Luz Galvez MD;  Location:  [facility];  Service: Gastroenterology;  Laterality: N/A;    ENDOSCOPY N/A 04/05/2022    Procedure: ESOPHAGOGASTRODUODENOSCOPY WITH BIOPSY;  Surgeon: Luz Galvez MD;  Location:  [facility];  Service: Gastroenterology;  Laterality: N/A;    GASTRIC SLEEVE LAPAROSCOPIC      2017    HIP SURGERY Right times 2    KNEE ARTHROSCOPY Left     TOTAL HIP ARTHROPLASTY Left 01/25/2021    Procedure: TOTAL HIP ARTHROPLASTY LEFT;  Surgeon: Jb Patel MD;  Location:  [facility];  Service: Orthopedics;  Laterality:  "Left;    TOTAL HIP ARTHROPLASTY REVISION Right 07/20/2020    Procedure: TOTAL HIP ARTHROPLASTY REVISION RIGHT;  Surgeon: Jb Patel MD;  Location: [facility];  Service: Orthopedics;  Laterality: Right;     Family History   Problem Relation Age of Onset    Heart disease Father     Hypertension Father     Osteoarthritis Father     Cancer Mother     Diabetes Brother     Hypertension Brother     Gout Paternal Grandmother     Diabetes Paternal Grandmother      Social History     Socioeconomic History    Marital status:    Tobacco Use    Smoking status: Never    Smokeless tobacco: Never   Vaping Use    Vaping status: Never Used   Substance and Sexual Activity    Alcohol use: No    Drug use: Never    Sexual activity: Yes     ---------------------------------------------------------------------------------------------------------------------   Allergies:  Sulfa antibiotics, Codeine, and Morphine and codeine  ---------------------------------------------------------------------------------------------------------------------  Objective     ---------------------------------------------------------------------------------------------------------------------   Vital Signs:  /63   Pulse 87   Temp 98.9 °F (37.2 °C) (Infrared)   Resp 18   SpO2 94%   Estimated body mass index is 31.17 kg/m² as calculated from the following:    Height as of 4/17/25: 172.7 cm (68\").    Weight as of 4/17/25: 93 kg (205 lb).  There is no height or weight on file to calculate BMI.  Wt Readings from Last 3 Encounters:   04/17/25 93 kg (205 lb)   04/09/25 93 kg (205 lb)   03/26/25 93 kg (205 lb)       ---------------------------------------------------------------------------------------------------------------------   Physical Exam  Wound Assessment:  Location: Perirectal   Wound Measurements: 3.4 X 1.5 X 0.2cm   Tunneling: No Undermining: No  Dressing Appearance: dressingappearance: clean  Closure: NA  Changes since last exam: " no changes  Etiology and classification: MASD  Wound bed structures/characteristics: partial thickness (subcutaneous tissue is not exposed), red  Edges moist  Periwound characteristics: excoriated  Periwound Temperature: normal turgor and temperature  Drainage characteristics: moderate, serous   Perfusion characteristics: NA    Wound Assessment:  Location: Left, gluteal  Wound Measurements: 7 X 6 X 1.3cm   Tunneling: No Undermining: No  Dressing Appearance: dressingappearance: clean  Closure: NA  Changes since last exam: no changes  Etiology and classification:  Unstageable Pressure injury   Wound bed structures/characteristics: full-thickness (subcutaneous tissue is exposed in at least a portion of the wound), black eschar, moist, pink  Edges moist  Periwound characteristics: intact  Periwound Temperature: normal turgor and temperature  Drainage characteristics: moderate, serous   Perfusion characteristics: NA    Wound Assessment:  Location: Right, gluteal  Wound Measurements: 9 X 4 X 0.2cm   Tunneling: No Undermining: No  Dressing Appearance: dressingappearance: clean  Closure: NA  Changes since last exam: ulcer is worsening   Etiology and classification: stage 3 pressure ulcer  Wound bed structures/characteristics: full-thickness (subcutaneous tissue is exposed in at least a portion of the wound), moist, pink, yellow  Edges moist  Periwound characteristics:  Purple, non-blanchable  Periwound Temperature: normal turgor and temperature  Drainage characteristics: moderate, serous   Perfusion characteristics: NA    Wound Goal (s):Free of infection and No further symptoms  Assessment & Plan      Patient Active Problem List    Diagnosis     Pressure injury of right buttock, stage 3 [L89.313]  -Clean with wound cleanser, apply magic barrier to area and cover with suberabsorbent and secure with tape  -Offloading pressure induction measures discussed  -Ordered high protein diet 120g/day along with vitamin C 2000mg/day,  vitamin A 5000 Units/day, vitamin D3 5000 Units/day, zinc 50mg/day to help promote wound healing   -Will order low-air loss mattress as wounds are continuing to worsen and will benefit from added support       Pressure injury of left buttock unstageable [L89.320]  -clean with wound cleanser, apply santyl to area and cover with silicone border dressing daily and PRN'  -Offloading measures discussed   -Will order low-air loss mattress   -Ordered high protein diet 120g/day along with vitamin C 2000mg/day, vitamin A 5000 Units/day, vitamin D3 5000 Units/day, zinc 50mg/day to help promote wound healing   -This condition is associated with a high risk for non-healing, infection, sepsis, loss of function, reduced quality of life, and increased risk of premature mortality. The patient is at high-risk for additional pressure injury, requiring a high level of vigilance and coordination of care, and frequent re-assessment to prevent adverse outcomes.     Management of this condition is inherently complex, requiring ongoing optimization of many factors to assure the highest likelihood of a favorable outcome, including pressure relief, bioburden, multiple aspects of nutrition, infection management, and moisture and mechanical factors relevant to wound healing.        Dermatitis associated with moisture [L30.8], Perirectal skin irritation [K62.89]  -Clean with wound cleanser, apply magic barrier to area and leave open to air  -Offloading measures discussed   -keep area clean and dry   -High risk for worsening due to incontinent of bowel and bladder  -Chin catheter is in place and appears to be functioning        Obesity [E66.9]  -An obese person?is at greater risk for wound infection and dehiscence or evisceration.       Diabetes mellitus [E11.9]  --Recommend adequate glycemic control to help promote wound healing  -Poor glycemic control increases risk of prolonged healing, infection, loss of limb and premature death        Quadriplegia  -Complicates all aspects of care  -Increases risk of wound failure due to decreased mobility        Clinical Impression:Moderate Complexity    Follow-up: 1 week    TANJA Mejias,CWS  [facility]  04/24/2025  [id number]

## 2025-05-01 ENCOUNTER — HOSPITAL ENCOUNTER (OUTPATIENT)
Dept: WOUND CARE | Facility: HOSPITAL | Age: 65
Discharge: HOME | End: 2025-05-01
Admitting: NURSE PRACTITIONER
Payer: MEDICARE

## 2025-05-01 DIAGNOSIS — T14.8XXA OPEN WOUND: Primary | ICD-10-CM

## 2025-05-01 PROCEDURE — A9270 NON-COVERED ITEM OR SERVICE: HCPCS | Performed by: NURSE PRACTITIONER

## 2025-05-01 PROCEDURE — 87070 CULTURE OTHR SPECIMN AEROBIC: CPT | Performed by: NURSE PRACTITIONER

## 2025-05-01 PROCEDURE — 97602 WOUND(S) CARE NON-SELECTIVE: CPT

## 2025-05-01 PROCEDURE — 63710000001 A&D OINTMENT 425 G JAR: Performed by: NURSE PRACTITIONER

## 2025-05-01 PROCEDURE — 87181 SC STD AGAR DILUTION PER AGT: CPT | Performed by: NURSE PRACTITIONER

## 2025-05-01 PROCEDURE — 87186 SC STD MICRODIL/AGAR DIL: CPT | Performed by: NURSE PRACTITIONER

## 2025-05-01 PROCEDURE — 63710000001 ZINC OXIDE 20 % OINTMENT 454 G JAR: Performed by: NURSE PRACTITIONER

## 2025-05-01 PROCEDURE — 87077 CULTURE AEROBIC IDENTIFY: CPT | Performed by: NURSE PRACTITIONER

## 2025-05-01 PROCEDURE — 63710000001 COLLAGENASE 250 UNIT/GM OINTMENT 30 G TUBE: Performed by: NURSE PRACTITIONER

## 2025-05-01 PROCEDURE — 63710000001 SODIUM HYPOCHLORITE 0.25 % SOLUTION 473 ML BOTTLE: Performed by: NURSE PRACTITIONER

## 2025-05-01 PROCEDURE — 87205 SMEAR GRAM STAIN: CPT | Performed by: NURSE PRACTITIONER

## 2025-05-01 PROCEDURE — 63710000001 CLOTRIMAZOLE 1 % CREAM 30 G TUBE: Performed by: NURSE PRACTITIONER

## 2025-05-01 PROCEDURE — 63710000001 ALUMINUM-MAGNESIUM HYDROXIDE-SIMETHICONE 200-200-20 MG/5ML SUSPENSION: Performed by: NURSE PRACTITIONER

## 2025-05-01 RX ORDER — LIDOCAINE HYDROCHLORIDE 20 MG/ML
10 INJECTION, SOLUTION INFILTRATION; PERINEURAL ONCE
OUTPATIENT
Start: 2025-05-01 | End: 2025-05-01

## 2025-05-01 RX ORDER — DIAPER,BRIEF,INFANT-TODD,DISP
1 EACH MISCELLANEOUS ONCE
OUTPATIENT
Start: 2025-05-01 | End: 2025-05-01

## 2025-05-01 RX ORDER — SODIUM HYPOCHLORITE 1.25 MG/ML
1 SOLUTION TOPICAL AS NEEDED
OUTPATIENT
Start: 2025-05-01

## 2025-05-01 RX ORDER — SODIUM HYPOCHLORITE 2.5 MG/ML
1 SOLUTION TOPICAL AS NEEDED
Status: DISCONTINUED | OUTPATIENT
Start: 2025-05-01 | End: 2025-05-02 | Stop reason: HOSPADM

## 2025-05-01 RX ORDER — MUPIROCIN 20 MG/G
1 OINTMENT TOPICAL AS NEEDED
OUTPATIENT
Start: 2025-05-01

## 2025-05-01 RX ORDER — SILVER SULFADIAZINE 10 MG/G
1 CREAM TOPICAL AS NEEDED
OUTPATIENT
Start: 2025-05-01

## 2025-05-01 RX ORDER — LIDOCAINE HYDROCHLORIDE 20 MG/ML
1 JELLY TOPICAL ONCE
Status: COMPLETED | OUTPATIENT
Start: 2025-05-01 | End: 2025-05-01

## 2025-05-01 RX ORDER — CASTOR OIL AND BALSAM, PERU 788; 87 MG/G; MG/G
1 OINTMENT TOPICAL AS NEEDED
OUTPATIENT
Start: 2025-05-01

## 2025-05-01 RX ORDER — LIDOCAINE HYDROCHLORIDE 20 MG/ML
JELLY TOPICAL AS NEEDED
OUTPATIENT
Start: 2025-05-01

## 2025-05-01 RX ORDER — LIDOCAINE HYDROCHLORIDE AND EPINEPHRINE BITARTRATE 20; .01 MG/ML; MG/ML
10 INJECTION, SOLUTION SUBCUTANEOUS ONCE
OUTPATIENT
Start: 2025-05-01 | End: 2025-05-01

## 2025-05-01 RX ORDER — LIDOCAINE HYDROCHLORIDE 20 MG/ML
1 JELLY TOPICAL ONCE
OUTPATIENT
Start: 2025-05-01 | End: 2025-05-01

## 2025-05-01 RX ORDER — NYSTATIN 100000 [USP'U]/G
1 POWDER TOPICAL ONCE
OUTPATIENT
Start: 2025-05-01 | End: 2025-05-01

## 2025-05-01 RX ORDER — SODIUM HYPOCHLORITE 2.5 MG/ML
1 SOLUTION TOPICAL AS NEEDED
OUTPATIENT
Start: 2025-05-01

## 2025-05-01 RX ADMIN — LIDOCAINE HYDROCHLORIDE 1 ML: 20 JELLY TOPICAL at 14:32

## 2025-05-01 RX ADMIN — HYOSCYAMINE SULFATE 473 ML: 16 SOLUTION at 15:05

## 2025-05-01 RX ADMIN — VITAMINS A AND D OINTMENT 1 APPLICATION: 15.5; 53.4 OINTMENT TOPICAL at 15:05

## 2025-05-01 RX ADMIN — COLLAGENASE SANTYL 1 APPLICATION: 250 OINTMENT TOPICAL at 15:05

## 2025-05-01 NOTE — LETTER
05/02/2025  Ang Jackson, 1960           Wound Care Instructions:     Left Lower Gluteal- Clean with wound cleanser or normal saline, pat dry. Apply santyl to 4x4 gauze then lay on wound and cover with ABD pad and secure with tape.     Right Lower Gluteal- Clean with wound cleanser or normal saline, pat dry. Apply magic barrier cream or zgaurd to wound and periwound. Cover with dry sheet and secure with tape.      Perirectal- Clean with wound cleanser or normal saline, pat dry. Apply santyl to 4x4 gauze then on lay wound and cover with ABD pad and secure with tape.      This needs to be done daily. If you have any questions please call our clinic. Patient will need supplies to last him through the weeks. He is seen in clinic once a week.         Thank you,     TANJA Serrano

## 2025-05-05 ENCOUNTER — TELEPHONE (OUTPATIENT)
Dept: WOUND CARE | Facility: HOSPITAL | Age: 65
End: 2025-05-05
Payer: MEDICARE

## 2025-05-05 DIAGNOSIS — L89.320 PRESSURE INJURY OF LEFT BUTTOCK, UNSTAGEABLE: Primary | ICD-10-CM

## 2025-05-05 DIAGNOSIS — L89.313 PRESSURE INJURY OF RIGHT BUTTOCK, STAGE 3: ICD-10-CM

## 2025-05-05 LAB
BACTERIA SPEC AEROBE CULT: ABNORMAL
BACTERIA SPEC AEROBE CULT: ABNORMAL
GRAM STN SPEC: ABNORMAL

## 2025-05-07 ENCOUNTER — OFFICE VISIT (OUTPATIENT)
Dept: UROLOGY | Facility: CLINIC | Age: 65
End: 2025-05-07
Payer: MEDICARE

## 2025-05-07 ENCOUNTER — HOSPITAL ENCOUNTER (OUTPATIENT)
Dept: WOUND CARE | Facility: HOSPITAL | Age: 65
Discharge: HOME | End: 2025-05-07
Payer: MEDICARE

## 2025-05-07 ENCOUNTER — HOSPITAL ENCOUNTER (OUTPATIENT)
Dept: INFUSION THERAPY | Facility: HOSPITAL | Age: 65
Discharge: HOME | End: 2025-05-07
Payer: MEDICARE

## 2025-05-07 VITALS
WEIGHT: 205 LBS | TEMPERATURE: 98.3 F | DIASTOLIC BLOOD PRESSURE: 55 MMHG | HEIGHT: 68 IN | BODY MASS INDEX: 31.07 KG/M2 | OXYGEN SATURATION: 95 % | SYSTOLIC BLOOD PRESSURE: 92 MMHG | HEART RATE: 65 BPM

## 2025-05-07 VITALS
HEART RATE: 80 BPM | HEIGHT: 68 IN | BODY MASS INDEX: 31.18 KG/M2 | DIASTOLIC BLOOD PRESSURE: 62 MMHG | SYSTOLIC BLOOD PRESSURE: 110 MMHG

## 2025-05-07 DIAGNOSIS — T14.8XXA OPEN WOUND: Primary | ICD-10-CM

## 2025-05-07 DIAGNOSIS — N39.42 URINARY INCONTINENCE WITHOUT SENSORY AWARENESS: ICD-10-CM

## 2025-05-07 DIAGNOSIS — N32.89 BLADDER SPASMS: ICD-10-CM

## 2025-05-07 DIAGNOSIS — N31.9 NEUROGENIC BLADDER: Primary | ICD-10-CM

## 2025-05-07 PROCEDURE — 25010000003 DEXTROSE 5 % SOLUTION: Performed by: NURSE PRACTITIONER

## 2025-05-07 PROCEDURE — A9270 NON-COVERED ITEM OR SERVICE: HCPCS | Performed by: NURSE PRACTITIONER

## 2025-05-07 PROCEDURE — 63710000001 CLOTRIMAZOLE 1 % CREAM 30 G TUBE: Performed by: NURSE PRACTITIONER

## 2025-05-07 PROCEDURE — 96365 THER/PROPH/DIAG IV INF INIT: CPT

## 2025-05-07 PROCEDURE — 63710000001 A&D OINTMENT 425 G JAR: Performed by: NURSE PRACTITIONER

## 2025-05-07 PROCEDURE — 63710000001 COLLAGENASE 250 UNIT/GM OINTMENT 30 G TUBE: Performed by: NURSE PRACTITIONER

## 2025-05-07 PROCEDURE — 63710000001 SODIUM HYPOCHLORITE 0.25 % SOLUTION 473 ML BOTTLE: Performed by: NURSE PRACTITIONER

## 2025-05-07 PROCEDURE — 25010000002 DALBAVANCIN 500 MG RECONSTITUTED SOLUTION: Performed by: NURSE PRACTITIONER

## 2025-05-07 PROCEDURE — 63710000001 ZINC OXIDE 20 % OINTMENT 454 G JAR: Performed by: NURSE PRACTITIONER

## 2025-05-07 PROCEDURE — 63710000001 ALUMINUM-MAGNESIUM HYDROXIDE-SIMETHICONE 200-200-20 MG/5ML SUSPENSION: Performed by: NURSE PRACTITIONER

## 2025-05-07 RX ORDER — MUPIROCIN 20 MG/G
1 OINTMENT TOPICAL AS NEEDED
OUTPATIENT
Start: 2025-05-07

## 2025-05-07 RX ORDER — CASTOR OIL AND BALSAM, PERU 788; 87 MG/G; MG/G
1 OINTMENT TOPICAL AS NEEDED
OUTPATIENT
Start: 2025-05-07

## 2025-05-07 RX ORDER — MIRABEGRON 25 MG/1
25 TABLET, FILM COATED, EXTENDED RELEASE ORAL NIGHTLY
Qty: 90 TABLET | Refills: 3 | Status: SHIPPED | OUTPATIENT
Start: 2025-05-07

## 2025-05-07 RX ORDER — SODIUM HYPOCHLORITE 2.5 MG/ML
1 SOLUTION TOPICAL AS NEEDED
Status: DISCONTINUED | OUTPATIENT
Start: 2025-05-07 | End: 2025-05-08 | Stop reason: HOSPADM

## 2025-05-07 RX ORDER — NYSTATIN 100000 [USP'U]/G
1 POWDER TOPICAL ONCE
OUTPATIENT
Start: 2025-05-07 | End: 2025-05-07

## 2025-05-07 RX ORDER — LIDOCAINE HYDROCHLORIDE 20 MG/ML
10 INJECTION, SOLUTION INFILTRATION; PERINEURAL ONCE
OUTPATIENT
Start: 2025-05-07 | End: 2025-05-07

## 2025-05-07 RX ORDER — LIDOCAINE HYDROCHLORIDE 20 MG/ML
1 JELLY TOPICAL ONCE
Status: CANCELLED | OUTPATIENT
Start: 2025-05-07 | End: 2025-05-07

## 2025-05-07 RX ORDER — BUSPIRONE HYDROCHLORIDE 5 MG/1
1 TABLET ORAL 3 TIMES DAILY
COMMUNITY
Start: 2024-11-15

## 2025-05-07 RX ORDER — SODIUM HYPOCHLORITE 2.5 MG/ML
1 SOLUTION TOPICAL AS NEEDED
Status: CANCELLED | OUTPATIENT
Start: 2025-05-07

## 2025-05-07 RX ORDER — SODIUM HYPOCHLORITE 1.25 MG/ML
1 SOLUTION TOPICAL AS NEEDED
OUTPATIENT
Start: 2025-05-07

## 2025-05-07 RX ORDER — BREXPIPRAZOLE 1 MG/1
TABLET ORAL
COMMUNITY
Start: 2025-02-23

## 2025-05-07 RX ORDER — DEXTROSE MONOHYDRATE 50 MG/ML
50 INJECTION, SOLUTION INTRAVENOUS ONCE
Status: COMPLETED | OUTPATIENT
Start: 2025-05-07 | End: 2025-05-07

## 2025-05-07 RX ORDER — DEXTROSE MONOHYDRATE 50 MG/ML
50 INJECTION, SOLUTION INTRAVENOUS ONCE
Status: CANCELLED
Start: 2025-05-07 | End: 2025-05-07

## 2025-05-07 RX ORDER — LIDOCAINE HYDROCHLORIDE 20 MG/ML
1 JELLY TOPICAL ONCE
Status: COMPLETED | OUTPATIENT
Start: 2025-05-07 | End: 2025-05-07

## 2025-05-07 RX ORDER — LIDOCAINE HYDROCHLORIDE 20 MG/ML
JELLY TOPICAL AS NEEDED
OUTPATIENT
Start: 2025-05-07

## 2025-05-07 RX ORDER — BACLOFEN 10 MG/1
TABLET ORAL
COMMUNITY

## 2025-05-07 RX ORDER — DIAPER,BRIEF,INFANT-TODD,DISP
1 EACH MISCELLANEOUS ONCE
OUTPATIENT
Start: 2025-05-07 | End: 2025-05-07

## 2025-05-07 RX ORDER — LIDOCAINE HYDROCHLORIDE AND EPINEPHRINE BITARTRATE 20; .01 MG/ML; MG/ML
10 INJECTION, SOLUTION SUBCUTANEOUS ONCE
OUTPATIENT
Start: 2025-05-07 | End: 2025-05-07

## 2025-05-07 RX ORDER — SILVER SULFADIAZINE 10 MG/G
1 CREAM TOPICAL AS NEEDED
OUTPATIENT
Start: 2025-05-07

## 2025-05-07 RX ADMIN — DALBAVANCIN 1500 MG: 500 INJECTION, POWDER, FOR SOLUTION INTRAVENOUS at 15:40

## 2025-05-07 RX ADMIN — COLLAGENASE SANTYL 1 APPLICATION: 250 OINTMENT TOPICAL at 14:44

## 2025-05-07 RX ADMIN — HYOSCYAMINE SULFATE 473 ML: 16 SOLUTION at 14:44

## 2025-05-07 RX ADMIN — LIDOCAINE HYDROCHLORIDE 1 ML: 20 JELLY TOPICAL at 14:45

## 2025-05-07 RX ADMIN — DEXTROSE MONOHYDRATE 50 ML: 50 INJECTION, SOLUTION INTRAVENOUS at 15:39

## 2025-05-07 RX ADMIN — VITAMINS A AND D OINTMENT 1 APPLICATION: 15.5; 53.4 OINTMENT TOPICAL at 14:44

## 2025-05-07 NOTE — LETTER
05/08/2025  Ang Jackson, 1960           Wound Care Instructions:     Left Lower Gluteal- Clean with wound cleanser or normal saline, pat dry. Apply santyl to 4x4 gauze then lay on wound and cover with ABD pad and secure with tape.     Right Lower Gluteal- Clean with wound cleanser or normal saline, pat dry. Apply magic barrier cream or zgaurd to wound and periwound. Cover with dry sheet and secure with tape.      Perirectal- Clean with wound cleanser or normal saline, pat dry. Apply santyl to 4x4 gauze then on lay wound and cover with ABD pad and secure with tape.      This needs to be done daily. If you have any questions please call our clinic. Patient will need supplies to last him through the weeks. He is seen in clinic once a week.         Thank you,     TANJA Serrano

## 2025-05-07 NOTE — PROGRESS NOTES
"Chief Complaint:    Chief Complaint   Patient presents with    neurogenic bladder       Vital Signs:   /62   Pulse 80   Ht 172.7 cm (67.99\")   BMI 31.18 kg/m²   Body mass index is 31.18 kg/m².      HPI:  Ang Jackson is a 64 y.o. male who presents today for follow up    History of Present Illness  Mr. Jackson presents to the clinic today for evaluation of neurogenic bladder.  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  He does use an electric wheelchair for ambulation.  He has very minimal use of his upper extremities.  He has been referred to us by Dr. Karen Recinos MD.  He has had a chronic indwelling Chin catheter since hospitalization in August 2024.  His Chin catheter was exchanged once monthly at home health.  He does endorse some bladder spasms and leakage around his indwelling Chin catheter.  He denies any gross hematuria or issues with Chin catheter exchange.  He did have a PSA in January that was 0.785.  He had no other urological complaints.      Past Medical History:  Past Medical History:   Diagnosis Date    Anesthesia     diffculty adminster spinal block, patient stated with last hip surgery 7-2020 several attempts per awilda and no luck, resulted in general anesthesia     Anxiety     Arthritis     Rheumatoid    COVID     2020    GERD (gastroesophageal reflux disease)     Hypertension     Lactose intolerance     Low back pain     Sleep apnea     does not wear machine, MILD, DOES NOT HAVE AFTER WGT LOSS SURGERY    Wears reading eyeglasses        Current Meds:  Current Outpatient Medications   Medication Sig Dispense Refill    ascorbic acid (VITAMIN C) 1000 MG tablet Take 1 tablet by mouth Daily.      baclofen (LIORESAL) 10 MG tablet Take 2 tablets 3 times daily, then take 30-50mg as needed at bedtime      Brexpiprazole (Rexulti) 1 MG tablet Take 1 tablet every day by " oral route for 14 days.      busPIRone (BUSPAR) 5 MG tablet Take 1 tablet by mouth 3 (Three) Times a Day.      cyanocobalamin 1000 MCG/ML injection Inject  into the appropriate muscle as directed by prescriber Every 28 (Twenty-Eight) Days.      gabapentin (NEURONTIN) 800 MG tablet Take 1 tablet by mouth 3 (Three) Times a Day.      HYDROcodone-acetaminophen (NORCO)  MG per tablet Take 1 tablet by mouth Every 6 (Six) Hours As Needed for Moderate Pain.      hydrOXYzine pamoate (VISTARIL) 50 MG capsule Take 1 capsule by mouth 3 (Three) Times a Day As Needed for Anxiety.      lidocaine (XYLOCAINE) 5 % ointment Apply 1 Application topically to the appropriate area as directed Daily. 50 g 3    lisinopril (PRINIVIL,ZESTRIL) 5 MG tablet Take 1 tablet by mouth Daily.      loratadine (CLARITIN) 10 MG tablet Take 1 tablet by mouth Daily.      methylPREDNISolone (MEDROL) 4 MG dose pack Take as directed on package instructions. 21 tablet 0    Multiple Vitamins-Minerals (MENS MULTIPLUS PO) Take 1 tablet by mouth Daily.      pantoprazole (PROTONIX) 40 MG EC tablet Take 1 tablet by mouth Daily. 90 tablet 3    Mirabegron ER (MYRBETRIQ) 25 MG tablet sustained-release 24 hour 24 hr tablet Take 1 tablet by mouth Every Night. 90 tablet 3     No current facility-administered medications for this visit.     Facility-Administered Medications Ordered in Other Visits   Medication Dose Route Frequency Provider Last Rate Last Admin    collagenase ointment 1 Application  1 Application Topical PRJillian Swann APRN   1 Application at 05/07/25 1444    magic barrier cream 1 Application  1 Application Topical PRN Jillian Bailey APRN   1 Application at 05/07/25 1444    sodium hypochlorite (DAKIN'S) topical solution 0.25% (half strength) 473 mL  1 bottle Irrigation PRJillian Swann APRN   473 mL at 05/07/25 1444        Allergies:   Allergies   Allergen Reactions    Sulfa Antibiotics Hives     Hives, shortness of  "breath    Codeine Nausea And Vomiting    Ketorolac Other (See Comments)     Pt reports heavy, \"tight\" feeling in his chest.    Morphine And Codeine Nausea And Vomiting        Past Surgical History:  Past Surgical History:   Procedure Laterality Date    ANTERIOR CERVICAL DISCECTOMY W/ FUSION Right 02/19/2023    Procedure: CERVICAL DISCECTOMY ANTERIOR WITH FUSION C6-7;  Surgeon: Jaiden Aldridge MD;  Location:  [facility];  Service: Neurosurgery;  Laterality: Right;    BARIATRIC SURGERY  4 year ago    CARPAL TUNNEL RELEASE Bilateral     CARPAL TUNNEL RELEASE Right 06/29/2023    Procedure: CARPAL TUNNEL RELEASE RIGHT;  Surgeon: Jaiden Aldridge MD;  Location:  [facility];  Service: Neurosurgery;  Laterality: Right;    COLONOSCOPY      5 years ago    COLONOSCOPY N/A 04/05/2022    Procedure: COLONOSCOPY FOR SCREENING;  Surgeon: Luz Galvez MD;  Location:  [facility];  Service: Gastroenterology;  Laterality: N/A;    ENDOSCOPY N/A 04/05/2022    Procedure: ESOPHAGOGASTRODUODENOSCOPY WITH BIOPSY;  Surgeon: Luz Galvez MD;  Location:  [facility];  Service: Gastroenterology;  Laterality: N/A;    GASTRIC SLEEVE LAPAROSCOPIC      2017    HIP SURGERY Right times 2    KNEE ARTHROSCOPY Left     TOTAL HIP ARTHROPLASTY Left 01/25/2021    Procedure: TOTAL HIP ARTHROPLASTY LEFT;  Surgeon: Jb Patel MD;  Location:  [facility];  Service: Orthopedics;  Laterality: Left;    TOTAL HIP ARTHROPLASTY REVISION Right 07/20/2020    Procedure: TOTAL HIP ARTHROPLASTY REVISION RIGHT;  Surgeon: Jb Patel MD;  Location:  [facility];  Service: Orthopedics;  Laterality: Right;       Social History:  Social History     Socioeconomic History    Marital status:    Tobacco Use    Smoking status: Never    Smokeless tobacco: Never   Vaping Use    Vaping status: Never Used   Substance and Sexual Activity    Alcohol use: No    Drug use: Never    Sexual activity: Yes       Family History:  Family History   Problem " Relation Age of Onset    Heart disease Father     Hypertension Father     Osteoarthritis Father     Cancer Mother     Diabetes Brother     Hypertension Brother     Gout Paternal Grandmother     Diabetes Paternal Grandmother     Colon cancer Maternal Uncle     Colon polyps Maternal Uncle        Review of Systems:  Review of Systems   Constitutional:  Negative for fatigue, fever and unexpected weight change.   Respiratory:  Negative for chest tightness and shortness of breath.    Cardiovascular:  Negative for chest pain.   Gastrointestinal:  Negative for abdominal pain, constipation, diarrhea, nausea and vomiting.   Genitourinary:  Positive for difficulty urinating. Negative for dysuria, frequency and urgency.   Skin:  Negative for rash.   Neurological:  Positive for weakness and numbness.   Psychiatric/Behavioral:  Negative for confusion and suicidal ideas.        Physical Exam:  Physical Exam  Constitutional:       General: He is not in acute distress.     Appearance: Normal appearance.   HENT:      Head: Normocephalic and atraumatic.      Nose: Nose normal.      Mouth/Throat:      Mouth: Mucous membranes are moist.   Eyes:      Conjunctiva/sclera: Conjunctivae normal.   Cardiovascular:      Rate and Rhythm: Normal rate.      Pulses: Normal pulses.   Pulmonary:      Effort: Pulmonary effort is normal.   Abdominal:      Palpations: Abdomen is soft.   Genitourinary:     Comments: Chronic indwelling Chin catheter in place with yellow urine noted in collection tubing and bag.  Musculoskeletal:      Cervical back: Normal range of motion.      Comments: Uses a motorized wheelchair for ambulation   Skin:     General: Skin is warm.   Neurological:      Mental Status: He is alert.      Cranial Nerves: Cranial nerve deficit present.      Sensory: Sensory deficit present.      Motor: Weakness present.      Coordination: Coordination abnormal.      Gait: Gait abnormal.   Psychiatric:         Mood and Affect: Mood normal.          Behavior: Behavior normal.         Thought Content: Thought content normal.         Judgment: Judgment normal.           Recent Image (CT and/or KUB):   CT Abdomen and Pelvis: No results found for this or any previous visit.     CT Stone Protocol: No results found for this or any previous visit.     KUB: No results found for this or any previous visit.       Labs:  Brief Urine Lab Results  (Last result in the past 365 days)        Color   Clarity   Blood   Leuk Est   Nitrite   Protein   CREAT   Urine HCG        03/26/25 1658 Dark Yellow   Cloudy   Large (3+)   Large (3+)   Negative   100 mg/dL (2+)                 Hospital Outpatient Visit on 05/01/2025   Component Date Value Ref Range Status    Wound Culture 05/01/2025 Moderate growth (3+) Escherichia coli ESBL (A)   Final      Consider infectious disease consult.  Susceptibility results may not correlate to clinical outcomes.    Wound Culture 05/01/2025 Moderate growth (3+) Enterococcus faecalis (A)   Final    Gram Stain 05/01/2025 Few (2+) WBCs seen   Final    Gram Stain 05/01/2025 Many (4+) Gram positive cocci in pairs and clusters   Final    Gram Stain 05/01/2025 Moderate (3+) Gram negative bacilli   Final    Gram Stain 05/01/2025 Few (2+) Gram positive bacilli   Final    Gram Stain 05/01/2025 Few (2+) Gram negative cocci   Final   Admission on 03/26/2025, Discharged on 03/26/2025   Component Date Value Ref Range Status    Glucose 03/26/2025 80  65 - 99 mg/dL Final    BUN 03/26/2025 20  8 - 23 mg/dL Final    Creatinine 03/26/2025 0.41 (L)  0.76 - 1.27 mg/dL Final    Sodium 03/26/2025 146 (H)  136 - 145 mmol/L Final    Potassium 03/26/2025 3.9  3.5 - 5.2 mmol/L Final    Chloride 03/26/2025 108 (H)  98 - 107 mmol/L Final    CO2 03/26/2025 33.0 (H)  22.0 - 29.0 mmol/L Final    Calcium 03/26/2025 8.8  8.6 - 10.5 mg/dL Final    Total Protein 03/26/2025 5.6 (L)  6.0 - 8.5 g/dL Final    Albumin 03/26/2025 2.9 (L)  3.5 - 5.2 g/dL Final    ALT (SGPT) 03/26/2025 10  1  - 41 U/L Final    AST (SGOT) 03/26/2025 13  1 - 40 U/L Final    Alkaline Phosphatase 03/26/2025 83  39 - 117 U/L Final    Total Bilirubin 03/26/2025 0.3  0.0 - 1.2 mg/dL Final    Globulin 03/26/2025 2.7  gm/dL Final    A/G Ratio 03/26/2025 1.1  g/dL Final    BUN/Creatinine Ratio 03/26/2025 48.8 (H)  7.0 - 25.0 Final    Anion Gap 03/26/2025 5.0  5.0 - 15.0 mmol/L Final    eGFR 03/26/2025 121.7  >60.0 mL/min/1.73 Final    PTT 03/26/2025 37.1 (H)  24.5 - 35.9 seconds Final    Protime 03/26/2025 14.6  11.6 - 15.1 Seconds Final    INR 03/26/2025 1.12 (H)  0.90 - 1.10 Final    Color, UA 03/26/2025 Dark Yellow (A)  Yellow, Straw Final    Appearance, UA 03/26/2025 Cloudy (A)  Clear Final    pH, UA 03/26/2025 8.0  5.0 - 8.0 Final    Specific Gravity, UA 03/26/2025 1.022  1.005 - 1.030 Final    Glucose, UA 03/26/2025 Negative  Negative Final    Ketones, UA 03/26/2025 Trace (A)  Negative Final    Bilirubin, UA 03/26/2025 Negative  Negative Final    Blood, UA 03/26/2025 Large (3+) (A)  Negative Final    Protein, UA 03/26/2025 100 mg/dL (2+) (A)  Negative Final    Leuk Esterase, UA 03/26/2025 Large (3+) (A)  Negative Final    Nitrite, UA 03/26/2025 Negative  Negative Final    Urobilinogen, UA 03/26/2025 2.0 E.U./dL (A)  0.2 - 1.0 E.U./dL Final    C-Reactive Protein 03/26/2025 0.56 (H)  0.00 - 0.50 mg/dL Final    Magnesium 03/26/2025 1.7  1.6 - 2.4 mg/dL Final    WBC 03/26/2025 5.33  3.40 - 10.80 10*3/mm3 Final    RBC 03/26/2025 4.03 (L)  4.14 - 5.80 10*6/mm3 Final    Hemoglobin 03/26/2025 11.4 (L)  13.0 - 17.7 g/dL Final    Hematocrit 03/26/2025 36.9 (L)  37.5 - 51.0 % Final    MCV 03/26/2025 91.6  79.0 - 97.0 fL Final    MCH 03/26/2025 28.3  26.6 - 33.0 pg Final    MCHC 03/26/2025 30.9 (L)  31.5 - 35.7 g/dL Final    RDW 03/26/2025 16.4 (H)  12.3 - 15.4 % Final    RDW-SD 03/26/2025 55.7 (H)  37.0 - 54.0 fl Final    MPV 03/26/2025 9.2  6.0 - 12.0 fL Final    Platelets 03/26/2025 168  140 - 450 10*3/mm3 Final    Neutrophil %  03/26/2025 59.3  42.7 - 76.0 % Final    Lymphocyte % 03/26/2025 28.1  19.6 - 45.3 % Final    Monocyte % 03/26/2025 6.4  5.0 - 12.0 % Final    Eosinophil % 03/26/2025 5.8  0.3 - 6.2 % Final    Basophil % 03/26/2025 0.2  0.0 - 1.5 % Final    Immature Grans % 03/26/2025 0.2  0.0 - 0.5 % Final    Neutrophils, Absolute 03/26/2025 3.16  1.70 - 7.00 10*3/mm3 Final    Lymphocytes, Absolute 03/26/2025 1.50  0.70 - 3.10 10*3/mm3 Final    Monocytes, Absolute 03/26/2025 0.34  0.10 - 0.90 10*3/mm3 Final    Eosinophils, Absolute 03/26/2025 0.31  0.00 - 0.40 10*3/mm3 Final    Basophils, Absolute 03/26/2025 0.01  0.00 - 0.20 10*3/mm3 Final    Immature Grans, Absolute 03/26/2025 0.01  0.00 - 0.05 10*3/mm3 Final    nRBC 03/26/2025 0.0  0.0 - 0.2 /100 WBC Final    Extra Tube 03/26/2025 Hold for add-ons.   Final    Auto resulted.    Extra Tube 03/26/2025 hold for add-on   Final    Auto resulted    Extra Tube 03/26/2025 Hold for add-ons.   Final    Auto resulted.    Extra Tube 03/26/2025 Hold for add-ons.   Final    Auto resulted    Procalcitonin 03/26/2025 0.08  0.00 - 0.25 ng/mL Final    Lactate 03/26/2025 1.0  0.5 - 2.0 mmol/L Final    Blood Culture 03/26/2025 No growth at 5 days   Final    RBC, UA 03/26/2025 Too Numerous to Count (A)  None Seen, 0-2 /HPF Final    WBC, UA 03/26/2025 Too Numerous to Count (A)  None Seen, 0-2 /HPF Final    Bacteria, UA 03/26/2025 None Seen  None Seen /HPF Final    Squamous Epithelial Cells, UA 03/26/2025 None Seen  None Seen, 0-2 /HPF Final    Hyaline Casts, UA 03/26/2025 0-2  None Seen /LPF Final    Methodology 03/26/2025 Automated Microscopy   Final    Urine Culture 03/26/2025 No growth   Final   Lab Requisition on 03/26/2025   Component Date Value Ref Range Status    Glucose 03/26/2025 79  65 - 99 mg/dL Final    BUN 03/26/2025 22  8 - 23 mg/dL Final    Creatinine 03/26/2025 0.40 (L)  0.76 - 1.27 mg/dL Final    Sodium 03/26/2025 145  136 - 145 mmol/L Final    Potassium 03/26/2025 4.1  3.5 - 5.2  mmol/L Final    Chloride 03/26/2025 107  98 - 107 mmol/L Final    CO2 03/26/2025 32.3 (H)  22.0 - 29.0 mmol/L Final    Calcium 03/26/2025 8.3 (L)  8.6 - 10.5 mg/dL Final    BUN/Creatinine Ratio 03/26/2025 55.0 (H)  7.0 - 25.0 Final    Anion Gap 03/26/2025 5.7  5.0 - 15.0 mmol/L Final    eGFR 03/26/2025 122.6  >60.0 mL/min/1.73 Final    C-Reactive Protein 03/26/2025 0.49  0.00 - 0.50 mg/dL Final    Color, UA 03/26/2025 Dark Yellow (A)  Yellow, Straw Final    Appearance, UA 03/26/2025 Cloudy (A)  Clear Final    pH, UA 03/26/2025 7.0  5.0 - 8.0 Final    Specific Gravity, UA 03/26/2025 1.025  1.005 - 1.030 Final    Glucose, UA 03/26/2025 Negative  Negative Final    Ketones, UA 03/26/2025 Trace (A)  Negative Final    Bilirubin, UA 03/26/2025 Small (1+) (A)  Negative Final    Blood, UA 03/26/2025 Large (3+) (A)  Negative Final    Protein, UA 03/26/2025 >=300 mg/dL (3+) (A)  Negative Final    Leuk Esterase, UA 03/26/2025 Large (3+) (A)  Negative Final    Nitrite, UA 03/26/2025 Negative  Negative Final    Urobilinogen, UA 03/26/2025 2.0 E.U./dL (A)  0.2 - 1.0 E.U./dL Final    WBC 03/26/2025 5.44  3.40 - 10.80 10*3/mm3 Final    RBC 03/26/2025 3.77 (L)  4.14 - 5.80 10*6/mm3 Final    Hemoglobin 03/26/2025 10.7 (L)  13.0 - 17.7 g/dL Final    Hematocrit 03/26/2025 34.6 (L)  37.5 - 51.0 % Final    MCV 03/26/2025 91.8  79.0 - 97.0 fL Final    MCH 03/26/2025 28.4  26.6 - 33.0 pg Final    MCHC 03/26/2025 30.9 (L)  31.5 - 35.7 g/dL Final    RDW 03/26/2025 16.4 (H)  12.3 - 15.4 % Final    RDW-SD 03/26/2025 55.7 (H)  37.0 - 54.0 fl Final    MPV 03/26/2025 9.5  6.0 - 12.0 fL Final    Platelets 03/26/2025 169  140 - 450 10*3/mm3 Final    Neutrophil % 03/26/2025 57.0  42.7 - 76.0 % Final    Lymphocyte % 03/26/2025 27.4  19.6 - 45.3 % Final    Monocyte % 03/26/2025 8.5  5.0 - 12.0 % Final    Eosinophil % 03/26/2025 6.3 (H)  0.3 - 6.2 % Final    Basophil % 03/26/2025 0.4  0.0 - 1.5 % Final    Immature Grans % 03/26/2025 0.4  0.0 - 0.5 %  Final    Neutrophils, Absolute 03/26/2025 3.11  1.70 - 7.00 10*3/mm3 Final    Lymphocytes, Absolute 03/26/2025 1.49  0.70 - 3.10 10*3/mm3 Final    Monocytes, Absolute 03/26/2025 0.46  0.10 - 0.90 10*3/mm3 Final    Eosinophils, Absolute 03/26/2025 0.34  0.00 - 0.40 10*3/mm3 Final    Basophils, Absolute 03/26/2025 0.02  0.00 - 0.20 10*3/mm3 Final    Immature Grans, Absolute 03/26/2025 0.02  0.00 - 0.05 10*3/mm3 Final    nRBC 03/26/2025 0.0  0.0 - 0.2 /100 WBC Final    RBC, UA 03/26/2025 21-50 (A)  None Seen, 0-2 /HPF Final    WBC, UA 03/26/2025 Too Numerous to Count (A)  None Seen, 0-2 /HPF Final    Bacteria, UA 03/26/2025 Trace (A)  None Seen /HPF Final    Squamous Epithelial Cells, UA 03/26/2025 3-6 (A)  None Seen, 0-2 /HPF Final    Hyaline Casts, UA 03/26/2025 None Seen  None Seen /LPF Final    Methodology 03/26/2025 Automated Microscopy   Final    Urine Culture 03/26/2025 Yeast isolated (A)   Final        Procedure: None  Procedures     I have reviewed and agree with the above PMH, PSH, FMH, social history, medications, allergies, and labs.     Assessment/Plan:   Problem List Items Addressed This Visit       Bladder spasms    Relevant Medications    Mirabegron ER (MYRBETRIQ) 25 MG tablet sustained-release 24 hour 24 hr tablet    Urinary incontinence without sensory awareness    Relevant Medications    Mirabegron ER (MYRBETRIQ) 25 MG tablet sustained-release 24 hour 24 hr tablet     Other Visit Diagnoses         Neurogenic bladder    -  Primary            Health Maintenance:   Health Maintenance Due   Topic Date Due    DIABETIC FOOT EXAM  Never done    DIABETIC EYE EXAM  Never done    URINE MICROALBUMIN-CREATININE RATIO (uACR)  Never done    Pneumococcal Vaccine 50+ (1 of 2 - PCV) Never done    ANNUAL WELLNESS VISIT  Never done    COLORECTAL CANCER SCREENING  04/05/2023    COVID-19 Vaccine (3 - 2024-25 season) 09/01/2024    HEMOGLOBIN A1C  07/15/2025        Smoking Counseling: Never smoked.  Never used smokeless  tobacco.  Counseling given.    Urine Incontinence: Patient reports that he is having leakage around indwelling Chin catheter.    Patient was given instructions and counseling regarding his condition or for health maintenance advice. Please see specific information pulled into the AVS if appropriate.    Patient Education:   Neurogenic bladder -patient's neurogenic bladder secondary to significant spinal trauma from previous abscess.  Would recommend to continue with chronic indwelling Chin catheter and may undergo repeat cath exchanges by home health once every 3 to 4 weeks.  Did advise to continue to irrigate as needed to help with appropriate sediment buildup.  Otherwise we will see the patient back in 1 year.  Bladder spasms/urinary incontinence -patient's urinary leakage around Chin catheter secondary to bladder spasms.  Did discuss the use of medication such as beta 3 agonists.  Will get him started on trial of Myrbetriq 25 mg once nightly.  Did discuss the risk of these medications in detail.  Will place the patient back in 1 year or sooner if needed.    Visit Diagnoses:    ICD-10-CM ICD-9-CM   1. Neurogenic bladder  N31.9 596.54   2. Bladder spasms  N32.89 596.89   3. Urinary incontinence without sensory awareness  N39.42 788.34     A total of 45 minutes were spent coordinating this patient’s care in clinic today; 30 minutes of which were face-to-face with the patient, reviewing medical history and counseling on the current treatment and followup plan.  All questions were answered to patient's satisfaction.    Meds Ordered During Visit:  New Medications Ordered This Visit   Medications    Mirabegron ER (MYRBETRIQ) 25 MG tablet sustained-release 24 hour 24 hr tablet     Sig: Take 1 tablet by mouth Every Night.     Dispense:  90 tablet     Refill:  3       Follow Up Appointment: 1 year  No follow-ups on file.      This document has been electronically signed by Jaquan Gan PA-C   May 7, 2025 16:36  EDT    Part of this note may be an electronic transcription/translation of spoken language to printed text using the Dragon Dictation System.   Excision Depth: adipose tissue

## 2025-05-08 ENCOUNTER — PATIENT ROUNDING (BHMG ONLY) (OUTPATIENT)
Dept: UROLOGY | Facility: CLINIC | Age: 65
End: 2025-05-08
Payer: MEDICARE

## 2025-05-13 NOTE — PROGRESS NOTES
Wound Clinic Note  Patient Identification:  Name:  Ang Jackson  Age:  64 y.o.  Sex:  male  :  1960  MRN:  [id number]   Visit Number:  [id number]  Primary Care Physician:  Deepak Perez     Subjective     Chief complaint:     Multiple pressure injuries     History of presenting illness:     Patient is a 64 y.o. male with past medical history significant for obesity, parplegia,  that presented today for evaluation of bilateral gluteal and sacral pressure injuries.  Reports areas have been present for over a month. He is paraplegic. Has been applying santyl and hydrofera blue to wounds. Denies any fever or chills. Moderate drainage without odor. Low air loss mattress has been ordered by PCP, should be delivered this week.  Denies history of diabetes, or smoking. Finished dose of cipro.     Interval History:   2025: Seen in clinic today for follow-up to pressure injuries to right and left gluteal, and perirectal MASD. Right gluteal wound has worsened, now with DTI down to right groin area. He is quadpraplegic, complicating wound prgoression. He is incontinent of bowel and bladder. Chin catheter in place. Awaiting approval for low-air loss mattress. Denies any fever or chills. No other issues or concerns reported.    2025: Seen in clinic today for follow-up to pressure injuries to right and left gluteal, and perirectal MASD. Right gluteal wound has worsened, now with DTI down to right groin area. He is quadpraplegic, complicating wound prgoression. He is incontinent of bowel and bladder. Chin catheter in place. Awaiting approval for low-air loss mattress. Denies any fever or chills. No other issues or concerns reported.    2025: Seen in clinic today for follow-up to pressure injuries to right and left gluteal, and perirectal MASD. Right gluteal wound is stable. Continues to be severe.. He is quadpraplegic, complicating wound prgoression. He is incontinent of bowel and bladder. Chin  catheter in place. Awaiting approval for low-air loss mattress. Denies any fever or chills. No other issues or concerns reported.  ---------------------------------------------------------------------------------------------------------------------   Review of Systems   Constitutional:  Negative for chills and fever.   Respiratory:  Negative for cough and shortness of breath.    Cardiovascular:  Positive for leg swelling. Negative for chest pain.   Gastrointestinal:  Negative for nausea and vomiting.   Musculoskeletal:  Positive for back pain and gait problem.   Skin:  Positive for wound.      ---------------------------------------------------------------------------------------------------------------------   Past Medical History:   Diagnosis Date    Anesthesia     diffculty adminster spinal block, patient stated with last hip surgery 7-2020 several attempts per awilda and no luck, resulted in general anesthesia     Anxiety     Arthritis     Rheumatoid    COVID     2020    GERD (gastroesophageal reflux disease)     Hypertension     Lactose intolerance     Low back pain     Sleep apnea     does not wear machine, MILD, DOES NOT HAVE AFTER WGT LOSS SURGERY    Wears reading eyeglasses      Past Surgical History:   Procedure Laterality Date    ANTERIOR CERVICAL DISCECTOMY W/ FUSION Right 02/19/2023    Procedure: CERVICAL DISCECTOMY ANTERIOR WITH FUSION C6-7;  Surgeon: Jaiden Aldridge MD;  Location: [facility];  Service: Neurosurgery;  Laterality: Right;    BARIATRIC SURGERY  4 year ago    CARPAL TUNNEL RELEASE Bilateral     CARPAL TUNNEL RELEASE Right 06/29/2023    Procedure: CARPAL TUNNEL RELEASE RIGHT;  Surgeon: Jaiden Aldridge MD;  Location: [facility] OR;  Service: Neurosurgery;  Laterality: Right;    COLONOSCOPY      5 years ago    COLONOSCOPY N/A 04/05/2022    Procedure: COLONOSCOPY FOR SCREENING;  Surgeon: Luz Galvez MD;  Location: [facility] OR;  Service: Gastroenterology;  Laterality: N/A;     "ENDOSCOPY N/A 04/05/2022    Procedure: ESOPHAGOGASTRODUODENOSCOPY WITH BIOPSY;  Surgeon: Luz Galvez MD;  Location:  [facility] OR;  Service: Gastroenterology;  Laterality: N/A;    GASTRIC SLEEVE LAPAROSCOPIC      2017    HIP SURGERY Right times 2    KNEE ARTHROSCOPY Left     TOTAL HIP ARTHROPLASTY Left 01/25/2021    Procedure: TOTAL HIP ARTHROPLASTY LEFT;  Surgeon: Jb Patel MD;  Location:  [facility];  Service: Orthopedics;  Laterality: Left;    TOTAL HIP ARTHROPLASTY REVISION Right 07/20/2020    Procedure: TOTAL HIP ARTHROPLASTY REVISION RIGHT;  Surgeon: Jb Patel MD;  Location:  [facility];  Service: Orthopedics;  Laterality: Right;     Family History   Problem Relation Age of Onset    Heart disease Father     Hypertension Father     Osteoarthritis Father     Cancer Mother     Diabetes Brother     Hypertension Brother     Gout Paternal Grandmother     Diabetes Paternal Grandmother     Colon cancer Maternal Uncle     Colon polyps Maternal Uncle      Social History     Socioeconomic History    Marital status:    Tobacco Use    Smoking status: Never    Smokeless tobacco: Never   Vaping Use    Vaping status: Never Used   Substance and Sexual Activity    Alcohol use: No    Drug use: Never    Sexual activity: Yes     ---------------------------------------------------------------------------------------------------------------------   Allergies:  Sulfa antibiotics, Codeine, Ketorolac, and Morphine and codeine  ---------------------------------------------------------------------------------------------------------------------  Objective     ---------------------------------------------------------------------------------------------------------------------   Vital Signs:  There were no vitals taken for this visit.  Estimated body mass index is 31.18 kg/m² as calculated from the following:    Height as of 5/7/25: 172.7 cm (67.99\").    Weight as of 5/7/25: 93 kg (205 lb).  There is no " height or weight on file to calculate BMI.  Wt Readings from Last 3 Encounters:   05/07/25 93 kg (205 lb)   04/17/25 93 kg (205 lb)   04/09/25 93 kg (205 lb)       ---------------------------------------------------------------------------------------------------------------------   Physical Exam  Wound Assessment:  Location: Perirectal   Wound Measurements: 3.5 X 1 X 0.2cm   Tunneling: No Undermining: No  Dressing Appearance: dressingappearance: clean  Closure: NA  Changes since last exam: no changes  Etiology and classification: MASD  Wound bed structures/characteristics: partial thickness (subcutaneous tissue is not exposed), red  Edges moist  Periwound characteristics: excoriated  Periwound Temperature: normal turgor and temperature  Drainage characteristics: moderate, serous   Perfusion characteristics: NA    Wound Assessment:  Location: Left, gluteal  Wound Measurements: 9 X 5.5 X 1.7cm   Tunneling: No Undermining: No  Dressing Appearance: dressingappearance: clean  Closure: NA  Changes since last exam: no changes  Etiology and classification:  Unstageable Pressure injury   Wound bed structures/characteristics: full-thickness (subcutaneous tissue is exposed in at least a portion of the wound), black eschar, moist, pink  Edges moist  Periwound characteristics: intact  Periwound Temperature: normal turgor and temperature  Drainage characteristics: moderate, serous   Perfusion characteristics: NA    Wound Assessment:  Location: Right, gluteal  Wound Measurements: 7 X 4 X 0.3cm   Tunneling: No Undermining: No  Dressing Appearance: dressingappearance: clean  Closure: NA  Changes since last exam: ulcer is worsening   Etiology and classification: stage 3 pressure ulcer  Wound bed structures/characteristics: full-thickness (subcutaneous tissue is exposed in at least a portion of the wound), moist, pink, yellow  Edges moist  Periwound characteristics:  Purple, non-blanchable  Periwound Temperature: normal turgor and  temperature  Drainage characteristics: moderate, serous   Perfusion characteristics: NA    Wound Goal (s):Free of infection and No further symptoms  Assessment & Plan      Patient Active Problem List    Diagnosis     Pressure injury of right buttock, stage 3 [L89.313]  -Clean with wound cleanser, apply magic barrier to area and cover with suberabsorbent and secure with tape  -Offloading pressure induction measures discussed  -Ordered high protein diet 120g/day along with vitamin C 2000mg/day, vitamin A 5000 Units/day, vitamin D3 5000 Units/day, zinc 50mg/day to help promote wound healing   -Will order low-air loss mattress as wounds are continuing to worsen and will benefit from added support       Pressure injury of left buttock unstageable [L89.320]  -clean with wound cleanser, apply santyl to area and cover with silicone border dressing daily and PRN  -Wound culture obtained  -If no improvements, may require debridement in OR   -Offloading measures discussed   -Will order low-air loss mattress   -Ordered high protein diet 120g/day along with vitamin C 2000mg/day, vitamin A 5000 Units/day, vitamin D3 5000 Units/day, zinc 50mg/day to help promote wound healing   -This condition is associated with a high risk for non-healing, infection, sepsis, loss of function, reduced quality of life, and increased risk of premature mortality. The patient is at high-risk for additional pressure injury, requiring a high level of vigilance and coordination of care, and frequent re-assessment to prevent adverse outcomes.     Management of this condition is inherently complex, requiring ongoing optimization of many factors to assure the highest likelihood of a favorable outcome, including pressure relief, bioburden, multiple aspects of nutrition, infection management, and moisture and mechanical factors relevant to wound healing.        Dermatitis associated with moisture [L30.8], Perirectal skin irritation [K62.89]  -Clean with wound  cleanser, apply magic barrier to area and leave open to air  -Offloading measures discussed   -keep area clean and dry   -High risk for worsening due to incontinent of bowel and bladder  -Chin catheter is in place and appears to be functioning        Obesity [E66.9]  -An obese person?is at greater risk for wound infection and dehiscence or evisceration.       Diabetes mellitus [E11.9]  --Recommend adequate glycemic control to help promote wound healing  -Poor glycemic control increases risk of prolonged healing, infection, loss of limb and premature death       Quadriplegia  -Complicates all aspects of care  -Increases risk of wound failure due to decreased mobility        Clinical Impression:Moderate Complexity    Follow-up: 1 week    TANJA Mejias,CWS  [facility]  05/01/2025  [id number]

## 2025-05-13 NOTE — PROGRESS NOTES
Wound Clinic Note  Patient Identification:  Name:  Ang Jackson  Age:  64 y.o.  Sex:  male  :  1960  MRN:  [id number]   Visit Number:  [id number]  Primary Care Physician:  Deepak Perez     Subjective     Chief complaint:     Multiple pressure injuries     History of presenting illness:     Patient is a 64 y.o. male with past medical history significant for obesity, parplegia,  that presented today for evaluation of bilateral gluteal and sacral pressure injuries.  Reports areas have been present for over a month. He is paraplegic. Has been applying santyl and hydrofera blue to wounds. Denies any fever or chills. Moderate drainage without odor. Low air loss mattress has been ordered by PCP, should be delivered this week.  Denies history of diabetes, or smoking. Finished dose of cipro.     Interval History:   2025: Seen in clinic today for follow-up to pressure injuries to right and left gluteal, and perirectal MASD. Right gluteal wound has worsened, now with DTI down to right groin area. He is quadpraplegic, complicating wound prgoression. He is incontinent of bowel and bladder. Chin catheter in place. Awaiting approval for low-air loss mattress. Denies any fever or chills. No other issues or concerns reported.    2025: Seen in clinic today for follow-up to pressure injuries to right and left gluteal, and perirectal MASD. Right gluteal wound has worsened, now with DTI down to right groin area. He is quadpraplegic, complicating wound prgoression. He is incontinent of bowel and bladder. Chin catheter in place. Awaiting approval for low-air loss mattress. Denies any fever or chills. No other issues or concerns reported.    2025: Seen in clinic today for follow-up to pressure injuries to right and left gluteal, and perirectal MASD. Right gluteal wound is stable. Continues to be severe.. He is quadpraplegic, complicating wound prgoression. He is incontinent of bowel and bladder. Chin  catheter in place. Awaiting approval for low-air loss mattress. Denies any fever or chills. No other issues or concerns reported.    05/07/2025: Seen in clinic today for follow-up to pressure injuries to right and left gluteal, and perirectal MASD. Right gluteal wound is stable. Continues to be severe.. He is quadpraplegic, complicating wound prgoression. He is incontinent of bowel and bladder. Chin catheter in place. Awaiting approval for low-air loss mattress. Denies any fever or chills. No other issues or concerns reported.  ---------------------------------------------------------------------------------------------------------------------   Review of Systems   Constitutional:  Negative for chills and fever.   Respiratory:  Negative for cough and shortness of breath.    Cardiovascular:  Positive for leg swelling. Negative for chest pain.   Gastrointestinal:  Negative for nausea and vomiting.   Musculoskeletal:  Positive for back pain and gait problem.   Skin:  Positive for wound.      ---------------------------------------------------------------------------------------------------------------------   Past Medical History:   Diagnosis Date    Anesthesia     diffculty adminster spinal block, patient stated with last hip surgery 7-2020 several attempts per anthyarelis and no luck, resulted in general anesthesia     Anxiety     Arthritis     Rheumatoid    COVID 2020    GERD (gastroesophageal reflux disease)     Hypertension     Lactose intolerance     Low back pain     Sleep apnea     does not wear machine, MILD, DOES NOT HAVE AFTER WGT LOSS SURGERY    Wears reading eyeglasses      Past Surgical History:   Procedure Laterality Date    ANTERIOR CERVICAL DISCECTOMY W/ FUSION Right 02/19/2023    Procedure: CERVICAL DISCECTOMY ANTERIOR WITH FUSION C6-7;  Surgeon: Jaiden Aldridge MD;  Location: [facility];  Service: Neurosurgery;  Laterality: Right;    BARIATRIC SURGERY  4 year ago    CARPAL TUNNEL RELEASE Bilateral      CARPAL TUNNEL RELEASE Right 06/29/2023    Procedure: CARPAL TUNNEL RELEASE RIGHT;  Surgeon: Jaiden Aldridge MD;  Location:  [facility];  Service: Neurosurgery;  Laterality: Right;    COLONOSCOPY      5 years ago    COLONOSCOPY N/A 04/05/2022    Procedure: COLONOSCOPY FOR SCREENING;  Surgeon: Luz Galvez MD;  Location:  [facility];  Service: Gastroenterology;  Laterality: N/A;    ENDOSCOPY N/A 04/05/2022    Procedure: ESOPHAGOGASTRODUODENOSCOPY WITH BIOPSY;  Surgeon: Luz Galvez MD;  Location:  [facility];  Service: Gastroenterology;  Laterality: N/A;    GASTRIC SLEEVE LAPAROSCOPIC      2017    HIP SURGERY Right times 2    KNEE ARTHROSCOPY Left     TOTAL HIP ARTHROPLASTY Left 01/25/2021    Procedure: TOTAL HIP ARTHROPLASTY LEFT;  Surgeon: Jb Patel MD;  Location:  [facility];  Service: Orthopedics;  Laterality: Left;    TOTAL HIP ARTHROPLASTY REVISION Right 07/20/2020    Procedure: TOTAL HIP ARTHROPLASTY REVISION RIGHT;  Surgeon: Jb Patel MD;  Location:  [facility];  Service: Orthopedics;  Laterality: Right;     Family History   Problem Relation Age of Onset    Heart disease Father     Hypertension Father     Osteoarthritis Father     Cancer Mother     Diabetes Brother     Hypertension Brother     Gout Paternal Grandmother     Diabetes Paternal Grandmother     Colon cancer Maternal Uncle     Colon polyps Maternal Uncle      Social History     Socioeconomic History    Marital status:    Tobacco Use    Smoking status: Never    Smokeless tobacco: Never   Vaping Use    Vaping status: Never Used   Substance and Sexual Activity    Alcohol use: No    Drug use: Never    Sexual activity: Yes     ---------------------------------------------------------------------------------------------------------------------   Allergies:  Sulfa antibiotics, Codeine, Ketorolac, and Morphine and  "codeine  ---------------------------------------------------------------------------------------------------------------------  Objective     ---------------------------------------------------------------------------------------------------------------------   Vital Signs:  BP 92/55   Pulse 65   Temp 98.3 °F (36.8 °C) (Infrared)   Ht 172.7 cm (67.99\")   Wt 93 kg (205 lb)   SpO2 95%   BMI 31.18 kg/m²   Estimated body mass index is 31.18 kg/m² as calculated from the following:    Height as of this encounter: 172.7 cm (67.99\").    Weight as of this encounter: 93 kg (205 lb).  Body mass index is 31.18 kg/m².  Wt Readings from Last 3 Encounters:   05/07/25 93 kg (205 lb)   04/17/25 93 kg (205 lb)   04/09/25 93 kg (205 lb)       ---------------------------------------------------------------------------------------------------------------------   Physical Exam  Wound Assessment:  Location: Perirectal   Wound Measurements: 4 X 1.5 X 0.2cm   Tunneling: No Undermining: No  Dressing Appearance: dressingappearance: clean  Closure: NA  Changes since last exam: no changes  Etiology and classification: MASD  Wound bed structures/characteristics: partial thickness (subcutaneous tissue is not exposed), red  Edges moist  Periwound characteristics: excoriated  Periwound Temperature: normal turgor and temperature  Drainage characteristics: moderate, serous   Perfusion characteristics: NA    Wound Assessment:  Location: Left, gluteal  Wound Measurements: 8 X 6.5 X 3.5cm   Tunneling: No Undermining: No  Dressing Appearance: dressingappearance: clean  Closure: NA  Changes since last exam: no changes  Etiology and classification:  Unstageable Pressure injury   Wound bed structures/characteristics: full-thickness (subcutaneous tissue is exposed in at least a portion of the wound), black eschar, moist, pink  Edges moist  Periwound characteristics: intact  Periwound Temperature: normal turgor and temperature  Drainage characteristics: " moderate, serous   Perfusion characteristics: NA    Wound Assessment:  Location: Right, gluteal  Wound Measurements: 7 X 7.5 X 0.3cm   Tunneling: No Undermining: No  Dressing Appearance: dressingappearance: clean  Closure: NA  Changes since last exam: ulcer is worsening   Etiology and classification: stage 3 pressure ulcer  Wound bed structures/characteristics: full-thickness (subcutaneous tissue is exposed in at least a portion of the wound), moist, pink, yellow  Edges moist  Periwound characteristics:  Purple, non-blanchable  Periwound Temperature: normal turgor and temperature  Drainage characteristics: moderate, serous   Perfusion characteristics: NA    Wound Goal (s):Free of infection and No further symptoms  Assessment & Plan      Patient Active Problem List    Diagnosis     Pressure injury of right buttock, stage 3 [L89.313]  -Clean with wound cleanser, apply magic barrier to area and cover with suberabsorbent and secure with tape  -Offloading pressure induction measures discussed  -Ordered high protein diet 120g/day along with vitamin C 2000mg/day, vitamin A 5000 Units/day, vitamin D3 5000 Units/day, zinc 50mg/day to help promote wound healing   -Will order low-air loss mattress as wounds are continuing to worsen and will benefit from added support       Pressure injury of left buttock unstageable [L89.320]  -clean with wound cleanser, apply santyl to area and cover with silicone border dressing daily and PRN  -Wound culture obtained  -If no improvements, may require debridement in OR   -Offloading measures discussed   -Will order low-air loss mattress   -Ordered high protein diet 120g/day along with vitamin C 2000mg/day, vitamin A 5000 Units/day, vitamin D3 5000 Units/day, zinc 50mg/day to help promote wound healing   -This condition is associated with a high risk for non-healing, infection, sepsis, loss of function, reduced quality of life, and increased risk of premature mortality. The patient is at  high-risk for additional pressure injury, requiring a high level of vigilance and coordination of care, and frequent re-assessment to prevent adverse outcomes.     Management of this condition is inherently complex, requiring ongoing optimization of many factors to assure the highest likelihood of a favorable outcome, including pressure relief, bioburden, multiple aspects of nutrition, infection management, and moisture and mechanical factors relevant to wound healing.        Dermatitis associated with moisture [L30.8], Perirectal skin irritation [K62.89]  -Clean with wound cleanser, apply magic barrier to area and leave open to air  -Offloading measures discussed   -keep area clean and dry   -High risk for worsening due to incontinent of bowel and bladder  -Chin catheter is in place and appears to be functioning        Obesity [E66.9]  -An obese person?is at greater risk for wound infection and dehiscence or evisceration.       Diabetes mellitus [E11.9]  --Recommend adequate glycemic control to help promote wound healing  -Poor glycemic control increases risk of prolonged healing, infection, loss of limb and premature death       Quadriplegia  -Complicates all aspects of care  -Increases risk of wound failure due to decreased mobility      Wound Care Debridement Note   Pre-Procedure  Pre-Procedure Diagnosis: Pressure injury of left gluteal with fat layer exposed  Checked for Allergies: yes  Consent:Consent obtained, consent given by Patient,Risks Discussed, Alternatives Discussed  Indication: slough, necrotic tissue, and biofilm  Vascular status:ANGELICA testing is not relevant to this wound, as it is not located on the lower extremity.  Time out was called prior to procedure.   Pre procedure Pain assessment: a 1 on a scale of 0-10  Pre debridement measurements: 8 X 6.5 X 3.5cm, volume: 95.295, surface: 40.84  sinus/tunnelNo, undermining No    Post Procedure  Post-Procedure Diagnosis: Pressure injury of left gluteal  with fat layer exposed  Post debridement measurements: 8.1 X 6.6 X 3.4cm   sinus/tunnelNo, undermining No  Post procedure Pain assessment: a 1 on a scale of 0-10  Graft/Implant/Prosthetics/Implanted Device/Transplants:  None  Complication(s):  None    Procedure details:  Method of Debridement: excissional (Surgical removal or cutting away, outside or beyond the wound margin devitalized tissue, necrosis or slough.)  Procedure: The site was prepared using clean techniques, subcutaneous tissue was removed by surgical excision.  Instrument(s) used: Curette 4mm  Anesthesia:After checking patient allergies,lidocaine topical 2% was administered to provide anesthesia.  Tissue removed: subuctaneous, Percent Removed 70%  Culture or Biopsy: culture and sensitivity  Estimated Blood Loss: Small  Hemostasis Obtained: pressure    Clinical Impression:Moderate Complexity    Follow-up: 1 week    TANJA Mejias,CWS  [facility]  05/07/2025  [id number]

## 2025-05-14 ENCOUNTER — TELEPHONE (OUTPATIENT)
Dept: UROLOGY | Facility: CLINIC | Age: 65
End: 2025-05-14
Payer: MEDICARE

## 2025-05-14 NOTE — TELEPHONE ENCOUNTER
PA for Myrbetriq has been sent to pt's insurance company, currently waiting on a response back.           Sent to Plan today  Drug  Mirabegron ER 25MG er tablets  ePA cloud logo  Form  [employer] Electronic PA Form (2017 NCP[employer])  Original Claim Info  569 Provide Notice: Medicare Prescription Drug Coverage and Your Rights

## 2025-05-15 ENCOUNTER — HOSPITAL ENCOUNTER (OUTPATIENT)
Dept: WOUND CARE | Facility: HOSPITAL | Age: 65
Discharge: HOME | End: 2025-05-15
Payer: MEDICARE

## 2025-05-15 DIAGNOSIS — T14.8XXA OPEN WOUND: Primary | ICD-10-CM

## 2025-05-15 LAB
ALBUMIN SERPL-MCNC: 2.8 G/DL (ref 3.5–5.2)
ALBUMIN/GLOB SERPL: 0.8 G/DL
ALP SERPL-CCNC: 77 U/L (ref 39–117)
ALT SERPL W P-5'-P-CCNC: <5 U/L (ref 1–41)
ANION GAP SERPL CALCULATED.3IONS-SCNC: 8.5 MMOL/L (ref 5–15)
AST SERPL-CCNC: 7 U/L (ref 1–40)
BASOPHILS # BLD AUTO: 0.02 10*3/MM3 (ref 0–0.2)
BASOPHILS NFR BLD AUTO: 0.3 % (ref 0–1.5)
BILIRUB SERPL-MCNC: <0.2 MG/DL (ref 0–1.2)
BUN SERPL-MCNC: 20 MG/DL (ref 8–23)
BUN/CREAT SERPL: 40.8 (ref 7–25)
CALCIUM SPEC-SCNC: 8.7 MG/DL (ref 8.6–10.5)
CHLORIDE SERPL-SCNC: 103 MMOL/L (ref 98–107)
CO2 SERPL-SCNC: 30.5 MMOL/L (ref 22–29)
CREAT SERPL-MCNC: 0.49 MG/DL (ref 0.76–1.27)
CRP SERPL-MCNC: 7.76 MG/DL (ref 0–0.5)
DEPRECATED RDW RBC AUTO: 44.8 FL (ref 37–54)
EGFRCR SERPLBLD CKD-EPI 2021: 115.3 ML/MIN/1.73
EOSINOPHIL # BLD AUTO: 0.24 10*3/MM3 (ref 0–0.4)
EOSINOPHIL NFR BLD AUTO: 3.2 % (ref 0.3–6.2)
ERYTHROCYTE [DISTWIDTH] IN BLOOD BY AUTOMATED COUNT: 13 % (ref 12.3–15.4)
ERYTHROCYTE [SEDIMENTATION RATE] IN BLOOD: 48 MM/HR (ref 0–20)
GLOBULIN UR ELPH-MCNC: 3.3 GM/DL
GLUCOSE SERPL-MCNC: 166 MG/DL (ref 65–99)
HCT VFR BLD AUTO: 31.7 % (ref 37.5–51)
HGB BLD-MCNC: 9.6 G/DL (ref 13–17.7)
IMM GRANULOCYTES # BLD AUTO: 0.07 10*3/MM3 (ref 0–0.05)
IMM GRANULOCYTES NFR BLD AUTO: 0.9 % (ref 0–0.5)
LYMPHOCYTES # BLD AUTO: 1.1 10*3/MM3 (ref 0.7–3.1)
LYMPHOCYTES NFR BLD AUTO: 14.6 % (ref 19.6–45.3)
MCH RBC QN AUTO: 28.2 PG (ref 26.6–33)
MCHC RBC AUTO-ENTMCNC: 30.3 G/DL (ref 31.5–35.7)
MCV RBC AUTO: 93.2 FL (ref 79–97)
MONOCYTES # BLD AUTO: 0.72 10*3/MM3 (ref 0.1–0.9)
MONOCYTES NFR BLD AUTO: 9.6 % (ref 5–12)
NEUTROPHILS NFR BLD AUTO: 5.37 10*3/MM3 (ref 1.7–7)
NEUTROPHILS NFR BLD AUTO: 71.4 % (ref 42.7–76)
NRBC BLD AUTO-RTO: 0 /100 WBC (ref 0–0.2)
PLATELET # BLD AUTO: 218 10*3/MM3 (ref 140–450)
PMV BLD AUTO: 8.8 FL (ref 6–12)
POTASSIUM SERPL-SCNC: 3.8 MMOL/L (ref 3.5–5.2)
PROT SERPL-MCNC: 6.1 G/DL (ref 6–8.5)
RBC # BLD AUTO: 3.4 10*6/MM3 (ref 4.14–5.8)
SODIUM SERPL-SCNC: 142 MMOL/L (ref 136–145)
WBC NRBC COR # BLD AUTO: 7.52 10*3/MM3 (ref 3.4–10.8)

## 2025-05-15 PROCEDURE — 87186 SC STD MICRODIL/AGAR DIL: CPT | Performed by: NURSE PRACTITIONER

## 2025-05-15 PROCEDURE — A9270 NON-COVERED ITEM OR SERVICE: HCPCS | Performed by: NURSE PRACTITIONER

## 2025-05-15 PROCEDURE — 87205 SMEAR GRAM STAIN: CPT | Performed by: NURSE PRACTITIONER

## 2025-05-15 PROCEDURE — 63710000001 COLLAGENASE 250 UNIT/GM OINTMENT 30 G TUBE: Performed by: NURSE PRACTITIONER

## 2025-05-15 PROCEDURE — 63710000001 ALUMINUM-MAGNESIUM HYDROXIDE-SIMETHICONE 200-200-20 MG/5ML SUSPENSION: Performed by: NURSE PRACTITIONER

## 2025-05-15 PROCEDURE — 63710000001 ZINC OXIDE 20 % OINTMENT 454 G JAR: Performed by: NURSE PRACTITIONER

## 2025-05-15 PROCEDURE — 86140 C-REACTIVE PROTEIN: CPT | Performed by: NURSE PRACTITIONER

## 2025-05-15 PROCEDURE — 63710000001 A&D OINTMENT 425 G JAR: Performed by: NURSE PRACTITIONER

## 2025-05-15 PROCEDURE — 85652 RBC SED RATE AUTOMATED: CPT | Performed by: NURSE PRACTITIONER

## 2025-05-15 PROCEDURE — 85025 COMPLETE CBC W/AUTO DIFF WBC: CPT | Performed by: NURSE PRACTITIONER

## 2025-05-15 PROCEDURE — 80053 COMPREHEN METABOLIC PANEL: CPT | Performed by: NURSE PRACTITIONER

## 2025-05-15 PROCEDURE — 87181 SC STD AGAR DILUTION PER AGT: CPT | Performed by: NURSE PRACTITIONER

## 2025-05-15 PROCEDURE — 36415 COLL VENOUS BLD VENIPUNCTURE: CPT

## 2025-05-15 PROCEDURE — 87070 CULTURE OTHR SPECIMN AEROBIC: CPT | Performed by: NURSE PRACTITIONER

## 2025-05-15 PROCEDURE — 84134 ASSAY OF PREALBUMIN: CPT | Performed by: NURSE PRACTITIONER

## 2025-05-15 PROCEDURE — 63710000001 CLOTRIMAZOLE 1 % CREAM 30 G TUBE: Performed by: NURSE PRACTITIONER

## 2025-05-15 PROCEDURE — 63710000001 SODIUM HYPOCHLORITE 0.25 % SOLUTION 473 ML BOTTLE: Performed by: NURSE PRACTITIONER

## 2025-05-15 RX ORDER — NYSTATIN 100000 [USP'U]/G
1 POWDER TOPICAL ONCE
OUTPATIENT
Start: 2025-05-15 | End: 2025-05-15

## 2025-05-15 RX ORDER — CASTOR OIL AND BALSAM, PERU 788; 87 MG/G; MG/G
1 OINTMENT TOPICAL AS NEEDED
OUTPATIENT
Start: 2025-05-15

## 2025-05-15 RX ORDER — SODIUM HYPOCHLORITE 1.25 MG/ML
1 SOLUTION TOPICAL AS NEEDED
OUTPATIENT
Start: 2025-05-15

## 2025-05-15 RX ORDER — LIDOCAINE HYDROCHLORIDE 20 MG/ML
JELLY TOPICAL AS NEEDED
OUTPATIENT
Start: 2025-05-15

## 2025-05-15 RX ORDER — LIDOCAINE HYDROCHLORIDE 20 MG/ML
10 INJECTION, SOLUTION INFILTRATION; PERINEURAL ONCE
OUTPATIENT
Start: 2025-05-15 | End: 2025-05-15

## 2025-05-15 RX ORDER — LIDOCAINE HYDROCHLORIDE 20 MG/ML
1 JELLY TOPICAL ONCE
OUTPATIENT
Start: 2025-05-15 | End: 2025-05-15

## 2025-05-15 RX ORDER — DIAPER,BRIEF,INFANT-TODD,DISP
1 EACH MISCELLANEOUS ONCE
OUTPATIENT
Start: 2025-05-15 | End: 2025-05-15

## 2025-05-15 RX ORDER — LIDOCAINE HYDROCHLORIDE 20 MG/ML
1 JELLY TOPICAL ONCE
Status: COMPLETED | OUTPATIENT
Start: 2025-05-15 | End: 2025-05-15

## 2025-05-15 RX ORDER — SODIUM HYPOCHLORITE 2.5 MG/ML
1 SOLUTION TOPICAL AS NEEDED
Status: COMPLETED | OUTPATIENT
Start: 2025-05-15 | End: 2025-05-15

## 2025-05-15 RX ORDER — MUPIROCIN 20 MG/G
1 OINTMENT TOPICAL AS NEEDED
OUTPATIENT
Start: 2025-05-15

## 2025-05-15 RX ORDER — LIDOCAINE HYDROCHLORIDE AND EPINEPHRINE BITARTRATE 20; .01 MG/ML; MG/ML
10 INJECTION, SOLUTION SUBCUTANEOUS ONCE
OUTPATIENT
Start: 2025-05-15 | End: 2025-05-15

## 2025-05-15 RX ORDER — SILVER SULFADIAZINE 10 MG/G
1 CREAM TOPICAL AS NEEDED
OUTPATIENT
Start: 2025-05-15

## 2025-05-15 RX ORDER — SODIUM HYPOCHLORITE 2.5 MG/ML
1 SOLUTION TOPICAL AS NEEDED
OUTPATIENT
Start: 2025-05-15

## 2025-05-15 RX ADMIN — LIDOCAINE HYDROCHLORIDE 1 ML: 20 JELLY TOPICAL at 16:14

## 2025-05-15 RX ADMIN — COLLAGENASE SANTYL 1 APPLICATION: 250 OINTMENT TOPICAL at 16:14

## 2025-05-15 RX ADMIN — SODIUM HYPOCHLORITE 473 ML: 2.5 SOLUTION TOPICAL at 16:14

## 2025-05-15 RX ADMIN — VITAMINS A AND D OINTMENT 1 APPLICATION: 15.5; 53.4 OINTMENT TOPICAL at 16:13

## 2025-05-15 NOTE — LETTER
05/08/2025  Ang Jackson, 1960           Wound Care Instructions:     Left Lower Gluteal- Clean with wound cleanser or normal saline, pat dry. Apply dakins moistened gauze packing and cover with abd pad and secure with tape.      Right Lower Gluteal- Clean with wound cleanser or normal saline, pat dry. Apply Dakins moistened 4x4 gauze and cover with abd pad and secure with tape.      Perirectal- Clean with wound cleanser or normal saline, pat dry. Apply dakins moistened 4x4 and cover with a dry 4x4 over that and cover with abd pad and secure with tape.       This needs to be done daily. If you have any questions please call our clinic. Patient will need supplies to last him through the weeks. He is seen in clinic once a week.           Thank you,     TANJA Serrano

## 2025-05-15 NOTE — PROGRESS NOTES
Wound Clinic Note  Patient Identification:  Name:  Ang Jackson  Age:  64 y.o.  Sex:  male  :  1960  MRN:  [id number]   Visit Number:  [id number]  Primary Care Physician:  Deepak Perez     Subjective     Chief complaint:     Multiple pressure injuries     History of presenting illness:     Patient is a 64 y.o. male with past medical history significant for obesity, parplegia,  that presented today for evaluation of bilateral gluteal and sacral pressure injuries.  Reports areas have been present for over a month. He is paraplegic. Has been applying santyl and hydrofera blue to wounds. Denies any fever or chills. Moderate drainage without odor. Low air loss mattress has been ordered by PCP, should be delivered this week.  Denies history of diabetes, or smoking. Finished dose of cipro.     Interval History:   2025: Seen in clinic today for follow-up to pressure injuries to right and left gluteal, and perirectal MASD. Right gluteal wound has worsened, now with DTI down to right groin area. He is quadpraplegic, complicating wound prgoression. He is incontinent of bowel and bladder. Chin catheter in place. Awaiting approval for low-air loss mattress. Denies any fever or chills. No other issues or concerns reported.    2025: Seen in clinic today for follow-up to pressure injuries to right and left gluteal, and perirectal MASD. Right gluteal wound has worsened, now with DTI down to right groin area. He is quadpraplegic, complicating wound prgoression. He is incontinent of bowel and bladder. Chin catheter in place. Awaiting approval for low-air loss mattress. Denies any fever or chills. No other issues or concerns reported.    2025: Seen in clinic today for follow-up to pressure injuries to right and left gluteal, and perirectal MASD. Right gluteal wound is stable. Continues to be severe.. He is quadpraplegic, complicating wound prgoression. He is incontinent of bowel and bladder. Chin  catheter in place. Awaiting approval for low-air loss mattress. Denies any fever or chills. No other issues or concerns reported.    05/07/2025: Seen in clinic today for follow-up to pressure injuries to right and left gluteal, and perirectal MASD. Right gluteal wound is stable. Continues to be severe.. He is quadpraplegic, complicating wound prgoression. He is incontinent of bowel and bladder. Chin catheter in place. Awaiting approval for low-air loss mattress. Denies any fever or chills. No other issues or concerns reported.    05/15/2025: Seen in clinic today for follow-up to pressure injuries or bilateral gluteal. Left gluteal wound continues to worsen with increase In tunnel area. Discussed concern of worsening and possible going to ED. Would like to avoid if possible. Will obtain new wound culture and labs. Pending results may require additional antibiotics. Was scheduled to see infectious disease tomorrow unfortunatley had to cancel. Highly encouraged to make following, which is scheduled for next week .  ---------------------------------------------------------------------------------------------------------------------   Review of Systems   Constitutional:  Negative for chills and fever.   Respiratory:  Negative for cough and shortness of breath.    Cardiovascular:  Positive for leg swelling. Negative for chest pain.   Gastrointestinal:  Negative for nausea and vomiting.   Musculoskeletal:  Positive for back pain and gait problem.   Skin:  Positive for wound.      ---------------------------------------------------------------------------------------------------------------------   Past Medical History:   Diagnosis Date    Anesthesia     diffculty adminster spinal block, patient stated with last hip surgery 7-2020 several attempts per awilda and no luck, resulted in general anesthesia     Anxiety     Arthritis     Rheumatoid    COVID 2020    GERD (gastroesophageal reflux disease)     Hypertension      Lactose intolerance     Low back pain     Sleep apnea     does not wear machine, MILD, DOES NOT HAVE AFTER WGT LOSS SURGERY    Wears reading eyeglasses      Past Surgical History:   Procedure Laterality Date    ANTERIOR CERVICAL DISCECTOMY W/ FUSION Right 02/19/2023    Procedure: CERVICAL DISCECTOMY ANTERIOR WITH FUSION C6-7;  Surgeon: Jaiden Aldridge MD;  Location:  [facility];  Service: Neurosurgery;  Laterality: Right;    BARIATRIC SURGERY  4 year ago    CARPAL TUNNEL RELEASE Bilateral     CARPAL TUNNEL RELEASE Right 06/29/2023    Procedure: CARPAL TUNNEL RELEASE RIGHT;  Surgeon: Jaiden Aldridge MD;  Location:  [facility];  Service: Neurosurgery;  Laterality: Right;    COLONOSCOPY      5 years ago    COLONOSCOPY N/A 04/05/2022    Procedure: COLONOSCOPY FOR SCREENING;  Surgeon: Luz Galvez MD;  Location:  [facility];  Service: Gastroenterology;  Laterality: N/A;    ENDOSCOPY N/A 04/05/2022    Procedure: ESOPHAGOGASTRODUODENOSCOPY WITH BIOPSY;  Surgeon: Luz Galvez MD;  Location:  [facility];  Service: Gastroenterology;  Laterality: N/A;    GASTRIC SLEEVE LAPAROSCOPIC      2017    HIP SURGERY Right times 2    KNEE ARTHROSCOPY Left     TOTAL HIP ARTHROPLASTY Left 01/25/2021    Procedure: TOTAL HIP ARTHROPLASTY LEFT;  Surgeon: Jb Patel MD;  Location:  [facility];  Service: Orthopedics;  Laterality: Left;    TOTAL HIP ARTHROPLASTY REVISION Right 07/20/2020    Procedure: TOTAL HIP ARTHROPLASTY REVISION RIGHT;  Surgeon: Jb Patel MD;  Location:  [facility];  Service: Orthopedics;  Laterality: Right;     Family History   Problem Relation Age of Onset    Heart disease Father     Hypertension Father     Osteoarthritis Father     Cancer Mother     Diabetes Brother     Hypertension Brother     Gout Paternal Grandmother     Diabetes Paternal Grandmother     Colon cancer Maternal Uncle     Colon polyps Maternal Uncle      Social History     Socioeconomic History    Marital status:  "   Tobacco Use    Smoking status: Never    Smokeless tobacco: Never   Vaping Use    Vaping status: Never Used   Substance and Sexual Activity    Alcohol use: No    Drug use: Never    Sexual activity: Yes     ---------------------------------------------------------------------------------------------------------------------   Allergies:  Sulfa antibiotics, Codeine, Ketorolac, and Morphine and codeine  ---------------------------------------------------------------------------------------------------------------------  Objective     ---------------------------------------------------------------------------------------------------------------------   Vital Signs:  There were no vitals taken for this visit.  Estimated body mass index is 31.18 kg/m² as calculated from the following:    Height as of 5/7/25: 172.7 cm (67.99\").    Weight as of 5/7/25: 93 kg (205 lb).  There is no height or weight on file to calculate BMI.  Wt Readings from Last 3 Encounters:   05/07/25 93 kg (205 lb)   04/17/25 93 kg (205 lb)   04/09/25 93 kg (205 lb)       ---------------------------------------------------------------------------------------------------------------------   Physical Exam  Wound Assessment:  Location: Perirectal   Wound Measurements: 4 X 2.5 X 0.2cm   Tunneling: No Undermining: No  Dressing Appearance: dressingappearance: clean  Closure: NA  Changes since last exam: no changes  Etiology and classification: MASD  Wound bed structures/characteristics: partial thickness (subcutaneous tissue is not exposed), red  Edges moist  Periwound characteristics: excoriated  Periwound Temperature: normal turgor and temperature  Drainage characteristics: moderate, serous   Perfusion characteristics: NA    Wound Assessment:  Location: Left, gluteal  Wound Measurements: 5 X 9 X 5.2cm   Tunneling: No Undermining: No  Dressing Appearance: dressingappearance: clean  Closure: NA  Changes since last exam: no changes  Etiology and " classification:  Unstageable Pressure injury   Wound bed structures/characteristics: full-thickness (subcutaneous tissue is exposed in at least a portion of the wound), black eschar, moist, pink  Edges moist  Periwound characteristics: intact  Periwound Temperature: normal turgor and temperature  Drainage characteristics: moderate, serous   Perfusion characteristics: NA    Wound Assessment:  Location: Right, gluteal  Wound Measurements: 9 X 5.5 X 2cm   Tunneling: No Undermining: No  Dressing Appearance: dressingappearance: clean  Closure: NA  Changes since last exam: ulcer is worsening   Etiology and classification: stage 3 pressure ulcer  Wound bed structures/characteristics: full-thickness (subcutaneous tissue is exposed in at least a portion of the wound), moist, pink, yellow  Edges moist  Periwound characteristics:  Purple, non-blanchable  Periwound Temperature: normal turgor and temperature  Drainage characteristics: moderate, serous   Perfusion characteristics: NA    Wound Goal (s):Free of infection and No further symptoms  Assessment & Plan      Patient Active Problem List    Diagnosis     Pressure injury of right buttock, stage 3 [L89.313]  -Clean with wound cleanser, apply magic barrier to area and cover with suberabsorbent and secure with tape  -Offloading pressure induction measures discussed  -Ordered high protein diet 120g/day along with vitamin C 2000mg/day, vitamin A 5000 Units/day, vitamin D3 5000 Units/day, zinc 50mg/day to help promote wound healing   -Will order low-air loss mattress as wounds are continuing to worsen and will benefit from added support       Pressure injury of left buttock unstageable [L89.320]  -clean with wound cleanser, apply santyl to area and cover with silicone border dressing daily and PRN  -Wound culture obtained  -Labs ordered: CBC, CMP, CRP, sed rate, prealbumin, and hemoglobain A1C to assess inflammatory markers and nutritional status as this both and negatively impact  wound healing if not properly treated.   -Offloading measures discussed   -Received low-air loss mattress today   -Ordered high protein diet 120g/day along with vitamin C 2000mg/day, vitamin A 5000 Units/day, vitamin D3 5000 Units/day, zinc 50mg/day to help promote wound healing   -This condition is associated with a high risk for non-healing, infection, sepsis, loss of function, reduced quality of life, and increased risk of premature mortality. The patient is at high-risk for additional pressure injury, requiring a high level of vigilance and coordination of care, and frequent re-assessment to prevent adverse outcomes.     Management of this condition is inherently complex, requiring ongoing optimization of many factors to assure the highest likelihood of a favorable outcome, including pressure relief, bioburden, multiple aspects of nutrition, infection management, and moisture and mechanical factors relevant to wound healing.        Dermatitis associated with moisture [L30.8], Perirectal skin irritation [K62.89]  -Clean with wound cleanser, apply magic barrier to area and leave open to air  -Offloading measures discussed   -keep area clean and dry   -High risk for worsening due to incontinent of bowel and bladder  -Chin catheter is in place and appears to be functioning        Obesity [E66.9]  -An obese person?is at greater risk for wound infection and dehiscence or evisceration.       Diabetes mellitus [E11.9]  --Recommend adequate glycemic control to help promote wound healing  -Poor glycemic control increases risk of prolonged healing, infection, loss of limb and premature death       Quadriplegia  -Complicates all aspects of care  -Increases risk of wound failure due to decreased mobility      Wound Care Debridement Note   Pre-Procedure  Pre-Procedure Diagnosis: Pressure injury of left gluteal with fat layer exposed  Checked for Allergies: yes  Consent:Consent obtained, consent given by Patient,Risks  Discussed, Alternatives Discussed  Indication: slough, necrotic tissue, and biofilm  Vascular status:ANGELICA testing is not relevant to this wound, as it is not located on the lower extremity.  Time out was called prior to procedure.   Pre procedure Pain assessment: a 1 on a scale of 0-10  Pre debridement measurements: 5 X 9 X 5.2cm, volume: 122.522, surface: 35.34  sinus/tunnelNo, undermining No    Post Procedure  Post-Procedure Diagnosis: Pressure injury of left gluteal with fat layer exposed  Post debridement measurements: 5.1 X9.1. X 5.3cm, volume: 128.791, surface: 36.45  sinus/tunnelNo, undermining No  Post procedure Pain assessment: a 1 on a scale of 0-10  Graft/Implant/Prosthetics/Implanted Device/Transplants:  None  Complication(s):  None    Procedure details:  Method of Debridement: excissional (Surgical removal or cutting away, outside or beyond the wound margin devitalized tissue, necrosis or slough.)  Procedure: The site was prepared using clean techniques, subcutaneous tissue was removed by surgical excision.  Instrument(s) used: Curette 4mm  Anesthesia:After checking patient allergies,lidocaine topical 2% was administered to provide anesthesia.  Tissue removed: subuctaneous, Percent Removed 70%  Culture or Biopsy: culture and sensitivity  Estimated Blood Loss: Small  Hemostasis Obtained: pressure    Clinical Impression:Moderate Complexity    Follow-up: 1 week    TANJA Mejias,GIULIANAS  [facility]  05/15/2025  1500

## 2025-05-16 ENCOUNTER — APPOINTMENT (OUTPATIENT)
Dept: MRI IMAGING | Facility: HOSPITAL | Age: 65
End: 2025-05-16
Payer: MEDICARE

## 2025-05-16 ENCOUNTER — HOSPITAL ENCOUNTER (INPATIENT)
Facility: HOSPITAL | Age: 65
LOS: 4 days | Discharge: HOME | End: 2025-05-20
Admitting: INTERNAL MEDICINE
Payer: MEDICARE

## 2025-05-16 VITALS
BODY MASS INDEX: 31.07 KG/M2 | SYSTOLIC BLOOD PRESSURE: 107 MMHG | DIASTOLIC BLOOD PRESSURE: 40 MMHG | HEIGHT: 68 IN | TEMPERATURE: 98.5 F | WEIGHT: 205 LBS | HEART RATE: 87 BPM

## 2025-05-16 DIAGNOSIS — T14.8XXA OPEN WOUND: ICD-10-CM

## 2025-05-16 DIAGNOSIS — L08.9 INFECTED WOUND: ICD-10-CM

## 2025-05-16 DIAGNOSIS — T14.8XXA INFECTED WOUND: ICD-10-CM

## 2025-05-16 DIAGNOSIS — T14.8XXA WOUND INFECTION: Primary | ICD-10-CM

## 2025-05-16 DIAGNOSIS — L08.9 WOUND INFECTION: Primary | ICD-10-CM

## 2025-05-16 DIAGNOSIS — L89.320 PRESSURE INJURY OF LEFT BUTTOCK, UNSTAGEABLE: ICD-10-CM

## 2025-05-16 LAB
ALBUMIN SERPL-MCNC: 2.6 G/DL (ref 3.5–5.2)
ALBUMIN/GLOB SERPL: 0.8 G/DL
ALP SERPL-CCNC: 71 U/L (ref 39–117)
ALT SERPL W P-5'-P-CCNC: <5 U/L (ref 1–41)
ANION GAP SERPL CALCULATED.3IONS-SCNC: 6.6 MMOL/L (ref 5–15)
AST SERPL-CCNC: 7 U/L (ref 1–40)
BACTERIA UR QL AUTO: ABNORMAL /HPF
BASOPHILS # BLD AUTO: 0.02 10*3/MM3 (ref 0–0.2)
BASOPHILS NFR BLD AUTO: 0.3 % (ref 0–1.5)
BILIRUB SERPL-MCNC: <0.2 MG/DL (ref 0–1.2)
BILIRUB UR QL STRIP: NEGATIVE
BUN SERPL-MCNC: 20 MG/DL (ref 8–23)
BUN/CREAT SERPL: 42.6 (ref 7–25)
CALCIUM SPEC-SCNC: 8.7 MG/DL (ref 8.6–10.5)
CHLORIDE SERPL-SCNC: 105 MMOL/L (ref 98–107)
CLARITY UR: ABNORMAL
CO2 SERPL-SCNC: 30.4 MMOL/L (ref 22–29)
COD CRY URNS QL: ABNORMAL /HPF
COLOR UR: ABNORMAL
CREAT SERPL-MCNC: 0.47 MG/DL (ref 0.76–1.27)
CRP SERPL-MCNC: 8.23 MG/DL (ref 0–0.5)
D-LACTATE SERPL-SCNC: 1.1 MMOL/L (ref 0.5–2)
DEPRECATED RDW RBC AUTO: 43.8 FL (ref 37–54)
EGFRCR SERPLBLD CKD-EPI 2021: 116.8 ML/MIN/1.73
EOSINOPHIL # BLD AUTO: 0.28 10*3/MM3 (ref 0–0.4)
EOSINOPHIL NFR BLD AUTO: 4.5 % (ref 0.3–6.2)
ERYTHROCYTE [DISTWIDTH] IN BLOOD BY AUTOMATED COUNT: 12.8 % (ref 12.3–15.4)
ERYTHROCYTE [SEDIMENTATION RATE] IN BLOOD: 57 MM/HR (ref 0–20)
GLOBULIN UR ELPH-MCNC: 3.2 GM/DL
GLUCOSE SERPL-MCNC: 134 MG/DL (ref 65–99)
GLUCOSE UR STRIP-MCNC: NEGATIVE MG/DL
HCT VFR BLD AUTO: 31.1 % (ref 37.5–51)
HGB BLD-MCNC: 9.2 G/DL (ref 13–17.7)
HGB UR QL STRIP.AUTO: ABNORMAL
HOLD SPECIMEN: NORMAL
HYALINE CASTS UR QL AUTO: ABNORMAL /LPF
IMM GRANULOCYTES # BLD AUTO: 0.05 10*3/MM3 (ref 0–0.05)
IMM GRANULOCYTES NFR BLD AUTO: 0.8 % (ref 0–0.5)
KETONES UR QL STRIP: ABNORMAL
LEUKOCYTE ESTERASE UR QL STRIP.AUTO: ABNORMAL
LYMPHOCYTES # BLD AUTO: 1.42 10*3/MM3 (ref 0.7–3.1)
LYMPHOCYTES NFR BLD AUTO: 22.9 % (ref 19.6–45.3)
MCH RBC QN AUTO: 27.8 PG (ref 26.6–33)
MCHC RBC AUTO-ENTMCNC: 29.6 G/DL (ref 31.5–35.7)
MCV RBC AUTO: 94 FL (ref 79–97)
MONOCYTES # BLD AUTO: 0.54 10*3/MM3 (ref 0.1–0.9)
MONOCYTES NFR BLD AUTO: 8.7 % (ref 5–12)
NEUTROPHILS NFR BLD AUTO: 3.88 10*3/MM3 (ref 1.7–7)
NEUTROPHILS NFR BLD AUTO: 62.8 % (ref 42.7–76)
NITRITE UR QL STRIP: POSITIVE
NRBC BLD AUTO-RTO: 0 /100 WBC (ref 0–0.2)
PH UR STRIP.AUTO: 5.5 [PH] (ref 5–8)
PLATELET # BLD AUTO: 189 10*3/MM3 (ref 140–450)
PMV BLD AUTO: 8.8 FL (ref 6–12)
POTASSIUM SERPL-SCNC: 3.8 MMOL/L (ref 3.5–5.2)
PREALB SERPL-MCNC: 14.1 MG/DL (ref 20–40)
PROT SERPL-MCNC: 5.8 G/DL (ref 6–8.5)
PROT UR QL STRIP: ABNORMAL
RBC # BLD AUTO: 3.31 10*6/MM3 (ref 4.14–5.8)
RBC # UR STRIP: ABNORMAL /HPF
REF LAB TEST METHOD: ABNORMAL
SODIUM SERPL-SCNC: 142 MMOL/L (ref 136–145)
SP GR UR STRIP: >1.03 (ref 1–1.03)
SQUAMOUS #/AREA URNS HPF: ABNORMAL /HPF
UROBILINOGEN UR QL STRIP: ABNORMAL
WBC # UR STRIP: ABNORMAL /HPF
WBC NRBC COR # BLD AUTO: 6.19 10*3/MM3 (ref 3.4–10.8)
WHOLE BLOOD HOLD COAG: NORMAL
WHOLE BLOOD HOLD SPECIMEN: NORMAL

## 2025-05-16 PROCEDURE — 25010000002 PIPERACILLIN SOD-TAZOBACTAM PER 1 G: Performed by: PHYSICIAN ASSISTANT

## 2025-05-16 PROCEDURE — 80053 COMPREHEN METABOLIC PANEL: CPT | Performed by: PHYSICIAN ASSISTANT

## 2025-05-16 PROCEDURE — 72195 MRI PELVIS W/O DYE: CPT | Performed by: RADIOLOGY

## 2025-05-16 PROCEDURE — 25510000002 GADOBENATE DIMEGLUMINE 529 MG/ML SOLUTION

## 2025-05-16 PROCEDURE — 25010000002 HYDROMORPHONE 1 MG/ML SOLUTION

## 2025-05-16 PROCEDURE — 36415 COLL VENOUS BLD VENIPUNCTURE: CPT

## 2025-05-16 PROCEDURE — 25010000002 VANCOMYCIN 5 G RECONSTITUTED SOLUTION: Performed by: PHYSICIAN ASSISTANT

## 2025-05-16 PROCEDURE — 81001 URINALYSIS AUTO W/SCOPE: CPT | Performed by: PHYSICIAN ASSISTANT

## 2025-05-16 PROCEDURE — 72157 MRI CHEST SPINE W/O & W/DYE: CPT

## 2025-05-16 PROCEDURE — 72195 MRI PELVIS W/O DYE: CPT

## 2025-05-16 PROCEDURE — 83605 ASSAY OF LACTIC ACID: CPT | Performed by: PHYSICIAN ASSISTANT

## 2025-05-16 PROCEDURE — 72156 MRI NECK SPINE W/O & W/DYE: CPT | Performed by: RADIOLOGY

## 2025-05-16 PROCEDURE — 87040 BLOOD CULTURE FOR BACTERIA: CPT | Performed by: PHYSICIAN ASSISTANT

## 2025-05-16 PROCEDURE — 99285 EMERGENCY DEPT VISIT HI MDM: CPT

## 2025-05-16 PROCEDURE — 72157 MRI CHEST SPINE W/O & W/DYE: CPT | Performed by: RADIOLOGY

## 2025-05-16 PROCEDURE — A9577 INJ MULTIHANCE: HCPCS

## 2025-05-16 PROCEDURE — 72156 MRI NECK SPINE W/O & W/DYE: CPT

## 2025-05-16 PROCEDURE — 25810000003 SODIUM CHLORIDE 0.9 % SOLUTION: Performed by: PHYSICIAN ASSISTANT

## 2025-05-16 PROCEDURE — 85025 COMPLETE CBC W/AUTO DIFF WBC: CPT | Performed by: PHYSICIAN ASSISTANT

## 2025-05-16 PROCEDURE — 51702 INSERT TEMP BLADDER CATH: CPT

## 2025-05-16 PROCEDURE — 25010000002 ERTAPENEM PER 500 MG: Performed by: INTERNAL MEDICINE

## 2025-05-16 PROCEDURE — 25010000002 DIPHENHYDRAMINE PER 50 MG: Performed by: PHYSICIAN ASSISTANT

## 2025-05-16 PROCEDURE — 86140 C-REACTIVE PROTEIN: CPT | Performed by: PHYSICIAN ASSISTANT

## 2025-05-16 PROCEDURE — 85652 RBC SED RATE AUTOMATED: CPT | Performed by: PHYSICIAN ASSISTANT

## 2025-05-16 RX ORDER — SODIUM CHLORIDE 9 MG/ML
40 INJECTION, SOLUTION INTRAVENOUS AS NEEDED
Status: DISCONTINUED | OUTPATIENT
Start: 2025-05-16 | End: 2025-05-20 | Stop reason: HOSPADM

## 2025-05-16 RX ORDER — AMOXICILLIN 250 MG
2 CAPSULE ORAL 2 TIMES DAILY PRN
Status: DISCONTINUED | OUTPATIENT
Start: 2025-05-16 | End: 2025-05-20 | Stop reason: HOSPADM

## 2025-05-16 RX ORDER — DIPHENHYDRAMINE HYDROCHLORIDE 50 MG/ML
25 INJECTION, SOLUTION INTRAMUSCULAR; INTRAVENOUS ONCE
Status: COMPLETED | OUTPATIENT
Start: 2025-05-16 | End: 2025-05-16

## 2025-05-16 RX ORDER — NITROGLYCERIN 0.4 MG/1
0.4 TABLET SUBLINGUAL
Status: DISCONTINUED | OUTPATIENT
Start: 2025-05-16 | End: 2025-05-20 | Stop reason: HOSPADM

## 2025-05-16 RX ORDER — GLUCAGON 1 MG/ML
1 KIT INJECTION
Status: DISCONTINUED | OUTPATIENT
Start: 2025-05-16 | End: 2025-05-20 | Stop reason: HOSPADM

## 2025-05-16 RX ORDER — DEXTROSE MONOHYDRATE 25 G/50ML
25 INJECTION, SOLUTION INTRAVENOUS
Status: DISCONTINUED | OUTPATIENT
Start: 2025-05-16 | End: 2025-05-20 | Stop reason: HOSPADM

## 2025-05-16 RX ORDER — VANCOMYCIN 2 GRAM/500 ML IN 0.9 % SODIUM CHLORIDE INTRAVENOUS
20 ONCE
Status: COMPLETED | OUTPATIENT
Start: 2025-05-16 | End: 2025-05-16

## 2025-05-16 RX ORDER — SODIUM CHLORIDE 0.9 % (FLUSH) 0.9 %
10 SYRINGE (ML) INJECTION EVERY 12 HOURS SCHEDULED
Status: DISCONTINUED | OUTPATIENT
Start: 2025-05-17 | End: 2025-05-20 | Stop reason: HOSPADM

## 2025-05-16 RX ORDER — NICOTINE POLACRILEX 4 MG
15 LOZENGE BUCCAL
Status: DISCONTINUED | OUTPATIENT
Start: 2025-05-16 | End: 2025-05-20 | Stop reason: HOSPADM

## 2025-05-16 RX ORDER — BISACODYL 10 MG
10 SUPPOSITORY, RECTAL RECTAL DAILY PRN
Status: DISCONTINUED | OUTPATIENT
Start: 2025-05-16 | End: 2025-05-20 | Stop reason: HOSPADM

## 2025-05-16 RX ORDER — BISACODYL 5 MG/1
5 TABLET, DELAYED RELEASE ORAL DAILY PRN
Status: DISCONTINUED | OUTPATIENT
Start: 2025-05-16 | End: 2025-05-20 | Stop reason: HOSPADM

## 2025-05-16 RX ORDER — SODIUM HYPOCHLORITE 2.5 MG/ML
SOLUTION TOPICAL ONCE
Status: COMPLETED | OUTPATIENT
Start: 2025-05-16 | End: 2025-05-16

## 2025-05-16 RX ORDER — SODIUM CHLORIDE 0.9 % (FLUSH) 0.9 %
10 SYRINGE (ML) INJECTION AS NEEDED
Status: DISCONTINUED | OUTPATIENT
Start: 2025-05-16 | End: 2025-05-20 | Stop reason: HOSPADM

## 2025-05-16 RX ORDER — BACLOFEN 10 MG/1
20 TABLET ORAL EVERY 8 HOURS SCHEDULED
Status: DISCONTINUED | OUTPATIENT
Start: 2025-05-17 | End: 2025-05-20 | Stop reason: HOSPADM

## 2025-05-16 RX ORDER — LIDOCAINE HYDROCHLORIDE 20 MG/ML
JELLY TOPICAL ONCE
Status: COMPLETED | OUTPATIENT
Start: 2025-05-16 | End: 2025-05-16

## 2025-05-16 RX ORDER — HEPARIN SODIUM 5000 [USP'U]/ML
5000 INJECTION, SOLUTION INTRAVENOUS; SUBCUTANEOUS EVERY 12 HOURS SCHEDULED
Status: DISCONTINUED | OUTPATIENT
Start: 2025-05-17 | End: 2025-05-20 | Stop reason: HOSPADM

## 2025-05-16 RX ORDER — POLYETHYLENE GLYCOL 3350 17 G/17G
17 POWDER, FOR SOLUTION ORAL DAILY PRN
Status: DISCONTINUED | OUTPATIENT
Start: 2025-05-16 | End: 2025-05-20 | Stop reason: HOSPADM

## 2025-05-16 RX ORDER — ASCORBIC ACID 500 MG
1000 TABLET ORAL DAILY
Status: DISCONTINUED | OUTPATIENT
Start: 2025-05-17 | End: 2025-05-20 | Stop reason: HOSPADM

## 2025-05-16 RX ADMIN — HYOSCYAMINE SULFATE: 16 SOLUTION at 19:47

## 2025-05-16 RX ADMIN — ERTAPENEM SODIUM 1000 MG: 1 INJECTION INTRAMUSCULAR; INTRAVENOUS at 23:17

## 2025-05-16 RX ADMIN — GADOBENATE DIMEGLUMINE 19 ML: 529 INJECTION, SOLUTION INTRAVENOUS at 18:50

## 2025-05-16 RX ADMIN — PIPERACILLIN SODIUM AND TAZOBACTAM SODIUM 3.38 G: 3; .375 INJECTION, POWDER, LYOPHILIZED, FOR SOLUTION INTRAVENOUS at 19:27

## 2025-05-16 RX ADMIN — HYDROMORPHONE HYDROCHLORIDE 1 MG: 1 INJECTION, SOLUTION INTRAMUSCULAR; INTRAVENOUS; SUBCUTANEOUS at 19:27

## 2025-05-16 RX ADMIN — VANCOMYCIN HYDROCHLORIDE 2000 MG: 5 INJECTION, POWDER, LYOPHILIZED, FOR SOLUTION INTRAVENOUS at 20:58

## 2025-05-16 RX ADMIN — LIDOCAINE HYDROCHLORIDE: 20 JELLY TOPICAL at 21:32

## 2025-05-16 RX ADMIN — DIPHENHYDRAMINE HYDROCHLORIDE 25 MG: 50 INJECTION, SOLUTION INTRAMUSCULAR; INTRAVENOUS at 22:23

## 2025-05-16 RX ADMIN — HYDROMORPHONE HYDROCHLORIDE 1 MG: 1 INJECTION, SOLUTION INTRAMUSCULAR; INTRAVENOUS; SUBCUTANEOUS at 16:43

## 2025-05-16 NOTE — ED PROVIDER NOTES
"Subjective   History of Present Illness  64-year-old male presents secondary to abnormal/elevated inflammatory markers.  Patient has a past medical history of epidural abscess which led to quadriplegia.  Patient has developed wounds both buttock regions along with his coccyx.  This was debrided recently.  There is a tunnel in the left wound that is deep.  He has a past medical history of hypertension, quadriplegia, epidural abscess.      Review of Systems   Constitutional: Negative.  Negative for fever.   HENT: Negative.     Respiratory: Negative.     Cardiovascular: Negative.  Negative for chest pain.   Gastrointestinal: Negative.  Negative for abdominal pain.   Endocrine: Negative.    Genitourinary: Negative.  Negative for dysuria.   Skin: Negative.    Neurological: Negative.    Psychiatric/Behavioral: Negative.     All other systems reviewed and are negative.      Past Medical History:   Diagnosis Date    Anesthesia     diffculty adminster spinal block, patient stated with last hip surgery 7-2020 several attempts per anthesia and no luck, resulted in general anesthesia     Anxiety     Arthritis     Rheumatoid    COVID     2020    GERD (gastroesophageal reflux disease)     Hypertension     Lactose intolerance     Low back pain     Sleep apnea     does not wear machine, MILD, DOES NOT HAVE AFTER WGT LOSS SURGERY    Wears reading eyeglasses        Allergies   Allergen Reactions    Sulfa Antibiotics Hives     Hives, shortness of breath    Codeine Nausea And Vomiting    Ketorolac Other (See Comments)     Pt reports heavy, \"tight\" feeling in his chest.    Morphine And Codeine Nausea And Vomiting       Past Surgical History:   Procedure Laterality Date    ANTERIOR CERVICAL DISCECTOMY W/ FUSION Right 02/19/2023    Procedure: CERVICAL DISCECTOMY ANTERIOR WITH FUSION C6-7;  Surgeon: Jaiden Aldridge MD;  Location: [facility];  Service: Neurosurgery;  Laterality: Right;    BARIATRIC SURGERY  4 year ago    CARPAL TUNNEL " RELEASE Bilateral     CARPAL TUNNEL RELEASE Right 06/29/2023    Procedure: CARPAL TUNNEL RELEASE RIGHT;  Surgeon: Jaiden Aldridge MD;  Location:  [facility];  Service: Neurosurgery;  Laterality: Right;    COLONOSCOPY      5 years ago    COLONOSCOPY N/A 04/05/2022    Procedure: COLONOSCOPY FOR SCREENING;  Surgeon: Luz Galvez MD;  Location:  [facility];  Service: Gastroenterology;  Laterality: N/A;    ENDOSCOPY N/A 04/05/2022    Procedure: ESOPHAGOGASTRODUODENOSCOPY WITH BIOPSY;  Surgeon: Luz Galvez MD;  Location:  [facility];  Service: Gastroenterology;  Laterality: N/A;    GASTRIC SLEEVE LAPAROSCOPIC      2017    HIP SURGERY Right times 2    KNEE ARTHROSCOPY Left     TOTAL HIP ARTHROPLASTY Left 01/25/2021    Procedure: TOTAL HIP ARTHROPLASTY LEFT;  Surgeon: Jb Patel MD;  Location:  [facility];  Service: Orthopedics;  Laterality: Left;    TOTAL HIP ARTHROPLASTY REVISION Right 07/20/2020    Procedure: TOTAL HIP ARTHROPLASTY REVISION RIGHT;  Surgeon: Jb Patel MD;  Location:  [facility];  Service: Orthopedics;  Laterality: Right;       Family History   Problem Relation Age of Onset    Heart disease Father     Hypertension Father     Osteoarthritis Father     Cancer Mother     Diabetes Brother     Hypertension Brother     Gout Paternal Grandmother     Diabetes Paternal Grandmother     Colon cancer Maternal Uncle     Colon polyps Maternal Uncle        Social History     Socioeconomic History    Marital status:    Tobacco Use    Smoking status: Never    Smokeless tobacco: Never   Vaping Use    Vaping status: Never Used   Substance and Sexual Activity    Alcohol use: No    Drug use: Never    Sexual activity: Yes           Objective   Physical Exam  Vitals and nursing note reviewed.   Constitutional:       General: He is not in acute distress.     Appearance: He is well-developed. He is not diaphoretic.   HENT:      Head: Normocephalic and atraumatic.      Right Ear:  External ear normal.      Left Ear: External ear normal.      Nose: Nose normal.   Eyes:      Conjunctiva/sclera: Conjunctivae normal.      Pupils: Pupils are equal, round, and reactive to light.   Neck:      Vascular: No JVD.      Trachea: No tracheal deviation.   Cardiovascular:      Rate and Rhythm: Normal rate and regular rhythm.      Heart sounds: Normal heart sounds. No murmur heard.  Pulmonary:      Effort: Pulmonary effort is normal. No respiratory distress.      Breath sounds: Normal breath sounds. No wheezing.   Abdominal:      General: Bowel sounds are normal.      Palpations: Abdomen is soft.      Tenderness: There is no abdominal tenderness.   Musculoskeletal:         General: No deformity. Normal range of motion.      Cervical back: Normal range of motion and neck supple.   Skin:     General: Skin is warm and dry.      Coloration: Skin is not pale.      Findings: No erythema or rash.      Comments: Patient has multiple wounds.  The left buttock wound measures 6.1 x 7.1.  Depth cannot be determined due to slough and necrotic tissue.  This does have a tunnel in the center.  His right gluteal fold wound measures 6.4 x 5.1.  His coccyx wound measures 5.1 x 2.1.   Neurological:      Mental Status: He is alert and oriented to person, place, and time.      Cranial Nerves: No cranial nerve deficit.   Psychiatric:         Behavior: Behavior normal.         Thought Content: Thought content normal.         Procedures           ED Course  ED Course as of 05/17/25 0746   Fri May 16, 2025   1632 Reviewed with Dr Kruse-MRI of the C and T-spine and pelvis with and without [JI]   1945 Fortunately MRI did not fully evaluate areas of hip.  There is fluid in this area.  It was recommended to get dedicated MRIs of hips [JI]      ED Course User Index  [JI] Nico Hall PA                                         Electronically signed by MAXIMILIAN Bill, 05/16/25, 7:47 PM EDT.                Medical Decision  Making  64-year-old male presents secondary to abnormal/elevated inflammatory markers.  Patient has a past medical history of epidural abscess which led to quadriplegia.  Patient has developed wounds both buttock regions along with his coccyx.  This was debrided recently.  There is a tunnel in the left wound that is deep.  He has a past medical history of hypertension, quadriplegia, epidural abscess.    Problems Addressed:  Wound infection: complicated acute illness or injury    Amount and/or Complexity of Data Reviewed  Labs: ordered. Decision-making details documented in ED Course.  Radiology: ordered. Decision-making details documented in ED Course.    Risk  OTC drugs.  Prescription drug management.  Decision regarding hospitalization.        Final diagnoses:   Wound infection       ED Disposition  ED Disposition       ED Disposition   Decision to Admit    Condition   --    Comment   Level of Care: Medical Telemetry [23]   Diagnosis: Infected wound [003295]   Admitting Physician: NICOL GUADALUPE [[id number]]   Certification: I Certify That Inpatient Hospital Services Are Medically Necessary For Greater Than 2 Midnights                 No follow-up provider specified.       Medication List        ASK your doctor about these medications      ascorbic acid 500 MG tablet  Commonly known as: VITAMIN C  Ask about: Which instructions should I use?     B-12 Compliance Injection 1000 MCG/ML kit  Generic drug: Cyanocobalamin  Ask about: Which instructions should I use?     * baclofen 10 MG tablet  Commonly known as: LIORESAL  Ask about: Which instructions should I use?     * baclofen 10 MG tablet  Commonly known as: LIORESAL  Ask about: Which instructions should I use?     busPIRone 5 MG tablet  Commonly known as: BUSPAR  Ask about: Which instructions should I use?     gabapentin 300 MG capsule  Commonly known as: NEURONTIN  Ask about: Which instructions should I use?     pantoprazole 40 MG EC tablet  Commonly known as:  PROTONIX  Ask about: Which instructions should I use?     Rexulti 1 MG tablet  Generic drug: Brexpiprazole  Ask about: Which instructions should I use?           * This list has 2 medication(s) that are the same as other medications prescribed for you. Read the directions carefully, and ask your doctor or other care provider to review them with you.                     Ron Cote, DO  05/17/25 0720

## 2025-05-17 ENCOUNTER — APPOINTMENT (OUTPATIENT)
Dept: CT IMAGING | Facility: HOSPITAL | Age: 65
End: 2025-05-17
Payer: MEDICARE

## 2025-05-17 LAB
ALBUMIN SERPL-MCNC: 2.5 G/DL (ref 3.5–5.2)
ALBUMIN/GLOB SERPL: 0.8 G/DL
ALP SERPL-CCNC: 72 U/L (ref 39–117)
ALT SERPL W P-5'-P-CCNC: 5 U/L (ref 1–41)
ANION GAP SERPL CALCULATED.3IONS-SCNC: 8.8 MMOL/L (ref 5–15)
AST SERPL-CCNC: 8 U/L (ref 1–40)
BASOPHILS # BLD AUTO: 0.03 10*3/MM3 (ref 0–0.2)
BASOPHILS NFR BLD AUTO: 0.4 % (ref 0–1.5)
BILIRUB SERPL-MCNC: 0.2 MG/DL (ref 0–1.2)
BUN SERPL-MCNC: 14 MG/DL (ref 8–23)
BUN/CREAT SERPL: 43.8 (ref 7–25)
CALCIUM SPEC-SCNC: 8.5 MG/DL (ref 8.6–10.5)
CHLORIDE SERPL-SCNC: 104 MMOL/L (ref 98–107)
CO2 SERPL-SCNC: 28.2 MMOL/L (ref 22–29)
CREAT SERPL-MCNC: 0.32 MG/DL (ref 0.76–1.27)
DEPRECATED RDW RBC AUTO: 43.7 FL (ref 37–54)
EGFRCR SERPLBLD CKD-EPI 2021: 131.1 ML/MIN/1.73
EOSINOPHIL # BLD AUTO: 0.29 10*3/MM3 (ref 0–0.4)
EOSINOPHIL NFR BLD AUTO: 4 % (ref 0.3–6.2)
ERYTHROCYTE [DISTWIDTH] IN BLOOD BY AUTOMATED COUNT: 12.8 % (ref 12.3–15.4)
ERYTHROCYTE [SEDIMENTATION RATE] IN BLOOD: 40 MM/HR (ref 0–20)
GLOBULIN UR ELPH-MCNC: 3.2 GM/DL
GLUCOSE SERPL-MCNC: 110 MG/DL (ref 65–99)
HCT VFR BLD AUTO: 32.2 % (ref 37.5–51)
HGB BLD-MCNC: 9.8 G/DL (ref 13–17.7)
IMM GRANULOCYTES # BLD AUTO: 0.04 10*3/MM3 (ref 0–0.05)
IMM GRANULOCYTES NFR BLD AUTO: 0.6 % (ref 0–0.5)
LYMPHOCYTES # BLD AUTO: 0.95 10*3/MM3 (ref 0.7–3.1)
LYMPHOCYTES NFR BLD AUTO: 13.2 % (ref 19.6–45.3)
MCH RBC QN AUTO: 28.3 PG (ref 26.6–33)
MCHC RBC AUTO-ENTMCNC: 30.4 G/DL (ref 31.5–35.7)
MCV RBC AUTO: 93.1 FL (ref 79–97)
MONOCYTES # BLD AUTO: 0.53 10*3/MM3 (ref 0.1–0.9)
MONOCYTES NFR BLD AUTO: 7.3 % (ref 5–12)
NEUTROPHILS NFR BLD AUTO: 5.38 10*3/MM3 (ref 1.7–7)
NEUTROPHILS NFR BLD AUTO: 74.5 % (ref 42.7–76)
NRBC BLD AUTO-RTO: 0 /100 WBC (ref 0–0.2)
PLATELET # BLD AUTO: 201 10*3/MM3 (ref 140–450)
PMV BLD AUTO: 9.2 FL (ref 6–12)
POTASSIUM SERPL-SCNC: 3.8 MMOL/L (ref 3.5–5.2)
PROT SERPL-MCNC: 5.7 G/DL (ref 6–8.5)
RBC # BLD AUTO: 3.46 10*6/MM3 (ref 4.14–5.8)
SODIUM SERPL-SCNC: 141 MMOL/L (ref 136–145)
VANCOMYCIN TROUGH SERPL-MCNC: 11.2 MCG/ML (ref 5–20)
WBC NRBC COR # BLD AUTO: 7.22 10*3/MM3 (ref 3.4–10.8)

## 2025-05-17 PROCEDURE — 25010000002 ERTAPENEM PER 500 MG: Performed by: INTERNAL MEDICINE

## 2025-05-17 PROCEDURE — 80053 COMPREHEN METABOLIC PANEL: CPT | Performed by: INTERNAL MEDICINE

## 2025-05-17 PROCEDURE — 80202 ASSAY OF VANCOMYCIN: CPT

## 2025-05-17 PROCEDURE — 99223 1ST HOSP IP/OBS HIGH 75: CPT | Performed by: INTERNAL MEDICINE

## 2025-05-17 PROCEDURE — 25810000003 LACTATED RINGERS PER 1000 ML: Performed by: INTERNAL MEDICINE

## 2025-05-17 PROCEDURE — 25510000001 IOPAMIDOL 61 % SOLUTION: Performed by: INTERNAL MEDICINE

## 2025-05-17 PROCEDURE — 85025 COMPLETE CBC W/AUTO DIFF WBC: CPT | Performed by: INTERNAL MEDICINE

## 2025-05-17 PROCEDURE — 72194 CT PELVIS W/O & W/DYE: CPT

## 2025-05-17 PROCEDURE — 99221 1ST HOSP IP/OBS SF/LOW 40: CPT | Performed by: SURGERY

## 2025-05-17 PROCEDURE — 25010000002 VANCOMYCIN 5 G RECONSTITUTED SOLUTION: Performed by: INTERNAL MEDICINE

## 2025-05-17 PROCEDURE — 85652 RBC SED RATE AUTOMATED: CPT | Performed by: INTERNAL MEDICINE

## 2025-05-17 PROCEDURE — 25010000002 HEPARIN (PORCINE) PER 1000 UNITS: Performed by: INTERNAL MEDICINE

## 2025-05-17 PROCEDURE — 25810000003 SODIUM CHLORIDE 0.9 % SOLUTION: Performed by: INTERNAL MEDICINE

## 2025-05-17 RX ORDER — QUETIAPINE FUMARATE 100 MG/1
200 TABLET, FILM COATED ORAL NIGHTLY
Status: DISCONTINUED | OUTPATIENT
Start: 2025-05-17 | End: 2025-05-20 | Stop reason: HOSPADM

## 2025-05-17 RX ORDER — BUSPIRONE HYDROCHLORIDE 5 MG/1
5 TABLET ORAL 2 TIMES DAILY
COMMUNITY

## 2025-05-17 RX ORDER — GABAPENTIN 300 MG/1
900 CAPSULE ORAL ONCE
Status: COMPLETED | OUTPATIENT
Start: 2025-05-17 | End: 2025-05-17

## 2025-05-17 RX ORDER — MULTIPLE VITAMINS W/ MINERALS TAB 9MG-400MCG
1 TAB ORAL DAILY
Status: DISCONTINUED | OUTPATIENT
Start: 2025-05-17 | End: 2025-05-20 | Stop reason: HOSPADM

## 2025-05-17 RX ORDER — BUSPIRONE HYDROCHLORIDE 5 MG/1
5 TABLET ORAL NIGHTLY
Status: DISCONTINUED | OUTPATIENT
Start: 2025-05-17 | End: 2025-05-17

## 2025-05-17 RX ORDER — FERROUS SULFATE 325(65) MG
325 TABLET ORAL
Status: DISCONTINUED | OUTPATIENT
Start: 2025-05-17 | End: 2025-05-20 | Stop reason: HOSPADM

## 2025-05-17 RX ORDER — OMEPRAZOLE 20 MG/1
20 CAPSULE, DELAYED RELEASE ORAL 2 TIMES DAILY
COMMUNITY
End: 2025-05-20 | Stop reason: HOSPADM

## 2025-05-17 RX ORDER — FAMOTIDINE 20 MG/1
20 TABLET, FILM COATED ORAL DAILY
Status: DISCONTINUED | OUTPATIENT
Start: 2025-05-17 | End: 2025-05-20 | Stop reason: HOSPADM

## 2025-05-17 RX ORDER — BUSPIRONE HYDROCHLORIDE 5 MG/1
5 TABLET ORAL EVERY 24 HOURS
Status: DISCONTINUED | OUTPATIENT
Start: 2025-05-18 | End: 2025-05-20 | Stop reason: HOSPADM

## 2025-05-17 RX ORDER — SODIUM CHLORIDE, SODIUM LACTATE, POTASSIUM CHLORIDE, CALCIUM CHLORIDE 600; 310; 30; 20 MG/100ML; MG/100ML; MG/100ML; MG/100ML
50 INJECTION, SOLUTION INTRAVENOUS CONTINUOUS
Status: DISCONTINUED | OUTPATIENT
Start: 2025-05-17 | End: 2025-05-20 | Stop reason: HOSPADM

## 2025-05-17 RX ORDER — QUETIAPINE FUMARATE 100 MG/1
200 TABLET, FILM COATED ORAL NIGHTLY
Status: ON HOLD | COMMUNITY
End: 2025-06-11

## 2025-05-17 RX ORDER — OXYCODONE HYDROCHLORIDE 10 MG/1
10 TABLET ORAL ONCE
Refills: 0 | Status: COMPLETED | OUTPATIENT
Start: 2025-05-17 | End: 2025-05-17

## 2025-05-17 RX ORDER — VANCOMYCIN/0.9 % SOD CHLORIDE 1.5G/250ML
1500 PLASTIC BAG, INJECTION (ML) INTRAVENOUS EVERY 12 HOURS
Status: DISCONTINUED | OUTPATIENT
Start: 2025-05-17 | End: 2025-05-17

## 2025-05-17 RX ORDER — DICLOFENAC SODIUM 75 MG/1
75 TABLET, DELAYED RELEASE ORAL 2 TIMES DAILY
COMMUNITY

## 2025-05-17 RX ORDER — CYANOCOBALAMIN 1000 UG/ML
1000 INJECTION, SOLUTION INTRAMUSCULAR; SUBCUTANEOUS
Status: DISCONTINUED | OUTPATIENT
Start: 2025-05-18 | End: 2025-05-20 | Stop reason: HOSPADM

## 2025-05-17 RX ORDER — TIZANIDINE 2 MG/1
4 TABLET ORAL NIGHTLY
COMMUNITY

## 2025-05-17 RX ORDER — OXYCODONE HCL 20 MG/1
20 TABLET, FILM COATED, EXTENDED RELEASE ORAL NIGHTLY
Status: DISCONTINUED | OUTPATIENT
Start: 2025-05-18 | End: 2025-05-17

## 2025-05-17 RX ORDER — GABAPENTIN 300 MG/1
900 CAPSULE ORAL
Status: DISCONTINUED | OUTPATIENT
Start: 2025-05-17 | End: 2025-05-20 | Stop reason: HOSPADM

## 2025-05-17 RX ORDER — BACLOFEN 10 MG/1
50 TABLET ORAL
COMMUNITY

## 2025-05-17 RX ORDER — OXYCODONE HCL 20 MG/1
20 TABLET, FILM COATED, EXTENDED RELEASE ORAL NIGHTLY
COMMUNITY

## 2025-05-17 RX ORDER — PANTOPRAZOLE SODIUM 40 MG/1
40 TABLET, DELAYED RELEASE ORAL DAILY
Status: DISCONTINUED | OUTPATIENT
Start: 2025-05-17 | End: 2025-05-20 | Stop reason: HOSPADM

## 2025-05-17 RX ORDER — BREXPIPRAZOLE 1 MG/1
1 TABLET ORAL DAILY
Status: ON HOLD | COMMUNITY
End: 2025-06-11

## 2025-05-17 RX ORDER — OXYCODONE HYDROCHLORIDE 10 MG/1
10 TABLET ORAL EVERY 6 HOURS SCHEDULED
Refills: 0 | Status: DISCONTINUED | OUTPATIENT
Start: 2025-05-17 | End: 2025-05-20 | Stop reason: HOSPADM

## 2025-05-17 RX ORDER — PANTOPRAZOLE SODIUM 40 MG/1
40 TABLET, DELAYED RELEASE ORAL 2 TIMES DAILY
COMMUNITY

## 2025-05-17 RX ORDER — TIZANIDINE 2 MG/1
6 TABLET ORAL 4 TIMES DAILY
COMMUNITY

## 2025-05-17 RX ORDER — NAPROXEN 250 MG/1
500 TABLET ORAL 2 TIMES DAILY WITH MEALS
Status: DISCONTINUED | OUTPATIENT
Start: 2025-05-17 | End: 2025-05-20 | Stop reason: HOSPADM

## 2025-05-17 RX ORDER — OXYCODONE HYDROCHLORIDE 10 MG/1
10 TABLET ORAL 4 TIMES DAILY
COMMUNITY

## 2025-05-17 RX ORDER — OXYCODONE HYDROCHLORIDE 10 MG/1
10 TABLET ORAL EVERY 6 HOURS PRN
Refills: 0 | Status: DISCONTINUED | OUTPATIENT
Start: 2025-05-17 | End: 2025-05-17

## 2025-05-17 RX ORDER — GARLIC EXTRACT 500 MG
1 CAPSULE ORAL DAILY
Status: DISCONTINUED | OUTPATIENT
Start: 2025-05-17 | End: 2025-05-20 | Stop reason: HOSPADM

## 2025-05-17 RX ORDER — SODIUM HYPOCHLORITE 2.5 MG/ML
SOLUTION TOPICAL ONCE
Status: COMPLETED | OUTPATIENT
Start: 2025-05-17 | End: 2025-05-17

## 2025-05-17 RX ORDER — ASCORBIC ACID 500 MG
1000 TABLET ORAL DAILY
COMMUNITY

## 2025-05-17 RX ORDER — HYDROCODONE BITARTRATE AND ACETAMINOPHEN 10; 325 MG/1; MG/1
1 TABLET ORAL EVERY 6 HOURS PRN
Refills: 0 | Status: DISCONTINUED | OUTPATIENT
Start: 2025-05-17 | End: 2025-05-17

## 2025-05-17 RX ORDER — BACLOFEN 10 MG/1
50 TABLET ORAL NIGHTLY
Status: CANCELLED | OUTPATIENT
Start: 2025-05-17

## 2025-05-17 RX ORDER — OXYCODONE HYDROCHLORIDE 10 MG/1
10 TABLET ORAL 4 TIMES DAILY
Refills: 0 | Status: CANCELLED | OUTPATIENT
Start: 2025-05-17

## 2025-05-17 RX ORDER — FERROUS SULFATE 325(65) MG
325 TABLET ORAL
COMMUNITY

## 2025-05-17 RX ORDER — LIDOCAINE 40 MG/G
1 CREAM TOPICAL 2 TIMES DAILY
Status: DISCONTINUED | OUTPATIENT
Start: 2025-05-17 | End: 2025-05-20 | Stop reason: HOSPADM

## 2025-05-17 RX ORDER — GABAPENTIN 300 MG/1
900 CAPSULE ORAL 4 TIMES DAILY
COMMUNITY

## 2025-05-17 RX ORDER — OXYCODONE HCL 20 MG/1
20 TABLET, FILM COATED, EXTENDED RELEASE ORAL NIGHTLY
Refills: 0 | Status: DISCONTINUED | OUTPATIENT
Start: 2025-05-17 | End: 2025-05-20 | Stop reason: HOSPADM

## 2025-05-17 RX ORDER — CYANOCOBALAMIN, ISOPROPYL ALCOHOL 1000MCG/ML
1000 KIT INJECTION
COMMUNITY

## 2025-05-17 RX ORDER — FAMOTIDINE 20 MG/1
20 TABLET, FILM COATED ORAL 2 TIMES DAILY
COMMUNITY

## 2025-05-17 RX ORDER — GABAPENTIN 300 MG/1
900 CAPSULE ORAL 3 TIMES DAILY
Status: DISCONTINUED | OUTPATIENT
Start: 2025-05-17 | End: 2025-05-17

## 2025-05-17 RX ORDER — BACLOFEN 10 MG/1
20 TABLET ORAL 3 TIMES DAILY
Status: DISCONTINUED | OUTPATIENT
Start: 2025-05-17 | End: 2025-05-17

## 2025-05-17 RX ORDER — ASCORBIC ACID 500 MG
1000 TABLET ORAL DAILY
Status: CANCELLED | OUTPATIENT
Start: 2025-05-17

## 2025-05-17 RX ORDER — BACLOFEN 10 MG/1
20 TABLET ORAL
Status: DISCONTINUED | OUTPATIENT
Start: 2025-05-17 | End: 2025-05-20 | Stop reason: HOSPADM

## 2025-05-17 RX ORDER — CETIRIZINE HYDROCHLORIDE 10 MG/1
10 TABLET ORAL DAILY
Status: DISCONTINUED | OUTPATIENT
Start: 2025-05-17 | End: 2025-05-20 | Stop reason: HOSPADM

## 2025-05-17 RX ORDER — BACLOFEN 10 MG/1
20 TABLET ORAL 3 TIMES DAILY
COMMUNITY

## 2025-05-17 RX ORDER — IOPAMIDOL 612 MG/ML
100 INJECTION, SOLUTION INTRAVASCULAR
Status: COMPLETED | OUTPATIENT
Start: 2025-05-17 | End: 2025-05-17

## 2025-05-17 RX ADMIN — BACLOFEN 20 MG: 10 TABLET ORAL at 13:13

## 2025-05-17 RX ADMIN — OXYCODONE HYDROCHLORIDE AND ACETAMINOPHEN 1000 MG: 500 TABLET ORAL at 11:06

## 2025-05-17 RX ADMIN — PANTOPRAZOLE SODIUM 40 MG: 40 TABLET, DELAYED RELEASE ORAL at 11:06

## 2025-05-17 RX ADMIN — IOPAMIDOL 90 ML: 612 INJECTION, SOLUTION INTRAVENOUS at 15:43

## 2025-05-17 RX ADMIN — TIZANIDINE 4 MG: 4 TABLET ORAL at 21:42

## 2025-05-17 RX ADMIN — FLUOXETINE HYDROCHLORIDE 20 MG: 20 CAPSULE ORAL at 11:06

## 2025-05-17 RX ADMIN — HEPARIN SODIUM 5000 UNITS: 5000 INJECTION INTRAVENOUS; SUBCUTANEOUS at 00:42

## 2025-05-17 RX ADMIN — GABAPENTIN 900 MG: 300 CAPSULE ORAL at 19:24

## 2025-05-17 RX ADMIN — VANCOMYCIN HYDROCHLORIDE 1500 MG: 5 INJECTION, POWDER, LYOPHILIZED, FOR SOLUTION INTRAVENOUS at 08:45

## 2025-05-17 RX ADMIN — TIZANIDINE 2 MG: 4 TABLET ORAL at 13:13

## 2025-05-17 RX ADMIN — BACLOFEN 20 MG: 10 TABLET ORAL at 17:27

## 2025-05-17 RX ADMIN — FERROUS SULFATE TAB 325 MG (65 MG ELEMENTAL FE) 325 MG: 325 (65 FE) TAB at 11:06

## 2025-05-17 RX ADMIN — BACLOFEN 20 MG: 10 TABLET ORAL at 21:42

## 2025-05-17 RX ADMIN — SODIUM CHLORIDE, POTASSIUM CHLORIDE, SODIUM LACTATE AND CALCIUM CHLORIDE 100 ML/HR: 600; 310; 30; 20 INJECTION, SOLUTION INTRAVENOUS at 14:09

## 2025-05-17 RX ADMIN — NAPROXEN 500 MG: 250 TABLET ORAL at 11:06

## 2025-05-17 RX ADMIN — Medication 1 CAPSULE: at 11:06

## 2025-05-17 RX ADMIN — SODIUM CHLORIDE, POTASSIUM CHLORIDE, SODIUM LACTATE AND CALCIUM CHLORIDE 100 ML/HR: 600; 310; 30; 20 INJECTION, SOLUTION INTRAVENOUS at 23:49

## 2025-05-17 RX ADMIN — OXYCODONE HYDROCHLORIDE 10 MG: 10 TABLET ORAL at 01:26

## 2025-05-17 RX ADMIN — OXYCODONE HYDROCHLORIDE 10 MG: 10 TABLET ORAL at 17:27

## 2025-05-17 RX ADMIN — QUETIAPINE FUMARATE 200 MG: 100 TABLET ORAL at 21:46

## 2025-05-17 RX ADMIN — BUSPIRONE HYDROCHLORIDE 5 MG: 5 TABLET ORAL at 04:46

## 2025-05-17 RX ADMIN — BACLOFEN 20 MG: 10 TABLET ORAL at 00:42

## 2025-05-17 RX ADMIN — HEPARIN SODIUM 5000 UNITS: 5000 INJECTION INTRAVENOUS; SUBCUTANEOUS at 11:11

## 2025-05-17 RX ADMIN — BACLOFEN 20 MG: 10 TABLET ORAL at 11:10

## 2025-05-17 RX ADMIN — OXYCODONE HYDROCHLORIDE 10 MG: 10 TABLET ORAL at 14:06

## 2025-05-17 RX ADMIN — SODIUM CHLORIDE, POTASSIUM CHLORIDE, SODIUM LACTATE AND CALCIUM CHLORIDE 100 ML/HR: 600; 310; 30; 20 INJECTION, SOLUTION INTRAVENOUS at 02:12

## 2025-05-17 RX ADMIN — HYOSCYAMINE SULFATE: 16 SOLUTION at 14:08

## 2025-05-17 RX ADMIN — TIZANIDINE 2 MG: 4 TABLET ORAL at 04:47

## 2025-05-17 RX ADMIN — Medication 10 ML: at 21:43

## 2025-05-17 RX ADMIN — Medication 1 TABLET: at 11:06

## 2025-05-17 RX ADMIN — Medication 10 ML: at 02:12

## 2025-05-17 RX ADMIN — OXYCODONE HYDROCHLORIDE 10 MG: 10 TABLET ORAL at 08:25

## 2025-05-17 RX ADMIN — GABAPENTIN 900 MG: 300 CAPSULE ORAL at 14:06

## 2025-05-17 RX ADMIN — QUETIAPINE FUMARATE 200 MG: 100 TABLET ORAL at 04:46

## 2025-05-17 RX ADMIN — FAMOTIDINE 20 MG: 20 TABLET ORAL at 11:06

## 2025-05-17 RX ADMIN — TIZANIDINE 2 MG: 4 TABLET ORAL at 17:27

## 2025-05-17 RX ADMIN — LIDOCAINE 4% 1 APPLICATION: 4 CREAM TOPICAL at 21:41

## 2025-05-17 RX ADMIN — Medication 5000 UNITS: at 11:06

## 2025-05-17 RX ADMIN — HEPARIN SODIUM 5000 UNITS: 5000 INJECTION INTRAVENOUS; SUBCUTANEOUS at 21:41

## 2025-05-17 RX ADMIN — ERTAPENEM SODIUM 1000 MG: 1 INJECTION INTRAMUSCULAR; INTRAVENOUS at 23:49

## 2025-05-17 RX ADMIN — BREXPIPRAZOLE 1 MG: 1 TABLET ORAL at 11:06

## 2025-05-17 RX ADMIN — CETIRIZINE HYDROCHLORIDE 10 MG: 10 TABLET, FILM COATED ORAL at 11:06

## 2025-05-17 RX ADMIN — NAPROXEN 500 MG: 250 TABLET ORAL at 17:28

## 2025-05-17 RX ADMIN — TIZANIDINE 2 MG: 4 TABLET ORAL at 08:25

## 2025-05-17 RX ADMIN — OXYCODONE HYDROCHLORIDE 20 MG: 20 TABLET, FILM COATED, EXTENDED RELEASE ORAL at 21:42

## 2025-05-17 RX ADMIN — BACLOFEN 20 MG: 10 TABLET ORAL at 05:40

## 2025-05-17 NOTE — CONSULTS
[facility]   Consult Note    Patient Name: Ang Jackson  : 1960  MRN: [id number]  Primary Care Physician:  Deepak Perez  Referring Physician: No ref. provider found  Date of admission: 2025    Consults  Subjective   Subjective     Reason for Consult/ Chief Complaint: Sacral decubitus    Abnormal Lab  Pertinent negative symptoms include no abdominal pain, no chest pain, no chills, no cough, no fever, no headaches, no nausea, no rash, no sore throat, no dysuria, no vomiting and no weakness.     Ang Jackson is a 64 y.o. male with quadriplegia who presents for chronic pressure ulcerations with necrosis and drainage.  He has a pressure ulcer with purulent drainage and patchy areas of necrosis on the sacral region and areas of skin breakdown on the buttocks penis near his Chin catheter.  No fever or systemic symptoms.    Review of Systems   Constitutional:  Negative for activity change, appetite change, chills and fever.   HENT:  Negative for sore throat and trouble swallowing.    Eyes:  Negative for visual disturbance.   Respiratory:  Negative for cough and shortness of breath.    Cardiovascular:  Negative for chest pain and palpitations.   Gastrointestinal:  Negative for abdominal distention, abdominal pain, blood in stool, constipation, diarrhea, nausea and vomiting.   Endocrine: Negative for cold intolerance and heat intolerance.   Genitourinary:  Negative for dysuria.   Musculoskeletal:  Negative for joint swelling.   Skin:  Positive for wound. Negative for color change and rash.   Allergic/Immunologic: Negative for immunocompromised state.   Neurological:  Negative for dizziness, seizures, weakness and headaches.   Hematological:  Negative for adenopathy. Does not bruise/bleed easily.   Psychiatric/Behavioral:  Negative for agitation and confusion.         Personal History     Past Medical History:   Diagnosis Date    Anesthesia     diffculty adminster spinal block, patient stated with  last hip surgery 7-2020 several attempts per awilda and rin molina, resulted in general anesthesia     Anxiety     Arthritis     Rheumatoid    COVID     2020    GERD (gastroesophageal reflux disease)     Hypertension     Lactose intolerance     Low back pain     Sleep apnea     does not wear machine, MILD, DOES NOT HAVE AFTER WGT LOSS SURGERY    Wears reading eyeglasses        Past Surgical History:   Procedure Laterality Date    ANTERIOR CERVICAL DISCECTOMY W/ FUSION Right 02/19/2023    Procedure: CERVICAL DISCECTOMY ANTERIOR WITH FUSION C6-7;  Surgeon: Jaiden Aldridge MD;  Location:  [facility];  Service: Neurosurgery;  Laterality: Right;    BARIATRIC SURGERY  4 year ago    CARPAL TUNNEL RELEASE Bilateral     CARPAL TUNNEL RELEASE Right 06/29/2023    Procedure: CARPAL TUNNEL RELEASE RIGHT;  Surgeon: Jaiden Aldridge MD;  Location:  [facility];  Service: Neurosurgery;  Laterality: Right;    COLONOSCOPY      5 years ago    COLONOSCOPY N/A 04/05/2022    Procedure: COLONOSCOPY FOR SCREENING;  Surgeon: Luz Galvez MD;  Location:  [facility];  Service: Gastroenterology;  Laterality: N/A;    ENDOSCOPY N/A 04/05/2022    Procedure: ESOPHAGOGASTRODUODENOSCOPY WITH BIOPSY;  Surgeon: Luz Galvez MD;  Location:  [facility];  Service: Gastroenterology;  Laterality: N/A;    GASTRIC SLEEVE LAPAROSCOPIC      2017    HIP SURGERY Right times 2    KNEE ARTHROSCOPY Left     TOTAL HIP ARTHROPLASTY Left 01/25/2021    Procedure: TOTAL HIP ARTHROPLASTY LEFT;  Surgeon: Jb Patel MD;  Location:  [facility];  Service: Orthopedics;  Laterality: Left;    TOTAL HIP ARTHROPLASTY REVISION Right 07/20/2020    Procedure: TOTAL HIP ARTHROPLASTY REVISION RIGHT;  Surgeon: Jb Patel MD;  Location:  [facility];  Service: Orthopedics;  Laterality: Right;       Family History: family history includes Cancer in his mother; Colon cancer in his maternal uncle; Colon polyps in his maternal uncle; Diabetes in his brother  "and paternal grandmother; Gout in his paternal grandmother; Heart disease in his father; Hypertension in his brother and father; Osteoarthritis in his father. Otherwise pertinent FHx was reviewed and not pertinent to current issue.    Social History:  reports that he has never smoked. He has never used smokeless tobacco. He reports that he does not drink alcohol and does not use drugs.    Home Medications:   Brexpiprazole, Cyanocobalamin, FLUoxetine, Lactobacillus Acid-Pectin, QUEtiapine, ascorbic acid, baclofen, busPIRone, diclofenac, famotidine, ferrous sulfate, gabapentin, loratadine, multivitamin with minerals, omeprazole, oxyCODONE, oxyCODONE ER, pantoprazole, tiZANidine, and vitamin D3    Allergies:  Allergies   Allergen Reactions    Sulfa Antibiotics Hives     Hives, shortness of breath    Codeine Nausea And Vomiting    Ketorolac Other (See Comments)     Pt reports heavy, \"tight\" feeling in his chest.    Morphine And Codeine Nausea And Vomiting       Objective    Objective     Vitals:  Temp:  [97.6 °F (36.4 °C)-98.6 °F (37 °C)] 98.6 °F (37 °C)  Heart Rate:  [56-87] 78  Resp:  [12-16] 16  BP: (104-155)/(40-88) 104/57    Physical Exam  Constitutional:       Appearance: He is well-developed.   HENT:      Head: Normocephalic and atraumatic.   Eyes:      Conjunctiva/sclera: Conjunctivae normal.      Pupils: Pupils are equal, round, and reactive to light.   Neck:      Thyroid: No thyromegaly.      Vascular: No JVD.      Trachea: No tracheal deviation.   Cardiovascular:      Rate and Rhythm: Normal rate and regular rhythm.      Heart sounds: No murmur heard.     No friction rub. No gallop.   Pulmonary:      Effort: Pulmonary effort is normal.      Breath sounds: Normal breath sounds.   Abdominal:      General: There is no distension.      Palpations: Abdomen is soft. There is no hepatomegaly or splenomegaly.      Tenderness: There is no abdominal tenderness.      Hernia: No hernia is present.   Musculoskeletal:    "      General: No deformity. Normal range of motion.      Cervical back: Neck supple.   Skin:     General: Skin is warm and dry.             Comments: Superficial gluteal pressure ulcerations, penile ulceration near Chin catheter, large sacral wound with purulent drainage and patchy areas of superficial necrosis.   Neurological:      Mental Status: He is alert and oriented to person, place, and time.      Comments: Quadriplegia         Result Review    Result Review:  I have personally reviewed the results from the time of this admission to 5/17/2025 07:59 EDT and agree with these findings:  [x]  Laboratory list / accordion  []  Microbiology  [x]  Radiology  []  EKG/Telemetry   []  Cardiology/Vascular   []  Pathology  []  Old records  []  Other:        Assessment & Plan   Assessment / Plan     Brief Patient Summary:  Ang Jackson is a 64 y.o. male who has quadriplegia presenting with pressure ulcerations with a sacral wound with necrotic tissue and purulent discharge.  He is seen in the wound clinic but due to the changes in the wound has been admitted for antibiotics and wound care.    Active Hospital Problems:  Active Hospital Problems    Diagnosis     **Infected wound      Plan:   Regular diet today  Wet-to-dry twice daily dressing changes  OR tomorrow for wound debridement    Deepak Lara MD

## 2025-05-17 NOTE — PLAN OF CARE
Goal Outcome Evaluation:  Plan of Care Reviewed With: patient           Outcome Evaluation: Pt was admitted from ED during shift. VSS on RN. Pt is A&O x4. Family remains at bedside. PRN meds given for pain. LR started @100. No complaints at this time. Continuing POC.

## 2025-05-17 NOTE — PLAN OF CARE
Goal Outcome Evaluation:                 Pt resting in bed at this time. Family at bedside. Wound care done per Dr. Jesus at bedside. Pain meds given this shift. No other complaints at this time.

## 2025-05-17 NOTE — PROGRESS NOTES
Patient admitted after midnight. I have seen and evaluated patient. He notes generally still feeling bad. I was able to look at the wound with the help of nursing staff. Patient has 3 large decubitus ulcers with worse being in left sacral/inguinal area that tunnels deep with hyperpigmented tissue. Patient and family notes surgery came by with plan to take to OR for debridement tomorrow. I discussed each medication with patient's daughter and made sure they are ordered like prescribed at home. All questions answered.     Updated plan of care:  #Deep unstageable ulcer w/ necrosis and suspected infection of left sacral/buttock area.  #Two right gluteal and sacral decuitus ulcers   - Surgery consulted  and planning to take to OR tomorrow for debridement   - 5/1 Wound grew ESBL E. Coli and E. Faecalis on 5/1 and received Dalvance at that time.    - 5/15 Wound culture Growing E. Coli still. Will continue invance. Suspect dalvance may have treated E. Faecalils component.   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

## 2025-05-17 NOTE — PROGRESS NOTES
Pharmacy to dose Vancomycin for SSTI - Dosed as follows: Vancomycin 2000mg iv x 1 then 1500mg iv q12h.  Pharmacy will monitor and adjust dosing to maintain AUC of 400-600.     Reddy Quintana RPH  04:48 EDT  05/17/25

## 2025-05-17 NOTE — H&P
[facility]   HISTORY AND PHYSICAL    Patient Name: Ang Jackson  : 1960  MRN: [id number]  Primary Care Physician:  Deepak Perez  Date of admission: 2025    Subjective   Subjective     Chief Complaint: Worsening wounds and inflammatory markers    History of Present Illness The patient is a 65 y/o male with PMHx significant for cervical degenerative disc disease status post C6-7 anterior cervical discectomy and fusion in  and nontraumatic cervical spinal cord injury secondary with cervical epidural abscess with cord compression from C3-C5 in 2024 status post incision and drainage with C4 corpectomy, C3-6 laminectomy and emergent hematoma evacuation. Patient with neurogenic bladder and chronic hanks catheter due to significant spinal trauma. 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  Patient underwent rehabilitation at [facility] from November through 2024.  Patient most recently underwent Botox injections to the upper extremities for spastic tetraplegia on 2025.    On or about 2025, patient noted to have wounds on his buttocks and coccyx and has followed with our wound care clinic since that time. Patient with bilateral gluteal and a sacral wound. It appears that patient received Dalvance in the infusion clinic per wound care on 25.    On wound care follow up 5/15/25, APRN reports worsening of left gluteal wound with increase in tunnel area. Patient was encouraged to seek ER evaluation then but wanted to wait for repeat labs and a wound culture from an in office debridement. Those cultures have revealed ESBL E.coli and Enterococcus faecalis (vancomycin sensitive). Sed rate has increased to 57 (48).    Patient denies any fever but does report sensitivities in change in temperature since his surgery. He denies any nausea or vomiting. No abdominal pain or  diarrhea.     Patient seen and examined in 339. Wife, daughter, granddaughter and RN present at bedside.    Review of Systems   Constitutional:  Negative for diaphoresis.   HENT:  Negative for hearing loss and trouble swallowing.    Eyes:  Negative for discharge and visual disturbance.   Respiratory:  Negative for cough, shortness of breath and wheezing.    Cardiovascular:  Negative for chest pain, palpitations and leg swelling.   Gastrointestinal:  Negative for abdominal pain, constipation, diarrhea, nausea and vomiting.   Endocrine: Negative for polydipsia, polyphagia and polyuria.   Genitourinary:  Negative for decreased urine volume, dysuria, frequency and hematuria.   Musculoskeletal:  Negative for gait problem, myalgias and neck pain.   Skin:  Positive for color change and wound. Negative for rash.   Neurological:  Negative for dizziness, tremors, seizures, syncope, weakness and light-headedness.   Hematological:  Does not bruise/bleed easily.   Psychiatric/Behavioral:  Negative for confusion, hallucinations and suicidal ideas.         Personal History     Past Medical History:   Diagnosis Date    Anesthesia     diffculty adminster spinal block, patient stated with last hip surgery 7-2020 several attempts per anthesia and no luck, resulted in general anesthesia     Anxiety     Arthritis     Rheumatoid    COVID     2020    GERD (gastroesophageal reflux disease)     Hypertension     Lactose intolerance     Low back pain     Sleep apnea     does not wear machine, MILD, DOES NOT HAVE AFTER WGT LOSS SURGERY    Wears reading eyeglasses        Past Surgical History:   Procedure Laterality Date    ANTERIOR CERVICAL DISCECTOMY W/ FUSION Right 02/19/2023    Procedure: CERVICAL DISCECTOMY ANTERIOR WITH FUSION C6-7;  Surgeon: Jaiden Aldridge MD;  Location: [facility];  Service: Neurosurgery;  Laterality: Right;    BARIATRIC SURGERY  4 year ago    CARPAL TUNNEL RELEASE Bilateral     CARPAL TUNNEL RELEASE Right 06/29/2023     Procedure: CARPAL TUNNEL RELEASE RIGHT;  Surgeon: Jaiden Aldridge MD;  Location:  [facility];  Service: Neurosurgery;  Laterality: Right;    COLONOSCOPY      5 years ago    COLONOSCOPY N/A 04/05/2022    Procedure: COLONOSCOPY FOR SCREENING;  Surgeon: Luz Galvez MD;  Location:  [facility];  Service: Gastroenterology;  Laterality: N/A;    ENDOSCOPY N/A 04/05/2022    Procedure: ESOPHAGOGASTRODUODENOSCOPY WITH BIOPSY;  Surgeon: Luz Glavez MD;  Location:  [facility];  Service: Gastroenterology;  Laterality: N/A;    GASTRIC SLEEVE LAPAROSCOPIC      2017    HIP SURGERY Right times 2    KNEE ARTHROSCOPY Left     TOTAL HIP ARTHROPLASTY Left 01/25/2021    Procedure: TOTAL HIP ARTHROPLASTY LEFT;  Surgeon: Jb Patel MD;  Location:  [facility];  Service: Orthopedics;  Laterality: Left;    TOTAL HIP ARTHROPLASTY REVISION Right 07/20/2020    Procedure: TOTAL HIP ARTHROPLASTY REVISION RIGHT;  Surgeon: Jb Patel MD;  Location:  [facility];  Service: Orthopedics;  Laterality: Right;       Family History: family history includes Cancer in his mother; Colon cancer in his maternal uncle; Colon polyps in his maternal uncle; Diabetes in his brother and paternal grandmother; Gout in his paternal grandmother; Heart disease in his father; Hypertension in his brother and father; Osteoarthritis in his father.     Social History:  reports that he has never smoked. He has never used smokeless tobacco. He reports that he does not drink alcohol and does not use drugs.    Home Medications:  Brexpiprazole, HYDROcodone-acetaminophen, Mirabegron ER, ascorbic acid, baclofen, busPIRone, cyanocobalamin, gabapentin, hydrOXYzine pamoate, lidocaine, lisinopril, loratadine, methylPREDNISolone, multivitamin with minerals, and pantoprazole    Allergies:  Allergies   Allergen Reactions    Sulfa Antibiotics Hives     Hives, shortness of breath    Codeine Nausea And Vomiting    Ketorolac Other (See Comments)     Pt  "reports heavy, \"tight\" feeling in his chest.    Morphine And Codeine Nausea And Vomiting       Objective    Objective     Vitals:   Temp:  [97.6 °F (36.4 °C)-97.9 °F (36.6 °C)] 97.9 °F (36.6 °C)  Heart Rate:  [56-69] 68  Resp:  [12-16] 16  BP: (128-155)/(69-88) 149/75    Physical Exam  Constitutional:       General: He is not in acute distress.     Appearance: He is well-developed.   HENT:      Head: Normocephalic and atraumatic.   Eyes:      Conjunctiva/sclera: Conjunctivae normal.   Neck:      Trachea: No tracheal deviation.   Cardiovascular:      Rate and Rhythm: Normal rate and regular rhythm.      Heart sounds: No murmur heard.     No friction rub. No gallop.   Pulmonary:      Effort: No respiratory distress.      Breath sounds: Normal breath sounds. No wheezing or rales.   Abdominal:      General: Bowel sounds are normal. There is no distension.      Palpations: Abdomen is soft.      Tenderness: There is no abdominal tenderness. There is no guarding.   Genitourinary:     Comments: Chin catheter in place; draining straw colored urine  Musculoskeletal:      Comments: Lower spinal/sacral wound with necrotic appearing tissue near 9'o clock region; right gluteal wound mostly with slough; some necrotic tissue deepest/most central portion of wound; left gluteal wound with some healthy appearing tissue but more centrally located necrosis and tunneling   Skin:     General: Skin is warm and dry.      Findings: No erythema or rash.   Neurological:      Mental Status: He is alert and oriented to person, place, and time.      Cranial Nerves: No dysarthria.      Motor: No tremor.         Result Review    Result Review:  I have personally reviewed the results from the time of this admission to 5/17/2025 00:07 EDT and agree with these findings:  [x]  Laboratory list / accordion  [x]  Microbiology  [x]  Radiology  []  EKG/Telemetry   []  Cardiology/Vascular   []  Pathology  []  Old records  []  Other:  Most notable findings " include:     Results from last 7 days   Lab Units 05/16/25  1544 05/15/25  1606   WBC 10*3/mm3 6.19 7.52   HEMOGLOBIN g/dL 9.2* 9.6*   HEMATOCRIT % 31.1* 31.7*   PLATELETS 10*3/mm3 189 218     Results from last 7 days   Lab Units 05/16/25  1544 05/15/25  1606   SODIUM mmol/L 142 142   POTASSIUM mmol/L 3.8 3.8   CHLORIDE mmol/L 105 103   CO2 mmol/L 30.4* 30.5*   BUN mg/dL 20 20   CREATININE mg/dL 0.47* 0.49*   CALCIUM mg/dL 8.7 8.7   BILIRUBIN mg/dL <0.2 <0.2   ALK PHOS U/L 71 77   ALT (SGPT) U/L <5 <5   AST (SGOT) U/L 7 7   GLUCOSE mg/dL 134* 166*         Culture results from last 30 days   Lab 05/15/25  1606 05/01/25  1616   WOUNDCX Light growth (2+) Escherichia coli* Moderate growth (3+) Escherichia coli ESBL*  Moderate growth (3+) Enterococcus faecalis*     MRI lumbar spine with/without contrast:  Fatty atrophy is noted.  Bilateral hip prostheses limits evaluation. Postsurgical changes hips  bilaterally with soft tissue edema. No free fluid seen within the  pelvis.      Subcutaneous edema of the hips bilaterally with fluid along the  subcutaneous tissues superficial to the hips which is partially imaged.  If concern for abscess or acute etiology recommend dedicated imaging of  the hips with enhancement.     IMPRESSION:  Impression:  1. Limited exam.  2. Postsurgical changes with subcutaneous edema and fluid superficial to  the hips, further evaluation if clinically warranted.    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 coli, Enterococcus faecalis and more recently light growth of E. coli with final culture pending  - Will follow-up on final wound culture  results  - Follow-up on blood culture results obtained in the ED  - I have ordered a CT pelvis with/without contrast to evaluate and rule out any evidence of osteomyelitis  - For now, empiric intravenous antibiotic therapy with pharmacy to dose vancomycin and ertapenem  - Will make patient n.p.o. after midnight for general surgery evaluation to see if patient would benefit from surgical excisional debridement specifically of the left gluteal wound  - Chin catheter exchanged in the ED  - Resume home baclofen and as needed analgesics pending pharmacy completion of patient's home medication list  - Will repeat the patient's CBC and chemistry panel in a.m.    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    Records from our [facility] wound clinic and from OSH reviewed and summarized in the HPI    Natacha Denilson Huerta DO

## 2025-05-18 ENCOUNTER — ANESTHESIA EVENT (OUTPATIENT)
Dept: PERIOP | Facility: HOSPITAL | Age: 65
End: 2025-05-18
Payer: MEDICARE

## 2025-05-18 ENCOUNTER — ANESTHESIA (OUTPATIENT)
Dept: PERIOP | Facility: HOSPITAL | Age: 65
End: 2025-05-18
Payer: MEDICARE

## 2025-05-18 LAB
ANION GAP SERPL CALCULATED.3IONS-SCNC: 7.6 MMOL/L (ref 5–15)
BACTERIA SPEC AEROBE CULT: ABNORMAL
BASOPHILS # BLD AUTO: 0.02 10*3/MM3 (ref 0–0.2)
BASOPHILS NFR BLD AUTO: 0.4 % (ref 0–1.5)
BUN SERPL-MCNC: 12 MG/DL (ref 8–23)
BUN/CREAT SERPL: 41.4 (ref 7–25)
CALCIUM SPEC-SCNC: 8.4 MG/DL (ref 8.6–10.5)
CHLORIDE SERPL-SCNC: 110 MMOL/L (ref 98–107)
CO2 SERPL-SCNC: 27.4 MMOL/L (ref 22–29)
CREAT SERPL-MCNC: 0.29 MG/DL (ref 0.76–1.27)
DEPRECATED RDW RBC AUTO: 43.6 FL (ref 37–54)
EGFRCR SERPLBLD CKD-EPI 2021: 135.1 ML/MIN/1.73
EOSINOPHIL # BLD AUTO: 0.15 10*3/MM3 (ref 0–0.4)
EOSINOPHIL NFR BLD AUTO: 2.8 % (ref 0.3–6.2)
ERYTHROCYTE [DISTWIDTH] IN BLOOD BY AUTOMATED COUNT: 12.8 % (ref 12.3–15.4)
GLUCOSE SERPL-MCNC: 96 MG/DL (ref 65–99)
GRAM STN SPEC: ABNORMAL
GRAM STN SPEC: ABNORMAL
HCT VFR BLD AUTO: 31.4 % (ref 37.5–51)
HGB BLD-MCNC: 9.2 G/DL (ref 13–17.7)
IMM GRANULOCYTES # BLD AUTO: 0.02 10*3/MM3 (ref 0–0.05)
IMM GRANULOCYTES NFR BLD AUTO: 0.4 % (ref 0–0.5)
LYMPHOCYTES # BLD AUTO: 1.43 10*3/MM3 (ref 0.7–3.1)
LYMPHOCYTES NFR BLD AUTO: 26.7 % (ref 19.6–45.3)
MCH RBC QN AUTO: 27.2 PG (ref 26.6–33)
MCHC RBC AUTO-ENTMCNC: 29.3 G/DL (ref 31.5–35.7)
MCV RBC AUTO: 92.9 FL (ref 79–97)
MONOCYTES # BLD AUTO: 0.47 10*3/MM3 (ref 0.1–0.9)
MONOCYTES NFR BLD AUTO: 8.8 % (ref 5–12)
NEUTROPHILS NFR BLD AUTO: 3.27 10*3/MM3 (ref 1.7–7)
NEUTROPHILS NFR BLD AUTO: 60.9 % (ref 42.7–76)
NRBC BLD AUTO-RTO: 0 /100 WBC (ref 0–0.2)
PLATELET # BLD AUTO: 177 10*3/MM3 (ref 140–450)
PMV BLD AUTO: 9.1 FL (ref 6–12)
POTASSIUM SERPL-SCNC: 3.5 MMOL/L (ref 3.5–5.2)
RBC # BLD AUTO: 3.38 10*6/MM3 (ref 4.14–5.8)
SODIUM SERPL-SCNC: 145 MMOL/L (ref 136–145)
WBC NRBC COR # BLD AUTO: 5.36 10*3/MM3 (ref 3.4–10.8)

## 2025-05-18 PROCEDURE — 25010000002 HEPARIN (PORCINE) PER 1000 UNITS: Performed by: SURGERY

## 2025-05-18 PROCEDURE — G0545 PR INHERENT VISIT TO INPT: HCPCS | Performed by: NURSE PRACTITIONER

## 2025-05-18 PROCEDURE — 25810000003 LACTATED RINGERS PER 1000 ML: Performed by: SURGERY

## 2025-05-18 PROCEDURE — 25010000002 FAMOTIDINE (PF) 20 MG/2ML SOLUTION: Performed by: NURSE ANESTHETIST, CERTIFIED REGISTERED

## 2025-05-18 PROCEDURE — 11042 DBRDMT SUBQ TIS 1ST 20SQCM/<: CPT | Performed by: SURGERY

## 2025-05-18 PROCEDURE — 97597 DBRDMT OPN WND 1ST 20 CM/<: CPT | Performed by: SURGERY

## 2025-05-18 PROCEDURE — 99222 1ST HOSP IP/OBS MODERATE 55: CPT | Performed by: NURSE PRACTITIONER

## 2025-05-18 PROCEDURE — 11045 DBRDMT SUBQ TISS EACH ADDL: CPT | Performed by: SURGERY

## 2025-05-18 PROCEDURE — 25810000003 LACTATED RINGERS PER 1000 ML: Performed by: NURSE ANESTHETIST, CERTIFIED REGISTERED

## 2025-05-18 PROCEDURE — 80048 BASIC METABOLIC PNL TOTAL CA: CPT | Performed by: INTERNAL MEDICINE

## 2025-05-18 PROCEDURE — 87186 SC STD MICRODIL/AGAR DIL: CPT | Performed by: SURGERY

## 2025-05-18 PROCEDURE — 25010000002 DEXAMETHASONE PER 1 MG: Performed by: NURSE ANESTHETIST, CERTIFIED REGISTERED

## 2025-05-18 PROCEDURE — 87070 CULTURE OTHR SPECIMN AEROBIC: CPT | Performed by: SURGERY

## 2025-05-18 PROCEDURE — 25010000002 FENTANYL CITRATE (PF) 50 MCG/ML SOLUTION: Performed by: NURSE ANESTHETIST, CERTIFIED REGISTERED

## 2025-05-18 PROCEDURE — 87205 SMEAR GRAM STAIN: CPT | Performed by: SURGERY

## 2025-05-18 PROCEDURE — 0JB70ZZ EXCISION OF BACK SUBCUTANEOUS TISSUE AND FASCIA, OPEN APPROACH: ICD-10-PCS | Performed by: SURGERY

## 2025-05-18 PROCEDURE — 87077 CULTURE AEROBIC IDENTIFY: CPT | Performed by: SURGERY

## 2025-05-18 PROCEDURE — 99232 SBSQ HOSP IP/OBS MODERATE 35: CPT | Performed by: INTERNAL MEDICINE

## 2025-05-18 PROCEDURE — 25010000002 ONDANSETRON PER 1 MG: Performed by: NURSE ANESTHETIST, CERTIFIED REGISTERED

## 2025-05-18 PROCEDURE — 72194 CT PELVIS W/O & W/DYE: CPT | Performed by: RADIOLOGY

## 2025-05-18 PROCEDURE — 87176 TISSUE HOMOGENIZATION CULTR: CPT | Performed by: SURGERY

## 2025-05-18 PROCEDURE — 85025 COMPLETE CBC W/AUTO DIFF WBC: CPT | Performed by: INTERNAL MEDICINE

## 2025-05-18 PROCEDURE — 25010000002 PROPOFOL 200 MG/20ML EMULSION: Performed by: NURSE ANESTHETIST, CERTIFIED REGISTERED

## 2025-05-18 RX ORDER — SODIUM CHLORIDE, SODIUM LACTATE, POTASSIUM CHLORIDE, CALCIUM CHLORIDE 600; 310; 30; 20 MG/100ML; MG/100ML; MG/100ML; MG/100ML
125 INJECTION, SOLUTION INTRAVENOUS ONCE
Status: DISCONTINUED | OUTPATIENT
Start: 2025-05-18 | End: 2025-05-18 | Stop reason: HOSPADM

## 2025-05-18 RX ORDER — MIDAZOLAM HYDROCHLORIDE 1 MG/ML
1 INJECTION, SOLUTION INTRAMUSCULAR; INTRAVENOUS
Status: DISCONTINUED | OUTPATIENT
Start: 2025-05-18 | End: 2025-05-18 | Stop reason: HOSPADM

## 2025-05-18 RX ORDER — HYDROXYZINE HYDROCHLORIDE 25 MG/1
25 TABLET, FILM COATED ORAL 3 TIMES DAILY PRN
Status: DISCONTINUED | OUTPATIENT
Start: 2025-05-18 | End: 2025-05-20 | Stop reason: HOSPADM

## 2025-05-18 RX ORDER — ONDANSETRON 2 MG/ML
4 INJECTION INTRAMUSCULAR; INTRAVENOUS AS NEEDED
Status: DISCONTINUED | OUTPATIENT
Start: 2025-05-18 | End: 2025-05-18 | Stop reason: HOSPADM

## 2025-05-18 RX ORDER — PROPOFOL 10 MG/ML
INJECTION, EMULSION INTRAVENOUS AS NEEDED
Status: DISCONTINUED | OUTPATIENT
Start: 2025-05-18 | End: 2025-05-18 | Stop reason: SURG

## 2025-05-18 RX ORDER — DEXAMETHASONE SODIUM PHOSPHATE 4 MG/ML
INJECTION, SOLUTION INTRA-ARTICULAR; INTRALESIONAL; INTRAMUSCULAR; INTRAVENOUS; SOFT TISSUE AS NEEDED
Status: DISCONTINUED | OUTPATIENT
Start: 2025-05-18 | End: 2025-05-18 | Stop reason: SURG

## 2025-05-18 RX ORDER — MAGNESIUM HYDROXIDE 1200 MG/15ML
LIQUID ORAL AS NEEDED
Status: DISCONTINUED | OUTPATIENT
Start: 2025-05-18 | End: 2025-05-18 | Stop reason: HOSPADM

## 2025-05-18 RX ORDER — FAMOTIDINE 10 MG/ML
INJECTION, SOLUTION INTRAVENOUS AS NEEDED
Status: DISCONTINUED | OUTPATIENT
Start: 2025-05-18 | End: 2025-05-18 | Stop reason: SURG

## 2025-05-18 RX ORDER — SODIUM CHLORIDE 0.9 % (FLUSH) 0.9 %
10 SYRINGE (ML) INJECTION AS NEEDED
Status: DISCONTINUED | OUTPATIENT
Start: 2025-05-18 | End: 2025-05-18 | Stop reason: HOSPADM

## 2025-05-18 RX ORDER — SODIUM CHLORIDE, SODIUM LACTATE, POTASSIUM CHLORIDE, CALCIUM CHLORIDE 600; 310; 30; 20 MG/100ML; MG/100ML; MG/100ML; MG/100ML
INJECTION, SOLUTION INTRAVENOUS CONTINUOUS PRN
Status: DISCONTINUED | OUTPATIENT
Start: 2025-05-18 | End: 2025-05-18 | Stop reason: SURG

## 2025-05-18 RX ORDER — SODIUM CHLORIDE 9 MG/ML
40 INJECTION, SOLUTION INTRAVENOUS AS NEEDED
Status: DISCONTINUED | OUTPATIENT
Start: 2025-05-18 | End: 2025-05-18 | Stop reason: HOSPADM

## 2025-05-18 RX ORDER — SODIUM CHLORIDE 0.9 % (FLUSH) 0.9 %
10 SYRINGE (ML) INJECTION EVERY 12 HOURS SCHEDULED
Status: DISCONTINUED | OUTPATIENT
Start: 2025-05-18 | End: 2025-05-18 | Stop reason: HOSPADM

## 2025-05-18 RX ORDER — IPRATROPIUM BROMIDE AND ALBUTEROL SULFATE 2.5; .5 MG/3ML; MG/3ML
3 SOLUTION RESPIRATORY (INHALATION) ONCE AS NEEDED
Status: DISCONTINUED | OUTPATIENT
Start: 2025-05-18 | End: 2025-05-18 | Stop reason: HOSPADM

## 2025-05-18 RX ORDER — FENTANYL CITRATE 50 UG/ML
50 INJECTION, SOLUTION INTRAMUSCULAR; INTRAVENOUS
Status: COMPLETED | OUTPATIENT
Start: 2025-05-18 | End: 2025-05-18

## 2025-05-18 RX ORDER — ONDANSETRON 2 MG/ML
INJECTION INTRAMUSCULAR; INTRAVENOUS AS NEEDED
Status: DISCONTINUED | OUTPATIENT
Start: 2025-05-18 | End: 2025-05-18 | Stop reason: SURG

## 2025-05-18 RX ORDER — SODIUM CHLORIDE, SODIUM LACTATE, POTASSIUM CHLORIDE, CALCIUM CHLORIDE 600; 310; 30; 20 MG/100ML; MG/100ML; MG/100ML; MG/100ML
100 INJECTION, SOLUTION INTRAVENOUS ONCE AS NEEDED
Status: DISCONTINUED | OUTPATIENT
Start: 2025-05-18 | End: 2025-05-18 | Stop reason: HOSPADM

## 2025-05-18 RX ORDER — FENTANYL CITRATE 50 UG/ML
INJECTION, SOLUTION INTRAMUSCULAR; INTRAVENOUS AS NEEDED
Status: DISCONTINUED | OUTPATIENT
Start: 2025-05-18 | End: 2025-05-18 | Stop reason: SURG

## 2025-05-18 RX ORDER — OXYCODONE AND ACETAMINOPHEN 5; 325 MG/1; MG/1
1 TABLET ORAL ONCE AS NEEDED
Status: DISCONTINUED | OUTPATIENT
Start: 2025-05-18 | End: 2025-05-18 | Stop reason: HOSPADM

## 2025-05-18 RX ORDER — MEPERIDINE HYDROCHLORIDE 25 MG/ML
12.5 INJECTION INTRAMUSCULAR; INTRAVENOUS; SUBCUTANEOUS
Status: DISCONTINUED | OUTPATIENT
Start: 2025-05-18 | End: 2025-05-18 | Stop reason: HOSPADM

## 2025-05-18 RX ADMIN — FAMOTIDINE 20 MG: 20 TABLET ORAL at 12:43

## 2025-05-18 RX ADMIN — OXYCODONE HYDROCHLORIDE 10 MG: 10 TABLET ORAL at 05:28

## 2025-05-18 RX ADMIN — GABAPENTIN 900 MG: 300 CAPSULE ORAL at 05:28

## 2025-05-18 RX ADMIN — FENTANYL CITRATE 100 MCG: 50 INJECTION INTRAMUSCULAR; INTRAVENOUS at 09:03

## 2025-05-18 RX ADMIN — BACLOFEN 20 MG: 10 TABLET ORAL at 05:28

## 2025-05-18 RX ADMIN — DEXAMETHASONE SODIUM PHOSPHATE 4 MG: 4 INJECTION, SOLUTION INTRA-ARTICULAR; INTRALESIONAL; INTRAMUSCULAR; INTRAVENOUS; SOFT TISSUE at 09:07

## 2025-05-18 RX ADMIN — TIZANIDINE 2 MG: 4 TABLET ORAL at 17:38

## 2025-05-18 RX ADMIN — GABAPENTIN 900 MG: 300 CAPSULE ORAL at 17:39

## 2025-05-18 RX ADMIN — Medication 10 ML: at 21:17

## 2025-05-18 RX ADMIN — PROPOFOL 150 MG: 10 INJECTION, EMULSION INTRAVENOUS at 09:02

## 2025-05-18 RX ADMIN — ONDANSETRON 4 MG: 2 INJECTION INTRAMUSCULAR; INTRAVENOUS at 08:58

## 2025-05-18 RX ADMIN — HYDROXYZINE HYDROCHLORIDE 25 MG: 25 TABLET, FILM COATED ORAL at 17:38

## 2025-05-18 RX ADMIN — CETIRIZINE HYDROCHLORIDE 10 MG: 10 TABLET, FILM COATED ORAL at 12:54

## 2025-05-18 RX ADMIN — FAMOTIDINE 20 MG: 10 INJECTION, SOLUTION INTRAVENOUS at 08:58

## 2025-05-18 RX ADMIN — SODIUM CHLORIDE, POTASSIUM CHLORIDE, SODIUM LACTATE AND CALCIUM CHLORIDE 100 ML/HR: 600; 310; 30; 20 INJECTION, SOLUTION INTRAVENOUS at 14:26

## 2025-05-18 RX ADMIN — BACLOFEN 20 MG: 10 TABLET ORAL at 21:10

## 2025-05-18 RX ADMIN — OXYCODONE HYDROCHLORIDE 20 MG: 20 TABLET, FILM COATED, EXTENDED RELEASE ORAL at 21:10

## 2025-05-18 RX ADMIN — NAPROXEN 500 MG: 250 TABLET ORAL at 17:38

## 2025-05-18 RX ADMIN — BACLOFEN 20 MG: 10 TABLET ORAL at 14:07

## 2025-05-18 RX ADMIN — SODIUM CHLORIDE, POTASSIUM CHLORIDE, SODIUM LACTATE AND CALCIUM CHLORIDE: 600; 310; 30; 20 INJECTION, SOLUTION INTRAVENOUS at 08:58

## 2025-05-18 RX ADMIN — BUSPIRONE HYDROCHLORIDE 5 MG: 5 TABLET ORAL at 05:28

## 2025-05-18 RX ADMIN — TIZANIDINE 2 MG: 4 TABLET ORAL at 14:07

## 2025-05-18 RX ADMIN — TIZANIDINE 2 MG: 4 TABLET ORAL at 05:28

## 2025-05-18 RX ADMIN — QUETIAPINE FUMARATE 200 MG: 100 TABLET ORAL at 21:10

## 2025-05-18 RX ADMIN — FERROUS SULFATE TAB 325 MG (65 MG ELEMENTAL FE) 325 MG: 325 (65 FE) TAB at 12:43

## 2025-05-18 RX ADMIN — TIZANIDINE 4 MG: 4 TABLET ORAL at 21:09

## 2025-05-18 RX ADMIN — FENTANYL CITRATE 50 MCG: 50 INJECTION, SOLUTION INTRAMUSCULAR; INTRAVENOUS at 10:10

## 2025-05-18 RX ADMIN — BACLOFEN 20 MG: 10 TABLET ORAL at 12:42

## 2025-05-18 RX ADMIN — FENTANYL CITRATE 50 MCG: 50 INJECTION, SOLUTION INTRAMUSCULAR; INTRAVENOUS at 10:00

## 2025-05-18 RX ADMIN — OXYCODONE HYDROCHLORIDE 10 MG: 10 TABLET ORAL at 14:07

## 2025-05-18 RX ADMIN — GABAPENTIN 900 MG: 300 CAPSULE ORAL at 14:08

## 2025-05-18 RX ADMIN — HEPARIN SODIUM 5000 UNITS: 5000 INJECTION INTRAVENOUS; SUBCUTANEOUS at 21:17

## 2025-05-18 RX ADMIN — LIDOCAINE 4% 1 APPLICATION: 4 CREAM TOPICAL at 21:18

## 2025-05-18 RX ADMIN — BACLOFEN 20 MG: 10 TABLET ORAL at 17:38

## 2025-05-18 RX ADMIN — OXYCODONE HYDROCHLORIDE 10 MG: 10 TABLET ORAL at 17:38

## 2025-05-18 NOTE — PROGRESS NOTES
[facility] HOSPITALIST PROGRESS NOTE     Patient Identification:  Name:  Ang Jackson  Age:  64 y.o.  Sex:  male  :  1960  MRN:  [id number]  Visit Number:  [id number]  ROOM: [address] [place] [address]     Primary Care Provider:  Deepak Perez    Length of stay in inpatient status:  2    Subjective     Chief Compliant:    Chief Complaint   Patient presents with    Abnormal Lab       History of Presenting Illness:    Patient denies any new complaints. Patient seen postoperatively, tolerated surgery well. Later in day reported anxiety per nursing staff.     ROS:  Otherwise 10 point ROS negative other than documented above in HPI.     Objective     Current Hospital Meds:Acidophilus/Pectin, 1 capsule, Oral, Daily  ascorbic acid, 1,000 mg, Oral, Daily  baclofen, 20 mg, Oral, Q8H  baclofen, 20 mg, Oral, TID With Meals  Brexpiprazole, 1 mg, Oral, Daily  busPIRone, 5 mg, Oral, Q24H  cetirizine, 10 mg, Oral, Daily  cyanocobalamin, 1,000 mcg, Subcutaneous, Q14 Days  ertapenem, 1,000 mg, Intravenous, Q24H  famotidine, 20 mg, Oral, Daily  ferrous sulfate, 325 mg, Oral, Daily With Breakfast  FLUoxetine, 20 mg, Oral, Daily  gabapentin, 900 mg, Oral, TID With Meals  heparin (porcine), 5,000 Units, Subcutaneous, Q12H  lidocaine, 1 Application, Topical, BID  multivitamin with minerals, 1 tablet, Oral, Daily  naproxen, 500 mg, Oral, BID With Meals  oxyCODONE, 10 mg, Oral, Q6H  oxyCODONE ER, 20 mg, Oral, Nightly  pantoprazole, 40 mg, Oral, Daily  QUEtiapine, 200 mg, Oral, Nightly  sodium chloride, 10 mL, Intravenous, Q12H  tiZANidine, 2 mg, Oral, 4x Daily  tiZANidine, 4 mg, Oral, Nightly  vitamin D3, 5,000 Units, Oral, Daily    lactated ringers, 100 mL/hr, Last Rate: 100 mL/hr (25 1426)  Pharmacy to dose vancomycin,         Current Antimicrobial Therapy:  Anti-Infectives (From admission, onward)      Ordered     Dose/Rate Route Frequency Start Stop    25 7068  vancomycin IVPB 1500 mg in 0.9% NaCl (Premix) 500 mL   Status:  Discontinued        Ordering Provider: Natacha Huerta DO    1,500 mg  333.3 mL/hr over 90 Minutes Intravenous Every 12 Hours 05/17/25 2100 05/17/25 0536    05/17/25 0536  vancomycin IVPB 1500 mg in 0.9% NaCl (Premix) 500 mL  Status:  Discontinued        Ordering Provider: Natacha Huerta DO    1,500 mg  333.3 mL/hr over 90 Minutes Intravenous Every 12 Hours 05/17/25 0900 05/17/25 1343    05/16/25 2302  ertapenem (INVanz) 1,000 mg in sodium chloride 0.9 % 100 mL IVPB-VTB        Ordering Provider: Deepak Lara MD    1,000 mg  200 mL/hr over 30 Minutes Intravenous Every 24 Hours 05/17/25 0000 05/21/25 2359    05/16/25 2302  Pharmacy to dose vancomycin        Ordering Provider: Deepak Lara MD     Not Applicable Continuous PRN 05/16/25 2302 05/23/25 2301    05/16/25 1603  piperacillin-tazobactam (ZOSYN) IVPB 3.375 g IVPB in 100 mL NS (VTB)        Ordering Provider: Nico Hall PA    3.375 g  over 30 Minutes Intravenous Once 05/16/25 1619 05/16/25 2037    05/16/25 1603  vancomycin IVPB 2000 mg in 0.9% Sodium Chloride 500 mL        Ordering Provider: Nico Hall PA    20 mg/kg × 95.3 kg  250 mL/hr over 120 Minutes Intravenous Once 05/16/25 1619 05/16/25 2214          Current Diuretic Therapy:  Diuretics (From admission, onward)      None          ----------------------------------------------------------------------------------------------------------------------  Vital Signs:  Temp:  [97 °F (36.1 °C)-100.1 °F (37.8 °C)] 99.1 °F (37.3 °C)  Heart Rate:  [55-83] 75  Resp:  [7-20] 20  BP: (112-152)/(64-86) 124/66  SpO2:  [90 %-100 %] 96 %  on  Flow (L/min) (Oxygen Therapy):  [2] 2;   Device (Oxygen Therapy): nasal cannula  Body mass index is 31.98 kg/m².    Wt Readings from Last 3 Encounters:   05/16/25 95.4 kg (210 lb 5.1 oz)   05/16/25 93 kg (205 lb)   05/07/25 93 kg (205 lb)     Intake & Output (last 3 days)         05/15 0701  05/16 0700 05/16 0701 05/17 0700 05/17  0701  05/18 0700 05/18 0701  05/19 0700    P.O.  240 960 840    I.V. (mL/kg)   1727 (18.1) 700 (7.3)    Total Intake(mL/kg)  240 (2.5) 2687 (28.2) 1540 (16.1)    Urine (mL/kg/hr)   4400 (1.9) 600 (0.5)    Total Output   4400 600    Net  +240 -1713 +940                  Diet: Regular/House; Fluid Consistency: Thin (IDDSI 0)  ----------------------------------------------------------------------------------------------------------------------  Physical exam:  Constitutional:  Well-developed and well-nourished.  No respiratory distress.      HENT:  Head:  Normocephalic and atraumatic.  Mouth:  Moist mucous membranes.    Eyes:  Conjunctivae and EOM are normal. No scleral icterus.    Neck:  Neck supple.  No JVD present.    Cardiovascular:  Normal rate, regular rhythm and normal heart sounds with no murmur.  Pulmonary/Chest:  No respiratory distress, no wheezes, no crackles, with normal breath sounds and good air movement.  Abdominal:  Soft.  Bowel sounds are normal.  No distension and no tenderness.   Musculoskeletal:  No edema, no tenderness, and no deformity.  No red or swollen joints anywhere.    Neurological:  Alert and oriented to person, place, and time.  No cranial nerve deficit.  No tongue deviation.  No facial droop.  No slurred speech.   Skin:  Skin is warm and dry. No rash noted. No pallor.   Peripheral vascular:  Pulses in all 4 extremities with no clubbing, no cyanosis, no edema.  ----------------------------------------------------------------------------------------------------------------------  Tele:    ----------------------------------------------------------------------------------------------------------------------  Results from last 7 days   Lab Units 05/18/25  0231 05/17/25  0446 05/16/25  1544 05/15/25  1606   CRP mg/dL  --   --  8.23* 7.76*   LACTATE mmol/L  --   --  1.1  --    WBC 10*3/mm3 5.36 7.22 6.19 7.52   HEMOGLOBIN g/dL 9.2* 9.8* 9.2* 9.6*   HEMATOCRIT % 31.4* 32.2* 31.1* 31.7*   MCV fL  "92.9 93.1 94.0 93.2   MCHC g/dL 29.3* 30.4* 29.6* 30.3*   PLATELETS 10*3/mm3 177 201 189 218         Results from last 7 days   Lab Units 05/18/25  0231 05/17/25  0446 05/16/25  1544 05/15/25  1606   SODIUM mmol/L 145 141 142 142   POTASSIUM mmol/L 3.5 3.8 3.8 3.8   CHLORIDE mmol/L 110* 104 105 103   CO2 mmol/L 27.4 28.2 30.4* 30.5*   BUN mg/dL 12 14 20 20   CREATININE mg/dL 0.29* 0.32* 0.47* 0.49*   CALCIUM mg/dL 8.4* 8.5* 8.7 8.7   GLUCOSE mg/dL 96 110* 134* 166*   ALBUMIN g/dL  --  2.5* 2.6* 2.8*   BILIRUBIN mg/dL  --  0.2 <0.2 <0.2   ALK PHOS U/L  --  72 71 77   AST (SGOT) U/L  --  8 7 7   ALT (SGPT) U/L  --  5 <5 <5   Estimated Creatinine Clearance: 292.1 mL/min (A) (by C-G formula based on SCr of 0.29 mg/dL (L)).  No results found for: \"AMMONIA\"              No results found for: \"HGBA1C\", \"POCGLU\"  Lab Results   Component Value Date    TSH 1.350 01/15/2025    FREET4 0.9 11/22/2024     No results found for: \"PREGTESTUR\", \"PREGSERUM\", \"HCG\", \"HCGQUANT\"  Pain Management Panel           No data to display              Brief Urine Lab Results  (Last result in the past 365 days)        Color   Clarity   Blood   Leuk Est   Nitrite   Protein   CREAT   Urine HCG        05/16/25 1634 Dark Yellow   Turbid   Moderate (2+)   Large (3+)   Positive   100 mg/dL (2+)                 Blood Culture   Date Value Ref Range Status   05/16/2025 No growth at 2 days  Preliminary   05/16/2025 No growth at 2 days  Preliminary     No results found for: \"URINECX\"  Wound Culture   Date Value Ref Range Status   05/15/2025 Light growth (2+) Escherichia coli ESBL (A)  Final     Comment:       Consider infectious disease consult.  Susceptibility results may not correlate to clinical outcomes.     No results found for: \"STOOLCX\"  No results found for: \"RESPCX\"  No results found for: \"AFBCX\"  Results from last 7 days   Lab Units 05/17/25  0446 05/16/25  1544 05/15/25  1606   LACTATE mmol/L  --  1.1  --    SED RATE mm/hr 40* 57* 48*   CRP mg/dL  " --  8.23* 7.76*       I have personally looked at the labs and they are summarized above.  ----------------------------------------------------------------------------------------------------------------------  Detailed radiology reports for the last 24 hours:    Imaging Results (Last 24 Hours)       Procedure Component Value Units Date/Time    CT Pelvis With & Without Contrast [[id number]] Collected: 05/18/25 0820     Updated: 05/18/25 0826    Narrative:      EXAM:    CT Pelvis Without and With Intravenous Contrast     EXAM DATE:    5/17/2025 3:42 PM     CLINICAL HISTORY:    gluteal and scaral wounds; evaluate for osteomyelitis; T14.8XXA-Other  injury of unspecified body region, initial encounter; L08.9-Local  infection of the skin and subcutaneous tissue, unspecified;  T14.8XXA-Other injury of unspecified body region, initial encounter;  L08.9-Local infection of the skin and subcutaneous tissue, unspecified     TECHNIQUE:    Axial computed tomography images of the pelvis without and with  intravenous contrast.  Sagittal and coronal reformatted images were  created and reviewed.  This CT exam was performed using one or more of  the following dose reduction techniques:  automated exposure control,  adjustment of the mA and/or kV according to patient size, and/or use of  iterative reconstruction technique.     COMPARISON:    MRI 5/16/2025, CT 3/26/2025     FINDINGS:    Bowel:  Pelvic portions of GI tract are within normal limits.  No  obstruction.  No mucosal thickening.    Intraperitoneal space:  See below.      Bladder:  Chin catheter decompresses urinary bladder.  No stones.    Reproductive:  Mild prostate enlargement.    Bones/joints:  Distal portions of lumbosacral fusion hardware are  noted with lateral SI joint fusion also present.  Bilateral hip  arthroplasty.  Left deep gluteal ulceration is noted which essentially  extends to the level of the left ischial tuberosity. No sclerotic or  destructive bony  features identified.  Deep and wide-base right  sacral-gluteal ulceration is noted which extends to the level of the  right ischial tuberosity and approximates the gluteal crease. No  associated sclerosis or destructive bone changes identified in the  region of ulceration.  No acute fracture.  No dislocation.    Soft tissues:  Diffuse soft tissue edema, nonspecific.  Anasarca.  No  soft tissue loculated fluid collections are identified.       Impression:      1.  No soft tissue loculated fluid collections are identified.  2.  Diffuse soft tissue edema, nonspecific.  3.  Left deep gluteal ulceration is noted which essentially extends to  the level of the left ischial tuberosity. No sclerotic or destructive  bony features identified.  4.  Deep and wide-base right sacral-gluteal ulceration is noted which  extends to the level of the right ischial tuberosity and approximates  the gluteal crease. No associated sclerosis or destructive bone changes  identified in the region of ulceration.  5.  Anasarca.     This report was finalized on 5/18/2025 8:24 AM by Dr. Jason Kruse MD.             Assessment & Plan    #Deep unstageable ulcer w/ necrosis and suspected infection of left sacral/buttock area.  #Two right gluteal and sacral decuitus ulcers   - Surgery consulted  and took to Or on 5/18 for debridment.   - 5/1 Wound grew ESBL E. Coli and E. Faecalis on 5/1 and received Dalvance at that time.    - 5/15 Wound culture Growing E. Coli still. Will continue invance. Suspect dalvance may have treated E. Faecalils component.   - CT pelvis w/ contrast did not show fluid collection or bone involvement.   - ID consulted. Opting to continue vancomycin and ertapenem pending new culture.      Chronic medical conditions:  - Chronic pain syndrome. Continue home regimen, watch for over sedation.   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

## 2025-05-18 NOTE — ANESTHESIA POSTPROCEDURE EVALUATION
Patient: Ang Jackson    Procedure Summary       Date: 05/18/25 Room / Location: [facility] OR 01 /  [facility] OR    Anesthesia Start: 0858 Anesthesia Stop: 0938    Procedure: DEBRIDEMENT SACRAL ULCER/WOUND (Abdomen) Diagnosis:       Infected wound      (Infected wound [T14.8XXA, L08.9])    Surgeons: Deepak Lara MD Provider: Ry Laguna MD    Anesthesia Type: general ASA Status: 3            Anesthesia Type: general    Vitals  Vitals Value Taken Time   /81 05/18/25 09:50   Temp 97.2 °F (36.2 °C) 05/18/25 09:40   Pulse 60 05/18/25 09:57   Resp 10 05/18/25 09:55   SpO2 100 % 05/18/25 09:57   Vitals shown include unfiled device data.        Post Anesthesia Care and Evaluation    Patient location during evaluation: PACU  Patient participation: complete - patient participated  Level of consciousness: awake  Pain score: 2  Pain management: adequate    Airway patency: patent  Anesthetic complications: No anesthetic complications  PONV Status: none  Cardiovascular status: hemodynamically stable  Respiratory status: nasal cannula  Hydration status: acceptable    Comments: Patient with chronic sacral pain and more severe with any procedures in the past.

## 2025-05-18 NOTE — ANESTHESIA PROCEDURE NOTES
Airway  Reason: elective    Date/Time: 5/18/2025 9:04 AM  Airway not difficult    General Information and Staff    Patient location during procedure: OR  CRNA/CAA: Cheikh Newell CRNA    Indications and Patient Condition    Preoxygenated: yes  MILS not maintained throughout    Mask difficulty assessment: 0 - not attempted    Final Airway Details    Final airway type: supraglottic airway      Successful airway: unique  Size: 4   Number of attempts at approach: 1  Assessment: lips, teeth, and gum same as pre-op and atraumatic intubation    Additional Comments  Atraumatic LMA placement, dentition unchanged.

## 2025-05-18 NOTE — ANESTHESIA PREPROCEDURE EVALUATION
Anesthesia Evaluation     Patient summary reviewed and Nursing notes reviewed   no history of anesthetic complications:   NPO Solid Status: > 8 hours  NPO Liquid Status: > 8 hours           Airway   Mallampati: I  TM distance: >3 FB  Neck ROM: full  No difficulty expected  Dental    (+) poor dentition        Pulmonary     breath sounds clear to auscultation  (+) ,home oxygen (2 liters O2 at night.), sleep apnea  Cardiovascular   Exercise tolerance: good (4-7 METS)    ECG reviewed  Rhythm: regular  Rate: normal    (+) hypertension      Neuro/Psych  (+) weakness, numbness, psychiatric history Anxiety and Depression  GI/Hepatic/Renal/Endo    (+) obesity, GERD, diabetes mellitus  (-) morbid obesity    Musculoskeletal     Abdominal     Abdomen: soft.   Substance History - negative use     OB/GYN          Other   arthritis,     ROS/Med Hx Other: Paraplegia from spontaneous epidural abscess.    Normal ECG  When compared with ECG of 15-UMAIR-2021 12:47,  No significant change was found  Confirmed by LESLEY JOHNSON MD ([id number]) on 2/19/2023 12:46:41 PM        Phys Exam Other:  Only 3 teeth on bottom              Anesthesia Plan    ASA 3     general     intravenous induction     Anesthetic plan, risks, benefits, and alternatives have been provided, discussed and informed consent has been obtained with: patient.  Pre-procedure education provided  Use of blood products discussed with  Consented to blood products.    Plan discussed with CRNA.      CODE STATUS:    Code Status (Patient has no pulse and is not breathing): No CPR (Do Not Attempt to Resuscitate)  Medical Interventions (Patient has pulse or is breathing): Limited Support  Medical Intervention Limits: No intubation (DNI)

## 2025-05-18 NOTE — PLAN OF CARE
Goal Outcome Evaluation:           Progress: no change  Outcome Evaluation: Pt has been resting in bed through the night, AOX4, VSS on RA. Family at bedside. Scheduled pain meds given per orders. No acute changes or concerns at this time.

## 2025-05-18 NOTE — CONSULTS
INFECTIOUS DISEASE CONSULTATION REPORT        Patient Identification:  Name:  Ang Jackson  Age:  64 y.o.  Sex:  male  :  1960  MRN:  [id number]   Visit Number:  [id number]  Primary Care Physician:  Deepak Perez        Referring Provider: Dr. Jesus    Reason for consult: Infected sacral ulcers       LOS: 2 days        Subjective       Subjective     History of present illness:      Thank you Dr. Jesus for allowing us to participate in the care of your patient.  As you well know, Mr. Ang Jackson is a 64 y.o. male with past medical history significant for cervical degenerative disc disease status post C6-7 anterior cervical discectomy and fusion in  and nontraumatic cervical spinal cord injury secondary with cervical epidural abscess with cord compression from C3-C5 in 2020 for status post incision and drainage with C4 corpectomy, C3-6 laminectomy and emergent hematoma evacuation.  Neurogenic bladder with chronic Chin catheter due to significant spinal trauma.  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  Underwent rehab at [facility] from November through 2024.  Recently underwent Botox injections to the upper extremities for spastic tetraplegia on 2025, who presented to [facility] on 2025 for sacral wound infections.    On or about 2025, the patient was noted to have wounds on his buttocks and coccyx and has followed with outpatient wound care clinic since that time.  With bilateral gluteal and sacral wounds.  It appears the patient received Dalvance in the infusion clinic per wound care on 2025.  On wound care follow-up 5/15/2025, APRN reported worsening of left gluteal wound with increasing tunneling area.  Patient was encouraged to seek ED evaluation but wanted to wait for repeat labs and wound culture from an in office  debridement.  Those cultures have revealed ESBL E. coli and Enterococcus faecalis.  Sed rate increased from 48-57.  The patient denied any fever but did report sensitivities and change in temperature since surgery.  Denied any nausea or vomiting.  No abdominal pain or diarrhea.    On arrival to ED, CRP elevated 8.23.  Lactate normal at 1.1.  WBC normal at 6.19.  Sed rate 57.  Urinalysis with positive nitrites, 3+ leukocytes but 6-10 WBCs and trace bacteria.  No urine culture.  Blood cultures x 2 preliminarily reporting no growth at 24 hours.  Left buttock wound cultures from 5/15 and at 5 5/1/2025 reporting E. coli ESBL and Enterococcus faecalis.  MRI cervical spine with and without contrast reported surgical fusion with surgical hardware artifact limits evaluation.  Myelomalacia cervical cord.  MRI thoracic spine with and without contrast reported no acute process.  MRI pelvis without contrast reported limited exam.  Postsurgical changes with subcutaneous edema and fluid superficial to the hips, further evaluation if clinically warranted.  CT pelvis with and without contrast reported no soft tissue loculated fluid collections identified.  Diffuse soft tissue edema, nonspecific.  Left deep gluteal ulceration noted which essentially extends to the level of the left ischial tuberosity.  No sclerotic or destructive bony features identified.  Deep and wide base right sacral gluteal ulceration noted which extends to the level of the right ischial tuberosity and approximates the gluteal crease.  No associated sclerosis or destructive bone changes identified in the region of ulceration.  Anasarca.    The patient was taken to the OR for incision and debridement of the left and right gluteal wounds involving necrotic skin.  The patient is awake and alert, resting comfortably in bed.  On 2 L nasal cannula with no apparent distress.  Overnight Tmax 100.1 °F.  Denies diarrhea.  Denies any issues with chronic Chin catheter.   Generalized edema is noted.  Lung exam clear to auscultation bilaterally.  Abdomen soft and nontender with normoactive bowel sounds.  WBC normal at 5.36.  Intraoperative tissue/bone culture preliminarily reports rare WBCs and no organisms on Gram stain.      Infectious Disease consultation was requested for antimicrobial management.      ---------------------------------------------------------------------------------------------------------------------     Review Of Systems:    Constitutional: no fever, chills and night sweats. No appetite change or unexpected weight change. No fatigue.  Eyes: no eye drainage, itching or redness.  HEENT: no mouth sores, dysphagia or nose bleed.  Respiratory: no for shortness of breath, cough or production of sputum.  Cardiovascular: no chest pain, no palpitations, no orthopnea.  Gastrointestinal: no nausea, vomiting or diarrhea. No abdominal pain, hematemesis or rectal bleeding.  Genitourinary: no dysuria or polyuria.  Chronic indwelling Chin catheter  Hematologic/lymphatic: no lymph node abnormalities, no easy bruising or easy bleeding.  Musculoskeletal: no muscle or joint pain.  Skin: No rash and no itching.  Sacral/gluteal ulcers, penile and scrotal ulcers  Neurological: no loss of consciousness, no seizure, no headache.  History traumatic spinal injury  Behavioral/Psych: no depression or suicidal ideation.  Endocrine: no hot flashes.  Immunologic: negative.    ---------------------------------------------------------------------------------------------------------------------     Past Medical History    Past Medical History:   Diagnosis Date    Anesthesia     diffculty adminster spinal block, patient stated with last hip surgery 7-2020 several attempts per anthesia and no luck, resulted in general anesthesia     Anxiety     Arthritis     Rheumatoid    COVID     2020    GERD (gastroesophageal reflux disease)     Hypertension     Lactose intolerance     Low back pain     Sleep  apnea     does not wear machine, MILD, DOES NOT HAVE AFTER WGT LOSS SURGERY    Wears reading eyeglasses        Past Surgical History    Past Surgical History:   Procedure Laterality Date    ANTERIOR CERVICAL DISCECTOMY W/ FUSION Right 02/19/2023    Procedure: CERVICAL DISCECTOMY ANTERIOR WITH FUSION C6-7;  Surgeon: Jaiden Aldridge MD;  Location:  [facility];  Service: Neurosurgery;  Laterality: Right;    BARIATRIC SURGERY  4 year ago    CARPAL TUNNEL RELEASE Bilateral     CARPAL TUNNEL RELEASE Right 06/29/2023    Procedure: CARPAL TUNNEL RELEASE RIGHT;  Surgeon: Jaiden Aldridge MD;  Location:  [facility];  Service: Neurosurgery;  Laterality: Right;    COLONOSCOPY      5 years ago    COLONOSCOPY N/A 04/05/2022    Procedure: COLONOSCOPY FOR SCREENING;  Surgeon: Luz Galvez MD;  Location:  [facility];  Service: Gastroenterology;  Laterality: N/A;    ENDOSCOPY N/A 04/05/2022    Procedure: ESOPHAGOGASTRODUODENOSCOPY WITH BIOPSY;  Surgeon: Luz Galvez MD;  Location:  [facility];  Service: Gastroenterology;  Laterality: N/A;    GASTRIC SLEEVE LAPAROSCOPIC      2017    HIP SURGERY Right times 2    KNEE ARTHROSCOPY Left     TOTAL HIP ARTHROPLASTY Left 01/25/2021    Procedure: TOTAL HIP ARTHROPLASTY LEFT;  Surgeon: Jb Patel MD;  Location:  [facility];  Service: Orthopedics;  Laterality: Left;    TOTAL HIP ARTHROPLASTY REVISION Right 07/20/2020    Procedure: TOTAL HIP ARTHROPLASTY REVISION RIGHT;  Surgeon: Jb Patel MD;  Location:  [facility];  Service: Orthopedics;  Laterality: Right;       Family History    Family History   Problem Relation Age of Onset    Heart disease Father     Hypertension Father     Osteoarthritis Father     Cancer Mother     Diabetes Brother     Hypertension Brother     Gout Paternal Grandmother     Diabetes Paternal Grandmother     Colon cancer Maternal Uncle     Colon polyps Maternal Uncle        Social History    Social History     Tobacco Use    Smoking  status: Never    Smokeless tobacco: Never   Vaping Use    Vaping status: Never Used   Substance Use Topics    Alcohol use: No    Drug use: Never       Allergies    Sulfa antibiotics, Codeine, Ketorolac, and Morphine and codeine  ---------------------------------------------------------------------------------------------------------------------     Home Medications:    Prior to Admission Medications       Prescriptions Last Dose Informant Patient Reported? Taking?    ascorbic acid (VITAMIN C) 500 MG tablet 5/16/2025 Child Yes Yes    Take 2 tablets by mouth Daily.    baclofen (LIORESAL) 10 MG tablet 5/16/2025 Child Yes Yes    Take 2 tablets by mouth 3 (Three) Times a Day.    baclofen (LIORESAL) 10 MG tablet Past Week Child Yes Yes    Take 5 tablets by mouth every night at bedtime.    Brexpiprazole (Rexulti) 1 MG tablet 5/16/2025 Child Yes Yes    Take 1 tablet by mouth Daily.    busPIRone (BUSPAR) 5 MG tablet Past Week Child Yes Yes    Take 1 tablet by mouth Every Night.    Cyanocobalamin (B-12 Compliance Injection) 1000 MCG/ML kit Past Month Child Yes Yes    Inject 1 mL as directed Every 14 (Fourteen) Days.    diclofenac (VOLTAREN) 75 MG EC tablet 5/16/2025 Child Yes Yes    Take 1 tablet by mouth 2 (Two) Times a Day.    famotidine (PEPCID) 20 MG tablet 5/16/2025 Child Yes Yes    Take 1 tablet by mouth Daily.    ferrous sulfate 325 (65 FE) MG tablet 5/16/2025 Child Yes Yes    Take 1 tablet by mouth Daily With Breakfast.    FLUoxetine (PROzac) 20 MG capsule 5/16/2025 Child Yes Yes    Take 1 capsule by mouth Daily.    gabapentin (NEURONTIN) 300 MG capsule 5/16/2025 Child Yes Yes    Take 3 capsules by mouth 3 (Three) Times a Day.    Lactobacillus Acid-Pectin (ACIDOPHILUS PLUS PECTIN PO) 5/16/2025 Child Yes Yes    Take 1 capsule by mouth Daily.    loratadine (CLARITIN) 10 MG tablet 5/16/2025 Child Yes Yes    Take 1 tablet by mouth Daily.    Multiple Vitamins-Minerals (MENS MULTIPLUS PO) 5/16/2025 Child Yes Yes    Take 1  tablet by mouth Daily.    omeprazole (priLOSEC) 20 MG capsule 5/16/2025 Child Yes Yes    Take 1 capsule by mouth 2 (Two) Times a Day.    oxyCODONE (ROXICODONE) 10 MG tablet 5/16/2025 Child Yes Yes    Take 1 tablet by mouth 4 (Four) Times a Day.    oxyCODONE ER (oxyCONTIN) 20 MG 12 hr tablet Past Week Child Yes Yes    Take 1 tablet by mouth Every Night.    pantoprazole (PROTONIX) 40 MG EC tablet 5/16/2025 Child Yes Yes    Take 1 tablet by mouth Daily.    QUEtiapine (SEROquel) 100 MG tablet Past Week Child Yes Yes    Take 1 tablet by mouth Take As Directed.    tiZANidine (ZANAFLEX) 2 MG tablet 5/16/2025 Child Yes Yes    Take 1 tablet by mouth 4 (Four) Times a Day.    tiZANidine (ZANAFLEX) 2 MG tablet Past Week Child Yes Yes    Take 2 tablets by mouth Every Night.    vitamin D3 125 MCG (5000 UT) capsule capsule 5/16/2025 Child Yes Yes    Take 1 capsule by mouth Daily.          ---------------------------------------------------------------------------------------------------------------------    Objective       Objective     Hospital Scheduled Meds:  Acidophilus/Pectin, 1 capsule, Oral, Daily  ascorbic acid, 1,000 mg, Oral, Daily  baclofen, 20 mg, Oral, Q8H  baclofen, 20 mg, Oral, TID With Meals  Brexpiprazole, 1 mg, Oral, Daily  busPIRone, 5 mg, Oral, Q24H  cetirizine, 10 mg, Oral, Daily  cyanocobalamin, 1,000 mcg, Subcutaneous, Q14 Days  ertapenem, 1,000 mg, Intravenous, Q24H  famotidine, 20 mg, Oral, Daily  ferrous sulfate, 325 mg, Oral, Daily With Breakfast  FLUoxetine, 20 mg, Oral, Daily  gabapentin, 900 mg, Oral, TID With Meals  heparin (porcine), 5,000 Units, Subcutaneous, Q12H  lidocaine, 1 Application, Topical, BID  multivitamin with minerals, 1 tablet, Oral, Daily  naproxen, 500 mg, Oral, BID With Meals  oxyCODONE, 10 mg, Oral, Q6H  oxyCODONE ER, 20 mg, Oral, Nightly  pantoprazole, 40 mg, Oral, Daily  QUEtiapine, 200 mg, Oral, Nightly  sodium chloride, 10 mL, Intravenous, Q12H  tiZANidine, 2 mg, Oral, 4x  Daily  tiZANidine, 4 mg, Oral, Nightly  vitamin D3, 5,000 Units, Oral, Daily      lactated ringers, 100 mL/hr, Last Rate: 100 mL/hr (05/17/25 4959)  Pharmacy to dose vancomycin,       ---------------------------------------------------------------------------------------------------------------------   Vital Signs:  Temp:  [97 °F (36.1 °C)-100.1 °F (37.8 °C)] 97.2 °F (36.2 °C)  Heart Rate:  [55-75] 64  Resp:  [7-20] 13  BP: (112-152)/(62-86) 142/75  Mean Arterial Pressure (Non-Invasive) for the past 24 hrs (Last 3 readings):   Noninvasive MAP (mmHg)   05/18/25 1005 104   05/18/25 1000 102   05/18/25 0955 104     SpO2 Percentage    05/18/25 1000 05/18/25 1005 05/18/25 1010   SpO2: 100% 100% 100%     SpO2:  [90 %-100 %] 100 %  on  Flow (L/min) (Oxygen Therapy):  [2] 2;   Device (Oxygen Therapy): nasal cannula    Body mass index is 31.98 kg/m².  Wt Readings from Last 3 Encounters:   05/16/25 95.4 kg (210 lb 5.1 oz)   05/16/25 93 kg (205 lb)   05/07/25 93 kg (205 lb)     ---------------------------------------------------------------------------------------------------------------------     Physical Exam:    Constitutional:  Well-developed and well-nourished.  Resting comfortably in bed, on 2 L nasal cannula with no respiratory distress.  The patient has just returned to his hospital room postoperatively.  Family members at the bedside.  Denies any complaints or issues this morning.     HENT:  Head: Normocephalic and atraumatic.  Mouth:  Moist mucous membranes.    Eyes:  Conjunctivae and EOM are normal.  No scleral icterus.  Neck:  Neck supple.  No JVD present.    Cardiovascular:  Normal rate, regular rhythm and normal heart sounds with no murmur. No edema.  Pulmonary/Chest:  No respiratory distress, no wheezes, no crackles, with normal breath sounds and good air movement.  Abdominal:  Soft.  Bowel sounds are normal.  No distension and no tenderness.   Musculoskeletal:  No edema, no tenderness, and no deformity.  No  "swelling or redness of joints.  Neurological:  Alert and oriented to person, place, and time.  No facial droop.  No slurred speech.   Skin:  Skin is warm and dry.  No rash noted.  No pallor.  Pressure injuries noted to the left scrotum and penile meatus.  Sacral and ischial ulcers are dressed postoperatively, clean dry and intact.  Wound images from admission reviewed.  Psychiatric:  Normal mood and affect.  Behavior is normal.    ---------------------------------------------------------------------------------------------------------------------              Results from last 7 days   Lab Units 05/18/25  0231 05/17/25  0446 05/16/25  1544 05/15/25  1606   CRP mg/dL  --   --  8.23* 7.76*   LACTATE mmol/L  --   --  1.1  --    WBC 10*3/mm3 5.36 7.22 6.19 7.52   HEMOGLOBIN g/dL 9.2* 9.8* 9.2* 9.6*   HEMATOCRIT % 31.4* 32.2* 31.1* 31.7*   MCV fL 92.9 93.1 94.0 93.2   MCHC g/dL 29.3* 30.4* 29.6* 30.3*   PLATELETS 10*3/mm3 177 201 189 218     Results from last 7 days   Lab Units 05/18/25 0231 05/17/25 0446 05/16/25  1544 05/15/25  1606   SODIUM mmol/L 145 141 142 142   POTASSIUM mmol/L 3.5 3.8 3.8 3.8   CHLORIDE mmol/L 110* 104 105 103   CO2 mmol/L 27.4 28.2 30.4* 30.5*   BUN mg/dL 12 14 20 20   CREATININE mg/dL 0.29* 0.32* 0.47* 0.49*   CALCIUM mg/dL 8.4* 8.5* 8.7 8.7   GLUCOSE mg/dL 96 110* 134* 166*   ALBUMIN g/dL  --  2.5* 2.6* 2.8*   BILIRUBIN mg/dL  --  0.2 <0.2 <0.2   ALK PHOS U/L  --  72 71 77   AST (SGOT) U/L  --  8 7 7   ALT (SGPT) U/L  --  5 <5 <5   Estimated Creatinine Clearance: 292.1 mL/min (A) (by C-G formula based on SCr of 0.29 mg/dL (L)).  No results found for: \"AMMONIA\"    No results found for: \"HGBA1C\", \"POCGLU\"  Lab Results   Component Value Date    HGBA1C 6.60 (H) 01/15/2025     Lab Results   Component Value Date    TSH 1.350 01/15/2025    FREET4 0.9 11/22/2024       Blood Culture   Date Value Ref Range Status   05/16/2025 No growth at 24 hours  Preliminary   05/16/2025 No growth at 24 hours  " "Preliminary     No results found for: \"URINECX\"  Wound Culture   Date Value Ref Range Status   05/15/2025 Light growth (2+) Escherichia coli ESBL (A)  Final     Comment:       Consider infectious disease consult.  Susceptibility results may not correlate to clinical outcomes.     No results found for: \"STOOLCX\"  No results found for: \"RESPCX\"  Pain Management Panel           No data to display                ---------------------------------------------------------------------------------------------------------------------  Imaging Results (Last 7 Days)       Procedure Component Value Units Date/Time    CT Pelvis With & Without Contrast [[id number]] Collected: 05/18/25 0820     Updated: 05/18/25 0826    Narrative:      EXAM:    CT Pelvis Without and With Intravenous Contrast     EXAM DATE:    5/17/2025 3:42 PM     CLINICAL HISTORY:    gluteal and scaral wounds; evaluate for osteomyelitis; T14.8XXA-Other  injury of unspecified body region, initial encounter; L08.9-Local  infection of the skin and subcutaneous tissue, unspecified;  T14.8XXA-Other injury of unspecified body region, initial encounter;  L08.9-Local infection of the skin and subcutaneous tissue, unspecified     TECHNIQUE:    Axial computed tomography images of the pelvis without and with  intravenous contrast.  Sagittal and coronal reformatted images were  created and reviewed.  This CT exam was performed using one or more of  the following dose reduction techniques:  automated exposure control,  adjustment of the mA and/or kV according to patient size, and/or use of  iterative reconstruction technique.     COMPARISON:    MRI 5/16/2025, CT 3/26/2025     FINDINGS:    Bowel:  Pelvic portions of GI tract are within normal limits.  No  obstruction.  No mucosal thickening.    Intraperitoneal space:  See below.      Bladder:  Chin catheter decompresses urinary bladder.  No stones.    Reproductive:  Mild prostate enlargement.    Bones/joints:  Distal portions " of lumbosacral fusion hardware are  noted with lateral SI joint fusion also present.  Bilateral hip  arthroplasty.  Left deep gluteal ulceration is noted which essentially  extends to the level of the left ischial tuberosity. No sclerotic or  destructive bony features identified.  Deep and wide-base right  sacral-gluteal ulceration is noted which extends to the level of the  right ischial tuberosity and approximates the gluteal crease. No  associated sclerosis or destructive bone changes identified in the  region of ulceration.  No acute fracture.  No dislocation.    Soft tissues:  Diffuse soft tissue edema, nonspecific.  Anasarca.  No  soft tissue loculated fluid collections are identified.       Impression:      1.  No soft tissue loculated fluid collections are identified.  2.  Diffuse soft tissue edema, nonspecific.  3.  Left deep gluteal ulceration is noted which essentially extends to  the level of the left ischial tuberosity. No sclerotic or destructive  bony features identified.  4.  Deep and wide-base right sacral-gluteal ulceration is noted which  extends to the level of the right ischial tuberosity and approximates  the gluteal crease. No associated sclerosis or destructive bone changes  identified in the region of ulceration.  5.  Anasarca.     This report was finalized on 5/18/2025 8:24 AM by Dr. Jason Kruse MD.       MRI Pelvis Without Contrast [[id number]] Collected: 05/16/25 1933     Updated: 05/16/25 1959    Narrative:      Comparison: None     Patient motion artifact limits evaluation. Fatty atrophy is noted.  Bilateral hip prostheses limits evaluation. Postsurgical changes hips  bilaterally with soft tissue edema. No free fluid seen within the  pelvis.      Subcutaneous edema of the hips bilaterally with fluid along the  subcutaneous tissues superficial to the hips which is partially imaged.  If concern for abscess or acute etiology recommend dedicated imaging of  the hips with enhancement.        Impression:      Impression:  1. Limited exam.  2. Postsurgical changes with subcutaneous edema and fluid superficial to  the hips, further evaluation if clinically warranted.     This report was finalized on 5/16/2025 7:33 PM by Angel Monet DO.       MRI Cervical Spine With & Without Contrast [[id number]] Collected: 05/16/25 1933     Updated: 05/16/25 1958    Narrative:      Comparison: None     Surgical hardware artifact limits evaluation.      Surgical fusion C3-T1. Craniocervical junction appears intact.  Myelomalacia cervical cord C5-C6 level. No suspicious bone marrow edema  is seen. No pathologic enhancement is seen. Foramina are not well  evaluated due to metallic artifact, suspect multilevel foraminal  stenosis. No significant spinal canal stenosis.        Impression:      Impression:  1. Surgical fusion with surgical hardware artifact limits evaluation.  2. Myelomalacia cervical cord.     This report was finalized on 5/16/2025 7:33 PM by Angel Monet DO.       MRI Thoracic Spine With & Without Contrast [[id number]] Collected: 05/16/25 1933     Updated: 05/16/25 1958    Narrative:      Comparison: None     Surgical fusion lower thoracic spine which limits evaluation. Facet  arthropathy causing moderate to severe foraminal stenosis left T10-T11.  No acute fracture seen. Thoracic cord is grossly unremarkable. No  suspicious bone marrow edema is seen. No pathologic enhancement is  identified.       Impression:      Impression:  1. No acute process.     This report was finalized on 5/16/2025 7:33 PM by Angel Monet DO.               ---------------------------------------------------------------------------------------------------------------------      Pertinent Infectious Disease Results          Assessment & Plan      Isolation Status:  Contact     Assessment      Infected chronic sacral ulcers        Plan      The patient was taken to the OR for incision and debridement of the left and right  gluteal wounds involving necrotic skin.  The patient is awake and alert, resting comfortably in bed.  On 2 L nasal cannula with no apparent distress.  Overnight Tmax 100.1 °F.  Denies diarrhea.  Denies any issues with chronic Chin catheter.  Generalized edema is noted.  Lung exam clear to auscultation bilaterally.  Abdomen soft and nontender with normoactive bowel sounds.  WBC normal at 5.36.  Intraoperative tissue/bone culture preliminarily reports rare WBCs and no organisms on Gram stain.    General surgery operative note reviewed, reported necrotic tissue and subcutaneous fat was sharply excised down to the fascia, not involving fascia.  No mention of bony involvement.  MRI does not report any evidence of osteomyelitis.    The patient was initiated on ertapenem 1 g IV 24 hours and vancomycin per pharmacy dosing based on wound culture sensitivity report.  We are agreeable with continuing ertapenem and vancomycin courses for the treatment of infected sacral ulcers, for 10 to 14 days pending clinical progress.  Continue to monitor closely and plan to adjust antibiotic regimen as clinically appropriate.        RECENT ANTIMICROBIAL THERAPY    ertapenem (INVanz) 1000 mg in 100 mL NS (VTB)  Pharmacy to dose vancomycin       Again, thank you Dr. Jesus for allowing us to participate in the care of your patient and please feel free to call for any questions you may have.        Code Status:     Code Status and Medical Interventions: No CPR (Do Not Attempt to Resuscitate); Limited Support; No intubation (DNI)   Ordered at: 05/16/25 2148     Code Status (Patient has no pulse and is not breathing):    No CPR (Do Not Attempt to Resuscitate)     Medical Interventions (Patient has pulse or is breathing):    Limited Support     Medical Intervention Limits:    No intubation (DNI)         TANJA Monroe  05/18/25  11:31 EDT

## 2025-05-18 NOTE — PLAN OF CARE
Goal Outcome Evaluation:              Outcome Evaluation: Pt's VSS on RA. Family to remain at bedside. Pt had surgery today. Pt's dressing remain CDI. No concern or complaints at this time. Will follow POC.

## 2025-05-18 NOTE — PROGRESS NOTES
Chief complaint: Necrotic pressure ulcerations bilateral gluteal region    No complaints overnight.  Wounds unchanged    64-year-old quadriplegic male with necrotic pressure ulcerations of the bilateral gluteal region  -Continue antibiotics  -Follow-up wound culture  -OR today for excisional debridement

## 2025-05-18 NOTE — OP NOTE
DEBRIDEMENT SACRAL ULCER/WOUND  Procedure Note    Ang Jackson  5/18/2025    Pre-op Diagnosis:   Infected wound [T14.8XXA, L08.9]    Post-op Diagnosis:     Post-Op Diagnosis Codes:     * Infected wound [T14.8XXA, L08.9]    Procedure(s):  SHARP EXCISIONAL DEBRIDEMENT OF SKIN AND SUBCUTANEOUS FAT   BILATERAL GLUTEAL WOUNDS TOTAL WOUND 14 X 13 CM    Surgeon(s):  Deepak Lara MD    Anesthesia: Choice    Staff:   Circulator: Claire Stark RN  Scrub Person: Indigo Larsen CSFA  Assistant: Jose Billings CSFA    Findings: 10 x 8 cm left gluteal wound involving skin and subcutaneous fat with necrotic tissue    4 x 5 cm right gluteal wound involving necrotic skin            Operative Procedure: Patient was taken operating suite and placed in right lateral decubitus position after induction of MAC anesthesia.  The bilateral gluteal regions were prepped and draped in usual sterile fashion.  Timeout procedure was performed.  On the left gluteal region there was a 10 x 8 cm wound going down to the presacral fascia but not involving the fascia.  Over this 10 by centimeter area there is necrotic skin and subcutaneous fat that was sharply excised with Metzenbaum scissors to healthy bleeding tissue throughout.  On the contralateral gluteal region a 4 x 5 cm area of skin necrosis was noted which was sharply excised with curved Parry scissors.  Hemostasis was achieved with cautery and the wounds were irrigated and packed with gauze.  Patient tolerated the procedure well.    Estimated Blood Loss: 5 mL    Specimens:   None tissue for culture left gluteal wound           Drains: None     Grafts/Implants: None    Complications: None           Deepak Lara MD     Date: 5/18/2025  Time: 09:38 EDT

## 2025-05-19 LAB
ALBUMIN SERPL-MCNC: 2.3 G/DL (ref 3.5–5.2)
ALBUMIN/GLOB SERPL: 0.9 G/DL
ALP SERPL-CCNC: 63 U/L (ref 39–117)
ALT SERPL W P-5'-P-CCNC: <5 U/L (ref 1–41)
ANION GAP SERPL CALCULATED.3IONS-SCNC: 7.6 MMOL/L (ref 5–15)
AST SERPL-CCNC: 9 U/L (ref 1–40)
BASOPHILS # BLD AUTO: 0.03 10*3/MM3 (ref 0–0.2)
BASOPHILS NFR BLD AUTO: 0.5 % (ref 0–1.5)
BILIRUB SERPL-MCNC: <0.2 MG/DL (ref 0–1.2)
BUN SERPL-MCNC: 15 MG/DL (ref 8–23)
BUN/CREAT SERPL: 48.4 (ref 7–25)
CALCIUM SPEC-SCNC: 8 MG/DL (ref 8.6–10.5)
CHLORIDE SERPL-SCNC: 108 MMOL/L (ref 98–107)
CK SERPL-CCNC: 21 U/L (ref 20–200)
CO2 SERPL-SCNC: 27.4 MMOL/L (ref 22–29)
CREAT SERPL-MCNC: 0.31 MG/DL (ref 0.76–1.27)
DEPRECATED RDW RBC AUTO: 43.7 FL (ref 37–54)
EGFRCR SERPLBLD CKD-EPI 2021: 132.4 ML/MIN/1.73
EOSINOPHIL # BLD AUTO: 0.08 10*3/MM3 (ref 0–0.4)
EOSINOPHIL NFR BLD AUTO: 1.4 % (ref 0.3–6.2)
ERYTHROCYTE [DISTWIDTH] IN BLOOD BY AUTOMATED COUNT: 12.8 % (ref 12.3–15.4)
GLOBULIN UR ELPH-MCNC: 2.5 GM/DL
GLUCOSE SERPL-MCNC: 109 MG/DL (ref 65–99)
HCT VFR BLD AUTO: 28.3 % (ref 37.5–51)
HGB BLD-MCNC: 8.5 G/DL (ref 13–17.7)
IMM GRANULOCYTES # BLD AUTO: 0.05 10*3/MM3 (ref 0–0.05)
IMM GRANULOCYTES NFR BLD AUTO: 0.9 % (ref 0–0.5)
LYMPHOCYTES # BLD AUTO: 1.63 10*3/MM3 (ref 0.7–3.1)
LYMPHOCYTES NFR BLD AUTO: 28 % (ref 19.6–45.3)
MCH RBC QN AUTO: 27.8 PG (ref 26.6–33)
MCHC RBC AUTO-ENTMCNC: 30 G/DL (ref 31.5–35.7)
MCV RBC AUTO: 92.5 FL (ref 79–97)
MONOCYTES # BLD AUTO: 0.57 10*3/MM3 (ref 0.1–0.9)
MONOCYTES NFR BLD AUTO: 9.8 % (ref 5–12)
NEUTROPHILS NFR BLD AUTO: 3.46 10*3/MM3 (ref 1.7–7)
NEUTROPHILS NFR BLD AUTO: 59.4 % (ref 42.7–76)
NRBC BLD AUTO-RTO: 0 /100 WBC (ref 0–0.2)
PLATELET # BLD AUTO: 188 10*3/MM3 (ref 140–450)
PMV BLD AUTO: 9.3 FL (ref 6–12)
POTASSIUM SERPL-SCNC: 3.5 MMOL/L (ref 3.5–5.2)
PROT SERPL-MCNC: 4.8 G/DL (ref 6–8.5)
RBC # BLD AUTO: 3.06 10*6/MM3 (ref 4.14–5.8)
SODIUM SERPL-SCNC: 143 MMOL/L (ref 136–145)
WBC NRBC COR # BLD AUTO: 5.82 10*3/MM3 (ref 3.4–10.8)

## 2025-05-19 PROCEDURE — 99231 SBSQ HOSP IP/OBS SF/LOW 25: CPT | Performed by: SURGERY

## 2025-05-19 PROCEDURE — 25010000002 DAPTOMYCIN PER 1 MG: Performed by: NURSE PRACTITIONER

## 2025-05-19 PROCEDURE — 99232 SBSQ HOSP IP/OBS MODERATE 35: CPT | Performed by: NURSE PRACTITIONER

## 2025-05-19 PROCEDURE — 80053 COMPREHEN METABOLIC PANEL: CPT | Performed by: INTERNAL MEDICINE

## 2025-05-19 PROCEDURE — G0545 PR INHERENT VISIT TO INPT: HCPCS | Performed by: NURSE PRACTITIONER

## 2025-05-19 PROCEDURE — 99232 SBSQ HOSP IP/OBS MODERATE 35: CPT | Performed by: INTERNAL MEDICINE

## 2025-05-19 PROCEDURE — 25810000003 LACTATED RINGERS PER 1000 ML: Performed by: SURGERY

## 2025-05-19 PROCEDURE — 82550 ASSAY OF CK (CPK): CPT | Performed by: NURSE PRACTITIONER

## 2025-05-19 PROCEDURE — 25010000002 HEPARIN (PORCINE) PER 1000 UNITS: Performed by: SURGERY

## 2025-05-19 PROCEDURE — 85025 COMPLETE CBC W/AUTO DIFF WBC: CPT | Performed by: INTERNAL MEDICINE

## 2025-05-19 PROCEDURE — 25010000002 ERTAPENEM PER 500 MG: Performed by: SURGERY

## 2025-05-19 RX ADMIN — BACLOFEN 20 MG: 10 TABLET ORAL at 05:54

## 2025-05-19 RX ADMIN — FERROUS SULFATE TAB 325 MG (65 MG ELEMENTAL FE) 325 MG: 325 (65 FE) TAB at 09:36

## 2025-05-19 RX ADMIN — TIZANIDINE 2 MG: 4 TABLET ORAL at 05:55

## 2025-05-19 RX ADMIN — Medication 10 ML: at 21:33

## 2025-05-19 RX ADMIN — TIZANIDINE 4 MG: 4 TABLET ORAL at 21:31

## 2025-05-19 RX ADMIN — FLUOXETINE HYDROCHLORIDE 20 MG: 20 CAPSULE ORAL at 09:37

## 2025-05-19 RX ADMIN — Medication 1 TABLET: at 09:36

## 2025-05-19 RX ADMIN — BACLOFEN 20 MG: 10 TABLET ORAL at 21:31

## 2025-05-19 RX ADMIN — GABAPENTIN 900 MG: 300 CAPSULE ORAL at 13:06

## 2025-05-19 RX ADMIN — PANTOPRAZOLE SODIUM 40 MG: 40 TABLET, DELAYED RELEASE ORAL at 09:36

## 2025-05-19 RX ADMIN — COLLAGENASE SANTYL 1 APPLICATION: 250 OINTMENT TOPICAL at 22:27

## 2025-05-19 RX ADMIN — QUETIAPINE FUMARATE 200 MG: 100 TABLET ORAL at 21:31

## 2025-05-19 RX ADMIN — OXYCODONE HYDROCHLORIDE 10 MG: 10 TABLET ORAL at 17:43

## 2025-05-19 RX ADMIN — GABAPENTIN 900 MG: 300 CAPSULE ORAL at 05:55

## 2025-05-19 RX ADMIN — NAPROXEN 500 MG: 250 TABLET ORAL at 17:42

## 2025-05-19 RX ADMIN — ERTAPENEM SODIUM 1000 MG: 1 INJECTION INTRAMUSCULAR; INTRAVENOUS at 00:59

## 2025-05-19 RX ADMIN — LIDOCAINE 4% 1 APPLICATION: 4 CREAM TOPICAL at 09:37

## 2025-05-19 RX ADMIN — BACLOFEN 20 MG: 10 TABLET ORAL at 09:36

## 2025-05-19 RX ADMIN — HEPARIN SODIUM 5000 UNITS: 5000 INJECTION INTRAVENOUS; SUBCUTANEOUS at 21:32

## 2025-05-19 RX ADMIN — SODIUM CHLORIDE, POTASSIUM CHLORIDE, SODIUM LACTATE AND CALCIUM CHLORIDE 100 ML/HR: 600; 310; 30; 20 INJECTION, SOLUTION INTRAVENOUS at 02:32

## 2025-05-19 RX ADMIN — Medication 1 CAPSULE: at 09:35

## 2025-05-19 RX ADMIN — TIZANIDINE 2 MG: 4 TABLET ORAL at 17:43

## 2025-05-19 RX ADMIN — BACLOFEN 20 MG: 10 TABLET ORAL at 17:42

## 2025-05-19 RX ADMIN — HEPARIN SODIUM 5000 UNITS: 5000 INJECTION INTRAVENOUS; SUBCUTANEOUS at 09:37

## 2025-05-19 RX ADMIN — OXYCODONE HYDROCHLORIDE 10 MG: 10 TABLET ORAL at 05:55

## 2025-05-19 RX ADMIN — BACLOFEN 20 MG: 10 TABLET ORAL at 13:06

## 2025-05-19 RX ADMIN — OXYCODONE HYDROCHLORIDE 20 MG: 20 TABLET, FILM COATED, EXTENDED RELEASE ORAL at 21:31

## 2025-05-19 RX ADMIN — SODIUM CHLORIDE, POTASSIUM CHLORIDE, SODIUM LACTATE AND CALCIUM CHLORIDE 100 ML/HR: 600; 310; 30; 20 INJECTION, SOLUTION INTRAVENOUS at 13:11

## 2025-05-19 RX ADMIN — NAPROXEN 500 MG: 250 TABLET ORAL at 09:35

## 2025-05-19 RX ADMIN — BUSPIRONE HYDROCHLORIDE 5 MG: 5 TABLET ORAL at 05:56

## 2025-05-19 RX ADMIN — DAPTOMYCIN 500 MG: 500 INJECTION, POWDER, LYOPHILIZED, FOR SOLUTION INTRAVENOUS at 17:43

## 2025-05-19 RX ADMIN — LIDOCAINE 4% 1 APPLICATION: 4 CREAM TOPICAL at 21:32

## 2025-05-19 RX ADMIN — Medication 5000 UNITS: at 09:36

## 2025-05-19 RX ADMIN — OXYCODONE HYDROCHLORIDE 10 MG: 10 TABLET ORAL at 13:07

## 2025-05-19 RX ADMIN — OXYCODONE HYDROCHLORIDE AND ACETAMINOPHEN 1000 MG: 500 TABLET ORAL at 09:36

## 2025-05-19 RX ADMIN — TIZANIDINE 2 MG: 4 TABLET ORAL at 09:36

## 2025-05-19 RX ADMIN — FAMOTIDINE 20 MG: 20 TABLET ORAL at 09:36

## 2025-05-19 RX ADMIN — OXYCODONE HYDROCHLORIDE 10 MG: 10 TABLET ORAL at 09:37

## 2025-05-19 RX ADMIN — Medication 10 ML: at 09:37

## 2025-05-19 RX ADMIN — BREXPIPRAZOLE 1 MG: 1 TABLET ORAL at 09:35

## 2025-05-19 RX ADMIN — CETIRIZINE HYDROCHLORIDE 10 MG: 10 TABLET, FILM COATED ORAL at 09:36

## 2025-05-19 RX ADMIN — GABAPENTIN 900 MG: 300 CAPSULE ORAL at 17:43

## 2025-05-19 RX ADMIN — BACLOFEN 20 MG: 10 TABLET ORAL at 15:35

## 2025-05-19 RX ADMIN — TIZANIDINE 2 MG: 4 TABLET ORAL at 13:06

## 2025-05-19 NOTE — NURSING NOTE
In room with TANJA Frias for wound assessment.      Unstageable PI to sacrum(documented on admit as perirectal, LDA changed) unstageable PI to left lower gluteal, stage III PI to right lower gluteal.  See attached flow sheet docuementation.  Order placed to cleanse with wound clenaser, apply santyl to wound beds, pack with NS dampened gauze and cover with abd pads and foam tape.    Stage II PI noted to scrotum and MASD to penis.  Order placed to cleanse with cleansing foam, pat dry, apply Z-Guard, and leave open to air BID.    Mario Alberto score 11.  PI prevention orders are in place.       05/19/25 1205   Wound 04/09/25 0836 Left lower gluteal Pressure Injury   Placement Date/Time: 04/09/25 0836   Present on Original Admission: Yes  Side: Left  Orientation: lower  Location: gluteal  Primary Wound Type: Pressure Injury   Pressure Injury Stage U   Base moist;red;yellow;slough   Periwound intact;redness   Periwound Temperature warm   Periwound Skin Turgor soft   Edges open   Wound Length (cm) 10 cm   Wound Width (cm) 11.4 cm   Wound Depth (cm) 3.5 cm   Wound Surface Area (cm^2) 89.54 cm^2   Wound Volume (cm^3) 208.916 cm^3   Wound 04/09/25 0836 Right lower gluteal Pressure Injury   Placement Date/Time: 04/09/25 0836   Present on Original Admission: Yes  Side: Right  Orientation: lower  Location: gluteal  Primary Wound Type: Pressure Injury   Pressure Injury Stage 3   Base moist;red;subcutaneous   Periwound intact;redness   Periwound Temperature cool   Periwound Skin Turgor soft   Edges open   Wound Length (cm) 8.5 cm   Wound Width (cm) 3.8 cm   Wound Depth (cm) 2.1 cm   Wound Surface Area (cm^2) 25.37 cm^2   Wound Volume (cm^3) 35.516 cm^3   Tunneling [Depth (cm)/Location] 9.5cm at 4:00   Wound 05/17/25 0111 Left lower scrotum Pressure Injury   Placement Date/Time: 05/17/25 0111   Present on Original Admission: Yes  Side: Left  Orientation: lower  Location: scrotum  Primary Wound Type: Pressure Injury  Number of  Sutures Placed: 0  Number of Staples Placed: 0   Pressure Injury Stage 2   Dressing Appearance open to air   Closure None   Base moist;red   Periwound intact;redness   Periwound Temperature warm   Periwound Skin Turgor soft   Edges open   Wound Length (cm) 2.2 cm   Wound Width (cm) 1 cm   Wound Depth (cm) 0.1 cm   Wound Surface Area (cm^2) 1.73 cm^2   Wound Volume (cm^3) 0.115 cm^3   Drainage Amount none   Wound 05/19/25 1828 distal penis Irritant Contact Incontinence   Placement Date/Time: 05/19/25 1828   Present on Original Admission: Yes  Orientation: distal  Location: penis  Primary Wound Type: Irritant Contact  Secondary Wound Type - Irritant Contact Dermatitis: Incontinence   Dressing Appearance open to air   Base dry;scab   Periwound intact   Edges rolled/closed   Drainage Amount none   Wound 05/19/25 1205 medial sacral spine Pressure Injury   Placement Date/Time: 05/19/25 1205   Present on Original Admission: Yes  Orientation: medial  Location: sacral spine  Primary Wound Type: Pressure Injury   Wound Image   (See photo under media tab)   Pressure Injury Stage U   Confirmed Empty Wound Bed Yes, visual inspection of wound bed   Closure None   Base dry;black;purple   Periwound intact;redness   Periwound Temperature warm   Periwound Skin Turgor soft   Edges rolled/closed   Wound Length (cm) 4.2 cm   Wound Width (cm) 3 cm   Wound Surface Area (cm^2) 9.9 cm^2

## 2025-05-19 NOTE — PLAN OF CARE
Goal Outcome Evaluation:              Outcome Evaluation: Pt's VSS on RA. Pt's family to remain at bedside. Wound care performed at bedside with wound care nurse. Midline consult in. No concerns or complaints at this time. Will follow POC.

## 2025-05-19 NOTE — DISCHARGE PLACEMENT REQUEST
"Emily Jackson (64 y.o. Male)       Date of Birth   1960    Social Security Number       Address   [address] [place], [place] [address]    Home Phone   [phone]    MRN   [id number]       Orth[employer]   [employer]    Marital Status                               Admission Date   5/16/2025    Admission Type   Emergency    Admitting Provider   Natacha Huerta DO    Attending Provider   Pablo Marquez MD    Department, Room/Bed   [address], 3327/       Discharge Date       Discharge Disposition       Discharge Destination                                 Attending Provider: Pablo Marquez MD    Allergies: Sulfa Antibiotics, Codeine, Ketorolac, Morphine And Codeine    Isolation: Contact   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), 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)   Code Status: No CPR    Ht: 172.7 cm (68\")   Wt: 95.4 kg (210 lb 5.1 oz)    Admission Cmt: None   Principal Problem: Infected wound [T14.8XXA,L08.9]                   Active Insurance as of 5/16/2025       Primary Coverage       Payor Plan Insurance Group Employer/Plan Group    MEDICARE MEDICARE A & B        Payor Plan Address Payor Plan Phone Number Payor Plan Fax Number Effective Dates    [address] [phone]  10/1/2011 - None Entered    [facility] [place] [address]         Subscriber Name Subscriber Birth Date Member ID       EMILY JACKSON 1960 [id number]               Secondary Coverage       Payor Plan Insurance Group Employer/Plan Group    [employer] [place]ARE MEDICAID        Payor Plan Address Payor Plan Phone Number Payor Plan Fax Number Effective Dates    [address] [address] [phone]  8/29/2016 - None Entered    [facility] [id number]         Subscriber Name Subscriber Birth Date Member ID       EMILY JACKSON 1960 [id number]                     Emergency Contacts        (Rel.) Home Phone Work Phone Mobile Phone    ROSS DYSON (Daughter) [phone] -- --    Myla Jackson (Spouse) [phone] -- " [phone]    CESAR JACKSON (Daughter) [phone] -- --                 Physician Progress Notes (last 24 hours)        Dominguez Chantel SEGUNDO, APRN at 25 1002                     PROGRESS NOTE         Patient Identification:  Name:  Ang Jackson  Age:  64 y.o.  Sex:  male  :  1960  MRN:  [id number]  Visit Number:  [id number]  Primary Care Provider:  Deepak Perez         LOS: 3 days       ----------------------------------------------------------------------------------------------------------------------  Subjective       Chief Complaints:    Infected chronic ulcers      Interval History:      The patient is awake and alert, sitting up comfortably in bed.  On room air with no apparent distress.  Tmax 99.7 °F.  Denies diarrhea.  Denies any complaints or issues.  Family member at the bedside.  Lung exam is clear to auscultation bilaterally.  Abdomen soft and nontender with normoactive bowel sounds.  WBC normal at 5.82.  Intraoperative tissue/bone culture preliminarily reporting no organisms.    Review of Systems:    Constitutional: no fever, chills and night sweats.  Generalized fatigue.  Eyes: no eye drainage, itching or redness.  HEENT: no mouth sores, dysphagia or nose bleed.  Respiratory: no for shortness of breath, cough or production of sputum.  Cardiovascular: no chest pain, no palpitations, no orthopnea.  Gastrointestinal: no nausea, vomiting or diarrhea. No abdominal pain, hematemesis or rectal bleeding.  Genitourinary: no dysuria or polyuria.  Hematologic/lymphatic: no lymph node abnormalities, no easy bruising or easy bleeding.  Musculoskeletal: no muscle or joint pain.  Skin: No rash and no itching.  Chronic sacral and gluteal ulcers  Neurological: no loss of consciousness, no seizure, no headache.  History of traumatic spinal injury  Behavioral/Psych: no depression or suicidal ideation.  Endocrine: no hot flashes.  Immunologic:  negative.    ----------------------------------------------------------------------------------------------------------------------      Objective       [facility] Meds:  Acidophilus/Pectin, 1 capsule, Oral, Daily  ascorbic acid, 1,000 mg, Oral, Daily  baclofen, 20 mg, Oral, Q8H  baclofen, 20 mg, Oral, TID With Meals  Brexpiprazole, 1 mg, Oral, Daily  busPIRone, 5 mg, Oral, Q24H  cetirizine, 10 mg, Oral, Daily  cyanocobalamin, 1,000 mcg, Subcutaneous, Q14 Days  DAPTOmycin, 6 mg/kg (Adjusted), Intravenous, Q24H  ertapenem, 1,000 mg, Intravenous, Q24H  famotidine, 20 mg, Oral, Daily  ferrous sulfate, 325 mg, Oral, Daily With Breakfast  FLUoxetine, 20 mg, Oral, Daily  gabapentin, 900 mg, Oral, TID With Meals  heparin (porcine), 5,000 Units, Subcutaneous, Q12H  lidocaine, 1 Application, Topical, BID  multivitamin with minerals, 1 tablet, Oral, Daily  naproxen, 500 mg, Oral, BID With Meals  oxyCODONE, 10 mg, Oral, Q6H  oxyCODONE ER, 20 mg, Oral, Nightly  pantoprazole, 40 mg, Oral, Daily  QUEtiapine, 200 mg, Oral, Nightly  sodium chloride, 10 mL, Intravenous, Q12H  tiZANidine, 2 mg, Oral, 4x Daily  tiZANidine, 4 mg, Oral, Nightly  vitamin D3, 5,000 Units, Oral, Daily      lactated ringers, 100 mL/hr, Last Rate: 100 mL/hr (05/19/25 0232)      ----------------------------------------------------------------------------------------------------------------------    Vital Signs:  Temp:  [97.2 °F (36.2 °C)-99.8 °F (37.7 °C)] 98.1 °F (36.7 °C)  Heart Rate:  [60-83] 80  Resp:  [13-20] 18  BP: (118-152)/(56-76) 118/65  Mean Arterial Pressure (Non-Invasive) for the past 24 hrs (Last 3 readings):   Noninvasive MAP (mmHg)   05/19/25 0247 81   05/18/25 2300 72   05/18/25 1833 90     SpO2 Percentage    05/18/25 1305 05/18/25 1405 05/18/25 1737   SpO2: 96% 96% 96%     SpO2:  [94 %-100 %] 96 %  on  Flow (L/min) (Oxygen Therapy):  [2] 2;   Device (Oxygen Therapy): room air    Body mass index is 31.98 kg/m².  Wt Readings from Last 3  Encounters:   05/16/25 95.4 kg (210 lb 5.1 oz)   05/16/25 93 kg (205 lb)   05/07/25 93 kg (205 lb)        Intake/Output Summary (Last 24 hours) at 5/19/2025 1002  Last data filed at 5/19/2025 0247  Gross per 24 hour   Intake 1200 ml   Output 1200 ml   Net 0 ml     Diet: Regular/House; Fluid Consistency: Thin (IDDSI 0)  ----------------------------------------------------------------------------------------------------------------------      Physical Exam:    Constitutional:  Well-developed and well-nourished.  On room air with no respiratory distress.  Family member at the bedside.  Denies any complaints or issues.  HENT:  Head: Normocephalic and atraumatic.  Mouth:  Moist mucous membranes.    Eyes:  Conjunctivae and EOM are normal.  No scleral icterus.  Neck:  Neck supple.  No JVD present.    Cardiovascular:  Normal rate, regular rhythm and normal heart sounds with no murmur. No edema.  Pulmonary/Chest:  No respiratory distress, no wheezes, no crackles, with normal breath sounds and good air movement.  Abdominal:  Soft.  Bowel sounds are normal.  No distension and no tenderness.   Musculoskeletal:  No edema, no tenderness, and no deformity.  No swelling or redness of joints.  Paralysis  Neurological:  Alert and oriented to person, place, and time.  No facial droop.  No slurred speech.  History of traumatic spinal injury  Skin:  Skin is warm and dry.  No rash noted.  No pallor.  Ulcer to the penile meatus secondary to indwelling Chin catheter.  Sacral ulcer.  Left and right gluteal ulcers.  Dressed, clean dry and intact.  Psychiatric:  Normal mood and affect.  Behavior is normal.        ----------------------------------------------------------------------------------------------------------------------            Results from last 7 days   Lab Units 05/19/25  0120 05/18/25  0231 05/17/25  0446 05/16/25  1544 05/15/25  1606   CRP mg/dL  --   --   --  8.23* 7.76*   LACTATE mmol/L  --   --   --  1.1  --    WBC  "10*3/mm3 5.82 5.36 7.22 6.19 7.52   HEMOGLOBIN g/dL 8.5* 9.2* 9.8* 9.2* 9.6*   HEMATOCRIT % 28.3* 31.4* 32.2* 31.1* 31.7*   MCV fL 92.5 92.9 93.1 94.0 93.2   MCHC g/dL 30.0* 29.3* 30.4* 29.6* 30.3*   PLATELETS 10*3/mm3 188 177 201 189 218     Results from last 7 days   Lab Units 05/19/25  0120 05/18/25  0231 05/17/25  0446 05/16/25  1544   SODIUM mmol/L 143 145 141 142   POTASSIUM mmol/L 3.5 3.5 3.8 3.8   CHLORIDE mmol/L 108* 110* 104 105   CO2 mmol/L 27.4 27.4 28.2 30.4*   BUN mg/dL 15 12 14 20   CREATININE mg/dL 0.31* 0.29* 0.32* 0.47*   CALCIUM mg/dL 8.0* 8.4* 8.5* 8.7   GLUCOSE mg/dL 109* 96 110* 134*   ALBUMIN g/dL 2.3*  --  2.5* 2.6*   BILIRUBIN mg/dL <0.2  --  0.2 <0.2   ALK PHOS U/L 63  --  72 71   AST (SGOT) U/L 9  --  8 7   ALT (SGPT) U/L <5  --  5 <5   Estimated Creatinine Clearance: 273.2 mL/min (A) (by C-G formula based on SCr of 0.31 mg/dL (L)).  No results found for: \"AMMONIA\"    No results found for: \"HGBA1C\", \"POCGLU\"  Lab Results   Component Value Date    HGBA1C 6.60 (H) 01/15/2025     Lab Results   Component Value Date    TSH 1.350 01/15/2025    FREET4 0.9 11/22/2024       Blood Culture   Date Value Ref Range Status   05/16/2025 No growth at 2 days  Preliminary   05/16/2025 No growth at 2 days  Preliminary     No results found for: \"URINECX\"  Wound Culture   Date Value Ref Range Status   05/15/2025 Light growth (2+) Escherichia coli ESBL (A)  Final     Comment:       Consider infectious disease consult.  Susceptibility results may not correlate to clinical outcomes.     No results found for: \"STOOLCX\"  No results found for: \"RESPCX\"  Pain Management Panel           No data to display                  ----------------------------------------------------------------------------------------------------------------------  Imaging Results (Last 24 Hours)       ** No results found for the last 24 hours. **      "       ----------------------------------------------------------------------------------------------------------------------    Pertinent Infectious Disease Results                Assessment/Plan     Isolation Status:  Contact     Assessment     Infected chronic sacral ulcers        Plan      The patient is awake and alert, sitting up comfortably in bed.  On room air with no apparent distress.  Tmax 99.7 °F.  Denies diarrhea.  Denies any complaints or issues.  Family member at the bedside.  Lung exam is clear to auscultation bilaterally.  Abdomen soft and nontender with normoactive bowel sounds.  WBC normal at 5.82.  Intraoperative tissue/bone culture preliminarily reporting no organisms.    General surgery operative note reported necrotic tissue and subcutaneous fat sharply excised down to the fascia, not involving fascia.  No mention of bony involvement.  MRI does not report any evidence of osteomyelitis.  Previous wound cultures finalized with E. coli ESBL and Enterococcus faecalis.    The patient continues ertapenem 1 g IV every 24 hours and vancomycin per pharmacy dosing based on recent wound culture sensitivities.  We will transition from vancomycin to daptomycin 6 mg/kg IV 24 hours today to continue with ertapenem course.      Plan to continue ertapenem and daptomycin courses for total of 14 days, through 5/30/2025 for infection of chronic ulcers without evidence of osteomyelitis.  CPK level ordered for today.  The patient will need weekly CPK monitoring and to avoid statins while on daptomycin course.  Plan to follow closely in the outpatient infectious disease clinic upon discharge.      RECENT ANTIMICROBIAL THERAPY    daptomycin  ertapenem (INVanz) 1000 mg in 100 mL NS (VTB)     Code Status:   Code Status and Medical Interventions: No CPR (Do Not Attempt to Resuscitate); Limited Support; No intubation (DNI)   Ordered at: 05/16/25 5809     Code Status (Patient has no pulse and is not breathing):    No CPR  (Do Not Attempt to Resuscitate)     Medical Interventions (Patient has pulse or is breathing):    Limited Support     Medical Intervention Limits:    No intubation (DNI)       TANJA Monroe  25  10:02 EDT      Electronically signed by Chantel Dominguez APRN at 25 1003       Pablo Marquez MD at 25 8312              [facility] HOSPITALIST PROGRESS NOTE     Patient Identification:  Name:  Ang Jackson  Age:  64 y.o.  Sex:  male  :  1960  MRN:  [id number]  Visit Number:  [id number]  ROOM: [address] [place] [address]     Primary Care Provider:  Deepak Perez    Length of stay in inpatient status:  3    Subjective     Chief Compliant:    Chief Complaint   Patient presents with    Abnormal Lab     History of Presenting Illness:    Patient remains ill but stable today, no acute events overnight, no new complaints, denies any fevers or chills, General Surgery consulted and following, recommended no further intervention, follow up in wound clinic, Infectious Disease consulted and following, updated recommendations today pending, spoke to daughter at bedside and another via phone today, updated on plan of care, questions answered, follow up Infectious Disease recommendations for definitive plan for antibiotics.   Objective     Current Hospital Meds:  Acidophilus/Pectin, 1 capsule, Oral, Daily  ascorbic acid, 1,000 mg, Oral, Daily  baclofen, 20 mg, Oral, Q8H  baclofen, 20 mg, Oral, TID With Meals  Brexpiprazole, 1 mg, Oral, Daily  busPIRone, 5 mg, Oral, Q24H  cetirizine, 10 mg, Oral, Daily  cyanocobalamin, 1,000 mcg, Subcutaneous, Q14 Days  ertapenem, 1,000 mg, Intravenous, Q24H  famotidine, 20 mg, Oral, Daily  ferrous sulfate, 325 mg, Oral, Daily With Breakfast  FLUoxetine, 20 mg, Oral, Daily  gabapentin, 900 mg, Oral, TID With Meals  heparin (porcine), 5,000 Units, Subcutaneous, Q12H  lidocaine, 1 Application, Topical, BID  multivitamin with minerals, 1 tablet, Oral, Daily  naproxen, 500 mg,  Oral, BID With Meals  oxyCODONE, 10 mg, Oral, Q6H  oxyCODONE ER, 20 mg, Oral, Nightly  pantoprazole, 40 mg, Oral, Daily  QUEtiapine, 200 mg, Oral, Nightly  sodium chloride, 10 mL, Intravenous, Q12H  tiZANidine, 2 mg, Oral, 4x Daily  tiZANidine, 4 mg, Oral, Nightly  vitamin D3, 5,000 Units, Oral, Daily      lactated ringers, 100 mL/hr, Last Rate: 100 mL/hr (05/19/25 0232)  Pharmacy to dose vancomycin,       ----------------------------------------------------------------------------------------------------------------------  Vital Signs:  Temp:  [97.2 °F (36.2 °C)-99.8 °F (37.7 °C)] 98.1 °F (36.7 °C)  Heart Rate:  [57-83] 80  Resp:  [7-20] 18  BP: (118-152)/(56-81) 118/65  SpO2:  [94 %-100 %] 96 %  on  Flow (L/min) (Oxygen Therapy):  [2] 2;   Device (Oxygen Therapy): room air  Body mass index is 31.98 kg/m².      Intake/Output Summary (Last 24 hours) at 5/19/2025 1208  Last data filed at 5/19/2025 0246  Gross per 24 hour   Intake 1200 ml   Output 1200 ml   Net 0 ml      ----------------------------------------------------------------------------------------------------------------------  Physical exam:  Constitutional:  Well-developed and well-nourished.  No acute distress.      HENT:  Head:  Normocephalic and atraumatic.  Mouth:  Moist mucous membranes.    Eyes:  Conjunctivae and EOM are normal. No scleral icterus.    Neck:  Neck supple.  No JVD present.    Cardiovascular:  Normal rate, regular rhythm and normal heart sounds with no murmur.  Pulmonary/Chest:  No respiratory distress, no wheezes, no crackles, with normal breath sounds and good air movement.  Abdominal:  Soft. No distension and no tenderness.   Musculoskeletal:  No tenderness, No red or swollen joints anywhere.    Neurological:  Alert and oriented to person, place, and time.  No cranial nerve deficit.    Skin:  Skin is warm and dry. No rash noted. No pallor.   Peripheral vascular:  No clubbing, no cyanosis, no edema.  Psychiatric: Appropriate mood and  "affect  Edited by: Pablo Marquez MD at 5/19/2025 0944  ----------------------------------------------------------------------------------------------------------------------  WBC/HGB/HCT/PLT   5.82/8.5/28.3/188 (05/19 0120)  BUN/CREAT/GLUC/ALT/AST/RETA/LIP    15/0.31/109/<5/9/--/-- (05/19 0120)  LYTES - Na/K/Cl/CO2: 143/3.5/108*/27.4 (05/19 0120)        No results found for: \"URINECX\"  Blood Culture   Date Value Ref Range Status   05/16/2025 No growth at 2 days  Preliminary   05/16/2025 No growth at 2 days  Preliminary       I have personally looked at the labs and they are summarized above.  ----------------------------------------------------------------------------------------------------------------------  Detailed radiology reports for the last 24 hours:  CT Pelvis With & Without Contrast  Result Date: 5/18/2025  1.  No soft tissue loculated fluid collections are identified. 2.  Diffuse soft tissue edema, nonspecific. 3.  Left deep gluteal ulceration is noted which essentially extends to the level of the left ischial tuberosity. No sclerotic or destructive bony features identified. 4.  Deep and wide-base right sacral-gluteal ulceration is noted which extends to the level of the right ischial tuberosity and approximates the gluteal crease. No associated sclerosis or destructive bone changes identified in the region of ulceration. 5.  Anasarca.  This report was finalized on 5/18/2025 8:24 AM by Dr. Jason Kruse MD.      Assessment & Plan    #Deep unstageable ulcer w/ necrosis and suspected infection of left sacral/buttock area.  #Two right gluteal and sacral decuitus ulcers   - Surgery consulted  and took to [facility] on 5/18 for debridment.   - 5/1 Wound grew ESBL E. Coli and E. Faecalis on 5/1 and received Dalvance at that time.    - 5/15 Wound culture Growing E. Coli still. Will continue invance. Suspect dalvance may have treated E. Faecalils component.   - CT pelvis with contrast did not show fluid collection or " bone involvement.   - ID consulted. Opting to continue vancomycin and ertapenem pending new culture. Follow up updated recommendations today      Chronic medical conditions:  - Chronic pain syndrome. Continue home regimen, watch for over sedation.   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  Code Status (Patient has no pulse and is not breathing): No CPR  Medical Interventions (Patient has pulse or is breathing): Limited Support  Medical Intervention Limits: No intubation (DNI)    Dispo: Pending workup and clinical improvement    *This patient is considered high risk secondary to decubitus ulcer status post debridement, Multi-Drug Resistant Organism on culture          Edited by: Pablo Marquez MD at 5/19/2025 0944  [facility] Hospitalist        Electronically signed by Pablo Marquez MD at 05/19/25 0930       Deepak Lara MD at 05/19/25 5709          Chief complaint: Pressure ulcerations with necrosis    Postoperative day 1: Bilateral gluteal wound debridement    No acute events overnight.    Afebrile, vital signs stable  Abdomen soft nontender nondistended  Clear to auscultation bilaterally  Dressings clean dry and intact    64-year-old quadriplegic male status post excisional debridement of necrotic pressure ulcerations to the bilateral gluteal regions.  -Wet-to-dry Dakin's packing twice daily  -Resume follow-up with wound clinic    Electronically signed by Deepak Lara MD at 05/19/25 8456       Ann Marie  Cornelio TOVAR MD at 25 1832              [facility] HOSPITALIST PROGRESS NOTE     Patient Identification:  Name:  Ang Jackson  Age:  64 y.o.  Sex:  male  :  1960  MRN:  [id number]  Visit Number:  [id number]  ROOM: [address] [place] [address]     Primary Care Provider:  Deepak Perez    Length of stay in inpatient status:  2    Subjective     Chief Compliant:    Chief Complaint   Patient presents with    Abnormal Lab       History of Presenting Illness:    Patient denies any new complaints. Patient seen postoperatively, tolerated surgery well. Later in day reported anxiety per nursing staff.     ROS:  Otherwise 10 point ROS negative other than documented above in HPI.     Objective     Current Hospital Meds:Acidophilus/Pectin, 1 capsule, Oral, Daily  ascorbic acid, 1,000 mg, Oral, Daily  baclofen, 20 mg, Oral, Q8H  baclofen, 20 mg, Oral, TID With Meals  Brexpiprazole, 1 mg, Oral, Daily  busPIRone, 5 mg, Oral, Q24H  cetirizine, 10 mg, Oral, Daily  cyanocobalamin, 1,000 mcg, Subcutaneous, Q14 Days  ertapenem, 1,000 mg, Intravenous, Q24H  famotidine, 20 mg, Oral, Daily  ferrous sulfate, 325 mg, Oral, Daily With Breakfast  FLUoxetine, 20 mg, Oral, Daily  gabapentin, 900 mg, Oral, TID With Meals  heparin (porcine), 5,000 Units, Subcutaneous, Q12H  lidocaine, 1 Application, Topical, BID  multivitamin with minerals, 1 tablet, Oral, Daily  naproxen, 500 mg, Oral, BID With Meals  oxyCODONE, 10 mg, Oral, Q6H  oxyCODONE ER, 20 mg, Oral, Nightly  pantoprazole, 40 mg, Oral, Daily  QUEtiapine, 200 mg, Oral, Nightly  sodium chloride, 10 mL, Intravenous, Q12H  tiZANidine, 2 mg, Oral, 4x Daily  tiZANidine, 4 mg, Oral, Nightly  vitamin D3, 5,000 Units, Oral, Daily    lactated ringers, 100 mL/hr, Last Rate: 100 mL/hr (25 6312)  Pharmacy to dose vancomycin,         Current Antimicrobial Therapy:  Anti-Infectives (From admission, onward)      Ordered     Dose/Rate Route Frequency Start Stop    25 4362  vancomycin IVPB  1500 mg in 0.9% NaCl (Premix) 500 mL  Status:  Discontinued        Ordering Provider: Natacha Huerta DO    1,500 mg  333.3 mL/hr over 90 Minutes Intravenous Every 12 Hours 05/17/25 2100 05/17/25 0536    05/17/25 0536  vancomycin IVPB 1500 mg in 0.9% NaCl (Premix) 500 mL  Status:  Discontinued        Ordering Provider: Natacha Huerta DO    1,500 mg  333.3 mL/hr over 90 Minutes Intravenous Every 12 Hours 05/17/25 0900 05/17/25 1343    05/16/25 2302  ertapenem (INVanz) 1,000 mg in sodium chloride 0.9 % 100 mL IVPB-VTB        Ordering Provider: Deepak Lara MD    1,000 mg  200 mL/hr over 30 Minutes Intravenous Every 24 Hours 05/17/25 0000 05/21/25 2359    05/16/25 2302  Pharmacy to dose vancomycin        Ordering Provider: Deepak Lara MD     Not Applicable Continuous PRN 05/16/25 2302 05/23/25 2301    05/16/25 1603  piperacillin-tazobactam (ZOSYN) IVPB 3.375 g IVPB in 100 mL NS (VTB)        Ordering Provider: Nico Hall PA    3.375 g  over 30 Minutes Intravenous Once 05/16/25 1619 05/16/25 2037    05/16/25 1603  vancomycin IVPB 2000 mg in 0.9% Sodium Chloride 500 mL        Ordering Provider: Nico Hall PA    20 mg/kg × 95.3 kg  250 mL/hr over 120 Minutes Intravenous Once 05/16/25 1619 05/16/25 2214          Current Diuretic Therapy:  Diuretics (From admission, onward)      None          ----------------------------------------------------------------------------------------------------------------------  Vital Signs:  Temp:  [97 °F (36.1 °C)-100.1 °F (37.8 °C)] 99.1 °F (37.3 °C)  Heart Rate:  [55-83] 75  Resp:  [7-20] 20  BP: (112-152)/(64-86) 124/66  SpO2:  [90 %-100 %] 96 %  on  Flow (L/min) (Oxygen Therapy):  [2] 2;   Device (Oxygen Therapy): nasal cannula  Body mass index is 31.98 kg/m².    Wt Readings from Last 3 Encounters:   05/16/25 95.4 kg (210 lb 5.1 oz)   05/16/25 93 kg (205 lb)   05/07/25 93 kg (205 lb)     Intake & Output (last 3 days)         05/15 0701 05/16  0700 05/16 0701 05/17 0700 05/17 0701  05/18 0700 05/18 0701 05/19 0700    P.O.  240 960 840    I.V. (mL/kg)   1727 (18.1) 700 (7.3)    Total Intake(mL/kg)  240 (2.5) 2687 (28.2) 1540 (16.1)    Urine (mL/kg/hr)   4400 (1.9) 600 (0.5)    Total Output   4400 600    Net  +240 -1713 +940                  Diet: Regular/House; Fluid Consistency: Thin (IDDSI 0)  ----------------------------------------------------------------------------------------------------------------------  Physical exam:  Constitutional:  Well-developed and well-nourished.  No respiratory distress.      HENT:  Head:  Normocephalic and atraumatic.  Mouth:  Moist mucous membranes.    Eyes:  Conjunctivae and EOM are normal. No scleral icterus.    Neck:  Neck supple.  No JVD present.    Cardiovascular:  Normal rate, regular rhythm and normal heart sounds with no murmur.  Pulmonary/Chest:  No respiratory distress, no wheezes, no crackles, with normal breath sounds and good air movement.  Abdominal:  Soft.  Bowel sounds are normal.  No distension and no tenderness.   Musculoskeletal:  No edema, no tenderness, and no deformity.  No red or swollen joints anywhere.    Neurological:  Alert and oriented to person, place, and time.  No cranial nerve deficit.  No tongue deviation.  No facial droop.  No slurred speech.   Skin:  Skin is warm and dry. No rash noted. No pallor.   Peripheral vascular:  Pulses in all 4 extremities with no clubbing, no cyanosis, no edema.  ----------------------------------------------------------------------------------------------------------------------  Tele:    ----------------------------------------------------------------------------------------------------------------------  Results from last 7 days   Lab Units 05/18/25  0231 05/17/25  0446 05/16/25  1544 05/15/25  1606   CRP mg/dL  --   --  8.23* 7.76*   LACTATE mmol/L  --   --  1.1  --    WBC 10*3/mm3 5.36 7.22 6.19 7.52   HEMOGLOBIN g/dL 9.2* 9.8* 9.2* 9.6*   HEMATOCRIT %  "31.4* 32.2* 31.1* 31.7*   MCV fL 92.9 93.1 94.0 93.2   MCHC g/dL 29.3* 30.4* 29.6* 30.3*   PLATELETS 10*3/mm3 177 201 189 218         Results from last 7 days   Lab Units 05/18/25  0231 05/17/25  0446 05/16/25  1544 05/15/25  1606   SODIUM mmol/L 145 141 142 142   POTASSIUM mmol/L 3.5 3.8 3.8 3.8   CHLORIDE mmol/L 110* 104 105 103   CO2 mmol/L 27.4 28.2 30.4* 30.5*   BUN mg/dL 12 14 20 20   CREATININE mg/dL 0.29* 0.32* 0.47* 0.49*   CALCIUM mg/dL 8.4* 8.5* 8.7 8.7   GLUCOSE mg/dL 96 110* 134* 166*   ALBUMIN g/dL  --  2.5* 2.6* 2.8*   BILIRUBIN mg/dL  --  0.2 <0.2 <0.2   ALK PHOS U/L  --  72 71 77   AST (SGOT) U/L  --  8 7 7   ALT (SGPT) U/L  --  5 <5 <5   Estimated Creatinine Clearance: 292.1 mL/min (A) (by C-G formula based on SCr of 0.29 mg/dL (L)).  No results found for: \"AMMONIA\"              No results found for: \"HGBA1C\", \"POCGLU\"  Lab Results   Component Value Date    TSH 1.350 01/15/2025    FREET4 0.9 11/22/2024     No results found for: \"PREGTESTUR\", \"PREGSERUM\", \"HCG\", \"HCGQUANT\"  Pain Management Panel           No data to display              Brief Urine Lab Results  (Last result in the past 365 days)        Color   Clarity   Blood   Leuk Est   Nitrite   Protein   CREAT   Urine HCG        05/16/25 1634 Dark Yellow   Turbid   Moderate (2+)   Large (3+)   Positive   100 mg/dL (2+)                 Blood Culture   Date Value Ref Range Status   05/16/2025 No growth at 2 days  Preliminary   05/16/2025 No growth at 2 days  Preliminary     No results found for: \"URINECX\"  Wound Culture   Date Value Ref Range Status   05/15/2025 Light growth (2+) Escherichia coli ESBL (A)  Final     Comment:       Consider infectious disease consult.  Susceptibility results may not correlate to clinical outcomes.     No results found for: \"STOOLCX\"  No results found for: \"RESPCX\"  No results found for: \"AFBCX\"  Results from last 7 days   Lab Units 05/17/25  0446 05/16/25  1544 05/15/25  1606   LACTATE mmol/L  --  1.1  --    SED " RATE mm/hr 40* 57* 48*   CRP mg/dL  --  8.23* 7.76*       I have personally looked at the labs and they are summarized above.  ----------------------------------------------------------------------------------------------------------------------  Detailed radiology reports for the last 24 hours:    Imaging Results (Last 24 Hours)       Procedure Component Value Units Date/Time    CT Pelvis With & Without Contrast [[id number]] Collected: 05/18/25 0820     Updated: 05/18/25 0826    Narrative:      EXAM:    CT Pelvis Without and With Intravenous Contrast     EXAM DATE:    5/17/2025 3:42 PM     CLINICAL HISTORY:    gluteal and scaral wounds; evaluate for osteomyelitis; T14.8XXA-Other  injury of unspecified body region, initial encounter; L08.9-Local  infection of the skin and subcutaneous tissue, unspecified;  T14.8XXA-Other injury of unspecified body region, initial encounter;  L08.9-Local infection of the skin and subcutaneous tissue, unspecified     TECHNIQUE:    Axial computed tomography images of the pelvis without and with  intravenous contrast.  Sagittal and coronal reformatted images were  created and reviewed.  This CT exam was performed using one or more of  the following dose reduction techniques:  automated exposure control,  adjustment of the mA and/or kV according to patient size, and/or use of  iterative reconstruction technique.     COMPARISON:    MRI 5/16/2025, CT 3/26/2025     FINDINGS:    Bowel:  Pelvic portions of GI tract are within normal limits.  No  obstruction.  No mucosal thickening.    Intraperitoneal space:  See below.      Bladder:  Chin catheter decompresses urinary bladder.  No stones.    Reproductive:  Mild prostate enlargement.    Bones/joints:  Distal portions of lumbosacral fusion hardware are  noted with lateral SI joint fusion also present.  Bilateral hip  arthroplasty.  Left deep gluteal ulceration is noted which essentially  extends to the level of the left ischial tuberosity. No  sclerotic or  destructive bony features identified.  Deep and wide-base right  sacral-gluteal ulceration is noted which extends to the level of the  right ischial tuberosity and approximates the gluteal crease. No  associated sclerosis or destructive bone changes identified in the  region of ulceration.  No acute fracture.  No dislocation.    Soft tissues:  Diffuse soft tissue edema, nonspecific.  Anasarca.  No  soft tissue loculated fluid collections are identified.       Impression:      1.  No soft tissue loculated fluid collections are identified.  2.  Diffuse soft tissue edema, nonspecific.  3.  Left deep gluteal ulceration is noted which essentially extends to  the level of the left ischial tuberosity. No sclerotic or destructive  bony features identified.  4.  Deep and wide-base right sacral-gluteal ulceration is noted which  extends to the level of the right ischial tuberosity and approximates  the gluteal crease. No associated sclerosis or destructive bone changes  identified in the region of ulceration.  5.  Anasarca.     This report was finalized on 5/18/2025 8:24 AM by Dr. Jason Kruse MD.             Assessment & Plan    #Deep unstageable ulcer w/ necrosis and suspected infection of left sacral/buttock area.  #Two right gluteal and sacral decuitus ulcers   - Surgery consulted  and took to [facility] on 5/18 for debridment.   - 5/1 Wound grew ESBL E. Coli and E. Faecalis on 5/1 and received Dalvance at that time.    - 5/15 Wound culture Growing E. Coli still. Will continue invance. Suspect dalvance may have treated E. Faecalils component.   - CT pelvis w/ contrast did not show fluid collection or bone involvement.   - ID consulted. Opting to continue vancomycin and ertapenem pending new culture.      Chronic medical conditions:  - Chronic pain syndrome. Continue home regimen, watch for over sedation.   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

## 2025-05-19 NOTE — PROGRESS NOTES
[facility] HOSPITALIST PROGRESS NOTE     Patient Identification:  Name:  Ang Jackson  Age:  64 y.o.  Sex:  male  :  1960  MRN:  [id number]  Visit Number:  [id number]  ROOM: [address] [place] [address]     Primary Care Provider:  Deepak Perez    Length of stay in inpatient status:  3    Subjective     Chief Compliant:    Chief Complaint   Patient presents with    Abnormal Lab     History of Presenting Illness:    Patient remains ill but stable today, no acute events overnight, no new complaints, denies any fevers or chills, General Surgery consulted and following, recommended no further intervention, follow up in wound clinic, Infectious Disease consulted and following, updated recommendations today pending, spoke to daughter at bedside and another via phone today, updated on plan of care, questions answered, follow up Infectious Disease recommendations for definitive plan for antibiotics.   Objective     Current Hospital Meds:  Acidophilus/Pectin, 1 capsule, Oral, Daily  ascorbic acid, 1,000 mg, Oral, Daily  baclofen, 20 mg, Oral, Q8H  baclofen, 20 mg, Oral, TID With Meals  Brexpiprazole, 1 mg, Oral, Daily  busPIRone, 5 mg, Oral, Q24H  cetirizine, 10 mg, Oral, Daily  cyanocobalamin, 1,000 mcg, Subcutaneous, Q14 Days  ertapenem, 1,000 mg, Intravenous, Q24H  famotidine, 20 mg, Oral, Daily  ferrous sulfate, 325 mg, Oral, Daily With Breakfast  FLUoxetine, 20 mg, Oral, Daily  gabapentin, 900 mg, Oral, TID With Meals  heparin (porcine), 5,000 Units, Subcutaneous, Q12H  lidocaine, 1 Application, Topical, BID  multivitamin with minerals, 1 tablet, Oral, Daily  naproxen, 500 mg, Oral, BID With Meals  oxyCODONE, 10 mg, Oral, Q6H  oxyCODONE ER, 20 mg, Oral, Nightly  pantoprazole, 40 mg, Oral, Daily  QUEtiapine, 200 mg, Oral, Nightly  sodium chloride, 10 mL, Intravenous, Q12H  tiZANidine, 2 mg, Oral, 4x Daily  tiZANidine, 4 mg, Oral, Nightly  vitamin D3, 5,000 Units, Oral, Daily      lactated ringers, 100 mL/hr, Last  Rate: 100 mL/hr (05/19/25 0232)  Pharmacy to dose vancomycin,       ----------------------------------------------------------------------------------------------------------------------  Vital Signs:  Temp:  [97.2 °F (36.2 °C)-99.8 °F (37.7 °C)] 98.1 °F (36.7 °C)  Heart Rate:  [57-83] 80  Resp:  [7-20] 18  BP: (118-152)/(56-81) 118/65  SpO2:  [94 %-100 %] 96 %  on  Flow (L/min) (Oxygen Therapy):  [2] 2;   Device (Oxygen Therapy): room air  Body mass index is 31.98 kg/m².      Intake/Output Summary (Last 24 hours) at 5/19/2025 0925  Last data filed at 5/19/2025 0247  Gross per 24 hour   Intake 1200 ml   Output 1200 ml   Net 0 ml      ----------------------------------------------------------------------------------------------------------------------  Physical exam:  Constitutional:  Well-developed and well-nourished.  No acute distress.      HENT:  Head:  Normocephalic and atraumatic.  Mouth:  Moist mucous membranes.    Eyes:  Conjunctivae and EOM are normal. No scleral icterus.    Neck:  Neck supple.  No JVD present.    Cardiovascular:  Normal rate, regular rhythm and normal heart sounds with no murmur.  Pulmonary/Chest:  No respiratory distress, no wheezes, no crackles, with normal breath sounds and good air movement.  Abdominal:  Soft. No distension and no tenderness.   Musculoskeletal:  No tenderness, No red or swollen joints anywhere.    Neurological:  Alert and oriented to person, place, and time.  No cranial nerve deficit.    Skin:  Skin is warm and dry. No rash noted. No pallor.   Peripheral vascular:  No clubbing, no cyanosis, no edema.  Psychiatric: Appropriate mood and affect  Edited by: Pablo Marquez MD at 5/19/2025 0944  ----------------------------------------------------------------------------------------------------------------------  WBC/HGB/HCT/PLT   5.82/8.5/28.3/188 (05/19 0120)  BUN/CREAT/GLUC/ALT/AST/RETA/LIP    15/0.31/109/<5/9/--/-- (05/19 0120)  LYTES - Na/K/Cl/CO2: 143/3.5/108*/27.4  "(05/19 0120)        No results found for: \"URINECX\"  Blood Culture   Date Value Ref Range Status   05/16/2025 No growth at 2 days  Preliminary   05/16/2025 No growth at 2 days  Preliminary       I have personally looked at the labs and they are summarized above.  ----------------------------------------------------------------------------------------------------------------------  Detailed radiology reports for the last 24 hours:  CT Pelvis With & Without Contrast  Result Date: 5/18/2025  1.  No soft tissue loculated fluid collections are identified. 2.  Diffuse soft tissue edema, nonspecific. 3.  Left deep gluteal ulceration is noted which essentially extends to the level of the left ischial tuberosity. No sclerotic or destructive bony features identified. 4.  Deep and wide-base right sacral-gluteal ulceration is noted which extends to the level of the right ischial tuberosity and approximates the gluteal crease. No associated sclerosis or destructive bone changes identified in the region of ulceration. 5.  Anasarca.  This report was finalized on 5/18/2025 8:24 AM by Dr. Jason Kruse MD.      Assessment & Plan    #Deep unstageable ulcer w/ necrosis and suspected infection of left sacral/buttock area.  #Two right gluteal and sacral decuitus ulcers   - Surgery consulted  and took to [facility] on 5/18 for debridment.   - 5/1 Wound grew ESBL E. Coli and E. Faecalis on 5/1 and received Dalvance at that time.    - 5/15 Wound culture Growing E. Coli still. Will continue invance. Suspect dalvance may have treated E. Faecalils component.   - CT pelvis with contrast did not show fluid collection or bone involvement.   - ID consulted. Opting to continue vancomycin and ertapenem pending new culture. Follow up updated recommendations today      Chronic medical conditions:  - Chronic pain syndrome. Continue home regimen, watch for over sedation.   - Neurogenic bladder with chronic hanks catheter with chronically abnormal " urinalyses  - Non-traumatic cervical spinal cord injury with quadriplegia and spasticity  - Essential hypertension  - Chronic normocytic anemia  - Anxiety/Depression  - Gastroesophageal reflux disease  - Previous MRSA dagoberto prosthetic infections status post bilateral VALERI    F: Oral  E: Monitor & Replace as needed   N: Diet: Regular/House; Fluid Consistency: Thin (IDDSI 0)  VTE Prophylaxis:Pharmacologic & mechanical VTE prophylaxis orders are present.  Code Status (Patient has no pulse and is not breathing): No CPR  Medical Interventions (Patient has pulse or is breathing): Limited Support  Medical Intervention Limits: No intubation (DNI)    Dispo: Pending workup and clinical improvement    *This patient is considered high risk secondary to decubitus ulcer status post debridement, Multi-Drug Resistant Organism on culture          Edited by: Pablo Marquez MD at 5/19/2025 3302  [facility]

## 2025-05-19 NOTE — CONSULTS
Consult Note     Patient Identification:  Name:  Ang Jackson  Age:  64 y.o.  Sex:  male  :  1960  MRN:  [id number]   Visit Number:  [id number]  Primary Care Physician:  Deepak Perez     Subjective     Chief complaint:   Sacral & buttock ulcer    History of presenting illness:   Patient is a 64 y.o. male with past medical history significant for  cervical degenerative disc disease status post C6-7 anterior cervical discectomy and fusion in  and nontraumatic cervical spinal cord injury secondary with cervical epidural abscess with cord compression from C3-C5 in 2024 status post incision and drainage with C4 corpectomy, C3-6 laminectomy and emergent hematoma evacuation. Patient with neurogenic bladder and chronic hanks catheter due to significant spinal trauma. 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  Patient was seen in the wound care clinic outpatient and due to worsening wound advised patient to go to ER.  Patient was evaluated and admitted for wound infection.  Surgery was consulted and wounds were debrided in OR on .  Patient is resting in bed with family at bedside.  Patient is receiving IV antibiotics.    ---------------------------------------------------------------------------------------------------------------------     Review of Systems denies any fever or chills     ---------------------------------------------------------------------------------------------------------------------   Past Medical History:   Diagnosis Date    Anesthesia     diffculty adminster spinal block, patient stated with last hip surgery  several attempts per awilda and no luck, resulted in general anesthesia     Anxiety     Arthritis     Rheumatoid    COVID         GERD (gastroesophageal reflux disease)      Hypertension     Lactose intolerance     Low back pain     Sleep apnea     does not wear machine, MILD, DOES NOT HAVE AFTER WGT LOSS SURGERY    Wears reading eyeglasses      Past Surgical History:   Procedure Laterality Date    ANTERIOR CERVICAL DISCECTOMY W/ FUSION Right 02/19/2023    Procedure: CERVICAL DISCECTOMY ANTERIOR WITH FUSION C6-7;  Surgeon: Jaiden Aldridge MD;  Location:  [facility];  Service: Neurosurgery;  Laterality: Right;    BARIATRIC SURGERY  4 year ago    CARPAL TUNNEL RELEASE Bilateral     CARPAL TUNNEL RELEASE Right 06/29/2023    Procedure: CARPAL TUNNEL RELEASE RIGHT;  Surgeon: Jaiden Aldridge MD;  Location:  [facility];  Service: Neurosurgery;  Laterality: Right;    COLONOSCOPY      5 years ago    COLONOSCOPY N/A 04/05/2022    Procedure: COLONOSCOPY FOR SCREENING;  Surgeon: Luz Galvez MD;  Location:  [facility];  Service: Gastroenterology;  Laterality: N/A;    ENDOSCOPY N/A 04/05/2022    Procedure: ESOPHAGOGASTRODUODENOSCOPY WITH BIOPSY;  Surgeon: Luz Galvez MD;  Location:  [facility];  Service: Gastroenterology;  Laterality: N/A;    GASTRIC SLEEVE LAPAROSCOPIC      2017    HIP SURGERY Right times 2    KNEE ARTHROSCOPY Left     TOTAL HIP ARTHROPLASTY Left 01/25/2021    Procedure: TOTAL HIP ARTHROPLASTY LEFT;  Surgeon: Jb Patel MD;  Location:  [facility];  Service: Orthopedics;  Laterality: Left;    TOTAL HIP ARTHROPLASTY REVISION Right 07/20/2020    Procedure: TOTAL HIP ARTHROPLASTY REVISION RIGHT;  Surgeon: Jb Patel MD;  Location:  [facility];  Service: Orthopedics;  Laterality: Right;     Family History   Problem Relation Age of Onset    Heart disease Father     Hypertension Father     Osteoarthritis Father     Cancer Mother     Diabetes Brother     Hypertension Brother     Gout Paternal Grandmother     Diabetes Paternal Grandmother     Colon cancer Maternal Uncle     Colon polyps Maternal Uncle      Social History     Socioeconomic History     Marital status:    Tobacco Use    Smoking status: Never    Smokeless tobacco: Never   Vaping Use    Vaping status: Never Used   Substance and Sexual Activity    Alcohol use: No    Drug use: Never    Sexual activity: Yes     ---------------------------------------------------------------------------------------------------------------------   Allergies:  Sulfa antibiotics, Codeine, Ketorolac, and Morphine and codeine  ---------------------------------------------------------------------------------------------------------------------   Medications below are reported home medications pulling from within the system; at this time, these medications have not been reconciled unless otherwise specified and are in the verification process for further verifcation as current home medications.    Prior to Admission Medications       Prescriptions Last Dose Informant Patient Reported? Taking?    ascorbic acid (VITAMIN C) 500 MG tablet 5/16/2025 Child Yes Yes    Take 2 tablets by mouth Daily.    baclofen (LIORESAL) 10 MG tablet 5/16/2025 Child Yes Yes    Take 2 tablets by mouth 3 (Three) Times a Day.    baclofen (LIORESAL) 10 MG tablet Past Week Child Yes Yes    Take 5 tablets by mouth every night at bedtime.    Brexpiprazole (Rexulti) 1 MG tablet 5/16/2025 Child Yes Yes    Take 1 tablet by mouth Daily.    busPIRone (BUSPAR) 5 MG tablet Past Week Child Yes Yes    Take 1 tablet by mouth Every Night.    Cyanocobalamin (B-12 Compliance Injection) 1000 MCG/ML kit Past Month Child Yes Yes    Inject 1 mL as directed Every 14 (Fourteen) Days.    diclofenac (VOLTAREN) 75 MG EC tablet 5/16/2025 Child Yes Yes    Take 1 tablet by mouth 2 (Two) Times a Day.    famotidine (PEPCID) 20 MG tablet 5/16/2025 Child Yes Yes    Take 1 tablet by mouth Daily.    ferrous sulfate 325 (65 FE) MG tablet 5/16/2025 Child Yes Yes    Take 1 tablet by mouth Daily With Breakfast.    FLUoxetine (PROzac) 20 MG capsule 5/16/2025 Child Yes Yes    Take 1  capsule by mouth Daily.    gabapentin (NEURONTIN) 300 MG capsule 5/16/2025 Child Yes Yes    Take 3 capsules by mouth 3 (Three) Times a Day.    Lactobacillus Acid-Pectin (ACIDOPHILUS PLUS PECTIN PO) 5/16/2025 Child Yes Yes    Take 1 capsule by mouth Daily.    loratadine (CLARITIN) 10 MG tablet 5/16/2025 Child Yes Yes    Take 1 tablet by mouth Daily.    Multiple Vitamins-Minerals (MENS MULTIPLUS PO) 5/16/2025 Child Yes Yes    Take 1 tablet by mouth Daily.    omeprazole (priLOSEC) 20 MG capsule 5/16/2025 Child Yes Yes    Take 1 capsule by mouth 2 (Two) Times a Day.    oxyCODONE (ROXICODONE) 10 MG tablet 5/16/2025 Child Yes Yes    Take 1 tablet by mouth 4 (Four) Times a Day.    oxyCODONE ER (oxyCONTIN) 20 MG 12 hr tablet Past Week Child Yes Yes    Take 1 tablet by mouth Every Night.    pantoprazole (PROTONIX) 40 MG EC tablet 5/16/2025 Child Yes Yes    Take 1 tablet by mouth Daily.    QUEtiapine (SEROquel) 100 MG tablet Past Week Child Yes Yes    Take 1 tablet by mouth Take As Directed.    tiZANidine (ZANAFLEX) 2 MG tablet 5/16/2025 Child Yes Yes    Take 1 tablet by mouth 4 (Four) Times a Day.    tiZANidine (ZANAFLEX) 2 MG tablet Past Week Child Yes Yes    Take 2 tablets by mouth Every Night.    vitamin D3 125 MCG (5000 UT) capsule capsule 5/16/2025 Child Yes Yes    Take 1 capsule by mouth Daily.          ---------------------------------------------------------------------------------------------------------------------    Objective     Hospital Scheduled Meds:  Acidophilus/Pectin, 1 capsule, Oral, Daily  ascorbic acid, 1,000 mg, Oral, Daily  baclofen, 20 mg, Oral, Q8H  baclofen, 20 mg, Oral, TID With Meals  Brexpiprazole, 1 mg, Oral, Daily  busPIRone, 5 mg, Oral, Q24H  cetirizine, 10 mg, Oral, Daily  cyanocobalamin, 1,000 mcg, Subcutaneous, Q14 Days  DAPTOmycin, 6 mg/kg (Adjusted), Intravenous, Q24H  ertapenem, 1,000 mg, Intravenous, Q24H  famotidine, 20 mg, Oral, Daily  ferrous sulfate, 325 mg, Oral, Daily With  Breakfast  FLUoxetine, 20 mg, Oral, Daily  gabapentin, 900 mg, Oral, TID With Meals  heparin (porcine), 5,000 Units, Subcutaneous, Q12H  lidocaine, 1 Application, Topical, BID  multivitamin with minerals, 1 tablet, Oral, Daily  naproxen, 500 mg, Oral, BID With Meals  oxyCODONE, 10 mg, Oral, Q6H  oxyCODONE ER, 20 mg, Oral, Nightly  pantoprazole, 40 mg, Oral, Daily  QUEtiapine, 200 mg, Oral, Nightly  sodium chloride, 10 mL, Intravenous, Q12H  tiZANidine, 2 mg, Oral, 4x Daily  tiZANidine, 4 mg, Oral, Nightly  vitamin D3, 5,000 Units, Oral, Daily      lactated ringers, 100 mL/hr, Last Rate: 100 mL/hr (05/19/25 1311)        Current listed hospital scheduled medications may not yet reflect those currently placed in orders that are signed and held, awaiting patient's arrival to floor/unit.    ---------------------------------------------------------------------------------------------------------------------   Vital Signs:  Temp:  [98.1 °F (36.7 °C)-99.8 °F (37.7 °C)] 98.1 °F (36.7 °C)  Heart Rate:  [69-80] 80  Resp:  [18-20] 18  BP: (118-147)/(56-76) 143/69  Mean Arterial Pressure (Non-Invasive) for the past 24 hrs (Last 3 readings):   Noninvasive MAP (mmHg)   05/19/25 0247 81   05/18/25 2300 72   05/18/25 1833 90     SpO2 Percentage    05/18/25 1305 05/18/25 1405 05/18/25 1737   SpO2: 96% 96% 96%     SpO2:  [96 %] 96 %  on   ;   Device (Oxygen Therapy): room air    Body mass index is 31.98 kg/m².  Wt Readings from Last 3 Encounters:   05/16/25 95.4 kg (210 lb 5.1 oz)   05/16/25 93 kg (205 lb)   05/07/25 93 kg (205 lb)       ---------------------------------------------------------------------------------------------------------------------   Physical Exam:  Area on penis with dry scab no drainage noted.  Patient is incontinent.    Wound Assessment:  Location: Left, lower, gluteal  Wound Measurements: Length: 10.0 Width:  11.4 Depth:3.5 cm  Tunneling: No Undermining: No  Dressing Appearance: dressingappearance:  intact  Closure: NA  Changes since last exam: this is initial exam  Etiology and classification:  Unstageable pressure injury  Wound bed structures/characteristics: moist, red, slough, yellow  Edges moist  Periwound characteristics: intact, dry, redness   Periwound Temperature: normal turgor and temperature  Drainage characteristics: moderate, serosanguinous  Perfusion characteristics: NA    Wound Assessment:  Location: Right, lower, gluteal  Wound Measurements: Length: 8.5 Width:  3.8 Depth: 2.1 cm  Tunneling: Yes 9.5 cm at 4:00 undermining: No  Dressing Appearance: dressingappearance: intact  Closure: NA  Changes since last exam: this is initial exam  Etiology and classification: stage 3 pressure ulcer  Wound bed structures/characteristics: full-thickness (subcutaneous tissue is exposed in at least a portion of the wound), moist, red  Edges moist  Periwound characteristics: intact, redness   Periwound Temperature: normal turgor and temperature  Drainage characteristics: moderate, serosanguinous  Perfusion characteristics: NA    Wound Assessment:  Location: Left, lower, scrotum  Wound Measurements: Length: 2.2 Width:  1.0 Depth:0.1 cm  Tunneling: No Undermining: No  Dressing Appearance: Open to air  Closure: NA  Changes since last exam: this is initial exam  Etiology and classification: stage 2 pressure ulcer  Wound bed structures/characteristics: moist, red  Edges moist  Periwound characteristics: intact, redness   Periwound Temperature: normal turgor and temperature  Drainage characteristics: none  Perfusion characteristics: NA    Wound Assessment:  Location: medial sacral spine  Wound Measurements: Length: 4.2 Width: 3.0  Depth:0.1 cm  Tunneling: No Undermining: No  Dressing Appearance: dressingappearance: intact  Closure: NA  Changes since last exam: this is initial exam  Etiology and classification:  Unstageable  Wound bed structures/characteristics: moist, red, slough  Edges moist  Periwound characteristics:  intact, dry, redness   Periwound Temperature: normal turgor and temperature  Drainage characteristics: none  Perfusion characteristics: NA        Assessment & Plan    Sacrum unstageable ulcer, left unstageable gluteal ulcer and right stage III ulcer gluteal  -Cleanse with wound cleanser and pat dry. Apply Santyl to base of wounds. Pack wounds with gauze dampened in NS. Cover with abd pads and seure with foam tape.   -Patient receiving IVertapenem and daptomycin .  ID managing antibiotic therapy  - This condition is associated with a high risk for non-healing, infection, sepsis, loss of function, reduced quality of life, and increased risk of premature mortality. The patient is at high-risk for additional pressure injury, requiring a high level of vigilance and coordination of care, and frequent re-assessment to prevent adverse outcomes.   -Management of this condition is inherently complex, requiring ongoing optimization of many factors to assure the highest likelihood of a favorable outcome, including pressure relief, bioburden, multiple aspects of nutrition, infection management, and moisture and mechanical factors relevant to wound healing.    - Recommend high protein diet 120g/day along with vitamin C 2000mg/day, vitamin A 5000 Units/day, vitamin D3 5000 Units/day, zinc 50mg/day to help promote wound healing    - Pressure intervention in place    Scrotum stage II pressure ulcer and penis contact dermatitis  - Cleanse with cleansing foam. Pat dry. Apply Z-Guard. No cover dressing.   -The patient is at high risk for wound formation and will require multiple assessments to maintain vigilance for wound formation.?      Diabetes  --Recommend adequate glycemic control to help promote wound healing  -Poor glycemic control increases risk of prolonged healing, infection, loss of limb and premature death       Obesity  -An obese person?is at greater risk for wound infection and dehiscence or evisceration.     Reviewed  notes of clinical teams involved in patient's care.    Follow-up tomorrow for wound assessment and effectiveness of wound treatment.    Thank you Dr. Marquez for allowing me to participate in the care of your patient and please feel free to contact me for any questions you may have.           TANJA Aleman  [facility]    05/19/25  13:58 EDT

## 2025-05-19 NOTE — CASE MANAGEMENT/SOCIAL WORK
Discharge Planning Assessment   Giovanny     Patient Name: Ang Jackson  MRN: [id number]  Today's Date: 5/19/2025    Admit Date: 5/16/2025    Plan: CM spoke with pt and his daughter Vita at the bedside. Pt lives with spouse Myla  at [address] [place] [address] in [facility]. Pt is quadriplegic. Daughters provide assistance with his care. Pt has power W/C, hospital bed with low air loss mattress, husam lift, O2@2L hs (verified per Luciana at [facility]) via [facility]. Pt has [facility] RN weekly and PT 2x/week and F/C supplies. Pt will need IV antbibiotics and prefers [facility]. CM contacted [facility] per Shonda and faxed referral. Pt f/u with wound care clinic every Wednesday. Pt transports via [facility]. PCP is Deepak YAO and he gets rx filled at [facility]. Pt has Medicare A&B and [facility] [place] and denies any issues with copays. CM/SS will follow.   Discharge Needs Assessment       Row Name 05/19/25 1239       Living Environment    People in Home spouse    Current Living Arrangements home    Primary Care Provided by spouse/significant other;child(clarence)    Provides Primary Care For no one    Family Caregiver if Needed spouse;child(clarence), adult    Family Caregiver Names Spouse Myla [phone]    Quality of Family Relationships supportive;helpful;involved    Able to Return to Prior Arrangements yes       Resource/Environmental Concerns    Transportation Concerns none       Transition Planning    Patient/Family Anticipates Transition to home    Patient/Family Anticipated Services at Transition home health care    Transportation Anticipated health plan transportation       Discharge Needs Assessment    Readmission Within the Last 30 Days no previous admission in last 30 days    Current Outpatient/Agency/Support Group homecare agency;clinic(s)    Equipment Currently Used at Home lift device;wheelchair, motorized;hospital bed;oxygen    Concerns to be Addressed discharge planning     Anticipated Changes Related to Illness none    Equipment Needed After Discharge none    Outpatient/Agency/Support Group Needs infusion therapy, home    Patient's Choice of Community Agency(s) [facility] and PT 2x/week    Current Discharge Risk dependent with mobility/activities of daily living                   Discharge Plan       Row Name 05/19/25 3320       Plan    Plan CM spoke with pt and his daughter Vita at the bedside. Pt lives with spouse Myla  at [address] [place] [address] in [facility]. Pt is quadriplegic. Daughters provide assistance with his care. Pt has power W/C, hospital bed with low air loss mattress, husam lift, O2@2L hs (verified per Luciana at [facility]) via [facility]. Pt has [facility] RN weekly and PT 2x/week and F/C supplies. Pt will need IV antbibiotics and prefers [facility]. CM contacted [facility] per Shonda and faxed referral. Pt f/u with wound care clinic every Wednesday. Pt transports via [facility]. PCP is Deepak YAO and he gets rx filled at [facility]. [facility] [place] and denies any issues with copays. CM/SS will follow.    Patient/Family in Agreement with Plan yes              Demographic Summary       Row Name 05/19/25 1209       General Information    Admission Type inpatient    Referral Source admission list;case finding    Reason for Consult discharge planning                 Nereyda Pelletier RN

## 2025-05-19 NOTE — PLAN OF CARE
Goal Outcome Evaluation:  Plan of Care Reviewed With: patient        Progress: no change  Outcome Evaluation: Pt resting in bed during assessment, VSS.PT has no complaints or concerns at this time. Will cont POC. Dressing is dry and intact at this time.

## 2025-05-19 NOTE — PROGRESS NOTES
Chief complaint: Pressure ulcerations with necrosis    Postoperative day 1: Bilateral gluteal wound debridement    No acute events overnight.    Afebrile, vital signs stable  Abdomen soft nontender nondistended  Clear to auscultation bilaterally  Dressings clean dry and intact    64-year-old quadriplegic male status post excisional debridement of necrotic pressure ulcerations to the bilateral gluteal regions.  -Wet-to-dry Dakin's packing twice daily  -Resume follow-up with wound clinic

## 2025-05-19 NOTE — CASE MANAGEMENT/SOCIAL WORK
Discharge Planning Assessment   Giovanny     Patient Name: Ang Jackson  MRN: [id number]  Today's Date: 5/19/2025    Admit Date: 5/16/2025                  Discharge Plan       Row Name 05/19/25 1515       Plan    Plan SS received  consult for having home health. Pt has [facility]. ([facility]) will need a new referral for skilled nursing and therpay at discharge. Pt lives with his spouse Myla at [address] [place] [address]. Pt has a power chair, hospital bed with low air loss mattress, husam lift, O2@ 2 liters via [employer]. Pt will need [facility] for transportation. SS to follow.                   Expected Discharge Date and Time       Expected Discharge Date Expected Discharge Time    May 20, 2025             TERESITA Park

## 2025-05-19 NOTE — PROGRESS NOTES
PROGRESS NOTE         Patient Identification:  Name:  Ang Jackson  Age:  64 y.o.  Sex:  male  :  1960  MRN:  [id number]  Visit Number:  [id number]  Primary Care Provider:  Deepak Perez         LOS: 3 days       ----------------------------------------------------------------------------------------------------------------------  Subjective       Chief Complaints:    Infected chronic ulcers      Interval History:      The patient is awake and alert, sitting up comfortably in bed.  On room air with no apparent distress.  Tmax 99.7 °F.  Denies diarrhea.  Denies any complaints or issues.  Family member at the bedside.  Lung exam is clear to auscultation bilaterally.  Abdomen soft and nontender with normoactive bowel sounds.  WBC normal at 5.82.  Intraoperative tissue/bone culture preliminarily reporting no organisms.    Review of Systems:    Constitutional: no fever, chills and night sweats.  Generalized fatigue.  Eyes: no eye drainage, itching or redness.  HEENT: no mouth sores, dysphagia or nose bleed.  Respiratory: no for shortness of breath, cough or production of sputum.  Cardiovascular: no chest pain, no palpitations, no orthopnea.  Gastrointestinal: no nausea, vomiting or diarrhea. No abdominal pain, hematemesis or rectal bleeding.  Genitourinary: no dysuria or polyuria.  Hematologic/lymphatic: no lymph node abnormalities, no easy bruising or easy bleeding.  Musculoskeletal: no muscle or joint pain.  Skin: No rash and no itching.  Chronic sacral and gluteal ulcers  Neurological: no loss of consciousness, no seizure, no headache.  History of traumatic spinal injury  Behavioral/Psych: no depression or suicidal ideation.  Endocrine: no hot flashes.  Immunologic: negative.    ----------------------------------------------------------------------------------------------------------------------      Objective       Current [facility] Meds:  Acidophilus/Pectin, 1 capsule, Oral, Daily  ascorbic  acid, 1,000 mg, Oral, Daily  baclofen, 20 mg, Oral, Q8H  baclofen, 20 mg, Oral, TID With Meals  Brexpiprazole, 1 mg, Oral, Daily  busPIRone, 5 mg, Oral, Q24H  cetirizine, 10 mg, Oral, Daily  cyanocobalamin, 1,000 mcg, Subcutaneous, Q14 Days  DAPTOmycin, 6 mg/kg (Adjusted), Intravenous, Q24H  ertapenem, 1,000 mg, Intravenous, Q24H  famotidine, 20 mg, Oral, Daily  ferrous sulfate, 325 mg, Oral, Daily With Breakfast  FLUoxetine, 20 mg, Oral, Daily  gabapentin, 900 mg, Oral, TID With Meals  heparin (porcine), 5,000 Units, Subcutaneous, Q12H  lidocaine, 1 Application, Topical, BID  multivitamin with minerals, 1 tablet, Oral, Daily  naproxen, 500 mg, Oral, BID With Meals  oxyCODONE, 10 mg, Oral, Q6H  oxyCODONE ER, 20 mg, Oral, Nightly  pantoprazole, 40 mg, Oral, Daily  QUEtiapine, 200 mg, Oral, Nightly  sodium chloride, 10 mL, Intravenous, Q12H  tiZANidine, 2 mg, Oral, 4x Daily  tiZANidine, 4 mg, Oral, Nightly  vitamin D3, 5,000 Units, Oral, Daily      lactated ringers, 100 mL/hr, Last Rate: 100 mL/hr (05/19/25 0232)      ----------------------------------------------------------------------------------------------------------------------    Vital Signs:  Temp:  [97.2 °F (36.2 °C)-99.8 °F (37.7 °C)] 98.1 °F (36.7 °C)  Heart Rate:  [60-83] 80  Resp:  [13-20] 18  BP: (118-152)/(56-76) 118/65  Mean Arterial Pressure (Non-Invasive) for the past 24 hrs (Last 3 readings):   Noninvasive MAP (mmHg)   05/19/25 0247 81   05/18/25 2300 72   05/18/25 1833 90     SpO2 Percentage    05/18/25 1305 05/18/25 1405 05/18/25 1737   SpO2: 96% 96% 96%     SpO2:  [94 %-100 %] 96 %  on  Flow (L/min) (Oxygen Therapy):  [2] 2;   Device (Oxygen Therapy): room air    Body mass index is 31.98 kg/m².  Wt Readings from Last 3 Encounters:   05/16/25 95.4 kg (210 lb 5.1 oz)   05/16/25 93 kg (205 lb)   05/07/25 93 kg (205 lb)        Intake/Output Summary (Last 24 hours) at 5/19/2025 1002  Last data filed at 5/19/2025 0247  Gross per 24 hour   Intake 1200  ml   Output 1200 ml   Net 0 ml     Diet: Regular/House; Fluid Consistency: Thin (IDDSI 0)  ----------------------------------------------------------------------------------------------------------------------      Physical Exam:    Constitutional:  Well-developed and well-nourished.  On room air with no respiratory distress.  Family member at the bedside.  Denies any complaints or issues.  HENT:  Head: Normocephalic and atraumatic.  Mouth:  Moist mucous membranes.    Eyes:  Conjunctivae and EOM are normal.  No scleral icterus.  Neck:  Neck supple.  No JVD present.    Cardiovascular:  Normal rate, regular rhythm and normal heart sounds with no murmur. No edema.  Pulmonary/Chest:  No respiratory distress, no wheezes, no crackles, with normal breath sounds and good air movement.  Abdominal:  Soft.  Bowel sounds are normal.  No distension and no tenderness.   Musculoskeletal:  No edema, no tenderness, and no deformity.  No swelling or redness of joints.  Paralysis  Neurological:  Alert and oriented to person, place, and time.  No facial droop.  No slurred speech.  History of traumatic spinal injury  Skin:  Skin is warm and dry.  No rash noted.  No pallor.  Ulcer to the penile meatus secondary to indwelling Chin catheter.  Sacral ulcer.  Left and right gluteal ulcers.  Dressed, clean dry and intact.  Psychiatric:  Normal mood and affect.  Behavior is normal.        ----------------------------------------------------------------------------------------------------------------------            Results from last 7 days   Lab Units 05/19/25  0120 05/18/25  0231 05/17/25  0446 05/16/25  1544 05/15/25  1606   CRP mg/dL  --   --   --  8.23* 7.76*   LACTATE mmol/L  --   --   --  1.1  --    WBC 10*3/mm3 5.82 5.36 7.22 6.19 7.52   HEMOGLOBIN g/dL 8.5* 9.2* 9.8* 9.2* 9.6*   HEMATOCRIT % 28.3* 31.4* 32.2* 31.1* 31.7*   MCV fL 92.5 92.9 93.1 94.0 93.2   MCHC g/dL 30.0* 29.3* 30.4* 29.6* 30.3*   PLATELETS 10*3/mm3 188 177 201 189  "218     Results from last 7 days   Lab Units 05/19/25  0120 05/18/25  0231 05/17/25  0446 05/16/25  1544   SODIUM mmol/L 143 145 141 142   POTASSIUM mmol/L 3.5 3.5 3.8 3.8   CHLORIDE mmol/L 108* 110* 104 105   CO2 mmol/L 27.4 27.4 28.2 30.4*   BUN mg/dL 15 12 14 20   CREATININE mg/dL 0.31* 0.29* 0.32* 0.47*   CALCIUM mg/dL 8.0* 8.4* 8.5* 8.7   GLUCOSE mg/dL 109* 96 110* 134*   ALBUMIN g/dL 2.3*  --  2.5* 2.6*   BILIRUBIN mg/dL <0.2  --  0.2 <0.2   ALK PHOS U/L 63  --  72 71   AST (SGOT) U/L 9  --  8 7   ALT (SGPT) U/L <5  --  5 <5   Estimated Creatinine Clearance: 273.2 mL/min (A) (by C-G formula based on SCr of 0.31 mg/dL (L)).  No results found for: \"AMMONIA\"    No results found for: \"HGBA1C\", \"POCGLU\"  Lab Results   Component Value Date    HGBA1C 6.60 (H) 01/15/2025     Lab Results   Component Value Date    TSH 1.350 01/15/2025    FREET4 0.9 11/22/2024       Blood Culture   Date Value Ref Range Status   05/16/2025 No growth at 2 days  Preliminary   05/16/2025 No growth at 2 days  Preliminary     No results found for: \"URINECX\"  Wound Culture   Date Value Ref Range Status   05/15/2025 Light growth (2+) Escherichia coli ESBL (A)  Final     Comment:       Consider infectious disease consult.  Susceptibility results may not correlate to clinical outcomes.     No results found for: \"STOOLCX\"  No results found for: \"RESPCX\"  Pain Management Panel           No data to display                  ----------------------------------------------------------------------------------------------------------------------  Imaging Results (Last 24 Hours)       ** No results found for the last 24 hours. **            ----------------------------------------------------------------------------------------------------------------------    Pertinent Infectious Disease Results                Assessment/Plan     Isolation Status:  Contact     Assessment     Infected chronic sacral ulcers        Plan      The patient is awake and alert, " sitting up comfortably in bed.  On room air with no apparent distress.  Tmax 99.7 °F.  Denies diarrhea.  Denies any complaints or issues.  Family member at the bedside.  Lung exam is clear to auscultation bilaterally.  Abdomen soft and nontender with normoactive bowel sounds.  WBC normal at 5.82.  Intraoperative tissue/bone culture preliminarily reporting no organisms.    General surgery operative note reported necrotic tissue and subcutaneous fat sharply excised down to the fascia, not involving fascia.  No mention of bony involvement.  MRI does not report any evidence of osteomyelitis.  Previous wound cultures finalized with E. coli ESBL and Enterococcus faecalis.    The patient continues ertapenem 1 g IV every 24 hours and vancomycin per pharmacy dosing based on recent wound culture sensitivities.  We will transition from vancomycin to daptomycin 6 mg/kg IV 24 hours today to continue with ertapenem course.      Plan to continue ertapenem and daptomycin courses for total of 14 days, through 5/30/2025 for infection of chronic ulcers without evidence of osteomyelitis.  CPK level ordered for today.  The patient will need weekly CPK monitoring and to avoid statins while on daptomycin course.  Plan to follow closely in the outpatient infectious disease clinic upon discharge.      RECENT ANTIMICROBIAL THERAPY    daptomycin  ertapenem (INVanz) 1000 mg in 100 mL NS (VTB)     Code Status:   Code Status and Medical Interventions: No CPR (Do Not Attempt to Resuscitate); Limited Support; No intubation (DNI)   Ordered at: 05/16/25 2148     Code Status (Patient has no pulse and is not breathing):    No CPR (Do Not Attempt to Resuscitate)     Medical Interventions (Patient has pulse or is breathing):    Limited Support     Medical Intervention Limits:    No intubation (DNI)       TANJA Monroe  05/19/25  10:02 EDT

## 2025-05-20 ENCOUNTER — READMISSION MANAGEMENT (OUTPATIENT)
Dept: CALL CENTER | Facility: HOSPITAL | Age: 65
End: 2025-05-20
Payer: MEDICARE

## 2025-05-20 VITALS
BODY MASS INDEX: 31.88 KG/M2 | DIASTOLIC BLOOD PRESSURE: 75 MMHG | WEIGHT: 210.32 LBS | HEART RATE: 64 BPM | OXYGEN SATURATION: 95 % | TEMPERATURE: 97.5 F | HEIGHT: 68 IN | SYSTOLIC BLOOD PRESSURE: 132 MMHG | RESPIRATION RATE: 20 BRPM

## 2025-05-20 PROCEDURE — G0545 PR INHERENT VISIT TO INPT: HCPCS | Performed by: NURSE PRACTITIONER

## 2025-05-20 PROCEDURE — 25010000002 HEPARIN (PORCINE) PER 1000 UNITS: Performed by: SURGERY

## 2025-05-20 PROCEDURE — 99239 HOSP IP/OBS DSCHRG MGMT >30: CPT | Performed by: INTERNAL MEDICINE

## 2025-05-20 PROCEDURE — 25010000002 ERTAPENEM PER 500 MG: Performed by: NURSE PRACTITIONER

## 2025-05-20 PROCEDURE — 25810000003 LACTATED RINGERS PER 1000 ML: Performed by: SURGERY

## 2025-05-20 PROCEDURE — C1751 CATH, INF, PER/CENT/MIDLINE: HCPCS

## 2025-05-20 PROCEDURE — 99232 SBSQ HOSP IP/OBS MODERATE 35: CPT | Performed by: NURSE PRACTITIONER

## 2025-05-20 PROCEDURE — 36410 VNPNXR 3YR/> PHY/QHP DX/THER: CPT

## 2025-05-20 RX ORDER — SODIUM CHLORIDE 0.9 % (FLUSH) 0.9 %
10 SYRINGE (ML) INJECTION AS NEEDED
Status: DISCONTINUED | OUTPATIENT
Start: 2025-05-20 | End: 2025-05-20 | Stop reason: HOSPADM

## 2025-05-20 RX ORDER — SODIUM CHLORIDE 0.9 % (FLUSH) 0.9 %
10 SYRINGE (ML) INJECTION EVERY 12 HOURS SCHEDULED
Status: DISCONTINUED | OUTPATIENT
Start: 2025-05-20 | End: 2025-05-20 | Stop reason: HOSPADM

## 2025-05-20 RX ADMIN — Medication 1 CAPSULE: at 09:01

## 2025-05-20 RX ADMIN — Medication 10 ML: at 13:33

## 2025-05-20 RX ADMIN — GABAPENTIN 900 MG: 300 CAPSULE ORAL at 05:09

## 2025-05-20 RX ADMIN — TIZANIDINE 2 MG: 4 TABLET ORAL at 09:00

## 2025-05-20 RX ADMIN — OXYCODONE HYDROCHLORIDE AND ACETAMINOPHEN 1000 MG: 500 TABLET ORAL at 09:02

## 2025-05-20 RX ADMIN — TIZANIDINE 2 MG: 4 TABLET ORAL at 05:09

## 2025-05-20 RX ADMIN — BREXPIPRAZOLE 1 MG: 1 TABLET ORAL at 09:01

## 2025-05-20 RX ADMIN — PANTOPRAZOLE SODIUM 40 MG: 40 TABLET, DELAYED RELEASE ORAL at 09:01

## 2025-05-20 RX ADMIN — OXYCODONE HYDROCHLORIDE 10 MG: 10 TABLET ORAL at 05:09

## 2025-05-20 RX ADMIN — NAPROXEN 500 MG: 250 TABLET ORAL at 09:00

## 2025-05-20 RX ADMIN — Medication 1 TABLET: at 09:02

## 2025-05-20 RX ADMIN — LIDOCAINE 4% 1 APPLICATION: 4 CREAM TOPICAL at 09:03

## 2025-05-20 RX ADMIN — Medication 5000 UNITS: at 09:00

## 2025-05-20 RX ADMIN — CETIRIZINE HYDROCHLORIDE 10 MG: 10 TABLET, FILM COATED ORAL at 09:00

## 2025-05-20 RX ADMIN — COLLAGENASE SANTYL 1 APPLICATION: 250 OINTMENT TOPICAL at 09:03

## 2025-05-20 RX ADMIN — TIZANIDINE 2 MG: 4 TABLET ORAL at 13:32

## 2025-05-20 RX ADMIN — FERROUS SULFATE TAB 325 MG (65 MG ELEMENTAL FE) 325 MG: 325 (65 FE) TAB at 09:01

## 2025-05-20 RX ADMIN — BACLOFEN 20 MG: 10 TABLET ORAL at 09:00

## 2025-05-20 RX ADMIN — SODIUM CHLORIDE, POTASSIUM CHLORIDE, SODIUM LACTATE AND CALCIUM CHLORIDE 100 ML/HR: 600; 310; 30; 20 INJECTION, SOLUTION INTRAVENOUS at 03:38

## 2025-05-20 RX ADMIN — ERTAPENEM SODIUM 1000 MG: 1 INJECTION INTRAMUSCULAR; INTRAVENOUS at 00:27

## 2025-05-20 RX ADMIN — FAMOTIDINE 20 MG: 20 TABLET ORAL at 09:01

## 2025-05-20 RX ADMIN — FLUOXETINE HYDROCHLORIDE 20 MG: 20 CAPSULE ORAL at 09:02

## 2025-05-20 RX ADMIN — BACLOFEN 20 MG: 10 TABLET ORAL at 05:09

## 2025-05-20 RX ADMIN — BACLOFEN 20 MG: 10 TABLET ORAL at 13:32

## 2025-05-20 RX ADMIN — Medication 10 ML: at 09:02

## 2025-05-20 RX ADMIN — BUSPIRONE HYDROCHLORIDE 5 MG: 5 TABLET ORAL at 05:09

## 2025-05-20 RX ADMIN — GABAPENTIN 900 MG: 300 CAPSULE ORAL at 13:32

## 2025-05-20 RX ADMIN — OXYCODONE HYDROCHLORIDE 10 MG: 10 TABLET ORAL at 13:32

## 2025-05-20 RX ADMIN — OXYCODONE HYDROCHLORIDE 10 MG: 10 TABLET ORAL at 09:02

## 2025-05-20 RX ADMIN — HEPARIN SODIUM 5000 UNITS: 5000 INJECTION INTRAVENOUS; SUBCUTANEOUS at 09:03

## 2025-05-20 NOTE — CONSULTS
"Assessment:  Diagnosis: infected wound    Allergies   Allergen Reactions    Sulfa Antibiotics Hives     Hives, shortness of breath    Codeine Nausea And Vomiting    Ketorolac Other (See Comments)     Pt reports heavy, \"tight\" feeling in his chest.    Morphine And Codeine Nausea And Vomiting       Order Date/Time: 5/19/2025  Indications: iv abx <30 days  LABS:  Lab Results   Component Value Date    INR 1.12 (H) 03/26/2025    PROTIME 14.6 03/26/2025     Lab Results   Component Value Date    PTT 37.1 (H) 03/26/2025     Lab Results   Component Value Date    WBC 5.82 05/19/2025    HGB 8.5 (L) 05/19/2025    HCT 28.3 (L) 05/19/2025    MCV 92.5 05/19/2025     05/19/2025     Lab Results   Component Value Date    BUN 15 05/19/2025     Lab Results   Component Value Date    CREATININE 0.31 (L) 05/19/2025     Lab Results   Component Value Date    EGFRIFNONA 99 01/26/2021     Labs Reviewed: all labs reviewed    Contraindications for PICC/Midline:  No contraindications noted    Recommendations:  PowerGlide Pro Midline Catheter; 18 g; 10 cm  Upper Right Basilic    Procedure Time Out:  Time out Time: 1040  Correct Patient Identity: Yes  Correct Surgical Side and Site Are Marked: Yes  Agreement on Procedure to be done: Yes  Antibiotic Given: N/A  RN: SOLOMON Estrada RN    PowerGlide Pro Midline Catheter placed with ultrasound guidance and verified by blood return. Minimal blood loss noted. Catheter flushes easily. Blood return noted. Patient nurse, Cristin HIDALGO, made aware that midline is in upper R arm and ready for use.    Kavita Estrada RN    "

## 2025-05-20 NOTE — DISCHARGE INSTRUCTIONS
Sacrum, right lower gluteal and left lower gluteal wounds.    Wound Care Instructions Cleanse with wound cleanser and pat dry.  Apply Santyl to base of wounds.  Pack wounds with gauze dampened in NS.  Cover with abd pads and seure with foam tape.

## 2025-05-20 NOTE — CASE MANAGEMENT/SOCIAL WORK
Discharge Planning Assessment  VELASQUEZ Baltazar     Patient Name: Ang Jackson  MRN: [id number]  Today's Date: 5/20/2025    Admit Date: 5/16/2025       Discharge Plan       Row Name 05/20/25 0846       Plan    Plan CM received message from [employer] via [employer] that pt's copay for IV antibiotics will be $1.60 per drug per week. Shonda will inform pt's daughter.                  Nereyda Pelletier RN

## 2025-05-20 NOTE — CASE MANAGEMENT/SOCIAL WORK
Continued Stay Note  VELASQUEZ Baltazar     Patient Name: Ang Jackson  MRN: [id number]  Today's Date: 5/20/2025    Admit Date: 5/16/2025    Plan: CM faxed and notified Shonda that Pt is being discharged home today.  CM phoned Kavita to let her know patient is being discharged so she can do Midline.   Discharge Plan       Row Name 05/20/25 0852       Plan    Plan CM faxed and notified Shonda that Pt is being discharged home today.  CM phoned Kavita to let her know patient is being discharged so she can do Midline.    Patient/Family in Agreement with Plan yes                     Discharge Codes    No documentation.                 Expected Discharge Date and Time       Expected Discharge Date Expected Discharge Time    May 20, 2025               Annalise Schofield RN

## 2025-05-20 NOTE — PROGRESS NOTES
PROGRESS NOTE         Patient Identification:  Name:  Ang Jackson  Age:  64 y.o.  Sex:  male  :  1960  MRN:  [id number]  Visit Number:  [id number]  Primary Care Provider:  Deepak Perez         LOS: 4 days       ----------------------------------------------------------------------------------------------------------------------  Subjective       Chief Complaints:    Infected chronic ulcers      Interval History:      The patient is awake and alert, very pleasant.  Resting comfortably in bed.  On room air with no apparent distress.  Afebrile.  Denies diarrhea.  Lung exam is clear auscultation bilaterally.  Abdomen soft and nontender, active bowel sounds.  Sacral wounds are dressed, clean dry and intact.  CK WNL at 21.  WBC normal at 5.82.  Intraoperative tissue/bone culture from the sacrum preliminarily reporting moderate growth E. coli.    Review of Systems:    Constitutional: no fever, chills and night sweats.  Generalized fatigue.  Eyes: no eye drainage, itching or redness.  HEENT: no mouth sores, dysphagia or nose bleed.  Respiratory: no for shortness of breath, cough or production of sputum.  Cardiovascular: no chest pain, no palpitations, no orthopnea.  Gastrointestinal: no nausea, vomiting or diarrhea. No abdominal pain, hematemesis or rectal bleeding.  Genitourinary: no dysuria or polyuria.  Hematologic/lymphatic: no lymph node abnormalities, no easy bruising or easy bleeding.  Musculoskeletal: no muscle or joint pain.  Skin: No rash and no itching.  Chronic sacral and gluteal ulcers  Neurological: no loss of consciousness, no seizure, no headache.  History of traumatic spinal injury  Behavioral/Psych: no depression or suicidal ideation.  Endocrine: no hot flashes.  Immunologic: negative.    ----------------------------------------------------------------------------------------------------------------------      Objective       Current Hospital Meds:  Acidophilus/Pectin, 1 capsule,  Oral, Daily  ascorbic acid, 1,000 mg, Oral, Daily  baclofen, 20 mg, Oral, Q8H  baclofen, 20 mg, Oral, TID With Meals  Brexpiprazole, 1 mg, Oral, Daily  busPIRone, 5 mg, Oral, Q24H  cetirizine, 10 mg, Oral, Daily  collagenase, 1 Application, Topical, Q24H  cyanocobalamin, 1,000 mcg, Subcutaneous, Q14 Days  DAPTOmycin, 6 mg/kg (Adjusted), Intravenous, Q24H  ertapenem, 1,000 mg, Intravenous, Q24H  famotidine, 20 mg, Oral, Daily  ferrous sulfate, 325 mg, Oral, Daily With Breakfast  FLUoxetine, 20 mg, Oral, Daily  gabapentin, 900 mg, Oral, TID With Meals  heparin (porcine), 5,000 Units, Subcutaneous, Q12H  lidocaine, 1 Application, Topical, BID  multivitamin with minerals, 1 tablet, Oral, Daily  naproxen, 500 mg, Oral, BID With Meals  oxyCODONE, 10 mg, Oral, Q6H  oxyCODONE ER, 20 mg, Oral, Nightly  pantoprazole, 40 mg, Oral, Daily  QUEtiapine, 200 mg, Oral, Nightly  sodium chloride, 10 mL, Intravenous, Q12H  sodium chloride, 10 mL, Intravenous, Q12H  tiZANidine, 2 mg, Oral, 4x Daily  tiZANidine, 4 mg, Oral, Nightly  vitamin D3, 5,000 Units, Oral, Daily      lactated ringers, 50 mL/hr, Last Rate: 50 mL/hr (05/20/25 0903)      ----------------------------------------------------------------------------------------------------------------------    Vital Signs:  Temp:  [97.5 °F (36.4 °C)-99 °F (37.2 °C)] 97.5 °F (36.4 °C)  Heart Rate:  [64-71] 64  Resp:  [16-20] 20  BP: (114-147)/(63-75) 132/75  No data found.    SpO2 Percentage    05/19/25 1830 05/19/25 2256 05/20/25 0256   SpO2: 95% 97% 95%     SpO2:  [95 %-97 %] 95 %  on   ;   Device (Oxygen Therapy): room air    Body mass index is 31.98 kg/m².  Wt Readings from Last 3 Encounters:   05/16/25 95.4 kg (210 lb 5.1 oz)   05/16/25 93 kg (205 lb)   05/07/25 93 kg (205 lb)        Intake/Output Summary (Last 24 hours) at 5/20/2025 1304  Last data filed at 5/20/2025 0900  Gross per 24 hour   Intake 840 ml   Output 1150 ml   Net -310 ml     Diet: Regular/House; Fluid Consistency:  Thin (IDDSI 0)  ----------------------------------------------------------------------------------------------------------------------      Physical Exam:    Constitutional:  Well-developed and well-nourished.  Awake and alert, resting comfortably in bed.  Very pleasant.  Denies any complaints or issues this morning.    HENT:  Head: Normocephalic and atraumatic.  Mouth:  Moist mucous membranes.    Eyes:  Conjunctivae and EOM are normal.  No scleral icterus.  Neck:  Neck supple.  No JVD present.    Cardiovascular:  Normal rate, regular rhythm and normal heart sounds with no murmur. No edema.  Pulmonary/Chest:  No respiratory distress, no wheezes, no crackles, with normal breath sounds and good air movement.  Abdominal:  Soft.  Bowel sounds are normal.  No distension and no tenderness.   Musculoskeletal:  No edema, no tenderness, and no deformity.  No swelling or redness of joints.  Paralysis  Neurological:  Alert and oriented to person, place, and time.  No facial droop.  No slurred speech.  History of traumatic spinal injury  Skin:  Skin is warm and dry.  No rash noted.  No pallor.  Ulcer to the penile meatus secondary to indwelling Chin catheter.  Sacral ulcer.  Left and right gluteal ulcers.  Dressed, clean dry and intact.  Psychiatric:  Normal mood and affect.  Behavior is normal.        ----------------------------------------------------------------------------------------------------------------------  Results from last 7 days   Lab Units 05/19/25  0120   CK TOTAL U/L 21           Results from last 7 days   Lab Units 05/19/25  0120 05/18/25  0231 05/17/25  0446 05/16/25  1544 05/15/25  1606   CRP mg/dL  --   --   --  8.23* 7.76*   LACTATE mmol/L  --   --   --  1.1  --    WBC 10*3/mm3 5.82 5.36 7.22 6.19 7.52   HEMOGLOBIN g/dL 8.5* 9.2* 9.8* 9.2* 9.6*   HEMATOCRIT % 28.3* 31.4* 32.2* 31.1* 31.7*   MCV fL 92.5 92.9 93.1 94.0 93.2   MCHC g/dL 30.0* 29.3* 30.4* 29.6* 30.3*   PLATELETS 10*3/mm3 188 177 201 189 218  "    Results from last 7 days   Lab Units 05/19/25  0120 05/18/25  0231 05/17/25  0446 05/16/25  1544   SODIUM mmol/L 143 145 141 142   POTASSIUM mmol/L 3.5 3.5 3.8 3.8   CHLORIDE mmol/L 108* 110* 104 105   CO2 mmol/L 27.4 27.4 28.2 30.4*   BUN mg/dL 15 12 14 20   CREATININE mg/dL 0.31* 0.29* 0.32* 0.47*   CALCIUM mg/dL 8.0* 8.4* 8.5* 8.7   GLUCOSE mg/dL 109* 96 110* 134*   ALBUMIN g/dL 2.3*  --  2.5* 2.6*   BILIRUBIN mg/dL <0.2  --  0.2 <0.2   ALK PHOS U/L 63  --  72 71   AST (SGOT) U/L 9  --  8 7   ALT (SGPT) U/L <5  --  5 <5   Estimated Creatinine Clearance: 273.2 mL/min (A) (by C-G formula based on SCr of 0.31 mg/dL (L)).  No results found for: \"AMMONIA\"    No results found for: \"HGBA1C\", \"POCGLU\"  Lab Results   Component Value Date    HGBA1C 6.60 (H) 01/15/2025     Lab Results   Component Value Date    TSH 1.350 01/15/2025    FREET4 0.9 11/22/2024       Blood Culture   Date Value Ref Range Status   05/16/2025 No growth at 2 days  Preliminary   05/16/2025 No growth at 2 days  Preliminary     No results found for: \"URINECX\"  Wound Culture   Date Value Ref Range Status   05/15/2025 Light growth (2+) Escherichia coli ESBL (A)  Final     Comment:       Consider infectious disease consult.  Susceptibility results may not correlate to clinical outcomes.     No results found for: \"STOOLCX\"  No results found for: \"RESPCX\"  Pain Management Panel           No data to display                  ----------------------------------------------------------------------------------------------------------------------  Imaging Results (Last 24 Hours)       ** No results found for the last 24 hours. **            ----------------------------------------------------------------------------------------------------------------------    Pertinent Infectious Disease Results                Assessment/Plan     Isolation Status:  Contact     Assessment     Infected chronic sacral ulcers        Plan      The patient is awake and alert, very " pleasant.  Resting comfortably in bed.  On room air with no apparent distress.  Afebrile.  Denies diarrhea.  Lung exam is clear auscultation bilaterally.  Abdomen soft and nontender, active bowel sounds.  Sacral wounds are dressed, clean dry and intact.  CK WNL at 21.  WBC normal at 5.82.  Intraoperative tissue/bone culture from the sacrum preliminarily reporting moderate growth E. coli.    General surgery operative note reported necrotic tissue and subcutaneous fat sharply excised down to the fascia, not involving fascia.  No mention of bony involvement.  MRI does not report any evidence of osteomyelitis.  Previous wound cultures finalized with E. coli ESBL and Enterococcus faecalis.    Plan to continue ertapenem 1 g IV every 24 hours and daptomycin 6 mg/kg IV 24 hours for total of 14 days, through 5/30/2025 infected chronic sacral ulcers without evidence of osteomyelitis.  CK within normal limits at 21.  The patient will need weekly CPK monitoring and to avoid statins while on daptomycin course.  Midline in place to the right upper extremity.  Plan to follow in the infectious disease clinic 1 week after discharge.      RECENT ANTIMICROBIAL THERAPY    daptomycin  daptomycin solution  ertapenem (INVanz) 1000 mg in 100 mL NS (VTB)  ERTAPENEM 1000 MG IVPB  ML NS VTB (CD)     Code Status:   Code Status and Medical Interventions: No CPR (Do Not Attempt to Resuscitate); Limited Support; No intubation (DNI)   Ordered at: 05/16/25 2148     Code Status (Patient has no pulse and is not breathing):    No CPR (Do Not Attempt to Resuscitate)     Medical Interventions (Patient has pulse or is breathing):    Limited Support     Medical Intervention Limits:    No intubation (DNI)       TANJA Monroe  05/20/25  13:04 EDT

## 2025-05-20 NOTE — CASE MANAGEMENT/SOCIAL WORK
Continued Stay Note  VELASQUEZ Baltazar     Patient Name: Ang Jackson  MRN: [id number]  Today's Date: 5/20/2025    Admit Date: 5/16/2025    Plan: LENKA spoke with Shonda from [employer] who states that daughter will do telehealth visit with her because she is at work.  Patient has Midline placed in [facility] arranged.  Fiona Amaral RN & Angelina, STACEY notified OK to D/C from IV antibiotic point of view.   Discharge Plan       Row Name 05/20/25 1228       Plan    Plan LENKA spoke with Shonda from [employer] who states that daughter will do telehealth visit with her because she is at work.  Patient has Midline placed in [facility] arranged.  Fiona Amaral RN & Angelina, SS notified OK to D/C from IV antibiotic point of view.                       Discharge Codes    No documentation.                 Expected Discharge Date and Time       Expected Discharge Date Expected Discharge Time    May 20, 2025               Annalise Schofield RN

## 2025-05-20 NOTE — PLAN OF CARE
Goal Outcome Evaluation:  Plan of Care Reviewed With: patient        Progress: no change  Outcome Evaluation: Pt resting in bed during assessment, VSS. Daughter at bedside. Wound care completed per orders. Pt C/O pain, meds given per orders. Cont IV fluids and Iv abx. Will cont POC.

## 2025-05-20 NOTE — DISCHARGE SUMMARY
[facility] HOSPITALISTS DISCHARGE SUMMARY    Patient Identification:  Name:  Ang Jackson  Age:  64 y.o.  Sex:  male  :  1960  MRN:  [id number]  Visit Number:  [id number]    Date of Admission: 2025  Date of Discharge:  2025     PCP: Deepak Perez    DISCHARGE DIAGNOSIS  Deep unstageable ulcer w/ necrosis and suspected infection of left sacral/buttock area.  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 male presented to [facility] complaining of wound.  Please see the admitting history and physical for further details.      #Deep unstageable ulcer w/ necrosis and suspected infection of left sacral/buttock area.  #Two right gluteal and sacral decuitus ulcers   Patient presented for infected wound.  Surgery consulted and took to [facility] on  for debridment.  Wound grew ESBL E. Coli and E. Faecalis on  and received Dalvance at that time. Now 5/15 wound culture Growing E. Coli still. CT pelvis with contrast did not show fluid collection or bone involvement. General Surgery followed for wound debridement, no other procedures needed.  Infectious Disease consulted and followed as well and  are recommending Daptomycin and Invanz for 14 total days.  Patient is to receive midline today prior to discharge, Nurse is aware.  He will receive additional 11 days of home antibiotics with Daptomycin and Invanz. His Home Health orders have been resumed.  He will need weekly CK on Daptomycin. He will need midline removed when antibiotics are completed.  He will follow up PCP, General Surgery, Infectious Disease and in the wound clinic in about a week.  Family at bedside and were amenable to this plan.  Patient denies any fevers or chills, denies any new complaints today.  Patient remains hemodynamically stable, labs stable a well.     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  Wt Readings from Last 3 Encounters:   05/16/25 95.4 kg (210 lb 5.1 oz)   05/16/25 93 kg (205 lb)   05/07/25 93 kg (205 lb)     PHYSICAL EXAM:  Constitutional:  Well-developed and well-nourished.  No acute distress.      HENT:  Head:  Normocephalic and atraumatic.  Mouth:  Moist mucous membranes.    Eyes:  Conjunctivae and EOM are normal. No scleral icterus.    Neck:  Neck supple.  No JVD present.    Cardiovascular:  Normal rate, regular rhythm and normal heart sounds with no murmur.  Pulmonary/Chest:  No respiratory distress, no wheezes, on room air    Abdominal:  Soft. No distension and no tenderness.   Musculoskeletal:  No tenderness, No red or swollen joints anywhere.    Neurological:  Alert and oriented to person, place, and time.  No cranial nerve deficit.    Skin:  Skin is warm and dry. No rash noted. No pallor.   Peripheral vascular:  No clubbing, no cyanosis, no edema.  Psychiatric: Appropriate mood and affect  Edited by: Pablo Marquez MD at 5/20/2025 0902    DISCHARGE DISPOSITION   Stable    DISCHARGE MEDICATIONS:     Discharge Medications        New Medications        Instructions Start Date   daptomycin solution IVPB  Commonly known as: CUBICIN   500 mg, Intravenous, Every 24 Hours      ertapenem 1,000 mg in sodium chloride 0.9 % 100 mL IVPB   1,000 mg, Intravenous, Every 24 Hours   Start Date: May 21, 2025            Continue These Medications        Instructions Start Date   ACIDOPHILUS PLUS PECTIN PO   1 capsule, Daily      ascorbic acid 500 MG tablet  Commonly known as: VITAMIN C   1,000 mg, Daily      B-12 Compliance Injection 1000 MCG/ML kit  Generic drug: Cyanocobalamin   1,000 mcg, Every 14 Days      baclofen 10 MG tablet  Commonly known as: LIORESAL   20 mg, 3 Times Daily      baclofen 10 MG tablet  Commonly known as: LIORESAL   50 mg, Every Night at Bedtime      busPIRone 5 MG tablet  Commonly known as: BUSPAR   5 mg, Nightly      diclofenac 75 MG EC tablet  Commonly known as: VOLTAREN   75 mg, 2 Times Daily      famotidine 20 MG tablet  Commonly known as: PEPCID   20 mg, Daily      ferrous sulfate 325 (65 FE) MG tablet   325 mg, Daily With Breakfast      FLUoxetine 20 MG capsule  Commonly known as: PROzac   20 mg, Daily      gabapentin 300 MG capsule  Commonly known as: NEURONTIN   900 mg, 3 Times Daily      loratadine 10 MG tablet  Commonly known as: CLARITIN   10 mg, Daily      multivitamin with minerals tablet tablet   1 tablet, Daily      oxyCODONE 10 MG tablet  Commonly known as: ROXICODONE   10 mg, 4 Times Daily      oxyCODONE ER 20  MG 12 hr tablet  Commonly known as: oxyCONTIN   20 mg, Nightly      pantoprazole 40 MG EC tablet  Commonly known as: PROTONIX   40 mg, Daily      QUEtiapine 100 MG tablet  Commonly known as: SEROquel   100 mg, Take As Directed      Rexulti 1 MG tablet  Generic drug: Brexpiprazole   1 mg, Daily      tiZANidine 2 MG tablet  Commonly known as: ZANAFLEX   2 mg, 4 Times Daily      tiZANidine 2 MG tablet  Commonly known as: ZANAFLEX   4 mg, Nightly      vitamin D3 125 MCG (5000 UT) capsule capsule   5,000 Units, Daily             Stop These Medications      omeprazole 20 MG capsule  Commonly known as: priLOSEC            Diet Instructions    Regular diet           Activity Instructions    Resume preadmission activity level         Additional Instructions for the Follow-ups that You Need to Schedule       Ambulatory Referral to Home Health   As directed      Needs Midline removed following completion of antibiotics.    Order Comments: Needs Midline removed following completion of antibiotics.    Face to Face Visit Date: 5/20/2025   Follow-up provider for Plan of Care?: I treated the patient in an acute care facility and will not continue treatment after discharge.   Follow-up provider: DEEPAK PEREZ [[id number]]   Reason/Clinical Findings: osteo, chronic bedbound   Describe mobility limitations that make leaving home difficult: osteo, chronic bedbound   Nursing/Therapeutic Services Requested: Skilled Nursing   Skilled nursing orders: Infusion therapy   Frequency: 1 Week 1        Ambulatory Referral to Wound Clinic   As directed      Discharge Follow-up with PCP   As directed       Currently Documented PCP:    Deepak Perez    PCP Phone Number:    [phone]     Follow Up Details: 1 week post hospital discharge        Discharge Follow-up with Specified Provider: general surgery 1 week   As directed      To: general surgery 1 week               Follow-up Information       Saskia Cui PA-C .    Specialties: Infectious  Diseases, Physician Assistant  Contact information:  [address]  [address]  [facility] [address]  [phone]               Deepak Perez .    Specialty: Physician Assistant  Why: 1 week post hospital discharge  Contact information:  [address] [place] [address] [address]  [facility] [address] [phone]               [facility] .    Specialty: Wound Care  Contact information:  [address] [facility] [address]  [phone]                          TEST  RESULTS PENDING AT DISCHARGE  Pending Results       None            CODE STATUS  Code Status and Medical Interventions: No CPR (Do Not Attempt to Resuscitate); Limited Support; No intubation (DNI)   Ordered at: 05/16/25 1362     Code Status (Patient has no pulse and is not breathing):    No CPR (Do Not Attempt to Resuscitate)     Medical Interventions (Patient has pulse or is breathing):    Limited Support     Medical Intervention Limits:    No intubation (DNI)     Pablo Marquez MD  [facility] Hospitalist  05/20/25  09:02 EDT    Please note that this discharge summary required more than 30 minutes to complete.

## 2025-05-20 NOTE — CASE MANAGEMENT/SOCIAL WORK
Discharge Planning Assessment   [place]     Patient Name: Ang Jackson  MRN: [id number]  Today's Date: 5/20/2025    Admit Date: 5/16/2025            Discharge Plan       Row Name 05/20/25 1048       Plan    Final Note [facility] transportation addressed with Lead RN. Per Lead RN Pt has family in room they may be transporting Pt instead. Pt needs a midline before Pt can be discharged.    11:19am: Per Lead RN family cannot transport Pt, he will need EMS transport home. SS completed PCS form and faxed to floor. Lead RN to notify SS when ready to transport.     13:43pm: SS scheduled EMS via EMS dispatch tab.                     Continued Care and Services - Admitted Since 5/16/2025       Dialysis/Infusion       Service Provider Request Status Services Address Phone Fax Patient Preferred    [facility] [employer] Accepted -- [facility] [place] [address] [phone] --              Home Medical Care       Service Provider Request Status Services Address Phone Fax Patient Preferred    [facility]  Selected Home Health Services [address] [place] [address] [phone] --                  Expected Discharge Date and Time       Expected Discharge Date Expected Discharge Time    May 20, 2025             TERESITA Park

## 2025-05-20 NOTE — CASE MANAGEMENT/SOCIAL WORK
Discharge Planning Assessment   [place]     Patient Name: Ang Jackson  MRN: [id number]  Today's Date: 5/20/2025    Admit Date: 5/16/2025       Discharge Plan       Row Name 05/20/25 1016       Plan    Final Note Pt is being discharged home today. CM arranged IV antibiotics via [facility]. SS received order for home health services. SS contacted [facility] per  with referral information and faxed referral including order, AVS, and IV antibiotic order. SS will arrange [facility] transportation.       Row Name 05/20/25 0955       Plan    Final Discharge Disposition Code 06 - home with home health care                 Continued Care and Services - Admitted Since 5/16/2025       Dialysis/Infusion       Service Provider Request Status Services Address Phone Fax Patient Preferred    [facility] Accepted -- [facility] [address] [phone] --              Home Medical Care       Service Provider Request Status Services Address Phone Fax Patient Preferred    [facility]  Selected Home Health Services [address] [place] [address] [phone] --                  Expected Discharge Date and Time       Expected Discharge Date Expected Discharge Time    May 20, 2025         TIFFANIE Oro

## 2025-05-20 NOTE — DISCHARGE PLACEMENT REQUEST
"Emily Jackson (64 y.o. Male)       Date of Birth   1960    Social Security Number       Address   [address] [place], [place] [address]    Home Phone   [phone]    MRN   [id number]       Hindu   Shinto    Marital Status                               Admission Date   5/16/2025    Admission Type   Emergency    Admitting Provider   Natacha Huerta DO    Attending Provider   Pablo Marquez MD    Department, Room/Bed   [address], [facility]7/       Discharge Date       Discharge Disposition   Home or Self Care    Discharge Destination                                 Attending Provider: Pablo Marquez MD    Allergies: Sulfa Antibiotics, Codeine, Ketorolac, Morphine And Codeine    Isolation: Contact   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), 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)   Code Status: No CPR    Ht: 172.7 cm (68\")   Wt: 95.4 kg (210 lb 5.1 oz)    Admission Cmt: None   Principal Problem: Infected wound [T14.8XXA,L08.9]                   Active Insurance as of 5/16/2025       Primary Coverage       Payor Plan Insurance Group Employer/Plan Group    MEDICARE MEDICARE A & B        Payor Plan Address Payor Plan Phone Number Payor Plan Fax Number Effective Dates    [address] [phone]  10/1/2011 - None Entered    [facility] [id number]         Subscriber Name Subscriber Birth Date Member ID       EMILY JACKSON 1960 [id number]               Secondary Coverage       Payor Plan Insurance Group Employer/Plan Group    [employer] [place]ICA[place]        Payor Plan Address Payor Plan Phone Number Payor Plan Fax Number Effective Dates    [address] [address] [phone]  8/29/2016 - None Entered    [facility] [id number]         Subscriber Name Subscriber Birth Date Member ID       EMILY JACKSON 1960 [id number]                     Emergency Contacts        (Rel.) Home Phone Work Phone Mobile Phone    ROSS DYSON (Daughter) [phone] -- --    ManuelMyla (Spouse) " [phone] -- [phone]    CESAR TENA (Daughter) -- -- [phone]          daptomycin (CUBICIN) solution IVPB [[id number]] (Order [id number])  Order  Date: 2025 Department: [address] Ordering/Authorizing: Pablo Marquez MD     Medication  daptomycin (CUBICIN) solution IVPB [[id number]]  Order History  Outpatient  Date/Time Action Taken User Additional Information   25 0832 Sign Pablo Marquez MD Reorder from Order:[id number]   25 Taking Flag Checked Pablo Marquez MD [id number]     Outpatient Morphine Milligram Equivalents Per Day  Expand All  Collapse All  25 and after  90 MME/day    Order Name Dose Route Frequency Maximum MME/Day    oxyCODONE (ROXICODONE) 10 MG tablet 10 mg Oral 4 Times Daily 60 MME/day    oxyCODONE ER (oxyCONTIN) 20 MG 12 hr tablet 20 mg Oral Nightly 30 MME/day   Total Potential Morphine Milligram Equivalents Per Day  90 mg   Calculation Information    oxyCODONE 10 MG tablet: single dose of 10 mg * 4 doses per day * morphine equivalence factor of 1.5 = 60 MME/day     oxyCODONE ER 20 MG tablet extended-release 12 hour: single dose of 20 mg * 1 doses per day * morphine equivalence factor of 1.5 = 30 MME/day           Rx #: [id number]   daptomycin (CUBICIN) solution IVPB [[id number]]     Order Details  Dose: 500 mg Route: Intravenous Frequency: Every 24 Hours   Dispense Quantity: 550 mL Refills: 0 Fills remainin   Indications of Use: Skin and Soft Tissue Infection         Sig: Infuse 50 mL into a venous catheter Daily for 11 doses. Indications: Infection of the Skin and/or Soft Tissue         Start Date: 25 End Date: 25 after 11 doses   Written Date: 25 Expiration Date: 26   Providers    Ordering Provider and Authorizing Provider:    Pablo Marquez MD   [address] [place] [address]   Phone:  [phone]   Fax:  [phone]   NPI:  [id number]        Ordering User:  Pablo Marquez MD          Pharmacy    [facility]  [address] [place] [address]   Phone:  [phone]  Fax:  [phone]       Quantity remainin mL Quantity used: 550 mL Next fill due: 2025     Orders with any of the following pharmaceutical classes: Anti-Infective Agents - Misc.    Name Dose Frequency Start Date End Date Medication Warnings Interventions? Order Mode    ertapenem 1,000 mg in sodium chloride 0.9 % 100 mL IVPB 1,000 mg Every 24 Hours 25   Outpatient    ertapenem (INVanz) 1,000 mg in sodium chloride 0.9 % 100 mL IVPB-VTB 1,000 mg Every 24 Hours 25 0000 25 2359   Inpatient    DAPTOmycin (CUBICIN) 500 mg/50 mL 0.9% sodium chloride IVPB 6 mg/kg (Adjusted) Every 24 Hours 25 1100 25 1559   Inpatient    Pharmacy to dose vancomycin  Continuous PRN 25 2302 25 1002   Inpatient    vancomycin IVPB 1500 mg in 0.9% NaCl (Premix) 500 mL 1,500 mg Every 12 Hours 25 0900 25 1343   Inpatient      Warnings Override History    No Interaction Warnings Shown      Dispense History    Date Type Origin Status Quantity Day Supply Patient Charge Product Pharmacy Fill Number    2025 First Fill Electronic Pending Fill 550 mL 11 $0.00 daptomycin solution IVPB [place] [id number]      Pharmacist Clinical Review History    This prescription has not been clinically reviewed.     Order Reconciliation Actions       Order Reconciliation Actions     E-Prescribing Status    Outpatient Medication Detail    daptomycin (CUBICIN) solution IVPB        Sig: Infuse 50 mL into a venous catheter Daily for 11 doses. Indications: Infection of the Skin and/or Soft Tissue        Sent to pharmacy as: daptomycin IV solution IVPB        Class: Normal        Route: Intravenous          Event History       Event History     Tracking Reports    Cosign Tracking Order Transmittal Tracking       ertapenem 1,000 mg in sodium chloride 0.9 % 100 mL IVPB [[id number]] (Order  [id number])  Order  Date: 5/20/2025 Department: [address] Ordering/Authorizing: Pablo Marquez MD     Medication  ertapenem 1,000 mg in sodium chloride 0.9 % 100 mL IVPB [[id number]]  Order History  Outpatient  Date/Time Action Taken User Additional Information   05/20/25 0832 Sign Pablo Marquez MD Reorder from Order:[id number]   05/20/25 0832 Taking Flag Checked Pablo Marquez MD [id number]     Outpatient Morphine Milligram Equivalents Per Day  Expand All  Collapse All  5/20/25 and after  90 MME/day    Order Name Dose Route Frequency Maximum MME/Day    oxyCODONE (ROXICODONE) 10 MG tablet 10 mg Oral 4 Times Daily 60 MME/day    oxyCODONE ER (oxyCONTIN) 20 MG 12 hr tablet 20 mg Oral Nightly 30 MME/day   Total Potential Morphine Milligram Equivalents Per Day  90 mg   Calculation Information    oxyCODONE 10 MG tablet: single dose of 10 mg * 4 doses per day * morphine equivalence factor of 1.5 = 60 MME/day     oxyCODONE ER 20 MG tablet extended-release 12 hour: single dose of 20 mg * 1 doses per day * morphine equivalence factor of 1.5 = 30 MME/day           ertapenem 1,000 mg in sodium chloride 0.9 % 100 mL IVPB [[id number]]     Order Details  Dose: 1,000 mg Route: Intravenous Frequency: Every 24 Hours   Dispense Quantity: 1,000 mL Refills: 0    Indications of Use: Skin and Soft Tissue Infection         Sig: Infuse 1,000 mg into a venous catheter Daily for 10 doses. Indications: Infection of the Skin and/or Soft Tissue         Start Date: 05/21/25 End Date: 05/31/25 after 10 doses   Written Date: 05/20/25 Expiration Date: 05/20/26       Components    Component Ordered Dose Dispense Quantity   ertapenem 1 g reconstituted solution 1,000 mg 1,000 mg   sodium chloride 0.9 % solution 100 mL 100 mL         Providers    Ordering Provider and Authorizing Provider:    Pablo Marquez MD    [place] [address]   Phone:  [phone]   Fax:  [phone]   NPI:  [id number]        Ordering User:   Pablo Marquez MD             Lab Test Results    Component Date/Time Value Range & Unit Status   Creatinine 25 0120 0.31 (L) 0.76 - 1.27 mg/dL Final    25 0231 0.29 (L) 0.76 - 1.27 mg/dL Final    25 0446 0.32 (L) 0.76 - 1.27 mg/dL Final     Orders with any of the following pharmaceutical classes: Anti-Infective Agents - Misc.    Name Dose Frequency Start Date End Date Medication Warnings Interventions? Order Mode    daptomycin (CUBICIN) solution IVPB 500 mg Every 24 Hours 25   Outpatient    ertapenem (INVanz) 1,000 mg in sodium chloride 0.9 % 100 mL IVPB-VTB 1,000 mg Every 24 Hours 25 0000 25 2359   Inpatient    DAPTOmycin (CUBICIN) 500 mg/50 mL 0.9% sodium chloride IVPB 6 mg/kg (Adjusted) Every 24 Hours 25 1100 25 1559   Inpatient    Pharmacy to dose vancomycin  Continuous PRN 25 2302 25 1002   Inpatient    vancomycin IVPB 1500 mg in 0.9% NaCl (Premix) 500 mL 1,500 mg Every 12 Hours 25 0900 25 1343   Inpatient      Warnings Override History    No Interaction Warnings Shown      Pharmacist Clinical Review History    This prescription has not been clinically reviewed.     Order Reconciliation Actions       Order Reconciliation Actions     E-Prescribing Status    Outpatient Medication Detail    ertapenem 1,000 mg in sodium chloride 0.9 % 100 mL IVPB        Sig: Infuse 1,000 mg into a venous catheter Daily for 10 doses. Indications: Infection of the Skin and/or Soft Tissue        Class: Print        Route: Intravenous          Event History       Event History     Tracking Reports    Cosign Tracking Order Transmittal Tracking          History & Physical        Natacha Huerta DO at 25 0007          [facility]   HISTORY AND PHYSICAL    Patient Name: Ang Jackson  : 1960  MRN: [id number]  Primary Care Physician:  Deepak Perez  Date of admission: 2025    Subjective  Subjective     Chief Complaint: Worsening  wounds and inflammatory markers    History of Present Illness The patient is a 63 y/o male with PMHx significant for cervical degenerative disc disease status post C6-7 anterior cervical discectomy and fusion in 2023 and nontraumatic cervical spinal cord injury secondary with cervical epidural abscess with cord compression from C3-C5 in October 2024 status post incision and drainage with C4 corpectomy, C3-6 laminectomy and emergent hematoma evacuation. Patient with neurogenic bladder and chronic hanks catheter due to significant spinal trauma. 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  Patient underwent rehabilitation at [facility] from November through December 2024.  Patient most recently underwent Botox injections to the upper extremities for spastic tetraplegia on 5/14/2025.    On or about 4/09/2025, patient noted to have wounds on his buttocks and coccyx and has followed with our wound care clinic since that time. Patient with bilateral gluteal and a sacral wound. It appears that patient received Dalvance in the infusion clinic per wound care on 5/7/25.    On wound care follow up 5/15/25, APRN reports worsening of left gluteal wound with increase in tunnel area. Patient was encouraged to seek ER evaluation then but wanted to wait for repeat labs and a wound culture from an in office debridement. Those cultures have revealed ESBL E.coli and Enterococcus faecalis (vancomycin sensitive). Sed rate has increased to 57 (48).    Patient denies any fever but does report sensitivities in change in temperature since his surgery. He denies any nausea or vomiting. No abdominal pain or diarrhea.     Patient seen and examined in 339. Wife, daughter, granddaughter and RN present at bedside.    Review of Systems   Constitutional:  Negative for diaphoresis.   HENT:  Negative for hearing loss and trouble swallowing.     Eyes:  Negative for discharge and visual disturbance.   Respiratory:  Negative for cough, shortness of breath and wheezing.    Cardiovascular:  Negative for chest pain, palpitations and leg swelling.   Gastrointestinal:  Negative for abdominal pain, constipation, diarrhea, nausea and vomiting.   Endocrine: Negative for polydipsia, polyphagia and polyuria.   Genitourinary:  Negative for decreased urine volume, dysuria, frequency and hematuria.   Musculoskeletal:  Negative for gait problem, myalgias and neck pain.   Skin:  Positive for color change and wound. Negative for rash.   Neurological:  Negative for dizziness, tremors, seizures, syncope, weakness and light-headedness.   Hematological:  Does not bruise/bleed easily.   Psychiatric/Behavioral:  Negative for confusion, hallucinations and suicidal ideas.         Personal History     Past Medical History:   Diagnosis Date    Anesthesia     diffculty adminster spinal block, patient stated with last hip surgery 7-2020 several attempts per anthesia and no luck, resulted in general anesthesia     Anxiety     Arthritis     Rheumatoid    COVID     2020    GERD (gastroesophageal reflux disease)     Hypertension     Lactose intolerance     Low back pain     Sleep apnea     does not wear machine, MILD, DOES NOT HAVE AFTER WGT LOSS SURGERY    Wears reading eyeglasses        Past Surgical History:   Procedure Laterality Date    ANTERIOR CERVICAL DISCECTOMY W/ FUSION Right 02/19/2023    Procedure: CERVICAL DISCECTOMY ANTERIOR WITH FUSION C6-7;  Surgeon: Jaiden Aldridge MD;  [facility];  Service: Neurosurgery;  Laterality: Right;    BARIATRIC SURGERY  4 year ago    CARPAL TUNNEL RELEASE Bilateral     CARPAL TUNNEL RELEASE Right 06/29/2023    Procedure: CARPAL TUNNEL RELEASE RIGHT;  Surgeon: Jaiden Aldridge MD;  Location:  [facility] OR;  Service: Neurosurgery;  Laterality: Right;    COLONOSCOPY      5 years ago    COLONOSCOPY N/A 04/05/2022    Procedure: COLONOSCOPY  "FOR SCREENING;  Surgeon: Luz Galvez MD;  Location:  [facility] OR;  Service: Gastroenterology;  Laterality: N/A;    ENDOSCOPY N/A 04/05/2022    Procedure: ESOPHAGOGASTRODUODENOSCOPY WITH BIOPSY;  Surgeon: Luz Galvez MD;  Location:  [facility] OR;  Service: Gastroenterology;  Laterality: N/A;    GASTRIC SLEEVE LAPAROSCOPIC      2017    HIP SURGERY Right times 2    KNEE ARTHROSCOPY Left     TOTAL HIP ARTHROPLASTY Left 01/25/2021    Procedure: TOTAL HIP ARTHROPLASTY LEFT;  Surgeon: Jb Patel MD;  Location:  [facility] OR;  Service: Orthopedics;  Laterality: Left;    TOTAL HIP ARTHROPLASTY REVISION Right 07/20/2020    Procedure: TOTAL HIP ARTHROPLASTY REVISION RIGHT;  Surgeon: Jb Patel MD;  Location:  [facility] OR;  Service: Orthopedics;  Laterality: Right;       Family History: family history includes Cancer in his mother; Colon cancer in his maternal uncle; Colon polyps in his maternal uncle; Diabetes in his brother and paternal grandmother; Gout in his paternal grandmother; Heart disease in his father; Hypertension in his brother and father; Osteoarthritis in his father.     Social History:  reports that he has never smoked. He has never used smokeless tobacco. He reports that he does not drink alcohol and does not use drugs.    Home Medications:  Brexpiprazole, HYDROcodone-acetaminophen, Mirabegron ER, ascorbic acid, baclofen, busPIRone, cyanocobalamin, gabapentin, hydrOXYzine pamoate, lidocaine, lisinopril, loratadine, methylPREDNISolone, multivitamin with minerals, and pantoprazole    Allergies:  Allergies   Allergen Reactions    Sulfa Antibiotics Hives     Hives, shortness of breath    Codeine Nausea And Vomiting    Ketorolac Other (See Comments)     Pt reports heavy, \"tight\" feeling in his chest.    Morphine And Codeine Nausea And Vomiting       Objective   Objective     Vitals:   Temp:  [97.6 °F (36.4 °C)-97.9 °F (36.6 °C)] 97.9 °F (36.6 °C)  Heart Rate:  [56-69] 68  Resp:  " [12-16] 16  BP: (128-155)/(69-88) 149/75    Physical Exam  Constitutional:       General: He is not in acute distress.     Appearance: He is well-developed.   HENT:      Head: Normocephalic and atraumatic.   Eyes:      Conjunctiva/sclera: Conjunctivae normal.   Neck:      Trachea: No tracheal deviation.   Cardiovascular:      Rate and Rhythm: Normal rate and regular rhythm.      Heart sounds: No murmur heard.     No friction rub. No gallop.   Pulmonary:      Effort: No respiratory distress.      Breath sounds: Normal breath sounds. No wheezing or rales.   Abdominal:      General: Bowel sounds are normal. There is no distension.      Palpations: Abdomen is soft.      Tenderness: There is no abdominal tenderness. There is no guarding.   Genitourinary:     Comments: Chin catheter in place; draining straw colored urine  Musculoskeletal:      Comments: Lower spinal/sacral wound with necrotic appearing tissue near 9'o clock region; right gluteal wound mostly with slough; some necrotic tissue deepest/most central portion of wound; left gluteal wound with some healthy appearing tissue but more centrally located necrosis and tunneling   Skin:     General: Skin is warm and dry.      Findings: No erythema or rash.   Neurological:      Mental Status: He is alert and oriented to person, place, and time.      Cranial Nerves: No dysarthria.      Motor: No tremor.         Result Review   Result Review:  I have personally reviewed the results from the time of this admission to 5/17/2025 00:07 EDT and agree with these findings:  [x]  Laboratory list / accordion  [x]  Microbiology  [x]  Radiology  []  EKG/Telemetry   []  Cardiology/Vascular   []  Pathology  []  Old records  []  Other:  Most notable findings include:     Results from last 7 days   Lab Units 05/16/25  1544 05/15/25  1606   WBC 10*3/mm3 6.19 7.52   HEMOGLOBIN g/dL 9.2* 9.6*   HEMATOCRIT % 31.1* 31.7*   PLATELETS 10*3/mm3 189 218     Results from last 7 days   Lab Units  05/16/25  1544 05/15/25  1606   SODIUM mmol/L 142 142   POTASSIUM mmol/L 3.8 3.8   CHLORIDE mmol/L 105 103   CO2 mmol/L 30.4* 30.5*   BUN mg/dL 20 20   CREATININE mg/dL 0.47* 0.49*   CALCIUM mg/dL 8.7 8.7   BILIRUBIN mg/dL <0.2 <0.2   ALK PHOS U/L 71 77   ALT (SGPT) U/L <5 <5   AST (SGOT) U/L 7 7   GLUCOSE mg/dL 134* 166*         Culture results from last 30 days   Lab 05/15/25  1606 05/01/25  1616   WOUNDCX Light growth (2+) Escherichia coli* Moderate growth (3+) Escherichia coli ESBL*  Moderate growth (3+) Enterococcus faecalis*     MRI lumbar spine with/without contrast:  Fatty atrophy is noted.  Bilateral hip prostheses limits evaluation. Postsurgical changes hips  bilaterally with soft tissue edema. No free fluid seen within the  pelvis.      Subcutaneous edema of the hips bilaterally with fluid along the  subcutaneous tissues superficial to the hips which is partially imaged.  If concern for abscess or acute etiology recommend dedicated imaging of  the hips with enhancement.     IMPRESSION:  Impression:  1. Limited exam.  2. Postsurgical changes with subcutaneous edema and fluid superficial to  the hips, further evaluation if clinically warranted.    Assessment & Plan  Assessment / Plan     #Polymicrobial left gluteal wound, POA  #Right gluteal and sacral decubitus wounds, POA  #Neurogenic bladder with chronic hanks catheter with chronically abnormal urinalyses  #Non-traumatic cervical spinal cord injury with quadriplegia and spasticity  - Patient has been admitted to the medical surgical floor  - Recent outpatient wound culture revealing ESBL E. coli, Enterococcus faecalis and more recently light growth of E. coli with final culture pending  - Will follow-up on final wound culture results  - Follow-up on blood culture results obtained in the ED  - I have ordered a CT pelvis with/without contrast to evaluate and rule out any evidence of osteomyelitis  - For now, empiric intravenous antibiotic therapy with  pharmacy to dose vancomycin and ertapenem  - Will make patient n.p.o. after midnight for general surgery evaluation to see if patient would benefit from surgical excisional debridement specifically of the left gluteal wound  - Chin catheter exchanged in the ED  - Resume home baclofen and as needed analgesics pending pharmacy completion of patient's home medication list  - Will repeat the patient's CBC and chemistry panel in a.m.    Chronic medical conditions:  - Chronic pain syndrome  - Essential hypertension  - Chronic normocytic anemia  - Anxiety/Depression  - Gastroesophageal reflux disease  - Previous MRSA dagoberto prosthetic infections s/p B/L VALERI    VTE Prophylaxis:  [facility]    CODE STATUS:    Code Status (Patient has no pulse and is not breathing): No CPR (Do Not Attempt to Resuscitate)  Medical Interventions (Patient has pulse or is breathing): Limited Support  Medical Intervention Limits: No intubation (DNI)    Admission Status:  I believe this patient meets inpatient status.    Records from our [facility] wound clinic and from OSH reviewed and summarized in the HPI    Natacha Huerta DO    Electronically signed by Natacha Huerta DO at 05/17/25 0142       Lines, Drains & Airways       Active LDAs       Name Placement date Placement time Site Days    Peripheral IV 05/20/25 0020 22 G Anterior;Upper;Left Arm 05/20/25  0020  Arm  less than 1    Urethral Catheter 18 Fr. 05/16/25  1602  -- 3                  Current Facility-Administered Medications   Medication Dose Route Frequency Provider Last Rate Last Admin    Acidophilus/Pectin capsule 1 capsule  1 capsule Oral Daily Deepak Lara MD   1 capsule at 05/19/25 0935    ascorbic acid (VITAMIN C) tablet 1,000 mg  1,000 mg Oral Daily Deepak Lara MD   1,000 mg at 05/19/25 0936    baclofen (LIORESAL) tablet 20 mg  20 mg Oral Q8H Deepak Lara MD   20 mg at 05/20/25 0509    baclofen (LIORESAL) tablet 20 mg  20 mg Oral TID With Meals House,  Deepak Murrieta MD   20 mg at 05/19/25 1742    sennosides-docusate (PERICOLACE) 8.6-50 MG per tablet 2 tablet  2 tablet Oral BID PRN Deepak Lara MD        And    polyethylene glycol (MIRALAX) packet 17 g  17 g Oral Daily PRN Deepak Lara MD        And    bisacodyl (DULCOLAX) EC tablet 5 mg  5 mg Oral Daily PRN Deepak Lara MD        And    bisacodyl (DULCOLAX) suppository 10 mg  10 mg Rectal Daily PRN Deepak Lara MD        Brexpiprazole tablet 1 mg  1 mg Oral Daily Deepak Lara MD   1 mg at 05/19/25 0935    busPIRone (BUSPAR) tablet 5 mg  5 mg Oral Q24H Deepak Lara MD   5 mg at 05/20/25 0509    cetirizine (zyrTEC) tablet 10 mg  10 mg Oral Daily Deepak Lara MD   10 mg at 05/19/25 0936    collagenase ointment 1 Application  1 Application Topical Q24H Xuan Gardner APRN   1 Application at 05/19/25 2227    cyanocobalamin injection 1,000 mcg  1,000 mcg Subcutaneous Q14 Days Deepak Lara MD        DAPTOmycin (CUBICIN) 500 mg/50 mL 0.9% sodium chloride IVPB  6 mg/kg (Adjusted) Intravenous Q24H Chantel Dominguez APRN   500 mg at 05/19/25 1743    dextrose (D50W) (25 g/50 mL) IV injection 25 g  25 g Intravenous Q15 Min PRN Deepak Lara MD        dextrose (GLUTOSE) oral gel 15 g  15 g Oral Q15 Min PRN Deepak Lara MD        ertapenem (INVanz) 1,000 mg in sodium chloride 0.9 % 100 mL IVPB-VTB  1,000 mg Intravenous Q24H Chantel Dominguez APRN 200 mL/hr at 05/20/25 0027 1,000 mg at 05/20/25 0027    famotidine (PEPCID) tablet 20 mg  20 mg Oral Daily Deepak Lara MD   20 mg at 05/19/25 0936    ferrous sulfate tablet 325 mg  325 mg Oral Daily With Breakfast Deepak Lara MD   325 mg at 05/19/25 0936    FLUoxetine (PROzac) capsule 20 mg  20 mg Oral Daily Deepak Lara MD   20 mg at 05/19/25 0937    gabapentin (NEURONTIN) capsule 900 mg  900 mg Oral TID With Meals Deepak Lara MD   900 mg at 05/20/25 0500     glucagon HCl (Diagnostic) injection 1 mg  1 mg Subcutaneous Q15 Min PRN Deepak Lara MD        heparin (porcine) 5000 UNIT/ML injection 5,000 Units  5,000 Units Subcutaneous Q12H Deepak Lara MD   5,000 Units at 05/19/25 2132    hydrOXYzine (ATARAX) tablet 25 mg  25 mg Oral TID PRN Cornelio Jesus MD   25 mg at 05/18/25 1738    lactated ringers infusion  50 mL/hr Intravenous Continuous Pablo Marquez  mL/hr at 05/20/25 0338 100 mL/hr at 05/20/25 0338    lidocaine (LMX) 4 % cream 1 Application  1 Application Topical BID Deepak Lara MD   1 Application at 05/19/25 2132    multivitamin with minerals 1 tablet  1 tablet Oral Daily Deepak Lara MD   1 tablet at 05/19/25 0936    naproxen (NAPROSYN) tablet 500 mg  500 mg Oral BID With Meals Deepak Lara MD   500 mg at 05/19/25 1742    nitroglycerin (NITROSTAT) SL tablet 0.4 mg  0.4 mg Sublingual Q5 Min PRN Deepak Lara MD        oxyCODONE (ROXICODONE) immediate release tablet 10 mg  10 mg Oral Q6H Deepak Lara MD   10 mg at 05/20/25 0509    oxyCODONE ER (oxyCONTIN) 12 hr tablet 20 mg  20 mg Oral Nightly Deeapk Lara MD   20 mg at 05/19/25 2131    pantoprazole (PROTONIX) EC tablet 40 mg  40 mg Oral Daily Deepak Lara MD   40 mg at 05/19/25 0936    QUEtiapine (SEROquel) tablet 200 mg  200 mg Oral Nightly Deepak Lara MD   200 mg at 05/19/25 2131    sodium chloride 0.9 % flush 10 mL  10 mL Intravenous Q12H Deepak Lara MD   10 mL at 05/19/25 2133    sodium chloride 0.9 % flush 10 mL  10 mL Intravenous PRN Deepak Lara MD        sodium chloride 0.9 % infusion 40 mL  40 mL Intravenous PRN Deepak Lara MD        tiZANidine (ZANAFLEX) tablet 2 mg  2 mg Oral 4x Daily Deepak Lara MD   2 mg at 05/20/25 0509    tiZANidine (ZANAFLEX) tablet 4 mg  4 mg Oral Nightly Deepak Lara MD   4 mg at 05/19/25 9475    vitamin D3 capsule 5,000 Units  5,000  Units Oral Daily Deepak Lara MD   5,000 Units at 05/19/25 0936     Lab Results (most recent)       Procedure Component Value Units Date/Time    Tissue / Bone Culture - Tissue, Sacrum [[id number]]  (Abnormal) Collected: 05/18/25 0915    Specimen: Tissue from Sacrum Updated: 05/20/25 0841     Tissue Culture Moderate growth (3+) Escherichia coli     Comment: MICs to follow        Gram Stain Rare (1+) WBCs seen      No organisms seen    Blood Culture - Blood, Arm, Right [[id number]]  (Normal) Collected: 05/16/25 1544    Specimen: Blood from Arm, Right Updated: 05/19/25 1600     Blood Culture No growth at 3 days    Blood Culture - Blood, Arm, Left [[id number]]  (Normal) Collected: 05/16/25 1544    Specimen: Blood from Arm, Left Updated: 05/19/25 1600     Blood Culture No growth at 3 days    CK [[id number]]  (Normal) Collected: 05/19/25 0120    Specimen: Blood Updated: 05/19/25 1015     Creatine Kinase 21 U/L     Comprehensive Metabolic Panel [[id number]]  (Abnormal) Collected: 05/19/25 0120    Specimen: Blood Updated: 05/19/25 0241     Glucose 109 mg/dL      BUN 15 mg/dL      Creatinine 0.31 mg/dL      Sodium 143 mmol/L      Potassium 3.5 mmol/L      Chloride 108 mmol/L      CO2 27.4 mmol/L      Calcium 8.0 mg/dL      Total Protein 4.8 g/dL      Albumin 2.3 g/dL      ALT (SGPT) <5 U/L      AST (SGOT) 9 U/L      Alkaline Phosphatase 63 U/L      Total Bilirubin <0.2 mg/dL      Globulin 2.5 gm/dL      A/G Ratio 0.9 g/dL      BUN/Creatinine Ratio 48.4     Anion Gap 7.6 mmol/L      eGFR 132.4 mL/min/1.73     Narrative:      GFR Categories in Chronic Kidney Disease (CKD)              GFR Category          GFR (mL/min/1.73)    Interpretation  G1                    90 or greater        Normal or high (1)  G2                    60-89                Mild decrease (1)  G3a                   45-59                Mild to moderate decrease  G3b                   30-44                Moderate to severe decrease  G4                     15-29                Severe decrease  G5                    14 or less           Kidney failure    (1)In the absence of evidence of kidney disease, neither GFR category G1 or G2 fulfill the criteria for CKD.    eGFR calculation 2021 CKD-EPI creatinine equation, which does not include race as a factor    CBC & Differential [[id number]]  (Abnormal) Collected: 05/19/25 0120    Specimen: Blood Updated: 05/19/25 0218    Narrative:      The following orders were created for panel order CBC & Differential.  Procedure                               Abnormality         Status                     ---------                               -----------         ------                     CBC Auto Differential[[id number]]        Abnormal            Final result                 Please view results for these tests on the individual orders.    CBC Auto Differential [[id number]]  (Abnormal) Collected: 05/19/25 0120    Specimen: Blood Updated: 05/19/25 0218     WBC 5.82 10*3/mm3      RBC 3.06 10*6/mm3      Hemoglobin 8.5 g/dL      Hematocrit 28.3 %      MCV 92.5 fL      MCH 27.8 pg      MCHC 30.0 g/dL      RDW 12.8 %      RDW-SD 43.7 fl      MPV 9.3 fL      Platelets 188 10*3/mm3      Neutrophil % 59.4 %      Lymphocyte % 28.0 %      Monocyte % 9.8 %      Eosinophil % 1.4 %      Basophil % 0.5 %      Immature Grans % 0.9 %      Neutrophils, Absolute 3.46 10*3/mm3      Lymphocytes, Absolute 1.63 10*3/mm3      Monocytes, Absolute 0.57 10*3/mm3      Eosinophils, Absolute 0.08 10*3/mm3      Basophils, Absolute 0.03 10*3/mm3      Immature Grans, Absolute 0.05 10*3/mm3      nRBC 0.0 /100 WBC     Basic Metabolic Panel [[id number]]  (Abnormal) Collected: 05/18/25 0231    Specimen: Blood Updated: 05/18/25 0339     Glucose 96 mg/dL      BUN 12 mg/dL      Creatinine 0.29 mg/dL      Sodium 145 mmol/L      Potassium 3.5 mmol/L      Chloride 110 mmol/L      CO2 27.4 mmol/L      Calcium 8.4 mg/dL      BUN/Creatinine Ratio 41.4     Anion Gap 7.6 mmol/L       eGFR 135.1 mL/min/1.73     Narrative:      GFR Categories in Chronic Kidney Disease (CKD)              GFR Category          GFR (mL/min/1.73)    Interpretation  G1                    90 or greater        Normal or high (1)  G2                    60-89                Mild decrease (1)  G3a                   45-59                Mild to moderate decrease  G3b                   30-44                Moderate to severe decrease  G4                    15-29                Severe decrease  G5                    14 or less           Kidney failure    (1)In the absence of evidence of kidney disease, neither GFR category G1 or G2 fulfill the criteria for CKD.    eGFR calculation 2021 CKD-EPI creatinine equation, which does not include race as a factor    CBC & Differential [[id number]]  (Abnormal) Collected: 05/18/25 0231    Specimen: Blood Updated: 05/18/25 0311    Narrative:      The following orders were created for panel order CBC & Differential.  Procedure                               Abnormality         Status                     ---------                               -----------         ------                     CBC Auto Differential[[id number]]        Abnormal            Final result                 Please view results for these tests on the individual orders.    CBC Auto Differential [[id number]]  (Abnormal) Collected: 05/18/25 0231    Specimen: Blood Updated: 05/18/25 0311     WBC 5.36 10*3/mm3      RBC 3.38 10*6/mm3      Hemoglobin 9.2 g/dL      Hematocrit 31.4 %      MCV 92.9 fL      MCH 27.2 pg      MCHC 29.3 g/dL      RDW 12.8 %      RDW-SD 43.6 fl      MPV 9.1 fL      Platelets 177 10*3/mm3      Neutrophil % 60.9 %      Lymphocyte % 26.7 %      Monocyte % 8.8 %      Eosinophil % 2.8 %      Basophil % 0.4 %      Immature Grans % 0.4 %      Neutrophils, Absolute 3.27 10*3/mm3      Lymphocytes, Absolute 1.43 10*3/mm3      Monocytes, Absolute 0.47 10*3/mm3      Eosinophils, Absolute 0.15 10*3/mm3       Basophils, Absolute 0.02 10*3/mm3      Immature Grans, Absolute 0.02 10*3/mm3      nRBC 0.0 /100 WBC     Vancomycin, Trough [[id number]]  (Normal) Collected: 05/17/25 2025    Specimen: Blood Updated: 05/17/25 2100     Vancomycin Trough 11.20 mcg/mL     Narrative:      Therapeutic Ranges for Vancomycin    Vancomycin Random   5.0-40.0 mcg/mL  Vancomycin Trough   5.0-20.0 mcg/mL  Vancomycin Peak     20.0-40.0 mcg/mL    Comprehensive Metabolic Panel [[id number]]  (Abnormal) Collected: 05/17/25 0446    Specimen: Blood Updated: 05/17/25 0540     Glucose 110 mg/dL      BUN 14 mg/dL      Creatinine 0.32 mg/dL      Sodium 141 mmol/L      Potassium 3.8 mmol/L      Chloride 104 mmol/L      CO2 28.2 mmol/L      Calcium 8.5 mg/dL      Total Protein 5.7 g/dL      Albumin 2.5 g/dL      ALT (SGPT) 5 U/L      AST (SGOT) 8 U/L      Alkaline Phosphatase 72 U/L      Total Bilirubin 0.2 mg/dL      Globulin 3.2 gm/dL      A/G Ratio 0.8 g/dL      BUN/Creatinine Ratio 43.8     Anion Gap 8.8 mmol/L      eGFR 131.1 mL/min/1.73     Narrative:      GFR Categories in Chronic Kidney Disease (CKD)              GFR Category          GFR (mL/min/1.73)    Interpretation  G1                    90 or greater        Normal or high (1)  G2                    60-89                Mild decrease (1)  G3a                   45-59                Mild to moderate decrease  G3b                   30-44                Moderate to severe decrease  G4                    15-29                Severe decrease  G5                    14 or less           Kidney failure    (1)In the absence of evidence of kidney disease, neither GFR category G1 or G2 fulfill the criteria for CKD.    eGFR calculation 2021 CKD-EPI creatinine equation, which does not include race as a factor    Sedimentation Rate [[id number]]  (Abnormal) Collected: 05/17/25 0446    Specimen: Blood Updated: 05/17/25 0535     Sed Rate 40 mm/hr     [place] Urine Culture Tube - Indwelling Urethral Catheter  [[id number]] Collected: 05/16/25 1634    Specimen: Urine from Indwelling Urethral Catheter Updated: 05/16/25 1709     Extra Tube Hold for add-ons.     Comment: Auto resulted.       Urinalysis, Microscopic Only - Indwelling Urethral Catheter [[id number]]  (Abnormal) Collected: 05/16/25 1634    Specimen: Urine from Indwelling Urethral Catheter Updated: 05/16/25 1704     RBC, UA 3-5 /HPF      WBC, UA 6-10 /HPF      Bacteria, UA Trace /HPF      Squamous Epithelial Cells, UA None Seen /HPF      Hyaline Casts, UA None Seen /LPF      Calcium Oxalate Crystals, UA Small/1+ /HPF      Methodology Manual Light Microscopy    Urinalysis With Microscopic If Indicated (No Culture) - Indwelling Urethral Catheter [[id number]]  (Abnormal) Collected: 05/16/25 1634    Specimen: Urine from Indwelling Urethral Catheter Updated: 05/16/25 1647     Color, UA Dark Yellow     Appearance, UA Turbid     pH, UA 5.5     Specific Gravity, UA >1.030     Glucose, UA Negative     Ketones, UA Trace     Bilirubin, UA Negative     Blood, UA Moderate (2+)     Protein,  mg/dL (2+)     Leuk Esterase, UA Large (3+)     Nitrite, UA Positive     Urobilinogen, UA 1.0 E.U./dL    C-reactive Protein [[id number]]  (Abnormal) Collected: 05/16/25 1544    Specimen: Blood Updated: 05/16/25 1620     C-Reactive Protein 8.23 mg/dL     Lactic Acid, Plasma [[id number]]  (Normal) Collected: 05/16/25 1544    Specimen: Blood Updated: 05/16/25 1618     Lactate 1.1 mmol/L     [place] Draw [[id number]] Collected: 05/16/25 1544    Specimen: Blood Updated: 05/16/25 1600    Narrative:      The following orders were created for panel order [place] Draw.  Procedure                               Abnormality         Status                     ---------                               -----------         ------                     Green Top (Gel)[[id number]]                                  Final result               Lavender Top[[id number]]                                     Final result                Gold Top - SST[[id number]]                                   Final result               Light Blue Top[[id number]]                                   Final result                 Please view results for these tests on the individual orders.    Green Top (Gel) [[id number]] Collected: 25 154    Specimen: Blood Updated: 25 1600     Extra Tube Hold for add-ons.     Comment: Auto resulted.       Lavender Top [[id number]] Collected: 25 154    Specimen: Blood Updated: 25 1600     Extra Tube hold for add-on     Comment: Auto resulted       Gold Top - SST [[id number]] Collected: 25 154    Specimen: Blood Updated: 25 1600     Extra Tube Hold for add-ons.     Comment: Auto resulted.       Light Blue Top [[id number]] Collected: 25 154    Specimen: Blood Updated: 25 1600     Extra Tube Hold for add-ons.     Comment: Auto resulted       Sedimentation Rate [[id number]]  (Abnormal) Collected: 25 154    Specimen: Blood Updated: 25 1558     Sed Rate 57 mm/hr              Physician Progress Notes (most recent note)        Chantel Dominguez, APRN at 25 1002                     PROGRESS NOTE         Patient Identification:  Name:  Ang Jackson  Age:  64 y.o.  Sex:  male  :  1960  MRN:  [id number]  Visit Number:  [id number]  Primary Care Provider:  Deepak Perez         LOS: 3 days       ----------------------------------------------------------------------------------------------------------------------  Subjective       Chief Complaints:    Infected chronic ulcers      Interval History:      The patient is awake and alert, sitting up comfortably in bed.  On room air with no apparent distress.  Tmax 99.7 °F.  Denies diarrhea.  Denies any complaints or issues.  Family member at the bedside.  Lung exam is clear to auscultation bilaterally.  Abdomen soft and nontender with normoactive bowel sounds.  WBC normal at 5.82.  Intraoperative tissue/bone  culture preliminarily reporting no organisms.    Review of Systems:    Constitutional: no fever, chills and night sweats.  Generalized fatigue.  Eyes: no eye drainage, itching or redness.  HEENT: no mouth sores, dysphagia or nose bleed.  Respiratory: no for shortness of breath, cough or production of sputum.  Cardiovascular: no chest pain, no palpitations, no orthopnea.  Gastrointestinal: no nausea, vomiting or diarrhea. No abdominal pain, hematemesis or rectal bleeding.  Genitourinary: no dysuria or polyuria.  Hematologic/lymphatic: no lymph node abnormalities, no easy bruising or easy bleeding.  Musculoskeletal: no muscle or joint pain.  Skin: No rash and no itching.  Chronic sacral and gluteal ulcers  Neurological: no loss of consciousness, no seizure, no headache.  History of traumatic spinal injury  Behavioral/Psych: no depression or suicidal ideation.  Endocrine: no hot flashes.  Immunologic: negative.    ----------------------------------------------------------------------------------------------------------------------      Objective       Current Hospital Meds:  Acidophilus/Pectin, 1 capsule, Oral, Daily  ascorbic acid, 1,000 mg, Oral, Daily  baclofen, 20 mg, Oral, Q8H  baclofen, 20 mg, Oral, TID With Meals  Brexpiprazole, 1 mg, Oral, Daily  busPIRone, 5 mg, Oral, Q24H  cetirizine, 10 mg, Oral, Daily  cyanocobalamin, 1,000 mcg, Subcutaneous, Q14 Days  DAPTOmycin, 6 mg/kg (Adjusted), Intravenous, Q24H  ertapenem, 1,000 mg, Intravenous, Q24H  famotidine, 20 mg, Oral, Daily  ferrous sulfate, 325 mg, Oral, Daily With Breakfast  FLUoxetine, 20 mg, Oral, Daily  gabapentin, 900 mg, Oral, TID With Meals  heparin (porcine), 5,000 Units, Subcutaneous, Q12H  lidocaine, 1 Application, Topical, BID  multivitamin with minerals, 1 tablet, Oral, Daily  naproxen, 500 mg, Oral, BID With Meals  oxyCODONE, 10 mg, Oral, Q6H  oxyCODONE ER, 20 mg, Oral, Nightly  pantoprazole, 40 mg, Oral, Daily  QUEtiapine, 200 mg, Oral,  Nightly  sodium chloride, 10 mL, Intravenous, Q12H  tiZANidine, 2 mg, Oral, 4x Daily  tiZANidine, 4 mg, Oral, Nightly  vitamin D3, 5,000 Units, Oral, Daily      lactated ringers, 100 mL/hr, Last Rate: 100 mL/hr (05/19/25 0232)      ----------------------------------------------------------------------------------------------------------------------    Vital Signs:  Temp:  [97.2 °F (36.2 °C)-99.8 °F (37.7 °C)] 98.1 °F (36.7 °C)  Heart Rate:  [60-83] 80  Resp:  [13-20] 18  BP: (118-152)/(56-76) 118/65  Mean Arterial Pressure (Non-Invasive) for the past 24 hrs (Last 3 readings):   Noninvasive MAP (mmHg)   05/19/25 0247 81   05/18/25 2300 72   05/18/25 1833 90     SpO2 Percentage    05/18/25 1305 05/18/25 1405 05/18/25 1737   SpO2: 96% 96% 96%     SpO2:  [94 %-100 %] 96 %  on  Flow (L/min) (Oxygen Therapy):  [2] 2;   Device (Oxygen Therapy): room air    Body mass index is 31.98 kg/m².  Wt Readings from Last 3 Encounters:   05/16/25 95.4 kg (210 lb 5.1 oz)   05/16/25 93 kg (205 lb)   05/07/25 93 kg (205 lb)        Intake/Output Summary (Last 24 hours) at 5/19/2025 1002  Last data filed at 5/19/2025 0247  Gross per 24 hour   Intake 1200 ml   Output 1200 ml   Net 0 ml     Diet: Regular/House; Fluid Consistency: Thin (IDDSI 0)  ----------------------------------------------------------------------------------------------------------------------      Physical Exam:    Constitutional:  Well-developed and well-nourished.  On room air with no respiratory distress.  Family member at the bedside.  Denies any complaints or issues.  HENT:  Head: Normocephalic and atraumatic.  Mouth:  Moist mucous membranes.    Eyes:  Conjunctivae and EOM are normal.  No scleral icterus.  Neck:  Neck supple.  No JVD present.    Cardiovascular:  Normal rate, regular rhythm and normal heart sounds with no murmur. No edema.  Pulmonary/Chest:  No respiratory distress, no wheezes, no crackles, with normal breath sounds and good air movement.  Abdominal:   "Soft.  Bowel sounds are normal.  No distension and no tenderness.   Musculoskeletal:  No edema, no tenderness, and no deformity.  No swelling or redness of joints.  Paralysis  Neurological:  Alert and oriented to person, place, and time.  No facial droop.  No slurred speech.  History of traumatic spinal injury  Skin:  Skin is warm and dry.  No rash noted.  No pallor.  Ulcer to the penile meatus secondary to indwelling Chin catheter.  Sacral ulcer.  Left and right gluteal ulcers.  Dressed, clean dry and intact.  Psychiatric:  Normal mood and affect.  Behavior is normal.        ----------------------------------------------------------------------------------------------------------------------            Results from last 7 days   Lab Units 05/19/25  0120 05/18/25  0231 05/17/25  0446 05/16/25  1544 05/15/25  1606   CRP mg/dL  --   --   --  8.23* 7.76*   LACTATE mmol/L  --   --   --  1.1  --    WBC 10*3/mm3 5.82 5.36 7.22 6.19 7.52   HEMOGLOBIN g/dL 8.5* 9.2* 9.8* 9.2* 9.6*   HEMATOCRIT % 28.3* 31.4* 32.2* 31.1* 31.7*   MCV fL 92.5 92.9 93.1 94.0 93.2   MCHC g/dL 30.0* 29.3* 30.4* 29.6* 30.3*   PLATELETS 10*3/mm3 188 177 201 189 218     Results from last 7 days   Lab Units 05/19/25  0120 05/18/25  0231 05/17/25  0446 05/16/25  1544   SODIUM mmol/L 143 145 141 142   POTASSIUM mmol/L 3.5 3.5 3.8 3.8   CHLORIDE mmol/L 108* 110* 104 105   CO2 mmol/L 27.4 27.4 28.2 30.4*   BUN mg/dL 15 12 14 20   CREATININE mg/dL 0.31* 0.29* 0.32* 0.47*   CALCIUM mg/dL 8.0* 8.4* 8.5* 8.7   GLUCOSE mg/dL 109* 96 110* 134*   ALBUMIN g/dL 2.3*  --  2.5* 2.6*   BILIRUBIN mg/dL <0.2  --  0.2 <0.2   ALK PHOS U/L 63  --  72 71   AST (SGOT) U/L 9  --  8 7   ALT (SGPT) U/L <5  --  5 <5   Estimated Creatinine Clearance: 273.2 mL/min (A) (by C-G formula based on SCr of 0.31 mg/dL (L)).  No results found for: \"AMMONIA\"    No results found for: \"HGBA1C\", \"POCGLU\"  Lab Results   Component Value Date    HGBA1C 6.60 (H) 01/15/2025     Lab Results " "  Component Value Date    TSH 1.350 01/15/2025    FREET4 0.9 11/22/2024       Blood Culture   Date Value Ref Range Status   05/16/2025 No growth at 2 days  Preliminary   05/16/2025 No growth at 2 days  Preliminary     No results found for: \"URINECX\"  Wound Culture   Date Value Ref Range Status   05/15/2025 Light growth (2+) Escherichia coli ESBL (A)  Final     Comment:       Consider infectious disease consult.  Susceptibility results may not correlate to clinical outcomes.     No results found for: \"STOOLCX\"  No results found for: \"RESPCX\"  Pain Management Panel           No data to display                  ----------------------------------------------------------------------------------------------------------------------  Imaging Results (Last 24 Hours)       ** No results found for the last 24 hours. **            ----------------------------------------------------------------------------------------------------------------------    Pertinent Infectious Disease Results                Assessment/Plan     Isolation Status:  Contact     Assessment     Infected chronic sacral ulcers        Plan      The patient is awake and alert, sitting up comfortably in bed.  On room air with no apparent distress.  Tmax 99.7 °F.  Denies diarrhea.  Denies any complaints or issues.  Family member at the bedside.  Lung exam is clear to auscultation bilaterally.  Abdomen soft and nontender with normoactive bowel sounds.  WBC normal at 5.82.  Intraoperative tissue/bone culture preliminarily reporting no organisms.    General surgery operative note reported necrotic tissue and subcutaneous fat sharply excised down to the fascia, not involving fascia.  No mention of bony involvement.  MRI does not report any evidence of osteomyelitis.  Previous wound cultures finalized with E. coli ESBL and Enterococcus faecalis.    The patient continues ertapenem 1 g IV every 24 hours and vancomycin per pharmacy dosing based on recent wound culture " sensitivities.  We will transition from vancomycin to daptomycin 6 mg/kg IV 24 hours today to continue with ertapenem course.      Plan to continue ertapenem and daptomycin courses for total of 14 days, through 2025 for infection of chronic ulcers without evidence of osteomyelitis.  CPK level ordered for today.  The patient will need weekly CPK monitoring and to avoid statins while on daptomycin course.  Plan to follow closely in the outpatient infectious disease clinic upon discharge.      RECENT ANTIMICROBIAL THERAPY    daptomycin  ertapenem (INVanz) 1000 mg in 100 mL NS (VTB)     Code Status:   Code Status and Medical Interventions: No CPR (Do Not Attempt to Resuscitate); Limited Support; No intubation (DNI)   Ordered at: 25 214     Code Status (Patient has no pulse and is not breathing):    No CPR (Do Not Attempt to Resuscitate)     Medical Interventions (Patient has pulse or is breathing):    Limited Support     Medical Intervention Limits:    No intubation (DNI)       TANJA Monroe  25  10:02 EDT      Electronically signed by Chantel Dominguez APRN at 25 1009          Consult Notes (most recent note)        Chantel Dominguez APRN at 25 1131        Consult Orders    1. Inpatient Infectious Diseases Consult [[id number]] ordered by Deepak Lara MD at 25 1344                         INFECTIOUS DISEASE CONSULTATION REPORT        Patient Identification:  Name:  Ang Jackson  Age:  64 y.o.  Sex:  male  :  1960  MRN:  [id number]   Visit Number:  [id number]  Primary Care Physician:  Deepak Perez        Referring Provider: Dr. Jesus    Reason for consult: Infected sacral ulcers       LOS: 2 days        Subjective       Subjective     History of present illness:      Thank you Dr. Jesus for allowing us to participate in the care of your patient.  As you well know, Mr. Ang Jackson is a 64 y.o. male with past medical history significant for cervical  degenerative disc disease status post C6-7 anterior cervical discectomy and fusion in 2023 and nontraumatic cervical spinal cord injury secondary with cervical epidural abscess with cord compression from C3-C5 in October 2020 for status post incision and drainage with C4 corpectomy, C3-6 laminectomy and emergent hematoma evacuation.  Neurogenic bladder with chronic Chin catheter due to significant spinal trauma.  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  Underwent rehab at [facility] from November through December 2024.  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    On or about 4/9/2025, the patient was noted to have wounds on his buttocks and coccyx and has followed with outpatient wound care clinic since that time.  With bilateral gluteal and sacral wounds.  It appears the patient received Dalvance in the infusion clinic per wound care on 5/7/2025.  On wound care follow-up 5/15/2025, APRN reported worsening of left gluteal wound with increasing tunneling area.  Patient was encouraged to seek ED evaluation but wanted to wait for repeat labs and wound culture from an in office debridement.  Those cultures have revealed ESBL E. coli and Enterococcus faecalis.  Sed rate increased from 48-57.  The patient denied any fever but did report sensitivities and change in temperature since surgery.  Denied any nausea or vomiting.  No abdominal pain or diarrhea.    On arrival to ED, CRP elevated 8.23.  Lactate normal at 1.1.  WBC normal at 6.19.  Sed rate 57.  Urinalysis with positive nitrites, 3+ leukocytes but 6-10 WBCs and trace bacteria.  No urine culture.  Blood cultures x 2 preliminarily reporting no growth at 24 hours.  Left buttock wound cultures from 5/15 and at 5 5/1/2025  reporting E. coli ESBL and Enterococcus faecalis.  MRI cervical spine with and without contrast reported surgical fusion with surgical hardware artifact limits evaluation.  Myelomalacia cervical cord.  MRI thoracic spine with and without contrast reported no acute process.  MRI pelvis without contrast reported limited exam.  Postsurgical changes with subcutaneous edema and fluid superficial to the hips, further evaluation if clinically warranted.  CT pelvis with and without contrast reported no soft tissue loculated fluid collections identified.  Diffuse soft tissue edema, nonspecific.  Left deep gluteal ulceration noted which essentially extends to the level of the left ischial tuberosity.  No sclerotic or destructive bony features identified.  Deep and wide base right sacral gluteal ulceration noted which extends to the level of the right ischial tuberosity and approximates the gluteal crease.  No associated sclerosis or destructive bone changes identified in the region of ulceration.  Anasarca.    The patient was taken to the OR for incision and debridement of the left and right gluteal wounds involving necrotic skin.  The patient is awake and alert, resting comfortably in bed.  On 2 L nasal cannula with no apparent distress.  Overnight Tmax 100.1 °F.  Denies diarrhea.  Denies any issues with chronic Chin catheter.  Generalized edema is noted.  Lung exam clear to auscultation bilaterally.  Abdomen soft and nontender with normoactive bowel sounds.  WBC normal at 5.36.  Intraoperative tissue/bone culture preliminarily reports rare WBCs and no organisms on Gram stain.      Infectious Disease consultation was requested for antimicrobial management.      ---------------------------------------------------------------------------------------------------------------------     Review Of Systems:    Constitutional: no fever, chills and night sweats. No appetite change or unexpected weight change. No fatigue.  Eyes: no  eye drainage, itching or redness.  HEENT: no mouth sores, dysphagia or nose bleed.  Respiratory: no for shortness of breath, cough or production of sputum.  Cardiovascular: no chest pain, no palpitations, no orthopnea.  Gastrointestinal: no nausea, vomiting or diarrhea. No abdominal pain, hematemesis or rectal bleeding.  Genitourinary: no dysuria or polyuria.  Chronic indwelling Chin catheter  Hematologic/lymphatic: no lymph node abnormalities, no easy bruising or easy bleeding.  Musculoskeletal: no muscle or joint pain.  Skin: No rash and no itching.  Sacral/gluteal ulcers, penile and scrotal ulcers  Neurological: no loss of consciousness, no seizure, no headache.  History traumatic spinal injury  Behavioral/Psych: no depression or suicidal ideation.  Endocrine: no hot flashes.  Immunologic: negative.    ---------------------------------------------------------------------------------------------------------------------     Past Medical History    Past Medical History:   Diagnosis Date    Anesthesia     diffculty adminster spinal block, patient stated with last hip surgery 7-2020 several attempts per anthesia and no luck, resulted in general anesthesia     Anxiety     Arthritis     Rheumatoid    COVID 2020    GERD (gastroesophageal reflux disease)     Hypertension     Lactose intolerance     Low back pain     Sleep apnea     does not wear machine, MILD, DOES NOT HAVE AFTER WGT LOSS SURGERY    Wears reading eyeglasses        Past Surgical History    Past Surgical History:   Procedure Laterality Date    ANTERIOR CERVICAL DISCECTOMY W/ FUSION Right 02/19/2023    Procedure: CERVICAL DISCECTOMY ANTERIOR WITH FUSION C6-7;  Surgeon: Jaiden Aldridge MD;  Location: [facility];  Service: Neurosurgery;  Laterality: Right;    BARIATRIC SURGERY  4 year ago    CARPAL TUNNEL RELEASE Bilateral     CARPAL TUNNEL RELEASE Right 06/29/2023    Procedure: CARPAL TUNNEL RELEASE RIGHT;  Surgeon: Jaiden Aldridge MD;  Location:   [facility];  Service: Neurosurgery;  Laterality: Right;    COLONOSCOPY      5 years ago    COLONOSCOPY N/A 04/05/2022    Procedure: COLONOSCOPY FOR SCREENING;  Surgeon: Luz Galvez MD;  Location:  [facility];  Service: Gastroenterology;  Laterality: N/A;    ENDOSCOPY N/A 04/05/2022    Procedure: ESOPHAGOGASTRODUODENOSCOPY WITH BIOPSY;  Surgeon: Luz Galvez MD;  Location:  [facility];  Service: Gastroenterology;  Laterality: N/A;    GASTRIC SLEEVE LAPAROSCOPIC      2017    HIP SURGERY Right times 2    KNEE ARTHROSCOPY Left     TOTAL HIP ARTHROPLASTY Left 01/25/2021    Procedure: TOTAL HIP ARTHROPLASTY LEFT;  Surgeon: Jb Patel MD;  Location:  [facility];  Service: Orthopedics;  Laterality: Left;    TOTAL HIP ARTHROPLASTY REVISION Right 07/20/2020    Procedure: TOTAL HIP ARTHROPLASTY REVISION RIGHT;  Surgeon: Jb Patel MD;  Location:  [facility];  Service: Orthopedics;  Laterality: Right;       Family History    Family History   Problem Relation Age of Onset    Heart disease Father     Hypertension Father     Osteoarthritis Father     Cancer Mother     Diabetes Brother     Hypertension Brother     Gout Paternal Grandmother     Diabetes Paternal Grandmother     Colon cancer Maternal Uncle     Colon polyps Maternal Uncle        Social History    Social History     Tobacco Use    Smoking status: Never    Smokeless tobacco: Never   Vaping Use    Vaping status: Never Used   Substance Use Topics    Alcohol use: No    Drug use: Never       Allergies    Sulfa antibiotics, Codeine, Ketorolac, and Morphine and codeine  ---------------------------------------------------------------------------------------------------------------------     Home Medications:    Prior to Admission Medications       Prescriptions Last Dose Informant Patient Reported? Taking?    ascorbic acid (VITAMIN C) 500 MG tablet 5/16/2025 Child Yes Yes    Take 2 tablets by mouth Daily.    baclofen (LIORESAL) 10 MG tablet  5/16/2025 Child Yes Yes    Take 2 tablets by mouth 3 (Three) Times a Day.    baclofen (LIORESAL) 10 MG tablet Past Week Child Yes Yes    Take 5 tablets by mouth every night at bedtime.    Brexpiprazole (Rexulti) 1 MG tablet 5/16/2025 Child Yes Yes    Take 1 tablet by mouth Daily.    busPIRone (BUSPAR) 5 MG tablet Past Week Child Yes Yes    Take 1 tablet by mouth Every Night.    Cyanocobalamin (B-12 Compliance Injection) 1000 MCG/ML kit Past Month Child Yes Yes    Inject 1 mL as directed Every 14 (Fourteen) Days.    diclofenac (VOLTAREN) 75 MG EC tablet 5/16/2025 Child Yes Yes    Take 1 tablet by mouth 2 (Two) Times a Day.    famotidine (PEPCID) 20 MG tablet 5/16/2025 Child Yes Yes    Take 1 tablet by mouth Daily.    ferrous sulfate 325 (65 FE) MG tablet 5/16/2025 Child Yes Yes    Take 1 tablet by mouth Daily With Breakfast.    FLUoxetine (PROzac) 20 MG capsule 5/16/2025 Child Yes Yes    Take 1 capsule by mouth Daily.    gabapentin (NEURONTIN) 300 MG capsule 5/16/2025 Child Yes Yes    Take 3 capsules by mouth 3 (Three) Times a Day.    Lactobacillus Acid-Pectin (ACIDOPHILUS PLUS PECTIN PO) 5/16/2025 Child Yes Yes    Take 1 capsule by mouth Daily.    loratadine (CLARITIN) 10 MG tablet 5/16/2025 Child Yes Yes    Take 1 tablet by mouth Daily.    Multiple Vitamins-Minerals (MENS MULTIPLUS PO) 5/16/2025 Child Yes Yes    Take 1 tablet by mouth Daily.    omeprazole (priLOSEC) 20 MG capsule 5/16/2025 Child Yes Yes    Take 1 capsule by mouth 2 (Two) Times a Day.    oxyCODONE (ROXICODONE) 10 MG tablet 5/16/2025 Child Yes Yes    Take 1 tablet by mouth 4 (Four) Times a Day.    oxyCODONE ER (oxyCONTIN) 20 MG 12 hr tablet Past Week Child Yes Yes    Take 1 tablet by mouth Every Night.    pantoprazole (PROTONIX) 40 MG EC tablet 5/16/2025 Child Yes Yes    Take 1 tablet by mouth Daily.    QUEtiapine (SEROquel) 100 MG tablet Past Week Child Yes Yes    Take 1 tablet by mouth Take As Directed.    tiZANidine (ZANAFLEX) 2 MG tablet  5/16/2025 Child Yes Yes    Take 1 tablet by mouth 4 (Four) Times a Day.    tiZANidine (ZANAFLEX) 2 MG tablet Past Week Child Yes Yes    Take 2 tablets by mouth Every Night.    vitamin D3 125 MCG (5000 UT) capsule capsule 5/16/2025 Child Yes Yes    Take 1 capsule by mouth Daily.          ---------------------------------------------------------------------------------------------------------------------    Objective       Objective     Hospital Scheduled Meds:  Acidophilus/Pectin, 1 capsule, Oral, Daily  ascorbic acid, 1,000 mg, Oral, Daily  baclofen, 20 mg, Oral, Q8H  baclofen, 20 mg, Oral, TID With Meals  Brexpiprazole, 1 mg, Oral, Daily  busPIRone, 5 mg, Oral, Q24H  cetirizine, 10 mg, Oral, Daily  cyanocobalamin, 1,000 mcg, Subcutaneous, Q14 Days  ertapenem, 1,000 mg, Intravenous, Q24H  famotidine, 20 mg, Oral, Daily  ferrous sulfate, 325 mg, Oral, Daily With Breakfast  FLUoxetine, 20 mg, Oral, Daily  gabapentin, 900 mg, Oral, TID With Meals  heparin (porcine), 5,000 Units, Subcutaneous, Q12H  lidocaine, 1 Application, Topical, BID  multivitamin with minerals, 1 tablet, Oral, Daily  naproxen, 500 mg, Oral, BID With Meals  oxyCODONE, 10 mg, Oral, Q6H  oxyCODONE ER, 20 mg, Oral, Nightly  pantoprazole, 40 mg, Oral, Daily  QUEtiapine, 200 mg, Oral, Nightly  sodium chloride, 10 mL, Intravenous, Q12H  tiZANidine, 2 mg, Oral, 4x Daily  tiZANidine, 4 mg, Oral, Nightly  vitamin D3, 5,000 Units, Oral, Daily      lactated ringers, 100 mL/hr, Last Rate: 100 mL/hr (05/17/25 2650)  Pharmacy to dose vancomycin,       ---------------------------------------------------------------------------------------------------------------------   Vital Signs:  Temp:  [97 °F (36.1 °C)-100.1 °F (37.8 °C)] 97.2 °F (36.2 °C)  Heart Rate:  [55-75] 64  Resp:  [7-20] 13  BP: (112-152)/(62-86) 142/75  Mean Arterial Pressure (Non-Invasive) for the past 24 hrs (Last 3 readings):   Noninvasive MAP (mmHg)   05/18/25 1005 104   05/18/25 1000 102    05/18/25 0955 104     SpO2 Percentage    05/18/25 1000 05/18/25 1005 05/18/25 1010   SpO2: 100% 100% 100%     SpO2:  [90 %-100 %] 100 %  on  Flow (L/min) (Oxygen Therapy):  [2] 2;   Device (Oxygen Therapy): nasal cannula    Body mass index is 31.98 kg/m².  Wt Readings from Last 3 Encounters:   05/16/25 95.4 kg (210 lb 5.1 oz)   05/16/25 93 kg (205 lb)   05/07/25 93 kg (205 lb)     ---------------------------------------------------------------------------------------------------------------------     Physical Exam:    Constitutional:  Well-developed and well-nourished.  Resting comfortably in bed, on 2 L nasal cannula with no respiratory distress.  The patient has just returned to his hospital room postoperatively.  Family members at the bedside.  Denies any complaints or issues this morning.     HENT:  Head: Normocephalic and atraumatic.  Mouth:  Moist mucous membranes.    Eyes:  Conjunctivae and EOM are normal.  No scleral icterus.  Neck:  Neck supple.  No JVD present.    Cardiovascular:  Normal rate, regular rhythm and normal heart sounds with no murmur. No edema.  Pulmonary/Chest:  No respiratory distress, no wheezes, no crackles, with normal breath sounds and good air movement.  Abdominal:  Soft.  Bowel sounds are normal.  No distension and no tenderness.   Musculoskeletal:  No edema, no tenderness, and no deformity.  No swelling or redness of joints.  Neurological:  Alert and oriented to person, place, and time.  No facial droop.  No slurred speech.   Skin:  Skin is warm and dry.  No rash noted.  No pallor.  Pressure injuries noted to the left scrotum and penile meatus.  Sacral and ischial ulcers are dressed postoperatively, clean dry and intact.  Wound images from admission reviewed.  Psychiatric:  Normal mood and affect.  Behavior is normal.    ---------------------------------------------------------------------------------------------------------------------              Results from last 7 days   Lab  "Units 05/18/25 0231 05/17/25 0446 05/16/25  1544 05/15/25  1606   CRP mg/dL  --   --  8.23* 7.76*   LACTATE mmol/L  --   --  1.1  --    WBC 10*3/mm3 5.36 7.22 6.19 7.52   HEMOGLOBIN g/dL 9.2* 9.8* 9.2* 9.6*   HEMATOCRIT % 31.4* 32.2* 31.1* 31.7*   MCV fL 92.9 93.1 94.0 93.2   MCHC g/dL 29.3* 30.4* 29.6* 30.3*   PLATELETS 10*3/mm3 177 201 189 218     Results from last 7 days   Lab Units 05/18/25 0231 05/17/25 0446 05/16/25  1544 05/15/25  1606   SODIUM mmol/L 145 141 142 142   POTASSIUM mmol/L 3.5 3.8 3.8 3.8   CHLORIDE mmol/L 110* 104 105 103   CO2 mmol/L 27.4 28.2 30.4* 30.5*   BUN mg/dL 12 14 20 20   CREATININE mg/dL 0.29* 0.32* 0.47* 0.49*   CALCIUM mg/dL 8.4* 8.5* 8.7 8.7   GLUCOSE mg/dL 96 110* 134* 166*   ALBUMIN g/dL  --  2.5* 2.6* 2.8*   BILIRUBIN mg/dL  --  0.2 <0.2 <0.2   ALK PHOS U/L  --  72 71 77   AST (SGOT) U/L  --  8 7 7   ALT (SGPT) U/L  --  5 <5 <5   Estimated Creatinine Clearance: 292.1 mL/min (A) (by C-G formula based on SCr of 0.29 mg/dL (L)).  No results found for: \"AMMONIA\"    No results found for: \"HGBA1C\", \"POCGLU\"  Lab Results   Component Value Date    HGBA1C 6.60 (H) 01/15/2025     Lab Results   Component Value Date    TSH 1.350 01/15/2025    FREET4 0.9 11/22/2024       Blood Culture   Date Value Ref Range Status   05/16/2025 No growth at 24 hours  Preliminary   05/16/2025 No growth at 24 hours  Preliminary     No results found for: \"URINECX\"  Wound Culture   Date Value Ref Range Status   05/15/2025 Light growth (2+) Escherichia coli ESBL (A)  Final     Comment:       Consider infectious disease consult.  Susceptibility results may not correlate to clinical outcomes.     No results found for: \"STOOLCX\"  No results found for: \"RESPCX\"  Pain Management Panel           No data to display                ---------------------------------------------------------------------------------------------------------------------  Imaging Results (Last 7 Days)       Procedure Component Value Units " Date/Time    CT Pelvis With & Without Contrast [[id number]] Collected: 05/18/25 0820     Updated: 05/18/25 0826    Narrative:      EXAM:    CT Pelvis Without and With Intravenous Contrast     EXAM DATE:    5/17/2025 3:42 PM     CLINICAL HISTORY:    gluteal and scaral wounds; evaluate for osteomyelitis; T14.8XXA-Other  injury of unspecified body region, initial encounter; L08.9-Local  infection of the skin and subcutaneous tissue, unspecified;  T14.8XXA-Other injury of unspecified body region, initial encounter;  L08.9-Local infection of the skin and subcutaneous tissue, unspecified     TECHNIQUE:    Axial computed tomography images of the pelvis without and with  intravenous contrast.  Sagittal and coronal reformatted images were  created and reviewed.  This CT exam was performed using one or more of  the following dose reduction techniques:  automated exposure control,  adjustment of the mA and/or kV according to patient size, and/or use of  iterative reconstruction technique.     COMPARISON:    MRI 5/16/2025, CT 3/26/2025     FINDINGS:    Bowel:  Pelvic portions of GI tract are within normal limits.  No  obstruction.  No mucosal thickening.    Intraperitoneal space:  See below.      Bladder:  Chin catheter decompresses urinary bladder.  No stones.    Reproductive:  Mild prostate enlargement.    Bones/joints:  Distal portions of lumbosacral fusion hardware are  noted with lateral SI joint fusion also present.  Bilateral hip  arthroplasty.  Left deep gluteal ulceration is noted which essentially  extends to the level of the left ischial tuberosity. No sclerotic or  destructive bony features identified.  Deep and wide-base right  sacral-gluteal ulceration is noted which extends to the level of the  right ischial tuberosity and approximates the gluteal crease. No  associated sclerosis or destructive bone changes identified in the  region of ulceration.  No acute fracture.  No dislocation.    Soft tissues:  Diffuse  soft tissue edema, nonspecific.  Anasarca.  No  soft tissue loculated fluid collections are identified.       Impression:      1.  No soft tissue loculated fluid collections are identified.  2.  Diffuse soft tissue edema, nonspecific.  3.  Left deep gluteal ulceration is noted which essentially extends to  the level of the left ischial tuberosity. No sclerotic or destructive  bony features identified.  4.  Deep and wide-base right sacral-gluteal ulceration is noted which  extends to the level of the right ischial tuberosity and approximates  the gluteal crease. No associated sclerosis or destructive bone changes  identified in the region of ulceration.  5.  Anasarca.     This report was finalized on 5/18/2025 8:24 AM by Dr. Jason Kruse MD.       MRI Pelvis Without Contrast [[id number]] Collected: 05/16/25 1933     Updated: 05/16/25 1959    Narrative:      Comparison: None     Patient motion artifact limits evaluation. Fatty atrophy is noted.  Bilateral hip prostheses limits evaluation. Postsurgical changes hips  bilaterally with soft tissue edema. No free fluid seen within the  pelvis.      Subcutaneous edema of the hips bilaterally with fluid along the  subcutaneous tissues superficial to the hips which is partially imaged.  If concern for abscess or acute etiology recommend dedicated imaging of  the hips with enhancement.       Impression:      Impression:  1. Limited exam.  2. Postsurgical changes with subcutaneous edema and fluid superficial to  the hips, further evaluation if clinically warranted.     This report was finalized on 5/16/2025 7:33 PM by Angel Monet DO.       MRI Cervical Spine With & Without Contrast [[id number]] Collected: 05/16/25 1933     Updated: 05/16/25 1958    Narrative:      Comparison: None     Surgical hardware artifact limits evaluation.      Surgical fusion C3-T1. Craniocervical junction appears intact.  Myelomalacia cervical cord C5-C6 level. No suspicious bone marrow edema  is  seen. No pathologic enhancement is seen. Foramina are not well  evaluated due to metallic artifact, suspect multilevel foraminal  stenosis. No significant spinal canal stenosis.        Impression:      Impression:  1. Surgical fusion with surgical hardware artifact limits evaluation.  2. Myelomalacia cervical cord.     This report was finalized on 5/16/2025 7:33 PM by Angel Monet DO.       MRI Thoracic Spine With & Without Contrast [[id number]] Collected: 05/16/25 1933     Updated: 05/16/25 1958    Narrative:      Comparison: None     Surgical fusion lower thoracic spine which limits evaluation. Facet  arthropathy causing moderate to severe foraminal stenosis left T10-T11.  No acute fracture seen. Thoracic cord is grossly unremarkable. No  suspicious bone marrow edema is seen. No pathologic enhancement is  identified.       Impression:      Impression:  1. No acute process.     This report was finalized on 5/16/2025 7:33 PM by Angel Monet DO.               ---------------------------------------------------------------------------------------------------------------------      Pertinent Infectious Disease Results          Assessment & Plan      Isolation Status:  Contact     Assessment      Infected chronic sacral ulcers        Plan      The patient was taken to the OR for incision and debridement of the left and right gluteal wounds involving necrotic skin.  The patient is awake and alert, resting comfortably in bed.  On 2 L nasal cannula with no apparent distress.  Overnight Tmax 100.1 °F.  Denies diarrhea.  Denies any issues with chronic Chin catheter.  Generalized edema is noted.  Lung exam clear to auscultation bilaterally.  Abdomen soft and nontender with normoactive bowel sounds.  WBC normal at 5.36.  Intraoperative tissue/bone culture preliminarily reports rare WBCs and no organisms on Gram stain.    General surgery operative note reviewed, reported necrotic tissue and subcutaneous fat was sharply  excised down to the fascia, not involving fascia.  No mention of bony involvement.  MRI does not report any evidence of osteomyelitis.    The patient was initiated on ertapenem 1 g IV 24 hours and vancomycin per pharmacy dosing based on wound culture sensitivity report.  We are agreeable with continuing ertapenem and vancomycin courses for the treatment of infected sacral ulcers, for 10 to 14 days pending clinical progress.  Continue to monitor closely and plan to adjust antibiotic regimen as clinically appropriate.        RECENT ANTIMICROBIAL THERAPY    ertapenem (INVanz) 1000 mg in 100 mL NS (VTB)  Pharmacy to dose vancomycin       Again, thank you Dr. Jesus for allowing us to participate in the care of your patient and please feel free to call for any questions you may have.        Code Status:     Code Status and Medical Interventions: No CPR (Do Not Attempt to Resuscitate); Limited Support; No intubation (DNI)   Ordered at: 05/16/25 0200     Code Status (Patient has no pulse and is not breathing):    No CPR (Do Not Attempt to Resuscitate)     Medical Interventions (Patient has pulse or is breathing):    Limited Support     Medical Intervention Limits:    No intubation (DNI)         TANJA Monroe  05/18/25  11:31 EDT      Electronically signed by Chantel Dominguez APRN at 05/18/25 0078

## 2025-05-20 NOTE — OUTREACH NOTE
Prep Survey      Flowsheet Row Responses   [facility] patient discharged from? [facility]   Is LACE score < 7 ? No   Eligibility Readm Mgmt   Discharge diagnosis DEBRIDEMENT SACRAL ULCER/WOUND   Does the patient have one of the following disease processes/diagnoses(primary or secondary)? Other   Does the patient have Home health ordered? Yes   What is the Home health agency?  [facility]   Is there a DME ordered? Yes   What DME was ordered? IV antibiotic   Medication alerts for this patient IV antibiotic   Prep survey completed? Yes            Danielle ROD - Registered Nurse

## 2025-05-20 NOTE — DISCHARGE PLACEMENT REQUEST
"Emily Jackson (64 y.o. Male)       Date of Birth   1960    Social Security Number       Address   [address] [place], [place] [address]    Home Phone   [phone]    MRN   [id number]       Holiness   Yarsani    Marital Status                               Admission Date   5/16/2025    Admission Type   Emergency    Admitting Provider   Natacha Huerta DO    Attending Provider   Pablo Marquez MD    Department, Room/Bed   [address], [facility]7/       Discharge Date       Discharge Disposition   Home or Self Care    Discharge Destination                                 Attending Provider: Pablo Marquez MD    Allergies: Sulfa Antibiotics, Codeine, Ketorolac, Morphine And Codeine    Isolation: Contact   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), 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)   Code Status: No CPR    Ht: 172.7 cm (68\")   Wt: 95.4 kg (210 lb 5.1 oz)    Admission Cmt: None   Principal Problem: Infected wound [T14.8XXA,L08.9]                   Active Insurance as of 5/16/2025       Primary Coverage       Payor Plan Insurance Group Employer/Plan Group    MEDICARE MEDICARE A & B        Payor Plan Address Payor Plan Phone Number Payor Plan Fax Number Effective Dates    [address] [phone]  10/1/2011 - None Entered    Formerly [facility] [id number]         Subscriber Name Subscriber Birth Date Member ID       EMILY JACKSON 1960 [id number]               Secondary Coverage       Payor Plan Insurance Group Employer/Plan Group    [employer] [place]ICA[place]        Payor Plan Address Payor Plan Phone Number Payor Plan Fax Number Effective Dates    [address] [address] [phone]  8/29/2016 - None Entered    [facility] [address]         Subscriber Name Subscriber Birth Date Member ID       EMILY JACKSON 1960 [id number]                     Emergency Contacts        (Rel.) Home Phone Work Phone Mobile Phone    ROSS DYSON (Daughter) [phone] -- --    ManuelMyla (Spouse) " [phone] -- [phone]    CESAR JACKSON (Daughter) -- -- [phone]                 History & Physical        DeannaNatacha  at 25 0007          [facility]   HISTORY AND PHYSICAL    Patient Name: Ang Jackson  : 1960  MRN: [id number]  Primary Care Physician:  Deepak Perez  Date of admission: 2025    Subjective  Subjective     Chief Complaint: Worsening wounds and inflammatory markers    History of Present Illness The patient is a 65 y/o male with PMHx significant for cervical degenerative disc disease status post C6-7 anterior cervical discectomy and fusion in  and nontraumatic cervical spinal cord injury secondary with cervical epidural abscess with cord compression from C3-C5 in 2024 status post incision and drainage with C4 corpectomy, C3-6 laminectomy and emergent hematoma evacuation. Patient with neurogenic bladder and chronic hanks catheter due to significant spinal trauma. 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  Patient underwent rehabilitation at [facility] from November through 2024.  Patient most recently underwent Botox injections to the upper extremities for spastic tetraplegia on 2025.    On or about 2025, patient noted to have wounds on his buttocks and coccyx and has followed with our wound care clinic since that time. Patient with bilateral gluteal and a sacral wound. It appears that patient received Dalvance in the infusion clinic per wound care on 25.    On wound care follow up 5/15/25, APRN reports worsening of left gluteal wound with increase in tunnel area. Patient was encouraged to seek ER evaluation then but wanted to wait for repeat labs and a wound culture from an in office debridement. Those cultures have revealed ESBL E.coli and Enterococcus faecalis (vancomycin sensitive). Sed rate has increased to  57 (48).    Patient denies any fever but does report sensitivities in change in temperature since his surgery. He denies any nausea or vomiting. No abdominal pain or diarrhea.     Patient seen and examined in 339. Wife, daughter, granddaughter and RN present at bedside.    Review of Systems   Constitutional:  Negative for diaphoresis.   HENT:  Negative for hearing loss and trouble swallowing.    Eyes:  Negative for discharge and visual disturbance.   Respiratory:  Negative for cough, shortness of breath and wheezing.    Cardiovascular:  Negative for chest pain, palpitations and leg swelling.   Gastrointestinal:  Negative for abdominal pain, constipation, diarrhea, nausea and vomiting.   Endocrine: Negative for polydipsia, polyphagia and polyuria.   Genitourinary:  Negative for decreased urine volume, dysuria, frequency and hematuria.   Musculoskeletal:  Negative for gait problem, myalgias and neck pain.   Skin:  Positive for color change and wound. Negative for rash.   Neurological:  Negative for dizziness, tremors, seizures, syncope, weakness and light-headedness.   Hematological:  Does not bruise/bleed easily.   Psychiatric/Behavioral:  Negative for confusion, hallucinations and suicidal ideas.         Personal History     Past Medical History:   Diagnosis Date    Anesthesia     diffculty adminster spinal block, patient stated with last hip surgery 7-2020 several attempts per anthesia and no luck, resulted in general anesthesia     Anxiety     Arthritis     Rheumatoid    COVID     2020    GERD (gastroesophageal reflux disease)     Hypertension     Lactose intolerance     Low back pain     Sleep apnea     does not wear machine, MILD, DOES NOT HAVE AFTER WGT LOSS SURGERY    Wears reading eyeglasses        Past Surgical History:   Procedure Laterality Date    ANTERIOR CERVICAL DISCECTOMY W/ FUSION Right 02/19/2023    Procedure: CERVICAL DISCECTOMY ANTERIOR WITH FUSION C6-7;  Surgeon: Jaiden Aldridge MD;  Location:   [facility];  Service: Neurosurgery;  Laterality: Right;    BARIATRIC SURGERY  4 year ago    CARPAL TUNNEL RELEASE Bilateral     CARPAL TUNNEL RELEASE Right 06/29/2023    Procedure: CARPAL TUNNEL RELEASE RIGHT;  Surgeon: Jaiden Aldridge MD;  Location:  [facility];  Service: Neurosurgery;  Laterality: Right;    COLONOSCOPY      5 years ago    COLONOSCOPY N/A 04/05/2022    Procedure: COLONOSCOPY FOR SCREENING;  Surgeon: Luz Galvez MD;  Location:  [facility];  Service: Gastroenterology;  Laterality: N/A;    ENDOSCOPY N/A 04/05/2022    Procedure: ESOPHAGOGASTRODUODENOSCOPY WITH BIOPSY;  Surgeon: Luz Galvez MD;  Location:  [facility];  Service: Gastroenterology;  Laterality: N/A;    GASTRIC SLEEVE LAPAROSCOPIC      2017    HIP SURGERY Right times 2    KNEE ARTHROSCOPY Left     TOTAL HIP ARTHROPLASTY Left 01/25/2021    Procedure: TOTAL HIP ARTHROPLASTY LEFT;  Surgeon: Jb Patel MD;  Location:  [facility];  Service: Orthopedics;  Laterality: Left;    TOTAL HIP ARTHROPLASTY REVISION Right 07/20/2020    Procedure: TOTAL HIP ARTHROPLASTY REVISION RIGHT;  Surgeon: Jb Patel MD;  Location:  [facility];  Service: Orthopedics;  Laterality: Right;       Family History: family history includes Cancer in his mother; Colon cancer in his maternal uncle; Colon polyps in his maternal uncle; Diabetes in his brother and paternal grandmother; Gout in his paternal grandmother; Heart disease in his father; Hypertension in his brother and father; Osteoarthritis in his father.     Social History:  reports that he has never smoked. He has never used smokeless tobacco. He reports that he does not drink alcohol and does not use drugs.    Home Medications:  Brexpiprazole, HYDROcodone-acetaminophen, Mirabegron ER, ascorbic acid, baclofen, busPIRone, cyanocobalamin, gabapentin, hydrOXYzine pamoate, lidocaine, lisinopril, loratadine, methylPREDNISolone, multivitamin with minerals, and  "pantoprazole    Allergies:  Allergies   Allergen Reactions    Sulfa Antibiotics Hives     Hives, shortness of breath    Codeine Nausea And Vomiting    Ketorolac Other (See Comments)     Pt reports heavy, \"tight\" feeling in his chest.    Morphine And Codeine Nausea And Vomiting       Objective   Objective     Vitals:   Temp:  [97.6 °F (36.4 °C)-97.9 °F (36.6 °C)] 97.9 °F (36.6 °C)  Heart Rate:  [56-69] 68  Resp:  [12-16] 16  BP: (128-155)/(69-88) 149/75    Physical Exam  Constitutional:       General: He is not in acute distress.     Appearance: He is well-developed.   HENT:      Head: Normocephalic and atraumatic.   Eyes:      Conjunctiva/sclera: Conjunctivae normal.   Neck:      Trachea: No tracheal deviation.   Cardiovascular:      Rate and Rhythm: Normal rate and regular rhythm.      Heart sounds: No murmur heard.     No friction rub. No gallop.   Pulmonary:      Effort: No respiratory distress.      Breath sounds: Normal breath sounds. No wheezing or rales.   Abdominal:      General: Bowel sounds are normal. There is no distension.      Palpations: Abdomen is soft.      Tenderness: There is no abdominal tenderness. There is no guarding.   Genitourinary:     Comments: Chin catheter in place; draining straw colored urine  Musculoskeletal:      Comments: Lower spinal/sacral wound with necrotic appearing tissue near 9'o clock region; right gluteal wound mostly with slough; some necrotic tissue deepest/most central portion of wound; left gluteal wound with some healthy appearing tissue but more centrally located necrosis and tunneling   Skin:     General: Skin is warm and dry.      Findings: No erythema or rash.   Neurological:      Mental Status: He is alert and oriented to person, place, and time.      Cranial Nerves: No dysarthria.      Motor: No tremor.         Result Review   Result Review:  I have personally reviewed the results from the time of this admission to 5/17/2025 00:07 EDT and agree with these " findings:  [x]  Laboratory list / accordion  [x]  Microbiology  [x]  Radiology  []  EKG/Telemetry   []  Cardiology/Vascular   []  Pathology  []  Old records  []  Other:  Most notable findings include:     Results from last 7 days   Lab Units 05/16/25  1544 05/15/25  1606   WBC 10*3/mm3 6.19 7.52   HEMOGLOBIN g/dL 9.2* 9.6*   HEMATOCRIT % 31.1* 31.7*   PLATELETS 10*3/mm3 189 218     Results from last 7 days   Lab Units 05/16/25  1544 05/15/25  1606   SODIUM mmol/L 142 142   POTASSIUM mmol/L 3.8 3.8   CHLORIDE mmol/L 105 103   CO2 mmol/L 30.4* 30.5*   BUN mg/dL 20 20   CREATININE mg/dL 0.47* 0.49*   CALCIUM mg/dL 8.7 8.7   BILIRUBIN mg/dL <0.2 <0.2   ALK PHOS U/L 71 77   ALT (SGPT) U/L <5 <5   AST (SGOT) U/L 7 7   GLUCOSE mg/dL 134* 166*         Culture results from last 30 days   Lab 05/15/25  1606 05/01/25  1616   WOUNDCX Light growth (2+) Escherichia coli* Moderate growth (3+) Escherichia coli ESBL*  Moderate growth (3+) Enterococcus faecalis*     MRI lumbar spine with/without contrast:  Fatty atrophy is noted.  Bilateral hip prostheses limits evaluation. Postsurgical changes hips  bilaterally with soft tissue edema. No free fluid seen within the  pelvis.      Subcutaneous edema of the hips bilaterally with fluid along the  subcutaneous tissues superficial to the hips which is partially imaged.  If concern for abscess or acute etiology recommend dedicated imaging of  the hips with enhancement.     IMPRESSION:  Impression:  1. Limited exam.  2. Postsurgical changes with subcutaneous edema and fluid superficial to  the hips, further evaluation if clinically warranted.    Assessment & Plan  Assessment / Plan     #Polymicrobial left gluteal wound, POA  #Right gluteal and sacral decubitus wounds, POA  #Neurogenic bladder with chronic hanks catheter with chronically abnormal urinalyses  #Non-traumatic cervical spinal cord injury with quadriplegia and spasticity  - Patient has been admitted to the medical surgical  floor  - Recent outpatient wound culture revealing ESBL E. coli, Enterococcus faecalis and more recently light growth of E. coli with final culture pending  - Will follow-up on final wound culture results  - Follow-up on blood culture results obtained in the ED  - I have ordered a CT pelvis with/without contrast to evaluate and rule out any evidence of osteomyelitis  - For now, empiric intravenous antibiotic therapy with pharmacy to dose vancomycin and ertapenem  - Will make patient n.p.o. after midnight for general surgery evaluation to see if patient would benefit from surgical excisional debridement specifically of the left gluteal wound  - Chin catheter exchanged in the ED  - Resume home baclofen and as needed analgesics pending pharmacy completion of patient's home medication list  - Will repeat the patient's CBC and chemistry panel in a.m.    Chronic medical conditions:  - Chronic pain syndrome  - Essential hypertension  - Chronic normocytic anemia  - Anxiety/Depression  - Gastroesophageal reflux disease  - Previous MRSA dagoberto prosthetic infections s/p B/L VALERI    VTE Prophylaxis:  [facility]    CODE STATUS:    Code Status (Patient has no pulse and is not breathing): No CPR (Do Not Attempt to Resuscitate)  Medical Interventions (Patient has pulse or is breathing): Limited Support  Medical Intervention Limits: No intubation (DNI)    Admission Status:  I believe this patient meets inpatient status.    Records from our [facility] wound clinic and from OSH reviewed and summarized in the HPI    Natacha Huerta DO    Electronically signed by Natacha Huerta DO at 05/17/25 0142       Vital Signs (last day)       Date/Time Temp Temp src Pulse Resp BP Patient Position SpO2    05/20/25 0600 97.5 (36.4) Oral -- 20 132/75 Lying --    05/20/25 0256 97.6 (36.4) Oral 64 20 114/63 Lying 95    05/19/25 2256 98.6 (37) Oral 71 20 119/64 Lying 97    05/19/25 1830 99 (37.2) Oral 66 16 147/75 Lying 95    05/19/25 1500 98.8 (37.1) Oral  70 18 126/67 Sitting --    05/19/25 1018 -- -- -- 18 143/69 Lying --    05/19/25 0659 98.1 (36.7) Oral -- 18 118/65 Lying --    05/19/25 0247 98.4 (36.9) Oral 80 20 132/62 Lying --          Intake & Output (last day)         05/19 0701  05/20 0700 05/20 0701 05/21 0700    P.O. 600     I.V. (mL/kg)      Total Intake(mL/kg) 600 (6.3)     Urine (mL/kg/hr) 1850 (0.8)     Stool 0     Total Output 1850     Net -1250           Stool Unmeasured Occurrence 0 x           Lines, Drains & Airways       Active LDAs       Name Placement date Placement time Site Days    Peripheral IV 05/20/25 0020 22 G Anterior;Upper;Left Arm 05/20/25  0020  Arm  less than 1    Urethral Catheter 18 Fr. 05/16/25  1602  -- 3                  Current Facility-Administered Medications   Medication Dose Route Frequency Provider Last Rate Last Admin    Acidophilus/Pectin capsule 1 capsule  1 capsule Oral Daily Deepak Lara MD   1 capsule at 05/20/25 0901    ascorbic acid (VITAMIN C) tablet 1,000 mg  1,000 mg Oral Daily Deepak Lara MD   1,000 mg at 05/20/25 0902    baclofen (LIORESAL) tablet 20 mg  20 mg Oral Q8H Deepak Lara MD   20 mg at 05/20/25 0509    baclofen (LIORESAL) tablet 20 mg  20 mg Oral TID With Meals Deepak Lara MD   20 mg at 05/20/25 0900    sennosides-docusate (PERICOLACE) 8.6-50 MG per tablet 2 tablet  2 tablet Oral BID PRN Deepak Lara MD        And    polyethylene glycol (MIRALAX) packet 17 g  17 g Oral Daily PRN Deepak Lara MD        And    bisacodyl (DULCOLAX) EC tablet 5 mg  5 mg Oral Daily PRN Deepak Lara MD        And    bisacodyl (DULCOLAX) suppository 10 mg  10 mg Rectal Daily PRN Deepak Lara MD        Brexpiprazole tablet 1 mg  1 mg Oral Daily Deepak Lara MD   1 mg at 05/20/25 0901    busPIRone (BUSPAR) tablet 5 mg  5 mg Oral Q24H Deepak Lara MD   5 mg at 05/20/25 0509    cetirizine (zyrTEC) tablet 10 mg  10 mg Oral Daily Marco  Deepak Murrieta MD   10 mg at 05/20/25 0900    collagenase ointment 1 Application  1 Application Topical Q24H Xuan Gardner APRN   1 Application at 05/20/25 0903    cyanocobalamin injection 1,000 mcg  1,000 mcg Subcutaneous Q14 Days Deepak Lara MD        DAPTOmycin (CUBICIN) 500 mg/50 mL 0.9% sodium chloride IVPB  6 mg/kg (Adjusted) Intravenous Q24H Chantel Dominguez APRN   500 mg at 05/19/25 1743    dextrose (D50W) (25 g/50 mL) IV injection 25 g  25 g Intravenous Q15 Min PRN Deepka Lara MD        dextrose (GLUTOSE) oral gel 15 g  15 g Oral Q15 Min PRN Deepak Lara MD        ertapenem (INVanz) 1,000 mg in sodium chloride 0.9 % 100 mL IVPB-VTB  1,000 mg Intravenous Q24H Chantel Dominguez APRN 200 mL/hr at 05/20/25 0027 1,000 mg at 05/20/25 0027    famotidine (PEPCID) tablet 20 mg  20 mg Oral Daily Deepak Lara MD   20 mg at 05/20/25 0901    ferrous sulfate tablet 325 mg  325 mg Oral Daily With Breakfast Deepak Lara MD   325 mg at 05/20/25 0901    FLUoxetine (PROzac) capsule 20 mg  20 mg Oral Daily Deepak Lara MD   20 mg at 05/20/25 0902    gabapentin (NEURONTIN) capsule 900 mg  900 mg Oral TID With Meals Deepak Lara MD   900 mg at 05/20/25 0509    glucagon HCl (Diagnostic) injection 1 mg  1 mg Subcutaneous Q15 Min PRN Deepak Lara MD        heparin (porcine) 5000 UNIT/ML injection 5,000 Units  5,000 Units Subcutaneous Q12H Deepak Lara MD   5,000 Units at 05/20/25 0903    hydrOXYzine (ATARAX) tablet 25 mg  25 mg Oral TID PRN Cornelio Jesus MD   25 mg at 05/18/25 1738    lactated ringers infusion  50 mL/hr Intravenous Continuous Pablo Marquez MD 50 mL/hr at 05/20/25 0903 50 mL/hr at 05/20/25 0903    lidocaine (LMX) 4 % cream 1 Application  1 Application Topical BID Deepak Lara MD   1 Application at 05/20/25 0903    multivitamin with minerals 1 tablet  1 tablet Oral Daily Deepak Lara MD   1 tablet at 05/20/25  0902    naproxen (NAPROSYN) tablet 500 mg  500 mg Oral BID With Meals Deepak Lara MD   500 mg at 05/20/25 0900    nitroglycerin (NITROSTAT) SL tablet 0.4 mg  0.4 mg Sublingual Q5 Min PRN Deepak Lara MD        oxyCODONE (ROXICODONE) immediate release tablet 10 mg  10 mg Oral Q6H Deepak Lara MD   10 mg at 05/20/25 0902    oxyCODONE ER (oxyCONTIN) 12 hr tablet 20 mg  20 mg Oral Nightly Deepak Lara MD   20 mg at 05/19/25 2131    pantoprazole (PROTONIX) EC tablet 40 mg  40 mg Oral Daily Deepak Lara MD   40 mg at 05/20/25 0901    QUEtiapine (SEROquel) tablet 200 mg  200 mg Oral Nightly Deepak Lara MD   200 mg at 05/19/25 2131    sodium chloride 0.9 % flush 10 mL  10 mL Intravenous Q12H Deepak Lara MD   10 mL at 05/20/25 0902    sodium chloride 0.9 % flush 10 mL  10 mL Intravenous PRN Deepak Lara MD        sodium chloride 0.9 % infusion 40 mL  40 mL Intravenous PRN Deepak Lara MD        tiZANidine (ZANAFLEX) tablet 2 mg  2 mg Oral 4x Daily Deepak Lara MD   2 mg at 05/20/25 0900    tiZANidine (ZANAFLEX) tablet 4 mg  4 mg Oral Nightly Deepak Lara MD   4 mg at 05/19/25 2131    vitamin D3 capsule 5,000 Units  5,000 Units Oral Daily Deepak Lara MD   5,000 Units at 05/20/25 0900        Operative/Procedure Notes (most recent note)        Deepak Lara MD at 05/18/25 0938          DEBRIDEMENT SACRAL ULCER/WOUND  Procedure Note    Ang Jackson  5/18/2025    Pre-op Diagnosis:   Infected wound [T14.8XXA, L08.9]    Post-op Diagnosis:     Post-Op Diagnosis Codes:     * Infected wound [T14.8XXA, L08.9]    Procedure(s):  SHARP EXCISIONAL DEBRIDEMENT OF SKIN AND SUBCUTANEOUS FAT   BILATERAL GLUTEAL WOUNDS TOTAL WOUND 14 X 13 CM    Surgeon(s):  Deepak Lara MD    Anesthesia: Choice    Staff:   Circulator: Claire Stark RN  Scrub Person: Indigo Larsen CSFA  Assistant: Jose Billings,  CSFA    Findings: 10 x 8 cm left gluteal wound involving skin and subcutaneous fat with necrotic tissue    4 x 5 cm right gluteal wound involving necrotic skin            Operative Procedure: Patient was taken operating suite and placed in right lateral decubitus position after induction of MAC anesthesia.  The bilateral gluteal regions were prepped and draped in usual sterile fashion.  Timeout procedure was performed.  On the left gluteal region there was a 10 x 8 cm wound going down to the presacral fascia but not involving the fascia.  Over this 10 by centimeter area there is necrotic skin and subcutaneous fat that was sharply excised with Metzenbaum scissors to healthy bleeding tissue throughout.  On the contralateral gluteal region a 4 x 5 cm area of skin necrosis was noted which was sharply excised with curved Parry scissors.  Hemostasis was achieved with cautery and the wounds were irrigated and packed with gauze.  Patient tolerated the procedure well.    Estimated Blood Loss: 5 mL    Specimens:   None tissue for culture left gluteal wound           Drains: None     Grafts/Implants: None    Complications: None           Deepak Lara MD     Date: 2025  Time: 09:38 EDT    Electronically signed by Deepak Lara MD at 25 0940          Physician Progress Notes (most recent note)        Chantel Dominguez, APRN at 25 1002                     PROGRESS NOTE         Patient Identification:  Name:  Ang Jackson  Age:  64 y.o.  Sex:  male  :  1960  MRN:  [id number]  Visit Number:  [id number]  Primary Care Provider:  Deepak Perez         LOS: 3 days       ----------------------------------------------------------------------------------------------------------------------  Subjective       Chief Complaints:    Infected chronic ulcers      Interval History:      The patient is awake and alert, sitting up comfortably in bed.  On room air with no apparent distress.  Tmax 99.7 °F.   Denies diarrhea.  Denies any complaints or issues.  Family member at the bedside.  Lung exam is clear to auscultation bilaterally.  Abdomen soft and nontender with normoactive bowel sounds.  WBC normal at 5.82.  Intraoperative tissue/bone culture preliminarily reporting no organisms.    Review of Systems:    Constitutional: no fever, chills and night sweats.  Generalized fatigue.  Eyes: no eye drainage, itching or redness.  HEENT: no mouth sores, dysphagia or nose bleed.  Respiratory: no for shortness of breath, cough or production of sputum.  Cardiovascular: no chest pain, no palpitations, no orthopnea.  Gastrointestinal: no nausea, vomiting or diarrhea. No abdominal pain, hematemesis or rectal bleeding.  Genitourinary: no dysuria or polyuria.  Hematologic/lymphatic: no lymph node abnormalities, no easy bruising or easy bleeding.  Musculoskeletal: no muscle or joint pain.  Skin: No rash and no itching.  Chronic sacral and gluteal ulcers  Neurological: no loss of consciousness, no seizure, no headache.  History of traumatic spinal injury  Behavioral/Psych: no depression or suicidal ideation.  Endocrine: no hot flashes.  Immunologic: negative.    ----------------------------------------------------------------------------------------------------------------------      Objective       Current Hospital Meds:  Acidophilus/Pectin, 1 capsule, Oral, Daily  ascorbic acid, 1,000 mg, Oral, Daily  baclofen, 20 mg, Oral, Q8H  baclofen, 20 mg, Oral, TID With Meals  Brexpiprazole, 1 mg, Oral, Daily  busPIRone, 5 mg, Oral, Q24H  cetirizine, 10 mg, Oral, Daily  cyanocobalamin, 1,000 mcg, Subcutaneous, Q14 Days  DAPTOmycin, 6 mg/kg (Adjusted), Intravenous, Q24H  ertapenem, 1,000 mg, Intravenous, Q24H  famotidine, 20 mg, Oral, Daily  ferrous sulfate, 325 mg, Oral, Daily With Breakfast  FLUoxetine, 20 mg, Oral, Daily  gabapentin, 900 mg, Oral, TID With Meals  heparin (porcine), 5,000 Units, Subcutaneous, Q12H  lidocaine, 1  Application, Topical, BID  multivitamin with minerals, 1 tablet, Oral, Daily  naproxen, 500 mg, Oral, BID With Meals  oxyCODONE, 10 mg, Oral, Q6H  oxyCODONE ER, 20 mg, Oral, Nightly  pantoprazole, 40 mg, Oral, Daily  QUEtiapine, 200 mg, Oral, Nightly  sodium chloride, 10 mL, Intravenous, Q12H  tiZANidine, 2 mg, Oral, 4x Daily  tiZANidine, 4 mg, Oral, Nightly  vitamin D3, 5,000 Units, Oral, Daily      lactated ringers, 100 mL/hr, Last Rate: 100 mL/hr (05/19/25 0232)      ----------------------------------------------------------------------------------------------------------------------    Vital Signs:  Temp:  [97.2 °F (36.2 °C)-99.8 °F (37.7 °C)] 98.1 °F (36.7 °C)  Heart Rate:  [60-83] 80  Resp:  [13-20] 18  BP: (118-152)/(56-76) 118/65  Mean Arterial Pressure (Non-Invasive) for the past 24 hrs (Last 3 readings):   Noninvasive MAP (mmHg)   05/19/25 0247 81   05/18/25 2300 72   05/18/25 1833 90     SpO2 Percentage    05/18/25 1305 05/18/25 1405 05/18/25 1737   SpO2: 96% 96% 96%     SpO2:  [94 %-100 %] 96 %  on  Flow (L/min) (Oxygen Therapy):  [2] 2;   Device (Oxygen Therapy): room air    Body mass index is 31.98 kg/m².  Wt Readings from Last 3 Encounters:   05/16/25 95.4 kg (210 lb 5.1 oz)   05/16/25 93 kg (205 lb)   05/07/25 93 kg (205 lb)        Intake/Output Summary (Last 24 hours) at 5/19/2025 1002  Last data filed at 5/19/2025 0247  Gross per 24 hour   Intake 1200 ml   Output 1200 ml   Net 0 ml     Diet: Regular/House; Fluid Consistency: Thin (IDDSI 0)  ----------------------------------------------------------------------------------------------------------------------      Physical Exam:    Constitutional:  Well-developed and well-nourished.  On room air with no respiratory distress.  Family member at the bedside.  Denies any complaints or issues.  HENT:  Head: Normocephalic and atraumatic.  Mouth:  Moist mucous membranes.    Eyes:  Conjunctivae and EOM are normal.  No scleral icterus.  Neck:  Neck supple.   No JVD present.    Cardiovascular:  Normal rate, regular rhythm and normal heart sounds with no murmur. No edema.  Pulmonary/Chest:  No respiratory distress, no wheezes, no crackles, with normal breath sounds and good air movement.  Abdominal:  Soft.  Bowel sounds are normal.  No distension and no tenderness.   Musculoskeletal:  No edema, no tenderness, and no deformity.  No swelling or redness of joints.  Paralysis  Neurological:  Alert and oriented to person, place, and time.  No facial droop.  No slurred speech.  History of traumatic spinal injury  Skin:  Skin is warm and dry.  No rash noted.  No pallor.  Ulcer to the penile meatus secondary to indwelling Chin catheter.  Sacral ulcer.  Left and right gluteal ulcers.  Dressed, clean dry and intact.  Psychiatric:  Normal mood and affect.  Behavior is normal.        ----------------------------------------------------------------------------------------------------------------------            Results from last 7 days   Lab Units 05/19/25 0120 05/18/25 0231 05/17/25  0446 05/16/25  1544 05/15/25  1606   CRP mg/dL  --   --   --  8.23* 7.76*   LACTATE mmol/L  --   --   --  1.1  --    WBC 10*3/mm3 5.82 5.36 7.22 6.19 7.52   HEMOGLOBIN g/dL 8.5* 9.2* 9.8* 9.2* 9.6*   HEMATOCRIT % 28.3* 31.4* 32.2* 31.1* 31.7*   MCV fL 92.5 92.9 93.1 94.0 93.2   MCHC g/dL 30.0* 29.3* 30.4* 29.6* 30.3*   PLATELETS 10*3/mm3 188 177 201 189 218     Results from last 7 days   Lab Units 05/19/25  0120 05/18/25  0231 05/17/25  0446 05/16/25  1544   SODIUM mmol/L 143 145 141 142   POTASSIUM mmol/L 3.5 3.5 3.8 3.8   CHLORIDE mmol/L 108* 110* 104 105   CO2 mmol/L 27.4 27.4 28.2 30.4*   BUN mg/dL 15 12 14 20   CREATININE mg/dL 0.31* 0.29* 0.32* 0.47*   CALCIUM mg/dL 8.0* 8.4* 8.5* 8.7   GLUCOSE mg/dL 109* 96 110* 134*   ALBUMIN g/dL 2.3*  --  2.5* 2.6*   BILIRUBIN mg/dL <0.2  --  0.2 <0.2   ALK PHOS U/L 63  --  72 71   AST (SGOT) U/L 9  --  8 7   ALT (SGPT) U/L <5  --  5 <5   Estimated  "Creatinine Clearance: 273.2 mL/min (A) (by C-G formula based on SCr of 0.31 mg/dL (L)).  No results found for: \"AMMONIA\"    No results found for: \"HGBA1C\", \"POCGLU\"  Lab Results   Component Value Date    HGBA1C 6.60 (H) 01/15/2025     Lab Results   Component Value Date    TSH 1.350 01/15/2025    FREET4 0.9 11/22/2024       Blood Culture   Date Value Ref Range Status   05/16/2025 No growth at 2 days  Preliminary   05/16/2025 No growth at 2 days  Preliminary     No results found for: \"URINECX\"  Wound Culture   Date Value Ref Range Status   05/15/2025 Light growth (2+) Escherichia coli ESBL (A)  Final     Comment:       Consider infectious disease consult.  Susceptibility results may not correlate to clinical outcomes.     No results found for: \"STOOLCX\"  No results found for: \"RESPCX\"  Pain Management Panel           No data to display                  ----------------------------------------------------------------------------------------------------------------------  Imaging Results (Last 24 Hours)       ** No results found for the last 24 hours. **            ----------------------------------------------------------------------------------------------------------------------    Pertinent Infectious Disease Results                Assessment/Plan     Isolation Status:  Contact     Assessment     Infected chronic sacral ulcers        Plan      The patient is awake and alert, sitting up comfortably in bed.  On room air with no apparent distress.  Tmax 99.7 °F.  Denies diarrhea.  Denies any complaints or issues.  Family member at the bedside.  Lung exam is clear to auscultation bilaterally.  Abdomen soft and nontender with normoactive bowel sounds.  WBC normal at 5.82.  Intraoperative tissue/bone culture preliminarily reporting no organisms.    General surgery operative note reported necrotic tissue and subcutaneous fat sharply excised down to the fascia, not involving fascia.  No mention of bony involvement.  MRI " does not report any evidence of osteomyelitis.  Previous wound cultures finalized with E. coli ESBL and Enterococcus faecalis.    The patient continues ertapenem 1 g IV every 24 hours and vancomycin per pharmacy dosing based on recent wound culture sensitivities.  We will transition from vancomycin to daptomycin 6 mg/kg IV 24 hours today to continue with ertapenem course.      Plan to continue ertapenem and daptomycin courses for total of 14 days, through 2025 for infection of chronic ulcers without evidence of osteomyelitis.  CPK level ordered for today.  The patient will need weekly CPK monitoring and to avoid statins while on daptomycin course.  Plan to follow closely in the outpatient infectious disease clinic upon discharge.      RECENT ANTIMICROBIAL THERAPY    daptomycin  ertapenem (INVanz) 1000 mg in 100 mL NS (VTB)     Code Status:   Code Status and Medical Interventions: No CPR (Do Not Attempt to Resuscitate); Limited Support; No intubation (DNI)   Ordered at: 25 2148     Code Status (Patient has no pulse and is not breathing):    No CPR (Do Not Attempt to Resuscitate)     Medical Interventions (Patient has pulse or is breathing):    Limited Support     Medical Intervention Limits:    No intubation (DNI)       TANJA Monroe  25  10:02 EDT      Electronically signed by Chantel Dominguez APRN at 25 1009          Consult Notes (most recent note)        Chantel Dominguez APRN at 25 1131        Consult Orders    1. Inpatient Infectious Diseases Consult [[id number]] ordered by Deepak Lara MD at 25 1344                         INFECTIOUS DISEASE CONSULTATION REPORT        Patient Identification:  Name:  Ang Jackson  Age:  64 y.o.  Sex:  male  :  1960  MRN:  [id number]   Visit Number:  [id number]  Primary Care Physician:  Deepak Perez        Referring Provider: Dr. Jesus    Reason for consult: Infected sacral ulcers       LOS: 2 days         Subjective       Subjective     History of present illness:      Thank you Dr. Jesus for allowing us to participate in the care of your patient.  As you well know, Mr. Ang Jackson is a 64 y.o. male with past medical history significant for cervical degenerative disc disease status post C6-7 anterior cervical discectomy and fusion in 2023 and nontraumatic cervical spinal cord injury secondary with cervical epidural abscess with cord compression from C3-C5 in October 2020 for status post incision and drainage with C4 corpectomy, C3-6 laminectomy and emergent hematoma evacuation.  Neurogenic bladder with chronic Chin catheter due to significant spinal trauma.  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  Underwent rehab at [facility] from November through December 2024.  Recently underwent Botox injections to the upper extremities for spastic tetraplegia on 5/14/2025, who presented to [facility] Emergency Department on 5/16/2025 for sacral wound infections.    On or about 4/9/2025, the patient was noted to have wounds on his buttocks and coccyx and has followed with outpatient wound care clinic since that time.  With bilateral gluteal and sacral wounds.  It appears the patient received Dalvance in the infusion clinic per wound care on 5/7/2025.  On wound care follow-up 5/15/2025, APRN reported worsening of left gluteal wound with increasing tunneling area.  Patient was encouraged to seek ED evaluation but wanted to wait for repeat labs and wound culture from an in office debridement.  Those cultures have revealed ESBL E. coli and Enterococcus faecalis.  Sed rate increased from 48-57.  The patient denied any fever but did report sensitivities and change in temperature since surgery.  Denied any nausea or vomiting.  No abdominal pain or diarrhea.    On arrival to ED, CRP elevated 8.23.  Lactate normal at 1.1.  WBC  normal at 6.19.  Sed rate 57.  Urinalysis with positive nitrites, 3+ leukocytes but 6-10 WBCs and trace bacteria.  No urine culture.  Blood cultures x 2 preliminarily reporting no growth at 24 hours.  Left buttock wound cultures from 5/15 and at 5 5/1/2025 reporting E. coli ESBL and Enterococcus faecalis.  MRI cervical spine with and without contrast reported surgical fusion with surgical hardware artifact limits evaluation.  Myelomalacia cervical cord.  MRI thoracic spine with and without contrast reported no acute process.  MRI pelvis without contrast reported limited exam.  Postsurgical changes with subcutaneous edema and fluid superficial to the hips, further evaluation if clinically warranted.  CT pelvis with and without contrast reported no soft tissue loculated fluid collections identified.  Diffuse soft tissue edema, nonspecific.  Left deep gluteal ulceration noted which essentially extends to the level of the left ischial tuberosity.  No sclerotic or destructive bony features identified.  Deep and wide base right sacral gluteal ulceration noted which extends to the level of the right ischial tuberosity and approximates the gluteal crease.  No associated sclerosis or destructive bone changes identified in the region of ulceration.  Anasarca.    The patient was taken to the OR for incision and debridement of the left and right gluteal wounds involving necrotic skin.  The patient is awake and alert, resting comfortably in bed.  On 2 L nasal cannula with no apparent distress.  Overnight Tmax 100.1 °F.  Denies diarrhea.  Denies any issues with chronic Chin catheter.  Generalized edema is noted.  Lung exam clear to auscultation bilaterally.  Abdomen soft and nontender with normoactive bowel sounds.  WBC normal at 5.36.  Intraoperative tissue/bone culture preliminarily reports rare WBCs and no organisms on Gram stain.      Infectious Disease consultation was requested for antimicrobial  management.      ---------------------------------------------------------------------------------------------------------------------     Review Of Systems:    Constitutional: no fever, chills and night sweats. No appetite change or unexpected weight change. No fatigue.  Eyes: no eye drainage, itching or redness.  HEENT: no mouth sores, dysphagia or nose bleed.  Respiratory: no for shortness of breath, cough or production of sputum.  Cardiovascular: no chest pain, no palpitations, no orthopnea.  Gastrointestinal: no nausea, vomiting or diarrhea. No abdominal pain, hematemesis or rectal bleeding.  Genitourinary: no dysuria or polyuria.  Chronic indwelling Chin catheter  Hematologic/lymphatic: no lymph node abnormalities, no easy bruising or easy bleeding.  Musculoskeletal: no muscle or joint pain.  Skin: No rash and no itching.  Sacral/gluteal ulcers, penile and scrotal ulcers  Neurological: no loss of consciousness, no seizure, no headache.  History traumatic spinal injury  Behavioral/Psych: no depression or suicidal ideation.  Endocrine: no hot flashes.  Immunologic: negative.    ---------------------------------------------------------------------------------------------------------------------     Past Medical History    Past Medical History:   Diagnosis Date    Anesthesia     diffculty adminster spinal block, patient stated with last hip surgery 7-2020 several attempts per anthyarelis and no luck, resulted in general anesthesia     Anxiety     Arthritis     Rheumatoid    COVID     2020    GERD (gastroesophageal reflux disease)     Hypertension     Lactose intolerance     Low back pain     Sleep apnea     does not wear machine, MILD, DOES NOT HAVE AFTER WGT LOSS SURGERY    Wears reading eyeglasses        Past Surgical History    Past Surgical History:   Procedure Laterality Date    ANTERIOR CERVICAL DISCECTOMY W/ FUSION Right 02/19/2023    Procedure: CERVICAL DISCECTOMY ANTERIOR WITH FUSION C6-7;  Surgeon: Luis Carlos  Jaiden VEGA MD;  Location:  [facility];  Service: Neurosurgery;  Laterality: Right;    BARIATRIC SURGERY  4 year ago    CARPAL TUNNEL RELEASE Bilateral     CARPAL TUNNEL RELEASE Right 06/29/2023    Procedure: CARPAL TUNNEL RELEASE RIGHT;  Surgeon: Jaiden Aldridge MD;  Location:  [facility];  Service: Neurosurgery;  Laterality: Right;    COLONOSCOPY      5 years ago    COLONOSCOPY N/A 04/05/2022    Procedure: COLONOSCOPY FOR SCREENING;  Surgeon: Luz Galvez MD;  Location:  [facility];  Service: Gastroenterology;  Laterality: N/A;    ENDOSCOPY N/A 04/05/2022    Procedure: ESOPHAGOGASTRODUODENOSCOPY WITH BIOPSY;  Surgeon: Luz Galvez MD;  Location:  [facility];  Service: Gastroenterology;  Laterality: N/A;    GASTRIC SLEEVE LAPAROSCOPIC      2017    HIP SURGERY Right times 2    KNEE ARTHROSCOPY Left     TOTAL HIP ARTHROPLASTY Left 01/25/2021    Procedure: TOTAL HIP ARTHROPLASTY LEFT;  Surgeon: Jb Patel MD;  Location:  [facility];  Service: Orthopedics;  Laterality: Left;    TOTAL HIP ARTHROPLASTY REVISION Right 07/20/2020    Procedure: TOTAL HIP ARTHROPLASTY REVISION RIGHT;  Surgeon: Jb Patel MD;  Location:  [facility];  Service: Orthopedics;  Laterality: Right;       Family History    Family History   Problem Relation Age of Onset    Heart disease Father     Hypertension Father     Osteoarthritis Father     Cancer Mother     Diabetes Brother     Hypertension Brother     Gout Paternal Grandmother     Diabetes Paternal Grandmother     Colon cancer Maternal Uncle     Colon polyps Maternal Uncle        Social History    Social History     Tobacco Use    Smoking status: Never    Smokeless tobacco: Never   Vaping Use    Vaping status: Never Used   Substance Use Topics    Alcohol use: No    Drug use: Never       Allergies    Sulfa antibiotics, Codeine, Ketorolac, and Morphine and  codeine  ---------------------------------------------------------------------------------------------------------------------     Home Medications:    Prior to Admission Medications       Prescriptions Last Dose Informant Patient Reported? Taking?    ascorbic acid (VITAMIN C) 500 MG tablet 5/16/2025 Child Yes Yes    Take 2 tablets by mouth Daily.    baclofen (LIORESAL) 10 MG tablet 5/16/2025 Child Yes Yes    Take 2 tablets by mouth 3 (Three) Times a Day.    baclofen (LIORESAL) 10 MG tablet Past Week Child Yes Yes    Take 5 tablets by mouth every night at bedtime.    Brexpiprazole (Rexulti) 1 MG tablet 5/16/2025 Child Yes Yes    Take 1 tablet by mouth Daily.    busPIRone (BUSPAR) 5 MG tablet Past Week Child Yes Yes    Take 1 tablet by mouth Every Night.    Cyanocobalamin (B-12 Compliance Injection) 1000 MCG/ML kit Past Month Child Yes Yes    Inject 1 mL as directed Every 14 (Fourteen) Days.    diclofenac (VOLTAREN) 75 MG EC tablet 5/16/2025 Child Yes Yes    Take 1 tablet by mouth 2 (Two) Times a Day.    famotidine (PEPCID) 20 MG tablet 5/16/2025 Child Yes Yes    Take 1 tablet by mouth Daily.    ferrous sulfate 325 (65 FE) MG tablet 5/16/2025 Child Yes Yes    Take 1 tablet by mouth Daily With Breakfast.    FLUoxetine (PROzac) 20 MG capsule 5/16/2025 Child Yes Yes    Take 1 capsule by mouth Daily.    gabapentin (NEURONTIN) 300 MG capsule 5/16/2025 Child Yes Yes    Take 3 capsules by mouth 3 (Three) Times a Day.    Lactobacillus Acid-Pectin (ACIDOPHILUS PLUS PECTIN PO) 5/16/2025 Child Yes Yes    Take 1 capsule by mouth Daily.    loratadine (CLARITIN) 10 MG tablet 5/16/2025 Child Yes Yes    Take 1 tablet by mouth Daily.    Multiple Vitamins-Minerals (MENS MULTIPLUS PO) 5/16/2025 Child Yes Yes    Take 1 tablet by mouth Daily.    omeprazole (priLOSEC) 20 MG capsule 5/16/2025 Child Yes Yes    Take 1 capsule by mouth 2 (Two) Times a Day.    oxyCODONE (ROXICODONE) 10 MG tablet 5/16/2025 Child Yes Yes    Take 1 tablet by  mouth 4 (Four) Times a Day.    oxyCODONE ER (oxyCONTIN) 20 MG 12 hr tablet Past Week Child Yes Yes    Take 1 tablet by mouth Every Night.    pantoprazole (PROTONIX) 40 MG EC tablet 5/16/2025 Child Yes Yes    Take 1 tablet by mouth Daily.    QUEtiapine (SEROquel) 100 MG tablet Past Week Child Yes Yes    Take 1 tablet by mouth Take As Directed.    tiZANidine (ZANAFLEX) 2 MG tablet 5/16/2025 Child Yes Yes    Take 1 tablet by mouth 4 (Four) Times a Day.    tiZANidine (ZANAFLEX) 2 MG tablet Past Week Child Yes Yes    Take 2 tablets by mouth Every Night.    vitamin D3 125 MCG (5000 UT) capsule capsule 5/16/2025 Child Yes Yes    Take 1 capsule by mouth Daily.          ---------------------------------------------------------------------------------------------------------------------    Objective       Objective     Hospital Scheduled Meds:  Acidophilus/Pectin, 1 capsule, Oral, Daily  ascorbic acid, 1,000 mg, Oral, Daily  baclofen, 20 mg, Oral, Q8H  baclofen, 20 mg, Oral, TID With Meals  Brexpiprazole, 1 mg, Oral, Daily  busPIRone, 5 mg, Oral, Q24H  cetirizine, 10 mg, Oral, Daily  cyanocobalamin, 1,000 mcg, Subcutaneous, Q14 Days  ertapenem, 1,000 mg, Intravenous, Q24H  famotidine, 20 mg, Oral, Daily  ferrous sulfate, 325 mg, Oral, Daily With Breakfast  FLUoxetine, 20 mg, Oral, Daily  gabapentin, 900 mg, Oral, TID With Meals  heparin (porcine), 5,000 Units, Subcutaneous, Q12H  lidocaine, 1 Application, Topical, BID  multivitamin with minerals, 1 tablet, Oral, Daily  naproxen, 500 mg, Oral, BID With Meals  oxyCODONE, 10 mg, Oral, Q6H  oxyCODONE ER, 20 mg, Oral, Nightly  pantoprazole, 40 mg, Oral, Daily  QUEtiapine, 200 mg, Oral, Nightly  sodium chloride, 10 mL, Intravenous, Q12H  tiZANidine, 2 mg, Oral, 4x Daily  tiZANidine, 4 mg, Oral, Nightly  vitamin D3, 5,000 Units, Oral, Daily      lactated ringers, 100 mL/hr, Last Rate: 100 mL/hr (05/17/25 8412)  Pharmacy to dose vancomycin,        ---------------------------------------------------------------------------------------------------------------------   Vital Signs:  Temp:  [97 °F (36.1 °C)-100.1 °F (37.8 °C)] 97.2 °F (36.2 °C)  Heart Rate:  [55-75] 64  Resp:  [7-20] 13  BP: (112-152)/(62-86) 142/75  Mean Arterial Pressure (Non-Invasive) for the past 24 hrs (Last 3 readings):   Noninvasive MAP (mmHg)   05/18/25 1005 104   05/18/25 1000 102   05/18/25 0955 104     SpO2 Percentage    05/18/25 1000 05/18/25 1005 05/18/25 1010   SpO2: 100% 100% 100%     SpO2:  [90 %-100 %] 100 %  on  Flow (L/min) (Oxygen Therapy):  [2] 2;   Device (Oxygen Therapy): nasal cannula    Body mass index is 31.98 kg/m².  Wt Readings from Last 3 Encounters:   05/16/25 95.4 kg (210 lb 5.1 oz)   05/16/25 93 kg (205 lb)   05/07/25 93 kg (205 lb)     ---------------------------------------------------------------------------------------------------------------------     Physical Exam:    Constitutional:  Well-developed and well-nourished.  Resting comfortably in bed, on 2 L nasal cannula with no respiratory distress.  The patient has just returned to his hospital room postoperatively.  Family members at the bedside.  Denies any complaints or issues this morning.     HENT:  Head: Normocephalic and atraumatic.  Mouth:  Moist mucous membranes.    Eyes:  Conjunctivae and EOM are normal.  No scleral icterus.  Neck:  Neck supple.  No JVD present.    Cardiovascular:  Normal rate, regular rhythm and normal heart sounds with no murmur. No edema.  Pulmonary/Chest:  No respiratory distress, no wheezes, no crackles, with normal breath sounds and good air movement.  Abdominal:  Soft.  Bowel sounds are normal.  No distension and no tenderness.   Musculoskeletal:  No edema, no tenderness, and no deformity.  No swelling or redness of joints.  Neurological:  Alert and oriented to person, place, and time.  No facial droop.  No slurred speech.   Skin:  Skin is warm and dry.  No rash noted.   "No pallor.  Pressure injuries noted to the left scrotum and penile meatus.  Sacral and ischial ulcers are dressed postoperatively, clean dry and intact.  Wound images from admission reviewed.  Psychiatric:  Normal mood and affect.  Behavior is normal.    ---------------------------------------------------------------------------------------------------------------------              Results from last 7 days   Lab Units 05/18/25  0231 05/17/25  0446 05/16/25  1544 05/15/25  1606   CRP mg/dL  --   --  8.23* 7.76*   LACTATE mmol/L  --   --  1.1  --    WBC 10*3/mm3 5.36 7.22 6.19 7.52   HEMOGLOBIN g/dL 9.2* 9.8* 9.2* 9.6*   HEMATOCRIT % 31.4* 32.2* 31.1* 31.7*   MCV fL 92.9 93.1 94.0 93.2   MCHC g/dL 29.3* 30.4* 29.6* 30.3*   PLATELETS 10*3/mm3 177 201 189 218     Results from last 7 days   Lab Units 05/18/25  0231 05/17/25  0446 05/16/25  1544 05/15/25  1606   SODIUM mmol/L 145 141 142 142   POTASSIUM mmol/L 3.5 3.8 3.8 3.8   CHLORIDE mmol/L 110* 104 105 103   CO2 mmol/L 27.4 28.2 30.4* 30.5*   BUN mg/dL 12 14 20 20   CREATININE mg/dL 0.29* 0.32* 0.47* 0.49*   CALCIUM mg/dL 8.4* 8.5* 8.7 8.7   GLUCOSE mg/dL 96 110* 134* 166*   ALBUMIN g/dL  --  2.5* 2.6* 2.8*   BILIRUBIN mg/dL  --  0.2 <0.2 <0.2   ALK PHOS U/L  --  72 71 77   AST (SGOT) U/L  --  8 7 7   ALT (SGPT) U/L  --  5 <5 <5   Estimated Creatinine Clearance: 292.1 mL/min (A) (by C-G formula based on SCr of 0.29 mg/dL (L)).  No results found for: \"AMMONIA\"    No results found for: \"HGBA1C\", \"POCGLU\"  Lab Results   Component Value Date    HGBA1C 6.60 (H) 01/15/2025     Lab Results   Component Value Date    TSH 1.350 01/15/2025    FREET4 0.9 11/22/2024       Blood Culture   Date Value Ref Range Status   05/16/2025 No growth at 24 hours  Preliminary   05/16/2025 No growth at 24 hours  Preliminary     No results found for: \"URINECX\"  Wound Culture   Date Value Ref Range Status   05/15/2025 Light growth (2+) Escherichia coli ESBL (A)  Final     Comment:       Consider " "infectious disease consult.  Susceptibility results may not correlate to clinical outcomes.     No results found for: \"STOOLCX\"  No results found for: \"RESPCX\"  Pain Management Panel           No data to display                ---------------------------------------------------------------------------------------------------------------------  Imaging Results (Last 7 Days)       Procedure Component Value Units Date/Time    CT Pelvis With & Without Contrast [[id number]] Collected: 05/18/25 0820     Updated: 05/18/25 0826    Narrative:      EXAM:    CT Pelvis Without and With Intravenous Contrast     EXAM DATE:    5/17/2025 3:42 PM     CLINICAL HISTORY:    gluteal and scaral wounds; evaluate for osteomyelitis; T14.8XXA-Other  injury of unspecified body region, initial encounter; L08.9-Local  infection of the skin and subcutaneous tissue, unspecified;  T14.8XXA-Other injury of unspecified body region, initial encounter;  L08.9-Local infection of the skin and subcutaneous tissue, unspecified     TECHNIQUE:    Axial computed tomography images of the pelvis without and with  intravenous contrast.  Sagittal and coronal reformatted images were  created and reviewed.  This CT exam was performed using one or more of  the following dose reduction techniques:  automated exposure control,  adjustment of the mA and/or kV according to patient size, and/or use of  iterative reconstruction technique.     COMPARISON:    MRI 5/16/2025, CT 3/26/2025     FINDINGS:    Bowel:  Pelvic portions of GI tract are within normal limits.  No  obstruction.  No mucosal thickening.    Intraperitoneal space:  See below.      Bladder:  Chin catheter decompresses urinary bladder.  No stones.    Reproductive:  Mild prostate enlargement.    Bones/joints:  Distal portions of lumbosacral fusion hardware are  noted with lateral SI joint fusion also present.  Bilateral hip  arthroplasty.  Left deep gluteal ulceration is noted which essentially  extends to " the level of the left ischial tuberosity. No sclerotic or  destructive bony features identified.  Deep and wide-base right  sacral-gluteal ulceration is noted which extends to the level of the  right ischial tuberosity and approximates the gluteal crease. No  associated sclerosis or destructive bone changes identified in the  region of ulceration.  No acute fracture.  No dislocation.    Soft tissues:  Diffuse soft tissue edema, nonspecific.  Anasarca.  No  soft tissue loculated fluid collections are identified.       Impression:      1.  No soft tissue loculated fluid collections are identified.  2.  Diffuse soft tissue edema, nonspecific.  3.  Left deep gluteal ulceration is noted which essentially extends to  the level of the left ischial tuberosity. No sclerotic or destructive  bony features identified.  4.  Deep and wide-base right sacral-gluteal ulceration is noted which  extends to the level of the right ischial tuberosity and approximates  the gluteal crease. No associated sclerosis or destructive bone changes  identified in the region of ulceration.  5.  Anasarca.     This report was finalized on 5/18/2025 8:24 AM by Dr. Jason Kruse MD.       MRI Pelvis Without Contrast [[id number]] Collected: 05/16/25 1933     Updated: 05/16/25 1959    Narrative:      Comparison: None     Patient motion artifact limits evaluation. Fatty atrophy is noted.  Bilateral hip prostheses limits evaluation. Postsurgical changes hips  bilaterally with soft tissue edema. No free fluid seen within the  pelvis.      Subcutaneous edema of the hips bilaterally with fluid along the  subcutaneous tissues superficial to the hips which is partially imaged.  If concern for abscess or acute etiology recommend dedicated imaging of  the hips with enhancement.       Impression:      Impression:  1. Limited exam.  2. Postsurgical changes with subcutaneous edema and fluid superficial to  the hips, further evaluation if clinically warranted.      This report was finalized on 5/16/2025 7:33 PM by Angel Monet DO.       MRI Cervical Spine With & Without Contrast [[id number]] Collected: 05/16/25 1933     Updated: 05/16/25 1958    Narrative:      Comparison: None     Surgical hardware artifact limits evaluation.      Surgical fusion C3-T1. Craniocervical junction appears intact.  Myelomalacia cervical cord C5-C6 level. No suspicious bone marrow edema  is seen. No pathologic enhancement is seen. Foramina are not well  evaluated due to metallic artifact, suspect multilevel foraminal  stenosis. No significant spinal canal stenosis.        Impression:      Impression:  1. Surgical fusion with surgical hardware artifact limits evaluation.  2. Myelomalacia cervical cord.     This report was finalized on 5/16/2025 7:33 PM by Angel Monet DO.       MRI Thoracic Spine With & Without Contrast [[id number]] Collected: 05/16/25 1933     Updated: 05/16/25 1958    Narrative:      Comparison: None     Surgical fusion lower thoracic spine which limits evaluation. Facet  arthropathy causing moderate to severe foraminal stenosis left T10-T11.  No acute fracture seen. Thoracic cord is grossly unremarkable. No  suspicious bone marrow edema is seen. No pathologic enhancement is  identified.       Impression:      Impression:  1. No acute process.     This report was finalized on 5/16/2025 7:33 PM by Angel Monet DO.               ---------------------------------------------------------------------------------------------------------------------      Pertinent Infectious Disease Results          Assessment & Plan      Isolation Status:  Contact     Assessment      Infected chronic sacral ulcers        Plan      The patient was taken to the OR for incision and debridement of the left and right gluteal wounds involving necrotic skin.  The patient is awake and alert, resting comfortably in bed.  On 2 L nasal cannula with no apparent distress.  Overnight Tmax 100.1 °F.  Denies  diarrhea.  Denies any issues with chronic Chin catheter.  Generalized edema is noted.  Lung exam clear to auscultation bilaterally.  Abdomen soft and nontender with normoactive bowel sounds.  WBC normal at 5.36.  Intraoperative tissue/bone culture preliminarily reports rare WBCs and no organisms on Gram stain.    General surgery operative note reviewed, reported necrotic tissue and subcutaneous fat was sharply excised down to the fascia, not involving fascia.  No mention of bony involvement.  MRI does not report any evidence of osteomyelitis.    The patient was initiated on ertapenem 1 g IV 24 hours and vancomycin per pharmacy dosing based on wound culture sensitivity report.  We are agreeable with continuing ertapenem and vancomycin courses for the treatment of infected sacral ulcers, for 10 to 14 days pending clinical progress.  Continue to monitor closely and plan to adjust antibiotic regimen as clinically appropriate.        RECENT ANTIMICROBIAL THERAPY    ertapenem (INVanz) 1000 mg in 100 mL NS (VTB)  Pharmacy to dose vancomycin       Again, thank you Dr. Jesus for allowing us to participate in the care of your patient and please feel free to call for any questions you may have.        Code Status:     Code Status and Medical Interventions: No CPR (Do Not Attempt to Resuscitate); Limited Support; No intubation (DNI)   Ordered at: 05/16/25 7378     Code Status (Patient has no pulse and is not breathing):    No CPR (Do Not Attempt to Resuscitate)     Medical Interventions (Patient has pulse or is breathing):    Limited Support     Medical Intervention Limits:    No intubation (DNI)         TANJA Monroe  05/18/25  11:31 EDT      Electronically signed by Chantel Dominguez APRN at 05/18/25 1144         ADL Documentation (most recent)      Flowsheet Row Most Recent Value   Transferring 4 - completely dependent   Toileting 4 - completely dependent   Bathing 4 - completely dependent   Dressing 4 - completely  dependent   Eating 4 - completely dependent   Communication 0 - understands/communicates without difficulty   Swallowing 0 - swallows foods/liquids without difficulty   Equipment Currently Used at Home lift device, wheelchair, motorized, hospital bed, oxygen             Discharge Summary        Pablo Marquez MD at 25 0902              [facility] DISCHARGE SUMMARY    Patient Identification:  Name:  Ang Jackson  Age:  64 y.o.  Sex:  male  :  1960  MRN:  [id number]  Visit Number:  [id number]    Date of Admission: 2025  Date of Discharge:  2025     PCP: Deepak Perez    DISCHARGE DIAGNOSIS  Deep unstageable ulcer w/ necrosis and suspected infection of left sacral/buttock area.  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 male presented to [facility] complaining of wound.  Please see the admitting history and physical for further details.      #Deep unstageable ulcer w/ necrosis and suspected infection of left sacral/buttock area.  #Two right gluteal and sacral decuitus ulcers   Patient presented for infected wound.  Surgery consulted and took to Or on  for debridment.  5/1 Wound grew ESBL E. Coli and E. Faecalis on 5/1 and received Dalvance at that time. Now 5/15 wound culture Growing E. Coli still. CT pelvis with contrast did not show fluid collection or bone involvement. General Surgery followed for wound debridement, no other procedures needed.  Infectious Disease consulted and followed as well and are recommending Daptomycin and Invanz for 14 total days.  Patient is to receive midline today prior to discharge, Nurse is aware.  He will receive additional 11 days of home antibiotics with Daptomycin and Invanz. His Home Health orders have been resumed.  He will need weekly CK on Daptomycin. He will need midline removed when antibiotics are completed.  He will follow up PCP, General Surgery, Infectious Disease and in the wound clinic in about a week.  Family at bedside and were amenable to this plan.  Patient denies any fevers or chills, denies any new complaints today.  Patient remains hemodynamically stable, labs stable a well.     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  Wt Readings from Last 3 Encounters:   05/16/25 95.4 kg (210 lb 5.1 oz)   05/16/25 93 kg (205 lb)   05/07/25 93 kg (205 lb)     PHYSICAL EXAM:  Constitutional:  Well-developed and well-nourished.  No acute distress.       HENT:  Head:  Normocephalic and atraumatic.  Mouth:  Moist mucous membranes.    Eyes:  Conjunctivae and EOM are normal. No scleral icterus.    Neck:  Neck supple.  No JVD present.    Cardiovascular:  Normal rate, regular rhythm and normal heart sounds with no murmur.  Pulmonary/Chest:  No respiratory distress, no wheezes, on room air   Abdominal:  Soft. No distension and no tenderness.   Musculoskeletal:  No tenderness, No red or swollen joints anywhere.    Neurological:  Alert and oriented to person, place, and time.  No cranial nerve deficit.    Skin:  Skin is warm and dry. No rash noted. No pallor.   Peripheral vascular:  No clubbing, no cyanosis, no edema.  Psychiatric: Appropriate mood and affect  Edited by: Pablo Marquez MD at 5/20/2025 0902    DISCHARGE DISPOSITION   Stable    DISCHARGE MEDICATIONS:     Discharge Medications        New Medications        Instructions Start Date   daptomycin solution IVPB  Commonly known as: CUBICIN   500 mg, Intravenous, Every 24 Hours      ertapenem 1,000 mg in sodium chloride 0.9 % 100 mL IVPB   1,000 mg, Intravenous, Every 24 Hours   Start Date: May 21, 2025            Continue These Medications        Instructions Start Date   ACIDOPHILUS PLUS PECTIN PO   1 capsule, Daily      ascorbic acid 500 MG tablet  Commonly known as: VITAMIN C   1,000 mg, Daily      B-12 Compliance Injection 1000 MCG/ML kit  Generic drug: Cyanocobalamin   1,000 mcg, Every 14 Days      baclofen 10 MG tablet  Commonly known as: LIORESAL   20 mg, 3 Times Daily      baclofen 10 MG tablet  Commonly known as: LIORESAL   50 mg, Every Night at Bedtime      busPIRone 5 MG tablet  Commonly known as: BUSPAR   5 mg, Nightly      diclofenac 75 MG EC tablet  Commonly known as: VOLTAREN   75 mg, 2 Times Daily      famotidine 20 MG tablet  Commonly known as: PEPCID   20 mg, Daily      ferrous sulfate 325 (65 FE) MG tablet   325 mg, Daily With Breakfast      FLUoxetine 20 MG capsule  Commonly known as: PROzac    20 mg, Daily      gabapentin 300 MG capsule  Commonly known as: NEURONTIN   900 mg, 3 Times Daily      loratadine 10 MG tablet  Commonly known as: CLARITIN   10 mg, Daily      multivitamin with minerals tablet tablet   1 tablet, Daily      oxyCODONE 10 MG tablet  Commonly known as: ROXICODONE   10 mg, 4 Times Daily      oxyCODONE ER 20 MG 12 hr tablet  Commonly known as: oxyCONTIN   20 mg, Nightly      pantoprazole 40 MG EC tablet  Commonly known as: PROTONIX   40 mg, Daily      QUEtiapine 100 MG tablet  Commonly known as: SEROquel   100 mg, Take As Directed      Rexulti 1 MG tablet  Generic drug: Brexpiprazole   1 mg, Daily      tiZANidine 2 MG tablet  Commonly known as: ZANAFLEX   2 mg, 4 Times Daily      tiZANidine 2 MG tablet  Commonly known as: ZANAFLEX   4 mg, Nightly      vitamin D3 125 MCG (5000 UT) capsule capsule   5,000 Units, Daily             Stop These Medications      omeprazole 20 MG capsule  Commonly known as: priLOSEC            Diet Instructions    Regular diet           Activity Instructions    Resume preadmission activity level         Additional Instructions for the Follow-ups that You Need to Schedule       Ambulatory Referral to Home Health   As directed      Needs Midline removed following completion of antibiotics.    Order Comments: Needs Midline removed following completion of antibiotics.    Face to Face Visit Date: 5/20/2025   Follow-up provider for Plan of Care?: I treated the patient in an acute care facility and will not continue treatment after discharge.   Follow-up provider: SAM BROWN [[id number]]   Reason/Clinical Findings: osteo, chronic bedbound   Describe mobility limitations that make leaving home difficult: osteo, chronic bedbound   Nursing/Therapeutic Services Requested: Skilled Nursing   Skilled nursing orders: Infusion therapy   Frequency: 1 Week 1        Ambulatory Referral to Wound Clinic   As directed      Discharge Follow-up with PCP   As directed       Currently  Documented PCP:    Deepak Perez    PCP Phone Number:    [phone]     Follow Up Details: 1 week post hospital discharge        Discharge Follow-up with Specified Provider: general surgery 1 week   As directed      To: general surgery 1 week               Follow-up Information       Saskia Cui PA-C .    Specialties: Infectious Diseases, Physician Assistant  Contact information:  [address]  [address]  [place] [id number] [phone]               Deepak Perez .    Specialty: Physician Assistant  Why: 1 week post hospital discharge  Contact information:  [address] [place] [address]  [address]  [facility] [address] [phone]               [facility] .    Specialty: Wound Care  Contact information:  [address] [facility] [address]  [phone]                          TEST  RESULTS PENDING AT DISCHARGE  Pending Results       None            CODE STATUS  Code Status and Medical Interventions: No CPR (Do Not Attempt to Resuscitate); Limited Support; No intubation (DNI)   Ordered at: 05/16/25 3120     Code Status (Patient has no pulse and is not breathing):    No CPR (Do Not Attempt to Resuscitate)     Medical Interventions (Patient has pulse or is breathing):    Limited Support     Medical Intervention Limits:    No intubation (DNI)     Pablo Marquez MD  [facility] Hospitalist  05/20/25  09:02 EDT    Please note that this discharge summary required more than 30 minutes to complete.        Electronically signed by Pablo Marquez MD at 05/20/25 0903       Discharge Order (From admission, onward)       Start     Ordered    05/20/25 0829  Discharge patient  Once        Expected Discharge Date: 05/20/25   Expected Discharge Time: Morning   Discharge Disposition: Home or Self Care   Physician of Record for Attribution - Please select from Treatment Team: KYLAH CH [[id number]]   Review needed by CMO to determine Physician of Record: No      Question Answer  Comment   Physician of Record for Attribution - Please select from Treatment Team KYLAH CH    Review needed by CMO to determine Physician of Record No        05/20/25 0875

## 2025-05-20 NOTE — DISCHARGE PLACEMENT REQUEST
"Emily Jackson (64 y.o. Male)       Date of Birth   1960    Social Security Number       Address   [address] [place], [place] [address]    Home Phone   [phone]    MRN   [id number]       Latter day   Jainism    Marital Status                               Admission Date   5/16/2025    Admission Type   Emergency    Admitting Provider   Natacha Huerta DO    Attending Provider   Pablo Marquez MD    Department, Room/Bed   [address], [facility]/       Discharge Date       Discharge Disposition   Home or Self Care    Discharge Destination                                 Attending Provider: Pablo Marquez MD    Allergies: Sulfa Antibiotics, Codeine, Ketorolac, Morphine And Codeine    Isolation: Contact   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), 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)   Code Status: No CPR    Ht: 172.7 cm (68\")   Wt: 95.4 kg (210 lb 5.1 oz)    Admission Cmt: None   Principal Problem: Infected wound [T14.8XXA,L08.9]                   Active Insurance as of 5/16/2025       Primary Coverage       Payor Plan Insurance Group Employer/Plan Group    MEDICARE MEDICARE A & B        Payor Plan Address Payor Plan Phone Number Payor Plan Fax Number Effective Dates    [address] [phone]  10/1/2011 - None Entered    [facility] [id number]         Subscriber Name Subscriber Birth Date Member ID       EMILY JACKSON 1960 [id number]               Secondary Coverage       Payor Plan Insurance Group Employer/Plan Group    [place] WE[place]ARE [place]        Payor Plan Address Payor Plan Phone Number Payor Plan Fax Number Effective Dates    [address] [address] [phone]  8/29/2016 - None Entered    [facility] [id number]         Subscriber Name Subscriber Birth Date Member ID       EMILY JACKSON 1960 [id number]                     Emergency Contacts        (Rel.) Home Phone Work Phone Mobile Phone    ROSS DYSON (Daughter) [phone] -- --    ManuelMyla (Spouse) " [phone] -- [phone]    CESAR JACKSON (Daughter) -- -- [phone]          [address] [address]  Phone:  [phone]  Fax:  [phone] Date: May 20, 2025      Ambulatory Referral to Home Health     Patient:  Ang Jackson MRN:  [id number]   [address]   [facility] [address] :  1960  SSN:    Phone: [phone] Sex:  M      INSURANCE PAYOR PLAN GROUP # SUBSCRIBER ID   Primary:  Secondary:    [place]ICARE [place] [id number]  [id number]      [id number]  [id number]      Referring Provider Information:  LANEY PATIÑO Phone: [phone] Fax: [phone]       Referral Information:   # Visits:  999 Referral Type: Home Health [42]   Urgency:  Routine Referral Reason: Specialty Services Required   Start Date: May 20, 2025 End Date:  To be determined by Insurer   Diagnosis: Pressure injury of left buttock, unstageable (L89.320)      Refer to Dept:   Refer to Provider:   Refer to Provider Phone:   Refer to Facility:    Needs Midline removed following completion of antibiotics.  Face to Face Visit Date: 2025  Follow-up provider for Plan of Care? I treated the patient in an acute care facility and will not continue treatment after discharge.  Follow-up provider: SAM RBOWN [[id number]]  Reason/Clinical Findings: osteo, chronic bedbound  Describe mobility limitations that make leaving home difficult: osteo, chronic bedbound  Nursing/Therapeutic Services Requested: Skilled Nursing  Skilled nursing orders: Infusion therapy  Frequency: 1 Week 1     This document serves as a request of services and does not constitute Insurance authorization or approval of services.  To determine eligibility, please contact the members Insurance carrier to verify and review coverage.     If you have medical questions regarding this request for services. Please contact [address] at [phone] during normal business hours.         Authorizing Provider:Pablo Marquez MD  Authorizing Provider's NPI: [id number]  Order Entered By: Pablo Marquez MD 5/20/2025  8:32 AM     Electronically signed by: Pablo Marquez MD 5/20/2025  8:32 AM

## 2025-05-21 LAB
BACTERIA SPEC AEROBE CULT: NORMAL
BACTERIA SPEC AEROBE CULT: NORMAL

## 2025-05-21 NOTE — PROGRESS NOTES
Patient Identification:  Name:  Ang Jackson  Age:  64 y.o.  Sex:  male  :  1960  MRN:  [id number]   Visit Number:  [id number]  Primary Care Physician:  Deepak Perez     Subjective     Chief complaint:   Sacral and buttock ulcer    History of presenting illness:   Patient is a 64 y.o. male with past medical history significant for  cervical degenerative disc disease status post C6-7 anterior cervical discectomy and fusion in  and nontraumatic cervical spinal cord injury secondary with cervical epidural abscess with cord compression from C3-C5 in 2024 status post incision and drainage with C4 corpectomy, C3-6 laminectomy and emergent hematoma evacuation. Patient with neurogenic bladder and chronic hanks catheter due to significant spinal trauma. 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  Patient was seen in the wound care clinic outpatient and due to worsening wound advised patient to go to ER.  Patient was evaluated and admitted for wound infection.  Surgery was consulted and wounds were debrided in OR on .  Patient is resting in bed with family at bedside.  Patient is receiving IV antibiotics.  Planning on discharge today.  Family is knowledgeable of wound care treatment.         ---------------------------------------------------------------------------------------------------------------------     Review of Systems denies fever or chills    ---------------------------------------------------------------------------------------------------------------------   Past Medical History:   Diagnosis Date    Anesthesia     diffculty adminster spinal block, patient stated with last hip surgery  several attempts per awilda and no luck, resulted in general anesthesia     Anxiety     Arthritis     Rheumatoid     COVID     2020    GERD (gastroesophageal reflux disease)     Hypertension     Lactose intolerance     Low back pain     Sleep apnea     does not wear machine, MILD, DOES NOT HAVE AFTER WGT LOSS SURGERY    Wears reading eyeglasses      Past Surgical History:   Procedure Laterality Date    ANTERIOR CERVICAL DISCECTOMY W/ FUSION Right 02/19/2023    Procedure: CERVICAL DISCECTOMY ANTERIOR WITH FUSION C6-7;  Surgeon: Jaiden Aldridge MD;  Location:  [facility];  Service: Neurosurgery;  Laterality: Right;    BARIATRIC SURGERY  4 year ago    CARPAL TUNNEL RELEASE Bilateral     CARPAL TUNNEL RELEASE Right 06/29/2023    Procedure: CARPAL TUNNEL RELEASE RIGHT;  Surgeon: Jaiden Aldridge MD;  Location:  [facility];  Service: Neurosurgery;  Laterality: Right;    COLONOSCOPY      5 years ago    COLONOSCOPY N/A 04/05/2022    Procedure: COLONOSCOPY FOR SCREENING;  Surgeon: Luz Galvez MD;  Location:  [facility];  Service: Gastroenterology;  Laterality: N/A;    ENDOSCOPY N/A 04/05/2022    Procedure: ESOPHAGOGASTRODUODENOSCOPY WITH BIOPSY;  Surgeon: Luz Galvez MD;  Location:  [facility];  Service: Gastroenterology;  Laterality: N/A;    GASTRIC SLEEVE LAPAROSCOPIC      2017    HIP SURGERY Right times 2    KNEE ARTHROSCOPY Left     TOTAL HIP ARTHROPLASTY Left 01/25/2021    Procedure: TOTAL HIP ARTHROPLASTY LEFT;  Surgeon: Jb Patel MD;  Location:  [facility];  Service: Orthopedics;  Laterality: Left;    TOTAL HIP ARTHROPLASTY REVISION Right 07/20/2020    Procedure: TOTAL HIP ARTHROPLASTY REVISION RIGHT;  Surgeon: Jb Patel MD;  Location:  [facility];  Service: Orthopedics;  Laterality: Right;    WOUND DEBRIDEMENT N/A 5/18/2025    Procedure: DEBRIDEMENT SACRAL ULCER/WOUND;  Surgeon: Deepak Lara MD;  Location:  [facility];  Service: General;  Laterality: N/A;     Family History   Problem Relation Age of Onset    Heart disease Father     Hypertension Father     Osteoarthritis Father      Cancer Mother     Diabetes Brother     Hypertension Brother     Gout Paternal Grandmother     Diabetes Paternal Grandmother     Colon cancer Maternal Uncle     Colon polyps Maternal Uncle      Social History     Socioeconomic History    Marital status:    Tobacco Use    Smoking status: Never    Smokeless tobacco: Never   Vaping Use    Vaping status: Never Used   Substance and Sexual Activity    Alcohol use: No    Drug use: Never    Sexual activity: Yes     ---------------------------------------------------------------------------------------------------------------------   Allergies:  Sulfa antibiotics, Codeine, Ketorolac, and Morphine and codeine  ---------------------------------------------------------------------------------------------------------------------   Medications below are reported home medications pulling from within the system; at this time, these medications have not been reconciled unless otherwise specified and are in the verification process for further verifcation as current home medications.    Prior to Admission Medications       Prescriptions Last Dose Informant Patient Reported? Taking?    ascorbic acid (VITAMIN C) 500 MG tablet 5/16/2025 Child Yes Yes    Take 2 tablets by mouth Daily.    baclofen (LIORESAL) 10 MG tablet 5/16/2025 Child Yes Yes    Take 2 tablets by mouth 3 (Three) Times a Day.    baclofen (LIORESAL) 10 MG tablet Past Week Child Yes Yes    Take 5 tablets by mouth every night at bedtime.    Brexpiprazole (Rexulti) 1 MG tablet 5/16/2025 Child Yes Yes    Take 1 tablet by mouth Daily.    busPIRone (BUSPAR) 5 MG tablet Past Week Child Yes Yes    Take 1 tablet by mouth Every Night.    Cyanocobalamin (B-12 Compliance Injection) 1000 MCG/ML kit Past Month Child Yes Yes    Inject 1 mL as directed Every 14 (Fourteen) Days.    diclofenac (VOLTAREN) 75 MG EC tablet 5/16/2025 Child Yes Yes    Take 1 tablet by mouth 2 (Two) Times a Day.    famotidine (PEPCID) 20 MG tablet  5/16/2025 Child Yes Yes    Take 1 tablet by mouth Daily.    ferrous sulfate 325 (65 FE) MG tablet 5/16/2025 Child Yes Yes    Take 1 tablet by mouth Daily With Breakfast.    FLUoxetine (PROzac) 20 MG capsule 5/16/2025 Child Yes Yes    Take 1 capsule by mouth Daily.    gabapentin (NEURONTIN) 300 MG capsule 5/16/2025 Child Yes Yes    Take 3 capsules by mouth 3 (Three) Times a Day.    Lactobacillus Acid-Pectin (ACIDOPHILUS PLUS PECTIN PO) 5/16/2025 Child Yes Yes    Take 1 capsule by mouth Daily.    loratadine (CLARITIN) 10 MG tablet 5/16/2025 Child Yes Yes    Take 1 tablet by mouth Daily.    Multiple Vitamins-Minerals (MENS MULTIPLUS PO) 5/16/2025 Child Yes Yes    Take 1 tablet by mouth Daily.    oxyCODONE (ROXICODONE) 10 MG tablet 5/16/2025 Child Yes Yes    Take 1 tablet by mouth 4 (Four) Times a Day.    oxyCODONE ER (oxyCONTIN) 20 MG 12 hr tablet Past Week Child Yes Yes    Take 1 tablet by mouth Every Night.    pantoprazole (PROTONIX) 40 MG EC tablet 5/16/2025 Child Yes Yes    Take 1 tablet by mouth Daily.    QUEtiapine (SEROquel) 100 MG tablet Past Week Child Yes Yes    Take 2 tablets by mouth Every Night.    tiZANidine (ZANAFLEX) 2 MG tablet 5/16/2025 Child Yes Yes    Take 1 tablet by mouth 4 (Four) Times a Day.    tiZANidine (ZANAFLEX) 2 MG tablet Past Week Child Yes Yes    Take 2 tablets by mouth Every Night.    vitamin D3 125 MCG (5000 UT) capsule capsule 5/16/2025 Child Yes Yes    Take 1 capsule by mouth Daily.    omeprazole (priLOSEC) 20 MG capsule 5/16/2025 Child Yes Yes    Take 1 capsule by mouth 2 (Two) Times a Day.          ---------------------------------------------------------------------------------------------------------------------  Objective     Hospital Scheduled Meds:    No current facility-administered medications for this encounter.      Current listed hospital scheduled medications may not yet reflect those currently placed in orders that are signed and held, awaiting patient's arrival to  floor/unit.    ---------------------------------------------------------------------------------------------------------------------   Vital Signs:  Temp:  [97.5 °F (36.4 °C)-98.6 °F (37 °C)] 97.5 °F (36.4 °C)  Heart Rate:  [64-71] 64  Resp:  [20] 20  BP: (114-132)/(63-75) 132/75  No data found.  SpO2 Percentage    05/19/25 1830 05/19/25 2256 05/20/25 0256   SpO2: 95% 97% 95%     SpO2:  [95 %-97 %] 95 %  on   ;   Device (Oxygen Therapy): room air    Body mass index is 31.98 kg/m².  Wt Readings from Last 3 Encounters:   05/16/25 95.4 kg (210 lb 5.1 oz)   05/16/25 93 kg (205 lb)   05/07/25 93 kg (205 lb)     ---------------------------------------------------------------------------------------------------------------------   Physical Exam:  Area on penis with dry scab no drainage noted.  Patient is incontinent.     Wound Assessment:  Location: Left, lower, gluteal  Wound Measurements: Length: 10.0 Width:  11.4     Depth:3.5 cm  Tunneling: No  Undermining: No  Dressing Appearance: dressingappearance: intact  Closure: NA  Changes since last exam: this is initial exam  Etiology and classification:  Unstageable pressure injury  Wound bed structures/characteristics: moist, red, slough, yellow  Edges moist  Periwound characteristics: intact, dry, redness   Periwound Temperature: normal turgor and temperature  Drainage characteristics: moderate, serosanguinous  Perfusion characteristics: NA     Wound Assessment:  Location: Right, lower, gluteal  Wound Measurements: Length: 8.5   Width:  3.8       Depth: 2.1 cm  Tunneling: Yes 9.5 cm at 4:00undermining: No  Dressing Appearance: dressingappearance: intact  Closure: NA  Changes since last exam: this is initial exam  Etiology and classification: stage 3 pressure ulcer  Wound bed structures/characteristics: full-thickness (subcutaneous tissue is exposed in at least a portion of the wound), moist, red  Edges moist  Periwound characteristics: intact, redness   Periwound  Temperature: normal turgor and temperature  Drainage characteristics: moderate, serosanguinous  Perfusion characteristics: NA     Wound Assessment:  Location: Left, lower, scrotum  Wound Measurements: Length: 2.2   Width:  1.0       Depth:0.1 cm  Tunneling: No  Undermining: No  Dressing Appearance: Open to air  Closure: NA  Changes since last exam: this is initial exam  Etiology and classification: stage 2 pressure ulcer  Wound bed structures/characteristics: moist, red  Edges moist  Periwound characteristics: intact, redness   Periwound Temperature: normal turgor and temperature  Drainage characteristics: none  Perfusion characteristics: NA     Wound Assessment:  Location: medial sacral spine  Wound Measurements: Length: 4.2   Width: 3.0                    Depth:0.1 cm  Tunneling: No  Undermining: No  Dressing Appearance: dressingappearance: intact  Closure: NA  Changes since last exam: this is initial exam  Etiology and classification:  Unstageable  Wound bed structures/characteristics: moist, red, slough  Edges moist  Periwound characteristics: intact, dry, redness   Periwound Temperature: normal turgor and temperature  Drainage characteristics: none  Perfusion characteristics: NA        ---------------------------------------------------------------------------------------------------------------------   Results from last 7 days   Lab Units 05/19/25  0120   CK TOTAL U/L 21           Results from last 7 days   Lab Units 05/19/25  0120 05/18/25  0231 05/17/25  0446 05/16/25  1544 05/15/25  1606   CRP mg/dL  --   --   --  8.23* 7.76*   LACTATE mmol/L  --   --   --  1.1  --    WBC 10*3/mm3 5.82 5.36 7.22 6.19 7.52   HEMOGLOBIN g/dL 8.5* 9.2* 9.8* 9.2* 9.6*   HEMATOCRIT % 28.3* 31.4* 32.2* 31.1* 31.7*   MCV fL 92.5 92.9 93.1 94.0 93.2   MCHC g/dL 30.0* 29.3* 30.4* 29.6* 30.3*   PLATELETS 10*3/mm3 188 177 201 189 218     Results from last 7 days   Lab Units 05/19/25  0120 05/18/25  0231 05/17/25  0446 05/16/25  1544  "  SODIUM mmol/L 143 145 141 142   POTASSIUM mmol/L 3.5 3.5 3.8 3.8   CHLORIDE mmol/L 108* 110* 104 105   CO2 mmol/L 27.4 27.4 28.2 30.4*   BUN mg/dL 15 12 14 20   CREATININE mg/dL 0.31* 0.29* 0.32* 0.47*   CALCIUM mg/dL 8.0* 8.4* 8.5* 8.7   GLUCOSE mg/dL 109* 96 110* 134*   ALBUMIN g/dL 2.3*  --  2.5* 2.6*   BILIRUBIN mg/dL <0.2  --  0.2 <0.2   ALK PHOS U/L 63  --  72 71   AST (SGOT) U/L 9  --  8 7   ALT (SGPT) U/L <5  --  5 <5   Estimated Creatinine Clearance: 273.2 mL/min (A) (by C-G formula based on SCr of 0.31 mg/dL (L)).  No results found for: \"AMMONIA\"    No results found for: \"HGBA1C\", \"POCGLU\"  Lab Results   Component Value Date    HGBA1C 6.60 (H) 01/15/2025     Lab Results   Component Value Date    TSH 1.350 01/15/2025    FREET4 0.9 11/22/2024       Blood Culture   Date Value Ref Range Status   05/16/2025 No growth at 4 days  Preliminary   05/16/2025 No growth at 4 days  Preliminary     No results found for: \"URINECX\"  Wound Culture   Date Value Ref Range Status   05/15/2025 Light growth (2+) Escherichia coli ESBL (A)  Final     Comment:       Consider infectious disease consult.  Susceptibility results may not correlate to clinical outcomes.     No results found for: \"STOOLCX\"  No results found for: \"RESPCX\"  No results found for: \"MRSACX\"  Pain Management Panel           No data to display              I have personally reviewed the above laboratory results.     ---------------------------------------------------------------------------------------------------------------------    Assessment & Plan      Sacrum unstageable ulcer, left unstageable gluteal ulcer and right stage III ulcer gluteal  -Cleanse with wound cleanser and pat dry. Apply Santyl to base of wounds. Pack wounds with gauze dampened in NS. Cover with abd pads and seure with foam tape.   -Patient receiving IVertapenem and daptomycin .  ID managing antibiotic therapy  - This condition is associated with a high risk for non-healing, infection, " sepsis, loss of function, reduced quality of life, and increased risk of premature mortality. The patient is at high-risk for additional pressure injury, requiring a high level of vigilance and coordination of care, and frequent re-assessment to prevent adverse outcomes.   -Management of this condition is inherently complex, requiring ongoing optimization of many factors to assure the highest likelihood of a favorable outcome, including pressure relief, bioburden, multiple aspects of nutrition, infection management, and moisture and mechanical factors relevant to wound healing.    - Recommend high protein diet 120g/day along with vitamin C 2000mg/day, vitamin A 5000 Units/day, vitamin D3 5000 Units/day, zinc 50mg/day to help promote wound healing    - Pressure intervention in place     Scrotum stage II pressure ulcer and penis contact dermatitis  - Cleanse with cleansing foam. Pat dry. Apply Z-Guard. No cover dressing.   -The patient is at high risk for wound formation and will require multiple assessments to maintain vigilance for wound formation.?       Diabetes  --Recommend adequate glycemic control to help promote wound healing  -Poor glycemic control increases risk of prolonged healing, infection, loss of limb and premature death         Obesity  -An obese person?is at greater risk for wound infection and dehiscence or evisceration.      Reviewed notes of clinical teams involved in patient's care.     Plan for discharge today patient will follow-up in outpatient wound care clinic.      TANJA Aleman  [facility]    05/20/25  22:33 EDT

## 2025-05-22 LAB
BACTERIA SPEC AEROBE CULT: ABNORMAL
GRAM STN SPEC: ABNORMAL
GRAM STN SPEC: ABNORMAL

## 2025-05-23 ENCOUNTER — READMISSION MANAGEMENT (OUTPATIENT)
Dept: CALL CENTER | Facility: HOSPITAL | Age: 65
End: 2025-05-23
Payer: MEDICARE

## 2025-05-23 NOTE — OUTREACH NOTE
Medical Week 1 Survey      Flowsheet Row Responses   [facility] facility patient discharged from? [facility]   Does the patient have one of the following disease processes/diagnoses(primary or secondary)? Other   Week 1 attempt successful? No   Unsuccessful attempts Attempt 1            Iona Wilson Registered Nurse

## 2025-05-26 ENCOUNTER — LAB REQUISITION (OUTPATIENT)
Dept: LAB | Facility: HOSPITAL | Age: 65
End: 2025-05-26
Payer: MEDICARE

## 2025-05-26 DIAGNOSIS — Z79.2 LONG TERM (CURRENT) USE OF ANTIBIOTICS: ICD-10-CM

## 2025-05-26 DIAGNOSIS — L89.159 PRESSURE ULCER OF SACRAL REGION, UNSPECIFIED STAGE: ICD-10-CM

## 2025-05-26 LAB
ANION GAP SERPL CALCULATED.3IONS-SCNC: 8.1 MMOL/L (ref 5–15)
BASOPHILS # BLD AUTO: 0.03 10*3/MM3 (ref 0–0.2)
BASOPHILS NFR BLD AUTO: 0.4 % (ref 0–1.5)
BUN SERPL-MCNC: 20 MG/DL (ref 8–23)
BUN/CREAT SERPL: 54.1 (ref 7–25)
CALCIUM SPEC-SCNC: 8 MG/DL (ref 8.6–10.5)
CHLORIDE SERPL-SCNC: 106 MMOL/L (ref 98–107)
CK SERPL-CCNC: 44 U/L (ref 20–200)
CO2 SERPL-SCNC: 27.9 MMOL/L (ref 22–29)
CREAT SERPL-MCNC: 0.37 MG/DL (ref 0.76–1.27)
CRP SERPL-MCNC: 3.12 MG/DL (ref 0–0.5)
DEPRECATED RDW RBC AUTO: 48.5 FL (ref 37–54)
EGFRCR SERPLBLD CKD-EPI 2021: 125.5 ML/MIN/1.73
EOSINOPHIL # BLD AUTO: 0.64 10*3/MM3 (ref 0–0.4)
EOSINOPHIL NFR BLD AUTO: 9.3 % (ref 0.3–6.2)
ERYTHROCYTE [DISTWIDTH] IN BLOOD BY AUTOMATED COUNT: 14.4 % (ref 12.3–15.4)
ERYTHROCYTE [SEDIMENTATION RATE] IN BLOOD: 21 MM/HR (ref 0–20)
GLUCOSE SERPL-MCNC: 72 MG/DL (ref 65–99)
HCT VFR BLD AUTO: 32.6 % (ref 37.5–51)
HGB BLD-MCNC: 9.7 G/DL (ref 13–17.7)
IMM GRANULOCYTES # BLD AUTO: 0.05 10*3/MM3 (ref 0–0.05)
IMM GRANULOCYTES NFR BLD AUTO: 0.7 % (ref 0–0.5)
LYMPHOCYTES # BLD AUTO: 1.44 10*3/MM3 (ref 0.7–3.1)
LYMPHOCYTES NFR BLD AUTO: 20.8 % (ref 19.6–45.3)
MCH RBC QN AUTO: 28 PG (ref 26.6–33)
MCHC RBC AUTO-ENTMCNC: 29.8 G/DL (ref 31.5–35.7)
MCV RBC AUTO: 93.9 FL (ref 79–97)
MONOCYTES # BLD AUTO: 0.57 10*3/MM3 (ref 0.1–0.9)
MONOCYTES NFR BLD AUTO: 8.2 % (ref 5–12)
NEUTROPHILS NFR BLD AUTO: 4.18 10*3/MM3 (ref 1.7–7)
NEUTROPHILS NFR BLD AUTO: 60.6 % (ref 42.7–76)
NRBC BLD AUTO-RTO: 0 /100 WBC (ref 0–0.2)
PLATELET # BLD AUTO: 250 10*3/MM3 (ref 140–450)
PMV BLD AUTO: 9.5 FL (ref 6–12)
POTASSIUM SERPL-SCNC: 3.9 MMOL/L (ref 3.5–5.2)
RBC # BLD AUTO: 3.47 10*6/MM3 (ref 4.14–5.8)
SODIUM SERPL-SCNC: 142 MMOL/L (ref 136–145)
WBC NRBC COR # BLD AUTO: 6.91 10*3/MM3 (ref 3.4–10.8)

## 2025-05-26 PROCEDURE — 85025 COMPLETE CBC W/AUTO DIFF WBC: CPT | Performed by: INTERNAL MEDICINE

## 2025-05-26 PROCEDURE — 82550 ASSAY OF CK (CPK): CPT | Performed by: INTERNAL MEDICINE

## 2025-05-26 PROCEDURE — 80048 BASIC METABOLIC PNL TOTAL CA: CPT | Performed by: INTERNAL MEDICINE

## 2025-05-26 PROCEDURE — 85652 RBC SED RATE AUTOMATED: CPT | Performed by: INTERNAL MEDICINE

## 2025-05-26 PROCEDURE — 86140 C-REACTIVE PROTEIN: CPT | Performed by: INTERNAL MEDICINE

## 2025-05-28 ENCOUNTER — READMISSION MANAGEMENT (OUTPATIENT)
Dept: CALL CENTER | Facility: HOSPITAL | Age: 65
End: 2025-05-28
Payer: MEDICARE

## 2025-05-28 NOTE — OUTREACH NOTE
Medical Week 1 Survey      Flowsheet Row Responses   [facility] patient discharged from? [facility]   Does the patient have one of the following disease processes/diagnoses(primary or secondary)? Other   Week 1 attempt successful? Yes   Call start time 0937   Call end time 0939   Discharge diagnosis DEBRIDEMENT SACRAL ULCER/WOUND   Person spoke with today (if not patient) and relationship Patient   Meds reviewed with patient/caregiver? Yes   Is the patient having any side effects they believe may be caused by any medication additions or changes? No   Does the patient have all medications ordered at discharge? Yes   Is the patient taking all medications as directed (includes completed medication regime)? Yes   Comments regarding appointments Patient is seeing PCP today. Getting ready for appt. Goes to wound care on 5/29 and will see ID on 5/30   Does the patient have a primary care provider?  Yes   Does the patient have an appointment with their PCP within 7 days of discharge? Yes   Has the patient kept scheduled appointments due by today? Yes   What is the Home health agency?  [facility]   Has home health visited the patient within 72 hours of discharge? Yes   What DME was ordered? IV antibiotic   Has all DME been delivered? Yes   Psychosocial issues? No   Did the patient receive a copy of their discharge instructions? Yes   Nursing interventions Reviewed instructions with patient   What is the patient's perception of their health status since discharge? Improving   Is the patient/caregiver able to teach back signs and symptoms related to disease process for when to call PCP? Yes   Is the patient/caregiver able to teach back signs and symptoms related to disease process for when to call 911? Yes   Is the patient/caregiver able to teach back the hierarchy of who to call/visit for symptoms/problems? PCP, Specialist, Home health nurse, Urgent Care, ED, 911 Yes   If the patient is a current  smoker, are they able to teach back resources for cessation? Not a smoker   Week 1 call completed? Yes   Graduated Yes   Would this patient benefit from a Referral to [facility] Social Work? No   Is the patient interested in additional calls from an ambulatory ? No   Graduated/Revoked comments Patient is doing well. Has great support from wife/ family.   Call end time [id number]            Kavon JEFFERSON - Registered Nurse

## 2025-05-29 ENCOUNTER — HOSPITAL ENCOUNTER (OUTPATIENT)
Dept: WOUND CARE | Facility: HOSPITAL | Age: 65
Discharge: HOME | End: 2025-05-29
Payer: MEDICARE

## 2025-05-29 DIAGNOSIS — T14.8XXA OPEN WOUND: Primary | ICD-10-CM

## 2025-05-29 PROCEDURE — 63710000001 CLOTRIMAZOLE 1 % CREAM 30 G TUBE: Performed by: NURSE PRACTITIONER

## 2025-05-29 PROCEDURE — A9270 NON-COVERED ITEM OR SERVICE: HCPCS | Performed by: NURSE PRACTITIONER

## 2025-05-29 PROCEDURE — 63710000001 ALUMINUM-MAGNESIUM HYDROXIDE-SIMETHICONE 200-200-20 MG/5ML SUSPENSION: Performed by: NURSE PRACTITIONER

## 2025-05-29 PROCEDURE — 63710000001 A&D OINTMENT 425 G JAR: Performed by: NURSE PRACTITIONER

## 2025-05-29 PROCEDURE — 63710000001 ZINC OXIDE 20 % OINTMENT 454 G JAR: Performed by: NURSE PRACTITIONER

## 2025-05-29 PROCEDURE — 63710000001 COLLAGENASE 250 UNIT/GM OINTMENT 30 G TUBE: Performed by: NURSE PRACTITIONER

## 2025-05-29 RX ORDER — CASTOR OIL AND BALSAM, PERU 788; 87 MG/G; MG/G
1 OINTMENT TOPICAL AS NEEDED
OUTPATIENT
Start: 2025-05-29

## 2025-05-29 RX ORDER — LIDOCAINE HYDROCHLORIDE 20 MG/ML
JELLY TOPICAL AS NEEDED
OUTPATIENT
Start: 2025-05-29

## 2025-05-29 RX ORDER — MUPIROCIN 20 MG/G
1 OINTMENT TOPICAL AS NEEDED
OUTPATIENT
Start: 2025-05-29

## 2025-05-29 RX ORDER — SODIUM HYPOCHLORITE 2.5 MG/ML
1 SOLUTION TOPICAL AS NEEDED
OUTPATIENT
Start: 2025-05-29

## 2025-05-29 RX ORDER — LIDOCAINE HYDROCHLORIDE AND EPINEPHRINE BITARTRATE 20; .01 MG/ML; MG/ML
10 INJECTION, SOLUTION SUBCUTANEOUS ONCE
OUTPATIENT
Start: 2025-05-29 | End: 2025-05-29

## 2025-05-29 RX ORDER — SILVER SULFADIAZINE 10 MG/G
1 CREAM TOPICAL AS NEEDED
OUTPATIENT
Start: 2025-05-29

## 2025-05-29 RX ORDER — SODIUM HYPOCHLORITE 1.25 MG/ML
1 SOLUTION TOPICAL AS NEEDED
OUTPATIENT
Start: 2025-05-29

## 2025-05-29 RX ORDER — LIDOCAINE HYDROCHLORIDE 20 MG/ML
10 INJECTION, SOLUTION INFILTRATION; PERINEURAL ONCE
OUTPATIENT
Start: 2025-05-29 | End: 2025-05-29

## 2025-05-29 RX ORDER — NYSTATIN 100000 [USP'U]/G
1 POWDER TOPICAL ONCE
OUTPATIENT
Start: 2025-05-29 | End: 2025-05-29

## 2025-05-29 RX ORDER — DIAPER,BRIEF,INFANT-TODD,DISP
1 EACH MISCELLANEOUS ONCE
OUTPATIENT
Start: 2025-05-29 | End: 2025-05-29

## 2025-05-29 RX ORDER — SODIUM HYPOCHLORITE 2.5 MG/ML
1 SOLUTION TOPICAL AS NEEDED
Status: DISCONTINUED | OUTPATIENT
Start: 2025-05-29 | End: 2025-05-31 | Stop reason: HOSPADM

## 2025-05-29 RX ORDER — LIDOCAINE HYDROCHLORIDE 20 MG/ML
1 JELLY TOPICAL ONCE
Status: COMPLETED | OUTPATIENT
Start: 2025-05-29 | End: 2025-05-29

## 2025-05-29 RX ORDER — LIDOCAINE HYDROCHLORIDE 20 MG/ML
1 JELLY TOPICAL ONCE
OUTPATIENT
Start: 2025-05-29 | End: 2025-05-29

## 2025-05-29 RX ADMIN — LIDOCAINE HYDROCHLORIDE 1 ML: 20 JELLY TOPICAL at 15:36

## 2025-05-29 RX ADMIN — VITAMINS A AND D OINTMENT 1 APPLICATION: 15.5; 53.4 OINTMENT TOPICAL at 16:15

## 2025-05-29 RX ADMIN — COLLAGENASE SANTYL 1 APPLICATION: 250 OINTMENT TOPICAL at 16:15

## 2025-05-29 NOTE — LETTER
05/30/2025  Ang Jackson, 1960           Wound Care Instructions:     Left Lower Gluteal- Clean with wound cleanser or normal saline, pat dry. Apply santyl moistened gauze packing and cover with abd pad and secure with foam tape.      Right Lower Gluteal- Clean with wound cleanser or normal saline, pat dry. Apply santyl moistened gauze packing and cover with abd pad and secure with foam tape.      Perirectal- Clean with wound cleanser or normal saline, pat dry. Apply layer of santyl, cover with saline moistened 4x4 and cover  with abd pad and secure with foam tape.       This needs to be done daily.     Patient will need supplies to last him through the week. He will need 4x4's, small kerlix gauze, ABD pads, and foam tape.     If you have any questions please call our clinic. Patient will need supplies to last him through the week. He is seen in clinic once a week.            Thank you,     TANJA Serrano

## 2025-05-30 ENCOUNTER — OFFICE VISIT (OUTPATIENT)
Dept: INFECTIOUS DISEASES | Facility: CLINIC | Age: 65
End: 2025-05-30
Payer: MEDICARE

## 2025-05-30 ENCOUNTER — TELEPHONE (OUTPATIENT)
Dept: INFECTIOUS DISEASES | Facility: CLINIC | Age: 65
End: 2025-05-30

## 2025-05-30 VITALS
TEMPERATURE: 97.4 F | WEIGHT: 210 LBS | OXYGEN SATURATION: 99 % | BODY MASS INDEX: 31.83 KG/M2 | HEIGHT: 68 IN | DIASTOLIC BLOOD PRESSURE: 61 MMHG | SYSTOLIC BLOOD PRESSURE: 100 MMHG | HEART RATE: 66 BPM | RESPIRATION RATE: 18 BRPM

## 2025-05-30 DIAGNOSIS — T14.8XXA OPEN WOUND: ICD-10-CM

## 2025-05-30 DIAGNOSIS — T14.8XXA INFECTED WOUND: Primary | ICD-10-CM

## 2025-05-30 DIAGNOSIS — L89.313 PRESSURE INJURY OF RIGHT BUTTOCK, STAGE 3: ICD-10-CM

## 2025-05-30 DIAGNOSIS — L08.9 INFECTED WOUND: Primary | ICD-10-CM

## 2025-05-30 DIAGNOSIS — L89.320 PRESSURE INJURY OF LEFT BUTTOCK, UNSTAGEABLE: ICD-10-CM

## 2025-05-30 PROBLEM — M17.12 PRIMARY OSTEOARTHRITIS OF LEFT KNEE: Status: RESOLVED | Noted: 2018-07-25 | Resolved: 2025-05-30

## 2025-05-30 PROBLEM — M16.12 PRIMARY OSTEOARTHRITIS OF LEFT HIP: Status: RESOLVED | Noted: 2021-01-15 | Resolved: 2025-05-30

## 2025-05-30 PROBLEM — M25.551 RIGHT HIP PAIN: Status: RESOLVED | Noted: 2020-07-21 | Resolved: 2025-05-30

## 2025-05-30 PROBLEM — G89.18 ACUTE POSTOPERATIVE PAIN: Status: RESOLVED | Noted: 2020-07-21 | Resolved: 2025-05-30

## 2025-05-30 PROBLEM — F32.A ANXIETY AND DEPRESSION: Status: RESOLVED | Noted: 2023-02-18 | Resolved: 2025-05-30

## 2025-05-30 PROBLEM — N40.0 BPH (BENIGN PROSTATIC HYPERPLASIA): Status: RESOLVED | Noted: 2021-01-25 | Resolved: 2025-05-30

## 2025-05-30 PROBLEM — M54.9 CHRONIC BACK PAIN: Status: RESOLVED | Noted: 2023-02-18 | Resolved: 2025-05-30

## 2025-05-30 PROBLEM — D62 ACUTE BLOOD LOSS ANEMIA: Status: RESOLVED | Noted: 2020-07-21 | Resolved: 2025-05-30

## 2025-05-30 PROBLEM — G47.33 OSA (OBSTRUCTIVE SLEEP APNEA): Status: RESOLVED | Noted: 2023-02-18 | Resolved: 2025-05-30

## 2025-05-30 PROBLEM — N52.02 CORPORO-VENOUS OCCLUSIVE ERECTILE DYSFUNCTION: Status: RESOLVED | Noted: 2018-02-12 | Resolved: 2025-05-30

## 2025-05-30 PROBLEM — L30.8 DERMATITIS ASSOCIATED WITH MOISTURE: Status: RESOLVED | Noted: 2025-04-18 | Resolved: 2025-05-30

## 2025-05-30 PROBLEM — I86.1 SCROTAL VARICES: Status: RESOLVED | Noted: 2018-03-15 | Resolved: 2025-05-30

## 2025-05-30 PROBLEM — E66.9 OBESITY: Status: RESOLVED | Noted: 2021-01-25 | Resolved: 2025-05-30

## 2025-05-30 PROBLEM — G89.29 CHRONIC BACK PAIN: Status: RESOLVED | Noted: 2023-02-18 | Resolved: 2025-05-30

## 2025-05-30 PROBLEM — K21.9 GERD (GASTROESOPHAGEAL REFLUX DISEASE): Status: RESOLVED | Noted: 2021-01-25 | Resolved: 2025-05-30

## 2025-05-30 PROBLEM — N32.89 BLADDER SPASMS: Status: RESOLVED | Noted: 2025-05-07 | Resolved: 2025-05-30

## 2025-05-30 PROBLEM — F41.9 ANXIETY AND DEPRESSION: Status: RESOLVED | Noted: 2023-02-18 | Resolved: 2025-05-30

## 2025-05-30 PROBLEM — M17.32 POST-TRAUMATIC OSTEOARTHRITIS OF LEFT KNEE: Status: RESOLVED | Noted: 2018-07-17 | Resolved: 2025-05-30

## 2025-05-30 PROBLEM — R79.89 LOW TESTOSTERONE IN MALE: Status: RESOLVED | Noted: 2018-02-12 | Resolved: 2025-05-30

## 2025-05-30 RX ORDER — OMADACYCLINE 150 MG/1
TABLET, FILM COATED ORAL
Qty: 30 TABLET | Refills: 0 | Status: SHIPPED | OUTPATIENT
Start: 2025-05-30 | End: 2025-06-13

## 2025-05-30 NOTE — TELEPHONE ENCOUNTER
Called and spoke with pts , [facility], and asked if could draw CRP from pts midline today and once finishes last antibiotic today if could take out midline. Spoke with  at [facility] and states usual nurse for this patient is out today but will let nurse seeing pt today know. Pts last dose is at 9:00 pm today and is given by family member. HH states can take midline out on Monday due to HH does not go out on weekends. Faxed today's note and CRP lab order to  at [phone]. Pt was able to obtain Nuzyra medication today from [facility] pharmacy with $0 copay and was brought over by pharmacy. Will follow up with  on Monday.

## 2025-05-30 NOTE — PROGRESS NOTES
Pharmacy consulted to provide Nuzyra for ongoing treatment of sacral wound x 14 days. Rx was sent to [facility] ([facility]) with dosing of 450 mg daily x 2 days, followed by 300 mg daily x 12 days. Instructions added and patient educated to take Nuzyra on an empty stomach and to separate administration appropriately from oral iron and multivitamins. Nuzyra was covered by insurance without prior authorization and has a $0 copay.  Medication was delivered to patient in the ID clinic.    Thank you,  Ludivina Padilla, PharmD  05/30/25

## 2025-05-30 NOTE — PROGRESS NOTES
[facility]         Referring Provider: Jillian Baliey, APRN  [address] [place] [address]    Subjective      Chief Complaint  Initial Evaluation (Sacral Wound Infection)    History of Present Illness  Ang Jackson is a 64 y.o. male who presents today to [facility] for infectious disease evaluation and antibiotic management.    Patient is a 64 y.o. male with past medical history significant for  cervical degenerative disc disease status post C6-7 anterior cervical discectomy and fusion in 2023 and nontraumatic cervical spinal cord injury secondary with cervical epidural abscess with cord compression from C3-C5 in October 2024 status post incision and drainage with C4 corpectomy, C3-6 laminectomy and emergent hematoma evacuation. Patient with neurogenic bladder and chronic hanks catheter due to significant spinal trauma. 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  Patient was seen in the wound care clinic outpatient and due to worsening wound advised patient to go to ER.  Patient was evaluated and admitted for wound infection.  Surgery was consulted and wounds were debrided in OR on 5/18.  Patient is resting in bed with family at bedside.  Patient is receiving IV antibiotics.       Past Medical History:   Diagnosis Date    Anesthesia     diffculty adminster spinal block, patient stated with last hip surgery 7-2020 several attempts per awilda and no luck, resulted in general anesthesia     Anxiety     Arthritis     Rheumatoid    COVID     2020    GERD (gastroesophageal reflux disease)     Hypertension     Lactose intolerance     Low back pain     Sleep apnea     does not wear machine, MILD, DOES NOT HAVE AFTER WGT LOSS SURGERY    Wears reading eyeglasses        Past  Surgical History:   Procedure Laterality Date    ANTERIOR CERVICAL DISCECTOMY W/ FUSION Right 02/19/2023    Procedure: CERVICAL DISCECTOMY ANTERIOR WITH FUSION C6-7;  Surgeon: Jaiden Aldridge MD;  Location:  [facility];  Service: Neurosurgery;  Laterality: Right;    BARIATRIC SURGERY  4 year ago    CARPAL TUNNEL RELEASE Bilateral     CARPAL TUNNEL RELEASE Right 06/29/2023    Procedure: CARPAL TUNNEL RELEASE RIGHT;  Surgeon: Jaiden Aldridge MD;  Location:  [facility];  Service: Neurosurgery;  Laterality: Right;    COLONOSCOPY      5 years ago    COLONOSCOPY N/A 04/05/2022    Procedure: COLONOSCOPY FOR SCREENING;  Surgeon: Luz Galvez MD;  Location:  [facility];  Service: Gastroenterology;  Laterality: N/A;    ENDOSCOPY N/A 04/05/2022    Procedure: ESOPHAGOGASTRODUODENOSCOPY WITH BIOPSY;  Surgeon: Luz Galvez MD;  Location:  [facility];  Service: Gastroenterology;  Laterality: N/A;    GASTRIC SLEEVE LAPAROSCOPIC      2017    HIP SURGERY Right times 2    KNEE ARTHROSCOPY Left     TOTAL HIP ARTHROPLASTY Left 01/25/2021    Procedure: TOTAL HIP ARTHROPLASTY LEFT;  Surgeon: Jb Patel MD;  Location:  [facility];  Service: Orthopedics;  Laterality: Left;    TOTAL HIP ARTHROPLASTY REVISION Right 07/20/2020    Procedure: TOTAL HIP ARTHROPLASTY REVISION RIGHT;  Surgeon: Jb Patel MD;  Location:  [facility];  Service: Orthopedics;  Laterality: Right;    WOUND DEBRIDEMENT N/A 5/18/2025    Procedure: DEBRIDEMENT SACRAL ULCER/WOUND;  Surgeon: Deepak Lara MD;  Location:  [facility];  Service: General;  Laterality: N/A;       Social History     Socioeconomic History    Marital status:    Tobacco Use    Smoking status: Never    Smokeless tobacco: Never   Vaping Use    Vaping status: Never Used   Substance and Sexual Activity    Alcohol use: No    Drug use: Never    Sexual activity: Yes       Family History  family history includes Cancer in his mother; Colon cancer in his  "maternal uncle; Colon polyps in his maternal uncle; Diabetes in his brother and paternal grandmother; Gout in his paternal grandmother; Heart disease in his father; Hypertension in his brother and father; Osteoarthritis in his father.    Immunization History   Administered Date(s) Administered    COVID-19 ([employer]) 04/05/2021        Allergies  Allergies   Allergen Reactions    Sulfa Antibiotics Hives     Hives, shortness of breath    Codeine Nausea And Vomiting    Ketorolac Other (See Comments)     Pt reports heavy, \"tight\" feeling in his chest.    Morphine And Codeine Nausea And Vomiting       The medication list has been reviewed and updated.   Current Medications    Current Outpatient Medications:     ascorbic acid (VITAMIN C) 500 MG tablet, Take 2 tablets by mouth Daily., Disp: , Rfl:     baclofen (LIORESAL) 10 MG tablet, Take 2 tablets by mouth 3 (Three) Times a Day., Disp: , Rfl:     baclofen (LIORESAL) 10 MG tablet, Take 5 tablets by mouth every night at bedtime., Disp: , Rfl:     Brexpiprazole (Rexulti) 1 MG tablet, Take 1 tablet by mouth Daily., Disp: , Rfl:     busPIRone (BUSPAR) 5 MG tablet, Take 1 tablet by mouth Every Night., Disp: , Rfl:     Cyanocobalamin (B-12 Compliance Injection) 1000 MCG/ML kit, Inject 1 mL as directed Every 14 (Fourteen) Days., Disp: , Rfl:     daptomycin (CUBICIN) solution IVPB, Infuse 50 mL into a venous catheter Daily for 11 doses. Indications: Infection of the Skin and/or Soft Tissue, Disp: 550 mL, Rfl: 0    diclofenac (VOLTAREN) 75 MG EC tablet, Take 1 tablet by mouth 2 (Two) Times a Day., Disp: , Rfl:     ertapenem 1,000 mg in sodium chloride 0.9 % 100 mL IVPB, Infuse 1,000 mg into a venous catheter Daily for 10 doses. Indications: Infection of the Skin and/or Soft Tissue, Disp: 1000 mL, Rfl: 0    famotidine (PEPCID) 20 MG tablet, Take 1 tablet by mouth Daily., Disp: , Rfl:     ferrous sulfate 325 (65 FE) MG tablet, Take 1 tablet by mouth Daily With Breakfast., Disp: , Rfl: "     FLUoxetine (PROzac) 20 MG capsule, Take 1 capsule by mouth Daily., Disp: , Rfl:     gabapentin (NEURONTIN) 300 MG capsule, Take 3 capsules by mouth 3 (Three) Times a Day., Disp: , Rfl:     Lactobacillus Acid-Pectin (ACIDOPHILUS PLUS PECTIN PO), Take 1 capsule by mouth Daily., Disp: , Rfl:     loratadine (CLARITIN) 10 MG tablet, Take 1 tablet by mouth Daily., Disp: , Rfl:     Multiple Vitamins-Minerals (MENS MULTIPLUS PO), Take 1 tablet by mouth Daily., Disp: , Rfl:     oxyCODONE (ROXICODONE) 10 MG tablet, Take 1 tablet by mouth 4 (Four) Times a Day., Disp: , Rfl:     oxyCODONE ER (oxyCONTIN) 20 MG 12 hr tablet, Take 1 tablet by mouth Every Night., Disp: , Rfl:     pantoprazole (PROTONIX) 40 MG EC tablet, Take 1 tablet by mouth Daily., Disp: , Rfl:     QUEtiapine (SEROquel) 100 MG tablet, Take 2 tablets by mouth Every Night., Disp: , Rfl:     tiZANidine (ZANAFLEX) 2 MG tablet, Take 1 tablet by mouth 4 (Four) Times a Day., Disp: , Rfl:     tiZANidine (ZANAFLEX) 2 MG tablet, Take 2 tablets by mouth Every Night., Disp: , Rfl:     vitamin D3 125 MCG (5000 UT) capsule capsule, Take 1 capsule by mouth Daily., Disp: , Rfl:   No current facility-administered medications for this visit.    Facility-Administered Medications Ordered in Other Visits:     collagenase ointment 1 Application, 1 Application, Topical, PRN, Jillian Bailey, APRN, 1 Application at 05/29/25 1615    magic barrier cream 1 Application, 1 Application, Topical, PRN, Jillian Bailey, APRN, 1 Application at 05/29/25 1615    sodium hypochlorite (DAKIN'S) topical solution 0.25% (half strength) 473 mL, 1 bottle, Irrigation, PRN, Jillian Bailey, APRN      Review of Systems    Review of Systems   Constitutional:  Negative for activity change, appetite change, chills, diaphoresis and fever.   HENT:  Negative for congestion, dental problem, drooling, mouth sores, sinus pressure and sneezing.    Eyes:  Negative for blurred vision, double  "vision, photophobia and discharge.   Respiratory:  Negative for apnea, cough, choking, chest tightness, shortness of breath and wheezing.    Cardiovascular:  Negative for chest pain, palpitations and leg swelling.   Gastrointestinal:  Negative for abdominal distention, abdominal pain, diarrhea, nausea and vomiting.   Genitourinary:  Negative for dysuria, flank pain and frequency.   Musculoskeletal:  Positive for arthralgias, back pain and gait problem. Negative for neck pain and neck stiffness.   Skin:  Positive for wound. Negative for rash.   Neurological:  Negative for dizziness, tremors, seizures, syncope, speech difficulty, numbness, headache and confusion.   Psychiatric/Behavioral:  Negative for agitation, behavioral problems, hallucinations and suicidal ideas.         Objective     Vital Signs:  /61 (BP Location: Left arm, Patient Position: Sitting, Cuff Size: Adult)   Pulse 66   Temp 97.4 °F (36.3 °C) (Infrared)   Resp 18   Ht 172.7 cm (68\")   Wt 95.3 kg (210 lb)   SpO2 99%   BMI 31.93 kg/m²   Estimated body mass index is 31.93 kg/m² as calculated from the following:    Height as of this encounter: 172.7 cm (68\").    Weight as of this encounter: 95.3 kg (210 lb).    Physical Exam  Constitutional:       General: He is not in acute distress.     Appearance: Normal appearance. He is normal weight. He is not ill-appearing, toxic-appearing or diaphoretic.   HENT:      Head: Normocephalic and atraumatic.      Nose: Nose normal. No congestion or rhinorrhea.      Mouth/Throat:      Mouth: Mucous membranes are moist.      Pharynx: Oropharynx is clear.   Eyes:      General: No scleral icterus.     Extraocular Movements: Extraocular movements intact.      Pupils: Pupils are equal, round, and reactive to light.   Neck:      Vascular: No carotid bruit.   Cardiovascular:      Rate and Rhythm: Normal rate and regular rhythm.      Pulses: Normal pulses.      Heart sounds: No murmur heard.  Pulmonary:      " "Effort: Pulmonary effort is normal. No respiratory distress.      Breath sounds: Normal breath sounds. No stridor. No wheezing, rhonchi or rales.   Chest:      Chest wall: No tenderness.   Abdominal:      General: Abdomen is flat. Bowel sounds are normal. There is no distension.      Palpations: Abdomen is soft.      Tenderness: There is no abdominal tenderness. There is no guarding or rebound.   Musculoskeletal:      Cervical back: Normal range of motion and neck supple. No rigidity or tenderness.   Lymphadenopathy:      Cervical: No cervical adenopathy.   Skin:     Coloration: Skin is not jaundiced.      Findings: No lesion or rash.      Comments: Sacral wound looks very clean with no evidence of purulent drainage or odor.  Mild surrounding erythema.  There is a ulcer with necrotic scab on top but does not seem to be overwhelmingly infected.   Neurological:      General: No focal deficit present.      Mental Status: He is alert and oriented to person, place, and time.   Psychiatric:         Mood and Affect: Mood normal.         Behavior: Behavior normal.          Result Review :  The following data was reviewed by Babs Ferris MD     Lab Results  Lab Results   Component Value Date    WBC 6.91 05/26/2025    HGB 9.7 (L) 05/26/2025    HCT 32.6 (L) 05/26/2025    MCV 93.9 05/26/2025     05/26/2025     Lab Results   Component Value Date    GLUCOSE 72 05/26/2025    BUN 20 05/26/2025    CREATININE 0.37 (L) 05/26/2025    EGFRIFNONA 99 01/26/2021    BCR 54.1 (H) 05/26/2025    K 3.9 05/26/2025    CO2 27.9 05/26/2025    CALCIUM 8.0 (L) 05/26/2025    ALBUMIN 2.3 (L) 05/19/2025    AST 9 05/19/2025    ALT <5 05/19/2025      Lab Results   Component Value Date    CRP 3.12 (H) 05/26/2025        No results found for: \"ACANTHNAEG\", \"AFBCX\", \"BPERTUSSISCX\", \"BLOODCX\"  No results found for: \"BCIDPCR\", \"CXREFLEX\", \"CSFCX\", \"CULTURETIS\"  No results found for: \"CULTURES\", \"HSVCX\", \"URCX\"  No results found for: \"EYECULTURE\", " "\"GCCX\", \"HSVCULTURE\", \"LABHSV\"  No results found for: \"LEGIONELLA\", \"MRSACX\", \"MUMPSCX\", \"MYCOPLASCX\"  No results found for: \"NOCARDIACX\", \"STOOLCX\"  No results found for: \"THROATCX\", \"UNSTIMCULT\", \"URINECX\", \"CULTURE\", \"VZVCULTUR\"  No results found for: \"VIRALCULTU\", \"WOUNDCX\"    Radiology Results  CT Pelvis With & Without Contrast  Result Date: 5/18/2025  Impression: 1.  No soft tissue loculated fluid collections are identified. 2.  Diffuse soft tissue edema, nonspecific. 3.  Left deep gluteal ulceration is noted which essentially extends to the level of the left ischial tuberosity. No sclerotic or destructive bony features identified. 4.  Deep and wide-base right sacral-gluteal ulceration is noted which extends to the level of the right ischial tuberosity and approximates the gluteal crease. No associated sclerosis or destructive bone changes identified in the region of ulceration. 5.  Anasarca.  This report was finalized on 5/18/2025 8:24 AM by Dr. Jason Kruse MD.      MRI Pelvis Without Contrast  Result Date: 5/16/2025  Impression: Impression: 1. Limited exam. 2. Postsurgical changes with subcutaneous edema and fluid superficial to the hips, further evaluation if clinically warranted.  This report was finalized on 5/16/2025 7:33 PM by Angel Monet DO.      MRI Cervical Spine With & Without Contrast  Result Date: 5/16/2025  Impression: Impression: 1. Surgical fusion with surgical hardware artifact limits evaluation. 2. Myelomalacia cervical cord.  This report was finalized on 5/16/2025 7:33 PM by Angel Monet DO.      MRI Thoracic Spine With & Without Contrast  Result Date: 5/16/2025  Impression: Impression: 1. No acute process.  This report was finalized on 5/16/2025 7:33 PM by Angel Monet DO.               Assessment / Plan        Diagnoses and all orders for this visit:    1. Infected wound (Primary)  -     C-reactive Protein; Future    2. Open wound  -     C-reactive Protein; Future    3. " Pressure injury of left buttock, unstageable  -     C-reactive Protein; Future    4. Pressure injury of right buttock, stage 3  -     C-reactive Protein; Future      Sacral wounds are dressed, clean dry and intact.  CK WNL at 21.  WBC normal at 5.82.  Intraoperative tissue/bone culture from the sacrum preliminarily reporting moderate growth E. coli.     General surgery operative note reported necrotic tissue and subcutaneous fat sharply excised down to the fascia, not involving fascia.  No mention of bony involvement.  MRI does not report any evidence of osteomyelitis.  Previous wound cultures finalized with E. coli ESBL and Enterococcus faecalis.     Plan to continue ertapenem 1 g IV every 24 hours and daptomycin 6 mg/kg IV 24 hours for total of 14 days, through 5/30/2025 infected chronic sacral ulcers without evidence of osteomyelitis.  CK within normal limits at 21.  The patient will need weekly CPK monitoring and to avoid statins while on daptomycin course.  Midline in place to the right upper extremity.    As of 5/26/2025 CRP level has shown remarkable improvement from 8.23 down to 3.12 on current IV antibiotic therapy with daptomycin and ertapenem but remained fairly elevated.  The patient is supposed to finish his antibiotic therapy tonight but would consider extending course with oral therapy.    Will get pharmacy help to initiate Nuzyra x 14 days.          Follow Up   No follow-ups on file.    Visit Diagnoses:    ICD-10-CM ICD-9-CM   1. Infected wound  T14.8XXA 958.3    L08.9    2. Open wound  T14.8XXA 879.8   3. Pressure injury of left buttock, unstageable  L89.320 707.05     707.25   4. Pressure injury of right buttock, stage 3  L89.313 707.05     707.23       Patient was given instructions and counseling regarding his condition or for health maintenance advice. Please see specific information pulled into the AVS if appropriate.     This document has been electronically signed by Babs Ferris MD    May 30, 2025 11:34 EDT      No orders of the defined types were placed in this encounter.     Dictated Utilizing Dragon Dictation: Part of this note may be an electronic transcription/translation of spoken language to printed text using the Dragon Dictation System.

## 2025-06-02 ENCOUNTER — LAB REQUISITION (OUTPATIENT)
Dept: LAB | Facility: HOSPITAL | Age: 65
End: 2025-06-02
Payer: MEDICARE

## 2025-06-02 DIAGNOSIS — E11.22 TYPE 2 DIABETES MELLITUS WITH DIABETIC CHRONIC KIDNEY DISEASE: ICD-10-CM

## 2025-06-02 LAB
ALBUMIN SERPL-MCNC: 2.7 G/DL (ref 3.5–5.2)
ALBUMIN/GLOB SERPL: 1 G/DL
ALP SERPL-CCNC: 76 U/L (ref 39–117)
ALT SERPL W P-5'-P-CCNC: 9 U/L (ref 1–41)
ANION GAP SERPL CALCULATED.3IONS-SCNC: 10.8 MMOL/L (ref 5–15)
AST SERPL-CCNC: 13 U/L (ref 1–40)
BASOPHILS # BLD AUTO: 0.03 10*3/MM3 (ref 0–0.2)
BASOPHILS NFR BLD AUTO: 0.5 % (ref 0–1.5)
BILIRUB SERPL-MCNC: 0.2 MG/DL (ref 0–1.2)
BUN SERPL-MCNC: 17.3 MG/DL (ref 8–23)
BUN/CREAT SERPL: 45.5 (ref 7–25)
CALCIUM SPEC-SCNC: 8 MG/DL (ref 8.6–10.5)
CHLORIDE SERPL-SCNC: 105 MMOL/L (ref 98–107)
CHOLEST SERPL-MCNC: 114 MG/DL (ref 0–200)
CO2 SERPL-SCNC: 26.2 MMOL/L (ref 22–29)
CREAT SERPL-MCNC: 0.38 MG/DL (ref 0.76–1.27)
CRP SERPL-MCNC: 3.96 MG/DL (ref 0–0.5)
DEPRECATED RDW RBC AUTO: 48.9 FL (ref 37–54)
EGFRCR SERPLBLD CKD-EPI 2021: 124.5 ML/MIN/1.73
EOSINOPHIL # BLD AUTO: 0.55 10*3/MM3 (ref 0–0.4)
EOSINOPHIL NFR BLD AUTO: 9.8 % (ref 0.3–6.2)
ERYTHROCYTE [DISTWIDTH] IN BLOOD BY AUTOMATED COUNT: 14.2 % (ref 12.3–15.4)
GLOBULIN UR ELPH-MCNC: 2.6 GM/DL
GLUCOSE SERPL-MCNC: 84 MG/DL (ref 65–99)
HBA1C MFR BLD: 5.4 % (ref 4.8–5.6)
HCT VFR BLD AUTO: 31.5 % (ref 37.5–51)
HDLC SERPL-MCNC: 33 MG/DL (ref 40–60)
HGB BLD-MCNC: 9.3 G/DL (ref 13–17.7)
IMM GRANULOCYTES # BLD AUTO: 0.03 10*3/MM3 (ref 0–0.05)
IMM GRANULOCYTES NFR BLD AUTO: 0.5 % (ref 0–0.5)
LDLC SERPL CALC-MCNC: 65 MG/DL (ref 0–100)
LDLC/HDLC SERPL: 1.97 {RATIO}
LYMPHOCYTES # BLD AUTO: 1.24 10*3/MM3 (ref 0.7–3.1)
LYMPHOCYTES NFR BLD AUTO: 22.1 % (ref 19.6–45.3)
MCH RBC QN AUTO: 27.8 PG (ref 26.6–33)
MCHC RBC AUTO-ENTMCNC: 29.5 G/DL (ref 31.5–35.7)
MCV RBC AUTO: 94 FL (ref 79–97)
MONOCYTES # BLD AUTO: 0.48 10*3/MM3 (ref 0.1–0.9)
MONOCYTES NFR BLD AUTO: 8.6 % (ref 5–12)
NEUTROPHILS NFR BLD AUTO: 3.27 10*3/MM3 (ref 1.7–7)
NEUTROPHILS NFR BLD AUTO: 58.5 % (ref 42.7–76)
NRBC BLD AUTO-RTO: 0 /100 WBC (ref 0–0.2)
PLATELET # BLD AUTO: 212 10*3/MM3 (ref 140–450)
PMV BLD AUTO: 9.2 FL (ref 6–12)
POTASSIUM SERPL-SCNC: 3.7 MMOL/L (ref 3.5–5.2)
PROT SERPL-MCNC: 5.3 G/DL (ref 6–8.5)
RBC # BLD AUTO: 3.35 10*6/MM3 (ref 4.14–5.8)
SODIUM SERPL-SCNC: 142 MMOL/L (ref 136–145)
TRIGL SERPL-MCNC: 80 MG/DL (ref 0–150)
TSH SERPL DL<=0.05 MIU/L-ACNC: 2.01 UIU/ML (ref 0.27–4.2)
VLDLC SERPL-MCNC: 16 MG/DL (ref 5–40)
WBC NRBC COR # BLD AUTO: 5.6 10*3/MM3 (ref 3.4–10.8)

## 2025-06-02 PROCEDURE — 85025 COMPLETE CBC W/AUTO DIFF WBC: CPT | Performed by: PHYSICIAN ASSISTANT

## 2025-06-02 PROCEDURE — 86140 C-REACTIVE PROTEIN: CPT | Performed by: PHYSICIAN ASSISTANT

## 2025-06-02 PROCEDURE — 80061 LIPID PANEL: CPT | Performed by: PHYSICIAN ASSISTANT

## 2025-06-02 PROCEDURE — 80053 COMPREHEN METABOLIC PANEL: CPT | Performed by: PHYSICIAN ASSISTANT

## 2025-06-02 PROCEDURE — 84443 ASSAY THYROID STIM HORMONE: CPT | Performed by: PHYSICIAN ASSISTANT

## 2025-06-02 PROCEDURE — 82306 VITAMIN D 25 HYDROXY: CPT | Performed by: PHYSICIAN ASSISTANT

## 2025-06-02 PROCEDURE — 83036 HEMOGLOBIN GLYCOSYLATED A1C: CPT | Performed by: PHYSICIAN ASSISTANT

## 2025-06-03 ENCOUNTER — OFFICE VISIT (OUTPATIENT)
Dept: SURGERY | Facility: CLINIC | Age: 65
End: 2025-06-03
Payer: MEDICARE

## 2025-06-03 VITALS — HEIGHT: 68 IN | WEIGHT: 210 LBS | BODY MASS INDEX: 31.83 KG/M2

## 2025-06-03 DIAGNOSIS — L89.320 PRESSURE INJURY OF LEFT BUTTOCK, UNSTAGEABLE: ICD-10-CM

## 2025-06-03 DIAGNOSIS — L89.313 PRESSURE INJURY OF RIGHT BUTTOCK, STAGE 3: Primary | ICD-10-CM

## 2025-06-03 LAB — 25(OH)D3 SERPL-MCNC: 39.4 NG/ML (ref 30–100)

## 2025-06-03 PROCEDURE — 1160F RVW MEDS BY RX/DR IN RCRD: CPT | Performed by: SURGERY

## 2025-06-03 PROCEDURE — 1159F MED LIST DOCD IN RCRD: CPT | Performed by: SURGERY

## 2025-06-03 PROCEDURE — 99212 OFFICE O/P EST SF 10 MIN: CPT | Performed by: SURGERY

## 2025-06-03 NOTE — PROGRESS NOTES
Subjective   Ang Jackson is a 64 y.o. male  is here today for follow-up.         Ang Jackson is a 64 y.o. male after recent admission for sacral wound debridement.  The patient had bilateral gluteal and sacral wounds that were debrided to healthy tissue.  Patient is doing well and last images of the wounds show no evidence of further necrosis with excellent granulation tissue.  History of Present Illness         Physical Exam  Quadriplegic male with bilateral gluteal pressure ulcerations.  Physical Exam              Assessment     Diagnoses and all orders for this visit:    1. Pressure injury of right buttock, stage 3 (Primary)    2. Pressure injury of left buttock, unstageable      Ang Jackson is a 64 y.o. male with bilateral gluteal pressure ulceration wounds.  Patient is doing well and will follow-up with wound care.  Follow-up in my office as needed.  Continue current wound care.  Assessment & Plan          This document has been electronically signed by Deepak Lara MD   Nydia 3, 2025 12:57 EDT

## 2025-06-04 ENCOUNTER — HOSPITAL ENCOUNTER (OUTPATIENT)
Dept: WOUND CARE | Facility: HOSPITAL | Age: 65
Discharge: HOME | End: 2025-06-04
Payer: MEDICARE

## 2025-06-04 ENCOUNTER — OFFICE VISIT (OUTPATIENT)
Dept: INFECTIOUS DISEASES | Facility: CLINIC | Age: 65
End: 2025-06-04
Payer: MEDICARE

## 2025-06-04 VITALS
RESPIRATION RATE: 18 BRPM | HEART RATE: 63 BPM | SYSTOLIC BLOOD PRESSURE: 130 MMHG | HEIGHT: 68 IN | WEIGHT: 210 LBS | DIASTOLIC BLOOD PRESSURE: 73 MMHG | OXYGEN SATURATION: 99 % | BODY MASS INDEX: 31.83 KG/M2 | TEMPERATURE: 97 F

## 2025-06-04 VITALS
TEMPERATURE: 98.7 F | WEIGHT: 210 LBS | HEART RATE: 63 BPM | BODY MASS INDEX: 31.83 KG/M2 | OXYGEN SATURATION: 95 % | DIASTOLIC BLOOD PRESSURE: 55 MMHG | SYSTOLIC BLOOD PRESSURE: 92 MMHG | RESPIRATION RATE: 16 BRPM | HEIGHT: 68 IN

## 2025-06-04 DIAGNOSIS — L08.9 INFECTED WOUND: Primary | ICD-10-CM

## 2025-06-04 DIAGNOSIS — N39.42 URINARY INCONTINENCE WITHOUT SENSORY AWARENESS: ICD-10-CM

## 2025-06-04 DIAGNOSIS — T14.8XXA OPEN WOUND: Primary | ICD-10-CM

## 2025-06-04 DIAGNOSIS — L89.320 PRESSURE INJURY OF LEFT BUTTOCK, UNSTAGEABLE: ICD-10-CM

## 2025-06-04 DIAGNOSIS — L89.313 PRESSURE INJURY OF RIGHT BUTTOCK, STAGE 3: ICD-10-CM

## 2025-06-04 DIAGNOSIS — T14.8XXA INFECTED WOUND: Primary | ICD-10-CM

## 2025-06-04 PROCEDURE — A9270 NON-COVERED ITEM OR SERVICE: HCPCS | Performed by: NURSE PRACTITIONER

## 2025-06-04 PROCEDURE — 63710000001 A&D OINTMENT 425 G JAR: Performed by: NURSE PRACTITIONER

## 2025-06-04 PROCEDURE — 63710000001 COLLAGENASE 250 UNIT/GM OINTMENT 30 G TUBE: Performed by: NURSE PRACTITIONER

## 2025-06-04 PROCEDURE — 63710000001 ZINC OXIDE 20 % OINTMENT 454 G JAR: Performed by: NURSE PRACTITIONER

## 2025-06-04 PROCEDURE — 63710000001 CLOTRIMAZOLE 1 % CREAM 30 G TUBE: Performed by: NURSE PRACTITIONER

## 2025-06-04 PROCEDURE — 63710000001 ALUMINUM-MAGNESIUM HYDROXIDE-SIMETHICONE 200-200-20 MG/5ML SUSPENSION: Performed by: NURSE PRACTITIONER

## 2025-06-04 RX ORDER — NYSTATIN 100000 [USP'U]/G
1 POWDER TOPICAL ONCE
OUTPATIENT
Start: 2025-06-04 | End: 2025-06-04

## 2025-06-04 RX ORDER — LIDOCAINE HYDROCHLORIDE 20 MG/ML
1 JELLY TOPICAL ONCE
Status: CANCELLED | OUTPATIENT
Start: 2025-06-04 | End: 2025-06-04

## 2025-06-04 RX ORDER — CASTOR OIL AND BALSAM, PERU 788; 87 MG/G; MG/G
1 OINTMENT TOPICAL AS NEEDED
OUTPATIENT
Start: 2025-06-04

## 2025-06-04 RX ORDER — DIAPER,BRIEF,INFANT-TODD,DISP
1 EACH MISCELLANEOUS ONCE
OUTPATIENT
Start: 2025-06-04 | End: 2025-06-04

## 2025-06-04 RX ORDER — SODIUM HYPOCHLORITE 2.5 MG/ML
1 SOLUTION TOPICAL AS NEEDED
Status: CANCELLED | OUTPATIENT
Start: 2025-06-04

## 2025-06-04 RX ORDER — NYSTATIN AND TRIAMCINOLONE ACETONIDE 100000; 1 [USP'U]/G; MG/G
1 OINTMENT TOPICAL 2 TIMES DAILY
Qty: 30 G | Refills: 0 | Status: SHIPPED | OUTPATIENT
Start: 2025-06-04 | End: 2025-06-19

## 2025-06-04 RX ORDER — LIDOCAINE HYDROCHLORIDE AND EPINEPHRINE BITARTRATE 20; .01 MG/ML; MG/ML
10 INJECTION, SOLUTION SUBCUTANEOUS ONCE
OUTPATIENT
Start: 2025-06-04 | End: 2025-06-04

## 2025-06-04 RX ORDER — LIDOCAINE HYDROCHLORIDE 20 MG/ML
1 JELLY TOPICAL ONCE
Status: COMPLETED | OUTPATIENT
Start: 2025-06-04 | End: 2025-06-04

## 2025-06-04 RX ORDER — LIDOCAINE HYDROCHLORIDE 20 MG/ML
JELLY TOPICAL AS NEEDED
OUTPATIENT
Start: 2025-06-04

## 2025-06-04 RX ORDER — SODIUM HYPOCHLORITE 1.25 MG/ML
1 SOLUTION TOPICAL AS NEEDED
OUTPATIENT
Start: 2025-06-04

## 2025-06-04 RX ORDER — LIDOCAINE HYDROCHLORIDE 20 MG/ML
10 INJECTION, SOLUTION INFILTRATION; PERINEURAL ONCE
OUTPATIENT
Start: 2025-06-04 | End: 2025-06-04

## 2025-06-04 RX ORDER — SILVER SULFADIAZINE 10 MG/G
1 CREAM TOPICAL AS NEEDED
OUTPATIENT
Start: 2025-06-04

## 2025-06-04 RX ORDER — MUPIROCIN 20 MG/G
1 OINTMENT TOPICAL AS NEEDED
OUTPATIENT
Start: 2025-06-04

## 2025-06-04 RX ADMIN — LIDOCAINE HYDROCHLORIDE 1 ML: 20 JELLY TOPICAL at 11:50

## 2025-06-04 RX ADMIN — COLLAGENASE SANTYL 1 APPLICATION: 250 OINTMENT TOPICAL at 11:50

## 2025-06-04 RX ADMIN — VITAMINS A AND D OINTMENT 1 APPLICATION: 15.5; 53.4 OINTMENT TOPICAL at 11:41

## 2025-06-04 NOTE — LETTER
06/05/2025  Ang Jackson, 1960           Wound Care Instructions:     Left Lower Gluteal- Clean with wound cleanser or normal saline, pat dry. Apply santyl and saline moistened gauze packing and cover with abd pad and secure with foam tape.      Right Lower Gluteal- Clean with wound cleanser or normal saline, pat dry. Apply santyl and saline moistened gauze packing and cover with abd pad and secure with foam tape.      Perirectal- Clean with wound cleanser or normal saline, pat dry. Apply layer of santyl, cover with saline moistened 4x4 and cover  with abd pad and secure with foam tape.       This needs to be done daily.      Patient will need supplies to last him through the week. He will need 4x4's, small kerlix gauze, ABD pads. Foam tape was supplied by clinic.      If you have any questions please call our clinic. Patient will need supplies to last him through the week. He is seen in clinic once a week.            Thank you,     TANJA Serrano

## 2025-06-04 NOTE — PROGRESS NOTES
[facility]         Referring Provider: No referring provider defined for this encounter.    Subjective      Chief Complaint  Follow-up (Infected wound/)    History of Present Illness  Ang Jackson is a 64 y.o. male who presents today to [facility] for infectious disease evaluation and antibiotic management.    Patient is a 64 y.o. male with past medical history significant for  cervical degenerative disc disease status post C6-7 anterior cervical discectomy and fusion in 2023 and nontraumatic cervical spinal cord injury secondary with cervical epidural abscess with cord compression from C3-C5 in October 2024 status post incision and drainage with C4 corpectomy, C3-6 laminectomy and emergent hematoma evacuation. Patient with neurogenic bladder and chronic hanks catheter due to significant spinal trauma. 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  Patient was seen in the wound care clinic outpatient and due to worsening wound advised patient to go to ER.  Patient was evaluated and admitted for wound infection.  Surgery was consulted and wounds were debrided in OR on 5/18.  Patient is resting in bed with family at bedside.  Patient is receiving IV antibiotics.       Past Medical History:   Diagnosis Date    Anesthesia     diffculty adminster spinal block, patient stated with last hip surgery 7-2020 several attempts per anthesia and no luck, resulted in general anesthesia     Anxiety     Arthritis     Rheumatoid    COVID     2020    GERD (gastroesophageal reflux disease)     Hypertension     Lactose intolerance     Low back pain     Sleep apnea     does not wear machine, MILD, DOES NOT HAVE AFTER WGT LOSS SURGERY    Wears reading eyeglasses        Past Surgical History:   Procedure  Laterality Date    ANTERIOR CERVICAL DISCECTOMY W/ FUSION Right 02/19/2023    Procedure: CERVICAL DISCECTOMY ANTERIOR WITH FUSION C6-7;  Surgeon: Jaiden Aldridge MD;  Location:  [facility];  Service: Neurosurgery;  Laterality: Right;    BARIATRIC SURGERY  4 year ago    CARPAL TUNNEL RELEASE Bilateral     CARPAL TUNNEL RELEASE Right 06/29/2023    Procedure: CARPAL TUNNEL RELEASE RIGHT;  Surgeon: Jaiden Aldridge MD;  Location:  [facility];  Service: Neurosurgery;  Laterality: Right;    COLONOSCOPY      5 years ago    COLONOSCOPY N/A 04/05/2022    Procedure: COLONOSCOPY FOR SCREENING;  Surgeon: Luz Galvez MD;  Location:  [facility];  Service: Gastroenterology;  Laterality: N/A;    ENDOSCOPY N/A 04/05/2022    Procedure: ESOPHAGOGASTRODUODENOSCOPY WITH BIOPSY;  Surgeon: Luz Galvez MD;  Location:  [facility];  Service: Gastroenterology;  Laterality: N/A;    GASTRIC SLEEVE LAPAROSCOPIC      2017    HIP SURGERY Right times 2    KNEE ARTHROSCOPY Left     TOTAL HIP ARTHROPLASTY Left 01/25/2021    Procedure: TOTAL HIP ARTHROPLASTY LEFT;  Surgeon: Jb Patel MD;  Location:  [facility];  Service: Orthopedics;  Laterality: Left;    TOTAL HIP ARTHROPLASTY REVISION Right 07/20/2020    Procedure: TOTAL HIP ARTHROPLASTY REVISION RIGHT;  Surgeon: Jb Patel MD;  Location:  [facility];  Service: Orthopedics;  Laterality: Right;    WOUND DEBRIDEMENT N/A 5/18/2025    Procedure: DEBRIDEMENT SACRAL ULCER/WOUND;  Surgeon: Deepak Lara MD;  Location:  [facility];  Service: General;  Laterality: N/A;       Social History     Socioeconomic History    Marital status:    Tobacco Use    Smoking status: Never    Smokeless tobacco: Never   Vaping Use    Vaping status: Never Used   Substance and Sexual Activity    Alcohol use: No    Drug use: Never    Sexual activity: Yes       Family History  family history includes Cancer in his mother; Colon cancer in his maternal uncle; Colon polyps in his  "maternal uncle; Diabetes in his brother and paternal grandmother; Gout in his paternal grandmother; Heart disease in his father; Hypertension in his brother and father; Osteoarthritis in his father.    Immunization History   Administered Date(s) Administered    COVID-19 ([employer]) 04/05/2021        Allergies  Allergies   Allergen Reactions    Sulfa Antibiotics Hives     Hives, shortness of breath    Codeine Nausea And Vomiting    Ketorolac Other (See Comments)     Pt reports heavy, \"tight\" feeling in his chest.    Morphine And Codeine Nausea And Vomiting       The medication list has been reviewed and updated.   Current Medications    Current Outpatient Medications:     ascorbic acid (VITAMIN C) 500 MG tablet, Take 2 tablets by mouth Daily., Disp: , Rfl:     baclofen (LIORESAL) 10 MG tablet, Take 2 tablets by mouth 3 (Three) Times a Day., Disp: , Rfl:     baclofen (LIORESAL) 10 MG tablet, Take 5 tablets by mouth every night at bedtime., Disp: , Rfl:     Brexpiprazole (Rexulti) 1 MG tablet, Take 1 tablet by mouth Daily., Disp: , Rfl:     busPIRone (BUSPAR) 5 MG tablet, Take 1 tablet by mouth Every Night., Disp: , Rfl:     Cyanocobalamin (B-12 Compliance Injection) 1000 MCG/ML kit, Inject 1 mL as directed Every 14 (Fourteen) Days., Disp: , Rfl:     diclofenac (VOLTAREN) 75 MG EC tablet, Take 1 tablet by mouth 2 (Two) Times a Day., Disp: , Rfl:     famotidine (PEPCID) 20 MG tablet, Take 1 tablet by mouth Daily., Disp: , Rfl:     ferrous sulfate 325 (65 FE) MG tablet, Take 1 tablet by mouth Daily With Breakfast., Disp: , Rfl:     FLUoxetine (PROzac) 20 MG capsule, Take 1 capsule by mouth Daily., Disp: , Rfl:     gabapentin (NEURONTIN) 300 MG capsule, Take 3 capsules by mouth 3 (Three) Times a Day., Disp: , Rfl:     Lactobacillus Acid-Pectin (ACIDOPHILUS PLUS PECTIN PO), Take 1 capsule by mouth Daily., Disp: , Rfl:     loratadine (CLARITIN) 10 MG tablet, Take 1 tablet by mouth Daily., Disp: , Rfl:     Multiple " Vitamins-Minerals (MENS MULTIPLUS PO), Take 1 tablet by mouth Daily., Disp: , Rfl:     Omadacycline Tosylate (Nuzyra) 150 MG tablet, Take 3 tablets by mouth Daily for 2 days, THEN 2 tablets Daily for 12 days. Take on an empty stomach. Do not take iron or multivitamins for 2 hours before or 4 hours after Nuzyra., Disp: 30 tablet, Rfl: 0    oxyCODONE (ROXICODONE) 10 MG tablet, Take 1 tablet by mouth 4 (Four) Times a Day., Disp: , Rfl:     oxyCODONE ER (oxyCONTIN) 20 MG 12 hr tablet, Take 1 tablet by mouth Every Night., Disp: , Rfl:     pantoprazole (PROTONIX) 40 MG EC tablet, Take 1 tablet by mouth Daily., Disp: , Rfl:     QUEtiapine (SEROquel) 100 MG tablet, Take 2 tablets by mouth Every Night., Disp: , Rfl:     tiZANidine (ZANAFLEX) 2 MG tablet, Take 1 tablet by mouth 4 (Four) Times a Day., Disp: , Rfl:     tiZANidine (ZANAFLEX) 2 MG tablet, Take 2 tablets by mouth Every Night., Disp: , Rfl:     vitamin D3 125 MCG (5000 UT) capsule capsule, Take 1 capsule by mouth Daily., Disp: , Rfl:     nystatin-triamcinolone (MYCOLOG) 160939-7.1 UNIT/GM-% ointment, Apply 1 Application topically to the appropriate area as directed 2 (Two) Times a Day for 15 days., Disp: 30 g, Rfl: 0  No current facility-administered medications for this visit.    Facility-Administered Medications Ordered in Other Visits:     collagenase ointment 1 Application, 1 Application, Topical, PRN, Jillian Bailey APRN, 1 Application at 06/04/25 1150    magic barrier cream 1 Application, 1 Application, Topical, PRN, Jillian Bailey APRN, 1 Application at 06/04/25 1141      Review of Systems    Review of Systems   Constitutional:  Negative for activity change, appetite change, chills, diaphoresis and fever.   HENT:  Negative for congestion, dental problem, drooling, mouth sores, sinus pressure and sneezing.    Eyes:  Negative for blurred vision, double vision, photophobia and discharge.   Respiratory:  Negative for apnea, cough, choking,  "chest tightness, shortness of breath and wheezing.    Cardiovascular:  Negative for chest pain, palpitations and leg swelling.   Gastrointestinal:  Negative for abdominal distention, abdominal pain, diarrhea, nausea and vomiting.   Genitourinary:  Negative for dysuria, flank pain and frequency.   Musculoskeletal:  Positive for arthralgias, back pain and gait problem. Negative for neck pain and neck stiffness.   Skin:  Positive for wound. Negative for rash.   Neurological:  Negative for dizziness, tremors, seizures, syncope, speech difficulty, numbness, headache and confusion.   Psychiatric/Behavioral:  Negative for agitation, behavioral problems, hallucinations and suicidal ideas.         Objective     Vital Signs:  /73 (BP Location: Left arm, Patient Position: Sitting, Cuff Size: Adult)   Pulse 63   Temp 97 °F (36.1 °C) (Infrared)   Resp 18   Ht 172.7 cm (68\")   Wt 95.3 kg (210 lb)   SpO2 99%   BMI 31.93 kg/m²   Estimated body mass index is 31.93 kg/m² as calculated from the following:    Height as of this encounter: 172.7 cm (68\").    Weight as of this encounter: 95.3 kg (210 lb).    Physical Exam  Constitutional:       General: He is not in acute distress.     Appearance: Normal appearance. He is normal weight. He is not ill-appearing, toxic-appearing or diaphoretic.   HENT:      Head: Normocephalic and atraumatic.      Nose: Nose normal. No congestion or rhinorrhea.      Mouth/Throat:      Mouth: Mucous membranes are moist.      Pharynx: Oropharynx is clear.   Eyes:      General: No scleral icterus.     Extraocular Movements: Extraocular movements intact.      Pupils: Pupils are equal, round, and reactive to light.   Neck:      Vascular: No carotid bruit.   Cardiovascular:      Rate and Rhythm: Normal rate and regular rhythm.      Pulses: Normal pulses.      Heart sounds: No murmur heard.  Pulmonary:      Effort: Pulmonary effort is normal. No respiratory distress.      Breath sounds: Normal breath " "sounds. No stridor. No wheezing, rhonchi or rales.   Chest:      Chest wall: No tenderness.   Abdominal:      General: Abdomen is flat. Bowel sounds are normal. There is no distension.      Palpations: Abdomen is soft.      Tenderness: There is no abdominal tenderness. There is no guarding or rebound.   Musculoskeletal:      Cervical back: Normal range of motion and neck supple. No rigidity or tenderness.   Lymphadenopathy:      Cervical: No cervical adenopathy.   Skin:     Coloration: Skin is not jaundiced.      Findings: No lesion or rash.      Comments: Sacral wound looks very clean with no evidence of purulent drainage or odor.  Mild surrounding erythema.  There is a ulcer with necrotic scab on top but does not seem to be overwhelmingly infected.   Neurological:      General: No focal deficit present.      Mental Status: He is alert and oriented to person, place, and time.   Psychiatric:         Mood and Affect: Mood normal.         Behavior: Behavior normal.          Result Review :  The following data was reviewed by Babs Ferris MD     Lab Results  Lab Results   Component Value Date    WBC 5.60 06/02/2025    HGB 9.3 (L) 06/02/2025    HCT 31.5 (L) 06/02/2025    MCV 94.0 06/02/2025     06/02/2025     Lab Results   Component Value Date    GLUCOSE 84 06/02/2025    BUN 17.3 06/02/2025    CREATININE 0.38 (L) 06/02/2025    EGFRIFNONA 99 01/26/2021    BCR 45.5 (H) 06/02/2025    K 3.7 06/02/2025    CO2 26.2 06/02/2025    CALCIUM 8.0 (L) 06/02/2025    ALBUMIN 2.7 (L) 06/02/2025    AST 13 06/02/2025    ALT 9 06/02/2025      Lab Results   Component Value Date    CRP 3.96 (H) 06/02/2025        No results found for: \"ACANTHNAEG\", \"AFBCX\", \"BPERTUSSISCX\", \"BLOODCX\"  No results found for: \"BCIDPCR\", \"CXREFLEX\", \"CSFCX\", \"CULTURETIS\"  No results found for: \"CULTURES\", \"HSVCX\", \"URCX\"  No results found for: \"EYECULTURE\", \"GCCX\", \"HSVCULTURE\", \"LABHSV\"  No results found for: \"LEGIONELLA\", \"MRSACX\", \"MUMPSCX\", " "\"MYCOPLASCX\"  No results found for: \"NOCARDIACX\", \"STOOLCX\"  No results found for: \"THROATCX\", \"UNSTIMCULT\", \"URINECX\", \"CULTURE\", \"VZVCULTUR\"  No results found for: \"VIRALCULTU\", \"WOUNDCX\"    Radiology Results  CT Pelvis With & Without Contrast  Result Date: 5/18/2025  Impression: 1.  No soft tissue loculated fluid collections are identified. 2.  Diffuse soft tissue edema, nonspecific. 3.  Left deep gluteal ulceration is noted which essentially extends to the level of the left ischial tuberosity. No sclerotic or destructive bony features identified. 4.  Deep and wide-base right sacral-gluteal ulceration is noted which extends to the level of the right ischial tuberosity and approximates the gluteal crease. No associated sclerosis or destructive bone changes identified in the region of ulceration. 5.  Anasarca.  This report was finalized on 5/18/2025 8:24 AM by Dr. Jason Kruse MD.      MRI Pelvis Without Contrast  Result Date: 5/16/2025  Impression: Impression: 1. Limited exam. 2. Postsurgical changes with subcutaneous edema and fluid superficial to the hips, further evaluation if clinically warranted.  This report was finalized on 5/16/2025 7:33 PM by Angel Monet DO.      MRI Cervical Spine With & Without Contrast  Result Date: 5/16/2025  Impression: Impression: 1. Surgical fusion with surgical hardware artifact limits evaluation. 2. Myelomalacia cervical cord.  This report was finalized on 5/16/2025 7:33 PM by Angel Monet DO.      MRI Thoracic Spine With & Without Contrast  Result Date: 5/16/2025  Impression: Impression: 1. No acute process.  This report was finalized on 5/16/2025 7:33 PM by Angel Monet DO.               Assessment / Plan        Diagnoses and all orders for this visit:    1. Infected wound (Primary)  -     Cancel: C-reactive Protein; Future    2. Pressure injury of left buttock, unstageable  -     Cancel: C-reactive Protein; Future    3. Pressure injury of right buttock, stage 3  -  "    Cancel: C-reactive Protein; Future    4. Urinary incontinence without sensory awareness  -     Cancel: C-reactive Protein; Future    Other orders  -     nystatin-triamcinolone (MYCOLOG) 856590-7.1 UNIT/GM-% ointment; Apply 1 Application topically to the appropriate area as directed 2 (Two) Times a Day for 15 days.  Dispense: 30 g; Refill: 0      Sacral wounds are dressed, clean dry and intact.  CK WNL at 21.  WBC normal at 5.82.  Intraoperative tissue/bone culture from the sacrum preliminarily reporting moderate growth E. coli.     General surgery operative note reported necrotic tissue and subcutaneous fat sharply excised down to the fascia, not involving fascia.  No mention of bony involvement.  MRI does not report any evidence of osteomyelitis.  Previous wound cultures finalized with E. coli ESBL and Enterococcus faecalis.     Plan completed his course of ertapenem 1 g IV every 24 hours and daptomycin 6 mg/kg IV 24 hours for total of 14 days on 5/30/2025 infected chronic sacral ulcers without evidence of osteomyelitis.    Continue Nuzyra x 14 days.    Labs as of June 2, 2025 with CRP stable at 3.96 well-controlled sugar with A1c at 5.4%.    Patient is following up with wound care and Dr. Lara who seems to be happy with the progress so far.    I am concerned about a superimposed yeast infection to his periwound and we will proceed with nystatin triamcinolone cream to help with the degree of erythema and inflammation.    Follow Up   Return in about 1 week (around 6/11/2025) for Follow up, With Dr. Ferris.    Visit Diagnoses:    ICD-10-CM ICD-9-CM   1. Infected wound  T14.8XXA 958.3    L08.9    2. Pressure injury of left buttock, unstageable  L89.320 707.05     707.25   3. Pressure injury of right buttock, stage 3  L89.313 707.05     707.23   4. Urinary incontinence without sensory awareness  N39.42 788.34       Patient was given instructions and counseling regarding his condition or for health maintenance  advice. Please see specific information pulled into the AVS if appropriate.     This document has been electronically signed by Babs Ferris MD   June 4, 2025 12:04 EDT      New Medications Ordered This Visit   Medications    nystatin-triamcinolone (MYCOLOG) 024144-4.1 UNIT/GM-% ointment     Sig: Apply 1 Application topically to the appropriate area as directed 2 (Two) Times a Day for 15 days.     Dispense:  30 g     Refill:  0      Dictated Utilizing Dragon Dictation: Part of this note may be an electronic transcription/translation of spoken language to printed text using the Dragon Dictation System.

## 2025-06-04 NOTE — PROGRESS NOTES
Wound Clinic Note  Patient Identification:  Name:  Ang Jackson  Age:  64 y.o.  Sex:  male  :  1960  MRN:  [id number]   Visit Number:  [id number]  Primary Care Physician:  Deepak Perez     Subjective     Chief complaint:     Multiple pressure injuries     History of presenting illness:     Patient is a 64 y.o. male with past medical history significant for obesity, parplegia,  that presented today for evaluation of bilateral gluteal and sacral pressure injuries.  Reports areas have been present for over a month. He is paraplegic. Has been applying santyl and hydrofera blue to wounds. Denies any fever or chills. Moderate drainage without odor. Low air loss mattress has been ordered by PCP, should be delivered this week.  Denies history of diabetes, or smoking. Finished dose of cipro.     Interval History:   2025: Seen in clinic today for follow-up to pressure injuries to right and left gluteal, and perirectal MASD. Right gluteal wound has worsened, now with DTI down to right groin area. He is quadpraplegic, complicating wound prgoression. He is incontinent of bowel and bladder. Chin catheter in place. Awaiting approval for low-air loss mattress. Denies any fever or chills. No other issues or concerns reported.    2025: Seen in clinic today for follow-up to pressure injuries to right and left gluteal, and perirectal MASD. Right gluteal wound has worsened, now with DTI down to right groin area. He is quadpraplegic, complicating wound prgoression. He is incontinent of bowel and bladder. Chin catheter in place. Awaiting approval for low-air loss mattress. Denies any fever or chills. No other issues or concerns reported.    2025: Seen in clinic today for follow-up to pressure injuries to right and left gluteal, and perirectal MASD. Right gluteal wound is stable. Continues to be severe.. He is quadpraplegic, complicating wound prgoression. He is incontinent of bowel and bladder. Chin  catheter in place. Awaiting approval for low-air loss mattress. Denies any fever or chills. No other issues or concerns reported.    05/07/2025: Seen in clinic today for follow-up to pressure injuries to right and left gluteal, and perirectal MASD. Right gluteal wound is stable. Continues to be severe.. He is quadpraplegic, complicating wound prgoression. He is incontinent of bowel and bladder. Chin catheter in place. Awaiting approval for low-air loss mattress. Denies any fever or chills. No other issues or concerns reported.    05/15/2025: Seen in clinic today for follow-up to pressure injuries or bilateral gluteal. Left gluteal wound continues to worsen with increase In tunnel area. Discussed concern of worsening and possible going to ED. Would like to avoid if possible. Will obtain new wound culture and labs. Pending results may require additional antibiotics. Was scheduled to see infectious disease tomorrow unfortunatley had to cancel. Highly encouraged to make following, which is scheduled for next week .    05/29/2025: Seen in clinic today for follow-up to pressure injuries or bilateral gluteal. Left gluteal wound continues to worsen with increase In tunnel area. Right gluteal wound with necrotic tissue. Sacral wound with necrotic tissue. Wounds continue to be severe. Will attempt debridement. Denies any fever or chills. Does have low-air loss mattress at home.   ---------------------------------------------------------------------------------------------------------------------   Review of Systems   Constitutional:  Negative for chills and fever.   Respiratory:  Negative for cough and shortness of breath.    Cardiovascular:  Positive for leg swelling. Negative for chest pain.   Gastrointestinal:  Negative for nausea and vomiting.   Musculoskeletal:  Positive for back pain and gait problem.   Skin:  Positive for wound.       ---------------------------------------------------------------------------------------------------------------------   Past Medical History:   Diagnosis Date    Anesthesia     diffculty adminster spinal block, patient stated with last hip surgery 7-2020 several attempts per awilda and no lucsridhar, resulted in general anesthesia     Anxiety     Arthritis     Rheumatoid    COVID     2020    GERD (gastroesophageal reflux disease)     Hypertension     Lactose intolerance     Low back pain     Sleep apnea     does not wear machine, MILD, DOES NOT HAVE AFTER WGT LOSS SURGERY    Wears reading eyeglasses      Past Surgical History:   Procedure Laterality Date    ANTERIOR CERVICAL DISCECTOMY W/ FUSION Right 02/19/2023    Procedure: CERVICAL DISCECTOMY ANTERIOR WITH FUSION C6-7;  Surgeon: Jaiden Aldridge MD;  Location:  [facility];  Service: Neurosurgery;  Laterality: Right;    BARIATRIC SURGERY  4 year ago    CARPAL TUNNEL RELEASE Bilateral     CARPAL TUNNEL RELEASE Right 06/29/2023    Procedure: CARPAL TUNNEL RELEASE RIGHT;  Surgeon: Jaiden Aldridge MD;  Location:  [facility];  Service: Neurosurgery;  Laterality: Right;    COLONOSCOPY      5 years ago    COLONOSCOPY N/A 04/05/2022    Procedure: COLONOSCOPY FOR SCREENING;  Surgeon: Luz Galvez MD;  Location:  [facility];  Service: Gastroenterology;  Laterality: N/A;    ENDOSCOPY N/A 04/05/2022    Procedure: ESOPHAGOGASTRODUODENOSCOPY WITH BIOPSY;  Surgeon: Luz Galvez MD;  Location:  [facility];  Service: Gastroenterology;  Laterality: N/A;    GASTRIC SLEEVE LAPAROSCOPIC      2017    HIP SURGERY Right times 2    KNEE ARTHROSCOPY Left     TOTAL HIP ARTHROPLASTY Left 01/25/2021    Procedure: TOTAL HIP ARTHROPLASTY LEFT;  Surgeon: Jb Patel MD;  Location:  [facility];  Service: Orthopedics;  Laterality: Left;    TOTAL HIP ARTHROPLASTY REVISION Right 07/20/2020    Procedure: TOTAL HIP ARTHROPLASTY REVISION RIGHT;  Surgeon: Jb Patel  "MD;  Location:  [facility];  Service: Orthopedics;  Laterality: Right;    WOUND DEBRIDEMENT N/A 5/18/2025    Procedure: DEBRIDEMENT SACRAL ULCER/WOUND;  Surgeon: Deepak Lara MD;  Location: [facility];  Service: General;  Laterality: N/A;     Family History   Problem Relation Age of Onset    Heart disease Father     Hypertension Father     Osteoarthritis Father     Cancer Mother     Diabetes Brother     Hypertension Brother     Gout Paternal Grandmother     Diabetes Paternal Grandmother     Colon cancer Maternal Uncle     Colon polyps Maternal Uncle      Social History     Socioeconomic History    Marital status:    Tobacco Use    Smoking status: Never    Smokeless tobacco: Never   Vaping Use    Vaping status: Never Used   Substance and Sexual Activity    Alcohol use: No    Drug use: Never    Sexual activity: Yes     ---------------------------------------------------------------------------------------------------------------------   Allergies:  Sulfa antibiotics, Codeine, Ketorolac, and Morphine and codeine  ---------------------------------------------------------------------------------------------------------------------  Objective     ---------------------------------------------------------------------------------------------------------------------   Vital Signs:  There were no vitals taken for this visit.  Estimated body mass index is 31.93 kg/m² as calculated from the following:    Height as of 6/4/25: 172.7 cm (68\").    Weight as of 6/4/25: 95.3 kg (210 lb).  There is no height or weight on file to calculate BMI.  Wt Readings from Last 3 Encounters:   06/04/25 95.3 kg (210 lb)   06/03/25 95.3 kg (210 lb)   05/30/25 95.3 kg (210 lb)       ---------------------------------------------------------------------------------------------------------------------   Physical Exam  Wound Assessment:  Location: Sacral  Wound Measurements: 4.5 X 6 X 0.4cm   Tunneling: No Undermining: No  Dressing " Appearance: dressingappearance: clean  Closure: NA  Changes since last exam: no changes  Etiology and classification: MASD  Wound bed structures/characteristics: partial thickness (subcutaneous tissue is not exposed), red  Edges moist  Periwound characteristics: excoriated  Periwound Temperature: normal turgor and temperature  Drainage characteristics: moderate, serous   Perfusion characteristics: NA    Wound Assessment:  Location: Left, gluteal  Wound Measurements: 5 X 9 X 5.2cm   Tunneling: No Undermining: No  Dressing Appearance: dressingappearance: clean  Closure: NA  Changes since last exam: no changes  Etiology and classification: Unstageable Pressure injury   Wound bed structures/characteristics: full-thickness (subcutaneous tissue is exposed in at least a portion of the wound), black eschar, moist, pink  Edges moist  Periwound characteristics: intact  Periwound Temperature: normal turgor and temperature  Drainage characteristics: moderate, serous   Perfusion characteristics: NA    Wound Assessment:  Location: Right, gluteal  Wound Measurements: 9 X 5.5 X 2cm   Tunneling: No Undermining: No  Dressing Appearance: dressingappearance: clean  Closure: NA  Changes since last exam: ulcer is worsening   Etiology and classification: stage 3 pressure ulcer  Wound bed structures/characteristics: full-thickness (subcutaneous tissue is exposed in at least a portion of the wound), moist, pink, yellow  Edges moist  Periwound characteristics: Purple, non-blanchable  Periwound Temperature: normal turgor and temperature  Drainage characteristics: moderate, serous   Perfusion characteristics: NA    Wound Goal (s):Free of infection and No further symptoms  Assessment & Plan      Patient Active Problem List    Diagnosis     Pressure injury of right buttock, stage 3 [L89.313]  -Clean with wound cleanser, apply magic barrier to area and cover with suberabsorbent and secure with tape  -Offloading pressure induction measures  discussed  -Ordered high protein diet 120g/day along with vitamin C 2000mg/day, vitamin A 5000 Units/day, vitamin D3 5000 Units/day, zinc 50mg/day to help promote wound healing   -Will order low-air loss mattress as wounds are continuing to worsen and will benefit from added support       Pressure injury of left buttock unstageable [L89.320]  -clean with wound cleanser, apply santyl to area and cover with silicone border dressing daily and PRN  -Wound culture obtained  -Labs ordered: CBC, CMP, CRP, sed rate, prealbumin, and hemoglobain A1C to assess inflammatory markers and nutritional status as this both and negatively impact wound healing if not properly treated.   -Offloading measures discussed   -Received low-air loss mattress today   -Ordered high protein diet 120g/day along with vitamin C 2000mg/day, vitamin A 5000 Units/day, vitamin D3 5000 Units/day, zinc 50mg/day to help promote wound healing   -This condition is associated with a high risk for non-healing, infection, sepsis, loss of function, reduced quality of life, and increased risk of premature mortality. The patient is at high-risk for additional pressure injury, requiring a high level of vigilance and coordination of care, and frequent re-assessment to prevent adverse outcomes.     Management of this condition is inherently complex, requiring ongoing optimization of many factors to assure the highest likelihood of a favorable outcome, including pressure relief, bioburden, multiple aspects of nutrition, infection management, and moisture and mechanical factors relevant to wound healing.        Dermatitis associated with moisture [L30.8], Perirectal skin irritation [K62.89]  -Clean with wound cleanser, apply magic barrier to area and leave open to air  -Offloading measures discussed   -keep area clean and dry   -High risk for worsening due to incontinent of bowel and bladder  -Chin catheter is in place and appears to be functioning        Obesity  [E66.9]  -An obese person?is at greater risk for wound infection and dehiscence or evisceration.       Diabetes mellitus [E11.9]  --Recommend adequate glycemic control to help promote wound healing  -Poor glycemic control increases risk of prolonged healing, infection, loss of limb and premature death       Quadriplegia  -Complicates all aspects of care  -Increases risk of wound failure due to decreased mobility      Wound Care Debridement Note   Pre-Procedure  Pre-Procedure Diagnosis: Pressure injury of sacrum with fat layer exposed, left lower gluteal with fat layer exposed, right gluteal pressure injury with fat layer exposed   Checked for Allergies: yes  Consent:Consent obtained, consent given by Patient,Risks Discussed, Alternatives Discussed  Indication: slough, necrotic tissue, and biofilm  Vascular status:ANGELICA testing is not relevant to this wound, as it is not located on the lower extremity.  Time out was called prior to procedure.   Pre procedure Pain assessment: a 1 on a scale of 0-10  Pre debridement measurements:   Sacrum: 4.5 X 6 X 0.4cm, volume: 5.655, surface: 21.21  Left gluteal: 9 X 9 X 5.5cm, volume: 233.263, surface: 63.62  Right gluteal: 5.5 X 4.5 X 4cm, volume: 51.836, surface: 19.44  sinus/tunnelNo, undermining No    Post Procedure  Post-Procedure Diagnosis: Pressure injury of sacrum with fat layer exposed, left lower gluteal with fat layer exposed, right gluteal pressure injury with fat layer exposed    Post debridement measurements:   Sacrum: 4.6 X 6.1 X 0.5cm, volume: 7.346, surface: 22.04  Left gluteal: 9.1 X 9.1 X 5.6cm, volume: 242.811, surface: 65.04  Right gluteal: 5.6 X 4.6 X 4.1cm, volume: 55.3, surface: 20.23  sinus/tunnelNo, undermining No  Post procedure Pain assessment: a 1 on a scale of 0-10  Graft/Implant/Prosthetics/Implanted Device/Transplants:  None  Complication(s):  None    Procedure details:  Method of Debridement: excissional (Surgical removal or cutting away, outside or  beyond the wound margin devitalized tissue, necrosis or slough.)  Procedure: The site was prepared using clean techniques, subcutaneous tissue was removed by surgical excision.  Instrument(s) used: Curette 4mm  Anesthesia:After checking patient allergies,lidocaine topical 2% was administered to provide anesthesia.  Tissue removed: subuctaneous, Percent Removed 70%  Culture or Biopsy: culture and sensitivity  Estimated Blood Loss: Small  Hemostasis Obtained: pressure    Clinical Impression:Moderate Complexity    Follow-up: 1 week    TANJA Mejias,CWS  [facility]  05/29/2025  1500

## 2025-06-11 ENCOUNTER — OFFICE VISIT (OUTPATIENT)
Dept: INFECTIOUS DISEASES | Facility: CLINIC | Age: 65
End: 2025-06-11
Payer: MEDICARE

## 2025-06-11 ENCOUNTER — HOSPITAL ENCOUNTER (INPATIENT)
Facility: HOSPITAL | Age: 65
LOS: 1 days | Discharge: HOME | End: 2025-06-13
Attending: SURGERY | Admitting: SURGERY
Payer: MEDICARE

## 2025-06-11 ENCOUNTER — HOSPITAL ENCOUNTER (OUTPATIENT)
Dept: WOUND CARE | Facility: HOSPITAL | Age: 65
Discharge: HOME | End: 2025-06-11
Admitting: NURSE PRACTITIONER
Payer: MEDICARE

## 2025-06-11 ENCOUNTER — PREP FOR SURGERY (OUTPATIENT)
Dept: OTHER | Facility: HOSPITAL | Age: 65
End: 2025-06-11
Payer: MEDICARE

## 2025-06-11 VITALS
DIASTOLIC BLOOD PRESSURE: 58 MMHG | SYSTOLIC BLOOD PRESSURE: 97 MMHG | RESPIRATION RATE: 18 BRPM | OXYGEN SATURATION: 99 % | HEIGHT: 68 IN | BODY MASS INDEX: 31.83 KG/M2 | HEART RATE: 80 BPM | WEIGHT: 210 LBS

## 2025-06-11 VITALS
HEART RATE: 88 BPM | HEIGHT: 68 IN | DIASTOLIC BLOOD PRESSURE: 49 MMHG | WEIGHT: 210 LBS | SYSTOLIC BLOOD PRESSURE: 99 MMHG | BODY MASS INDEX: 31.83 KG/M2 | TEMPERATURE: 98.5 F

## 2025-06-11 DIAGNOSIS — N39.42 URINARY INCONTINENCE WITHOUT SENSORY AWARENESS: ICD-10-CM

## 2025-06-11 DIAGNOSIS — T14.8XXA OPEN WOUND: Primary | ICD-10-CM

## 2025-06-11 DIAGNOSIS — L89.320 PRESSURE INJURY OF LEFT BUTTOCK, UNSTAGEABLE: ICD-10-CM

## 2025-06-11 DIAGNOSIS — S31.000D WOUND OF SACRAL REGION, SUBSEQUENT ENCOUNTER: Primary | ICD-10-CM

## 2025-06-11 DIAGNOSIS — L89.313 PRESSURE INJURY OF RIGHT BUTTOCK, STAGE 3: ICD-10-CM

## 2025-06-11 DIAGNOSIS — Z96.642 STATUS POST TOTAL HIP REPLACEMENT, LEFT: ICD-10-CM

## 2025-06-11 DIAGNOSIS — S31.000D WOUND OF SACRAL REGION, SUBSEQUENT ENCOUNTER: ICD-10-CM

## 2025-06-11 DIAGNOSIS — Z96.641 STATUS POST TOTAL REPLACEMENT OF RIGHT HIP: ICD-10-CM

## 2025-06-11 PROBLEM — S31.000A SACRAL WOUND: Status: ACTIVE | Noted: 2025-06-11

## 2025-06-11 PROBLEM — L89.90 DECUBITUS ULCER: Status: ACTIVE | Noted: 2025-06-11

## 2025-06-11 LAB — CRP SERPL-MCNC: 6.14 MG/DL (ref 0–0.5)

## 2025-06-11 PROCEDURE — A9270 NON-COVERED ITEM OR SERVICE: HCPCS | Performed by: NURSE PRACTITIONER

## 2025-06-11 PROCEDURE — G0378 HOSPITAL OBSERVATION PER HR: HCPCS

## 2025-06-11 PROCEDURE — 25010000002 HEPARIN (PORCINE) PER 1000 UNITS: Performed by: SURGERY

## 2025-06-11 PROCEDURE — 97602 WOUND(S) CARE NON-SELECTIVE: CPT

## 2025-06-11 PROCEDURE — 63710000001 CLOTRIMAZOLE 1 % CREAM 30 G TUBE: Performed by: NURSE PRACTITIONER

## 2025-06-11 PROCEDURE — 99221 1ST HOSP IP/OBS SF/LOW 40: CPT | Performed by: SURGERY

## 2025-06-11 PROCEDURE — 63710000001 A&D OINTMENT 425 G JAR: Performed by: NURSE PRACTITIONER

## 2025-06-11 PROCEDURE — 63710000001 ZINC OXIDE 20 % OINTMENT 454 G JAR: Performed by: NURSE PRACTITIONER

## 2025-06-11 PROCEDURE — 63710000001 SODIUM HYPOCHLORITE 0.25 % SOLUTION 473 ML BOTTLE: Performed by: NURSE PRACTITIONER

## 2025-06-11 PROCEDURE — 63710000001 COLLAGENASE 250 UNIT/GM OINTMENT 30 G TUBE: Performed by: NURSE PRACTITIONER

## 2025-06-11 PROCEDURE — 63710000001 ALUMINUM-MAGNESIUM HYDROXIDE-SIMETHICONE 200-200-20 MG/5ML SUSPENSION: Performed by: NURSE PRACTITIONER

## 2025-06-11 PROCEDURE — 86140 C-REACTIVE PROTEIN: CPT | Performed by: INTERNAL MEDICINE

## 2025-06-11 RX ORDER — HEPARIN SODIUM 5000 [USP'U]/ML
5000 INJECTION, SOLUTION INTRAVENOUS; SUBCUTANEOUS EVERY 8 HOURS SCHEDULED
Status: DISCONTINUED | OUTPATIENT
Start: 2025-06-11 | End: 2025-06-14 | Stop reason: HOSPADM

## 2025-06-11 RX ORDER — SODIUM CHLORIDE 0.9 % (FLUSH) 0.9 %
10 SYRINGE (ML) INJECTION AS NEEDED
Status: DISCONTINUED | OUTPATIENT
Start: 2025-06-11 | End: 2025-06-14 | Stop reason: HOSPADM

## 2025-06-11 RX ORDER — CASTOR OIL AND BALSAM, PERU 788; 87 MG/G; MG/G
1 OINTMENT TOPICAL AS NEEDED
Status: CANCELLED | OUTPATIENT
Start: 2025-06-11

## 2025-06-11 RX ORDER — NAPROXEN 250 MG/1
500 TABLET ORAL 2 TIMES DAILY WITH MEALS
Status: CANCELLED | OUTPATIENT
Start: 2025-06-11

## 2025-06-11 RX ORDER — SODIUM HYPOCHLORITE 2.5 MG/ML
1 SOLUTION TOPICAL AS NEEDED
Status: DISCONTINUED | OUTPATIENT
Start: 2025-06-11 | End: 2025-06-12 | Stop reason: HOSPADM

## 2025-06-11 RX ORDER — LIDOCAINE HYDROCHLORIDE 20 MG/ML
10 INJECTION, SOLUTION INFILTRATION; PERINEURAL ONCE
Status: CANCELLED | OUTPATIENT
Start: 2025-06-11 | End: 2025-06-11

## 2025-06-11 RX ORDER — DIPHENOXYLATE HYDROCHLORIDE AND ATROPINE SULFATE 2.5; .025 MG/1; MG/1
1 TABLET ORAL DAILY
COMMUNITY

## 2025-06-11 RX ORDER — SODIUM HYPOCHLORITE 1.25 MG/ML
1 SOLUTION TOPICAL AS NEEDED
Status: CANCELLED | OUTPATIENT
Start: 2025-06-11

## 2025-06-11 RX ORDER — OMADACYCLINE 150 MG/1
150 TABLET, FILM COATED ORAL NIGHTLY
COMMUNITY
End: 2025-06-13

## 2025-06-11 RX ORDER — DICLOFENAC SODIUM 75 MG/1
75 TABLET, DELAYED RELEASE ORAL 2 TIMES DAILY
Status: DISCONTINUED | OUTPATIENT
Start: 2025-06-11 | End: 2025-06-14 | Stop reason: HOSPADM

## 2025-06-11 RX ORDER — NYSTATIN 100000 U/G
1 CREAM TOPICAL DAILY PRN
Status: DISCONTINUED | OUTPATIENT
Start: 2025-06-11 | End: 2025-06-14 | Stop reason: HOSPADM

## 2025-06-11 RX ORDER — GABAPENTIN 300 MG/1
900 CAPSULE ORAL 4 TIMES DAILY
Status: DISCONTINUED | OUTPATIENT
Start: 2025-06-11 | End: 2025-06-14 | Stop reason: HOSPADM

## 2025-06-11 RX ORDER — TRIAMCINOLONE ACETONIDE 1 MG/G
1 CREAM TOPICAL DAILY PRN
COMMUNITY

## 2025-06-11 RX ORDER — PANTOPRAZOLE SODIUM 40 MG/1
40 TABLET, DELAYED RELEASE ORAL
Status: CANCELLED | OUTPATIENT
Start: 2025-06-12

## 2025-06-11 RX ORDER — SODIUM CHLORIDE 0.9 % (FLUSH) 0.9 %
10 SYRINGE (ML) INJECTION EVERY 12 HOURS SCHEDULED
Status: DISCONTINUED | OUTPATIENT
Start: 2025-06-11 | End: 2025-06-14 | Stop reason: HOSPADM

## 2025-06-11 RX ORDER — BISACODYL 10 MG
10 SUPPOSITORY, RECTAL RECTAL DAILY PRN
Status: DISCONTINUED | OUTPATIENT
Start: 2025-06-11 | End: 2025-06-14 | Stop reason: HOSPADM

## 2025-06-11 RX ORDER — NYSTATIN 100000 U/G
1 CREAM TOPICAL DAILY PRN
COMMUNITY

## 2025-06-11 RX ORDER — POLYETHYLENE GLYCOL 3350 17 G/17G
17 POWDER, FOR SOLUTION ORAL DAILY PRN
Status: DISCONTINUED | OUTPATIENT
Start: 2025-06-11 | End: 2025-06-14 | Stop reason: HOSPADM

## 2025-06-11 RX ORDER — DIAPER,BRIEF,INFANT-TODD,DISP
1 EACH MISCELLANEOUS ONCE
Status: CANCELLED | OUTPATIENT
Start: 2025-06-11 | End: 2025-06-11

## 2025-06-11 RX ORDER — SODIUM CHLORIDE 0.9 % (FLUSH) 0.9 %
10 SYRINGE (ML) INJECTION AS NEEDED
Status: CANCELLED | OUTPATIENT
Start: 2025-06-12

## 2025-06-11 RX ORDER — ZOLPIDEM TARTRATE 5 MG/1
5 TABLET ORAL NIGHTLY
Status: DISCONTINUED | OUTPATIENT
Start: 2025-06-11 | End: 2025-06-14 | Stop reason: HOSPADM

## 2025-06-11 RX ORDER — SODIUM CHLORIDE 9 MG/ML
40 INJECTION, SOLUTION INTRAVENOUS AS NEEDED
Status: DISCONTINUED | OUTPATIENT
Start: 2025-06-11 | End: 2025-06-14 | Stop reason: HOSPADM

## 2025-06-11 RX ORDER — CLOTRIMAZOLE 1 %
1 CREAM (GRAM) TOPICAL NIGHTLY
COMMUNITY

## 2025-06-11 RX ORDER — FAMOTIDINE 20 MG/1
20 TABLET, FILM COATED ORAL 2 TIMES DAILY
Status: DISCONTINUED | OUTPATIENT
Start: 2025-06-11 | End: 2025-06-14 | Stop reason: HOSPADM

## 2025-06-11 RX ORDER — MICONAZOLE NITRATE 2 G/100G
1 CREAM TOPICAL NIGHTLY
Status: DISCONTINUED | OUTPATIENT
Start: 2025-06-11 | End: 2025-06-14 | Stop reason: HOSPADM

## 2025-06-11 RX ORDER — SODIUM CHLORIDE 0.9 % (FLUSH) 0.9 %
10 SYRINGE (ML) INJECTION EVERY 12 HOURS SCHEDULED
Status: CANCELLED | OUTPATIENT
Start: 2025-06-12

## 2025-06-11 RX ORDER — OMEPRAZOLE 20 MG/1
20 CAPSULE, DELAYED RELEASE ORAL DAILY
COMMUNITY
End: 2025-06-13 | Stop reason: SDUPTHER

## 2025-06-11 RX ORDER — LIDOCAINE HYDROCHLORIDE 20 MG/ML
1 JELLY TOPICAL ONCE
Status: COMPLETED | OUTPATIENT
Start: 2025-06-11 | End: 2025-06-11

## 2025-06-11 RX ORDER — BUSPIRONE HYDROCHLORIDE 5 MG/1
5 TABLET ORAL 2 TIMES DAILY
Status: DISCONTINUED | OUTPATIENT
Start: 2025-06-11 | End: 2025-06-14 | Stop reason: HOSPADM

## 2025-06-11 RX ORDER — PANTOPRAZOLE SODIUM 40 MG/1
40 TABLET, DELAYED RELEASE ORAL
Status: DISCONTINUED | OUTPATIENT
Start: 2025-06-11 | End: 2025-06-14 | Stop reason: HOSPADM

## 2025-06-11 RX ORDER — BACLOFEN 10 MG/1
50 TABLET ORAL NIGHTLY
Status: DISCONTINUED | OUTPATIENT
Start: 2025-06-11 | End: 2025-06-14 | Stop reason: HOSPADM

## 2025-06-11 RX ORDER — SODIUM CHLORIDE 9 MG/ML
40 INJECTION, SOLUTION INTRAVENOUS AS NEEDED
Status: CANCELLED | OUTPATIENT
Start: 2025-06-12

## 2025-06-11 RX ORDER — SILVER SULFADIAZINE 10 MG/G
1 CREAM TOPICAL AS NEEDED
Status: CANCELLED | OUTPATIENT
Start: 2025-06-11

## 2025-06-11 RX ORDER — AMOXICILLIN 250 MG
2 CAPSULE ORAL 2 TIMES DAILY PRN
Status: DISCONTINUED | OUTPATIENT
Start: 2025-06-11 | End: 2025-06-14 | Stop reason: HOSPADM

## 2025-06-11 RX ORDER — BACLOFEN 10 MG/1
20 TABLET ORAL 3 TIMES DAILY
Status: DISCONTINUED | OUTPATIENT
Start: 2025-06-11 | End: 2025-06-14 | Stop reason: HOSPADM

## 2025-06-11 RX ORDER — LIDOCAINE HYDROCHLORIDE 20 MG/ML
1 JELLY TOPICAL ONCE
Status: CANCELLED | OUTPATIENT
Start: 2025-06-11 | End: 2025-06-11

## 2025-06-11 RX ORDER — PANTOPRAZOLE SODIUM 40 MG/1
40 TABLET, DELAYED RELEASE ORAL DAILY
Status: CANCELLED | OUTPATIENT
Start: 2025-06-12

## 2025-06-11 RX ORDER — MUPIROCIN 2 %
1 OINTMENT (GRAM) TOPICAL AS NEEDED
Status: CANCELLED | OUTPATIENT
Start: 2025-06-11

## 2025-06-11 RX ORDER — LIDOCAINE HYDROCHLORIDE AND EPINEPHRINE BITARTRATE 20; .01 MG/ML; MG/ML
10 INJECTION, SOLUTION SUBCUTANEOUS ONCE
Status: CANCELLED | OUTPATIENT
Start: 2025-06-11 | End: 2025-06-11

## 2025-06-11 RX ORDER — SODIUM HYPOCHLORITE 2.5 MG/ML
1 SOLUTION TOPICAL AS NEEDED
Status: CANCELLED | OUTPATIENT
Start: 2025-06-11

## 2025-06-11 RX ORDER — CETIRIZINE HYDROCHLORIDE 10 MG/1
10 TABLET ORAL DAILY
Status: DISCONTINUED | OUTPATIENT
Start: 2025-06-12 | End: 2025-06-14 | Stop reason: HOSPADM

## 2025-06-11 RX ORDER — BREXPIPRAZOLE 2 MG/1
2 TABLET ORAL DAILY
COMMUNITY

## 2025-06-11 RX ORDER — ZOLPIDEM TARTRATE 5 MG/1
5 TABLET ORAL NIGHTLY
COMMUNITY

## 2025-06-11 RX ORDER — ACETAMINOPHEN 500 MG
1000 TABLET ORAL EVERY 6 HOURS PRN
Status: DISCONTINUED | OUTPATIENT
Start: 2025-06-11 | End: 2025-06-14 | Stop reason: HOSPADM

## 2025-06-11 RX ORDER — OXYCODONE HCL 10 MG/1
20 TABLET, FILM COATED, EXTENDED RELEASE ORAL NIGHTLY
Status: DISCONTINUED | OUTPATIENT
Start: 2025-06-11 | End: 2025-06-14 | Stop reason: HOSPADM

## 2025-06-11 RX ORDER — TRIAMCINOLONE ACETONIDE 1 MG/G
1 CREAM TOPICAL DAILY PRN
Status: DISCONTINUED | OUTPATIENT
Start: 2025-06-11 | End: 2025-06-14 | Stop reason: HOSPADM

## 2025-06-11 RX ORDER — OXYCODONE HYDROCHLORIDE 10 MG/1
10 TABLET ORAL 4 TIMES DAILY
Status: DISCONTINUED | OUTPATIENT
Start: 2025-06-11 | End: 2025-06-14 | Stop reason: HOSPADM

## 2025-06-11 RX ORDER — BISACODYL 5 MG/1
5 TABLET, DELAYED RELEASE ORAL DAILY PRN
Status: DISCONTINUED | OUTPATIENT
Start: 2025-06-11 | End: 2025-06-14 | Stop reason: HOSPADM

## 2025-06-11 RX ORDER — OXYCODONE AND ACETAMINOPHEN 5; 325 MG/1; MG/1
1 TABLET ORAL EVERY 4 HOURS PRN
Refills: 0 | Status: DISCONTINUED | OUTPATIENT
Start: 2025-06-11 | End: 2025-06-14 | Stop reason: HOSPADM

## 2025-06-11 RX ORDER — LIDOCAINE HYDROCHLORIDE 20 MG/ML
JELLY TOPICAL AS NEEDED
Status: CANCELLED | OUTPATIENT
Start: 2025-06-11

## 2025-06-11 RX ORDER — NYSTATIN 100000 [USP'U]/G
1 POWDER TOPICAL ONCE
Status: CANCELLED | OUTPATIENT
Start: 2025-06-11 | End: 2025-06-11

## 2025-06-11 RX ORDER — ACETAMINOPHEN 500 MG
1000 TABLET ORAL EVERY 6 HOURS PRN
COMMUNITY

## 2025-06-11 RX ADMIN — BUSPIRONE HYDROCHLORIDE 5 MG: 5 TABLET ORAL at 21:19

## 2025-06-11 RX ADMIN — OXYCODONE HYDROCHLORIDE 20 MG: 10 TABLET, FILM COATED, EXTENDED RELEASE ORAL at 21:19

## 2025-06-11 RX ADMIN — BACLOFEN 50 MG: 10 TABLET ORAL at 21:18

## 2025-06-11 RX ADMIN — VITAMINS A AND D OINTMENT 1 APPLICATION: 15.5; 53.4 OINTMENT TOPICAL at 14:23

## 2025-06-11 RX ADMIN — Medication 10 ML: at 21:20

## 2025-06-11 RX ADMIN — BACLOFEN 20 MG: 10 TABLET ORAL at 21:19

## 2025-06-11 RX ADMIN — SODIUM HYPOCHLORITE 473 ML: 2.5 SOLUTION TOPICAL at 14:24

## 2025-06-11 RX ADMIN — HEPARIN SODIUM 5000 UNITS: 5000 INJECTION INTRAVENOUS; SUBCUTANEOUS at 21:20

## 2025-06-11 RX ADMIN — GABAPENTIN 900 MG: 300 CAPSULE ORAL at 21:18

## 2025-06-11 RX ADMIN — FAMOTIDINE 20 MG: 20 TABLET, FILM COATED ORAL at 21:19

## 2025-06-11 RX ADMIN — COLLAGENASE SANTYL 1 APPLICATION: 250 OINTMENT TOPICAL at 14:24

## 2025-06-11 RX ADMIN — LIDOCAINE HYDROCHLORIDE 1 ML: 20 JELLY TOPICAL at 14:23

## 2025-06-11 RX ADMIN — PANTOPRAZOLE SODIUM 40 MG: 40 TABLET, DELAYED RELEASE ORAL at 21:19

## 2025-06-11 RX ADMIN — TIZANIDINE 4 MG: 4 TABLET ORAL at 21:18

## 2025-06-11 RX ADMIN — ZOLPIDEM TARTRATE 5 MG: 5 TABLET ORAL at 21:20

## 2025-06-11 NOTE — PROGRESS NOTES
[facility]         Referring Provider: No referring provider defined for this encounter.    Subjective      Chief Complaint  Follow-up (Infected wound)    History of Present Illness  Ang Jackson is a 64 y.o. male who presents today to [facility] for infectious disease evaluation and antibiotic management.    Patient is a 64 y.o. male with past medical history significant for  cervical degenerative disc disease status post C6-7 anterior cervical discectomy and fusion in 2023 and nontraumatic cervical spinal cord injury secondary with cervical epidural abscess with cord compression from C3-C5 in October 2024 status post incision and drainage with C4 corpectomy, C3-6 laminectomy and emergent hematoma evacuation. Patient with neurogenic bladder and chronic hanks catheter due to significant spinal trauma. 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  Patient was seen in the wound care clinic outpatient and due to worsening wound advised patient to go to ER.  Patient was evaluated and admitted for wound infection.  Surgery was consulted and wounds were debrided in OR on 5/18.  Patient is resting in bed with family at bedside.  Patient is receiving IV antibiotics.       Past Medical History:   Diagnosis Date    Anesthesia     diffculty adminster spinal block, patient stated with last hip surgery 7-2020 several attempts per anthesia and no luck, resulted in general anesthesia     Anxiety     Arthritis     Rheumatoid    COVID     2020    GERD (gastroesophageal reflux disease)     Hypertension     Lactose intolerance     Low back pain     Sleep apnea     does not wear machine, MILD, DOES NOT HAVE AFTER WGT LOSS SURGERY    Wears reading eyeglasses        Past Surgical History:   Procedure  Laterality Date    ANTERIOR CERVICAL DISCECTOMY W/ FUSION Right 02/19/2023    Procedure: CERVICAL DISCECTOMY ANTERIOR WITH FUSION C6-7;  Surgeon: Jaiden Aldridge MD;  Location:  [facility];  Service: Neurosurgery;  Laterality: Right;    BARIATRIC SURGERY  4 year ago    CARPAL TUNNEL RELEASE Bilateral     CARPAL TUNNEL RELEASE Right 06/29/2023    Procedure: CARPAL TUNNEL RELEASE RIGHT;  Surgeon: Jaiden Aldridge MD;  Location:  [facility];  Service: Neurosurgery;  Laterality: Right;    COLONOSCOPY      5 years ago    COLONOSCOPY N/A 04/05/2022    Procedure: COLONOSCOPY FOR SCREENING;  Surgeon: Luz Galvez MD;  Location:  [facility];  Service: Gastroenterology;  Laterality: N/A;    ENDOSCOPY N/A 04/05/2022    Procedure: ESOPHAGOGASTRODUODENOSCOPY WITH BIOPSY;  Surgeon: Luz Galvez MD;  Location:  [facility];  Service: Gastroenterology;  Laterality: N/A;    GASTRIC SLEEVE LAPAROSCOPIC      2017    HIP SURGERY Right times 2    KNEE ARTHROSCOPY Left     TOTAL HIP ARTHROPLASTY Left 01/25/2021    Procedure: TOTAL HIP ARTHROPLASTY LEFT;  Surgeon: Jb Patel MD;  Location:  [facility];  Service: Orthopedics;  Laterality: Left;    TOTAL HIP ARTHROPLASTY REVISION Right 07/20/2020    Procedure: TOTAL HIP ARTHROPLASTY REVISION RIGHT;  Surgeon: Jb Patel MD;  Location:  [facility];  Service: Orthopedics;  Laterality: Right;    WOUND DEBRIDEMENT N/A 5/18/2025    Procedure: DEBRIDEMENT SACRAL ULCER/WOUND;  Surgeon: Deepak Lara MD;  Location:  [facility];  Service: General;  Laterality: N/A;       Social History     Socioeconomic History    Marital status:    Tobacco Use    Smoking status: Never    Smokeless tobacco: Never   Vaping Use    Vaping status: Never Used   Substance and Sexual Activity    Alcohol use: No    Drug use: Never    Sexual activity: Yes       Family History  family history includes Cancer in his mother; Colon cancer in his maternal uncle; Colon polyps in his  "maternal uncle; Diabetes in his brother and paternal grandmother; Gout in his paternal grandmother; Heart disease in his father; Hypertension in his brother and father; Osteoarthritis in his father.    Immunization History   Administered Date(s) Administered    COVID-19 ([employer]) 04/05/2021        Allergies  Allergies   Allergen Reactions    Sulfa Antibiotics Hives     Hives, shortness of breath    Codeine Nausea And Vomiting    Ketorolac Other (See Comments)     Pt reports heavy, \"tight\" feeling in his chest.    Morphine And Codeine Nausea And Vomiting       The medication list has been reviewed and updated.   Current Medications    Current Outpatient Medications:     ascorbic acid (VITAMIN C) 500 MG tablet, Take 2 tablets by mouth Daily., Disp: , Rfl:     baclofen (LIORESAL) 10 MG tablet, Take 2 tablets by mouth 3 (Three) Times a Day., Disp: , Rfl:     baclofen (LIORESAL) 10 MG tablet, Take 5 tablets by mouth every night at bedtime., Disp: , Rfl:     Brexpiprazole (Rexulti) 1 MG tablet, Take 1 tablet by mouth Daily., Disp: , Rfl:     busPIRone (BUSPAR) 5 MG tablet, Take 1 tablet by mouth Every Night., Disp: , Rfl:     Cyanocobalamin (B-12 Compliance Injection) 1000 MCG/ML kit, Inject 1 mL as directed Every 14 (Fourteen) Days., Disp: , Rfl:     diclofenac (VOLTAREN) 75 MG EC tablet, Take 1 tablet by mouth 2 (Two) Times a Day., Disp: , Rfl:     famotidine (PEPCID) 20 MG tablet, Take 1 tablet by mouth Daily., Disp: , Rfl:     ferrous sulfate 325 (65 FE) MG tablet, Take 1 tablet by mouth Daily With Breakfast., Disp: , Rfl:     FLUoxetine (PROzac) 20 MG capsule, Take 1 capsule by mouth Daily., Disp: , Rfl:     gabapentin (NEURONTIN) 300 MG capsule, Take 3 capsules by mouth 3 (Three) Times a Day., Disp: , Rfl:     Lactobacillus Acid-Pectin (ACIDOPHILUS PLUS PECTIN PO), Take 1 capsule by mouth Daily., Disp: , Rfl:     loratadine (CLARITIN) 10 MG tablet, Take 1 tablet by mouth Daily., Disp: , Rfl:     Multiple " Vitamins-Minerals (MENS MULTIPLUS PO), Take 1 tablet by mouth Daily., Disp: , Rfl:     nystatin-triamcinolone (MYCOLOG) 381157-3.1 UNIT/GM-% ointment, Apply 1 Application topically to the appropriate area as directed 2 (Two) Times a Day for 15 days., Disp: 30 g, Rfl: 0    Omadacycline Tosylate (Nuzyra) 150 MG tablet, Take 3 tablets by mouth Daily for 2 days, THEN 2 tablets Daily for 12 days. Take on an empty stomach. Do not take iron or multivitamins for 2 hours before or 4 hours after Nuzyra., Disp: 30 tablet, Rfl: 0    oxyCODONE (ROXICODONE) 10 MG tablet, Take 1 tablet by mouth 4 (Four) Times a Day., Disp: , Rfl:     oxyCODONE ER (oxyCONTIN) 20 MG 12 hr tablet, Take 1 tablet by mouth Every Night., Disp: , Rfl:     pantoprazole (PROTONIX) 40 MG EC tablet, Take 1 tablet by mouth Daily., Disp: , Rfl:     QUEtiapine (SEROquel) 100 MG tablet, Take 2 tablets by mouth Every Night., Disp: , Rfl:     tiZANidine (ZANAFLEX) 2 MG tablet, Take 1 tablet by mouth 4 (Four) Times a Day., Disp: , Rfl:     tiZANidine (ZANAFLEX) 2 MG tablet, Take 2 tablets by mouth Every Night., Disp: , Rfl:     vitamin D3 125 MCG (5000 UT) capsule capsule, Take 1 capsule by mouth Daily., Disp: , Rfl:       Review of Systems    Review of Systems   Constitutional:  Negative for activity change, appetite change, chills, diaphoresis and fever.   HENT:  Negative for congestion, dental problem, drooling, mouth sores, sinus pressure and sneezing.    Eyes:  Negative for blurred vision, double vision, photophobia and discharge.   Respiratory:  Negative for apnea, cough, choking, chest tightness, shortness of breath and wheezing.    Cardiovascular:  Negative for chest pain, palpitations and leg swelling.   Gastrointestinal:  Negative for abdominal distention, abdominal pain, diarrhea, nausea and vomiting.   Genitourinary:  Negative for dysuria, flank pain and frequency.   Musculoskeletal:  Positive for arthralgias, back pain and gait problem. Negative for  "neck pain and neck stiffness.   Skin:  Positive for wound. Negative for rash.   Neurological:  Negative for dizziness, tremors, seizures, syncope, speech difficulty, numbness, headache and confusion.   Psychiatric/Behavioral:  Negative for agitation, behavioral problems, hallucinations and suicidal ideas.         Objective     Vital Signs:  BP 97/58 (BP Location: Left arm, Patient Position: Sitting, Cuff Size: Adult)   Pulse 80   Resp 18   Ht 172.7 cm (68\")   Wt 95.3 kg (210 lb)   SpO2 99%   BMI 31.93 kg/m²   Estimated body mass index is 31.93 kg/m² as calculated from the following:    Height as of this encounter: 172.7 cm (68\").    Weight as of this encounter: 95.3 kg (210 lb).    Physical Exam  Constitutional:       General: He is not in acute distress.     Appearance: Normal appearance. He is normal weight. He is not ill-appearing, toxic-appearing or diaphoretic.   HENT:      Head: Normocephalic and atraumatic.      Nose: Nose normal. No congestion or rhinorrhea.      Mouth/Throat:      Mouth: Mucous membranes are moist.      Pharynx: Oropharynx is clear.   Eyes:      General: No scleral icterus.     Extraocular Movements: Extraocular movements intact.      Pupils: Pupils are equal, round, and reactive to light.   Neck:      Vascular: No carotid bruit.   Cardiovascular:      Rate and Rhythm: Normal rate and regular rhythm.      Pulses: Normal pulses.      Heart sounds: No murmur heard.  Pulmonary:      Effort: Pulmonary effort is normal. No respiratory distress.      Breath sounds: Normal breath sounds. No stridor. No wheezing, rhonchi or rales.   Chest:      Chest wall: No tenderness.   Abdominal:      General: Abdomen is flat. Bowel sounds are normal. There is no distension.      Palpations: Abdomen is soft.      Tenderness: There is no abdominal tenderness. There is no guarding or rebound.   Musculoskeletal:      Cervical back: Normal range of motion and neck supple. No rigidity or tenderness. " "  Lymphadenopathy:      Cervical: No cervical adenopathy.   Skin:     Coloration: Skin is not jaundiced.      Findings: No lesion or rash.      Comments: Sacral wound looks very clean with no evidence of purulent drainage or odor.  Mild surrounding erythema.  There is a ulcer with necrotic scab on top but does not seem to be overwhelmingly infected.   Neurological:      General: No focal deficit present.      Mental Status: He is alert and oriented to person, place, and time.   Psychiatric:         Mood and Affect: Mood normal.         Behavior: Behavior normal.          Result Review :  The following data was reviewed by Babs Ferris MD     Lab Results  Lab Results   Component Value Date    WBC 5.60 06/02/2025    HGB 9.3 (L) 06/02/2025    HCT 31.5 (L) 06/02/2025    MCV 94.0 06/02/2025     06/02/2025     Lab Results   Component Value Date    GLUCOSE 84 06/02/2025    BUN 17.3 06/02/2025    CREATININE 0.38 (L) 06/02/2025    EGFRIFNONA 99 01/26/2021    BCR 45.5 (H) 06/02/2025    K 3.7 06/02/2025    CO2 26.2 06/02/2025    CALCIUM 8.0 (L) 06/02/2025    ALBUMIN 2.7 (L) 06/02/2025    AST 13 06/02/2025    ALT 9 06/02/2025      Lab Results   Component Value Date    CRP 3.96 (H) 06/02/2025        No results found for: \"ACANTHNAEG\", \"AFBCX\", \"BPERTUSSISCX\", \"BLOODCX\"  No results found for: \"BCIDPCR\", \"CXREFLEX\", \"CSFCX\", \"CULTURETIS\"  No results found for: \"CULTURES\", \"HSVCX\", \"URCX\"  No results found for: \"EYECULTURE\", \"GCCX\", \"HSVCULTURE\", \"LABHSV\"  No results found for: \"LEGIONELLA\", \"MRSACX\", \"MUMPSCX\", \"MYCOPLASCX\"  No results found for: \"NOCARDIACX\", \"STOOLCX\"  No results found for: \"THROATCX\", \"UNSTIMCULT\", \"URINECX\", \"CULTURE\", \"VZVCULTUR\"  No results found for: \"VIRALCULTU\", \"WOUNDCX\"    Radiology Results  CT Pelvis With & Without Contrast  Result Date: 5/18/2025  Impression: 1.  No soft tissue loculated fluid collections are identified. 2.  Diffuse soft tissue edema, nonspecific. 3.  Left deep gluteal " ulceration is noted which essentially extends to the level of the left ischial tuberosity. No sclerotic or destructive bony features identified. 4.  Deep and wide-base right sacral-gluteal ulceration is noted which extends to the level of the right ischial tuberosity and approximates the gluteal crease. No associated sclerosis or destructive bone changes identified in the region of ulceration. 5.  Anasarca.  This report was finalized on 5/18/2025 8:24 AM by Dr. Jason Kruse MD.      MRI Pelvis Without Contrast  Result Date: 5/16/2025  Impression: Impression: 1. Limited exam. 2. Postsurgical changes with subcutaneous edema and fluid superficial to the hips, further evaluation if clinically warranted.  This report was finalized on 5/16/2025 7:33 PM by Angel Monet DO.      MRI Cervical Spine With & Without Contrast  Result Date: 5/16/2025  Impression: Impression: 1. Surgical fusion with surgical hardware artifact limits evaluation. 2. Myelomalacia cervical cord.  This report was finalized on 5/16/2025 7:33 PM by Angel Monet DO.      MRI Thoracic Spine With & Without Contrast  Result Date: 5/16/2025  Impression: Impression: 1. No acute process.  This report was finalized on 5/16/2025 7:33 PM by Angel Monet DO.               Assessment / Plan        Diagnoses and all orders for this visit:    1. Open wound (Primary)  -     C-reactive Protein; Future    2. Pressure injury of left buttock, unstageable  -     C-reactive Protein; Future    3. Pressure injury of right buttock, stage 3  -     C-reactive Protein; Future    4. Status post total hip replacement, left  -     C-reactive Protein; Future    5. Status post total replacement of right hip  -     C-reactive Protein; Future    6. Urinary incontinence without sensory awareness  -     C-reactive Protein; Future      On 5/18/25, Intraoperative tissue/bone culture from the sacrum reporting moderate growth of ESBL E. coli.     General surgery operative note  reported necrotic tissue and subcutaneous fat sharply excised down to the fascia, not involving fascia.  No mention of bony involvement.  MRI does not report any evidence of osteomyelitis.  Previous wound cultures finalized with E. coli ESBL and Enterococcus faecalis.     Plan completed his course of ertapenem 1 g IV every 24 hours and daptomycin 6 mg/kg IV 24 hours for total of 14 days on 5/30/2025 infected chronic sacral ulcers without evidence of osteomyelitis.    Labs as of June 2, 2025 with CRP stable at 3.96 well-controlled sugar with A1c at 5.4%.    Patient is following up with wound care and Dr. Lara who seems to be happy with the progress so far.    I am concerned about a superimposed yeast infection to his periwound and we will proceed with nystatin triamcinolone cream to help with the degree of erythema and inflammation.    Continue Nuzyra x 14 days.    Will check CRP today.    Follow Up   Return in about 3 weeks (around 7/2/2025) for Follow up, With Dr. Ferris.    Visit Diagnoses:    ICD-10-CM ICD-9-CM   1. Open wound  T14.8XXA 879.8   2. Pressure injury of left buttock, unstageable  L89.320 707.05     707.25   3. Pressure injury of right buttock, stage 3  L89.313 707.05     707.23   4. Status post total hip replacement, left  Z96.642 V43.64   5. Status post total replacement of right hip  Z96.641 V43.64   6. Urinary incontinence without sensory awareness  N39.42 788.34       Patient was given instructions and counseling regarding his condition or for health maintenance advice. Please see specific information pulled into the AVS if appropriate.     This document has been electronically signed by Babs Ferris MD   June 11, 2025 11:21 EDT      No orders of the defined types were placed in this encounter.     Dictated Utilizing Dragon Dictation: Part of this note may be an electronic transcription/translation of spoken language to printed text using the Dragon Dictation System.

## 2025-06-11 NOTE — H&P
[facility]   HISTORY AND PHYSICAL    Patient Name: Ang Jackson  : 1960  MRN: [id number]  Primary Care Physician:  Deepak Perez  Date of admission: 2025    Subjective   Subjective     Chief Complaint: Buttock and sacral wounds    History of Present Illness  64-year-old quadriplegic male with known sacral wounds that have been debrided in the past but have continued to deteriorate and require more extensive debridement and can be performed in the wound clinic.  No changes to his overall health otherwise.      Review of Systems   Constitutional:  Negative for activity change, appetite change, chills and fever.   HENT:  Negative for sore throat and trouble swallowing.    Eyes:  Negative for visual disturbance.   Respiratory:  Negative for cough and shortness of breath.    Cardiovascular:  Negative for chest pain and palpitations.   Gastrointestinal:  Negative for abdominal distention, abdominal pain, blood in stool, constipation, diarrhea, nausea and vomiting.   Endocrine: Negative for cold intolerance and heat intolerance.   Genitourinary:  Negative for dysuria.   Musculoskeletal:  Negative for joint swelling.   Skin:  Negative for color change, rash and wound.   Allergic/Immunologic: Negative for immunocompromised state.   Neurological:  Negative for dizziness, seizures, weakness and headaches.   Hematological:  Negative for adenopathy. Does not bruise/bleed easily.   Psychiatric/Behavioral:  Negative for agitation and confusion.         Personal History     Past Medical History:   Diagnosis Date    Anesthesia     diffculty adminster spinal block, patient stated with last hip surgery  several attempts per anthesia and no luck, resulted in general anesthesia     Anxiety     Arthritis     Rheumatoid    COVID         GERD (gastroesophageal reflux disease)     Hypertension     Lactose intolerance     Low back pain     Sleep apnea     does not wear machine, MILD, DOES NOT HAVE AFTER WGT  LOSS SURGERY    Wears reading eyeglasses        Past Surgical History:   Procedure Laterality Date    ANTERIOR CERVICAL DISCECTOMY W/ FUSION Right 02/19/2023    Procedure: CERVICAL DISCECTOMY ANTERIOR WITH FUSION C6-7;  Surgeon: Jaiden Aldridge MD;  Location:  [facility];  Service: Neurosurgery;  Laterality: Right;    BARIATRIC SURGERY  4 year ago    CARPAL TUNNEL RELEASE Bilateral     CARPAL TUNNEL RELEASE Right 06/29/2023    Procedure: CARPAL TUNNEL RELEASE RIGHT;  Surgeon: Jaiden Aldridge MD;  Location:  [facility];  Service: Neurosurgery;  Laterality: Right;    COLONOSCOPY      5 years ago    COLONOSCOPY N/A 04/05/2022    Procedure: COLONOSCOPY FOR SCREENING;  Surgeon: Luz Galvez MD;  Location:  [facility];  Service: Gastroenterology;  Laterality: N/A;    ENDOSCOPY N/A 04/05/2022    Procedure: ESOPHAGOGASTRODUODENOSCOPY WITH BIOPSY;  Surgeon: Luz Galvez MD;  Location:  [facility];  Service: Gastroenterology;  Laterality: N/A;    GASTRIC SLEEVE LAPAROSCOPIC      2017    HIP SURGERY Right times 2    KNEE ARTHROSCOPY Left     TOTAL HIP ARTHROPLASTY Left 01/25/2021    Procedure: TOTAL HIP ARTHROPLASTY LEFT;  Surgeon: Jb Patel MD;  Location:  [facility];  Service: Orthopedics;  Laterality: Left;    TOTAL HIP ARTHROPLASTY REVISION Right 07/20/2020    Procedure: TOTAL HIP ARTHROPLASTY REVISION RIGHT;  Surgeon: Jb Patel MD;  Location:  [facility];  Service: Orthopedics;  Laterality: Right;    WOUND DEBRIDEMENT N/A 5/18/2025    Procedure: DEBRIDEMENT SACRAL ULCER/WOUND;  Surgeon: Deepak Lara MD;  [facility];  Service: General;  Laterality: N/A;       Family History: family history includes Cancer in his mother; Colon cancer in his maternal uncle; Colon polyps in his maternal uncle; Diabetes in his brother and paternal grandmother; Gout in his paternal grandmother; Heart disease in his father; Hypertension in his brother and father; Osteoarthritis in his  "father. Otherwise pertinent FHx was reviewed and not pertinent to current issue.    Social History:  reports that he has never smoked. He has never been exposed to tobacco smoke. He has never used smokeless tobacco. He reports that he does not drink alcohol and does not use drugs.    Home Medications:  Brexpiprazole, Cyanocobalamin, FLUoxetine, Lactobacillus Acid-Pectin, Omadacycline Tosylate, QUEtiapine, ascorbic acid, baclofen, busPIRone, diclofenac, famotidine, ferrous sulfate, gabapentin, loratadine, multivitamin with minerals, nystatin-triamcinolone, oxyCODONE, oxyCODONE ER, pantoprazole, tiZANidine, and vitamin D3    Allergies:  Allergies   Allergen Reactions    Sulfa Antibiotics Hives     Hives, shortness of breath    Codeine Nausea And Vomiting    Ketorolac Other (See Comments)     Pt reports heavy, \"tight\" feeling in his chest.    Morphine And Codeine Nausea And Vomiting       Objective    Objective     Vitals:   Temp:  [97.8 °F (36.6 °C)-98.5 °F (36.9 °C)] 97.8 °F (36.6 °C)  Heart Rate:  [67-88] 67  Resp:  [18] 18  BP: ()/(49-69) 118/69    Physical Exam  Constitutional:       Appearance: He is well-developed.   HENT:      Head: Normocephalic and atraumatic.   Eyes:      Conjunctiva/sclera: Conjunctivae normal.      Pupils: Pupils are equal, round, and reactive to light.   Neck:      Thyroid: No thyromegaly.      Vascular: No JVD.      Trachea: No tracheal deviation.   Cardiovascular:      Rate and Rhythm: Normal rate and regular rhythm.      Heart sounds: No murmur heard.     No friction rub. No gallop.   Pulmonary:      Effort: Pulmonary effort is normal.      Breath sounds: Normal breath sounds.   Abdominal:      General: There is no distension.      Palpations: Abdomen is soft. There is no hepatomegaly or splenomegaly.      Tenderness: There is no abdominal tenderness.      Hernia: No hernia is present.   Musculoskeletal:         General: No deformity. Normal range of motion.      Cervical back: " Neck supple.   Skin:     General: Skin is warm and dry.      Comments: Buttock and sacral wounds with necrosis but otherwise unchanged as regards to the size   Neurological:      Mental Status: He is alert and oriented to person, place, and time.      Comments: Quadriplegic         Result Review    Result Review:  I have personally reviewed the results from the time of this admission to 6/11/2025 15:59 EDT and agree with these findings:  [x]  Laboratory list / accordion  []  Microbiology  [x]  Radiology  []  EKG/Telemetry   []  Cardiology/Vascular   []  Pathology        Assessment & Plan   Assessment / Plan     Brief Patient Summary:  Ang Jackson is a 64 y.o. male who has quadriplegia and has developed pressure ulcerations that have required debridement and now have worsening necrosis that cannot be debrided in the wound clinic.    Active Hospital Problems:  There are no active hospital problems to display for this patient.    Plan:   Admit to floor  N.p.o. after midnight  OR tomorrow for debridement    VTE Prophylaxis:  No VTE prophylaxis order currently exists.        CODE STATUS:       Admission Status:  I believe this patient meets observation status.    Deepak Lara MD

## 2025-06-11 NOTE — PLAN OF CARE
Goal Outcome Evaluation:   Pt is a direct admit to the floor this shift. Pt on RA with sats maintaining above 90%. No s/s of acute distress noted at this time. Plan of care ongoing.

## 2025-06-12 ENCOUNTER — ANESTHESIA (OUTPATIENT)
Dept: PERIOP | Facility: HOSPITAL | Age: 65
End: 2025-06-12
Payer: MEDICARE

## 2025-06-12 ENCOUNTER — DOCUMENTATION (OUTPATIENT)
Dept: WOUND CARE | Facility: HOSPITAL | Age: 65
End: 2025-06-12
Payer: MEDICARE

## 2025-06-12 ENCOUNTER — ANESTHESIA EVENT (OUTPATIENT)
Dept: PERIOP | Facility: HOSPITAL | Age: 65
End: 2025-06-12
Payer: MEDICARE

## 2025-06-12 LAB
ANION GAP SERPL CALCULATED.3IONS-SCNC: 7.5 MMOL/L (ref 5–15)
BASOPHILS # BLD AUTO: 0.03 10*3/MM3 (ref 0–0.2)
BASOPHILS NFR BLD AUTO: 0.5 % (ref 0–1.5)
BUN SERPL-MCNC: 20 MG/DL (ref 8–23)
BUN/CREAT SERPL: 35.7 (ref 7–25)
CALCIUM SPEC-SCNC: 7.7 MG/DL (ref 8.6–10.5)
CHLORIDE SERPL-SCNC: 108 MMOL/L (ref 98–107)
CO2 SERPL-SCNC: 27.5 MMOL/L (ref 22–29)
CREAT SERPL-MCNC: 0.56 MG/DL (ref 0.76–1.27)
DEPRECATED RDW RBC AUTO: 47 FL (ref 37–54)
EGFRCR SERPLBLD CKD-EPI 2021: 110.8 ML/MIN/1.73
EOSINOPHIL # BLD AUTO: 0.39 10*3/MM3 (ref 0–0.4)
EOSINOPHIL NFR BLD AUTO: 7.1 % (ref 0.3–6.2)
ERYTHROCYTE [DISTWIDTH] IN BLOOD BY AUTOMATED COUNT: 13.8 % (ref 12.3–15.4)
GLUCOSE SERPL-MCNC: 206 MG/DL (ref 65–99)
HCT VFR BLD AUTO: 30.2 % (ref 37.5–51)
HGB BLD-MCNC: 8.9 G/DL (ref 13–17.7)
IMM GRANULOCYTES # BLD AUTO: 0.04 10*3/MM3 (ref 0–0.05)
IMM GRANULOCYTES NFR BLD AUTO: 0.7 % (ref 0–0.5)
LYMPHOCYTES # BLD AUTO: 0.75 10*3/MM3 (ref 0.7–3.1)
LYMPHOCYTES NFR BLD AUTO: 13.6 % (ref 19.6–45.3)
MCH RBC QN AUTO: 27.1 PG (ref 26.6–33)
MCHC RBC AUTO-ENTMCNC: 29.5 G/DL (ref 31.5–35.7)
MCV RBC AUTO: 92.1 FL (ref 79–97)
MONOCYTES # BLD AUTO: 0.27 10*3/MM3 (ref 0.1–0.9)
MONOCYTES NFR BLD AUTO: 4.9 % (ref 5–12)
NEUTROPHILS NFR BLD AUTO: 4.05 10*3/MM3 (ref 1.7–7)
NEUTROPHILS NFR BLD AUTO: 73.2 % (ref 42.7–76)
NRBC BLD AUTO-RTO: 0 /100 WBC (ref 0–0.2)
PLATELET # BLD AUTO: 193 10*3/MM3 (ref 140–450)
PMV BLD AUTO: 9 FL (ref 6–12)
POTASSIUM SERPL-SCNC: 4.1 MMOL/L (ref 3.5–5.2)
RBC # BLD AUTO: 3.28 10*6/MM3 (ref 4.14–5.8)
SODIUM SERPL-SCNC: 143 MMOL/L (ref 136–145)
WBC NRBC COR # BLD AUTO: 5.53 10*3/MM3 (ref 3.4–10.8)

## 2025-06-12 PROCEDURE — 25010000002 FENTANYL CITRATE (PF) 50 MCG/ML SOLUTION: Performed by: ANESTHESIOLOGY

## 2025-06-12 PROCEDURE — G0378 HOSPITAL OBSERVATION PER HR: HCPCS

## 2025-06-12 PROCEDURE — 25010000002 ONDANSETRON PER 1 MG: Performed by: NURSE ANESTHETIST, CERTIFIED REGISTERED

## 2025-06-12 PROCEDURE — 25010000002 FENTANYL CITRATE (PF) 50 MCG/ML SOLUTION: Performed by: NURSE ANESTHETIST, CERTIFIED REGISTERED

## 2025-06-12 PROCEDURE — 80048 BASIC METABOLIC PNL TOTAL CA: CPT | Performed by: SURGERY

## 2025-06-12 PROCEDURE — 25010000002 PROPOFOL 10 MG/ML EMULSION: Performed by: NURSE ANESTHETIST, CERTIFIED REGISTERED

## 2025-06-12 PROCEDURE — 25810000003 SODIUM CHLORIDE 0.9 % SOLUTION: Performed by: SURGERY

## 2025-06-12 PROCEDURE — 87176 TISSUE HOMOGENIZATION CULTR: CPT | Performed by: SURGERY

## 2025-06-12 PROCEDURE — 11042 DBRDMT SUBQ TIS 1ST 20SQCM/<: CPT | Performed by: SURGERY

## 2025-06-12 PROCEDURE — 25010000002 MIDAZOLAM PER 1 MG: Performed by: NURSE ANESTHETIST, CERTIFIED REGISTERED

## 2025-06-12 PROCEDURE — 0JB90ZZ EXCISION OF BUTTOCK SUBCUTANEOUS TISSUE AND FASCIA, OPEN APPROACH: ICD-10-PCS | Performed by: SURGERY

## 2025-06-12 PROCEDURE — 25010000002 FAMOTIDINE 10 MG/ML SOLUTION: Performed by: NURSE ANESTHETIST, CERTIFIED REGISTERED

## 2025-06-12 PROCEDURE — 87070 CULTURE OTHR SPECIMN AEROBIC: CPT | Performed by: SURGERY

## 2025-06-12 PROCEDURE — 85025 COMPLETE CBC W/AUTO DIFF WBC: CPT | Performed by: SURGERY

## 2025-06-12 PROCEDURE — 11045 DBRDMT SUBQ TISS EACH ADDL: CPT | Performed by: SURGERY

## 2025-06-12 PROCEDURE — 25010000002 HEPARIN (PORCINE) PER 1000 UNITS: Performed by: SURGERY

## 2025-06-12 PROCEDURE — 87205 SMEAR GRAM STAIN: CPT | Performed by: SURGERY

## 2025-06-12 PROCEDURE — 0JB70ZZ EXCISION OF BACK SUBCUTANEOUS TISSUE AND FASCIA, OPEN APPROACH: ICD-10-PCS | Performed by: SURGERY

## 2025-06-12 RX ORDER — SODIUM CHLORIDE 9 MG/ML
40 INJECTION, SOLUTION INTRAVENOUS AS NEEDED
Status: DISCONTINUED | OUTPATIENT
Start: 2025-06-12 | End: 2025-06-12 | Stop reason: HOSPADM

## 2025-06-12 RX ORDER — SODIUM CHLORIDE, SODIUM LACTATE, POTASSIUM CHLORIDE, CALCIUM CHLORIDE 600; 310; 30; 20 MG/100ML; MG/100ML; MG/100ML; MG/100ML
100 INJECTION, SOLUTION INTRAVENOUS ONCE AS NEEDED
Status: DISCONTINUED | OUTPATIENT
Start: 2025-06-12 | End: 2025-06-12 | Stop reason: HOSPADM

## 2025-06-12 RX ORDER — PROPOFOL 10 MG/ML
VIAL (ML) INTRAVENOUS AS NEEDED
Status: DISCONTINUED | OUTPATIENT
Start: 2025-06-12 | End: 2025-06-12 | Stop reason: SURG

## 2025-06-12 RX ORDER — FENTANYL CITRATE 50 UG/ML
50 INJECTION, SOLUTION INTRAMUSCULAR; INTRAVENOUS
Status: COMPLETED | OUTPATIENT
Start: 2025-06-12 | End: 2025-06-12

## 2025-06-12 RX ORDER — MIDAZOLAM HYDROCHLORIDE 1 MG/ML
INJECTION, SOLUTION INTRAMUSCULAR; INTRAVENOUS AS NEEDED
Status: DISCONTINUED | OUTPATIENT
Start: 2025-06-12 | End: 2025-06-12 | Stop reason: SURG

## 2025-06-12 RX ORDER — SODIUM CHLORIDE, SODIUM LACTATE, POTASSIUM CHLORIDE, CALCIUM CHLORIDE 600; 310; 30; 20 MG/100ML; MG/100ML; MG/100ML; MG/100ML
125 INJECTION, SOLUTION INTRAVENOUS ONCE
Status: DISCONTINUED | OUTPATIENT
Start: 2025-06-12 | End: 2025-06-12 | Stop reason: HOSPADM

## 2025-06-12 RX ORDER — IPRATROPIUM BROMIDE AND ALBUTEROL SULFATE 2.5; .5 MG/3ML; MG/3ML
3 SOLUTION RESPIRATORY (INHALATION) ONCE AS NEEDED
Status: DISCONTINUED | OUTPATIENT
Start: 2025-06-12 | End: 2025-06-12 | Stop reason: HOSPADM

## 2025-06-12 RX ORDER — ONDANSETRON 2 MG/ML
INJECTION INTRAMUSCULAR; INTRAVENOUS AS NEEDED
Status: DISCONTINUED | OUTPATIENT
Start: 2025-06-12 | End: 2025-06-12 | Stop reason: SURG

## 2025-06-12 RX ORDER — FENTANYL CITRATE 50 UG/ML
INJECTION, SOLUTION INTRAMUSCULAR; INTRAVENOUS AS NEEDED
Status: DISCONTINUED | OUTPATIENT
Start: 2025-06-12 | End: 2025-06-12 | Stop reason: SURG

## 2025-06-12 RX ORDER — ONDANSETRON 2 MG/ML
4 INJECTION INTRAMUSCULAR; INTRAVENOUS AS NEEDED
Status: DISCONTINUED | OUTPATIENT
Start: 2025-06-12 | End: 2025-06-12 | Stop reason: HOSPADM

## 2025-06-12 RX ORDER — MAGNESIUM HYDROXIDE 1200 MG/15ML
LIQUID ORAL AS NEEDED
Status: DISCONTINUED | OUTPATIENT
Start: 2025-06-12 | End: 2025-06-12 | Stop reason: HOSPADM

## 2025-06-12 RX ORDER — FAMOTIDINE 10 MG/ML
INJECTION, SOLUTION INTRAVENOUS AS NEEDED
Status: DISCONTINUED | OUTPATIENT
Start: 2025-06-12 | End: 2025-06-12 | Stop reason: SURG

## 2025-06-12 RX ORDER — FENTANYL CITRATE 50 UG/ML
100 INJECTION, SOLUTION INTRAMUSCULAR; INTRAVENOUS ONCE
Refills: 0 | Status: COMPLETED | OUTPATIENT
Start: 2025-06-12 | End: 2025-06-12

## 2025-06-12 RX ORDER — SODIUM CHLORIDE 0.9 % (FLUSH) 0.9 %
10 SYRINGE (ML) INJECTION AS NEEDED
Status: DISCONTINUED | OUTPATIENT
Start: 2025-06-12 | End: 2025-06-12 | Stop reason: HOSPADM

## 2025-06-12 RX ORDER — OXYCODONE AND ACETAMINOPHEN 5; 325 MG/1; MG/1
1 TABLET ORAL ONCE AS NEEDED
Status: DISCONTINUED | OUTPATIENT
Start: 2025-06-12 | End: 2025-06-12 | Stop reason: HOSPADM

## 2025-06-12 RX ORDER — SODIUM CHLORIDE 0.9 % (FLUSH) 0.9 %
10 SYRINGE (ML) INJECTION EVERY 12 HOURS SCHEDULED
Status: DISCONTINUED | OUTPATIENT
Start: 2025-06-12 | End: 2025-06-12 | Stop reason: HOSPADM

## 2025-06-12 RX ORDER — MIDAZOLAM HYDROCHLORIDE 1 MG/ML
1 INJECTION, SOLUTION INTRAMUSCULAR; INTRAVENOUS
Status: DISCONTINUED | OUTPATIENT
Start: 2025-06-12 | End: 2025-06-12 | Stop reason: HOSPADM

## 2025-06-12 RX ADMIN — OXYCODONE HYDROCHLORIDE 20 MG: 10 TABLET, FILM COATED, EXTENDED RELEASE ORAL at 20:51

## 2025-06-12 RX ADMIN — FENTANYL CITRATE 100 MCG: 50 INJECTION, SOLUTION INTRAMUSCULAR; INTRAVENOUS at 12:21

## 2025-06-12 RX ADMIN — GABAPENTIN 900 MG: 300 CAPSULE ORAL at 15:47

## 2025-06-12 RX ADMIN — FENTANYL CITRATE 100 MCG: 50 INJECTION INTRAMUSCULAR; INTRAVENOUS at 13:16

## 2025-06-12 RX ADMIN — BACLOFEN 50 MG: 10 TABLET ORAL at 20:52

## 2025-06-12 RX ADMIN — TIZANIDINE 6 MG: 4 TABLET ORAL at 05:27

## 2025-06-12 RX ADMIN — Medication 10 ML: at 20:53

## 2025-06-12 RX ADMIN — HEPARIN SODIUM 5000 UNITS: 5000 INJECTION INTRAVENOUS; SUBCUTANEOUS at 05:27

## 2025-06-12 RX ADMIN — FAMOTIDINE 20 MG: 10 INJECTION INTRAVENOUS at 13:16

## 2025-06-12 RX ADMIN — CALCIUM CARBONATE 1 APPLICATION: 500 TABLET, CHEWABLE ORAL at 20:50

## 2025-06-12 RX ADMIN — BUSPIRONE HYDROCHLORIDE 5 MG: 5 TABLET ORAL at 20:52

## 2025-06-12 RX ADMIN — FENTANYL CITRATE 50 MCG: 50 INJECTION, SOLUTION INTRAMUSCULAR; INTRAVENOUS at 14:26

## 2025-06-12 RX ADMIN — BACLOFEN 20 MG: 10 TABLET ORAL at 15:49

## 2025-06-12 RX ADMIN — ONDANSETRON 4 MG: 2 INJECTION INTRAMUSCULAR; INTRAVENOUS at 13:16

## 2025-06-12 RX ADMIN — FENTANYL CITRATE 50 MCG: 50 INJECTION, SOLUTION INTRAMUSCULAR; INTRAVENOUS at 14:17

## 2025-06-12 RX ADMIN — ZOLPIDEM TARTRATE 5 MG: 5 TABLET ORAL at 20:53

## 2025-06-12 RX ADMIN — BUSPIRONE HYDROCHLORIDE 5 MG: 5 TABLET ORAL at 23:26

## 2025-06-12 RX ADMIN — Medication 10 ML: at 10:02

## 2025-06-12 RX ADMIN — FAMOTIDINE 20 MG: 20 TABLET, FILM COATED ORAL at 20:53

## 2025-06-12 RX ADMIN — TIZANIDINE 4 MG: 4 TABLET ORAL at 20:53

## 2025-06-12 RX ADMIN — SODIUM CHLORIDE 500 ML: 9 INJECTION, SOLUTION INTRAVENOUS at 17:23

## 2025-06-12 RX ADMIN — MIDAZOLAM HYDROCHLORIDE 2 MG: 1 INJECTION, SOLUTION INTRAMUSCULAR; INTRAVENOUS at 13:16

## 2025-06-12 RX ADMIN — CETIRIZINE HYDROCHLORIDE 10 MG: 10 TABLET, FILM COATED ORAL at 15:49

## 2025-06-12 RX ADMIN — GABAPENTIN 900 MG: 300 CAPSULE ORAL at 21:05

## 2025-06-12 RX ADMIN — PANTOPRAZOLE SODIUM 40 MG: 40 TABLET, DELAYED RELEASE ORAL at 15:48

## 2025-06-12 RX ADMIN — DICLOFENAC SODIUM 75 MG: 75 TABLET, DELAYED RELEASE ORAL at 15:50

## 2025-06-12 RX ADMIN — OXYCODONE HYDROCHLORIDE 10 MG: 10 TABLET ORAL at 15:48

## 2025-06-12 RX ADMIN — ACETAMINOPHEN 1000 MG: 500 TABLET ORAL at 05:31

## 2025-06-12 RX ADMIN — DICLOFENAC SODIUM 75 MG: 75 TABLET, DELAYED RELEASE ORAL at 20:51

## 2025-06-12 RX ADMIN — PROPOFOL 100 MG: 10 INJECTION, EMULSION INTRAVENOUS at 13:18

## 2025-06-12 RX ADMIN — OXYCODONE HYDROCHLORIDE 10 MG: 10 TABLET ORAL at 05:26

## 2025-06-12 RX ADMIN — BREXPIPRAZOLE 2 MG: 1 TABLET ORAL at 15:48

## 2025-06-12 RX ADMIN — TIZANIDINE 6 MG: 4 TABLET ORAL at 15:47

## 2025-06-12 RX ADMIN — FLUOXETINE HYDROCHLORIDE 20 MG: 20 CAPSULE ORAL at 15:49

## 2025-06-12 NOTE — ANESTHESIA PREPROCEDURE EVALUATION
Anesthesia Evaluation     Patient summary reviewed and Nursing notes reviewed   NPO Solid Status: > 8 hours  NPO Liquid Status: > 8 hours           Airway   Mallampati: II  TM distance: >3 FB  Neck ROM: full  No difficulty expected  Dental - normal exam   (+) poor dentition        Pulmonary     breath sounds clear to auscultation  (+) ,home oxygen (2 liters O2 at night.), sleep apnea, decreased breath sounds  Cardiovascular - normal exam  Exercise tolerance: good (4-7 METS)    ECG reviewed  Rhythm: regular  Rate: normal    (+) hypertension      Neuro/Psych  (+) weakness, numbness, psychiatric history Anxiety and Depression  GI/Hepatic/Renal/Endo    (+) obesity, GERD, diabetes mellitus    Musculoskeletal     (+) back pain, chronic pain, gait problem, myalgias, neck pain, neck stiffness  Abdominal    Substance History - negative use     OB/GYN negative ob/gyn ROS         Other   arthritis, blood dyscrasia anemia,     ROS/Med Hx Other: Paraplegia from spontaneous epidural abscess.    Normal ECG  When compared with ECG of 15-UMAIR-2021 12:47,  No significant change was found  Confirmed by LESLEY JOHNSON MD ([id number]) on 2/19/2023 12:46:41 PM        Phys Exam Other:  Only 3 teeth on bottom              Anesthesia Plan    ASA 4     general     intravenous induction     Anesthetic plan, risks, benefits, and alternatives have been provided, discussed and informed consent has been obtained with: patient.  Pre-procedure education provided  Plan discussed with CRNA.      CODE STATUS:    Code Status (Patient has no pulse and is not breathing): CPR (Attempt to Resuscitate)  Medical Interventions (Patient has pulse or is breathing): Full Support  Level Of Support Discussed With: Patient

## 2025-06-12 NOTE — ANESTHESIA PROCEDURE NOTES
Airway  Reason: elective    Date/Time: 6/12/2025 1:19 PM  End Time:6/12/2025 1:19 PM    General Information and Staff    Patient location during procedure: OR  CRNA/CAA: Johnnie Zaidi CRNA    Indications and Patient Condition  Indications for airway management: airway protection    Preoxygenated: yes  MILS maintained throughout    Mask difficulty assessment: 0 - not attempted    Final Airway Details    Final airway type: supraglottic airway      Successful airway: unique  Size: 4  Airway Seal Pressure (cm H2O): 20   Number of attempts at approach: 1  Assessment: lips, teeth, and gum same as pre-op and atraumatic intubation    Additional Comments  Atraumatic

## 2025-06-12 NOTE — PLAN OF CARE
Goal Outcome Evaluation:  Pt resting in bed with no s/s of distress noted. VSS. IV access maintained. Surgery bath performed with CNA. Pt NPO for procedure. Call light and belongings in reach. Plan of care ongoing.

## 2025-06-12 NOTE — ANESTHESIA POSTPROCEDURE EVALUATION
Patient: Ang Jackson    Procedure Summary       Date: 06/12/25 Room / Location:  [facility] 03 /  [facility]    Anesthesia Start: 1316 Anesthesia Stop: 1405    Procedure: DEBRIDEMENT SACRAL ULCER/WOUND (Abdomen) Diagnosis:       Wound of sacral region, subsequent encounter      (Wound of sacral region, subsequent encounter [S31.000D])    Surgeons: Deepak Lara MD Provider: Conor Hong DO    Anesthesia Type: general ASA Status: 4            Anesthesia Type: general    Vitals  Vitals Value Taken Time   /67 06/12/25 14:33   Temp 97.9 °F (36.6 °C) 06/12/25 14:08   Pulse 73 06/12/25 14:34   Resp 15 06/12/25 14:33   SpO2 100 % 06/12/25 14:34   Vitals shown include unfiled device data.        Post Anesthesia Care and Evaluation    Patient location during evaluation: PHASE II  Patient participation: complete - patient participated  Level of consciousness: awake and alert  Pain score: 1  Pain management: adequate    Airway patency: patent  Anesthetic complications: No anesthetic complications  PONV Status: controlled  Cardiovascular status: acceptable  Respiratory status: acceptable and room air  Hydration status: euvolemic  No anesthesia care post op

## 2025-06-12 NOTE — NURSING NOTE
POA unstagable wounds to sacrum, left lower gluteal and right lower gluteal.  Planned OR today for debridement.

## 2025-06-12 NOTE — CASE MANAGEMENT/SOCIAL WORK
Discharge Planning Assessment  [facility]     Patient Name: Ang Jackson  MRN: [id number]  Today's Date: 6/12/2025    Admit Date: 6/11/2025    Plan: SS received consult per Case Management for may be current with [facility].  SS spoke with pt and family at bedside on this date.  Pt resides at home with spouse and plans to return home at discharge.  Pt currently utilizes [facility] per [facility].  Pt currently utilizes home 02 at 2 liters qhs, hospital bed, husam lift and power chair (at [facility] [place]) via [facility].  Pt's PCP is Deepak Perez.  SS will follow.   Discharge Needs Assessment       Row Name 06/12/25 4957       Living Environment    People in Home spouse    Name(s) of People in Home Lives with spouse, Myla    Current Living Arrangements home    Potentially Unsafe Housing Conditions none    Family Caregiver if Needed none    Quality of Family Relationships helpful;involved;supportive       Transition Planning    Patient/Family Anticipates Transition to home with family    Patient/Family Anticipated Services at Transition none    Transportation Anticipated family or friend will provide       Discharge Needs Assessment    Equipment Currently Used at Home oxygen;hospital bed;lift device;wheelchair, motorized                   Discharge Plan       Row Name 06/12/25 6036       Plan    Plan SS received consult per Case Management for may be current with [facility].  SS spoke with pt and family at bedside on this date.  Pt resides at home with spouse and plans to return home at discharge.  Pt currently utilizes [facility] per [facility].  Pt currently utilizes home 02 at 2 liters qhs, hospital bed, husam lift and power chair (at [facility] [place]) via [facility].  Pt's PCP is Deepak Perez.  SS will follow.                  Selected Continued Care - Prior Encounters Includes continued care and service providers with selected services from  prior encounters from 3/13/2025 to 6/12/2025      Discharged on 5/20/2025 Admission date: 5/16/2025 - Discharge disposition: Home-Health Care c      Home Medical Care       Service Provider Services Address Phone Fax Patient Preferred    [facility] [address] [facility] [address] [phone] --                          Expected Discharge Date and Time       Expected Discharge Date Expected Discharge Time    Jun 12, 2025            Demographic Summary       Row Name 06/12/25 1657       General Information    Admission Type observation    Arrived From home    Referral Source nursing    Reason for Consult discharge planning    Preferred Language English                    Sara Scott, DUKEW

## 2025-06-12 NOTE — PAYOR COMM NOTE
"CONTACT:  RUKHSANA WREN RN  UTILIZATION MANAGEMENT DEPT.   [facility]    [address] [place], [address]   PHONE:  [phone]   FAX: [phone]   NPI [id number]        OBS AUTH REQUEST--SECONDARY TO MEDICARE            Emily Jackson (64 y.o. Male)       Date of Birth   1960    Social Security Number       Address   [address]  [facility] [address]    Home Phone   [phone]    MRN   [id number]       Latter day   Restorationist    Marital Status                               Admission Date   6/11/2025    Admission Type   Urgent    Admitting Provider   Deepak Lara MD    Attending Provider   Deepak Lara MD    Department, Room/Bed   [facility] OPERATING ROOM DEPARTMENT, [facility] OR/MAIN OR       Discharge Date       Discharge Disposition       Discharge Destination                                 Attending Provider: Deepak Lara MD    Allergies: Sulfa Antibiotics, Codeine, Ketorolac, Morphine And Codeine    Isolation: Contact   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), 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)   Code Status: CPR    Ht: 172.7 cm (68\")   Wt: 95 kg (209 lb 8 oz)    Admission Cmt: None   Principal Problem: Decubitus ulcer [L89.90]                   Active Insurance as of 6/11/2025       Primary Coverage       Payor Plan Insurance Group Employer/Plan Group    MEDICARE MEDICARE A & B        Payor Plan Address Payor Plan Phone Number Payor Plan Fax Number Effective Dates    [address] [phone]  10/1/2011 - None Entered    [facility] [id number]         Subscriber Name Subscriber Birth Date Member ID       EMILY JACKSON 1960 [id number]               Secondary Coverage       Payor Plan Insurance Group Employer/Plan Group    [employer] [place] WE[place]ARE [place]ICAID        Payor Plan Address Payor Plan Phone Number Payor Plan Fax Number Effective Dates    [address] [address] [phone]  8/29/2016 - None Entered    [facility] [id number]         Subscriber Name " Subscriber Birth Date Member ID       EMILY JACKSON 1960 [id number]                     Emergency Contacts        (Rel.) Home Phone Work Phone Mobile Phone    ROSS DYSON (Daughter) [phone] -- --    Myla Jackson (Spouse) [phone] -- [phone]    CESAR JACKSON (Daughter) -- -- [phone]                 History & Physical        PlanoDeepak MD at [address] [place] [address]   HISTORY AND PHYSICAL    Patient Name: Emily Jackson  : 1960  MRN: [id number]  Primary Care Physician:  Deepak Perez  Date of admission: 2025    Subjective  Subjective     Chief Complaint: Buttock and sacral wounds    History of Present Illness  64-year-old quadriplegic male with known sacral wounds that have been debrided in the past but have continued to deteriorate and require more extensive debridement and can be performed in the wound clinic.  No changes to his overall health otherwise.      Review of Systems   Constitutional:  Negative for activity change, appetite change, chills and fever.   HENT:  Negative for sore throat and trouble swallowing.    Eyes:  Negative for visual disturbance.   Respiratory:  Negative for cough and shortness of breath.    Cardiovascular:  Negative for chest pain and palpitations.   Gastrointestinal:  Negative for abdominal distention, abdominal pain, blood in stool, constipation, diarrhea, nausea and vomiting.   Endocrine: Negative for cold intolerance and heat intolerance.   Genitourinary:  Negative for dysuria.   Musculoskeletal:  Negative for joint swelling.   Skin:  Negative for color change, rash and wound.   Allergic/Immunologic: Negative for immunocompromised state.   Neurological:  Negative for dizziness, seizures, weakness and headaches.   Hematological:  Negative for adenopathy. Does not bruise/bleed easily.   Psychiatric/Behavioral:  Negative for agitation and confusion.         Personal History     Past Medical History:    Diagnosis Date    Anesthesia     diffculty adminster spinal block, patient stated with last hip surgery 7-2020 several attempts per anthayrelis and no luck, resulted in general anesthesia     Anxiety     Arthritis     Rheumatoid    COVID     2020    GERD (gastroesophageal reflux disease)     Hypertension     Lactose intolerance     Low back pain     Sleep apnea     does not wear machine, MILD, DOES NOT HAVE AFTER WGT LOSS SURGERY    Wears reading eyeglasses        Past Surgical History:   Procedure Laterality Date    ANTERIOR CERVICAL DISCECTOMY W/ FUSION Right 02/19/2023    Procedure: CERVICAL DISCECTOMY ANTERIOR WITH FUSION C6-7;  Surgeon: Jaiden Aldridge MD;  Location:  [facility];  Service: Neurosurgery;  Laterality: Right;    BARIATRIC SURGERY  4 year ago    CARPAL TUNNEL RELEASE Bilateral     CARPAL TUNNEL RELEASE Right 06/29/2023    Procedure: CARPAL TUNNEL RELEASE RIGHT;  Surgeon: Jaiden Aldridge MD;  Location:  [facility];  Service: Neurosurgery;  Laterality: Right;    COLONOSCOPY      5 years ago    COLONOSCOPY N/A 04/05/2022    Procedure: COLONOSCOPY FOR SCREENING;  Surgeon: Luz Galvez MD;  Location:  [facility];  Service: Gastroenterology;  Laterality: N/A;    ENDOSCOPY N/A 04/05/2022    Procedure: ESOPHAGOGASTRODUODENOSCOPY WITH BIOPSY;  Surgeon: Luz Galvez MD;  Location:  [facility];  Service: Gastroenterology;  Laterality: N/A;    GASTRIC SLEEVE LAPAROSCOPIC      2017    HIP SURGERY Right times 2    KNEE ARTHROSCOPY Left     TOTAL HIP ARTHROPLASTY Left 01/25/2021    Procedure: TOTAL HIP ARTHROPLASTY LEFT;  Surgeon: Jb Patel MD;  Location:  [facility];  Service: Orthopedics;  Laterality: Left;    TOTAL HIP ARTHROPLASTY REVISION Right 07/20/2020    Procedure: TOTAL HIP ARTHROPLASTY REVISION RIGHT;  Surgeon: Jb Patel MD;  Location:  [facility];  Service: Orthopedics;  Laterality: Right;    WOUND DEBRIDEMENT N/A 5/18/2025    Procedure: DEBRIDEMENT SACRAL  "ULCER/WOUND;  Surgeon: Deepak Lara MD;  Location: [facility];  Service: General;  Laterality: N/A;       Family History: family history includes Cancer in his mother; Colon cancer in his maternal uncle; Colon polyps in his maternal uncle; Diabetes in his brother and paternal grandmother; Gout in his paternal grandmother; Heart disease in his father; Hypertension in his brother and father; Osteoarthritis in his father. Otherwise pertinent FHx was reviewed and not pertinent to current issue.    Social History:  reports that he has never smoked. He has never been exposed to tobacco smoke. He has never used smokeless tobacco. He reports that he does not drink alcohol and does not use drugs.    Home Medications:  Brexpiprazole, Cyanocobalamin, FLUoxetine, Lactobacillus Acid-Pectin, Omadacycline Tosylate, QUEtiapine, ascorbic acid, baclofen, busPIRone, diclofenac, famotidine, ferrous sulfate, gabapentin, loratadine, multivitamin with minerals, nystatin-triamcinolone, oxyCODONE, oxyCODONE ER, pantoprazole, tiZANidine, and vitamin D3    Allergies:  Allergies   Allergen Reactions    Sulfa Antibiotics Hives     Hives, shortness of breath    Codeine Nausea And Vomiting    Ketorolac Other (See Comments)     Pt reports heavy, \"tight\" feeling in his chest.    Morphine And Codeine Nausea And Vomiting       Objective   Objective     Vitals:   Temp:  [97.8 °F (36.6 °C)-98.5 °F (36.9 °C)] 97.8 °F (36.6 °C)  Heart Rate:  [67-88] 67  Resp:  [18] 18  BP: ()/(49-69) 118/69    Physical Exam  Constitutional:       Appearance: He is well-developed.   HENT:      Head: Normocephalic and atraumatic.   Eyes:      Conjunctiva/sclera: Conjunctivae normal.      Pupils: Pupils are equal, round, and reactive to light.   Neck:      Thyroid: No thyromegaly.      Vascular: No JVD.      Trachea: No tracheal deviation.   Cardiovascular:      Rate and Rhythm: Normal rate and regular rhythm.      Heart sounds: No murmur heard.     No " friction rub. No gallop.   Pulmonary:      Effort: Pulmonary effort is normal.      Breath sounds: Normal breath sounds.   Abdominal:      General: There is no distension.      Palpations: Abdomen is soft. There is no hepatomegaly or splenomegaly.      Tenderness: There is no abdominal tenderness.      Hernia: No hernia is present.   Musculoskeletal:         General: No deformity. Normal range of motion.      Cervical back: Neck supple.   Skin:     General: Skin is warm and dry.      Comments: Buttock and sacral wounds with necrosis but otherwise unchanged as regards to the size   Neurological:      Mental Status: He is alert and oriented to person, place, and time.      Comments: Quadriplegic         Result Review   Result Review:  I have personally reviewed the results from the time of this admission to 6/11/2025 15:59 EDT and agree with these findings:  [x]  Laboratory list / accordion  []  Microbiology  [x]  Radiology  []  EKG/Telemetry   []  Cardiology/Vascular   []  Pathology        Assessment & Plan  Assessment / Plan     Brief Patient Summary:  Ang Jackson is a 64 y.o. male who has quadriplegia and has developed pressure ulcerations that have required debridement and now have worsening necrosis that cannot be debrided in the wound clinic.    Active Hospital Problems:  There are no active hospital problems to display for this patient.    Plan:   Admit to floor  N.p.o. after midnight  OR tomorrow for debridement    VTE Prophylaxis:  No VTE prophylaxis order currently exists.        CODE STATUS:       Admission Status:  I believe this patient meets observation status.    Deepak Lara MD    Electronically signed by Deepak Lara MD at 06/11/25 1601       Emergency Department Notes    No notes of this type exist for this encounter.       Facility-Administered Medications as of 6/12/2025   Medication Dose Route Frequency Provider Last Rate Last Admin    acetaminophen (TYLENOL) tablet 1,000 mg   1,000 mg Oral Q6H PRN Deepak Lara MD   1,000 mg at 06/12/25 0531    baclofen (LIORESAL) tablet 20 mg  20 mg Oral TID Deepak Lara MD   20 mg at 06/11/25 2119    baclofen (LIORESAL) tablet 50 mg  50 mg Oral Nightly Deepak Lara MD   50 mg at 06/11/25 2118    [Transfer Hold] sennosides-docusate (PERICOLACE) 8.6-50 MG per tablet 2 tablet  2 tablet Oral BID PRN Deepak Lara MD        And    [Transfer Hold] polyethylene glycol (MIRALAX) packet 17 g  17 g Oral Daily PRN Deepak Lara MD        And    [Transfer Hold] bisacodyl (DULCOLAX) EC tablet 5 mg  5 mg Oral Daily PRN Deepak Lara MD        And    [Transfer Hold] bisacodyl (DULCOLAX) suppository 10 mg  10 mg Rectal Daily PRN Deepak Lara MD        Brexpiprazole tablet 2 mg  2 mg Oral Daily Deepak Lara MD        busPIRone (BUSPAR) tablet 5 mg  5 mg Oral BID Deepak Lara MD   5 mg at 06/11/25 2119    cetirizine (zyrTEC) tablet 10 mg  10 mg Oral Daily Deepak Lara MD        diclofenac (VOLTAREN) EC tablet 75 mg  75 mg Oral BID Deepak Lara MD        famotidine (PEPCID) tablet 20 mg  20 mg Oral BID Deepak Lara MD   20 mg at 06/11/25 2119    [COMPLETED] fentaNYL citrate (PF) (SUBLIMAZE) injection 100 mcg  100 mcg Intravenous Once Conor Hong DO   100 mcg at 06/12/25 1221    FLUoxetine (PROzac) capsule 20 mg  20 mg Oral Daily Deepak Lara MD        gabapentin (NEURONTIN) capsule 900 mg  900 mg Oral 4x Daily Deepak Lara MD   900 mg at 06/11/25 2118    [Transfer Hold] heparin (porcine) 5000 UNIT/ML injection 5,000 Units  5,000 Units Subcutaneous Q8H Deepak Lara MD   5,000 Units at 06/12/25 0527    [COMPLETED] Lidocaine HCl gel (XYLOCAINE) urethral/mucosal syringe 1 mL  1 Application Topical Once Jillian Bailey APRN   1 mL at 06/11/25 1423    miconazole (MICOTIN) 2 % cream 1 Application  1 Application Topical Nightly House,  Deepak Murrieta MD        nystatin (MYCOSTATIN) 777055 UNIT/GM cream 1 Application  1 Application Topical Daily PRN Deepak Lara MD        Omadacycline Tosylate tablet 300 mg  300 mg Oral Nightly Deepak Lara MD        oxyCODONE (oxyCONTIN) 12 hr tablet 20 mg  20 mg Oral Nightly Deepak Lara MD   20 mg at 06/11/25 2119    oxyCODONE (ROXICODONE) immediate release tablet 10 mg  10 mg Oral 4x Daily Deepak Lara MD   10 mg at 06/12/25 0526    [Transfer Hold] oxyCODONE-acetaminophen (PERCOCET) 5-325 MG per tablet 1 tablet  1 tablet Oral Q4H PRN Deepak Lara MD        pantoprazole (PROTONIX) EC tablet 40 mg  40 mg Oral BID AC Deepak Lara MD   40 mg at 06/11/25 2119    [Transfer Hold] sodium chloride 0.9 % flush 10 mL  10 mL Intravenous Q12H Deepak Lara MD   10 mL at 06/12/25 1002    [Transfer Hold] sodium chloride 0.9 % flush 10 mL  10 mL Intravenous PRN Deepak Lara MD        [Transfer Hold] sodium chloride 0.9 % flush 10 mL  10 mL Intravenous Q12H Deepak Lara MD   10 mL at 06/12/25 1002    [Transfer Hold] sodium chloride 0.9 % flush 10 mL  10 mL Intravenous PRN Deepak Lara MD        [Transfer Hold] sodium chloride 0.9 % infusion 40 mL  40 mL Intravenous PRN Deepak Lara MD        [Transfer Hold] sodium chloride 0.9 % infusion 40 mL  40 mL Intravenous PRN Deepak Lara MD        tiZANidine (ZANAFLEX) tablet 4 mg  4 mg Oral Nightly Deepak Lara MD   4 mg at 06/11/25 2118    tiZANidine (ZANAFLEX) tablet 6 mg  6 mg Oral 4x Daily Deepak Lara MD   6 mg at 06/12/25 0527    triamcinolone (KENALOG) 0.1 % cream 1 Application  1 Application Topical Daily PRN Deepak Lara MD        zolpidem (AMBIEN) tablet 5 mg  5 mg Oral Nightly Deepak Lara MD   5 mg at 06/11/25 2120     Orders (all)        Start     Ordered    06/12/25 2100  Omadacycline Tosylate tablet 300 mg  Nightly          06/12/25 0956    06/12/25 1400  Instructions on coughing, deep breathing, and incentive spirometry.  Every 4 Hours While Awake,   Status:  Canceled       06/12/25 1105    06/12/25 1342  Tissue / Bone Culture - Tissue, Sacrum  RELEASE UPON ORDERING         06/12/25 1342    06/12/25 1220  fentaNYL citrate (PF) (SUBLIMAZE) injection 100 mcg  Once         06/12/25 1218    06/12/25 1107  sodium chloride 0.9 % flush 10 mL  Every 12 Hours Scheduled,   Status:  Discontinued         06/12/25 1105    06/12/25 1107  lactated ringers infusion  Once,   Status:  Discontinued         06/12/25 1105    06/12/25 1107  sodium chloride 0.9 % flush 10 mL  Every 12 Hours Scheduled,   Status:  Discontinued         06/12/25 1105    06/12/25 1106  Oxygen Therapy- Nasal Cannula; Titrate 1-6 LPM Per SpO2; 90 - 95%  Continuous         06/12/25 1105    06/12/25 1106  Continuous Pulse Oximetry  Continuous         06/12/25 1105    06/12/25 1106  POC Glucose Once  Once        Comments: For all diabetic patients and all patients who are to be admitted. Notify Anesthesiologist for blood sugar > 180.      06/12/25 1105    06/12/25 1106  Follow Anesthesia Guidelines / Protocol  Once         06/12/25 1105    06/12/25 1106  Verify / Perform Chlorhexidine Skin Prep  Once        Comments: Chlorhexidine Skin Wipes and Instructions For All Patients Having A Procedure Requiring an Outward Incision if Not Allergic.  If Allergic, Give Antibacterial Skin Wipes and Instructions.  Do Not Use For Facial Cases or on Any Mucus Membranes.    06/12/25 1105    06/12/25 1106  Insert Peripheral IV  Once         06/12/25 1105    06/12/25 1106  Saline Lock & Maintain IV Access  Continuous         06/12/25 1105    06/12/25 1106  Place Sequential Compression Device  Once         06/12/25 1105    06/12/25 1106  Maintain Sequential Compression Device  Continuous         06/12/25 1105    06/12/25 1105  Vital Signs - Per Anesthesia Protocol  As Needed,   Status:  Canceled        06/12/25 1105    06/12/25 1105  sodium chloride 0.9 % flush 10 mL  As Needed,   Status:  Discontinued         06/12/25 1105    06/12/25 1105  sodium chloride 0.9 % infusion 40 mL  As Needed,   Status:  Discontinued         06/12/25 1105    06/12/25 1105  midazolam (VERSED) injection 1 mg  Every 10 Minutes PRN,   Status:  Discontinued         06/12/25 1105    06/12/25 1105  sodium chloride 0.9 % flush 10 mL  As Needed,   Status:  Discontinued         06/12/25 1105    06/12/25 1105  sodium chloride 0.9 % infusion 40 mL  As Needed,   Status:  Discontinued         06/12/25 1105    06/12/25 1105  Case Management  Consult  Once        Provider:  (Not yet assigned)    06/12/25 1105    06/12/25 0900  FLUoxetine (PROzac) capsule 20 mg  Daily         06/11/25 1848    06/12/25 0900  Brexpiprazole tablet 2 mg  Daily         06/11/25 1848    06/12/25 0900  cetirizine (zyrTEC) tablet 10 mg  Daily         06/11/25 1848    06/12/25 0600  Basic Metabolic Panel  Morning Draw         06/11/25 1603    06/12/25 0600  CBC Auto Differential  Morning Draw         06/11/25 1603    06/12/25 0001  NPO Diet NPO Type: Strict NPO  Diet Effective Midnight         06/11/25 1602    06/11/25 2200  [Transfer Hold]  heparin (porcine) 5000 UNIT/ML injection 5,000 Units  Every 8 Hours Scheduled        (Medication was not reviewed on transfer)    06/11/25 1602    06/11/25 2100  [Transfer Hold]  sodium chloride 0.9 % flush 10 mL  Every 12 Hours Scheduled        (Medication was not reviewed on transfer)    06/11/25 1602 06/11/25 2100  [Transfer Hold]  sodium chloride 0.9 % flush 10 mL  Every 12 Hours Scheduled        (Medication was not reviewed on transfer)    06/11/25 1603    06/11/25 2100  famotidine (PEPCID) tablet 20 mg  2 Times Daily         06/11/25 1848    06/11/25 2100  tiZANidine (ZANAFLEX) tablet 4 mg  Nightly         06/11/25 1848    06/11/25 2100  baclofen (LIORESAL) tablet 50 mg  Nightly         06/11/25 1848    06/11/25  "2100  gabapentin (NEURONTIN) capsule 900 mg  4 Times Daily         06/11/25 1848    06/11/25 2100  oxyCODONE (oxyCONTIN) 12 hr tablet 20 mg  Nightly         06/11/25 1848    06/11/25 2100  busPIRone (BUSPAR) tablet 5 mg  2 Times Daily         06/11/25 1848 06/11/25 2100  miconazole (MICOTIN) 2 % cream 1 Application  Nightly         06/11/25 1848 06/11/25 2100  zolpidem (AMBIEN) tablet 5 mg  Nightly         06/11/25 1848 06/11/25 2100  Omadacycline Tosylate tablet 150 mg  Nightly,   Status:  Discontinued         06/11/25 1848 06/11/25 2100  diclofenac (VOLTAREN) EC tablet 75 mg  2 Times Daily         06/11/25 1848 06/11/25 2000  Vital Signs  Every 4 Hours       06/11/25 1603    06/11/25 1945  tiZANidine (ZANAFLEX) tablet 6 mg  4 Times Daily         06/11/25 1848 06/11/25 1945  baclofen (LIORESAL) tablet 20 mg  3 Times Daily         06/11/25 1848 06/11/25 1945  oxyCODONE (ROXICODONE) immediate release tablet 10 mg  4 Times Daily         06/11/25 1848 06/11/25 1945  pantoprazole (PROTONIX) EC tablet 40 mg  2 Times Daily Before Meals         06/11/25 1848    06/11/25 1804  acetaminophen (TYLENOL) tablet 1,000 mg  Every 6 Hours PRN         06/11/25 1848    06/11/25 1804  triamcinolone (KENALOG) 0.1 % cream 1 Application  Daily PRN         06/11/25 1848    06/11/25 1804  nystatin (MYCOSTATIN) [id number] UNIT/GM cream 1 Application  Daily PRN         06/11/25 1848    06/11/25 1800  Oral Care  2 Times Daily       06/11/25 1603    06/11/25 1614  Wound Ostomy Eval & Treat  Once         06/11/25 1614    06/11/25 1603  [Transfer Hold]  oxyCODONE-acetaminophen (PERCOCET) 5-325 MG per tablet 1 tablet  Every 4 Hours PRN        (Medication was not reviewed on transfer)    06/11/25 1603    06/11/25 1603  [Transfer Hold]  sennosides-docusate (PERICOLACE) 8.6-50 MG per tablet 2 tablet  2 Times Daily PRN        (Medication was not reviewed on transfer)   Placed in \"And\" Linked Group    06/11/25 1603    06/11/25 1603 " " [Transfer Hold]  polyethylene glycol (MIRALAX) packet 17 g  Daily PRN        (Medication was not reviewed on transfer)   Placed in \"And\" Linked Group    06/11/25 1603    06/11/25 1603  [Transfer Hold]  bisacodyl (DULCOLAX) EC tablet 5 mg  Daily PRN        (Medication was not reviewed on transfer)   Placed in \"And\" Linked Group    06/11/25 1603    06/11/25 1603  [Transfer Hold]  bisacodyl (DULCOLAX) suppository 10 mg  Daily PRN        (Medication was not reviewed on transfer)   Placed in \"And\" Linked Group    06/11/25 1603    06/11/25 1603  Diet: Regular/House; Fluid Consistency: Thin (IDDSI 0)  Diet Effective Now,   Status:  Canceled         06/11/25 1602    06/11/25 1603  Intake & Output  Every Shift       06/11/25 1603    06/11/25 1603  Weigh Patient  Once         06/11/25 1603    06/11/25 1603  Insert Peripheral IV  Once         06/11/25 1603    06/11/25 1603  Saline Lock & Maintain IV Access  Continuous,   Status:  Canceled         06/11/25 1603    06/11/25 1603  Code Status and Medical Interventions: CPR (Attempt to Resuscitate); Full Support  Continuous         06/11/25 1603    06/11/25 1602  [Transfer Hold]  sodium chloride 0.9 % flush 10 mL  As Needed        (Medication was not reviewed on transfer)    06/11/25 1603    06/11/25 1602  [Transfer Hold]  sodium chloride 0.9 % infusion 40 mL  As Needed        (Medication was not reviewed on transfer)    06/11/25 1603    06/11/25 1602  Insert Peripheral IV  Once         06/11/25 1602    06/11/25 1602  Saline Lock & Maintain IV Access  Continuous,   Status:  Canceled         06/11/25 1602    06/11/25 1602  Initiate Observation Status  Once         06/11/25 1602    06/11/25 1601  [Transfer Hold]  sodium chloride 0.9 % flush 10 mL  As Needed        (Medication was not reviewed on transfer)    06/11/25 1602    06/11/25 1601  [Transfer Hold]  sodium chloride 0.9 % infusion 40 mL  As Needed        (Medication was not reviewed on transfer)    06/11/25 1602    06/11/25 " 0000  Telemetry Scan  Once         06/11/25 0000    06/11/25 0000  Telemetry Scan  Once         06/11/25 0000    --  acetaminophen (TYLENOL) 500 MG tablet  Every 6 Hours PRN         06/11/25 1612    --  clotrimazole (LOTRIMIN) 1 % cream  Nightly         06/11/25 1617    --  collagenase 250 UNIT/GM ointment  Daily         06/11/25 1617    --  multivitamin tablet tablet  Daily         06/11/25 1619    --  nystatin (MYCOSTATIN) 942753 UNIT/GM cream  Daily PRN         06/11/25 1620    --  omeprazole (priLOSEC) 20 MG capsule  Daily         06/11/25 1627    --  triamcinolone (KENALOG) 0.1 % cream  Daily PRN         06/11/25 1627    --  Brexpiprazole (Rexulti) 2 MG tablet  Daily         06/11/25 1633    --  Omadacycline Tosylate (Nuzyra) 150 MG tablet  Nightly         06/11/25 1815    --  zolpidem (Ambien) 5 MG tablet  Nightly         06/11/25 1821                  Physician Progress Notes (all)    No notes of this type exist for this encounter.       Consult Notes (all)    No notes of this type exist for this encounter.

## 2025-06-12 NOTE — OP NOTE
DEBRIDEMENT SACRAL ULCER/WOUND  Procedure Note    Ang Jackson  6/12/2025    Pre-op Diagnosis:   Wound of sacral region, subsequent encounter [S31.000D]    Post-op Diagnosis:     Post-Op Diagnosis Codes:     * Wound of sacral region, subsequent encounter [S31.000D]    Procedure(s):  DEBRIDEMENT SACRAL ULCER/WOUND    Surgeon(s):  Deepak Lara MD    Anesthesia: General    Staff:   Circulator: Zackary Tilley RN  Scrub Person: Jose Malone  Assistant: Sarah Harrington    Findings: Large sacral decubitus ulceration as well as large bilateral gluteal ulcerations with necrotic skin and subcutaneous fat treated with sharp excisional debridement using scalpel, and Versajet over a total measured area of 10 x 15 cm.          Operative Procedure: Patient is taken operating suite and placed in right lateral decubitus position.  The buttocks was prepped and draped in usual sterile fashion.  Timeout procedure was performed.  The patient had large decubitus ulcerations of the sacral and gluteal regions.  There was necrotic skin and subcutaneous fat but no tunneling tracts of purulence.  A total area of 10 x 15 cm of necrotic tissue was measured and sharply debrided with scalpel and Versajet to healthy bleeding tissue.  The wounds were then irrigated and packed with gauze.  Patient tolerated the procedure well.    Estimated Blood Loss: 10 mL    Specimens:   Tissue for culture sacral wound           Drains: None    Grafts/Implants: None    Complications: None           Deepak Lara MD     Date: 6/12/2025  Time: 13:56 EDT

## 2025-06-12 NOTE — NURSING NOTE
Patient was admitted yesterday during office visit. Security was contacted about power chair. The power chair was labeled with patients name and . Security came and got it from clinic and said they would store it til the patient was released from hospital. Family is aware that security was contacted and got the power chair.

## 2025-06-13 VITALS
SYSTOLIC BLOOD PRESSURE: 119 MMHG | WEIGHT: 213.6 LBS | OXYGEN SATURATION: 95 % | TEMPERATURE: 99.2 F | HEIGHT: 68 IN | HEART RATE: 69 BPM | BODY MASS INDEX: 32.37 KG/M2 | RESPIRATION RATE: 18 BRPM | DIASTOLIC BLOOD PRESSURE: 62 MMHG

## 2025-06-13 PROCEDURE — 25010000002 HEPARIN (PORCINE) PER 1000 UNITS: Performed by: SURGERY

## 2025-06-13 PROCEDURE — 99238 HOSP IP/OBS DSCHRG MGMT 30/<: CPT | Performed by: SURGERY

## 2025-06-13 RX ORDER — LIDOCAINE 40 MG/G
1 CREAM TOPICAL ONCE
Status: COMPLETED | OUTPATIENT
Start: 2025-06-13 | End: 2025-06-13

## 2025-06-13 RX ORDER — SODIUM HYPOCHLORITE 1.25 MG/ML
SOLUTION TOPICAL DAILY
Status: DISCONTINUED | OUTPATIENT
Start: 2025-06-13 | End: 2025-06-14 | Stop reason: HOSPADM

## 2025-06-13 RX ADMIN — BACLOFEN 20 MG: 10 TABLET ORAL at 12:53

## 2025-06-13 RX ADMIN — FLUOXETINE HYDROCHLORIDE 20 MG: 20 CAPSULE ORAL at 08:52

## 2025-06-13 RX ADMIN — GABAPENTIN 900 MG: 300 CAPSULE ORAL at 08:54

## 2025-06-13 RX ADMIN — BUSPIRONE HYDROCHLORIDE 5 MG: 5 TABLET ORAL at 22:19

## 2025-06-13 RX ADMIN — Medication 10 ML: at 08:55

## 2025-06-13 RX ADMIN — BUSPIRONE HYDROCHLORIDE 5 MG: 5 TABLET ORAL at 20:45

## 2025-06-13 RX ADMIN — BREXPIPRAZOLE 2 MG: 1 TABLET ORAL at 08:53

## 2025-06-13 RX ADMIN — CALCIUM CARBONATE 1 APPLICATION: 500 TABLET, CHEWABLE ORAL at 20:53

## 2025-06-13 RX ADMIN — COLLAGENASE SANTYL 1 APPLICATION: 250 OINTMENT TOPICAL at 12:16

## 2025-06-13 RX ADMIN — OXYCODONE HYDROCHLORIDE 10 MG: 10 TABLET ORAL at 08:53

## 2025-06-13 RX ADMIN — TIZANIDINE 6 MG: 4 TABLET ORAL at 18:00

## 2025-06-13 RX ADMIN — DICLOFENAC SODIUM 75 MG: 75 TABLET, DELAYED RELEASE ORAL at 12:53

## 2025-06-13 RX ADMIN — OXYCODONE HYDROCHLORIDE 10 MG: 10 TABLET ORAL at 18:00

## 2025-06-13 RX ADMIN — BACLOFEN 20 MG: 10 TABLET ORAL at 08:55

## 2025-06-13 RX ADMIN — FAMOTIDINE 20 MG: 20 TABLET, FILM COATED ORAL at 08:52

## 2025-06-13 RX ADMIN — TIZANIDINE 6 MG: 4 TABLET ORAL at 08:53

## 2025-06-13 RX ADMIN — GABAPENTIN 900 MG: 300 CAPSULE ORAL at 12:16

## 2025-06-13 RX ADMIN — GABAPENTIN 900 MG: 300 CAPSULE ORAL at 20:45

## 2025-06-13 RX ADMIN — OXYCODONE HYDROCHLORIDE 10 MG: 10 TABLET ORAL at 12:53

## 2025-06-13 RX ADMIN — PANTOPRAZOLE SODIUM 40 MG: 40 TABLET, DELAYED RELEASE ORAL at 08:53

## 2025-06-13 RX ADMIN — BACLOFEN 20 MG: 10 TABLET ORAL at 18:00

## 2025-06-13 RX ADMIN — OXYCODONE HYDROCHLORIDE 10 MG: 10 TABLET ORAL at 05:50

## 2025-06-13 RX ADMIN — TIZANIDINE 4 MG: 4 TABLET ORAL at 20:44

## 2025-06-13 RX ADMIN — TIZANIDINE 6 MG: 4 TABLET ORAL at 05:51

## 2025-06-13 RX ADMIN — DAKIN'S SOLUTION 0.125% (QUARTER STRENGTH): 0.12 SOLUTION at 12:16

## 2025-06-13 RX ADMIN — HEPARIN SODIUM 5000 UNITS: 5000 INJECTION INTRAVENOUS; SUBCUTANEOUS at 18:02

## 2025-06-13 RX ADMIN — Medication 10 ML: at 20:53

## 2025-06-13 RX ADMIN — TIZANIDINE 6 MG: 4 TABLET ORAL at 12:53

## 2025-06-13 RX ADMIN — PANTOPRAZOLE SODIUM 40 MG: 40 TABLET, DELAYED RELEASE ORAL at 18:00

## 2025-06-13 RX ADMIN — FAMOTIDINE 20 MG: 20 TABLET, FILM COATED ORAL at 20:45

## 2025-06-13 RX ADMIN — HEPARIN SODIUM 5000 UNITS: 5000 INJECTION INTRAVENOUS; SUBCUTANEOUS at 22:20

## 2025-06-13 RX ADMIN — BACLOFEN 50 MG: 10 TABLET ORAL at 20:44

## 2025-06-13 RX ADMIN — CETIRIZINE HYDROCHLORIDE 10 MG: 10 TABLET, FILM COATED ORAL at 08:52

## 2025-06-13 RX ADMIN — OXYCODONE HYDROCHLORIDE 20 MG: 10 TABLET, FILM COATED, EXTENDED RELEASE ORAL at 20:45

## 2025-06-13 RX ADMIN — GABAPENTIN 900 MG: 300 CAPSULE ORAL at 18:00

## 2025-06-13 RX ADMIN — LIDOCAINE 4% 1 APPLICATION: 4 CREAM TOPICAL at 12:16

## 2025-06-13 RX ADMIN — OXYCODONE HYDROCHLORIDE AND ACETAMINOPHEN 1 TABLET: 5; 325 TABLET ORAL at 16:01

## 2025-06-13 RX ADMIN — Medication 10 ML: at 20:46

## 2025-06-13 NOTE — CASE MANAGEMENT/SOCIAL WORK
Discharge Planning Assessment  [facility]     Patient Name: Ang Jackson  MRN: [id number]  Today's Date: 6/13/2025    Admit Date: 6/11/2025    Plan: SS received consult per Case Management for may be current with [facility].  SS spoke with pt and family at bedside on this date.  Pt resides at home with spouse and plans to return home at discharge.  Pt currently utilizes [facility] per Debbie Davis.  Pt currently utilizes home 02 at 2 liters q, hospital bed, husam lift and power chair (at [facility] in [place]) via [employer].  Pt's PCP is Deepak Perez.  SS will follow.       Discharge Plan       Row Name 06/13/25 1233       Plan    Final Discharge Disposition Code 06 - home with home health care    Final Note Pt to be discharged home with continued [facility].  SS notified  at [phone] and gave RN number to call report.  Pt will need EMS to transport.                  Continued Care and Services - Admitted Since 6/11/2025       Home Medical Care Coordination complete.      Service Provider Request Status Services Address Phone Fax Patient Preferred    [facility]  Selected Home Health Services [address] [address] [phone] --                      TERESITA Leonard

## 2025-06-13 NOTE — DISCHARGE INSTRUCTIONS
Wound Care Instructions:   Bilaterial posterior thigh  *Cleanse with dakins,  *Apply thera honey to wound beds  *Cover with silicone border dressing    Bilateral gluteal and scarum  *Cleanse with dakins  *Apply thick layer santyl to wound bed  *Pack with dankins moist gauze  *Cover with abd pad and secure with tape

## 2025-06-13 NOTE — PLAN OF CARE
Goal Outcome Evaluation:      Patient had non eventful day. Complaints of pain this shift, PRN medication given. Wound care done per orders. Plan of care ongoing.

## 2025-06-13 NOTE — PROGRESS NOTES
Wound Clinic Note  Patient Identification:  Name:  Ang Jackson  Age:  64 y.o.  Sex:  male  :  1960  MRN:  [id number]   Visit Number:  [id number]  Primary Care Physician:  Deepak Perez     Subjective     Chief complaint:     Multiple pressure injuries     History of presenting illness:     Patient is a 64 y.o. male with past medical history significant for obesity, parplegia,  that presented today for evaluation of bilateral gluteal and sacral pressure injuries.  Reports areas have been present for over a month. He is paraplegic. Has been applying santyl and hydrofera blue to wounds. Denies any fever or chills. Moderate drainage without odor. Low air loss mattress has been ordered by PCP, should be delivered this week.  Denies history of diabetes, or smoking. Finished dose of cipro.     Interval History:   2025: Seen in clinic today for follow-up to pressure injuries to right and left gluteal, and perirectal MASD. Right gluteal wound has worsened, now with DTI down to right groin area. He is quadpraplegic, complicating wound prgoression. He is incontinent of bowel and bladder. Chin catheter in place. Awaiting approval for low-air loss mattress. Denies any fever or chills. No other issues or concerns reported.    2025: Seen in clinic today for follow-up to pressure injuries to right and left gluteal, and perirectal MASD. Right gluteal wound has worsened, now with DTI down to right groin area. He is quadpraplegic, complicating wound prgoression. He is incontinent of bowel and bladder. Chin catheter in place. Awaiting approval for low-air loss mattress. Denies any fever or chills. No other issues or concerns reported.    2025: Seen in clinic today for follow-up to pressure injuries to right and left gluteal, and perirectal MASD. Right gluteal wound is stable. Continues to be severe.. He is quadpraplegic, complicating wound prgoression. He is incontinent of bowel and bladder. Chin  catheter in place. Awaiting approval for low-air loss mattress. Denies any fever or chills. No other issues or concerns reported.    05/07/2025: Seen in clinic today for follow-up to pressure injuries to right and left gluteal, and perirectal MASD. Right gluteal wound is stable. Continues to be severe.. He is quadpraplegic, complicating wound prgoression. He is incontinent of bowel and bladder. Chin catheter in place. Awaiting approval for low-air loss mattress. Denies any fever or chills. No other issues or concerns reported.    05/15/2025: Seen in clinic today for follow-up to pressure injuries or bilateral gluteal. Left gluteal wound continues to worsen with increase In tunnel area. Discussed concern of worsening and possible going to ED. Would like to avoid if possible. Will obtain new wound culture and labs. Pending results may require additional antibiotics. Was scheduled to see infectious disease tomorrow unfortunatley had to cancel. Highly encouraged to make following, which is scheduled for next week .    05/29/2025: Seen in clinic today for follow-up to pressure injuries or bilateral gluteal. Left gluteal wound continues to worsen with increase In tunnel area. Right gluteal wound with necrotic tissue. Sacral wound with necrotic tissue. Wounds continue to be severe. Will attempt debridement. Denies any fever or chills. Does have low-air loss mattress at home.     06/04/2025: Seen in clinic today for follow-up to pressure injuries or bilateral gluteal. Left gluteal wound continues to worsen with increase In tunnel area. Right gluteal wound with necrotic tissue. Sacral wound with necrotic tissue. Wounds continue to be severe. Will attempt debridement. Denies any fever or chills. Does have low-air loss mattress at home.  Hesistate for debridement in OR due to possibility of prolonged hosptial stay. Will attempt debridement today,  and re-evaluate next week for possible debridement in OR at that time.    ---------------------------------------------------------------------------------------------------------------------   Review of Systems   Constitutional:  Negative for chills and fever.   Respiratory:  Negative for cough and shortness of breath.    Cardiovascular:  Positive for leg swelling. Negative for chest pain.   Gastrointestinal:  Negative for nausea and vomiting.   Musculoskeletal:  Positive for back pain and gait problem.   Skin:  Positive for wound.      ---------------------------------------------------------------------------------------------------------------------   Past Medical History:   Diagnosis Date    Anesthesia     diffculty adminster spinal block, patient stated with last hip surgery 7-2020 several attempts per anthesia and no luck, resulted in general anesthesia     Anxiety     Arthritis     Rheumatoid    COVID 2020    GERD (gastroesophageal reflux disease)     Hypertension     Lactose intolerance     Low back pain     Sleep apnea     does not wear machine, MILD, DOES NOT HAVE AFTER WGT LOSS SURGERY    Wears reading eyeglasses      Past Surgical History:   Procedure Laterality Date    ABDOMINAL SURGERY      ANTERIOR CERVICAL DISCECTOMY W/ FUSION Right 02/19/2023    Procedure: CERVICAL DISCECTOMY ANTERIOR WITH FUSION C6-7;  Surgeon: Jaiden Aldridge MD;  Location:  [facility];  Service: Neurosurgery;  Laterality: Right;    BACK SURGERY      BARIATRIC SURGERY  4 year ago    CARDIAC CATHETERIZATION      CARPAL TUNNEL RELEASE Bilateral     CARPAL TUNNEL RELEASE Right 06/29/2023    Procedure: CARPAL TUNNEL RELEASE RIGHT;  Surgeon: Jaiden Aldridge MD;  Location:  [facility];  Service: Neurosurgery;  Laterality: Right;    COLONOSCOPY      5 years ago    COLONOSCOPY N/A 04/05/2022    Procedure: COLONOSCOPY FOR SCREENING;  Surgeon: Luz Galvez MD;  Location: [facility];  Service: Gastroenterology;  Laterality: N/A;    ENDOSCOPY N/A 04/05/2022    Procedure:  ESOPHAGOGASTRODUODENOSCOPY WITH BIOPSY;  Surgeon: Luz Galvez MD;  Location:  [facility];  Service: Gastroenterology;  Laterality: N/A;    GASTRIC SLEEVE LAPAROSCOPIC      2017    HIP SURGERY Right times 2    JOINT REPLACEMENT      KNEE ARTHROSCOPY Left     TONSILLECTOMY      TOTAL HIP ARTHROPLASTY Left 01/25/2021    Procedure: TOTAL HIP ARTHROPLASTY LEFT;  Surgeon: Jb Patel MD;  Location:  [facility];  Service: Orthopedics;  Laterality: Left;    TOTAL HIP ARTHROPLASTY REVISION Right 07/20/2020    Procedure: TOTAL HIP ARTHROPLASTY REVISION RIGHT;  Surgeon: Jb Patel MD;  Location:  [facility];  Service: Orthopedics;  Laterality: Right;    WOUND DEBRIDEMENT N/A 05/18/2025    Procedure: DEBRIDEMENT SACRAL ULCER/WOUND;  Surgeon: Deepak Lara MD;  Location:  [facility];  Service: General;  Laterality: N/A;     Family History   Problem Relation Age of Onset    Heart disease Father     Hypertension Father     Osteoarthritis Father     Cancer Mother     Diabetes Brother     Hypertension Brother     Gout Paternal Grandmother     Diabetes Paternal Grandmother     Colon cancer Maternal Uncle     Colon polyps Maternal Uncle      Social History     Socioeconomic History    Marital status:    Tobacco Use    Smoking status: Never     Passive exposure: Never    Smokeless tobacco: Never   Vaping Use    Vaping status: Never Used   Substance and Sexual Activity    Alcohol use: No    Drug use: Never    Sexual activity: Yes     ---------------------------------------------------------------------------------------------------------------------   Allergies:  Sulfa antibiotics, Codeine, Ketorolac, and Morphine and codeine  ---------------------------------------------------------------------------------------------------------------------  Objective     ---------------------------------------------------------------------------------------------------------------------   Vital Signs:  BP 92/55   " Pulse 63   Temp 98.7 °F (37.1 °C) (Infrared)   Resp 16   Ht 172.7 cm (68\")   Wt 95.3 kg (210 lb)   SpO2 95%   BMI 31.93 kg/m²   Estimated body mass index is 31.93 kg/m² as calculated from the following:    Height as of this encounter: 172.7 cm (68\").    Weight as of this encounter: 95.3 kg (210 lb).  Body mass index is 31.93 kg/m².  Wt Readings from Last 3 Encounters:   06/13/25 96.9 kg (213 lb 9.6 oz)   06/11/25 95.3 kg (210 lb)   06/11/25 95.3 kg (210 lb)       ---------------------------------------------------------------------------------------------------------------------   Physical Exam  Wound Assessment:  Location: Sacral  Wound Measurements: 4 X 4.2 X 0.4cm   Tunneling: No Undermining: No  Dressing Appearance: dressingappearance: clean  Closure: NA  Changes since last exam: no changes  Etiology and classification: MASD  Wound bed structures/characteristics: partial thickness (subcutaneous tissue is not exposed), red  Edges moist  Periwound characteristics: excoriated  Periwound Temperature: normal turgor and temperature  Drainage characteristics: moderate, serous   Perfusion characteristics: NA    Wound Assessment:  Location: Left, gluteal  Wound Measurements: 8.1 X 5 X 3.5cm   Tunneling: No Undermining: No  Dressing Appearance: dressingappearance: clean  Closure: NA  Changes since last exam: no changes  Etiology and classification: Unstageable Pressure injury   Wound bed structures/characteristics: full-thickness (subcutaneous tissue is exposed in at least a portion of the wound), black eschar, moist, pink  Edges moist  Periwound characteristics: intact  Periwound Temperature: normal turgor and temperature  Drainage characteristics: moderate, serous   Perfusion characteristics: NA    Wound Assessment:  Location: Right, gluteal  Wound Measurements: 9 X 5.5 X 2cm   Tunneling: No Undermining: No  Dressing Appearance: dressingappearance: clean  Closure: NA  Changes since last exam: ulcer is worsening "   Etiology and classification: stage 3 pressure ulcer  Wound bed structures/characteristics: full-thickness (subcutaneous tissue is exposed in at least a portion of the wound), moist, pink, yellow  Edges moist  Periwound characteristics: Purple, non-blanchable  Periwound Temperature: normal turgor and temperature  Drainage characteristics: moderate, serous   Perfusion characteristics: NA    Wound Goal (s):Free of infection and No further symptoms  Assessment & Plan      Patient Active Problem List    Diagnosis     Pressure injury of right buttock, stage 3 [L89.313]  -Clean with wound cleanser, apply magic barrier to area and cover with suberabsorbent and secure with tape  -Offloading pressure induction measures discussed  -Ordered high protein diet 120g/day along with vitamin C 2000mg/day, vitamin A 5000 Units/day, vitamin D3 5000 Units/day, zinc 50mg/day to help promote wound healing   -Will order low-air loss mattress as wounds are continuing to worsen and will benefit from added support       Pressure injury of left buttock unstageable [L89.320]  -clean with wound cleanser, apply santyl to area and cover with silicone border dressing daily and PRN  -Wound culture obtained  -Labs ordered: CBC, CMP, CRP, sed rate, prealbumin, and hemoglobain A1C to assess inflammatory markers and nutritional status as this both and negatively impact wound healing if not properly treated.   -Offloading measures discussed   -Received low-air loss mattress today   -Ordered high protein diet 120g/day along with vitamin C 2000mg/day, vitamin A 5000 Units/day, vitamin D3 5000 Units/day, zinc 50mg/day to help promote wound healing   -This condition is associated with a high risk for non-healing, infection, sepsis, loss of function, reduced quality of life, and increased risk of premature mortality. The patient is at high-risk for additional pressure injury, requiring a high level of vigilance and coordination of care, and frequent  re-assessment to prevent adverse outcomes.     Management of this condition is inherently complex, requiring ongoing optimization of many factors to assure the highest likelihood of a favorable outcome, including pressure relief, bioburden, multiple aspects of nutrition, infection management, and moisture and mechanical factors relevant to wound healing.        Dermatitis associated with moisture [L30.8], Perirectal skin irritation [K62.89]  -Clean with wound cleanser, apply magic barrier to area and leave open to air  -Offloading measures discussed   -keep area clean and dry   -High risk for worsening due to incontinent of bowel and bladder  -Chin catheter is in place and appears to be functioning        Obesity [E66.9]  -An obese person?is at greater risk for wound infection and dehiscence or evisceration.       Diabetes mellitus [E11.9]  --Recommend adequate glycemic control to help promote wound healing  -Poor glycemic control increases risk of prolonged healing, infection, loss of limb and premature death       Quadriplegia  -Complicates all aspects of care  -Increases risk of wound failure due to decreased mobility      Wound Care Debridement Note   Pre-Procedure  Pre-Procedure Diagnosis: Pressure injury of sacrum with fat layer exposed, left lower gluteal with fat layer exposed, right gluteal pressure injury with fat layer exposed   Checked for Allergies: yes  Consent:Consent obtained, consent given by Patient,Risks Discussed, Alternatives Discussed  Indication: slough, necrotic tissue, and biofilm  Vascular status:ANGELICA testing is not relevant to this wound, as it is not located on the lower extremity.  Time out was called prior to procedure.   Pre procedure Pain assessment: a 1 on a scale of 0-10  Pre debridement measurements:   Sacrum: 4 X 4.2 X 0.4cm, volume: 3.519, surface: 13.19  Left gluteal: 8.1 X 5 X 3.5cm, volume: 74.22, surface: 31.81  Right gluteal: 6.5 X 9 X 7.4cm, volume: 226.666, surface:  45.95  sinus/tunnelNo, undermining No    Post Procedure  Post-Procedure Diagnosis: Pressure injury of sacrum with fat layer exposed, left lower gluteal with fat layer exposed, right gluteal pressure injury with fat layer exposed    Post debridement measurements:   Sacrum: 4.1 X 4.3 X 0.3cm,   Left gluteal: 8.2 X 5.1 X 3.6cm  Right gluteal: 6.51 X 9.1 X 7.41cm, volume: 229.847, surface: 46.53  sinus/tunnelNo, undermining No  Post procedure Pain assessment: a 1 on a scale of 0-10  Graft/Implant/Prosthetics/Implanted Device/Transplants:  None  Complication(s):  None    Procedure details:  Method of Debridement: excissional (Surgical removal or cutting away, outside or beyond the wound margin devitalized tissue, necrosis or slough.)  Procedure: The site was prepared using clean techniques, subcutaneous tissue was removed by surgical excision.  Instrument(s) used: Curette 4mm  Anesthesia:After checking patient allergies,lidocaine topical 2% was administered to provide anesthesia.  Tissue removed: subuctaneous, Percent Removed 70%  Culture or Biopsy: culture and sensitivity  Estimated Blood Loss: Small  Hemostasis Obtained: pressure    Clinical Impression:Moderate Complexity    Follow-up: 1 week    TANJA Mejias,CWS  [facility]  06/04/2025  1017

## 2025-06-13 NOTE — CONSULTS
Consult Note     Patient Identification:  Name:  Ang Jackson  Age:  64 y.o.  Sex:  male  :  1960  MRN:  [id number]   Visit Number:  [id number]  Primary Care Physician:  Deepak Perez     Subjective     Chief complaint:   Bilateral gluteal and sacral pressure injury    History of presenting illness:   Patient is a 64 y.o. male with past medical history significant for cervical degenerative disc disease status post C6-7 anterior cervical discectomy and fusion in  and nontraumatic cervical spinal cord injury secondary with cervical epidural abscess with cord compression from C3-C5 in 2024 status post incision and drainage with C4 corpectomy, C3-6 laminectomy and emergent hematoma evacuation.  that presented to the [facility] Emergency Department for complaints of worsening pressure ulcers.  Patient was discharged from hospital on  and has been following up with outpatient wound care.  He was admitted  to have surgical debridement in OR.  OR surgical debridement was completed yesterday.  At bedside for assessment with Mamta HIDALGO wound care.  Patient's daughter is at bedside.  She is a caregiver at home and performs wound care.    ---------------------------------------------------------------------------------------------------------------------     Review of Systems denies fever chills    ---------------------------------------------------------------------------------------------------------------------   Past Medical History:   Diagnosis Date    Anesthesia     diffculty adminster spinal block, patient stated with last hip surgery  several attempts per awilda and no luck, resulted in general anesthesia     Anxiety     Arthritis     Rheumatoid    COVID         GERD (gastroesophageal reflux disease)     Hypertension     Lactose intolerance     Low back pain     Sleep apnea     does not wear machine, MILD, DOES NOT HAVE AFTER WGT LOSS SURGERY    Wears reading eyeglasses       Past Surgical History:   Procedure Laterality Date    ABDOMINAL SURGERY      ANTERIOR CERVICAL DISCECTOMY W/ FUSION Right 02/19/2023    Procedure: CERVICAL DISCECTOMY ANTERIOR WITH FUSION C6-7;  Surgeon: Jaiden Aldridge MD;  Location:  [facility];  Service: Neurosurgery;  Laterality: Right;    BACK SURGERY      BARIATRIC SURGERY  4 year ago    CARDIAC CATHETERIZATION      CARPAL TUNNEL RELEASE Bilateral     CARPAL TUNNEL RELEASE Right 06/29/2023    Procedure: CARPAL TUNNEL RELEASE RIGHT;  Surgeon: Jaiden Aldridge MD;  Location:  [facility];  Service: Neurosurgery;  Laterality: Right;    COLONOSCOPY      5 years ago    COLONOSCOPY N/A 04/05/2022    Procedure: COLONOSCOPY FOR SCREENING;  Surgeon: Luz Galvez MD;  Location:  [facility];  Service: Gastroenterology;  Laterality: N/A;    ENDOSCOPY N/A 04/05/2022    Procedure: ESOPHAGOGASTRODUODENOSCOPY WITH BIOPSY;  Surgeon: Luz Galvez MD;  Location:  [facility];  Service: Gastroenterology;  Laterality: N/A;    GASTRIC SLEEVE LAPAROSCOPIC      2017    HIP SURGERY Right times 2    JOINT REPLACEMENT      KNEE ARTHROSCOPY Left     TONSILLECTOMY      TOTAL HIP ARTHROPLASTY Left 01/25/2021    Procedure: TOTAL HIP ARTHROPLASTY LEFT;  Surgeon: Jb Patel MD;  Location:  [facility];  Service: Orthopedics;  Laterality: Left;    TOTAL HIP ARTHROPLASTY REVISION Right 07/20/2020    Procedure: TOTAL HIP ARTHROPLASTY REVISION RIGHT;  Surgeon: Jb Patel MD;  Location:  [facility];  Service: Orthopedics;  Laterality: Right;    WOUND DEBRIDEMENT N/A 05/18/2025    Procedure: DEBRIDEMENT SACRAL ULCER/WOUND;  Surgeon: Deepak Lara MD;  Location:  [facility];  Service: General;  Laterality: N/A;     Family History   Problem Relation Age of Onset    Heart disease Father     Hypertension Father     Osteoarthritis Father     Cancer Mother     Diabetes Brother     Hypertension Brother     Gout Paternal Grandmother     Diabetes Paternal  Grandmother     Colon cancer Maternal Uncle     Colon polyps Maternal Uncle      Social History     Socioeconomic History    Marital status:    Tobacco Use    Smoking status: Never     Passive exposure: Never    Smokeless tobacco: Never   Vaping Use    Vaping status: Never Used   Substance and Sexual Activity    Alcohol use: No    Drug use: Never    Sexual activity: Yes     ---------------------------------------------------------------------------------------------------------------------   Allergies:  Sulfa antibiotics, Codeine, Ketorolac, and Morphine and codeine  ---------------------------------------------------------------------------------------------------------------------   Medications below are reported home medications pulling from within the system; at this time, these medications have not been reconciled unless otherwise specified and are in the verification process for further verifcation as current home medications.    Prior to Admission Medications       Prescriptions Last Dose Informant Patient Reported? Taking?    acetaminophen (TYLENOL) 500 MG tablet Past Week  Yes Yes    Take 2 tablets by mouth Every 6 (Six) Hours As Needed for Mild Pain.    ascorbic acid (VITAMIN C) 500 MG tablet Past Month Child Yes Yes    Take 2 tablets by mouth Daily.    baclofen (LIORESAL) 10 MG tablet 6/11/2025 Child Yes Yes    Take 2 tablets by mouth 3 (Three) Times a Day.    baclofen (LIORESAL) 10 MG tablet 6/11/2025 Child Yes Yes    Take 5 tablets by mouth every night at bedtime.    Brexpiprazole (Rexulti) 2 MG tablet 6/11/2025  Yes Yes    Take 1 tablet by mouth Daily.    busPIRone (BUSPAR) 5 MG tablet 6/10/2025 Child Yes Yes    Take 1 tablet by mouth 2 (Two) Times a Day.    clotrimazole (LOTRIMIN) 1 % cream 6/10/2025  Yes Yes    Apply 1 Application topically to the appropriate area as directed Every Night.    collagenase 250 UNIT/GM ointment 6/11/2025  Yes Yes    Apply 1 Application topically to the appropriate  area as directed Daily.    diclofenac (VOLTAREN) 75 MG EC tablet 6/11/2025 Child Yes Yes    Take 1 tablet by mouth 2 (Two) Times a Day.    famotidine (PEPCID) 20 MG tablet 6/11/2025 Child Yes Yes    Take 1 tablet by mouth 2 (Two) Times a Day.    FLUoxetine (PROzac) 20 MG capsule 6/11/2025 Child Yes Yes    Take 1 capsule by mouth Daily.    gabapentin (NEURONTIN) 300 MG capsule 6/11/2025 Child Yes Yes    Take 3 capsules by mouth 4 (Four) Times a Day.    loratadine (CLARITIN) 10 MG tablet 6/11/2025 Child Yes Yes    Take 1 tablet by mouth Daily.    nystatin (MYCOSTATIN) 567606 UNIT/GM cream Past Week  Yes Yes    Apply 1 Application topically to the appropriate area as directed Daily As Needed (irritation).    Omadacycline Tosylate (Nuzyra) 150 MG tablet 6/10/2025 Self Yes Yes    Take 150 mg by mouth Every Night.    omeprazole (priLOSEC) 20 MG capsule 6/11/2025  Yes Yes    Take 1 capsule by mouth Daily.    oxyCODONE (ROXICODONE) 10 MG tablet 6/11/2025 Child Yes Yes    Take 1 tablet by mouth 4 (Four) Times a Day.    oxyCODONE ER (oxyCONTIN) 20 MG 12 hr tablet 6/10/2025 Child Yes Yes    Take 1 tablet by mouth Every Night.    pantoprazole (PROTONIX) 40 MG EC tablet 6/11/2025 Child Yes Yes    Take 1 tablet by mouth 2 (Two) Times a Day.    tiZANidine (ZANAFLEX) 2 MG tablet 6/11/2025 Child Yes Yes    Take 3 tablets by mouth 4 (Four) Times a Day.    tiZANidine (ZANAFLEX) 2 MG tablet 6/10/2025 Child Yes Yes    Take 2 tablets by mouth Every Night.    triamcinolone (KENALOG) 0.1 % cream Past Week  Yes Yes    Apply 1 Application topically to the appropriate area as directed Daily As Needed for Irritation or Rash.    Cyanocobalamin (B-12 Compliance Injection) 1000 MCG/ML kit  Child Yes No    Inject 1 mL as directed Every 14 (Fourteen) Days.    ferrous sulfate 325 (65 FE) MG tablet  Child Yes No    Take 1 tablet by mouth Daily With Breakfast.    multivitamin tablet tablet   Yes No    Take 1 tablet by mouth Daily.    vitamin D3 125  MCG (5000 UT) capsule capsule  Child Yes No    Take 1 capsule by mouth Daily.    zolpidem (Ambien) 5 MG tablet  Self, Pharmacy Yes No    Take 1 tablet by mouth Every Night.          ---------------------------------------------------------------------------------------------------------------------    Objective     Hospital Scheduled Meds:  baclofen, 20 mg, Oral, TID  baclofen, 50 mg, Oral, Nightly  Brexpiprazole, 2 mg, Oral, Daily  busPIRone, 5 mg, Oral, BID  cetirizine, 10 mg, Oral, Daily  collagenase, 1 Application, Topical, Daily  diclofenac, 75 mg, Oral, BID  famotidine, 20 mg, Oral, BID  FLUoxetine, 20 mg, Oral, Daily  gabapentin, 900 mg, Oral, 4x Daily  heparin (porcine), 5,000 Units, Subcutaneous, Q8H  miconazole, 1 Application, Topical, Nightly  oxyCODONE ER, 20 mg, Oral, Nightly  oxyCODONE, 10 mg, Oral, 4x Daily  pantoprazole, 40 mg, Oral, BID AC  sodium chloride, 10 mL, Intravenous, Q12H  sodium chloride, 10 mL, Intravenous, Q12H  sodium hypochlorite, , Topical, Daily  tiZANidine, 4 mg, Oral, Nightly  tiZANidine, 6 mg, Oral, 4x Daily  zolpidem, 5 mg, Oral, Nightly           Current listed hospital scheduled medications may not yet reflect those currently placed in orders that are signed and held, awaiting patient's arrival to floor/unit.    ---------------------------------------------------------------------------------------------------------------------   Vital Signs:  Temp:  [97.9 °F (36.6 °C)-98.7 °F (37.1 °C)] 98.2 °F (36.8 °C)  Heart Rate:  [61-78] 61  Resp:  [15-20] 18  BP: ()/(40-67) 115/64  Mean Arterial Pressure (Non-Invasive) for the past 24 hrs (Last 3 readings):   Noninvasive MAP (mmHg)   06/13/25 1132 83   06/13/25 0733 87   06/13/25 0318 85     SpO2 Percentage    06/13/25 0318 06/13/25 0733 06/13/25 1132   SpO2: 96% 94% 95%     SpO2:  [94 %-100 %] 95 %  on  Flow (L/min) (Oxygen Therapy):  [3] 3;   Device (Oxygen Therapy): room air    Body mass index is 32.48 kg/m².  Wt Readings from  Last 3 Encounters:   06/13/25 96.9 kg (213 lb 9.6 oz)   06/11/25 95.3 kg (210 lb)   06/11/25 95.3 kg (210 lb)       ---------------------------------------------------------------------------------------------------------------------   Physical Exam:    06/13/25 1005   Wound 04/09/25 0836 Left lower gluteal Pressure Injury   Placement Date/Time: 04/09/25 0836   Present on Original Admission: Yes  Side: Left  Orientation: lower  Location: gluteal  Primary Wound Type: Pressure Injury   Pressure Injury Stage 4   Dressing Appearance moist drainage   Dressing Removed Type gauze   Confirmed Empty Wound Bed Yes, visual inspection of wound bed   Closure None   Base moist;red;brown   Periwound intact;redness   Periwound Temperature warm   Periwound Skin Turgor soft   Edges open   Wound Length (cm) 10.5 cm   Wound Width (cm) 6 cm   Wound Depth (cm) 6 cm   Wound Surface Area (cm^2) 49.48 cm^2   Wound Volume (cm^3) 197.92 cm^3   Drainage Characteristics/Odor serosanguineous   Drainage Amount moderate   Wound 04/09/25 0836 Right lower gluteal Pressure Injury   Placement Date/Time: 04/09/25 0836   Present on Original Admission: Yes  Side: Right  Orientation: lower  Location: gluteal  Primary Wound Type: Pressure Injury   Pressure Injury Stage 4   Dressing Appearance moist drainage   Dressing Removed Type gauze   Confirmed Empty Wound Bed Yes, visual inspection of wound bed   Closure None   Base moist;red;brown   Periwound intact;redness   Periwound Temperature warm   Periwound Skin Turgor soft   Edges open   Wound Length (cm) 9 cm   Wound Width (cm) 5.5 cm   Wound Depth (cm) 3.1 cm   Wound Surface Area (cm^2) 38.88 cm^2   Wound Volume (cm^3) 80.346 cm^3   Drainage Characteristics/Odor serosanguineous   Drainage Amount moderate   Wound 05/19/25 1205 medial sacral spine Pressure Injury   Placement Date/Time: 05/19/25 1205   Present on Original Admission: Yes  Orientation: medial  Location: sacral spine  Primary Wound Type: Pressure  Injury   Pressure Injury Stage U   Dressing Appearance moist drainage   Dressing Removed Type gauze   Confirmed Empty Wound Bed Yes, visual inspection of wound bed   Closure None   Base moist;brown;red   Periwound intact;redness   Periwound Temperature warm   Periwound Skin Turgor soft   Edges open   Wound Length (cm) 11 cm   Wound Width (cm) 8 cm   Wound Depth (cm) 2.1 cm   Wound Surface Area (cm^2) 69.11 cm^2   Wound Volume (cm^3) 96.761 cm^3   Drainage Characteristics/Odor serosanguineous   Drainage Amount moderate   Wound 06/13/25 1000 Left posterior thigh Other (Comments)   Placement Date/Time: 06/13/25 1000   Side: Left  Orientation: posterior  Location: thigh  Primary Wound Type: (c) Other (Comments)   Pressure Injury Stage OTH  (Not a PI)   Dressing Appearance moist drainage   Dressing Removed Type gauze   Confirmed Empty Wound Bed Yes, visual inspection of wound bed   Base slough;yellow;pink   Periwound intact;pink   Periwound Temperature warm   Periwound Skin Turgor soft   Edges irregular   Wound Length (cm) 6.1 cm   Wound Width (cm) 8 cm   Wound Depth (cm) 0.1 cm   Wound Surface Area (cm^2) 38.33 cm^2   Wound Volume (cm^3) 2.555 cm^3   Wound 06/13/25 1000 Right posterior thigh Other (Comments)   Placement Date/Time: 06/13/25 1000   Side: Right  Orientation: posterior  Location: thigh  Primary Wound Type: (c) Other (Comments)   Pressure Injury Stage OTH  (Not a PI)   Dressing Appearance intact   Dressing Removed Type gauze   Confirmed Empty Wound Bed Yes, visual inspection of wound bed   Closure None   Base yellow;slough;pink   Periwound intact;pink   Periwound Temperature warm   Periwound Skin Turgor soft   Edges irregular   Wound Length (cm) 6 cm   Wound Width (cm) 6.2 cm   Wound Depth (cm) 0.1 cm   Wound Surface Area (cm^2) 29.22 cm^2   Wound Volume (cm^3) 1.948 cm^3                       Assessment & Plan    Bilateral stage IV gluteal pressure ulcers  -Clean with Dakin's, apply Santyl to necrotic wound  beds, pack with Dakin's moist gauze and cover with ABD pads secure with tape.  - Goal is to decrease necrotic and slough tissue as evident by granulation tissue.  -This condition is associated with a high risk for non-healing, infection, sepsis, loss of function, reduced quality of life, and increased risk of premature mortality. The patient is at high-risk for additional pressure injury, requiring a high level of vigilance and coordination of care, and frequent re-assessment to prevent adverse outcomes.   -Management of this condition is inherently complex, requiring ongoing optimization of many factors to assure the highest likelihood of a favorable outcome, including pressure relief, bioburden, multiple aspects of nutrition, infection management, and moisture and mechanical factors relevant to wound healing.     Unstageable sacral pressure ulcer  -Clean with Dakin's, apply Santyl to necrotic wound beds, pack with Dakin's moist gauze and cover with ABD pads secure with tape.  - Goal is to decrease necrotic and slough tissue as evident by granulation tissue.  -Recommend high protein diet 120g/day along with vitamin C 2000mg/day, vitamin A 5000 Units/day, vitamin D3 5000 Units/day, zinc 50mg/day to help promote wound healing      Bilateral posterior thigh wounds  - Clean with Dakin's, pat dry, apply theraHoney to wound beds, cover with gauze and Mepilex  - Goal is to promote healing as evident by decreased slough and increased granulation tissue    ID is following and has prescribed Nuzyra on 6/11 for 14 days.    Quadriplegia  -Patient is moderate to high risk for increased skin breakdown due to inability to successfully reposition self without assistance.      Reviewed notes of clinical teams involved in patient care.    Discussed treatment plan with Dr. Lara.  Wounds are not appropriate at this time for wound VAC therapy.    Plan for patient to discharge to home will be followed with home health and outpatient  wound care clinic.    Thank you  for allowing me to participate in the care of your patient and please feel free to contact me for any questions you may have.     TANJA Aleman  [facility]    06/13/25  14:30 EDT

## 2025-06-13 NOTE — DISCHARGE INSTR - APPOINTMENTS
Deepak Perez MD Office is closed. Will call on Monday to make appt and call patient with info. Called  out  pt  wound  care at  [phone]  out  to  lunch  ask  to  call  me  back  at  [phone]

## 2025-06-13 NOTE — PLAN OF CARE
Goal Outcome Evaluation:      Patient resting in bed this shift. No acute signs or symptoms of distress have been noted. Plan of care is ongoing.

## 2025-06-13 NOTE — CASE MANAGEMENT/SOCIAL WORK
Discharge Planning Assessment  [facility]     Patient Name: Ang Jackson  MRN: [id number]  Today's Date: 6/13/2025    Admit Date: 6/11/2025    Plan: SS received consult per Case Management for may be current with [facility].  SS spoke with pt and family at bedside on this date.  Pt resides at home with spouse and plans to return home at discharge.  Pt currently utilizes [facility] per Debbie Davis.  Pt currently utilizes home 02 at 2 liters q, hospital bed, husam lift and power chair (at [facility] in [facility]) via 60mo.  Pt's PCP is Deepak Perez.  SS will follow.       Discharge Plan       Row Name 06/13/25 1523       Plan    Final Note EMS is scheduled in [facility].  Pt's power chair is in security.  SS notified security that family would  chair on this date.      Row Name 06/13/25 1235       Plan    Final Discharge Disposition Code 06 - home with home health care    Final Note Pt to be discharged home with continued [facility].  SS notified  at [phone] and gave RN number to call report.  Pt will need EMS to transport.                  Continued Care and Services - Admitted Since 6/11/2025       Home Medical Care Coordination complete.      Service Provider Request Status Services Address Phone Fax Patient Preferred    [facility]  Selected Home Health Services [address] [address] [phone] --                      Sara Scott, [place]

## 2025-06-13 NOTE — PROGRESS NOTES
Chief complaint: Gluteal and sacral pressure ulcerations    Patient underwent debridement yesterday.  Doing well.  Cultures pending.  Patient is already on outpatient antibiotic recommendations per infectious disease.     No acute distress, alert and orient x 3  Clear to auscultation bilaterally  Abdomen soft nontender nondistended  Quadriplegia    Wound dressings clean dry and intact    64-year-old male quadriplegic with bilateral gluteal and sacral pressure ulcerations requiring debridement in the operating room yesterday.  -Wound nurse assessment for home wound care, possible VAC that may prove difficult given location and nature of wounds.  -Continue antibiotics per ID recommendations and ID follow-up  -Wound clinic follow-up as outpatient

## 2025-06-13 NOTE — PAYOR COMM NOTE
"CONTACT:  RUKHSANA WREN RN  UTILIZATION MANAGEMENT DEPT.   [facility]    [address] [facility] [address], [address]   PHONE:  [phone]   FAX: [phone]   NPI [id number]        ADMIT TO OBSERVATION 6/11/25--CONVERTED TO INPATIENT 6/13/25    ORDER 6/13/25 DC HOME/HOME HEALTH            Emily Jackson (64 y.o. Male)       Date of Birth   1960    Social Security Number       Address   [address]  [facility] [address]    Home Phone   [phone]    MRN   [id number]       [place]   [place]    Marital Status                               Admission Date   6/11/2025    Admission Type   Urgent    Admitting Provider   Deepak Lara MD    Attending Provider   Deepak Lara MD    Department, Room/Bed   [facility] [address] [facility], [address]       Discharge Date       Discharge Disposition   Home or Self Care    Discharge Destination                                 Attending Provider: Deepak Lara MD    Allergies: Sulfa Antibiotics, Codeine, Ketorolac, Morphine And Codeine    Isolation: Contact   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), 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)   Code Status: CPR    Ht: 172.7 cm (68\")   Wt: 96.9 kg (213 lb 9.6 oz)    Admission Cmt: None   Principal Problem: Decubitus ulcer [L89.90]                   Active Insurance as of 6/11/2025       Primary Coverage       Payor Plan Insurance Group Employer/Plan Group    MEDICARE MEDICARE A & B        Payor Plan Address Payor Plan Phone Number Payor Plan Fax Number Effective Dates    [address] [phone]  10/1/2011 - None Entered    [place] [address]         Subscriber Name Subscriber Birth Date Member ID       EMILY JACKSON 1960 [id number]               Secondary Coverage       Payor Plan Insurance Group Employer/Plan Group    [employer] [place]ARE [place]ICAID        Payor Plan Address Payor Plan Phone Number Payor Plan Fax Number Effective Dates    [address] [address] [phone]  8/29/2016 - None " [facility] [id number]         Subscriber Name Subscriber Birth Date Member ID       EMILY JACKSON 1960 [id number]                     Emergency Contacts        (Rel.) Home Phone Work Phone Mobile Phone    ROSS DYSON (Daughter) [phone] -- --    Myla Jackson (Spouse) [phone] -- [phone]    CESAR JACKSON (Daughter) -- -- [phone]              Orders (last 48 hrs)        Start     Ordered    06/14/25 0600  Wound Care  Daily       06/13/25 1002    06/13/25 1205  Cardiac Monitoring  Continuous        Comments: Follow Standing Orders As Outlined in Process Instructions (Open Order Report to View Full Instructions)    06/13/25 1204    06/13/25 1204  Inpatient Admission  Once         06/13/25 1204    06/13/25 1151  Discharge to care of  when patient meets criteria  Once         06/13/25 1150    06/13/25 1149  Discharge patient  Once         06/13/25 1150    06/13/25 1100  collagenase ointment 1 Application  Daily         06/13/25 0956    06/13/25 1100  sodium hypochlorite (DAKIN'S 1/4 STRENGTH) 0.125 % topical solution 0.125% solution  Daily         06/13/25 0956    06/13/25 1045  lidocaine (LMX) 4 % cream 1 Application  Once         06/13/25 0956    06/13/25 1003  Wound Care  Daily       06/13/25 1002    06/13/25 0924  Inpatient Wound APRN Consult  Once        Comments: Possible wound vac, and/or wound care orders for outpatient   Provider:  (Not yet assigned)    06/13/25 0923    06/13/25 0819  Inpatient Wound APRN Consult  Once        Provider:  (Not yet assigned)    06/13/25 0819    06/13/25 0000  Discharge Follow-up with Specialty: 2 Weeks  Status:  Canceled         06/13/25 1150    06/13/25 0000  Diet: Regular/House Diet; Thin (IDDSI 0)         06/13/25 1150    06/13/25 0000  Discharge Follow-up with Specialty: 2 Weeks         06/13/25 1219    06/12/25 2106  Telemetry Scan  Once         06/12/25 2106    06/12/25 2100  Omadacycline Tosylate tablet 300 mg  Nightly          06/12/25 0956    06/12/25 1815  sodium chloride 0.9 % bolus 500 mL  Once         06/12/25 1720    06/12/25 1504  Diet: Regular/House; Fluid Consistency: Thin (IDDSI 0)  Diet Effective Now         06/12/25 1503    06/12/25 1410  Oxygen Therapy- Nasal Cannula; Titrate 1-6 LPM Per SpO2; 90 - 95%  Continuous         06/12/25 1410    06/12/25 1410  Continuous Pulse Oximetry  Continuous         06/12/25 1409    06/12/25 1410  Vital signs every 5 minutes for 15 minutes, every 15 minutes thereafter.  Once         06/12/25 1410    06/12/25 1410  Call Anesthesiologist for additional IV Fluid bolus for Hypotension/Tachycardia  Until Discontinued         06/12/25 1410    06/12/25 1410  Notify Anesthesia of Any Acute Changes in Patient Condition  Continuous        Comments: Open Order Report to View Parameters Requiring Provider Notification    06/12/25 1410    06/12/25 1410  Notify Anesthesia for Unrelieved Pain  Continuous        Comments: Open Order Report to View Parameters Requiring Provider Notification    06/12/25 1410    06/12/25 1410  Once DC criteria to floor met, follow surgeon's orders.  Until Discontinued         06/12/25 1410    06/12/25 1410  Discharge patient from PACU when discharge criteria is met.  Until Discontinued         06/12/25 1410    06/12/25 1409  lactated ringers infusion  Once As Needed,   Status:  Discontinued         06/12/25 1410    06/12/25 1409  oxyCODONE-acetaminophen (PERCOCET) 5-325 MG per tablet 1 tablet  Once As Needed,   Status:  Discontinued         06/12/25 1410    06/12/25 1409  fentaNYL citrate (PF) (SUBLIMAZE) injection 50 mcg  Every 5 Minutes PRN         06/12/25 1410    06/12/25 1409  ondansetron (ZOFRAN) injection 4 mg  As Needed,   Status:  Discontinued         06/12/25 1410    06/12/25 1409  ipratropium-albuterol (DUO-NEB) nebulizer solution 3 mL  Once As Needed,   Status:  Discontinued         06/12/25 1410    06/12/25 1400  Instructions on coughing, deep breathing, and  incentive spirometry.  Every 4 Hours While Awake,   Status:  Canceled       06/12/25 1105    06/12/25 1342  Tissue / Bone Culture - Tissue, Sacrum  RELEASE UPON ORDERING         06/12/25 1342    06/12/25 1342  sodium chloride (NS) irrigation solution  As Needed,   Status:  Discontinued         06/12/25 1358    06/12/25 1220  fentaNYL citrate (PF) (SUBLIMAZE) injection 100 mcg  Once         06/12/25 1218    06/12/25 1107  sodium chloride 0.9 % flush 10 mL  Every 12 Hours Scheduled,   Status:  Discontinued         06/12/25 1105    06/12/25 1107  lactated ringers infusion  Once,   Status:  Discontinued         06/12/25 1105    06/12/25 1107  sodium chloride 0.9 % flush 10 mL  Every 12 Hours Scheduled,   Status:  Discontinued         06/12/25 1105    06/12/25 1106  Oxygen Therapy- Nasal Cannula; Titrate 1-6 LPM Per SpO2; 90 - 95%  Continuous,   Status:  Canceled         06/12/25 1105    06/12/25 1106  Continuous Pulse Oximetry  Continuous,   Status:  Canceled         06/12/25 1105    06/12/25 1106  POC Glucose Once  Once        Comments: For all diabetic patients and all patients who are to be admitted. Notify Anesthesiologist for blood sugar > 180.      06/12/25 1105    06/12/25 1106  Follow Anesthesia Guidelines / Protocol  Once         06/12/25 1105    06/12/25 1106  Verify / Perform Chlorhexidine Skin Prep  Once        Comments: Chlorhexidine Skin Wipes and Instructions For All Patients Having A Procedure Requiring an Outward Incision if Not Allergic.  If Allergic, Give Antibacterial Skin Wipes and Instructions.  Do Not Use For Facial Cases or on Any Mucus Membranes.    06/12/25 1105    06/12/25 1106  Insert Peripheral IV  Once         06/12/25 1105    06/12/25 1106  Saline Lock & Maintain IV Access  Continuous         06/12/25 1105    06/12/25 1106  Place Sequential Compression Device  Once         06/12/25 1105    06/12/25 1106  Maintain Sequential Compression Device  Continuous         06/12/25 1105    06/12/25  1105  Vital Signs - Per Anesthesia Protocol  As Needed,   Status:  Canceled       06/12/25 1105    06/12/25 1105  sodium chloride 0.9 % flush 10 mL  As Needed,   Status:  Discontinued         06/12/25 1105    06/12/25 1105  sodium chloride 0.9 % infusion 40 mL  As Needed,   Status:  Discontinued         06/12/25 1105    06/12/25 1105  midazolam (VERSED) injection 1 mg  Every 10 Minutes PRN,   Status:  Discontinued         06/12/25 1105    06/12/25 1105  sodium chloride 0.9 % flush 10 mL  As Needed,   Status:  Discontinued         06/12/25 1105    06/12/25 1105  sodium chloride 0.9 % infusion 40 mL  As Needed,   Status:  Discontinued         06/12/25 1105    06/12/25 1105  Case Management  Consult  Once        Provider:  (Not yet assigned)    06/12/25 1105    06/12/25 0905  Telemetry Scan  Once         06/12/25 0905    06/12/25 0900  FLUoxetine (PROzac) capsule 20 mg  Daily         06/11/25 1848    06/12/25 0900  Brexpiprazole tablet 2 mg  Daily         06/11/25 1848    06/12/25 0900  cetirizine (zyrTEC) tablet 10 mg  Daily         06/11/25 1848    06/12/25 0600  Basic Metabolic Panel  Morning Draw         06/11/25 1603    06/12/25 0600  CBC Auto Differential  Morning Draw         06/11/25 1603    06/12/25 0001  NPO Diet NPO Type: Strict NPO  Diet Effective Midnight,   Status:  Canceled         06/11/25 1602 06/11/25 2200  heparin (porcine) 5000 UNIT/ML injection 5,000 Units  Every 8 Hours Scheduled         06/11/25 1602    06/11/25 2100  sodium chloride 0.9 % flush 10 mL  Every 12 Hours Scheduled         06/11/25 1602    06/11/25 2100  sodium chloride 0.9 % flush 10 mL  Every 12 Hours Scheduled         06/11/25 1603    06/11/25 2100  famotidine (PEPCID) tablet 20 mg  2 Times Daily         06/11/25 1848    06/11/25 2100  tiZANidine (ZANAFLEX) tablet 4 mg  Nightly         06/11/25 1848    06/11/25 2100  baclofen (LIORESAL) tablet 50 mg  Nightly         06/11/25 1848 06/11/25 2100  gabapentin  "(NEURONTIN) capsule 900 mg  4 Times Daily         06/11/25 1848    06/11/25 2100  oxyCODONE (oxyCONTIN) 12 hr tablet 20 mg  Nightly         06/11/25 1848 06/11/25 2100  busPIRone (BUSPAR) tablet 5 mg  2 Times Daily         06/11/25 1848 06/11/25 2100  miconazole (MICOTIN) 2 % cream 1 Application  Nightly         06/11/25 1848    06/11/25 2100  zolpidem (AMBIEN) tablet 5 mg  Nightly         06/11/25 1848 06/11/25 2100  Omadacycline Tosylate tablet 150 mg  Nightly,   Status:  Discontinued         06/11/25 1848 06/11/25 2100  diclofenac (VOLTAREN) EC tablet 75 mg  2 Times Daily         06/11/25 1848 06/11/25 2000  Vital Signs  Every 4 Hours       06/11/25 1603    06/11/25 1945  tiZANidine (ZANAFLEX) tablet 6 mg  4 Times Daily         06/11/25 1848 06/11/25 1945  baclofen (LIORESAL) tablet 20 mg  3 Times Daily         06/11/25 1848 06/11/25 1945  oxyCODONE (ROXICODONE) immediate release tablet 10 mg  4 Times Daily         06/11/25 1848 06/11/25 1945  pantoprazole (PROTONIX) EC tablet 40 mg  2 Times Daily Before Meals         06/11/25 1848 06/11/25 1804  acetaminophen (TYLENOL) tablet 1,000 mg  Every 6 Hours PRN         06/11/25 1848    06/11/25 1804  triamcinolone (KENALOG) 0.1 % cream 1 Application  Daily PRN         06/11/25 1848    06/11/25 1804  nystatin (MYCOSTATIN) 888793 UNIT/GM cream 1 Application  Daily PRN         06/11/25 1848    06/11/25 1800  Oral Care  2 Times Daily       06/11/25 1603    06/11/25 1614  Wound Ostomy Eval & Treat  Once         06/11/25 1614    06/11/25 1603  oxyCODONE-acetaminophen (PERCOCET) 5-325 MG per tablet 1 tablet  Every 4 Hours PRN         06/11/25 1603    06/11/25 1603  sennosides-docusate (PERICOLACE) 8.6-50 MG per tablet 2 tablet  2 Times Daily PRN        Placed in \"And\" Linked Group    06/11/25 1603    06/11/25 1603  polyethylene glycol (MIRALAX) packet 17 g  Daily PRN        Placed in \"And\" Linked Group    06/11/25 1603    06/11/25 1603  bisacodyl " "(DULCOLAX) EC tablet 5 mg  Daily PRN        Placed in \"And\" Linked Group    06/11/25 1603    06/11/25 1603  bisacodyl (DULCOLAX) suppository 10 mg  Daily PRN        Placed in \"And\" Linked Group    06/11/25 1603    06/11/25 1603  Diet: Regular/House; Fluid Consistency: Thin (IDDSI 0)  Diet Effective Now,   Status:  Canceled         06/11/25 1602    06/11/25 1603  Intake & Output  Every Shift       06/11/25 1603    06/11/25 1603  Weigh Patient  Once         06/11/25 1603    06/11/25 1603  Insert Peripheral IV  Once         06/11/25 1603    06/11/25 1603  Saline Lock & Maintain IV Access  Continuous,   Status:  Canceled         06/11/25 1603    06/11/25 1603  Code Status and Medical Interventions: CPR (Attempt to Resuscitate); Full Support  Continuous         06/11/25 1603    06/11/25 1602  sodium chloride 0.9 % flush 10 mL  As Needed         06/11/25 1603    06/11/25 1602  sodium chloride 0.9 % infusion 40 mL  As Needed         06/11/25 1603    06/11/25 1602  Insert Peripheral IV  Once         06/11/25 1602    06/11/25 1602  Saline Lock & Maintain IV Access  Continuous,   Status:  Canceled         06/11/25 1602    06/11/25 1602  Initiate Observation Status  Once         06/11/25 1602    06/11/25 1601  sodium chloride 0.9 % flush 10 mL  As Needed         06/11/25 1602    06/11/25 1601  sodium chloride 0.9 % infusion 40 mL  As Needed         06/11/25 1602    --  acetaminophen (TYLENOL) 500 MG tablet  Every 6 Hours PRN         06/11/25 1612    --  clotrimazole (LOTRIMIN) 1 % cream  Nightly         06/11/25 1617    --  collagenase 250 UNIT/GM ointment  Daily         06/11/25 1617    --  multivitamin tablet tablet  Daily         06/11/25 1619    --  nystatin (MYCOSTATIN) 400119 UNIT/GM cream  Daily PRN         06/11/25 1620    --  omeprazole (priLOSEC) 20 MG capsule  Daily         06/11/25 1627    --  triamcinolone (KENALOG) 0.1 % cream  Daily PRN         06/11/25 1627    --  Brexpiprazole (Rexulti) 2 MG tablet  Daily      "    06/11/25 1633    --  Omadacycline Tosylate (Nuzyra) 150 MG tablet  Nightly         06/11/25 1815    --  zolpidem (Ambien) 5 MG tablet  Nightly         06/11/25 1821                     Physician Progress Notes (last 48 hours)        Deepak Lara MD at 06/13/25 0936          Chief complaint: Gluteal and sacral pressure ulcerations    Patient underwent debridement yesterday.  Doing well.  Cultures pending.  Patient is already on outpatient antibiotic recommendations per infectious disease.     No acute distress, alert and orient x 3  Clear to auscultation bilaterally  Abdomen soft nontender nondistended  Quadriplegia    Wound dressings clean dry and intact    64-year-old male quadriplegic with bilateral gluteal and sacral pressure ulcerations requiring debridement in the operating room yesterday.  -Wound nurse assessment for home wound care, possible VAC that may prove difficult given location and nature of wounds.  -Continue antibiotics per ID recommendations and ID follow-up  -Wound clinic follow-up as outpatient    Electronically signed by Deepak Lara MD at 06/13/25 0938       Consult Notes (last 48 hours)  Notes from 06/11/25 1229 through 06/13/25 1229   No notes of this type exist for this encounter.

## 2025-06-13 NOTE — DISCHARGE PLACEMENT REQUEST
"Emily Jackson (64 y.o. Male)       Date of Birth   1960    Social Security Number       Address   [address] [place], [place] [address]    Home Phone   [phone]    MRN   [id number]       Hindu   Latter-day    Marital Status                               Admission Date   6/11/2025    Admission Type   Urgent    Admitting Provider   Deepak Lara MD    Attending Provider   Deepak Lara MD    Department, Room/Bed   [address], 3310/2S       Discharge Date       Discharge Disposition   Home or Self Care    Discharge Destination                                 Attending Provider: Deepak Lara MD    Allergies: Sulfa Antibiotics, Codeine, Ketorolac, Morphine And Codeine    Isolation: Contact   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), 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)   Code Status: CPR    Ht: 172.7 cm (68\")   Wt: 96.9 kg (213 lb 9.6 oz)    Admission Cmt: None   Principal Problem: Decubitus ulcer [L89.90]                   Active Insurance as of 6/11/2025       Primary Coverage       Payor Plan Insurance Group Employer/Plan Group    MEDICARE MEDICARE A & B        Payor Plan Address Payor Plan Phone Number Payor Plan Fax Number Effective Dates    [address] [phone]  10/1/2011 - None Entered    [facility] [id number]         Subscriber Name Subscriber Birth Date Member ID       EMILY JACKSON 1960 [id number]               Secondary Coverage       Payor Plan Insurance Group Employer/Plan Group    [employer] WELL[place]ARE [place]ICAID        Payor Plan Address Payor Plan Phone Number Payor Plan Fax Number Effective Dates    [address] [address] [phone]  8/29/2016 - None Entered    [facility] [id number]         Subscriber Name Subscriber Birth Date Member ID       EMILY JACKSON 1960 [id number]                     Emergency Contacts        (Rel.) Home Phone Work Phone Mobile Phone    ROSS DYSON (Daughter) [phone] -- --    Myla Jackson (Spouse) " [phone] -- [phone]    CESAR JACKSON (Daughter) -- -- [phone]              Emergency Contact Information       Name Relation Home Work Mobile    ROSS DYSON Daughter [phone]      Myla Jackson Spouse [phone]    CESAR JACKSON Daughter   [phone]          Other Contacts    None on File       Insurance Information                  MEDICARE/MEDICARE A & B Phone: [phone]    Subscriber: Ang Jackson Subscriber#: [id number]    Group#: -- Precert#: --    Authorization#: -- Effective Date: --        [facility]/WELLCARE MEDICAID Phone: [phone]    Subscriber: Ang Jackson Subscriber#: [id number]    Group#: -- Precert#: --    Authorization#: [id number] Effective Date: --          Problem List           Codes Noted - Resolved       Hospital    Sacral wound ICD-10-CM: S31.000A  ICD-9-CM: 959.19 6/11/2025 - Present    * (Principal) Decubitus ulcer ICD-10-CM: L89.90  ICD-9-CM: 707.00, 707.20 6/11/2025 - Present       Non-Hospital    Infected wound ICD-10-CM: T14.8XXA, L08.9  ICD-9-CM: 958.3 5/16/2025 - Present    Urinary incontinence without sensory awareness ICD-10-CM: N39.42  ICD-9-CM: 788.34 5/7/2025 - Present    Perirectal skin irritation ICD-10-CM: K62.89  ICD-9-CM: 569.49 4/18/2025 - Present    Pressure injury of right buttock, stage 3 ICD-10-CM: L89.313  ICD-9-CM: 707.05, 707.23 4/18/2025 - Present    Pressure injury of left buttock, unstageable ICD-10-CM: L89.320  ICD-9-CM: 707.05, 707.25 4/18/2025 - Present    Open wound ICD-10-CM: T14.8XXA  ICD-9-CM: 879.8 4/9/2025 - Present    Bilateral carpal tunnel syndrome ICD-10-CM: G56.03  ICD-9-CM: 354.0 5/24/2023 - Present    Iron deficiency anemia ICD-10-CM: D50.9  ICD-9-CM: 280.9 3/22/2022 - Present    Status post total hip replacement, left ICD-10-CM: Z96.642  ICD-9-CM: V43.64 1/25/2021 - Present    Status post total replacement of right hip ICD-10-CM: Z96.641  ICD-9-CM: V43.64 7/21/2020 - Present    BPH  with obstruction/lower urinary tract symptoms ICD-10-CM: N40.1, N13.8  ICD-9-CM: 600.01, 599.69 2018 - Present    Hypertension ICD-10-CM: I10  ICD-9-CM: 401.9 Unknown - Present        History & Physical        Deepak Lara MD at 25 1559          [facility]   HISTORY AND PHYSICAL    Patient Name: Ang Jackson  : 1960  MRN: [id number]  Primary Care Physician:  Deepak Perez  Date of admission: 2025    Subjective  Subjective     Chief Complaint: Buttock and sacral wounds    History of Present Illness  64-year-old quadriplegic male with known sacral wounds that have been debrided in the past but have continued to deteriorate and require more extensive debridement and can be performed in the wound clinic.  No changes to his overall health otherwise.      Review of Systems   Constitutional:  Negative for activity change, appetite change, chills and fever.   HENT:  Negative for sore throat and trouble swallowing.    Eyes:  Negative for visual disturbance.   Respiratory:  Negative for cough and shortness of breath.    Cardiovascular:  Negative for chest pain and palpitations.   Gastrointestinal:  Negative for abdominal distention, abdominal pain, blood in stool, constipation, diarrhea, nausea and vomiting.   Endocrine: Negative for cold intolerance and heat intolerance.   Genitourinary:  Negative for dysuria.   Musculoskeletal:  Negative for joint swelling.   Skin:  Negative for color change, rash and wound.   Allergic/Immunologic: Negative for immunocompromised state.   Neurological:  Negative for dizziness, seizures, weakness and headaches.   Hematological:  Negative for adenopathy. Does not bruise/bleed easily.   Psychiatric/Behavioral:  Negative for agitation and confusion.         Personal History     Past Medical History:   Diagnosis Date    Anesthesia     diffculty adminster spinal block, patient stated with last hip surgery  several attempts per anthesia and no luck,  resulted in general anesthesia     Anxiety     Arthritis     Rheumatoid    COVID     2020    GERD (gastroesophageal reflux disease)     Hypertension     Lactose intolerance     Low back pain     Sleep apnea     does not wear machine, MILD, DOES NOT HAVE AFTER WGT LOSS SURGERY    Wears reading eyeglasses        Past Surgical History:   Procedure Laterality Date    ANTERIOR CERVICAL DISCECTOMY W/ FUSION Right 02/19/2023    Procedure: CERVICAL DISCECTOMY ANTERIOR WITH FUSION C6-7;  Surgeon: Jaiden Aldridge MD;  Location:  [facility];  Service: Neurosurgery;  Laterality: Right;    BARIATRIC SURGERY  4 year ago    CARPAL TUNNEL RELEASE Bilateral     CARPAL TUNNEL RELEASE Right 06/29/2023    Procedure: CARPAL TUNNEL RELEASE RIGHT;  Surgeon: Jaiden Aldridge MD;  Location:  [facility];  Service: Neurosurgery;  Laterality: Right;    COLONOSCOPY      5 years ago    COLONOSCOPY N/A 04/05/2022    Procedure: COLONOSCOPY FOR SCREENING;  Surgeon: Luz Galvez MD;  Location:  [facility];  Service: Gastroenterology;  Laterality: N/A;    ENDOSCOPY N/A 04/05/2022    Procedure: ESOPHAGOGASTRODUODENOSCOPY WITH BIOPSY;  Surgeon: Luz Galvez MD;  Location:  [facility];  Service: Gastroenterology;  Laterality: N/A;    GASTRIC SLEEVE LAPAROSCOPIC      2017    HIP SURGERY Right times 2    KNEE ARTHROSCOPY Left     TOTAL HIP ARTHROPLASTY Left 01/25/2021    Procedure: TOTAL HIP ARTHROPLASTY LEFT;  Surgeon: Jb Patel MD;  Location:  [facility];  Service: Orthopedics;  Laterality: Left;    TOTAL HIP ARTHROPLASTY REVISION Right 07/20/2020    Procedure: TOTAL HIP ARTHROPLASTY REVISION RIGHT;  Surgeon: Jb aPtel MD;  Location:  [facility];  Service: Orthopedics;  Laterality: Right;    WOUND DEBRIDEMENT N/A 5/18/2025    Procedure: DEBRIDEMENT SACRAL ULCER/WOUND;  Surgeon: Deepak Lara MD;  Location:  [facility];  Service: General;  Laterality: N/A;       Family History: family history includes Cancer  "in his mother; Colon cancer in his maternal uncle; Colon polyps in his maternal uncle; Diabetes in his brother and paternal grandmother; Gout in his paternal grandmother; Heart disease in his father; Hypertension in his brother and father; Osteoarthritis in his father. Otherwise pertinent FHx was reviewed and not pertinent to current issue.    Social History:  reports that he has never smoked. He has never been exposed to tobacco smoke. He has never used smokeless tobacco. He reports that he does not drink alcohol and does not use drugs.    Home Medications:  Brexpiprazole, Cyanocobalamin, FLUoxetine, Lactobacillus Acid-Pectin, Omadacycline Tosylate, QUEtiapine, ascorbic acid, baclofen, busPIRone, diclofenac, famotidine, ferrous sulfate, gabapentin, loratadine, multivitamin with minerals, nystatin-triamcinolone, oxyCODONE, oxyCODONE ER, pantoprazole, tiZANidine, and vitamin D3    Allergies:  Allergies   Allergen Reactions    Sulfa Antibiotics Hives     Hives, shortness of breath    Codeine Nausea And Vomiting    Ketorolac Other (See Comments)     Pt reports heavy, \"tight\" feeling in his chest.    Morphine And Codeine Nausea And Vomiting       Objective   Objective     Vitals:   Temp:  [97.8 °F (36.6 °C)-98.5 °F (36.9 °C)] 97.8 °F (36.6 °C)  Heart Rate:  [67-88] 67  Resp:  [18] 18  BP: ()/(49-69) 118/69    Physical Exam  Constitutional:       Appearance: He is well-developed.   HENT:      Head: Normocephalic and atraumatic.   Eyes:      Conjunctiva/sclera: Conjunctivae normal.      Pupils: Pupils are equal, round, and reactive to light.   Neck:      Thyroid: No thyromegaly.      Vascular: No JVD.      Trachea: No tracheal deviation.   Cardiovascular:      Rate and Rhythm: Normal rate and regular rhythm.      Heart sounds: No murmur heard.     No friction rub. No gallop.   Pulmonary:      Effort: Pulmonary effort is normal.      Breath sounds: Normal breath sounds.   Abdominal:      General: There is no " distension.      Palpations: Abdomen is soft. There is no hepatomegaly or splenomegaly.      Tenderness: There is no abdominal tenderness.      Hernia: No hernia is present.   Musculoskeletal:         General: No deformity. Normal range of motion.      Cervical back: Neck supple.   Skin:     General: Skin is warm and dry.      Comments: Buttock and sacral wounds with necrosis but otherwise unchanged as regards to the size   Neurological:      Mental Status: He is alert and oriented to person, place, and time.      Comments: Quadriplegic         Result Review   Result Review:  I have personally reviewed the results from the time of this admission to 6/11/2025 15:59 EDT and agree with these findings:  [x]  Laboratory list / accordion  []  Microbiology  [x]  Radiology  []  EKG/Telemetry   []  Cardiology/Vascular   []  Pathology        Assessment & Plan  Assessment / Plan     Brief Patient Summary:  Ang Jackson is a 64 y.o. male who has quadriplegia and has developed pressure ulcerations that have required debridement and now have worsening necrosis that cannot be debrided in the wound clinic.    Active Hospital Problems:  There are no active hospital problems to display for this patient.    Plan:   Admit to floor  N.p.o. after midnight  OR tomorrow for debridement    VTE Prophylaxis:  No VTE prophylaxis order currently exists.        CODE STATUS:       Admission Status:  I believe this patient meets observation status.    Deepak Lara MD    Electronically signed by Deepak Lara MD at 06/11/25 1601       Vital Signs (last day)       Date/Time Temp Temp src Pulse Resp BP Patient Position SpO2    06/13/25 1132 98.2 (36.8) Oral 61 18 115/64 Lying 95    06/13/25 0733 98 (36.7) Oral 68 16 112/66 Lying 94    06/13/25 0318 98.1 (36.7) Oral 78 16 120/57 Lying 96    06/12/25 2351 98.4 (36.9) Oral 65 17 107/60 Lying 98    06/12/25 1854 98.2 (36.8) Oral -- 20 95/55 Lying --    06/12/25 1806 -- -- 62 --  132/60 -- 99    06/12/25 1716 -- -- 64 -- 68/40 Lying 99    06/12/25 1645 -- -- 68 -- 145/64 -- 99    06/12/25 1615 -- -- 74 -- 133/62 -- 99    06/12/25 1545 -- -- 68 -- 132/62 -- 99    06/12/25 1515 -- -- 73 -- 133/63 -- 99    06/12/25 1500 -- -- 69 -- 127/64 -- 99    06/12/25 1445 98.7 (37.1) Oral 72 18 133/66 Lying 99    06/12/25 1438 97.9 (36.6) Temporal 73 16 134/66 Lying 100    06/12/25 1433 -- -- 72 15 130/67 Lying 100    06/12/25 1428 -- -- 71 16 133/68 Lying 100    06/12/25 1423 -- -- 69 16 131/68 Lying 100    06/12/25 1418 -- -- 72 16 132/69 Lying 100    06/12/25 1413 -- -- 75 14 136/72 Lying 100    06/12/25 1408 97.9 (36.6) Temporal 70 13 141/67 Lying 100    06/12/25 1105 99.5 (37.5) Oral 72 18 132/78 Lying 98    06/12/25 0701 98.5 (36.9) Oral 62 20 103/59 Lying 99    06/12/25 0351 99.1 (37.3) Oral 75 18 118/63 Lying 99          Lines, Drains & Airways       Active LDAs       Name Placement date Placement time Site Days    Peripheral IV 06/12/25 1000 20 G Right Antecubital 06/12/25  1000  Antecubital  1    Urethral Catheter 18 Fr. 05/16/25  1602  -- 27                  Current Facility-Administered Medications   Medication Dose Route Frequency Provider Last Rate Last Admin    acetaminophen (TYLENOL) tablet 1,000 mg  1,000 mg Oral Q6H PRN Deepak Lara MD   1,000 mg at 06/12/25 0531    baclofen (LIORESAL) tablet 20 mg  20 mg Oral TID Deepak Lara MD   20 mg at 06/13/25 0855    baclofen (LIORESAL) tablet 50 mg  50 mg Oral Nightly Deepak Lara MD   50 mg at 06/12/25 2052    [Transfer Hold] sennosides-docusate (PERICOLACE) 8.6-50 MG per tablet 2 tablet  2 tablet Oral BID PRN Deepak Lara MD        And    [Transfer Hold] polyethylene glycol (MIRALAX) packet 17 g  17 g Oral Daily PRN Deepak Lara MD        And    [Transfer Hold] bisacodyl (DULCOLAX) EC tablet 5 mg  5 mg Oral Daily PRN Deepak Lara MD        And    [Transfer Hold] bisacodyl (DULCOLAX)  suppository 10 mg  10 mg Rectal Daily PRN Deepak Lara MD        Brexpiprazole tablet 2 mg  2 mg Oral Daily Deepak Lara MD   2 mg at 06/13/25 0853    busPIRone (BUSPAR) tablet 5 mg  5 mg Oral BID Deepak Lara MD   5 mg at 06/12/25 2326    cetirizine (zyrTEC) tablet 10 mg  10 mg Oral Daily Deepak Lara MD   10 mg at 06/13/25 0852    collagenase ointment 1 Application  1 Application Topical Daily Xuan Gardner APRN        diclofenac (VOLTAREN) EC tablet 75 mg  75 mg Oral BID Deepak Lara MD   75 mg at 06/12/25 2051    famotidine (PEPCID) tablet 20 mg  20 mg Oral BID Deepak Lara MD   20 mg at 06/13/25 0852    FLUoxetine (PROzac) capsule 20 mg  20 mg Oral Daily Deepak Lara MD   20 mg at 06/13/25 0852    gabapentin (NEURONTIN) capsule 900 mg  900 mg Oral 4x Daily Deepak Lara MD   900 mg at 06/13/25 0854    [Transfer Hold] heparin (porcine) 5000 UNIT/ML injection 5,000 Units  5,000 Units Subcutaneous Q8H Deepak Lara MD   5,000 Units at 06/12/25 0527    lidocaine (LMX) 4 % cream 1 Application  1 Application Topical Once Xuan Gardner APRN        miconazole (MICOTIN) 2 % cream 1 Application  1 Application Topical Nightly Deepak Lara MD   1 Application at 06/12/25 2050    nystatin (MYCOSTATIN) 273922 UNIT/GM cream 1 Application  1 Application Topical Daily PRN Deepak Lara MD        oxyCODONE (oxyCONTIN) 12 hr tablet 20 mg  20 mg Oral Nightly Deepak Lara MD   20 mg at 06/12/25 2051    oxyCODONE (ROXICODONE) immediate release tablet 10 mg  10 mg Oral 4x Daily Deepak Lara MD   10 mg at 06/13/25 0853    [Transfer Hold] oxyCODONE-acetaminophen (PERCOCET) 5-325 MG per tablet 1 tablet  1 tablet Oral Q4H PRN Deepak Lara MD        pantoprazole (PROTONIX) EC tablet 40 mg  40 mg [facility] BID [facility]   40 mg at 06/13/25 0872    sodium chloride 0.9 % flush 10 mL  10 mL Intravenous  Q12H Deepak Lara MD   10 mL at 06/13/25 0855    sodium chloride 0.9 % flush 10 mL  10 mL Intravenous PRN Deepak Lara MD        sodium chloride 0.9 % flush 10 mL  10 mL Intravenous Q12H Deepak Lara MD   10 mL at 06/13/25 0855    sodium chloride 0.9 % flush 10 mL  10 mL Intravenous PRN Deepak Lara MD        [Transfer Hold] sodium chloride 0.9 % infusion 40 mL  40 mL Intravenous PRN Deepak Lara MD        [Transfer Hold] sodium chloride 0.9 % infusion 40 mL  40 mL Intravenous PRN Deepak Lara MD        sodium hypochlorite (DAKIN'S 1/4 STRENGTH) 0.125 % topical solution 0.125% solution   Topical Daily Xuan Gardner APRN        tiZANidine (ZANAFLEX) tablet 4 mg  4 mg Oral Nightly Deepak Lara MD   4 mg at 06/12/25 2053    tiZANidine (ZANAFLEX) tablet 6 mg  6 mg Oral 4x Daily Deepak Lara MD   6 mg at 06/13/25 0853    triamcinolone (KENALOG) 0.1 % cream 1 Application  1 Application Topical Daily PRN Deepak Lara MD        zolpidem (AMBIEN) tablet 5 mg  5 mg Oral Nightly Deepak Lara MD   5 mg at 06/12/25 2053     Lab Results (most recent)       Procedure Component Value Units Date/Time    Tissue / Bone Culture - Tissue, Sacrum [[id number]] Collected: 06/12/25 1342    Specimen: Tissue from Sacrum Updated: 06/13/25 1021     Tissue Culture Culture in progress     Gram Stain Rare (1+) WBCs seen      No organisms seen    Basic Metabolic Panel [[id number]]  (Abnormal) Collected: 06/12/25 0013    Specimen: Blood Updated: 06/12/25 0114     Glucose 206 mg/dL      BUN 20.0 mg/dL      Creatinine 0.56 mg/dL      Sodium 143 mmol/L      Potassium 4.1 mmol/L      Chloride 108 mmol/L      CO2 27.5 mmol/L      Calcium 7.7 mg/dL      BUN/Creatinine Ratio 35.7     Anion Gap 7.5 mmol/L      eGFR 110.8 mL/min/1.73     Narrative:      GFR Categories in Chronic Kidney Disease (CKD)              GFR Category          GFR (mL/min/1.73)     Interpretation  G1                    90 or greater        Normal or high (1)  G2                    60-89                Mild decrease (1)  G3a                   45-59                Mild to moderate decrease  G3b                   30-44                Moderate to severe decrease  G4                    15-29                Severe decrease  G5                    14 or less           Kidney failure    (1)In the absence of evidence of kidney disease, neither GFR category G1 or G2 fulfill the criteria for CKD.    eGFR calculation 2021 CKD-EPI creatinine equation, which does not include race as a factor    CBC Auto Differential [[id number]]  (Abnormal) Collected: 06/12/25 0013    Specimen: Blood Updated: 06/12/25 0053     WBC 5.53 10*3/mm3      RBC 3.28 10*6/mm3      Hemoglobin 8.9 g/dL      Hematocrit 30.2 %      MCV 92.1 fL      MCH 27.1 pg      MCHC 29.5 g/dL      RDW 13.8 %      RDW-SD 47.0 fl      MPV 9.0 fL      Platelets 193 10*3/mm3      Neutrophil % 73.2 %      Lymphocyte % 13.6 %      Monocyte % 4.9 %      Eosinophil % 7.1 %      Basophil % 0.5 %      Immature Grans % 0.7 %      Neutrophils, Absolute 4.05 10*3/mm3      Lymphocytes, Absolute 0.75 10*3/mm3      Monocytes, Absolute 0.27 10*3/mm3      Eosinophils, Absolute 0.39 10*3/mm3      Basophils, Absolute 0.03 10*3/mm3      Immature Grans, Absolute 0.04 10*3/mm3      nRBC 0.0 /100 WBC           Orders (last 24 hrs)        Start     Ordered    06/14/25 0600  Wound Care  Daily       06/13/25 1002    06/13/25 1205  Cardiac Monitoring  Continuous        Comments: Follow Standing Orders As Outlined in Process Instructions (Open Order Report to View Full Instructions)    06/13/25 1204    06/13/25 1204  Inpatient Admission  Once         06/13/25 1204    06/13/25 1151  Discharge to care of  when patient meets criteria  Once         06/13/25 1150    06/13/25 1149  Discharge patient  Once         06/13/25 1150    06/13/25 1100  collagenase ointment 1  Application  Daily         06/13/25 0956    06/13/25 1100  sodium hypochlorite (DAKIN'S 1/4 STRENGTH) 0.125 % topical solution 0.125% solution  Daily         06/13/25 0956    06/13/25 1045  lidocaine (LMX) 4 % cream 1 Application  Once         06/13/25 0956    06/13/25 1003  Wound Care  Daily       06/13/25 1002    06/13/25 0924  Inpatient Wound APRN Consult  Once        Comments: Possible wound vac, and/or wound care orders for outpatient   Provider:  (Not yet assigned)    06/13/25 0923    06/13/25 0819  Inpatient Wound APRN Consult  Once        Provider:  (Not yet assigned)    06/13/25 0819    06/13/25 0000  Discharge Follow-up with Specialty: 2 Weeks         06/13/25 1150    06/13/25 0000  Diet: Regular/House Diet; Thin (IDDSI 0)         06/13/25 1150    06/12/25 2106  Telemetry Scan  Once         06/12/25 2106    06/12/25 2100  Omadacycline Tosylate tablet 300 mg  Nightly         06/12/25 0956    06/12/25 1815  sodium chloride 0.9 % bolus 500 mL  Once         06/12/25 1720    06/12/25 1504  Diet: Regular/House; Fluid Consistency: Thin (IDDSI 0)  Diet Effective Now         06/12/25 1503    06/12/25 1410  Oxygen Therapy- Nasal Cannula; Titrate 1-6 LPM Per SpO2; 90 - 95%  Continuous         06/12/25 1410    06/12/25 1410  Continuous Pulse Oximetry  Continuous         06/12/25 1409    06/12/25 1410  Vital signs every 5 minutes for 15 minutes, every 15 minutes thereafter.  Once         06/12/25 1410    06/12/25 1410  Call Anesthesiologist for additional IV Fluid bolus for Hypotension/Tachycardia  Until Discontinued         06/12/25 1410    06/12/25 1410  Notify Anesthesia of Any Acute Changes in Patient Condition  Continuous        Comments: Open Order Report to View Parameters Requiring Provider Notification    06/12/25 1410    06/12/25 1410  Notify Anesthesia for Unrelieved Pain  Continuous        Comments: Open Order Report to View Parameters Requiring Provider Notification    06/12/25 1410    06/12/25 1410  Once  DC criteria to floor met, follow surgeon's orders.  Until Discontinued         06/12/25 1410    06/12/25 1410  Discharge patient from PACU when discharge criteria is met.  Until Discontinued         06/12/25 1410    06/12/25 1409  lactated ringers infusion  Once As Needed,   Status:  Discontinued         06/12/25 1410    06/12/25 1409  oxyCODONE-acetaminophen (PERCOCET) 5-325 MG per tablet 1 tablet  Once As Needed,   Status:  Discontinued         06/12/25 1410    06/12/25 1409  fentaNYL citrate (PF) (SUBLIMAZE) injection 50 mcg  Every 5 Minutes PRN         06/12/25 1410    06/12/25 1409  ondansetron (ZOFRAN) injection 4 mg  As Needed,   Status:  Discontinued         06/12/25 1410    06/12/25 1409  ipratropium-albuterol (DUO-NEB) nebulizer solution 3 mL  Once As Needed,   Status:  Discontinued         06/12/25 1410    06/12/25 1400  Instructions on coughing, deep breathing, and incentive spirometry.  Every 4 Hours While Awake,   Status:  Canceled       06/12/25 1105    06/12/25 1342  Tissue / Bone Culture - Tissue, Sacrum  RELEASE UPON ORDERING         06/12/25 1342    06/12/25 1342  sodium chloride (NS) irrigation solution  As Needed,   Status:  Discontinued         06/12/25 1358    06/12/25 1220  fentaNYL citrate (PF) (SUBLIMAZE) injection 100 mcg  Once         06/12/25 1218    06/12/25 1107  sodium chloride 0.9 % flush 10 mL  Every 12 Hours Scheduled,   Status:  Discontinued         06/12/25 1105    06/12/25 1107  lactated ringers infusion  Once,   Status:  Discontinued         06/12/25 1105    06/12/25 1107  sodium chloride 0.9 % flush 10 mL  Every 12 Hours Scheduled,   Status:  Discontinued         06/12/25 1105    06/12/25 1105  Vital Signs - Per Anesthesia Protocol  As Needed,   Status:  Canceled       06/12/25 1105    06/12/25 1105  sodium chloride 0.9 % flush 10 mL  As Needed,   Status:  Discontinued         06/12/25 1105    06/12/25 1105  sodium chloride 0.9 % infusion 40 mL  As Needed,   Status:   Discontinued         06/12/25 1105    06/12/25 1105  midazolam (VERSED) injection 1 mg  Every 10 Minutes PRN,   Status:  Discontinued         06/12/25 1105    06/12/25 1105  sodium chloride 0.9 % flush 10 mL  As Needed,   Status:  Discontinued         06/12/25 1105    06/12/25 1105  sodium chloride 0.9 % infusion 40 mL  As Needed,   Status:  Discontinued         06/12/25 1105    06/12/25 0900  FLUoxetine (PROzac) capsule 20 mg  Daily         06/11/25 1848 06/12/25 0900  Brexpiprazole tablet 2 mg  Daily         06/11/25 1848 06/12/25 0900  cetirizine (zyrTEC) tablet 10 mg  Daily         06/11/25 1848 06/11/25 2200  [Transfer Hold]  heparin (porcine) 5000 UNIT/ML injection 5,000 Units  Every 8 Hours Scheduled        (The patient's transfer has not been completed. Release the transfer orders.)    06/11/25 1602 06/11/25 2100  sodium chloride 0.9 % flush 10 mL  Every 12 Hours Scheduled         06/11/25 1602 06/11/25 2100  sodium chloride 0.9 % flush 10 mL  Every 12 Hours Scheduled         06/11/25 1603    06/11/25 2100  famotidine (PEPCID) tablet 20 mg  2 Times Daily         06/11/25 1848 06/11/25 2100  tiZANidine (ZANAFLEX) tablet 4 mg  Nightly         06/11/25 1848    06/11/25 2100  baclofen (LIORESAL) tablet 50 mg  Nightly         06/11/25 1848 06/11/25 2100  gabapentin (NEURONTIN) capsule 900 mg  4 Times Daily         06/11/25 1848 06/11/25 2100  oxyCODONE (oxyCONTIN) 12 hr tablet 20 mg  Nightly         06/11/25 1848    06/11/25 2100  busPIRone (BUSPAR) tablet 5 mg  2 Times Daily         06/11/25 1848 06/11/25 2100  miconazole (MICOTIN) 2 % cream 1 Application  Nightly         06/11/25 1848    06/11/25 2100  zolpidem (AMBIEN) tablet 5 mg  Nightly         06/11/25 1848    06/11/25 2100  diclofenac (VOLTAREN) EC tablet 75 mg  2 Times Daily         06/11/25 1848    06/11/25 2000  Vital Signs  Every 4 Hours       06/11/25 1603    06/11/25 1945  tiZANidine (ZANAFLEX) tablet 6 mg  4 Times Daily    "      06/11/25 1848 06/11/25 1945  baclofen (LIORESAL) tablet 20 mg  3 Times Daily         06/11/25 1848 06/11/25 1945  oxyCODONE (ROXICODONE) immediate release tablet 10 mg  4 Times Daily         06/11/25 1848 06/11/25 1945  pantoprazole (PROTONIX) EC tablet 40 mg  2 Times Daily Before Meals         06/11/25 1848 06/11/25 1804  acetaminophen (TYLENOL) tablet 1,000 mg  Every 6 Hours PRN         06/11/25 1848 06/11/25 1804  triamcinolone (KENALOG) 0.1 % cream 1 Application  Daily PRN         06/11/25 1848 06/11/25 1804  nystatin (MYCOSTATIN) 974679 UNIT/GM cream 1 Application  Daily PRN         06/11/25 1848 06/11/25 1800  Oral Care  2 Times Daily       06/11/25 1603    06/11/25 1603  [Transfer Hold]  oxyCODONE-acetaminophen (PERCOCET) 5-325 MG per tablet 1 tablet  Every 4 Hours PRN        (The patient's transfer has not been completed. Release the transfer orders.)    06/11/25 1603    06/11/25 1603  [Transfer Hold]  sennosides-docusate (PERICOLACE) 8.6-50 MG per tablet 2 tablet  2 Times Daily PRN        (The patient's transfer has not been completed. Release the transfer orders.)   Placed in \"And\" Linked Group    06/11/25 1603    06/11/25 1603  [Transfer Hold]  polyethylene glycol (MIRALAX) packet 17 g  Daily PRN        (The patient's transfer has not been completed. Release the transfer orders.)   Placed in \"And\" Linked Group    06/11/25 1603    06/11/25 1603  [Transfer Hold]  bisacodyl (DULCOLAX) EC tablet 5 mg  Daily PRN        (The patient's transfer has not been completed. Release the transfer orders.)   Placed in \"And\" Linked Group    06/11/25 1603    06/11/25 1603  [Transfer Hold]  bisacodyl (DULCOLAX) suppository 10 mg  Daily PRN        (The patient's transfer has not been completed. Release the transfer orders.)   Placed in \"And\" Linked Group    06/11/25 1603    06/11/25 1603  Intake & Output  Every Shift       06/11/25 1603    06/11/25 1602  sodium chloride 0.9 % flush 10 mL  As Needed "         06/11/25 1603    06/11/25 1602  [Transfer Hold]  sodium chloride 0.9 % infusion 40 mL  As Needed        (The patient's transfer has not been completed. Release the transfer orders.)    06/11/25 1603    06/11/25 1601  sodium chloride 0.9 % flush 10 mL  As Needed         06/11/25 1602    06/11/25 1601  [Transfer Hold]  sodium chloride 0.9 % infusion 40 mL  As Needed        (The patient's transfer has not been completed. Release the transfer orders.)    06/11/25 1602    --  acetaminophen (TYLENOL) 500 MG tablet  Every 6 Hours PRN         06/11/25 1612    --  clotrimazole (LOTRIMIN) 1 % cream  Nightly         06/11/25 1617    --  collagenase 250 UNIT/GM ointment  Daily         06/11/25 1617    --  multivitamin tablet tablet  Daily         06/11/25 1619    --  nystatin (MYCOSTATIN) 342281 UNIT/GM cream  Daily PRN         06/11/25 1620    --  omeprazole (priLOSEC) 20 MG capsule  Daily         06/11/25 1627    --  triamcinolone (KENALOG) 0.1 % cream  Daily PRN         06/11/25 1627    --  Brexpiprazole (Rexulti) 2 MG tablet  Daily         06/11/25 1633    --  Omadacycline Tosylate (Nuzyra) 150 MG tablet  Nightly         06/11/25 1815    --  zolpidem (Ambien) 5 MG tablet  Nightly         06/11/25 1821                     Physician Progress Notes (last 24 hours)        Deepak Lara MD at 06/13/25 0936          Chief complaint: Gluteal and sacral pressure ulcerations    Patient underwent debridement yesterday.  Doing well.  Cultures pending.  Patient is already on outpatient antibiotic recommendations per infectious disease.     No acute distress, alert and orient x 3  Clear to auscultation bilaterally  Abdomen soft nontender nondistended  Quadriplegia    Wound dressings clean dry and intact    64-year-old male quadriplegic with bilateral gluteal and sacral pressure ulcerations requiring debridement in the operating room yesterday.  -Wound nurse assessment for home wound care, possible VAC that may prove  difficult given location and nature of wounds.  -Continue antibiotics per ID recommendations and ID follow-up  -Wound clinic follow-up as outpatient    Electronically signed by Deepak Lara MD at 06/13/25 0968       ADL Documentation (last day)       Date/Time Transferring Toileting Bathing Dressing Eating Communication Swallowing Equipment Currently Used at Home    06/13/25 0731 0 - independent 0 - independent 0 - independent 0 - independent 0 - independent 0 - understands/communicates without difficulty 0 - swallows foods/liquids without difficulty --    06/12/25 2051 3 - assistive equipment and person 3 - assistive equipment and person 2 - assistive person 2 - assistive person 2 - assistive person 0 - understands/communicates without difficulty 0 - swallows foods/liquids without difficulty --    06/12/25 1657 -- -- -- -- -- -- -- oxygen;hospital bed;lift device;wheelchair, motorized    06/12/25 0959 3 - assistive equipment and person 3 - assistive equipment and person 2 - assistive person 2 - assistive person 2 - assistive person 0 - understands/communicates without difficulty 0 - swallows foods/liquids without difficulty --            Discharge Summary    No notes of this type exist for this encounter.       Discharge Order (From admission, onward)       Start     Ordered    06/13/25 1149  Discharge patient  Once        Expected Discharge Date: 06/13/25   Discharge Disposition: Home or Self Care   Physician of Record for Attribution - Please select from Treatment Team: DEEPAK LARA [[id number]]   Review needed by CMO to determine Physician of Record: No      Question Answer Comment   Physician of Record for Attribution - Please select from Treatment Team DEEPAK LARA    Review needed by CMO to determine Physician of Record No        06/13/25 3440

## 2025-06-13 NOTE — NURSING NOTE
RN asked patient if she could do patient's discharge wound photos and measurements. Patient refused to have dressing removed due to it being done a couple hours prior. Rn educated patient on reasoning and importance.

## 2025-06-13 NOTE — NURSING NOTE
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  These were debrided in the OR yesterday.  See attached flow sheet documentation for further wound details.     Patient with gauze 4x4 taped to the back of his right and left posterior thighs.  This was removed.  Open wounds to both thighs noted.  Slight yellow sloughing.  These do not appear to be pressure related.  LDAs added.  See attached flow sheet documentation for further wound details.     Wound care orders in place per Xuan.       06/13/25 1005   Wound 04/09/25 0836 Left lower gluteal Pressure Injury   Placement Date/Time: 04/09/25 0836   Present on Original Admission: Yes  Side: Left  Orientation: lower  Location: gluteal  Primary Wound Type: Pressure Injury   Pressure Injury Stage 4   Dressing Appearance moist drainage   Dressing Removed Type gauze   Confirmed Empty Wound Bed Yes, visual inspection of wound bed   Closure None   Base moist;red;brown   Periwound intact;redness   Periwound Temperature warm   Periwound Skin Turgor soft   Edges open   Wound Length (cm) 10.5 cm   Wound Width (cm) 6 cm   Wound Depth (cm) 6 cm   Wound Surface Area (cm^2) 49.48 cm^2   Wound Volume (cm^3) 197.92 cm^3   Drainage Characteristics/Odor serosanguineous   Drainage Amount moderate   Wound 04/09/25 0836 Right lower gluteal Pressure Injury   Placement Date/Time: 04/09/25 0836   Present on Original Admission: Yes  Side: Right  Orientation: lower  Location: gluteal  Primary Wound Type: Pressure Injury   Pressure Injury Stage 4   Dressing Appearance moist drainage   Dressing Removed Type gauze   Confirmed Empty Wound Bed Yes, visual inspection of wound bed   Closure None   Base moist;red;brown   Periwound intact;redness   Periwound Temperature warm   Periwound Skin Turgor soft   Edges open   Wound Length (cm) 9 cm   Wound Width (cm) 5.5 cm   Wound Depth (cm) 3.1 cm   Wound Surface Area (cm^2) 38.88  cm^2   Wound Volume (cm^3) 80.346 cm^3   Drainage Characteristics/Odor serosanguineous   Drainage Amount moderate   Wound 05/19/25 1205 medial sacral spine Pressure Injury   Placement Date/Time: 05/19/25 1205   Present on Original Admission: Yes  Orientation: medial  Location: sacral spine  Primary Wound Type: Pressure Injury   Pressure Injury Stage U   Dressing Appearance moist drainage   Dressing Removed Type gauze   Confirmed Empty Wound Bed Yes, visual inspection of wound bed   Closure None   Base moist;brown;red   Periwound intact;redness   Periwound Temperature warm   Periwound Skin Turgor soft   Edges open   Wound Length (cm) 11 cm   Wound Width (cm) 8 cm   Wound Depth (cm) 2.1 cm   Wound Surface Area (cm^2) 69.11 cm^2   Wound Volume (cm^3) 96.761 cm^3   Drainage Characteristics/Odor serosanguineous   Drainage Amount moderate   Wound 06/13/25 1000 Left posterior thigh Other (Comments)   Placement Date/Time: 06/13/25 1000   Side: Left  Orientation: posterior  Location: thigh  Primary Wound Type: (c) Other (Comments)   Pressure Injury Stage OTH  (Not a PI)   Dressing Appearance moist drainage   Dressing Removed Type gauze   Confirmed Empty Wound Bed Yes, visual inspection of wound bed   Base slough;yellow;pink   Periwound intact;pink   Periwound Temperature warm   Periwound Skin Turgor soft   Edges irregular   Wound Length (cm) 6.1 cm   Wound Width (cm) 8 cm   Wound Depth (cm) 0.1 cm   Wound Surface Area (cm^2) 38.33 cm^2   Wound Volume (cm^3) 2.555 cm^3   Wound 06/13/25 1000 Right posterior thigh Other (Comments)   Placement Date/Time: 06/13/25 1000   Side: Right  Orientation: posterior  Location: thigh  Primary Wound Type: (c) Other (Comments)   Pressure Injury Stage OTH  (Not a PI)   Dressing Appearance intact   Dressing Removed Type gauze   Confirmed Empty Wound Bed Yes, visual inspection of wound bed   Closure None   Base yellow;slough;pink   Periwound intact;pink   Periwound Temperature warm   Periwound  Skin Turgor soft   Edges irregular   Wound Length (cm) 6 cm   Wound Width (cm) 6.2 cm   Wound Depth (cm) 0.1 cm   Wound Surface Area (cm^2) 29.22 cm^2   Wound Volume (cm^3) 1.948 cm^3

## 2025-06-14 LAB
BACTERIA SPEC AEROBE CULT: ABNORMAL
GRAM STN SPEC: ABNORMAL
GRAM STN SPEC: ABNORMAL

## 2025-06-14 NOTE — PAYOR COMM NOTE
"[facility]   ASHLEY GIFFORD  PHONE  [phone]  FAX  [phone]  NPI:  [id number]    PATIENT D/C 6/13/2025    Ang Jackson (64 y.o. Male)       Date of Birth   1960    Social Security Number       Address   [address] [address]    Home Phone   [phone]    MRN   [id number]       Jain   Anglican    Marital Status                               Admission Date   6/11/2025    Admission Type   Urgent    Admitting Provider   Deepak Lara MD    Attending Provider       Department, Room/Bed   [facility] 3 SOUTH, 3310/2S       Discharge Date   6/13/2025    Discharge Disposition   Home or Self Care    Discharge Destination                                 Attending Provider: (none)   Allergies: Sulfa Antibiotics, Codeine, Ketorolac, Morphine And Codeine    Isolation: Contact   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), 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)   Code Status: Prior    Ht: 172.7 cm (68\")   Wt: 96.9 kg (213 lb 9.6 oz)    Admission Cmt: None   Principal Problem: Decubitus ulcer [L89.90]                   Active Insurance as of 6/11/2025       Primary Coverage       Payor Plan Insurance Group Employer/Plan Group    MEDICARE MEDICARE A & B        Payor Plan Address Payor Plan Phone Number Payor Plan Fax Number Effective Dates    [address] [phone]  10/1/2011 - None Entered    [facility] [address]         Subscriber Name Subscriber Birth Date Member ID       ANG JACKSON 1960 [id number]               Secondary Coverage       Payor Plan Insurance Group Employer/Plan Group    [employer] [place] [employer] MEDICAID        Payor Plan Address Payor Plan Phone Number Payor Plan Fax Number Effective Dates    [address] [address] [phone]  8/29/2016 - None Entered    [facility] [id number]         Subscriber Name Subscriber Birth Date Member ID       ANG JACKSON 1960 [id number]                     Emergency Contacts        (Rel.) Home Phone Work " Phone Mobile Phone    ROSS DYSON (Daughter) [phone] -- --    Myla Jackson (Spouse) [phone] -- [phone]    CESAR JACKSON (Daughter) -- -- [phone]              Discharge Summary    No notes of this type exist for this encounter.

## 2025-06-14 NOTE — PLAN OF CARE
Goal Outcome Evaluation:                 Pt received all medications before discharging home. No acute distress noted. No complaints voiced. Resp ENL. Daughter at bedside. Discharge papers given to daughter. Verbally expressed understanding. F/C emptied with 300ml noted. IV removed. EMS here to transport patient home.

## 2025-06-16 ENCOUNTER — TELEPHONE (OUTPATIENT)
Dept: TELEMETRY | Facility: HOSPITAL | Age: 65
End: 2025-06-16
Payer: MEDICARE

## 2025-06-17 NOTE — DISCHARGE SUMMARY
Date of Discharge: 6/13/2020    Discharge Diagnosis: Pressure ulcerations of the sacrum and bilateral gluteal regions with necrosis    Presenting Problem/History of Present Illness  Active Hospital Problems    Diagnosis  POA    **Decubitus ulcer [L89.90]  Yes    Sacral wound [S31.000A]  Unknown      Resolved Hospital Problems   No resolved problems to display.          Hospital Course  Patient is a 65 y.o. male presented with quadriplegia and previously debrided sacral and bilateral gluteal wounds.  He was being followed in the wound clinic but was found to develop further necrosis of his wounds and was admitted for antibiotics and debridement.  He underwent surgical debridement and wound culture.  Wound care evaluated the patient and instructions were placed and he was ready for discharge home with wound clinic follow-up and established ID follow-up.      Procedures Performed    Procedure(s):  DEBRIDEMENT SACRAL ULCER/WOUND  -------------------       Consults:   Consults       Date and Time Order Name Status Description    5/17/2025  1:44 PM Inpatient Infectious Diseases Consult Completed               Discharge Disposition  Home or Self Care    Discharge Medications     Discharge Medications        Continue These Medications        Instructions Start Date   acetaminophen 500 MG tablet  Commonly known as: TYLENOL   1,000 mg, Every 6 Hours PRN      Ambien 5 MG tablet  Generic drug: zolpidem   5 mg, Oral, Nightly      ascorbic acid 500 MG tablet  Commonly known as: VITAMIN C   1,000 mg, Daily      B-12 Compliance Injection 1000 MCG/ML kit  Generic drug: Cyanocobalamin   1,000 mcg, Every 14 Days      baclofen 10 MG tablet  Commonly known as: LIORESAL   20 mg, 3 Times Daily      baclofen 10 MG tablet  Commonly known as: LIORESAL   50 mg, Every Night at Bedtime      busPIRone 5 MG tablet  Commonly known as: BUSPAR   5 mg, 2 Times Daily      clotrimazole 1 % cream  Commonly known as: LOTRIMIN   1 Application, Nightly       collagenase 250 UNIT/GM ointment   1 Application, Daily      diclofenac 75 MG EC tablet  Commonly known as: VOLTAREN   75 mg, 2 Times Daily      famotidine 20 MG tablet  Commonly known as: PEPCID   20 mg, 2 Times Daily      ferrous sulfate 325 (65 FE) MG tablet   325 mg, Daily With Breakfast      FLUoxetine 20 MG capsule  Commonly known as: PROzac   20 mg, Daily      gabapentin 300 MG capsule  Commonly known as: NEURONTIN   900 mg, 4 Times Daily      loratadine 10 MG tablet  Commonly known as: CLARITIN   10 mg, Daily      multivitamin tablet tablet   1 tablet, Oral, Daily      nystatin 140056 UNIT/GM cream  Commonly known as: MYCOSTATIN   1 Application, Daily PRN      oxyCODONE 10 MG tablet  Commonly known as: ROXICODONE   10 mg, 4 Times Daily      oxyCODONE ER 20 MG 12 hr tablet  Commonly known as: oxyCONTIN   20 mg, Nightly      pantoprazole 40 MG EC tablet  Commonly known as: PROTONIX   40 mg, Oral, 2 Times Daily      Rexulti 2 MG tablet  Generic drug: Brexpiprazole   2 mg, Daily      tiZANidine 2 MG tablet  Commonly known as: ZANAFLEX   6 mg, 4 Times Daily      tiZANidine 2 MG tablet  Commonly known as: ZANAFLEX   4 mg, Nightly      triamcinolone 0.1 % cream  Commonly known as: KENALOG   1 Application, Daily PRN      vitamin D3 125 MCG (5000 UT) capsule capsule   5,000 Units, Daily             Stop These Medications      Nuzyra 150 MG tablet  Generic drug: Omadacycline Tosylate     omeprazole 20 MG capsule  Commonly known as: priLOSEC              Discharge Diet:   Diet Instructions       Diet: Regular/House Diet; Thin (IDDSI 0)      Discharge Diet: Regular/House Diet    Fluid Consistency: Thin (IDDSI 0)            Activity at Discharge:   Activity Instructions    Advance activity as tolerated            Follow-up Appointments  Future Appointments   Date Time Provider Department Center   6/20/2025 11:15 AM Jillian Bailey APRN  [facility]   7/1/2025 10:30 AM Deepak Lara MD [facility]   7/2/2025 11:45 AM Babs Ferris MD [facility]   5/7/2026  2:45 PM Jaquan Gan PA-C [facility]     Additional Instructions for the Follow-ups that You Need to Schedule       Discharge Follow-up with Specialty: 2 Weeks   As directed      Follow Up: 2 Weeks   Follow Up Details: wound clinic next week                Test Results Pending at Discharge       Deepak Lara MD  06/17/25  09:00 EDT    Time: Discharge 15 min

## 2025-06-20 ENCOUNTER — HOSPITAL ENCOUNTER (OUTPATIENT)
Dept: WOUND CARE | Facility: HOSPITAL | Age: 65
Discharge: HOME | End: 2025-06-20
Payer: MEDICARE

## 2025-06-20 VITALS
WEIGHT: 213.6 LBS | DIASTOLIC BLOOD PRESSURE: 60 MMHG | HEIGHT: 68 IN | HEART RATE: 66 BPM | TEMPERATURE: 98.3 F | SYSTOLIC BLOOD PRESSURE: 100 MMHG | BODY MASS INDEX: 32.37 KG/M2

## 2025-06-20 DIAGNOSIS — T14.8XXA OPEN WOUND: Primary | ICD-10-CM

## 2025-06-20 PROCEDURE — 63710000001 ZINC OXIDE 20 % OINTMENT 454 G JAR: Performed by: NURSE PRACTITIONER

## 2025-06-20 PROCEDURE — 63710000001 ALUMINUM-MAGNESIUM HYDROXIDE-SIMETHICONE 200-200-20 MG/5ML SUSPENSION: Performed by: NURSE PRACTITIONER

## 2025-06-20 PROCEDURE — A9270 NON-COVERED ITEM OR SERVICE: HCPCS | Performed by: NURSE PRACTITIONER

## 2025-06-20 PROCEDURE — 63710000001 COLLAGENASE 250 UNIT/GM OINTMENT 30 G TUBE: Performed by: NURSE PRACTITIONER

## 2025-06-20 PROCEDURE — 63710000001 A&D OINTMENT 425 G JAR: Performed by: NURSE PRACTITIONER

## 2025-06-20 PROCEDURE — 63710000001 CLOTRIMAZOLE 1 % CREAM 30 G TUBE: Performed by: NURSE PRACTITIONER

## 2025-06-20 RX ORDER — CASTOR OIL AND BALSAM, PERU 788; 87 MG/G; MG/G
1 OINTMENT TOPICAL AS NEEDED
Status: CANCELLED | OUTPATIENT
Start: 2025-06-20

## 2025-06-20 RX ORDER — SODIUM HYPOCHLORITE 2.5 MG/ML
1 SOLUTION TOPICAL AS NEEDED
Status: CANCELLED | OUTPATIENT
Start: 2025-06-20

## 2025-06-20 RX ORDER — LIDOCAINE HYDROCHLORIDE 20 MG/ML
1 JELLY TOPICAL ONCE
Status: CANCELLED | OUTPATIENT
Start: 2025-06-20 | End: 2025-06-20

## 2025-06-20 RX ORDER — SODIUM HYPOCHLORITE 1.25 MG/ML
1 SOLUTION TOPICAL AS NEEDED
Status: CANCELLED | OUTPATIENT
Start: 2025-06-20

## 2025-06-20 RX ORDER — LIDOCAINE HYDROCHLORIDE 20 MG/ML
1 JELLY TOPICAL ONCE
Status: DISCONTINUED | OUTPATIENT
Start: 2025-06-20 | End: 2025-06-21 | Stop reason: HOSPADM

## 2025-06-20 RX ORDER — MUPIROCIN 2 %
1 OINTMENT (GRAM) TOPICAL AS NEEDED
Status: CANCELLED | OUTPATIENT
Start: 2025-06-20

## 2025-06-20 RX ORDER — DIAPER,BRIEF,INFANT-TODD,DISP
1 EACH MISCELLANEOUS ONCE
Status: CANCELLED | OUTPATIENT
Start: 2025-06-20 | End: 2025-06-20

## 2025-06-20 RX ORDER — LIDOCAINE HYDROCHLORIDE 20 MG/ML
JELLY TOPICAL AS NEEDED
Status: CANCELLED | OUTPATIENT
Start: 2025-06-20

## 2025-06-20 RX ORDER — SODIUM HYPOCHLORITE 2.5 MG/ML
1 SOLUTION TOPICAL AS NEEDED
Status: DISCONTINUED | OUTPATIENT
Start: 2025-06-20 | End: 2025-06-21 | Stop reason: HOSPADM

## 2025-06-20 RX ORDER — LIDOCAINE HYDROCHLORIDE AND EPINEPHRINE BITARTRATE 20; .01 MG/ML; MG/ML
10 INJECTION, SOLUTION SUBCUTANEOUS ONCE
Status: CANCELLED | OUTPATIENT
Start: 2025-06-20 | End: 2025-06-20

## 2025-06-20 RX ORDER — NYSTATIN 100000 [USP'U]/G
1 POWDER TOPICAL ONCE
Status: CANCELLED | OUTPATIENT
Start: 2025-06-20 | End: 2025-06-20

## 2025-06-20 RX ORDER — SILVER SULFADIAZINE 10 MG/G
1 CREAM TOPICAL AS NEEDED
Status: CANCELLED | OUTPATIENT
Start: 2025-06-20

## 2025-06-20 RX ORDER — LIDOCAINE HYDROCHLORIDE 20 MG/ML
10 INJECTION, SOLUTION INFILTRATION; PERINEURAL ONCE
Status: CANCELLED | OUTPATIENT
Start: 2025-06-20 | End: 2025-06-20

## 2025-06-20 RX ADMIN — COLLAGENASE SANTYL 1 APPLICATION: 250 OINTMENT TOPICAL at 12:58

## 2025-06-20 RX ADMIN — VITAMINS A AND D OINTMENT 1 APPLICATION: 15.5; 53.4 OINTMENT TOPICAL at 12:58

## 2025-06-20 NOTE — LETTER
06/20/2025  Ang Jackson, 1960           Wound Care Instructions:     Left Lower Gluteal- Clean with wound cleanser or normal saline, pat dry. Apply santyl, vaseline gauze, and saline moistened gauze packing and cover with abd pad and secure with foam tape.      Right Lower Gluteal- Clean with wound cleanser or normal saline, pat dry. Apply santyl, vaseline gauze, and saline moistened gauze packing and cover with abd pad and secure with foam tape.      Sacral- Clean with wound cleanser or normal saline, pat dry. Apply layer of santyl, vaseline gauze, cover with saline moistened 4x4 and cover  with abd pad and secure with foam tape.       This needs to be done daily.      Patient will need supplies to last him through the week. He will need 4x4's, small kerlix gauze, vaseline gauze, ABD pads. Foam tape was supplied by clinic.      If you have any questions please call our clinic. Patient will need supplies to last him through the week. He is seen in clinic once a week.            Thank you,     TANJA Serrano

## 2025-06-23 NOTE — PROGRESS NOTES
Wound Clinic Note  Patient Identification:  Name:  Ang Jackson  Age:  65 y.o.  Sex:  male  :  1960  MRN:  [id number]   Visit Number:  [id number]  Primary Care Physician:  Deepak Perez     Subjective     Chief complaint:     Multiple pressure injuries     History of presenting illness:     Patient is a 65 y.o. male with past medical history significant for obesity, parplegia,  that presented today for evaluation of bilateral gluteal and sacral pressure injuries.  Reports areas have been present for over a month. He is paraplegic. Has been applying santyl and hydrofera blue to wounds. Denies any fever or chills. Moderate drainage without odor. Low air loss mattress has been ordered by PCP, should be delivered this week.  Denies history of diabetes, or smoking. Finished dose of cipro.     Interval History:   2025: Seen in clinic today for follow-up to pressure injuries to right and left gluteal, and perirectal MASD. Right gluteal wound has worsened, now with DTI down to right groin area. He is quadpraplegic, complicating wound prgoression. He is incontinent of bowel and bladder. Chin catheter in place. Awaiting approval for low-air loss mattress. Denies any fever or chills. No other issues or concerns reported.    2025: Seen in clinic today for follow-up to pressure injuries to right and left gluteal, and perirectal MASD. Right gluteal wound has worsened, now with DTI down to right groin area. He is quadpraplegic, complicating wound prgoression. He is incontinent of bowel and bladder. Chin catheter in place. Awaiting approval for low-air loss mattress. Denies any fever or chills. No other issues or concerns reported.    2025: Seen in clinic today for follow-up to pressure injuries to right and left gluteal, and perirectal MASD. Right gluteal wound is stable. Continues to be severe.. He is quadpraplegic, complicating wound prgoression. He is incontinent of bowel and bladder. Chin  catheter in place. Awaiting approval for low-air loss mattress. Denies any fever or chills. No other issues or concerns reported.    05/07/2025: Seen in clinic today for follow-up to pressure injuries to right and left gluteal, and perirectal MASD. Right gluteal wound is stable. Continues to be severe.. He is quadpraplegic, complicating wound prgoression. He is incontinent of bowel and bladder. Chin catheter in place. Awaiting approval for low-air loss mattress. Denies any fever or chills. No other issues or concerns reported.    05/15/2025: Seen in clinic today for follow-up to pressure injuries or bilateral gluteal. Left gluteal wound continues to worsen with increase In tunnel area. Discussed concern of worsening and possible going to ED. Would like to avoid if possible. Will obtain new wound culture and labs. Pending results may require additional antibiotics. Was scheduled to see infectious disease tomorrow unfortunatley had to cancel. Highly encouraged to make following, which is scheduled for next week .    05/29/2025: Seen in clinic today for follow-up to pressure injuries or bilateral gluteal. Left gluteal wound continues to worsen with increase In tunnel area. Right gluteal wound with necrotic tissue. Sacral wound with necrotic tissue. Wounds continue to be severe. Will attempt debridement. Denies any fever or chills. Does have low-air loss mattress at home.     06/04/2025: Seen in clinic today for follow-up to pressure injuries or bilateral gluteal. Left gluteal wound continues to worsen with increase In tunnel area. Right gluteal wound with necrotic tissue. Sacral wound with necrotic tissue. Wounds continue to be severe. Will attempt debridement. Denies any fever or chills. Does have low-air loss mattress at home.  Hesistate for debridement in OR due to possibility of prolonged hosptial stay. Will attempt debridement today,  and re-evaluate next week for possible debridement in OR at that time.      06/11/2025: Seen in clinic today for follow-up to pressure injuries to bilateral gluteal and sacral wounds. Wounds appear to be worse today. Foul odor present. Case was discussed with Dr. Lara, Will plan to admit patient and take to OR tomrorow.   ---------------------------------------------------------------------------------------------------------------------   Review of Systems   Constitutional:  Negative for chills and fever.   Respiratory:  Negative for cough and shortness of breath.    Cardiovascular:  Positive for leg swelling. Negative for chest pain.   Gastrointestinal:  Negative for nausea and vomiting.   Musculoskeletal:  Positive for back pain and gait problem.   Skin:  Positive for wound.      ---------------------------------------------------------------------------------------------------------------------   Past Medical History:   Diagnosis Date    Anesthesia     diffculty adminster spinal block, patient stated with last hip surgery 7-2020 several attempts per anthyarelis and no luck, resulted in general anesthesia     Anxiety     Arthritis     Rheumatoid    COVID     2020    GERD (gastroesophageal reflux disease)     Hypertension     Lactose intolerance     Low back pain     Sleep apnea     does not wear machine, MILD, DOES NOT HAVE AFTER WGT LOSS SURGERY    Wears reading eyeglasses      Past Surgical History:   Procedure Laterality Date    ABDOMINAL SURGERY      ANTERIOR CERVICAL DISCECTOMY W/ FUSION Right 02/19/2023    Procedure: CERVICAL DISCECTOMY ANTERIOR WITH FUSION C6-7;  Surgeon: Jaiden Aldridge MD;  Location: [facility];  Service: Neurosurgery;  Laterality: Right;    BACK SURGERY      BARIATRIC SURGERY  4 year ago    CARDIAC CATHETERIZATION      CARPAL TUNNEL RELEASE Bilateral     CARPAL TUNNEL RELEASE Right 06/29/2023    Procedure: CARPAL TUNNEL RELEASE RIGHT;  Surgeon: Jaiden Aldridge MD;  Location: [facility];  Service: Neurosurgery;  Laterality: Right;    COLONOSCOPY      5 years  ago    COLONOSCOPY N/A 04/05/2022    Procedure: COLONOSCOPY FOR SCREENING;  Surgeon: Luz Galvez MD;  Location:  [facility];  Service: Gastroenterology;  Laterality: N/A;    ENDOSCOPY N/A 04/05/2022    Procedure: ESOPHAGOGASTRODUODENOSCOPY WITH BIOPSY;  Surgeon: Luz Galvez MD;  Location:  [facility];  Service: Gastroenterology;  Laterality: N/A;    GASTRIC SLEEVE LAPAROSCOPIC      2017    HIP SURGERY Right times 2    JOINT REPLACEMENT      KNEE ARTHROSCOPY Left     TONSILLECTOMY      TOTAL HIP ARTHROPLASTY Left 01/25/2021    Procedure: TOTAL HIP ARTHROPLASTY LEFT;  Surgeon: Jb Patel MD;  Location:  [facility];  Service: Orthopedics;  Laterality: Left;    TOTAL HIP ARTHROPLASTY REVISION Right 07/20/2020    Procedure: TOTAL HIP ARTHROPLASTY REVISION RIGHT;  Surgeon: Jb Patel MD;  Location:  [facility];  Service: Orthopedics;  Laterality: Right;    WOUND DEBRIDEMENT N/A 05/18/2025    Procedure: DEBRIDEMENT SACRAL ULCER/WOUND;  Surgeon: Deepak Lara MD;  Location:  [facility];  Service: General;  Laterality: N/A;    WOUND DEBRIDEMENT N/A 6/12/2025    Procedure: DEBRIDEMENT SACRAL ULCER/WOUND;  Surgeon: Deepak Lara MD;  Location:  [facility];  Service: General;  Laterality: N/A;  Sacral and other wounds.     Family History   Problem Relation Age of Onset    Heart disease Father     Hypertension Father     Osteoarthritis Father     Cancer Mother     Diabetes Brother     Hypertension Brother     Gout Paternal Grandmother     Diabetes Paternal Grandmother     Colon cancer Maternal Uncle     Colon polyps Maternal Uncle      Social History     Socioeconomic History    Marital status:    Tobacco Use    Smoking status: Never     Passive exposure: Never    Smokeless tobacco: Never   Vaping Use    Vaping status: Never Used   Substance and Sexual Activity    Alcohol use: No    Drug use: Never    Sexual activity: Yes  "    ---------------------------------------------------------------------------------------------------------------------   Allergies:  Sulfa antibiotics, Codeine, Ketorolac, and Morphine and codeine  ---------------------------------------------------------------------------------------------------------------------  Objective     ---------------------------------------------------------------------------------------------------------------------   Vital Signs:  BP 99/49   Pulse 88   Temp 98.5 °F (36.9 °C) (Infrared)   Ht 172.7 cm (68\")   Wt 95.3 kg (210 lb)   BMI 31.93 kg/m²   Estimated body mass index is 31.93 kg/m² as calculated from the following:    Height as of this encounter: 172.7 cm (68\").    Weight as of this encounter: 95.3 kg (210 lb).  Body mass index is 31.93 kg/m².  Wt Readings from Last 3 Encounters:   06/20/25 96.9 kg (213 lb 9.6 oz)   06/13/25 96.9 kg (213 lb 9.6 oz)   06/11/25 95.3 kg (210 lb)       ---------------------------------------------------------------------------------------------------------------------   Physical Exam  Wound Assessment:  Location: Sacral  Wound Measurements: 4 X 4.2 X 0.4cm   Tunneling: No Undermining: No  Dressing Appearance: dressingappearance: clean  Closure: NA  Changes since last exam: no changes  Etiology and classification: MASD  Wound bed structures/characteristics: partial thickness (subcutaneous tissue is not exposed), red  Edges moist  Periwound characteristics: excoriated  Periwound Temperature: normal turgor and temperature  Drainage characteristics: moderate, serous   Perfusion characteristics: NA    Wound Assessment:  Location: Left, gluteal  Wound Measurements: 8.1 X 5 X 3.5cm   Tunneling: No Undermining: No  Dressing Appearance: dressingappearance: clean  Closure: NA  Changes since last exam: no changes  Etiology and classification: Unstageable Pressure injury   Wound bed structures/characteristics: full-thickness (subcutaneous tissue is exposed " in at least a portion of the wound), black eschar, moist, pink  Edges moist  Periwound characteristics: intact  Periwound Temperature: normal turgor and temperature  Drainage characteristics: moderate, serous   Perfusion characteristics: NA    Wound Assessment:  Location: Right, gluteal  Wound Measurements: 9 X 5.5 X 2cm   Tunneling: No Undermining: No  Dressing Appearance: dressingappearance: clean  Closure: NA  Changes since last exam: ulcer is worsening   Etiology and classification: stage 3 pressure ulcer  Wound bed structures/characteristics: full-thickness (subcutaneous tissue is exposed in at least a portion of the wound), moist, pink, yellow  Edges moist  Periwound characteristics: Purple, non-blanchable  Periwound Temperature: normal turgor and temperature  Drainage characteristics: moderate, serous   Perfusion characteristics: NA    Wound Goal (s):Free of infection and No further symptoms  Assessment & Plan      Patient Active Problem List    Diagnosis     Pressure injury of right buttock, stage 3 [L89.313]  -Clean with wound cleanser, apply magic barrier to area and cover with suberabsorbent and secure with tape  -Offloading pressure induction measures discussed  -Ordered high protein diet 120g/day along with vitamin C 2000mg/day, vitamin A 5000 Units/day, vitamin D3 5000 Units/day, zinc 50mg/day to help promote wound healing   -Will order low-air loss mattress as wounds are continuing to worsen and will benefit from added support       Pressure injury of left buttock unstageable [L89.320]  -clean with wound cleanser, apply santyl to area and cover with silicone border dressing daily and PRN  -Wound culture obtained  -Labs ordered: CBC, CMP, CRP, sed rate, prealbumin, and hemoglobain A1C to assess inflammatory markers and nutritional status as this both and negatively impact wound healing if not properly treated.   -Offloading measures discussed   -Received low-air loss mattress today   -Ordered high  protein diet 120g/day along with vitamin C 2000mg/day, vitamin A 5000 Units/day, vitamin D3 5000 Units/day, zinc 50mg/day to help promote wound healing   -This condition is associated with a high risk for non-healing, infection, sepsis, loss of function, reduced quality of life, and increased risk of premature mortality. The patient is at high-risk for additional pressure injury, requiring a high level of vigilance and coordination of care, and frequent re-assessment to prevent adverse outcomes.     Management of this condition is inherently complex, requiring ongoing optimization of many factors to assure the highest likelihood of a favorable outcome, including pressure relief, bioburden, multiple aspects of nutrition, infection management, and moisture and mechanical factors relevant to wound healing.        Dermatitis associated with moisture [L30.8], Perirectal skin irritation [K62.89]  -Clean with wound cleanser, apply magic barrier to area and leave open to air  -Offloading measures discussed   -keep area clean and dry   -High risk for worsening due to incontinent of bowel and bladder  -Chin catheter is in place and appears to be functioning        Obesity [E66.9]  -An obese person?is at greater risk for wound infection and dehiscence or evisceration.       Diabetes mellitus [E11.9]  --Recommend adequate glycemic control to help promote wound healing  -Poor glycemic control increases risk of prolonged healing, infection, loss of limb and premature death       Quadriplegia  -Complicates all aspects of care  -Increases risk of wound failure due to decreased mobility        Patient was transferred to inpatient room by nursing staff     Clinical Impression:Moderate Complexity    Follow-up: 1 week    TANJA Mejias,CWS  [facility]  06/11/2025  [id number]

## 2025-06-25 ENCOUNTER — HOSPITAL ENCOUNTER (OUTPATIENT)
Dept: WOUND CARE | Facility: HOSPITAL | Age: 65
Discharge: HOME | End: 2025-06-25
Payer: MEDICARE

## 2025-06-25 DIAGNOSIS — T14.8XXA OPEN WOUND: Primary | ICD-10-CM

## 2025-06-25 LAB
ALBUMIN SERPL-MCNC: 3.1 G/DL (ref 3.5–5.2)
ALBUMIN/GLOB SERPL: 1 G/DL
ALP SERPL-CCNC: 87 U/L (ref 39–117)
ALT SERPL W P-5'-P-CCNC: 6 U/L (ref 1–41)
ANION GAP SERPL CALCULATED.3IONS-SCNC: 7.4 MMOL/L (ref 5–15)
AST SERPL-CCNC: 9 U/L (ref 1–40)
BASOPHILS # BLD AUTO: 0.03 10*3/MM3 (ref 0–0.2)
BASOPHILS NFR BLD AUTO: 0.3 % (ref 0–1.5)
BILIRUB SERPL-MCNC: 0.2 MG/DL (ref 0–1.2)
BUN SERPL-MCNC: 14.8 MG/DL (ref 8–23)
BUN/CREAT SERPL: 38.9 (ref 7–25)
CALCIUM SPEC-SCNC: 8.7 MG/DL (ref 8.6–10.5)
CHLORIDE SERPL-SCNC: 102 MMOL/L (ref 98–107)
CO2 SERPL-SCNC: 29.6 MMOL/L (ref 22–29)
CREAT SERPL-MCNC: 0.38 MG/DL (ref 0.76–1.27)
CRP SERPL-MCNC: 10.6 MG/DL (ref 0–0.5)
DEPRECATED RDW RBC AUTO: 46.1 FL (ref 37–54)
EGFRCR SERPLBLD CKD-EPI 2021: 123.7 ML/MIN/1.73
EOSINOPHIL # BLD AUTO: 0.32 10*3/MM3 (ref 0–0.4)
EOSINOPHIL NFR BLD AUTO: 3 % (ref 0.3–6.2)
ERYTHROCYTE [DISTWIDTH] IN BLOOD BY AUTOMATED COUNT: 13.9 % (ref 12.3–15.4)
ERYTHROCYTE [SEDIMENTATION RATE] IN BLOOD: 55 MM/HR (ref 0–20)
GLOBULIN UR ELPH-MCNC: 3.1 GM/DL
GLUCOSE SERPL-MCNC: 146 MG/DL (ref 65–99)
HCT VFR BLD AUTO: 33.3 % (ref 37.5–51)
HGB BLD-MCNC: 10 G/DL (ref 13–17.7)
IMM GRANULOCYTES # BLD AUTO: 0.08 10*3/MM3 (ref 0–0.05)
IMM GRANULOCYTES NFR BLD AUTO: 0.8 % (ref 0–0.5)
LYMPHOCYTES # BLD AUTO: 1.39 10*3/MM3 (ref 0.7–3.1)
LYMPHOCYTES NFR BLD AUTO: 13.2 % (ref 19.6–45.3)
MCH RBC QN AUTO: 27.2 PG (ref 26.6–33)
MCHC RBC AUTO-ENTMCNC: 30 G/DL (ref 31.5–35.7)
MCV RBC AUTO: 90.5 FL (ref 79–97)
MONOCYTES # BLD AUTO: 0.76 10*3/MM3 (ref 0.1–0.9)
MONOCYTES NFR BLD AUTO: 7.2 % (ref 5–12)
NEUTROPHILS NFR BLD AUTO: 7.97 10*3/MM3 (ref 1.7–7)
NEUTROPHILS NFR BLD AUTO: 75.5 % (ref 42.7–76)
NRBC BLD AUTO-RTO: 0 /100 WBC (ref 0–0.2)
PLATELET # BLD AUTO: 302 10*3/MM3 (ref 140–450)
PMV BLD AUTO: 8.8 FL (ref 6–12)
POTASSIUM SERPL-SCNC: 4.1 MMOL/L (ref 3.5–5.2)
PROT SERPL-MCNC: 6.2 G/DL (ref 6–8.5)
RBC # BLD AUTO: 3.68 10*6/MM3 (ref 4.14–5.8)
SODIUM SERPL-SCNC: 139 MMOL/L (ref 136–145)
WBC NRBC COR # BLD AUTO: 10.55 10*3/MM3 (ref 3.4–10.8)

## 2025-06-25 PROCEDURE — 63710000001 COLLAGENASE 250 UNIT/GM OINTMENT 30 G TUBE: Performed by: NURSE PRACTITIONER

## 2025-06-25 PROCEDURE — 86140 C-REACTIVE PROTEIN: CPT | Performed by: NURSE PRACTITIONER

## 2025-06-25 PROCEDURE — A9270 NON-COVERED ITEM OR SERVICE: HCPCS | Performed by: NURSE PRACTITIONER

## 2025-06-25 PROCEDURE — 80053 COMPREHEN METABOLIC PANEL: CPT | Performed by: NURSE PRACTITIONER

## 2025-06-25 PROCEDURE — 87186 SC STD MICRODIL/AGAR DIL: CPT | Performed by: NURSE PRACTITIONER

## 2025-06-25 PROCEDURE — 85025 COMPLETE CBC W/AUTO DIFF WBC: CPT | Performed by: NURSE PRACTITIONER

## 2025-06-25 PROCEDURE — 87070 CULTURE OTHR SPECIMN AEROBIC: CPT | Performed by: NURSE PRACTITIONER

## 2025-06-25 PROCEDURE — 87077 CULTURE AEROBIC IDENTIFY: CPT | Performed by: NURSE PRACTITIONER

## 2025-06-25 PROCEDURE — 84134 ASSAY OF PREALBUMIN: CPT | Performed by: NURSE PRACTITIONER

## 2025-06-25 PROCEDURE — 87205 SMEAR GRAM STAIN: CPT | Performed by: NURSE PRACTITIONER

## 2025-06-25 PROCEDURE — 85652 RBC SED RATE AUTOMATED: CPT | Performed by: NURSE PRACTITIONER

## 2025-06-25 RX ORDER — SILVER SULFADIAZINE 10 MG/G
1 CREAM TOPICAL AS NEEDED
OUTPATIENT
Start: 2025-06-25

## 2025-06-25 RX ORDER — NYSTATIN 100000 [USP'U]/G
1 POWDER TOPICAL ONCE
OUTPATIENT
Start: 2025-06-25 | End: 2025-06-25

## 2025-06-25 RX ORDER — LIDOCAINE HYDROCHLORIDE 20 MG/ML
1 JELLY TOPICAL ONCE
Status: COMPLETED | OUTPATIENT
Start: 2025-06-25 | End: 2025-06-25

## 2025-06-25 RX ORDER — CASTOR OIL AND BALSAM, PERU 788; 87 MG/G; MG/G
1 OINTMENT TOPICAL AS NEEDED
OUTPATIENT
Start: 2025-06-25

## 2025-06-25 RX ORDER — LIDOCAINE HYDROCHLORIDE 20 MG/ML
10 INJECTION, SOLUTION INFILTRATION; PERINEURAL ONCE
OUTPATIENT
Start: 2025-06-25 | End: 2025-06-25

## 2025-06-25 RX ORDER — LIDOCAINE HYDROCHLORIDE 20 MG/ML
JELLY TOPICAL AS NEEDED
OUTPATIENT
Start: 2025-06-25

## 2025-06-25 RX ORDER — DIAPER,BRIEF,INFANT-TODD,DISP
1 EACH MISCELLANEOUS ONCE
OUTPATIENT
Start: 2025-06-25 | End: 2025-06-25

## 2025-06-25 RX ORDER — SODIUM HYPOCHLORITE 1.25 MG/ML
1 SOLUTION TOPICAL AS NEEDED
OUTPATIENT
Start: 2025-06-25

## 2025-06-25 RX ORDER — LIDOCAINE HYDROCHLORIDE 20 MG/ML
1 JELLY TOPICAL ONCE
Status: CANCELLED | OUTPATIENT
Start: 2025-06-25 | End: 2025-06-25

## 2025-06-25 RX ORDER — LIDOCAINE HYDROCHLORIDE AND EPINEPHRINE BITARTRATE 20; .01 MG/ML; MG/ML
10 INJECTION, SOLUTION SUBCUTANEOUS ONCE
OUTPATIENT
Start: 2025-06-25 | End: 2025-06-25

## 2025-06-25 RX ORDER — SODIUM HYPOCHLORITE 2.5 MG/ML
1 SOLUTION TOPICAL AS NEEDED
Status: CANCELLED | OUTPATIENT
Start: 2025-06-25

## 2025-06-25 RX ORDER — MUPIROCIN 2 %
1 OINTMENT (GRAM) TOPICAL AS NEEDED
OUTPATIENT
Start: 2025-06-25

## 2025-06-25 RX ADMIN — COLLAGENASE SANTYL 1 APPLICATION: 250 OINTMENT TOPICAL at 16:45

## 2025-06-25 RX ADMIN — LIDOCAINE HYDROCHLORIDE 1 ML: 20 JELLY TOPICAL at 16:45

## 2025-06-26 LAB — PREALB SERPL-MCNC: 14.6 MG/DL (ref 20–40)

## 2025-06-28 LAB
BACTERIA SPEC AEROBE CULT: ABNORMAL
GRAM STN SPEC: ABNORMAL
GRAM STN SPEC: ABNORMAL

## 2025-07-01 ENCOUNTER — TELEPHONE (OUTPATIENT)
Dept: INFECTIOUS DISEASES | Facility: CLINIC | Age: 65
End: 2025-07-01

## 2025-07-01 ENCOUNTER — OFFICE VISIT (OUTPATIENT)
Dept: SURGERY | Facility: CLINIC | Age: 65
End: 2025-07-01
Payer: MEDICARE

## 2025-07-01 ENCOUNTER — OFFICE VISIT (OUTPATIENT)
Dept: INFECTIOUS DISEASES | Facility: CLINIC | Age: 65
End: 2025-07-01
Payer: MEDICARE

## 2025-07-01 VITALS
DIASTOLIC BLOOD PRESSURE: 60 MMHG | HEIGHT: 68 IN | SYSTOLIC BLOOD PRESSURE: 99 MMHG | BODY MASS INDEX: 32.28 KG/M2 | WEIGHT: 213 LBS

## 2025-07-01 VITALS
TEMPERATURE: 97.3 F | HEART RATE: 73 BPM | OXYGEN SATURATION: 96 % | HEIGHT: 68 IN | DIASTOLIC BLOOD PRESSURE: 60 MMHG | SYSTOLIC BLOOD PRESSURE: 99 MMHG | WEIGHT: 213 LBS | BODY MASS INDEX: 32.28 KG/M2 | RESPIRATION RATE: 18 BRPM

## 2025-07-01 DIAGNOSIS — L08.9 INFECTED WOUND: ICD-10-CM

## 2025-07-01 DIAGNOSIS — T14.8XXA INFECTED WOUND: ICD-10-CM

## 2025-07-01 DIAGNOSIS — T14.8XXA OPEN WOUND: ICD-10-CM

## 2025-07-01 DIAGNOSIS — L89.320 PRESSURE INJURY OF LEFT BUTTOCK, UNSTAGEABLE: ICD-10-CM

## 2025-07-01 DIAGNOSIS — L89.313 PRESSURE INJURY OF RIGHT BUTTOCK, STAGE 3: Primary | ICD-10-CM

## 2025-07-01 DIAGNOSIS — S31.000A WOUND OF SACRAL REGION, INITIAL ENCOUNTER: ICD-10-CM

## 2025-07-01 PROBLEM — L89.90 DECUBITUS ULCER: Status: RESOLVED | Noted: 2025-06-11 | Resolved: 2025-07-01

## 2025-07-01 NOTE — PROGRESS NOTES
[facility]         Referring Provider: No referring provider defined for this encounter.    Subjective      Chief Complaint  Follow-up (Open wound)    History of Present Illness  Ang Jackson is a 65 y.o. male who presents today to [facility] for infectious disease evaluation and antibiotic management.    Patient is a 64 y.o. male with past medical history significant for  cervical degenerative disc disease status post C6-7 anterior cervical discectomy and fusion in 2023 and nontraumatic cervical spinal cord injury secondary with cervical epidural abscess with cord compression from C3-C5 in October 2024 status post incision and drainage with C4 corpectomy, C3-6 laminectomy and emergent hematoma evacuation. Patient with neurogenic bladder and chronic hanks catheter due to significant spinal trauma. 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  Patient was seen in the wound care clinic outpatient and due to worsening wound advised patient to go to ER.  Patient was evaluated and admitted for wound infection.  Surgery was consulted and wounds were debrided in OR on 5/18.    Was found to have elevated CRP level and positive wound cultures and came back for further antibiotic management.      Past Medical History:   Diagnosis Date    Anesthesia     diffculty adminster spinal block, patient stated with last hip surgery 7-2020 several attempts per anthesia and no luck, resulted in general anesthesia     Anxiety     Arthritis     Rheumatoid    COVID     2020    GERD (gastroesophageal reflux disease)     Hypertension     Lactose intolerance     Low back pain     Sleep apnea     does not wear machine, MILD, DOES NOT HAVE AFTER WGT LOSS SURGERY    Wears reading eyeglasses        Past Surgical  History:   Procedure Laterality Date    ABDOMINAL SURGERY      ANTERIOR CERVICAL DISCECTOMY W/ FUSION Right 02/19/2023    Procedure: CERVICAL DISCECTOMY ANTERIOR WITH FUSION C6-7;  Surgeon: Jaiden Aldridge MD;  Location:  [facility];  Service: Neurosurgery;  Laterality: Right;    BACK SURGERY      BARIATRIC SURGERY  4 year ago    CARDIAC CATHETERIZATION      CARPAL TUNNEL RELEASE Bilateral     CARPAL TUNNEL RELEASE Right 06/29/2023    Procedure: CARPAL TUNNEL RELEASE RIGHT;  Surgeon: Jaiden Aldridge MD;  Location:  [facility];  Service: Neurosurgery;  Laterality: Right;    COLONOSCOPY      5 years ago    COLONOSCOPY N/A 04/05/2022    Procedure: COLONOSCOPY FOR SCREENING;  Surgeon: Luz Galvez MD;  Location:  [facility];  Service: Gastroenterology;  Laterality: N/A;    ENDOSCOPY N/A 04/05/2022    Procedure: ESOPHAGOGASTRODUODENOSCOPY WITH BIOPSY;  Surgeon: Luz Galvez MD;  Location:  [facility];  Service: Gastroenterology;  Laterality: N/A;    GASTRIC SLEEVE LAPAROSCOPIC      2017    HIP SURGERY Right times 2    JOINT REPLACEMENT      KNEE ARTHROSCOPY Left     TONSILLECTOMY      TOTAL HIP ARTHROPLASTY Left 01/25/2021    Procedure: TOTAL HIP ARTHROPLASTY LEFT;  Surgeon: Jb Patel MD;  Location:  [facility];  Service: Orthopedics;  Laterality: Left;    TOTAL HIP ARTHROPLASTY REVISION Right 07/20/2020    Procedure: TOTAL HIP ARTHROPLASTY REVISION RIGHT;  Surgeon: Jb Patel MD;  Location:  [facility];  Service: Orthopedics;  Laterality: Right;    WOUND DEBRIDEMENT N/A 05/18/2025    Procedure: DEBRIDEMENT SACRAL ULCER/WOUND;  Surgeon: Deepak Lara MD;  Location:  [facility];  Service: General;  Laterality: N/A;    WOUND DEBRIDEMENT N/A 6/12/2025    Procedure: DEBRIDEMENT SACRAL ULCER/WOUND;  Surgeon: Deepak Lara MD;  Location:  [facility];  Service: General;  Laterality: N/A;  Sacral and other wounds.       Social History     Socioeconomic History    Marital status:  "   Tobacco Use    Smoking status: Never     Passive exposure: Never    Smokeless tobacco: Never   Vaping Use    Vaping status: Never Used   Substance and Sexual Activity    Alcohol use: No    Drug use: Never    Sexual activity: Yes       Family History  family history includes Cancer in his mother; Colon cancer in his maternal uncle; Colon polyps in his maternal uncle; Diabetes in his brother and paternal grandmother; Gout in his paternal grandmother; Heart disease in his father; Hypertension in his brother and father; Osteoarthritis in his father.    Immunization History   Administered Date(s) Administered    COVID-19 ([employer]) 04/05/2021        Allergies  Allergies   Allergen Reactions    Sulfa Antibiotics Hives     Hives, shortness of breath    Codeine Nausea And Vomiting    Ketorolac Other (See Comments)     Pt reports heavy, \"tight\" feeling in his chest.    Morphine And Codeine Nausea And Vomiting       The medication list has been reviewed and updated.   Current Medications    Current Outpatient Medications:     acetaminophen (TYLENOL) 500 MG tablet, Take 2 tablets by mouth Every 6 (Six) Hours As Needed for Mild Pain., Disp: , Rfl:     ascorbic acid (VITAMIN C) 500 MG tablet, Take 2 tablets by mouth Daily., Disp: , Rfl:     baclofen (LIORESAL) 10 MG tablet, Take 2 tablets by mouth 3 (Three) Times a Day., Disp: , Rfl:     baclofen (LIORESAL) 10 MG tablet, Take 5 tablets by mouth every night at bedtime., Disp: , Rfl:     Brexpiprazole (Rexulti) 2 MG tablet, Take 1 tablet by mouth Daily., Disp: , Rfl:     busPIRone (BUSPAR) 5 MG tablet, Take 1 tablet by mouth 2 (Two) Times a Day., Disp: , Rfl:     clotrimazole (LOTRIMIN) 1 % cream, Apply 1 Application topically to the appropriate area as directed Every Night., Disp: , Rfl:     collagenase 250 UNIT/GM ointment, Apply 1 Application topically to the appropriate area as directed Daily., Disp: , Rfl:     Cyanocobalamin (B-12 Compliance Injection) 1000 MCG/ML " kit, Inject 1 mL as directed Every 14 (Fourteen) Days., Disp: , Rfl:     diclofenac (VOLTAREN) 75 MG EC tablet, Take 1 tablet by mouth 2 (Two) Times a Day., Disp: , Rfl:     famotidine (PEPCID) 20 MG tablet, Take 1 tablet by mouth 2 (Two) Times a Day., Disp: , Rfl:     ferrous sulfate 325 (65 FE) MG tablet, Take 1 tablet by mouth Daily With Breakfast., Disp: , Rfl:     FLUoxetine (PROzac) 20 MG capsule, Take 1 capsule by mouth Daily., Disp: , Rfl:     gabapentin (NEURONTIN) 300 MG capsule, Take 3 capsules by mouth 4 (Four) Times a Day., Disp: , Rfl:     loratadine (CLARITIN) 10 MG tablet, Take 1 tablet by mouth Daily., Disp: , Rfl:     multivitamin tablet tablet, Take 1 tablet by mouth Daily., Disp: , Rfl:     nystatin (MYCOSTATIN) 438256 UNIT/GM cream, Apply 1 Application topically to the appropriate area as directed Daily As Needed (irritation)., Disp: , Rfl:     oxyCODONE (ROXICODONE) 10 MG tablet, Take 1 tablet by mouth 4 (Four) Times a Day., Disp: , Rfl:     oxyCODONE ER (oxyCONTIN) 20 MG 12 hr tablet, Take 1 tablet by mouth Every Night., Disp: , Rfl:     pantoprazole (PROTONIX) 40 MG EC tablet, Take 1 tablet by mouth 2 (Two) Times a Day., Disp: , Rfl:     tiZANidine (ZANAFLEX) 2 MG tablet, Take 3 tablets by mouth 4 (Four) Times a Day., Disp: , Rfl:     tiZANidine (ZANAFLEX) 2 MG tablet, Take 2 tablets by mouth Every Night., Disp: , Rfl:     triamcinolone (KENALOG) 0.1 % cream, Apply 1 Application topically to the appropriate area as directed Daily As Needed for Irritation or Rash., Disp: , Rfl:     vitamin D3 125 MCG (5000 UT) capsule capsule, Take 1 capsule by mouth Daily., Disp: , Rfl:     zolpidem (Ambien) 5 MG tablet, Take 1 tablet by mouth Every Night., Disp: , Rfl:       Review of Systems    Review of Systems   Constitutional:  Negative for activity change, appetite change, chills, diaphoresis and fever.   HENT:  Negative for congestion, dental problem, drooling, mouth sores, sinus pressure and sneezing.  "   Eyes:  Negative for blurred vision, double vision, photophobia and discharge.   Respiratory:  Negative for apnea, cough, choking, chest tightness, shortness of breath and wheezing.    Cardiovascular:  Negative for chest pain, palpitations and leg swelling.   Gastrointestinal:  Negative for abdominal distention, abdominal pain, diarrhea, nausea and vomiting.   Genitourinary:  Negative for dysuria, flank pain and frequency.   Musculoskeletal:  Positive for arthralgias, back pain and gait problem. Negative for neck pain and neck stiffness.   Skin:  Positive for wound. Negative for rash.   Neurological:  Negative for dizziness, tremors, seizures, syncope, speech difficulty, numbness, headache and confusion.   Psychiatric/Behavioral:  Negative for agitation, behavioral problems, hallucinations and suicidal ideas.         Objective     Vital Signs:  BP 99/60 (BP Location: Right arm, Patient Position: Sitting, Cuff Size: Adult)   Pulse 73   Temp 97.3 °F (36.3 °C) (Infrared)   Resp 18   Ht 172.7 cm (68\")   Wt 96.6 kg (213 lb)   SpO2 96%   BMI 32.39 kg/m²   Estimated body mass index is 32.39 kg/m² as calculated from the following:    Height as of this encounter: 172.7 cm (68\").    Weight as of this encounter: 96.6 kg (213 lb).    Physical Exam  Constitutional:       General: He is not in acute distress.     Appearance: Normal appearance. He is normal weight. He is not ill-appearing, toxic-appearing or diaphoretic.   HENT:      Head: Normocephalic and atraumatic.      Nose: Nose normal. No congestion or rhinorrhea.      Mouth/Throat:      Mouth: Mucous membranes are moist.      Pharynx: Oropharynx is clear.   Eyes:      General: No scleral icterus.     Extraocular Movements: Extraocular movements intact.      Pupils: Pupils are equal, round, and reactive to light.   Neck:      Vascular: No carotid bruit.   Cardiovascular:      Rate and Rhythm: Normal rate and regular rhythm.      Pulses: Normal pulses.      Heart " "sounds: No murmur heard.  Pulmonary:      Effort: Pulmonary effort is normal. No respiratory distress.      Breath sounds: Normal breath sounds. No stridor. No wheezing, rhonchi or rales.   Chest:      Chest wall: No tenderness.   Abdominal:      General: Abdomen is flat. Bowel sounds are normal. There is no distension.      Palpations: Abdomen is soft.      Tenderness: There is no abdominal tenderness. There is no guarding or rebound.   Musculoskeletal:      Cervical back: Normal range of motion and neck supple. No rigidity or tenderness.   Lymphadenopathy:      Cervical: No cervical adenopathy.   Skin:     Coloration: Skin is not jaundiced.      Findings: No lesion or rash.      Comments: Significant necrosis to the wound   Neurological:      Mental Status: He is alert and oriented to person, place, and time. Mental status is at baseline.      Gait: Gait abnormal.      Comments: paraplegia   Psychiatric:         Mood and Affect: Mood normal.         Behavior: Behavior normal.          Result Review :  The following data was reviewed by Babs Ferris MD     Lab Results  Lab Results   Component Value Date    WBC 10.55 06/25/2025    HGB 10.0 (L) 06/25/2025    HCT 33.3 (L) 06/25/2025    MCV 90.5 06/25/2025     06/25/2025     Lab Results   Component Value Date    GLUCOSE 146 (H) 06/25/2025    BUN 14.8 06/25/2025    CREATININE 0.38 (L) 06/25/2025    EGFRIFNONA 99 01/26/2021    BCR 38.9 (H) 06/25/2025    K 4.1 06/25/2025    CO2 29.6 (H) 06/25/2025    CALCIUM 8.7 06/25/2025    ALBUMIN 3.1 (L) 06/25/2025    AST 9 06/25/2025    ALT 6 06/25/2025      Lab Results   Component Value Date    CRP 10.60 (H) 06/25/2025        No results found for: \"ACANTHNAEG\", \"AFBCX\", \"BPERTUSSISCX\", \"BLOODCX\"  No results found for: \"BCIDPCR\", \"CXREFLEX\", \"CSFCX\", \"CULTURETIS\"  No results found for: \"CULTURES\", \"HSVCX\", \"URCX\"  No results found for: \"EYECULTURE\", \"GCCX\", \"HSVCULTURE\", \"LABHSV\"  No results found for: \"LEGIONELLA\", " "\"MRSACX\", \"MUMPSCX\", \"MYCOPLASCX\"  No results found for: \"NOCARDIACX\", \"STOOLCX\"  No results found for: \"THROATCX\", \"UNSTIMCULT\", \"URINECX\", \"CULTURE\", \"VZVCULTUR\"  No results found for: \"VIRALCULTU\", \"WOUNDCX\"    Radiology Results                Assessment / Plan        Diagnoses and all orders for this visit:    1. Pressure injury of right buttock, stage 3 (Primary)  -     Ambulatory Referral for PICC/Midline  -     Ambulatory Referral to General Surgery    2. Pressure injury of left buttock, unstageable  -     Ambulatory Referral for PICC/Midline  -     Ambulatory Referral to General Surgery    3. Open wound  -     Ambulatory Referral for PICC/Midline  -     Ambulatory Referral to General Surgery    4. Infected wound  -     Ambulatory Referral for PICC/Midline  -     Ambulatory Referral to General Surgery      On 5/18/25, Intraoperative tissue/bone culture from the sacrum reporting moderate growth of ESBL E. coli.     General surgery operative note reported necrotic tissue and subcutaneous fat sharply excised down to the fascia, not involving fascia.  No mention of bony involvement.  MRI does not report any evidence of osteomyelitis.  Previous wound cultures finalized with E. coli ESBL and Enterococcus faecalis.     Plan completed his course of ertapenem 1 g IV every 24 hours and daptomycin 6 mg/kg IV 24 hours for total of 14 days on 5/30/2025 infected chronic sacral ulcers without evidence of osteomyelitis.    6/25/25  Wound Culture   Lab   Heavy growth (4+) Escherichia coli ESBL Abnormal   [facility]     Consider infectious disease consult.  Susceptibility results may not correlate to clinical outcomes.            Gram Stain  Lab   Few (2+) WBCs seen [facility]   Few (2+) Mixed bacterial morphotypes seen on Gram Stain  [facility]           Susceptibility     Escherichia coli ESBL     ABDI     Ciprofloxacin >=4 ug/ml Resistant     Ertapenem <=0.12 ug/ml Susceptible     Levofloxacin >=8 ug/ml Resistant     Meropenem " <=0.25 ug/ml Susceptible     Tetracycline >=16 ug/ml Resistant     Trimethoprim + Sulfamethoxazole >=320 ug/ml Resistant                 C-Reactive Protein 10.60 High  6.14 High  3.96 High  3.12 High  8.23 High  7.76 High  0.49   Resulting Agency [facility]             I am concerned about the degree of necrosis inside the wound, the periwound erythema, the rising CRP level up to 10.6 as of 6/25/2025 and new wound culture showing growth of ESBL E. coli.    Patient has just finished a course of daptomycin and ertapenem on 5/30/2025 x 14 days but we will have to restart antibiotic therapy and for possibly a more prolonged course this time x 6 weeks.    I recommend wound VAC placement, more extensive I&D with deep cultures.    I discussed the case with Jillian from wound care and we agreed to proceed with referral to Dr. Lara for deeper debridement of the necrotic tissue to the left lower gluteal pressure injury with deep wound cultures during surgery followed by wound VAC placement.        Follow Up   Return in about 1 week (around 7/8/2025) for Follow up, With Dr. Ferris.    Visit Diagnoses:    ICD-10-CM ICD-9-CM   1. Pressure injury of right buttock, stage 3  L89.313 707.05     707.23   2. Pressure injury of left buttock, unstageable  L89.320 707.05     707.25   3. Open wound  T14.8XXA 879.8   4. Infected wound  T14.8XXA 958.3    L08.9        Patient was given instructions and counseling regarding his condition or for health maintenance advice. Please see specific information pulled into the AVS if appropriate.     This document has been electronically signed by Babs Ferris MD   July 1, 2025 12:22 EDT      No orders of the defined types were placed in this encounter.     Dictated Utilizing Dragon Dictation: Part of this note may be an electronic transcription/translation of spoken language to printed text using the Dragon Dictation System.

## 2025-07-01 NOTE — PROGRESS NOTES
Subjective   Ang Jackson is a 65 y.o. male who presents today for Initial Evaluation    Chief Complaint:    Chief Complaint   Patient presents with    Vascular Access Problem        History of Present Illness:    History of Present Illness Ang is a 65-year-old male who presents for evaluation for PICC placement.  He previously was evaluated by Dr. Liu with infectious disease prior to being referred to my clinic today.  He has pressure injuries to bilateral buttocks as well as a sacral wound.  Patient recently completed 2 weeks of IV antibiotics on May 30.  However, Dr. Liu has extended his antibiotics for an additional 6-week.  Patient reports that he has had multiple PICC lines in the past and that his right upper extremity is more patent versus left.    The following portions of the patient's history were reviewed and updated as appropriate: allergies, current medications, past family history, past medical history, past social history, past surgical history and problem list.    Past Medical History:  Past Medical History:   Diagnosis Date    Anesthesia     diffculty adminster spinal block, patient stated with last hip surgery 7-2020 several attempts per awilda and no luck, resulted in general anesthesia     Anxiety     Arthritis     Rheumatoid    COVID     2020    GERD (gastroesophageal reflux disease)     Hypertension     Lactose intolerance     Low back pain     Sleep apnea     does not wear machine, MILD, DOES NOT HAVE AFTER WGT LOSS SURGERY    Wears reading eyeglasses        Social History:  Social History     Socioeconomic History    Marital status:    Tobacco Use    Smoking status: Never     Passive exposure: Never    Smokeless tobacco: Never   Vaping Use    Vaping status: Never Used   Substance and Sexual Activity    Alcohol use: No    Drug use: Never    Sexual activity: Yes       Family History:  Family History   Problem Relation Age of Onset    Heart disease Father      Hypertension Father     Osteoarthritis Father     Cancer Mother     Diabetes Brother     Hypertension Brother     Gout Paternal Grandmother     Diabetes Paternal Grandmother     Colon cancer Maternal Uncle     Colon polyps Maternal Uncle        Past Surgical History:  Past Surgical History:   Procedure Laterality Date    ABDOMINAL SURGERY      ANTERIOR CERVICAL DISCECTOMY W/ FUSION Right 02/19/2023    Procedure: CERVICAL DISCECTOMY ANTERIOR WITH FUSION C6-7;  Surgeon: Jaiden Aldridge MD;  Location:  [facility];  Service: Neurosurgery;  Laterality: Right;    BACK SURGERY      BARIATRIC SURGERY  4 year ago    CARDIAC CATHETERIZATION      CARPAL TUNNEL RELEASE Bilateral     CARPAL TUNNEL RELEASE Right 06/29/2023    Procedure: CARPAL TUNNEL RELEASE RIGHT;  Surgeon: Jaiden Aldridge MD;  Location:  [facility];  Service: Neurosurgery;  Laterality: Right;    COLONOSCOPY      5 years ago    COLONOSCOPY N/A 04/05/2022    Procedure: COLONOSCOPY FOR SCREENING;  Surgeon: Luz Galvez MD;  Location:  [facility];  Service: Gastroenterology;  Laterality: N/A;    ENDOSCOPY N/A 04/05/2022    Procedure: ESOPHAGOGASTRODUODENOSCOPY WITH BIOPSY;  Surgeon: Luz Galvez MD;  Location:  [facility];  Service: Gastroenterology;  Laterality: N/A;    GASTRIC SLEEVE LAPAROSCOPIC      2017    HIP SURGERY Right times 2    JOINT REPLACEMENT      KNEE ARTHROSCOPY Left     TONSILLECTOMY      TOTAL HIP ARTHROPLASTY Left 01/25/2021    Procedure: TOTAL HIP ARTHROPLASTY LEFT;  Surgeon: Jb Patel MD;  Location:  [facility];  Service: Orthopedics;  Laterality: Left;    TOTAL HIP ARTHROPLASTY REVISION Right 07/20/2020    Procedure: TOTAL HIP ARTHROPLASTY REVISION RIGHT;  Surgeon: Jb Patel MD;  Location:  [facility];  Service: Orthopedics;  Laterality: Right;    WOUND DEBRIDEMENT N/A 05/18/2025    Procedure: DEBRIDEMENT SACRAL ULCER/WOUND;  Surgeon: Deepak Lara MD;  Location:  [facility];  Service: General;   "Laterality: N/A;    WOUND DEBRIDEMENT N/A 6/12/2025    Procedure: DEBRIDEMENT SACRAL ULCER/WOUND;  Surgeon: Deepak Lara MD;  Location: [facility];  Service: General;  Laterality: N/A;  Sacral and other wounds.       Problem List:  Patient Active Problem List   Diagnosis    Hypertension    BPH with obstruction/lower urinary tract symptoms    Status post total replacement of right hip    Status post total hip replacement, left    Iron deficiency anemia    Bilateral carpal tunnel syndrome    Open wound    Perirectal skin irritation    Pressure injury of right buttock, stage 3    Pressure injury of left buttock, unstageable    Urinary incontinence without sensory awareness    Infected wound    Sacral wound       Allergy:   Allergies   Allergen Reactions    Sulfa Antibiotics Hives     Hives, shortness of breath    Codeine Nausea And Vomiting    Ketorolac Other (See Comments)     Pt reports heavy, \"tight\" feeling in his chest.    Morphine And Codeine Nausea And Vomiting        Current Medications:   Current Outpatient Medications   Medication Sig Dispense Refill    acetaminophen (TYLENOL) 500 MG tablet Take 2 tablets by mouth Every 6 (Six) Hours As Needed for Mild Pain.      ascorbic acid (VITAMIN C) 500 MG tablet Take 2 tablets by mouth Daily.      baclofen (LIORESAL) 10 MG tablet Take 2 tablets by mouth 3 (Three) Times a Day.      baclofen (LIORESAL) 10 MG tablet Take 5 tablets by mouth every night at bedtime.      Brexpiprazole (Rexulti) 2 MG tablet Take 1 tablet by mouth Daily.      busPIRone (BUSPAR) 5 MG tablet Take 1 tablet by mouth 2 (Two) Times a Day.      clotrimazole (LOTRIMIN) 1 % cream Apply 1 Application topically to the appropriate area as directed Every Night.      collagenase 250 UNIT/GM ointment Apply 1 Application topically to the appropriate area as directed Daily.      Cyanocobalamin (B-12 Compliance Injection) 1000 MCG/ML kit Inject 1 mL as directed Every 14 (Fourteen) Days.      [START ON " 7/2/2025] daptomycin (CUBICIN) solution IVPB Infuse 50 mL into a venous catheter Daily for 41 days.      diclofenac (VOLTAREN) 75 MG EC tablet Take 1 tablet by mouth 2 (Two) Times a Day.      [START ON 7/2/2025] ertapenem (INVanz) 1 g/50 mL 0.9% NS IVPB Infuse 50 mL into a venous catheter Daily for 41 days.      famotidine (PEPCID) 20 MG tablet Take 1 tablet by mouth 2 (Two) Times a Day.      ferrous sulfate 325 (65 FE) MG tablet Take 1 tablet by mouth Daily With Breakfast.      FLUoxetine (PROzac) 20 MG capsule Take 1 capsule by mouth Daily.      gabapentin (NEURONTIN) 300 MG capsule Take 3 capsules by mouth 4 (Four) Times a Day.      loratadine (CLARITIN) 10 MG tablet Take 1 tablet by mouth Daily.      multivitamin tablet tablet Take 1 tablet by mouth Daily.      nystatin (MYCOSTATIN) 739416 UNIT/GM cream Apply 1 Application topically to the appropriate area as directed Daily As Needed (irritation).      oxyCODONE (ROXICODONE) 10 MG tablet Take 1 tablet by mouth 4 (Four) Times a Day.      oxyCODONE ER (oxyCONTIN) 20 MG 12 hr tablet Take 1 tablet by mouth Every Night.      pantoprazole (PROTONIX) 40 MG EC tablet Take 1 tablet by mouth 2 (Two) Times a Day.      tiZANidine (ZANAFLEX) 2 MG tablet Take 3 tablets by mouth 4 (Four) Times a Day.      tiZANidine (ZANAFLEX) 2 MG tablet Take 2 tablets by mouth Every Night.      triamcinolone (KENALOG) 0.1 % cream Apply 1 Application topically to the appropriate area as directed Daily As Needed for Irritation or Rash.      vitamin D3 125 MCG (5000 UT) capsule capsule Take 1 capsule by mouth Daily.      zolpidem (Ambien) 5 MG tablet Take 1 tablet by mouth Every Night.       No current facility-administered medications for this visit.       Review of Systems:    Review of Systems   Skin:  Positive for wound.         Physical Exam:   Physical Exam  Constitutional:       Appearance: Normal appearance.   HENT:      Head: Normocephalic and atraumatic.      Right Ear: External ear  "normal.      Left Ear: External ear normal.   Eyes:      Conjunctiva/sclera: Conjunctivae normal.   Cardiovascular:      Pulses: Normal pulses.   Pulmonary:      Effort: Pulmonary effort is normal.   Abdominal:      General: Abdomen is flat.      Palpations: Abdomen is soft.   Musculoskeletal:      Cervical back: Normal range of motion.      Comments: Paraplegic, presents in motorized wheelchair   Skin:     General: Skin is warm and dry.      Capillary Refill: Capillary refill takes less than 2 seconds.   Neurological:      General: No focal deficit present.      Mental Status: He is alert and oriented to person, place, and time.   Psychiatric:         Mood and Affect: Mood normal.         Behavior: Behavior normal.         Vitals:  Blood pressure 99/60, height 172.7 cm (67.99\"), weight 96.6 kg (213 lb).   Body mass index is 32.39 kg/m².      Informed consent was obtained.  Timeout was called.  Patient's veins of right upper extremity were visualized using ultrasound.  Measurements were taken.  Patient was then prepped and draped in a sterile fashion.  Ultrasound cover was placed in sterile manner.  Patient's basilic vein was visualized and cannulated with needle.  Guidewire was passed through with no resistance.  Introducer was threaded over guidewire.  Guidewire was then removed.  PICC line was then threaded through the introducer.  Patient tolerated this procedure well.  There is no complications noted.  There was minimal blood loss noted.  PICC line was verified by [facility]lock 3 CG.    Measurements include:    Arm circumference is 33 cm  PICC line was trimmed at 38 cm  PICC line is exposed at 0 cm    Assessment & Plan   Diagnoses and all orders for this visit:    1. Pressure injury of right buttock, stage 3 (Primary)    2. Pressure injury of left buttock, unstageable    3. Infected wound    4. Wound of sacral region, initial encounter    Ang is a 65-year-old male who presents for evaluation for PICC placement.  " I placed PICC line to patient's right upper extremity in office today.  He will follow-up as needed in office.    Visit Diagnoses:    ICD-10-CM ICD-9-CM   1. Pressure injury of right buttock, stage 3  L89.313 707.05     707.23   2. Pressure injury of left buttock, unstageable  L89.320 707.05     707.25   3. Infected wound  T14.8XXA 958.3    L08.9    4. Wound of sacral region, initial encounter  S31.000A 959.19     Procedure took 30 mins    MEDS ORDERED DURING VISIT:  No orders of the defined types were placed in this encounter.      Return if symptoms worsen or fail to improve.             This document has been electronically signed by TANJA Tariq  July 1, 2025 13:32 EDT    Please note that portions of this note were completed with a voice recognition program.

## 2025-07-01 NOTE — TELEPHONE ENCOUNTER
Pt here today for follow up. Dr. Ferris ordered Daptomycin and Invanz to be done at home, to have a Picc Line Placed today, and to schedule a follow up in 1 week with out office and a general surgery consult with Dr. Lara. Pt and family in room with pt at the time of Dr. Ledesma appt (wife Myla and daughter Tracey) aware and understood. Family aware that prescription for Daptomycin and Invanz [facility] and will be sent to pts home. Family went with pt after appt in our office to have picc line placed with TANJA Norwood in general surgeons office. Family also aware Dr. Lara consult is on 7/2/2025 at 1:40 pm. Family had no further questions.

## 2025-07-01 NOTE — PROGRESS NOTES
Wound Clinic Note  Patient Identification:  Name:  Ang Jackson  Age:  65 y.o.  Sex:  male  :  1960  MRN:  [id number]   Visit Number:  [id number]  Primary Care Physician:  Deepak Perez     Subjective     Chief complaint:     Multiple pressure injuries     History of presenting illness:     Patient is a 65 y.o. male with past medical history significant for obesity, parplegia,  that presented today for evaluation of bilateral gluteal and sacral pressure injuries.  Reports areas have been present for over a month. He is paraplegic. Has been applying santyl and hydrofera blue to wounds. Denies any fever or chills. Moderate drainage without odor. Low air loss mattress has been ordered by PCP, should be delivered this week.  Denies history of diabetes, or smoking. Finished dose of cipro.     Interval History:   2025: Seen in clinic today for follow-up to pressure injuries to right and left gluteal, and perirectal MASD. Right gluteal wound has worsened, now with DTI down to right groin area. He is quadpraplegic, complicating wound prgoression. He is incontinent of bowel and bladder. Chin catheter in place. Awaiting approval for low-air loss mattress. Denies any fever or chills. No other issues or concerns reported.    2025: Seen in clinic today for follow-up to pressure injuries to right and left gluteal, and perirectal MASD. Right gluteal wound has worsened, now with DTI down to right groin area. He is quadpraplegic, complicating wound prgoression. He is incontinent of bowel and bladder. Chin catheter in place. Awaiting approval for low-air loss mattress. Denies any fever or chills. No other issues or concerns reported.    2025: Seen in clinic today for follow-up to pressure injuries to right and left gluteal, and perirectal MASD. Right gluteal wound is stable. Continues to be severe.. He is quadpraplegic, complicating wound prgoression. He is incontinent of bowel and bladder. Chin  catheter in place. Awaiting approval for low-air loss mattress. Denies any fever or chills. No other issues or concerns reported.    05/07/2025: Seen in clinic today for follow-up to pressure injuries to right and left gluteal, and perirectal MASD. Right gluteal wound is stable. Continues to be severe.. He is quadpraplegic, complicating wound prgoression. He is incontinent of bowel and bladder. Chin catheter in place. Awaiting approval for low-air loss mattress. Denies any fever or chills. No other issues or concerns reported.    05/15/2025: Seen in clinic today for follow-up to pressure injuries or bilateral gluteal. Left gluteal wound continues to worsen with increase In tunnel area. Discussed concern of worsening and possible going to ED. Would like to avoid if possible. Will obtain new wound culture and labs. Pending results may require additional antibiotics. Was scheduled to see infectious disease tomorrow unfortunatley had to cancel. Highly encouraged to make following, which is scheduled for next week .    05/29/2025: Seen in clinic today for follow-up to pressure injuries or bilateral gluteal. Left gluteal wound continues to worsen with increase In tunnel area. Right gluteal wound with necrotic tissue. Sacral wound with necrotic tissue. Wounds continue to be severe. Will attempt debridement. Denies any fever or chills. Does have low-air loss mattress at home.     06/04/2025: Seen in clinic today for follow-up to pressure injuries or bilateral gluteal. Left gluteal wound continues to worsen with increase In tunnel area. Right gluteal wound with necrotic tissue. Sacral wound with necrotic tissue. Wounds continue to be severe. Will attempt debridement. Denies any fever or chills. Does have low-air loss mattress at home.  Hesistate for debridement in OR due to possibility of prolonged hosptial stay. Will attempt debridement today,  and re-evaluate next week for possible debridement in OR at that time.      06/11/2025: Seen in clinic today for follow-up to pressure injuries to bilateral gluteal and sacral wounds. Wounds appear to be worse today. Foul odor present. Case was discussed with Dr. Lara, Will plan to admit patient and take to OR tomrorow.     06/20/2025: Seen in clinic today for follow-up to multiple pressure injuries. He had debridement in OR by Dr. Lara 06/12/2025. Wounds continue to be severe, sacral ulcer the worst at this time. Left gluteal improved. Sacral and right gluteal continue with necrotic tissue. Denies any fever or chills. Has air mattress at home. Reports adequate intake to promote healing.   ---------------------------------------------------------------------------------------------------------------------   Review of Systems   Constitutional:  Negative for chills and fever.   Respiratory:  Negative for cough and shortness of breath.    Cardiovascular:  Positive for leg swelling. Negative for chest pain.   Gastrointestinal:  Negative for nausea and vomiting.   Musculoskeletal:  Positive for back pain and gait problem.   Skin:  Positive for wound.      ---------------------------------------------------------------------------------------------------------------------   Past Medical History:   Diagnosis Date    Anesthesia     diffculty adminster spinal block, patient stated with last hip surgery 7-2020 several attempts per anthyarelis and no luck, resulted in general anesthesia     Anxiety     Arthritis     Rheumatoid    COVID     2020    GERD (gastroesophageal reflux disease)     Hypertension     Lactose intolerance     Low back pain     Sleep apnea     does not wear machine, MILD, DOES NOT HAVE AFTER WGT LOSS SURGERY    Wears reading eyeglasses      Past Surgical History:   Procedure Laterality Date    ABDOMINAL SURGERY      ANTERIOR CERVICAL DISCECTOMY W/ FUSION Right 02/19/2023    Procedure: CERVICAL DISCECTOMY ANTERIOR WITH FUSION C6-7;  Surgeon: Jaiden Aldridge MD;  Location:   [facility];  Service: Neurosurgery;  Laterality: Right;    BACK SURGERY      BARIATRIC SURGERY  4 year ago    CARDIAC CATHETERIZATION      CARPAL TUNNEL RELEASE Bilateral     CARPAL TUNNEL RELEASE Right 06/29/2023    Procedure: CARPAL TUNNEL RELEASE RIGHT;  Surgeon: Jaiden Aldridge MD;  Location:  [facility];  Service: Neurosurgery;  Laterality: Right;    COLONOSCOPY      5 years ago    COLONOSCOPY N/A 04/05/2022    Procedure: COLONOSCOPY FOR SCREENING;  Surgeon: Luz Galvez MD;  Location:  [facility];  Service: Gastroenterology;  Laterality: N/A;    ENDOSCOPY N/A 04/05/2022    Procedure: ESOPHAGOGASTRODUODENOSCOPY WITH BIOPSY;  Surgeon: Luz Galvez MD;  Location:  [facility];  Service: Gastroenterology;  Laterality: N/A;    GASTRIC SLEEVE LAPAROSCOPIC      2017    HIP SURGERY Right times 2    JOINT REPLACEMENT      KNEE ARTHROSCOPY Left     TONSILLECTOMY      TOTAL HIP ARTHROPLASTY Left 01/25/2021    Procedure: TOTAL HIP ARTHROPLASTY LEFT;  Surgeon: Jb Patel MD;  Location:  [facility];  Service: Orthopedics;  Laterality: Left;    TOTAL HIP ARTHROPLASTY REVISION Right 07/20/2020    Procedure: TOTAL HIP ARTHROPLASTY REVISION RIGHT;  Surgeon: Jb Patel MD;  Location:  [facility];  Service: Orthopedics;  Laterality: Right;    WOUND DEBRIDEMENT N/A 05/18/2025    Procedure: DEBRIDEMENT SACRAL ULCER/WOUND;  Surgeon: Deepak Lara MD;  Location:  [facility];  Service: General;  Laterality: N/A;    WOUND DEBRIDEMENT N/A 6/12/2025    Procedure: DEBRIDEMENT SACRAL ULCER/WOUND;  Surgeon: Deepak Lara MD;  Location:  [facility];  Service: General;  Laterality: N/A;  Sacral and other wounds.     Family History   Problem Relation Age of Onset    Heart disease Father     Hypertension Father     Osteoarthritis Father     Cancer Mother     Diabetes Brother     Hypertension Brother     Gout Paternal Grandmother     Diabetes Paternal Grandmother     Colon cancer Maternal Uncle      "Colon polyps Maternal Uncle      Social History     Socioeconomic History    Marital status:    Tobacco Use    Smoking status: Never     Passive exposure: Never    Smokeless tobacco: Never   Vaping Use    Vaping status: Never Used   Substance and Sexual Activity    Alcohol use: No    Drug use: Never    Sexual activity: Yes     ---------------------------------------------------------------------------------------------------------------------   Allergies:  Sulfa antibiotics, Codeine, Ketorolac, and Morphine and codeine  ---------------------------------------------------------------------------------------------------------------------  Objective     ---------------------------------------------------------------------------------------------------------------------   Vital Signs:  /60   Pulse 66   Temp 98.3 °F (36.8 °C) (Infrared)   Ht 172.7 cm (68\")   Wt 96.9 kg (213 lb 9.6 oz)   BMI 32.48 kg/m²   Estimated body mass index is 32.48 kg/m² as calculated from the following:    Height as of this encounter: 172.7 cm (68\").    Weight as of this encounter: 96.9 kg (213 lb 9.6 oz).  Body mass index is 32.48 kg/m².  Wt Readings from Last 3 Encounters:   06/20/25 96.9 kg (213 lb 9.6 oz)   06/13/25 96.9 kg (213 lb 9.6 oz)   06/11/25 95.3 kg (210 lb)       ---------------------------------------------------------------------------------------------------------------------   Physical Exam  Wound Assessment:  Location: Sacral  Wound Measurements: 7.5 X 8 X 3.2cm   Tunneling: No Undermining: No  Dressing Appearance: dressingappearance: clean  Closure: NA  Changes since last exam: no changes  Etiology and classification: MASD  Wound bed structures/characteristics: partial thickness (subcutaneous tissue is not exposed), red  Edges moist  Periwound characteristics: excoriated  Periwound Temperature: normal turgor and temperature  Drainage characteristics: moderate, serous   Perfusion characteristics: NA    Wound " Assessment:  Location: Left, gluteal  Wound Measurements: 11 X 11 X 3.5cm   Tunneling: No Undermining: No  Dressing Appearance: dressingappearance: clean  Closure: NA  Changes since last exam: no changes  Etiology and classification: Unstageable Pressure injury   Wound bed structures/characteristics: full-thickness (subcutaneous tissue is exposed in at least a portion of the wound), black eschar, moist, pink  Edges moist  Periwound characteristics: intact  Periwound Temperature: normal turgor and temperature  Drainage characteristics: moderate, serous   Perfusion characteristics: NA    Wound Assessment:  Location: Right, gluteal  Wound Measurements: 9 X 7 X 3cm   Tunneling: No Undermining: No  Dressing Appearance: dressingappearance: clean  Closure: NA  Changes since last exam: ulcer is worsening   Etiology and classification: stage 3 pressure ulcer  Wound bed structures/characteristics: full-thickness (subcutaneous tissue is exposed in at least a portion of the wound), moist, pink, yellow  Edges moist  Periwound characteristics: Purple, non-blanchable  Periwound Temperature: normal turgor and temperature  Drainage characteristics: moderate, serous   Perfusion characteristics: NA    Wound Assessment:  Location: Right thigh  Wound Measurements: 0.4 X 0.5 X 0.2cm   Tunneling: No Undermining: No  Dressing Appearance: dressingappearance: clean  Closure: NA  Changes since last exam: stable  Etiology and classification: stage 2 pressure ulcer  Wound bed structures/characteristics: partial thickness, red and moist  Edges moist  Periwound characteristics: Purple, non-blanchable  Periwound Temperature: normal turgor and temperature  Drainage characteristics: moderate, serous   Perfusion characteristics: NA  Wound Goal (s):Free of infection and No further symptoms  Assessment & Plan      Patient Active Problem List    Diagnosis     Pressure injury of right buttock, stage 3 [L89.313]  -Clean with wound cleanser, apply magic  barrier to area and cover with suberabsorbent and secure with tape  -Offloading pressure induction measures discussed  -Ordered high protein diet 120g/day along with vitamin C 2000mg/day, vitamin A 5000 Units/day, vitamin D3 5000 Units/day, zinc 50mg/day to help promote wound healing   -Will order low-air loss mattress as wounds are continuing to worsen and will benefit from added support       Pressure injury of left buttock unstageable [L89.320]  -clean with wound cleanser, apply santyl to area and cover with silicone border dressing daily and PRN  -Wound culture obtained  -Labs ordered: CBC, CMP, CRP, sed rate, prealbumin, and hemoglobain A1C to assess inflammatory markers and nutritional status as this both and negatively impact wound healing if not properly treated.   -Offloading measures discussed   -Received low-air loss mattress today   -Ordered high protein diet 120g/day along with vitamin C 2000mg/day, vitamin A 5000 Units/day, vitamin D3 5000 Units/day, zinc 50mg/day to help promote wound healing   -This condition is associated with a high risk for non-healing, infection, sepsis, loss of function, reduced quality of life, and increased risk of premature mortality. The patient is at high-risk for additional pressure injury, requiring a high level of vigilance and coordination of care, and frequent re-assessment to prevent adverse outcomes.     Management of this condition is inherently complex, requiring ongoing optimization of many factors to assure the highest likelihood of a favorable outcome, including pressure relief, bioburden, multiple aspects of nutrition, infection management, and moisture and mechanical factors relevant to wound healing.        Dermatitis associated with moisture [L30.8], Perirectal skin irritation [K62.89]  -Clean with wound cleanser, apply magic barrier to area and leave open to air  -Offloading measures discussed   -keep area clean and dry   -High risk for worsening due to  incontinent of bowel and bladder  -Chin catheter is in place and appears to be functioning        Obesity [E66.9]  -An obese person?is at greater risk for wound infection and dehiscence or evisceration.       Diabetes mellitus [E11.9]  --Recommend adequate glycemic control to help promote wound healing  -Poor glycemic control increases risk of prolonged healing, infection, loss of limb and premature death       Quadriplegia  -Complicates all aspects of care  -Increases risk of wound failure due to decreased mobility        Wound Care Debridement Note   Pre-Procedure  Pre-Procedure Diagnosis: Pressure injury of sacrum with fat layer exposed,  right gluteal pressure injury with fat layer exposed   Checked for Allergies: yes  Consent:Consent obtained, consent given by Patient,Risks Discussed, Alternatives Discussed  Indication: slough, necrotic tissue, and biofilm  Vascular status:ANGELICA testing is not relevant to this wound, as it is not located on the lower extremity.  Time out was called prior to procedure.   Pre procedure Pain assessment: a 1 on a scale of 0-10  Pre debridement measurements:   Sacrum: 7.5 X 8 X 3.2cm, volume: 100.531, surface: 47.12  Right gluteal: 9 X 7 X 3cm, volume: 98.96, surface: 49.48  sinus/tunnelNo, undermining No    Post Procedure  Post-Procedure Diagnosis: Pressure injury of sacrum with fat layer exposed,  right gluteal pressure injury with fat layer exposed    Post debridement measurements:   Sacrum: 7.6 X 8.1 X 3.3cm, Volume: 106.368, surface: 48.35  Right gluteal: 9.1 X 7.1 X 3.1cm, volume: 104.872, surface: 50.74  sinus/tunnelNo, undermining No  Post procedure Pain assessment: a 1 on a scale of 0-10  Graft/Implant/Prosthetics/Implanted Device/Transplants:  None  Complication(s):  None    Procedure details:  Method of Debridement: excissional (Surgical removal or cutting away, outside or beyond the wound margin devitalized tissue, necrosis or slough.)  Procedure: The site was prepared  using clean techniques, subcutaneous tissue was removed by surgical excision.  Instrument(s) used: Curette 4mm  Anesthesia:After checking patient allergies,lidocaine topical 2% was administered to provide anesthesia.  Tissue removed: subuctaneous, Percent Removed 70%  Culture or Biopsy: culture and sensitivity  Estimated Blood Loss: Small  Hemostasis Obtained: pressure    Clinical Impression:Moderate Complexity    Follow-up: 1 week    TANJA Mejias,CWS  [facility]  06/20/2025  [id number]

## 2025-07-02 ENCOUNTER — HOSPITAL ENCOUNTER (OUTPATIENT)
Dept: WOUND CARE | Facility: HOSPITAL | Age: 65
Discharge: HOME | End: 2025-07-02
Admitting: NURSE PRACTITIONER
Payer: MEDICARE

## 2025-07-02 VITALS
HEART RATE: 98 BPM | HEIGHT: 68 IN | TEMPERATURE: 98.5 F | SYSTOLIC BLOOD PRESSURE: 80 MMHG | BODY MASS INDEX: 32.28 KG/M2 | DIASTOLIC BLOOD PRESSURE: 55 MMHG | WEIGHT: 213 LBS

## 2025-07-02 DIAGNOSIS — T14.8XXA OPEN WOUND: Primary | ICD-10-CM

## 2025-07-02 LAB
ALBUMIN SERPL-MCNC: 2.9 G/DL (ref 3.5–5.2)
ALBUMIN/GLOB SERPL: 0.9 G/DL
ALP SERPL-CCNC: 87 U/L (ref 39–117)
ALT SERPL W P-5'-P-CCNC: <5 U/L (ref 1–41)
ANION GAP SERPL CALCULATED.3IONS-SCNC: 9.4 MMOL/L (ref 5–15)
AST SERPL-CCNC: 11 U/L (ref 1–40)
BASOPHILS # BLD AUTO: 0.03 10*3/MM3 (ref 0–0.2)
BASOPHILS NFR BLD AUTO: 0.3 % (ref 0–1.5)
BILIRUB SERPL-MCNC: 0.2 MG/DL (ref 0–1.2)
BUN SERPL-MCNC: 28.7 MG/DL (ref 8–23)
BUN/CREAT SERPL: 77.6 (ref 7–25)
CALCIUM SPEC-SCNC: 8.7 MG/DL (ref 8.6–10.5)
CHLORIDE SERPL-SCNC: 104 MMOL/L (ref 98–107)
CO2 SERPL-SCNC: 29.6 MMOL/L (ref 22–29)
CREAT SERPL-MCNC: 0.37 MG/DL (ref 0.76–1.27)
CRP SERPL-MCNC: 7.97 MG/DL (ref 0–0.5)
DEPRECATED RDW RBC AUTO: 45.5 FL (ref 37–54)
EGFRCR SERPLBLD CKD-EPI 2021: 124.7 ML/MIN/1.73
EOSINOPHIL # BLD AUTO: 0.15 10*3/MM3 (ref 0–0.4)
EOSINOPHIL NFR BLD AUTO: 1.4 % (ref 0.3–6.2)
ERYTHROCYTE [DISTWIDTH] IN BLOOD BY AUTOMATED COUNT: 13.9 % (ref 12.3–15.4)
ERYTHROCYTE [SEDIMENTATION RATE] IN BLOOD: 45 MM/HR (ref 0–20)
GLOBULIN UR ELPH-MCNC: 3.1 GM/DL
GLUCOSE SERPL-MCNC: 207 MG/DL (ref 65–99)
HCT VFR BLD AUTO: 33.6 % (ref 37.5–51)
HGB BLD-MCNC: 10 G/DL (ref 13–17.7)
IMM GRANULOCYTES # BLD AUTO: 0.05 10*3/MM3 (ref 0–0.05)
IMM GRANULOCYTES NFR BLD AUTO: 0.5 % (ref 0–0.5)
LYMPHOCYTES # BLD AUTO: 0.99 10*3/MM3 (ref 0.7–3.1)
LYMPHOCYTES NFR BLD AUTO: 9.4 % (ref 19.6–45.3)
MCH RBC QN AUTO: 26.7 PG (ref 26.6–33)
MCHC RBC AUTO-ENTMCNC: 29.8 G/DL (ref 31.5–35.7)
MCV RBC AUTO: 89.6 FL (ref 79–97)
MONOCYTES # BLD AUTO: 0.75 10*3/MM3 (ref 0.1–0.9)
MONOCYTES NFR BLD AUTO: 7.1 % (ref 5–12)
NEUTROPHILS NFR BLD AUTO: 8.6 10*3/MM3 (ref 1.7–7)
NEUTROPHILS NFR BLD AUTO: 81.3 % (ref 42.7–76)
NRBC BLD AUTO-RTO: 0 /100 WBC (ref 0–0.2)
PLATELET # BLD AUTO: 243 10*3/MM3 (ref 140–450)
PMV BLD AUTO: 9.4 FL (ref 6–12)
POTASSIUM SERPL-SCNC: 3.9 MMOL/L (ref 3.5–5.2)
PROT SERPL-MCNC: 6 G/DL (ref 6–8.5)
RBC # BLD AUTO: 3.75 10*6/MM3 (ref 4.14–5.8)
SODIUM SERPL-SCNC: 143 MMOL/L (ref 136–145)
WBC NRBC COR # BLD AUTO: 10.57 10*3/MM3 (ref 3.4–10.8)

## 2025-07-02 PROCEDURE — A9270 NON-COVERED ITEM OR SERVICE: HCPCS | Performed by: NURSE PRACTITIONER

## 2025-07-02 PROCEDURE — 63710000001 ZINC OXIDE 20 % OINTMENT 454 G JAR: Performed by: NURSE PRACTITIONER

## 2025-07-02 PROCEDURE — 63710000001 COLLAGENASE 250 UNIT/GM OINTMENT 30 G TUBE: Performed by: NURSE PRACTITIONER

## 2025-07-02 PROCEDURE — 85025 COMPLETE CBC W/AUTO DIFF WBC: CPT | Performed by: NURSE PRACTITIONER

## 2025-07-02 PROCEDURE — 63710000001 A&D OINTMENT 425 G JAR: Performed by: NURSE PRACTITIONER

## 2025-07-02 PROCEDURE — 85652 RBC SED RATE AUTOMATED: CPT | Performed by: NURSE PRACTITIONER

## 2025-07-02 PROCEDURE — 80053 COMPREHEN METABOLIC PANEL: CPT | Performed by: NURSE PRACTITIONER

## 2025-07-02 PROCEDURE — 63710000001 CLOTRIMAZOLE 1 % CREAM 30 G TUBE: Performed by: NURSE PRACTITIONER

## 2025-07-02 PROCEDURE — 63710000001 ALUMINUM-MAGNESIUM HYDROXIDE-SIMETHICONE 200-200-20 MG/5ML SUSPENSION: Performed by: NURSE PRACTITIONER

## 2025-07-02 PROCEDURE — 86140 C-REACTIVE PROTEIN: CPT | Performed by: NURSE PRACTITIONER

## 2025-07-02 RX ORDER — MUPIROCIN 2 %
1 OINTMENT (GRAM) TOPICAL AS NEEDED
OUTPATIENT
Start: 2025-07-02

## 2025-07-02 RX ORDER — DIAPER,BRIEF,INFANT-TODD,DISP
1 EACH MISCELLANEOUS ONCE
OUTPATIENT
Start: 2025-07-02 | End: 2025-07-02

## 2025-07-02 RX ORDER — LIDOCAINE HYDROCHLORIDE 20 MG/ML
JELLY TOPICAL AS NEEDED
OUTPATIENT
Start: 2025-07-02

## 2025-07-02 RX ORDER — SILVER SULFADIAZINE 10 MG/G
1 CREAM TOPICAL AS NEEDED
OUTPATIENT
Start: 2025-07-02

## 2025-07-02 RX ORDER — LIDOCAINE HYDROCHLORIDE AND EPINEPHRINE BITARTRATE 20; .01 MG/ML; MG/ML
10 INJECTION, SOLUTION SUBCUTANEOUS ONCE
OUTPATIENT
Start: 2025-07-02 | End: 2025-07-02

## 2025-07-02 RX ORDER — CASTOR OIL AND BALSAM, PERU 788; 87 MG/G; MG/G
1 OINTMENT TOPICAL AS NEEDED
OUTPATIENT
Start: 2025-07-02

## 2025-07-02 RX ORDER — SODIUM HYPOCHLORITE 2.5 MG/ML
1 SOLUTION TOPICAL AS NEEDED
OUTPATIENT
Start: 2025-07-02

## 2025-07-02 RX ORDER — NYSTATIN 100000 [USP'U]/G
1 POWDER TOPICAL ONCE
OUTPATIENT
Start: 2025-07-02 | End: 2025-07-02

## 2025-07-02 RX ORDER — LIDOCAINE HYDROCHLORIDE 20 MG/ML
1 JELLY TOPICAL ONCE
Status: COMPLETED | OUTPATIENT
Start: 2025-07-02 | End: 2025-07-02

## 2025-07-02 RX ORDER — LIDOCAINE HYDROCHLORIDE 20 MG/ML
1 JELLY TOPICAL ONCE
OUTPATIENT
Start: 2025-07-02 | End: 2025-07-02

## 2025-07-02 RX ORDER — SODIUM HYPOCHLORITE 1.25 MG/ML
1 SOLUTION TOPICAL AS NEEDED
OUTPATIENT
Start: 2025-07-02

## 2025-07-02 RX ORDER — SODIUM HYPOCHLORITE 2.5 MG/ML
1 SOLUTION TOPICAL AS NEEDED
Status: DISCONTINUED | OUTPATIENT
Start: 2025-07-02 | End: 2025-07-03 | Stop reason: HOSPADM

## 2025-07-02 RX ORDER — LIDOCAINE HYDROCHLORIDE 20 MG/ML
10 INJECTION, SOLUTION INFILTRATION; PERINEURAL ONCE
OUTPATIENT
Start: 2025-07-02 | End: 2025-07-02

## 2025-07-02 RX ADMIN — LIDOCAINE HYDROCHLORIDE 1 ML: 20 JELLY TOPICAL at 12:00

## 2025-07-02 RX ADMIN — COLLAGENASE SANTYL 1 APPLICATION: 250 OINTMENT TOPICAL at 12:00

## 2025-07-02 RX ADMIN — VITAMINS A AND D OINTMENT 1 APPLICATION: 15.5; 53.4 OINTMENT TOPICAL at 12:00

## 2025-07-02 NOTE — PROGRESS NOTES
Wound Clinic Note  Patient Identification:  Name:  Ang Jackson  Age:  65 y.o.  Sex:  male  :  1960  MRN:  [id number]   Visit Number:  [id number]  Primary Care Physician:  Deepak Perez     Subjective     Chief complaint:     Multiple pressure injuries     History of presenting illness:     Patient is a 65 y.o. male with past medical history significant for obesity, parplegia,  that presented today for evaluation of bilateral gluteal and sacral pressure injuries.  Reports areas have been present for over a month. He is paraplegic. Has been applying santyl and hydrofera blue to wounds. Denies any fever or chills. Moderate drainage without odor. Low air loss mattress has been ordered by PCP, should be delivered this week.  Denies history of diabetes, or smoking. Finished dose of cipro.     Interval History:   2025: Seen in clinic today for follow-up to pressure injuries to right and left gluteal, and perirectal MASD. Right gluteal wound has worsened, now with DTI down to right groin area. He is quadpraplegic, complicating wound prgoression. He is incontinent of bowel and bladder. Chin catheter in place. Awaiting approval for low-air loss mattress. Denies any fever or chills. No other issues or concerns reported.    2025: Seen in clinic today for follow-up to pressure injuries to right and left gluteal, and perirectal MASD. Right gluteal wound has worsened, now with DTI down to right groin area. He is quadpraplegic, complicating wound prgoression. He is incontinent of bowel and bladder. Chin catheter in place. Awaiting approval for low-air loss mattress. Denies any fever or chills. No other issues or concerns reported.    2025: Seen in clinic today for follow-up to pressure injuries to right and left gluteal, and perirectal MASD. Right gluteal wound is stable. Continues to be severe.. He is quadpraplegic, complicating wound prgoression. He is incontinent of bowel and bladder. Chin  catheter in place. Awaiting approval for low-air loss mattress. Denies any fever or chills. No other issues or concerns reported.    05/07/2025: Seen in clinic today for follow-up to pressure injuries to right and left gluteal, and perirectal MASD. Right gluteal wound is stable. Continues to be severe.. He is quadpraplegic, complicating wound prgoression. He is incontinent of bowel and bladder. Chin catheter in place. Awaiting approval for low-air loss mattress. Denies any fever or chills. No other issues or concerns reported.    05/15/2025: Seen in clinic today for follow-up to pressure injuries or bilateral gluteal. Left gluteal wound continues to worsen with increase In tunnel area. Discussed concern of worsening and possible going to ED. Would like to avoid if possible. Will obtain new wound culture and labs. Pending results may require additional antibiotics. Was scheduled to see infectious disease tomorrow unfortunatley had to cancel. Highly encouraged to make following, which is scheduled for next week .    05/29/2025: Seen in clinic today for follow-up to pressure injuries or bilateral gluteal. Left gluteal wound continues to worsen with increase In tunnel area. Right gluteal wound with necrotic tissue. Sacral wound with necrotic tissue. Wounds continue to be severe. Will attempt debridement. Denies any fever or chills. Does have low-air loss mattress at home.     06/04/2025: Seen in clinic today for follow-up to pressure injuries or bilateral gluteal. Left gluteal wound continues to worsen with increase In tunnel area. Right gluteal wound with necrotic tissue. Sacral wound with necrotic tissue. Wounds continue to be severe. Will attempt debridement. Denies any fever or chills. Does have low-air loss mattress at home.  Hesistate for debridement in OR due to possibility of prolonged hosptial stay. Will attempt debridement today,  and re-evaluate next week for possible debridement in OR at that time.      06/11/2025: Seen in clinic today for follow-up to pressure injuries to bilateral gluteal and sacral wounds. Wounds appear to be worse today. Foul odor present. Case was discussed with Dr. Lara, Will plan to admit patient and take to OR tomrorow.     06/20/2025: Seen in clinic today for follow-up to multiple pressure injuries. He had debridement in OR by Dr. Lara 06/12/2025. Wounds continue to be severe, sacral ulcer the worst at this time. Left gluteal improved. Sacral and right gluteal continue with necrotic tissue. Denies any fever or chills. Has air mattress at home. Reports adequate intake to promote healing.     06/25/2025: Seen in clinic today for follow-up to multiple pressure injuries. Wounds continue to be severe. There continues to be areas of necrosis to sacral and right gluteal. No reported fever or chills. Has low-air loss mattress. Reporrts adequate intake to promote healing.   ---------------------------------------------------------------------------------------------------------------------   Review of Systems   Constitutional:  Negative for chills and fever.   Respiratory:  Negative for cough and shortness of breath.    Cardiovascular:  Positive for leg swelling. Negative for chest pain.   Gastrointestinal:  Negative for nausea and vomiting.   Musculoskeletal:  Positive for back pain and gait problem.   Skin:  Positive for wound.      ---------------------------------------------------------------------------------------------------------------------   Past Medical History:   Diagnosis Date    Anesthesia     diffculty adminster spinal block, patient stated with last hip surgery 7-2020 several attempts per awilda and no luck, resulted in general anesthesia     Anxiety     Arthritis     Rheumatoid    COVID     2020    GERD (gastroesophageal reflux disease)     Hypertension     Lactose intolerance     Low back pain     Sleep apnea     does not wear machine, MILD, DOES NOT HAVE AFTER WGT LOSS  SURGERY    Wears reading eyeglasses      Past Surgical History:   Procedure Laterality Date    ABDOMINAL SURGERY      ANTERIOR CERVICAL DISCECTOMY W/ FUSION Right 02/19/2023    Procedure: CERVICAL DISCECTOMY ANTERIOR WITH FUSION C6-7;  Surgeon: Jaiden Aldridge MD;  Location:  [facility];  Service: Neurosurgery;  Laterality: Right;    BACK SURGERY      BARIATRIC SURGERY  4 year ago    CARDIAC CATHETERIZATION      CARPAL TUNNEL RELEASE Bilateral     CARPAL TUNNEL RELEASE Right 06/29/2023    Procedure: CARPAL TUNNEL RELEASE RIGHT;  Surgeon: Jaiden Aldridge MD;  Location:  [facility];  Service: Neurosurgery;  Laterality: Right;    COLONOSCOPY      5 years ago    COLONOSCOPY N/A 04/05/2022    Procedure: COLONOSCOPY FOR SCREENING;  Surgeon: Luz Galvez MD;  Location:  [facility];  Service: Gastroenterology;  Laterality: N/A;    ENDOSCOPY N/A 04/05/2022    Procedure: ESOPHAGOGASTRODUODENOSCOPY WITH BIOPSY;  Surgeon: Luz Galvez MD;  Location:  [facility];  Service: Gastroenterology;  Laterality: N/A;    GASTRIC SLEEVE LAPAROSCOPIC      2017    HIP SURGERY Right times 2    JOINT REPLACEMENT      KNEE ARTHROSCOPY Left     TONSILLECTOMY      TOTAL HIP ARTHROPLASTY Left 01/25/2021    Procedure: TOTAL HIP ARTHROPLASTY LEFT;  Surgeon: Jb Patel MD;  Location:  [facility];  Service: Orthopedics;  Laterality: Left;    TOTAL HIP ARTHROPLASTY REVISION Right 07/20/2020    Procedure: TOTAL HIP ARTHROPLASTY REVISION RIGHT;  Surgeon: Jb Patel MD;  Location:  [facility];  Service: Orthopedics;  Laterality: Right;    WOUND DEBRIDEMENT N/A 05/18/2025    Procedure: DEBRIDEMENT SACRAL ULCER/WOUND;  Surgeon: Deepak Lara MD;  Location:  [facility];  Service: General;  Laterality: N/A;    WOUND DEBRIDEMENT N/A 6/12/2025    Procedure: DEBRIDEMENT SACRAL ULCER/WOUND;  Surgeon: Deepak Lara MD;  Location:  [facility];  Service: General;  Laterality: N/A;  Sacral and other wounds.     Family  "History   Problem Relation Age of Onset    Heart disease Father     Hypertension Father     Osteoarthritis Father     Cancer Mother     Diabetes Brother     Hypertension Brother     Gout Paternal Grandmother     Diabetes Paternal Grandmother     Colon cancer Maternal Uncle     Colon polyps Maternal Uncle      Social History     Socioeconomic History    Marital status:    Tobacco Use    Smoking status: Never     Passive exposure: Never    Smokeless tobacco: Never   Vaping Use    Vaping status: Never Used   Substance and Sexual Activity    Alcohol use: No    Drug use: Never    Sexual activity: Yes     ---------------------------------------------------------------------------------------------------------------------   Allergies:  Sulfa antibiotics, Codeine, Ketorolac, and Morphine and codeine  ---------------------------------------------------------------------------------------------------------------------  Objective     ---------------------------------------------------------------------------------------------------------------------   Vital Signs:  There were no vitals taken for this visit.  Estimated body mass index is 32.39 kg/m² as calculated from the following:    Height as of 7/1/25: 172.7 cm (67.99\").    Weight as of 7/1/25: 96.6 kg (213 lb).  There is no height or weight on file to calculate BMI.  Wt Readings from Last 3 Encounters:   07/01/25 96.6 kg (213 lb)   07/01/25 96.6 kg (213 lb)   06/20/25 96.9 kg (213 lb 9.6 oz)       ---------------------------------------------------------------------------------------------------------------------   Physical Exam  Wound Assessment:  Location: Sacral  Wound Measurements: 6 X 7 X 3.5cm   Tunneling: No Undermining: No  Dressing Appearance: dressingappearance: clean  Closure: NA  Changes since last exam: no changes  Etiology and classification: MASD  Wound bed structures/characteristics: partial thickness (subcutaneous tissue is not exposed), red  Edges " moist  Periwound characteristics: excoriated  Periwound Temperature: normal turgor and temperature  Drainage characteristics: moderate, serous   Perfusion characteristics: NA    Wound Assessment:  Location: Left, gluteal  Wound Measurements: 9.5 X 6.5 X 4.5cm   Tunneling: No Undermining: No  Dressing Appearance: dressingappearance: clean  Closure: NA  Changes since last exam: no changes  Etiology and classification: Unstageable Pressure injury   Wound bed structures/characteristics: full-thickness (subcutaneous tissue is exposed in at least a portion of the wound), black eschar, moist, pink  Edges moist  Periwound characteristics: intact  Periwound Temperature: normal turgor and temperature  Drainage characteristics: moderate, serous   Perfusion characteristics: NA    Wound Assessment:  Location: Right, gluteal  Wound Measurements: 10 X 12 X 4.5cm   Tunneling: No Undermining: No  Dressing Appearance: dressingappearance: clean  Closure: NA  Changes since last exam: ulcer is worsening   Etiology and classification: stage 3 pressure ulcer  Wound bed structures/characteristics: full-thickness (subcutaneous tissue is exposed in at least a portion of the wound), moist, pink, yellow  Edges moist  Periwound characteristics: Purple, non-blanchable  Periwound Temperature: normal turgor and temperature  Drainage characteristics: moderate, serous   Perfusion characteristics: NA    Wound Assessment:  Location: Right thigh  Wound Measurements: 0.4 X 0.5 X 0.2cm   Tunneling: No Undermining: No  Dressing Appearance: dressingappearance: clean  Closure: NA  Changes since last exam: stable  Etiology and classification: stage 2 pressure ulcer  Wound bed structures/characteristics: partial thickness, red and moist  Edges moist  Periwound characteristics: Purple, non-blanchable  Periwound Temperature: normal turgor and temperature  Drainage characteristics: moderate, serous   Perfusion characteristics: NA  Wound Goal (s):Free of  infection and No further symptoms  Assessment & Plan      Patient Active Problem List    Diagnosis     Pressure injury of right buttock, stage 3 [L89.313]  -Clean with wound cleanser, apply magic barrier to area and cover with suberabsorbent and secure with tape  -Offloading pressure induction measures discussed  -Ordered high protein diet 120g/day along with vitamin C 2000mg/day, vitamin A 5000 Units/day, vitamin D3 5000 Units/day, zinc 50mg/day to help promote wound healing   -Will order low-air loss mattress as wounds are continuing to worsen and will benefit from added support       Pressure injury of left buttock unstageable [L89.320]  -clean with wound cleanser, apply santyl to area and cover with silicone border dressing daily and PRN  -Wound culture obtained  -Labs ordered: CBC, CMP, CRP, sed rate, prealbumin, and hemoglobain A1C to assess inflammatory markers and nutritional status as this both and negatively impact wound healing if not properly treated.   -Offloading measures discussed   -Received low-air loss mattress today   -Ordered high protein diet 120g/day along with vitamin C 2000mg/day, vitamin A 5000 Units/day, vitamin D3 5000 Units/day, zinc 50mg/day to help promote wound healing   -This condition is associated with a high risk for non-healing, infection, sepsis, loss of function, reduced quality of life, and increased risk of premature mortality. The patient is at high-risk for additional pressure injury, requiring a high level of vigilance and coordination of care, and frequent re-assessment to prevent adverse outcomes.     Management of this condition is inherently complex, requiring ongoing optimization of many factors to assure the highest likelihood of a favorable outcome, including pressure relief, bioburden, multiple aspects of nutrition, infection management, and moisture and mechanical factors relevant to wound healing.     Unstageable pressure injury sacral ulcer  -clean with wound  cleanser, apply santyl to area and cover with silicone border dressing daily and PRN  -Wound culture obtained  -Labs ordered: CBC, CMP, CRP, sed rate, prealbumin, and hemoglobain A1C to assess inflammatory markers and nutritional status as this both and negatively impact wound healing if not properly treated.   -Offloading measures discussed   -Received low-air loss mattress today   -Ordered high protein diet 120g/day along with vitamin C 2000mg/day, vitamin A 5000 Units/day, vitamin D3 5000 Units/day, zinc 50mg/day to help promote wound healing   -This condition is associated with a high risk for non-healing, infection, sepsis, loss of function, reduced quality of life, and increased risk of premature mortality. The patient is at high-risk for additional pressure injury, requiring a high level of vigilance and coordination of care, and frequent re-assessment to prevent adverse outcomes.     Management of this condition is inherently complex, requiring ongoing optimization of many factors to assure the highest likelihood of a favorable outcome, including pressure relief, bioburden, multiple aspects of nutrition, infection management, and moisture and mechanical factors relevant to wound healing.        Dermatitis associated with moisture [L30.8], Perirectal skin irritation [K62.89]  -Clean with wound cleanser, apply magic barrier to area and leave open to air  -Offloading measures discussed   -keep area clean and dry   -High risk for worsening due to incontinent of bowel and bladder  -Chin catheter is in place and appears to be functioning        Obesity [E66.9]  -An obese person?is at greater risk for wound infection and dehiscence or evisceration.       Diabetes mellitus [E11.9]  --Recommend adequate glycemic control to help promote wound healing  -Poor glycemic control increases risk of prolonged healing, infection, loss of limb and premature death       Quadriplegia  -Complicates all aspects of care  -Increases  risk of wound failure due to decreased mobility        Wound Care Debridement Note   Pre-Procedure  Pre-Procedure Diagnosis: Pressure injury of sacrum with fat layer exposed,  right gluteal pressure injury with fat layer exposed   Checked for Allergies: yes  Consent:Consent obtained, consent given by Patient,Risks Discussed, Alternatives Discussed  Indication: slough, necrotic tissue, and biofilm  Vascular status:ANGELICA testing is not relevant to this wound, as it is not located on the lower extremity.  Time out was called prior to procedure.   Pre procedure Pain assessment: a 1 on a scale of 0-10  Pre debridement measurements:   Sacrum: 6 X 7 X 3.5cm, volume: 76.969, surface: 32.99  Right gluteal: 10 X 12 X 4.5cm, volume: 282.743, surface: 494.25  sinus/tunnelNo, undermining No    Post Procedure  Post-Procedure Diagnosis: Pressure injury of sacrum with fat layer exposed,  right gluteal pressure injury with fat layer exposed    Post debridement measurements:   Sacrum: 6.1 X 7.1 X 3.51cm, Volume: 79.596, surface: 34.02  Right gluteal: 10.1 X127.1 X 4.51cm, volume: 288.59, surface: 95.98  sinus/tunnelNo, undermining No  Post procedure Pain assessment: a 1 on a scale of 0-10  Graft/Implant/Prosthetics/Implanted Device/Transplants:  None  Complication(s):  None    Procedure details:  Method of Debridement: excissional (Surgical removal or cutting away, outside or beyond the wound margin devitalized tissue, necrosis or slough.)  Procedure: The site was prepared using clean techniques, subcutaneous tissue was removed by surgical excision.  Instrument(s) used: Curette 4mm  Anesthesia:After checking patient allergies,lidocaine topical 2% was administered to provide anesthesia.  Tissue removed: subuctaneous, Percent Removed 70%  Culture or Biopsy: culture and sensitivity sacral   Estimated Blood Loss: Small  Hemostasis Obtained: pressure    Clinical Impression:Moderate Complexity    Follow-up: 1 week    Jillian Bailey  MAURIZIO AGRAWAL  [facility]  06/25/2025  [id number]

## 2025-07-02 NOTE — LETTER
07/3/2025  Ang Jackson, 1960           Wound Care Instructions:     Left Lower Gluteal- Clean with wound cleanser or normal saline, pat dry. Apply santyl, vaseline gauze, and saline moistened gauze packing and cover with abd pad and secure with foam tape.      Right Lower Gluteal- Clean with wound cleanser or normal saline, pat dry. Apply santyl, vaseline gauze, and saline moistened gauze packing and cover with abd pad and secure with foam tape.      Sacral- Clean with wound cleanser or normal saline, pat dry. Apply layer of santyl, vaseline gauze, cover with saline moistened 4x4 and cover  with abd pad and secure with foam tape.       This needs to be done daily.      Patient will need supplies to last him through the week. He will need 4x4's, small kerlix gauze, vaseline gauze, ABD pads. Foam tape was supplied by clinic.      If you have any questions please call our clinic. Patient will need supplies to last him through the week. He is seen in clinic once a week.            Thank you,     TANJA Serrano

## 2025-07-03 NOTE — PROGRESS NOTES
Wound Clinic Note  Patient Identification:  Name:  Ang Jackson  Age:  65 y.o.  Sex:  male  :  1960  MRN:  [id number]   Visit Number:  [id number]  Primary Care Physician:  Deepak Perez     Subjective     Chief complaint:     Multiple pressure injuries     History of presenting illness:     Patient is a 65 y.o. male with past medical history significant for obesity, parplegia,  that presented today for evaluation of bilateral gluteal and sacral pressure injuries.  Reports areas have been present for over a month. He is paraplegic. Has been applying santyl and hydrofera blue to wounds. Denies any fever or chills. Moderate drainage without odor. Low air loss mattress has been ordered by PCP, should be delivered this week.  Denies history of diabetes, or smoking. Finished dose of cipro.     Interval History:   2025: Seen in clinic today for follow-up to pressure injuries to right and left gluteal, and perirectal MASD. Right gluteal wound has worsened, now with DTI down to right groin area. He is quadpraplegic, complicating wound prgoression. He is incontinent of bowel and bladder. Chin catheter in place. Awaiting approval for low-air loss mattress. Denies any fever or chills. No other issues or concerns reported.    2025: Seen in clinic today for follow-up to pressure injuries to right and left gluteal, and perirectal MASD. Right gluteal wound has worsened, now with DTI down to right groin area. He is quadpraplegic, complicating wound prgoression. He is incontinent of bowel and bladder. Chin catheter in place. Awaiting approval for low-air loss mattress. Denies any fever or chills. No other issues or concerns reported.    2025: Seen in clinic today for follow-up to pressure injuries to right and left gluteal, and perirectal MASD. Right gluteal wound is stable. Continues to be severe.. He is quadpraplegic, complicating wound prgoression. He is incontinent of bowel and bladder. Chin  catheter in place. Awaiting approval for low-air loss mattress. Denies any fever or chills. No other issues or concerns reported.    05/07/2025: Seen in clinic today for follow-up to pressure injuries to right and left gluteal, and perirectal MASD. Right gluteal wound is stable. Continues to be severe.. He is quadpraplegic, complicating wound prgoression. He is incontinent of bowel and bladder. Chin catheter in place. Awaiting approval for low-air loss mattress. Denies any fever or chills. No other issues or concerns reported.    05/15/2025: Seen in clinic today for follow-up to pressure injuries or bilateral gluteal. Left gluteal wound continues to worsen with increase In tunnel area. Discussed concern of worsening and possible going to ED. Would like to avoid if possible. Will obtain new wound culture and labs. Pending results may require additional antibiotics. Was scheduled to see infectious disease tomorrow unfortunatley had to cancel. Highly encouraged to make following, which is scheduled for next week .    05/29/2025: Seen in clinic today for follow-up to pressure injuries or bilateral gluteal. Left gluteal wound continues to worsen with increase In tunnel area. Right gluteal wound with necrotic tissue. Sacral wound with necrotic tissue. Wounds continue to be severe. Will attempt debridement. Denies any fever or chills. Does have low-air loss mattress at home.     06/04/2025: Seen in clinic today for follow-up to pressure injuries or bilateral gluteal. Left gluteal wound continues to worsen with increase In tunnel area. Right gluteal wound with necrotic tissue. Sacral wound with necrotic tissue. Wounds continue to be severe. Will attempt debridement. Denies any fever or chills. Does have low-air loss mattress at home.  Hesistate for debridement in OR due to possibility of prolonged hosptial stay. Will attempt debridement today,  and re-evaluate next week for possible debridement in OR at that time.      06/11/2025: Seen in clinic today for follow-up to pressure injuries to bilateral gluteal and sacral wounds. Wounds appear to be worse today. Foul odor present. Case was discussed with Dr. Lara, Will plan to admit patient and take to OR tomrorow.     06/20/2025: Seen in clinic today for follow-up to multiple pressure injuries. He had debridement in OR by Dr. Lara 06/12/2025. Wounds continue to be severe, sacral ulcer the worst at this time. Left gluteal improved. Sacral and right gluteal continue with necrotic tissue. Denies any fever or chills. Has air mattress at home. Reports adequate intake to promote healing.     06/25/2025: Seen in clinic today for follow-up to multiple pressure injuries. Wounds continue to be severe. There continues to be areas of necrosis to sacral and right gluteal. No reported fever or chills. Has low-air loss mattress. Reporrts adequate intake to promote healing.     07/02/2025: Seen in clinic today for follow-up to multiple pressure injuries. Wounds continue to be severe. There continues to be areas of necrosis to sacral and right gluteal. No reported fever or chills. Has low-air loss mattress. Reporrts adequate intake to promote healing.  Dr. Lara present to assess wounds, no plan for surgical intervention at this time.   ---------------------------------------------------------------------------------------------------------------------   Review of Systems   Constitutional:  Negative for chills and fever.   Respiratory:  Negative for cough and shortness of breath.    Cardiovascular:  Positive for leg swelling. Negative for chest pain.   Gastrointestinal:  Negative for nausea and vomiting.   Musculoskeletal:  Positive for back pain and gait problem.   Skin:  Positive for wound.      ---------------------------------------------------------------------------------------------------------------------   Past Medical History:   Diagnosis Date    Anesthesia     diffculty adminster  spinal block, patient stated with last hip surgery 7-2020 several attempts per anthesia and no luck, resulted in general anesthesia     Anxiety     Arthritis     Rheumatoid    COVID     2020    GERD (gastroesophageal reflux disease)     Hypertension     Lactose intolerance     Low back pain     Sleep apnea     does not wear machine, MILD, DOES NOT HAVE AFTER WGT LOSS SURGERY    Wears reading eyeglasses      Past Surgical History:   Procedure Laterality Date    ABDOMINAL SURGERY      ANTERIOR CERVICAL DISCECTOMY W/ FUSION Right 02/19/2023    Procedure: CERVICAL DISCECTOMY ANTERIOR WITH FUSION C6-7;  Surgeon: Jaiden Aldridge MD;  Location:  [facility];  Service: Neurosurgery;  Laterality: Right;    BACK SURGERY      BARIATRIC SURGERY  4 year ago    CARDIAC CATHETERIZATION      CARPAL TUNNEL RELEASE Bilateral     CARPAL TUNNEL RELEASE Right 06/29/2023    Procedure: CARPAL TUNNEL RELEASE RIGHT;  Surgeon: Jaiden Adlridge MD;  Location:  [facility];  Service: Neurosurgery;  Laterality: Right;    COLONOSCOPY      5 years ago    COLONOSCOPY N/A 04/05/2022    Procedure: COLONOSCOPY FOR SCREENING;  Surgeon: Luz Galvez MD;  Location:  [facility];  Service: Gastroenterology;  Laterality: N/A;    ENDOSCOPY N/A 04/05/2022    Procedure: ESOPHAGOGASTRODUODENOSCOPY WITH BIOPSY;  Surgeon: Luz Galvez MD;  Location:  [facility];  Service: Gastroenterology;  Laterality: N/A;    GASTRIC SLEEVE LAPAROSCOPIC      2017    HIP SURGERY Right times 2    JOINT REPLACEMENT      KNEE ARTHROSCOPY Left     TONSILLECTOMY      TOTAL HIP ARTHROPLASTY Left 01/25/2021    Procedure: TOTAL HIP ARTHROPLASTY LEFT;  Surgeon: Jb Patel MD;  Location:  [facility];  Service: Orthopedics;  Laterality: Left;    TOTAL HIP ARTHROPLASTY REVISION Right 07/20/2020    Procedure: TOTAL HIP ARTHROPLASTY REVISION RIGHT;  Surgeon: Jb Patel MD;  Location:  [facility];  Service: Orthopedics;  Laterality: Right;    WOUND  "DEBRIDEMENT N/A 05/18/2025    Procedure: DEBRIDEMENT SACRAL ULCER/WOUND;  Surgeon: Deepak Lara MD;  Location:  [facility];  Service: General;  Laterality: N/A;    WOUND DEBRIDEMENT N/A 6/12/2025    Procedure: DEBRIDEMENT SACRAL ULCER/WOUND;  Surgeon: Deepak Lara MD;  Location:  [facility];  Service: General;  Laterality: N/A;  Sacral and other wounds.     Family History   Problem Relation Age of Onset    Heart disease Father     Hypertension Father     Osteoarthritis Father     Cancer Mother     Diabetes Brother     Hypertension Brother     Gout Paternal Grandmother     Diabetes Paternal Grandmother     Colon cancer Maternal Uncle     Colon polyps Maternal Uncle      Social History     Socioeconomic History    Marital status:    Tobacco Use    Smoking status: Never     Passive exposure: Never    Smokeless tobacco: Never   Vaping Use    Vaping status: Never Used   Substance and Sexual Activity    Alcohol use: No    Drug use: Never    Sexual activity: Yes     ---------------------------------------------------------------------------------------------------------------------   Allergies:  Sulfa antibiotics, Codeine, Ketorolac, and Morphine and codeine  ---------------------------------------------------------------------------------------------------------------------  Objective     ---------------------------------------------------------------------------------------------------------------------   Vital Signs:  BP (!) 80/55   Pulse 98   Temp 98.5 °F (36.9 °C) (Infrared)   Ht 172.7 cm (67.99\")   Wt 96.6 kg (213 lb)   BMI 32.40 kg/m²   Estimated body mass index is 32.4 kg/m² as calculated from the following:    Height as of this encounter: 172.7 cm (67.99\").    Weight as of this encounter: 96.6 kg (213 lb).  Body mass index is 32.4 kg/m².  Wt Readings from Last 3 Encounters:   07/02/25 96.6 kg (213 lb)   07/01/25 96.6 kg (213 lb)   07/01/25 96.6 kg (213 lb)       " ---------------------------------------------------------------------------------------------------------------------   Physical Exam  Wound Assessment:  Location: Sacral  Wound Measurements: 9 X 6.5 X 5cm   Tunneling: No Undermining: No  Dressing Appearance: dressingappearance: clean  Closure: NA  Changes since last exam: no changes  Etiology and classification: MASD  Wound bed structures/characteristics: partial thickness (subcutaneous tissue is not exposed), red  Edges moist  Periwound characteristics: excoriated  Periwound Temperature: normal turgor and temperature  Drainage characteristics: moderate, serous   Perfusion characteristics: NA    Wound Assessment:  Location: Left, gluteal  Wound Measurements: 9 X 6.5 X 6.5cm   Tunneling: No Undermining: No  Dressing Appearance: dressingappearance: clean  Closure: NA  Changes since last exam: no changes  Etiology and classification: Unstageable Pressure injury   Wound bed structures/characteristics: full-thickness (subcutaneous tissue is exposed in at least a portion of the wound), black eschar, moist, pink  Edges moist  Periwound characteristics: intact  Periwound Temperature: normal turgor and temperature  Drainage characteristics: moderate, serous   Perfusion characteristics: NA    Wound Assessment:  Location: Right, gluteal  Wound Measurements: 9 X 7.5 X 5.5cm   Tunneling: No Undermining: No  Dressing Appearance: dressingappearance: clean  Closure: NA  Changes since last exam: ulcer is worsening   Etiology and classification: stage 3 pressure ulcer  Wound bed structures/characteristics: full-thickness (subcutaneous tissue is exposed in at least a portion of the wound), moist, pink, yellow  Edges moist  Periwound characteristics: Purple, non-blanchable  Periwound Temperature: normal turgor and temperature  Drainage characteristics: moderate, serous   Perfusion characteristics: NA    Wound Assessment:  Location: Right thigh  Wound Measurements: 0.4 X 0.5 X 0.2cm    Tunneling: No Undermining: No  Dressing Appearance: dressingappearance: clean  Closure: NA  Changes since last exam: stable  Etiology and classification: stage 2 pressure ulcer  Wound bed structures/characteristics: partial thickness, red and moist  Edges moist  Periwound characteristics: Purple, non-blanchable  Periwound Temperature: normal turgor and temperature  Drainage characteristics: moderate, serous   Perfusion characteristics: NA  Wound Goal (s):Free of infection and No further symptoms  Assessment & Plan      Patient Active Problem List    Diagnosis     Pressure injury of right buttock, stage 3 [L89.313]  -Clean with wound cleanser, apply snatyl  to area and cover with suberabsorbent and secure with tape  -If wound continues to improve with consider wound vac therapy, due to size of wound this would be beneficial to promote healing.   -Offloading pressure induction measures discussed  -Ordered high protein diet 120g/day along with vitamin C 2000mg/day, vitamin A 5000 Units/day, vitamin D3 5000 Units/day, zinc 50mg/day to help promote wound healing   -Will order low-air loss mattress as wounds are continuing to worsen and will benefit from added support       Pressure injury of left buttock unstageable [L89.320]  -clean with wound cleanser, apply santyl to area and cover with silicone border dressing daily and PRN  -Wound culture obtained  -Labs ordered: CBC, CMP, CRP, sed rate, prealbumin, and hemoglobain A1C to assess inflammatory markers and nutritional status as this both and negatively impact wound healing if not properly treated.   -Offloading measures discussed   -Received low-air loss mattress today   -Ordered high protein diet 120g/day along with vitamin C 2000mg/day, vitamin A 5000 Units/day, vitamin D3 5000 Units/day, zinc 50mg/day to help promote wound healing   -This condition is associated with a high risk for non-healing, infection, sepsis, loss of function, reduced quality of life, and  increased risk of premature mortality. The patient is at high-risk for additional pressure injury, requiring a high level of vigilance and coordination of care, and frequent re-assessment to prevent adverse outcomes.     Management of this condition is inherently complex, requiring ongoing optimization of many factors to assure the highest likelihood of a favorable outcome, including pressure relief, bioburden, multiple aspects of nutrition, infection management, and moisture and mechanical factors relevant to wound healing.     Unstageable pressure injury sacral ulcer  -clean with wound cleanser, apply santyl to area and cover with silicone border dressing daily and PRN  -If wound continues to improve with consider wound vac therapy, due to size of wound this would be beneficial to promote healing.   -Labs ordered: CBC, CMP, CRP, sed rate, prealbumin, and hemoglobain A1C to assess inflammatory markers and nutritional status as this both and negatively impact wound healing if not properly treated.   -Offloading measures discussed   -Received low-air loss mattress today   -Ordered high protein diet 120g/day along with vitamin C 2000mg/day, vitamin A 5000 Units/day, vitamin D3 5000 Units/day, zinc 50mg/day to help promote wound healing   -This condition is associated with a high risk for non-healing, infection, sepsis, loss of function, reduced quality of life, and increased risk of premature mortality. The patient is at high-risk for additional pressure injury, requiring a high level of vigilance and coordination of care, and frequent re-assessment to prevent adverse outcomes.     Management of this condition is inherently complex, requiring ongoing optimization of many factors to assure the highest likelihood of a favorable outcome, including pressure relief, bioburden, multiple aspects of nutrition, infection management, and moisture and mechanical factors relevant to wound healing.        Dermatitis associated  with moisture [L30.8], Perirectal skin irritation [K62.89]  -Clean with wound cleanser, apply magic barrier to area and leave open to air  -Offloading measures discussed   -keep area clean and dry   -High risk for worsening due to incontinent of bowel and bladder  -Chin catheter is in place and appears to be functioning        Obesity [E66.9]  -An obese person?is at greater risk for wound infection and dehiscence or evisceration.       Diabetes mellitus [E11.9]  --Recommend adequate glycemic control to help promote wound healing  -Poor glycemic control increases risk of prolonged healing, infection, loss of limb and premature death       Quadriplegia  -Complicates all aspects of care  -Increases risk of wound failure due to decreased mobility        Wound Care Debridement Note   Pre-Procedure  Pre-Procedure Diagnosis: Pressure injury of sacrum with fat layer exposed,  right gluteal pressure injury with fat layer exposed   Checked for Allergies: yes  Consent:Consent obtained, consent given by Patient,Risks Discussed, Alternatives Discussed  Indication: slough, necrotic tissue, and biofilm  Vascular status:ANGELICA testing is not relevant to this wound, as it is not located on the lower extremity.  Time out was called prior to procedure.   Pre procedure Pain assessment: a 1 on a scale of 0-10  Pre debridement measurements:   Right gluteal: 9 X 7.5 X 5.5cm, volume: 194.386, surface: 53.01  Sacral: 9 X 6.5 X 5cm, volume: 153.153, surface: 45.95  Left gluteal: 9 X 6.5 X 6.5cm, volume: 199.098, surface: 45.95  sinus/tunnelNo, undermining No    Post Procedure  Post-Procedure Diagnosis: Pressure injury of sacrum with fat layer exposed,  right gluteal pressure injury with fat layer exposed, left gluteal ulcer with fat layer exposed  Post debridement measurements:   Sacrum: 9.1 X 6.6 X 5.1cm, Volume: 160.381, surface: 47.17  Right gluteal: 9.1 X 7.6 X 5.6cm, volume: 202.788, surface: 54.32  Left gluteal: 9.1 X 6.6 X 6.5cm,  volume: 207.552, surface: 47.17  sinus/tunnelNo, undermining No  Post procedure Pain assessment: a 1 on a scale of 0-10  Graft/Implant/Prosthetics/Implanted Device/Transplants:  None  Complication(s):  None    Procedure details:  Method of Debridement: excissional (Surgical removal or cutting away, outside or beyond the wound margin devitalized tissue, necrosis or slough.)  Procedure: The site was prepared using clean techniques, subcutaneous tissue was removed by surgical excision.  Instrument(s) used: Curette 4mm  Anesthesia:After checking patient allergies,lidocaine topical 2% was administered to provide anesthesia.  Tissue removed: subuctaneous, Percent Removed 70%  Culture or Biopsy: culture and sensitivity sacral   Estimated Blood Loss: Small  Hemostasis Obtained: pressure    Clinical Impression:Moderate Complexity    Follow-up: 1 week    TANJA Mejias,CWS  [facility]  07/02/2025  [id number]

## 2025-07-07 ENCOUNTER — LAB REQUISITION (OUTPATIENT)
Dept: LAB | Facility: HOSPITAL | Age: 65
End: 2025-07-07
Payer: MEDICARE

## 2025-07-07 DIAGNOSIS — Z79.2 LONG TERM (CURRENT) USE OF ANTIBIOTICS: ICD-10-CM

## 2025-07-07 LAB
ANION GAP SERPL CALCULATED.3IONS-SCNC: 12.5 MMOL/L (ref 5–15)
BASOPHILS # BLD AUTO: 0.03 10*3/MM3 (ref 0–0.2)
BASOPHILS NFR BLD AUTO: 0.3 % (ref 0–1.5)
BUN SERPL-MCNC: 22.7 MG/DL (ref 8–23)
BUN/CREAT SERPL: 68.8 (ref 7–25)
CALCIUM SPEC-SCNC: 8.9 MG/DL (ref 8.6–10.5)
CHLORIDE SERPL-SCNC: 103 MMOL/L (ref 98–107)
CK SERPL-CCNC: 29 U/L (ref 20–200)
CO2 SERPL-SCNC: 28.5 MMOL/L (ref 22–29)
CREAT SERPL-MCNC: 0.33 MG/DL (ref 0.76–1.27)
CRP SERPL-MCNC: 6.66 MG/DL (ref 0–0.5)
DEPRECATED RDW RBC AUTO: 45.9 FL (ref 37–54)
EGFRCR SERPLBLD CKD-EPI 2021: 129.1 ML/MIN/1.73
EOSINOPHIL # BLD AUTO: 0.23 10*3/MM3 (ref 0–0.4)
EOSINOPHIL NFR BLD AUTO: 2.1 % (ref 0.3–6.2)
ERYTHROCYTE [DISTWIDTH] IN BLOOD BY AUTOMATED COUNT: 14.3 % (ref 12.3–15.4)
ERYTHROCYTE [SEDIMENTATION RATE] IN BLOOD: 38 MM/HR (ref 0–20)
GLUCOSE SERPL-MCNC: 151 MG/DL (ref 65–99)
HCT VFR BLD AUTO: 33.4 % (ref 37.5–51)
HGB BLD-MCNC: 10.1 G/DL (ref 13–17.7)
IMM GRANULOCYTES # BLD AUTO: 0.08 10*3/MM3 (ref 0–0.05)
IMM GRANULOCYTES NFR BLD AUTO: 0.7 % (ref 0–0.5)
LYMPHOCYTES # BLD AUTO: 1.21 10*3/MM3 (ref 0.7–3.1)
LYMPHOCYTES NFR BLD AUTO: 11.2 % (ref 19.6–45.3)
MCH RBC QN AUTO: 26.7 PG (ref 26.6–33)
MCHC RBC AUTO-ENTMCNC: 30.2 G/DL (ref 31.5–35.7)
MCV RBC AUTO: 88.4 FL (ref 79–97)
MONOCYTES # BLD AUTO: 0.75 10*3/MM3 (ref 0.1–0.9)
MONOCYTES NFR BLD AUTO: 6.9 % (ref 5–12)
NEUTROPHILS NFR BLD AUTO: 78.8 % (ref 42.7–76)
NEUTROPHILS NFR BLD AUTO: 8.51 10*3/MM3 (ref 1.7–7)
NRBC BLD AUTO-RTO: 0 /100 WBC (ref 0–0.2)
PLATELET # BLD AUTO: 255 10*3/MM3 (ref 140–450)
PMV BLD AUTO: 9.7 FL (ref 6–12)
POTASSIUM SERPL-SCNC: 3.9 MMOL/L (ref 3.5–5.2)
RBC # BLD AUTO: 3.78 10*6/MM3 (ref 4.14–5.8)
SODIUM SERPL-SCNC: 144 MMOL/L (ref 136–145)
WBC NRBC COR # BLD AUTO: 10.81 10*3/MM3 (ref 3.4–10.8)

## 2025-07-07 PROCEDURE — 86140 C-REACTIVE PROTEIN: CPT | Performed by: PHYSICIAN ASSISTANT

## 2025-07-07 PROCEDURE — 80048 BASIC METABOLIC PNL TOTAL CA: CPT | Performed by: PHYSICIAN ASSISTANT

## 2025-07-07 PROCEDURE — 82550 ASSAY OF CK (CPK): CPT | Performed by: PHYSICIAN ASSISTANT

## 2025-07-07 PROCEDURE — 85025 COMPLETE CBC W/AUTO DIFF WBC: CPT | Performed by: PHYSICIAN ASSISTANT

## 2025-07-07 PROCEDURE — 85652 RBC SED RATE AUTOMATED: CPT | Performed by: PHYSICIAN ASSISTANT

## 2025-07-07 NOTE — ADDENDUM NOTE
Encounter addended by: Jillian Bailey APRN on: 7/7/2025 9:42 AM   Actions taken: Clinical Note Signed

## 2025-07-09 ENCOUNTER — HOSPITAL ENCOUNTER (OUTPATIENT)
Dept: WOUND CARE | Facility: HOSPITAL | Age: 65
Discharge: HOME | End: 2025-07-09
Payer: MEDICARE

## 2025-07-09 ENCOUNTER — APPOINTMENT (OUTPATIENT)
Dept: WOUND CARE | Facility: HOSPITAL | Age: 65
End: 2025-07-09
Payer: MEDICARE

## 2025-07-09 VITALS
RESPIRATION RATE: 16 BRPM | SYSTOLIC BLOOD PRESSURE: 98 MMHG | DIASTOLIC BLOOD PRESSURE: 56 MMHG | BODY MASS INDEX: 32.28 KG/M2 | WEIGHT: 213 LBS | HEART RATE: 99 BPM | HEIGHT: 68 IN | TEMPERATURE: 98.2 F | OXYGEN SATURATION: 99 %

## 2025-07-09 DIAGNOSIS — T14.8XXA OPEN WOUND: Primary | ICD-10-CM

## 2025-07-09 PROCEDURE — A9270 NON-COVERED ITEM OR SERVICE: HCPCS | Performed by: NURSE PRACTITIONER

## 2025-07-09 PROCEDURE — 63710000001 COLLAGENASE 250 UNIT/GM OINTMENT 30 G TUBE: Performed by: NURSE PRACTITIONER

## 2025-07-09 RX ORDER — SODIUM HYPOCHLORITE 1.25 MG/ML
1 SOLUTION TOPICAL AS NEEDED
OUTPATIENT
Start: 2025-07-09

## 2025-07-09 RX ORDER — DIAPER,BRIEF,INFANT-TODD,DISP
1 EACH MISCELLANEOUS ONCE
OUTPATIENT
Start: 2025-07-09 | End: 2025-07-09

## 2025-07-09 RX ORDER — LIDOCAINE HYDROCHLORIDE 20 MG/ML
1 JELLY TOPICAL ONCE
Status: CANCELLED | OUTPATIENT
Start: 2025-07-09 | End: 2025-07-09

## 2025-07-09 RX ORDER — SILVER SULFADIAZINE 10 MG/G
1 CREAM TOPICAL AS NEEDED
OUTPATIENT
Start: 2025-07-09

## 2025-07-09 RX ORDER — LIDOCAINE HYDROCHLORIDE 20 MG/ML
JELLY TOPICAL AS NEEDED
OUTPATIENT
Start: 2025-07-09

## 2025-07-09 RX ORDER — SODIUM HYPOCHLORITE 2.5 MG/ML
1 SOLUTION TOPICAL AS NEEDED
OUTPATIENT
Start: 2025-07-09

## 2025-07-09 RX ORDER — LIDOCAINE HYDROCHLORIDE AND EPINEPHRINE BITARTRATE 20; .01 MG/ML; MG/ML
10 INJECTION, SOLUTION SUBCUTANEOUS ONCE
OUTPATIENT
Start: 2025-07-09 | End: 2025-07-09

## 2025-07-09 RX ORDER — LIDOCAINE HYDROCHLORIDE 20 MG/ML
1 JELLY TOPICAL ONCE
Status: COMPLETED | OUTPATIENT
Start: 2025-07-09 | End: 2025-07-09

## 2025-07-09 RX ORDER — MUPIROCIN 2 %
1 OINTMENT (GRAM) TOPICAL AS NEEDED
OUTPATIENT
Start: 2025-07-09

## 2025-07-09 RX ORDER — LIDOCAINE HYDROCHLORIDE 20 MG/ML
10 INJECTION, SOLUTION INFILTRATION; PERINEURAL ONCE
OUTPATIENT
Start: 2025-07-09 | End: 2025-07-09

## 2025-07-09 RX ORDER — NYSTATIN 100000 [USP'U]/G
1 POWDER TOPICAL ONCE
OUTPATIENT
Start: 2025-07-09 | End: 2025-07-09

## 2025-07-09 RX ORDER — CASTOR OIL AND BALSAM, PERU 788; 87 MG/G; MG/G
1 OINTMENT TOPICAL AS NEEDED
OUTPATIENT
Start: 2025-07-09

## 2025-07-09 RX ADMIN — COLLAGENASE SANTYL 1 APPLICATION: 250 OINTMENT TOPICAL at 16:16

## 2025-07-09 RX ADMIN — LIDOCAINE HYDROCHLORIDE 1 ML: 20 JELLY TOPICAL at 16:15

## 2025-07-10 ENCOUNTER — TELEPHONE (OUTPATIENT)
Dept: INFECTIOUS DISEASES | Facility: CLINIC | Age: 65
End: 2025-07-10
Payer: MEDICARE

## 2025-07-10 NOTE — TELEPHONE ENCOUNTER
Received a message from the  while in a patient room on this patient. Daughter called and states labs were done and brought here from home health on 7/8/2025 (resulted in chart). Pt had appt scheduled for 7/9/2025 and was cancelled due pt having another appt same day at 1:00pm at wound care. Appts are later in the afternoon due to easier for patient per daughter.  Daughter is wondering if home health will continue to get labs once weekly if can wait till antibiotics are finished mid August to come back for follow up appt. Please advise, thank you.   
Yes

## 2025-07-15 ENCOUNTER — LAB REQUISITION (OUTPATIENT)
Dept: LAB | Facility: HOSPITAL | Age: 65
End: 2025-07-15
Payer: MEDICARE

## 2025-07-15 DIAGNOSIS — Z45.2 ENCOUNTER FOR ADJUSTMENT AND MANAGEMENT OF VASCULAR ACCESS DEVICE: ICD-10-CM

## 2025-07-15 DIAGNOSIS — L89.312 PRESSURE ULCER OF RIGHT BUTTOCK, STAGE 2: ICD-10-CM

## 2025-07-15 DIAGNOSIS — Z79.2 LONG TERM (CURRENT) USE OF ANTIBIOTICS: ICD-10-CM

## 2025-07-15 DIAGNOSIS — L89.320 PRESSURE ULCER OF LEFT BUTTOCK, UNSTAGEABLE: ICD-10-CM

## 2025-07-15 LAB
ANION GAP SERPL CALCULATED.3IONS-SCNC: 8.2 MMOL/L (ref 5–15)
BASOPHILS # BLD AUTO: 0.03 10*3/MM3 (ref 0–0.2)
BASOPHILS NFR BLD AUTO: 0.3 % (ref 0–1.5)
BUN SERPL-MCNC: 11.3 MG/DL (ref 8–23)
BUN/CREAT SERPL: 34.2 (ref 7–25)
CALCIUM SPEC-SCNC: 8.1 MG/DL (ref 8.6–10.5)
CHLORIDE SERPL-SCNC: 106 MMOL/L (ref 98–107)
CK SERPL-CCNC: 30 U/L (ref 20–200)
CO2 SERPL-SCNC: 27.8 MMOL/L (ref 22–29)
CREAT SERPL-MCNC: 0.33 MG/DL (ref 0.76–1.27)
CRP SERPL-MCNC: 4.71 MG/DL (ref 0–0.5)
DEPRECATED RDW RBC AUTO: 49.6 FL (ref 37–54)
EGFRCR SERPLBLD CKD-EPI 2021: 129.1 ML/MIN/1.73
EOSINOPHIL # BLD AUTO: 0.49 10*3/MM3 (ref 0–0.4)
EOSINOPHIL NFR BLD AUTO: 5.2 % (ref 0.3–6.2)
ERYTHROCYTE [DISTWIDTH] IN BLOOD BY AUTOMATED COUNT: 15.1 % (ref 12.3–15.4)
ERYTHROCYTE [SEDIMENTATION RATE] IN BLOOD: 31 MM/HR (ref 0–20)
GLUCOSE SERPL-MCNC: 86 MG/DL (ref 65–99)
HCT VFR BLD AUTO: 31.1 % (ref 37.5–51)
HGB BLD-MCNC: 9.3 G/DL (ref 13–17.7)
IMM GRANULOCYTES # BLD AUTO: 0.04 10*3/MM3 (ref 0–0.05)
IMM GRANULOCYTES NFR BLD AUTO: 0.4 % (ref 0–0.5)
LYMPHOCYTES # BLD AUTO: 2.07 10*3/MM3 (ref 0.7–3.1)
LYMPHOCYTES NFR BLD AUTO: 21.9 % (ref 19.6–45.3)
MCH RBC QN AUTO: 26.8 PG (ref 26.6–33)
MCHC RBC AUTO-ENTMCNC: 29.9 G/DL (ref 31.5–35.7)
MCV RBC AUTO: 89.6 FL (ref 79–97)
MONOCYTES # BLD AUTO: 0.69 10*3/MM3 (ref 0.1–0.9)
MONOCYTES NFR BLD AUTO: 7.3 % (ref 5–12)
NEUTROPHILS NFR BLD AUTO: 6.12 10*3/MM3 (ref 1.7–7)
NEUTROPHILS NFR BLD AUTO: 64.9 % (ref 42.7–76)
NRBC BLD AUTO-RTO: 0 /100 WBC (ref 0–0.2)
PLATELET # BLD AUTO: 238 10*3/MM3 (ref 140–450)
PMV BLD AUTO: 9.3 FL (ref 6–12)
POTASSIUM SERPL-SCNC: 4 MMOL/L (ref 3.5–5.2)
RBC # BLD AUTO: 3.47 10*6/MM3 (ref 4.14–5.8)
SODIUM SERPL-SCNC: 142 MMOL/L (ref 136–145)
WBC NRBC COR # BLD AUTO: 9.44 10*3/MM3 (ref 3.4–10.8)

## 2025-07-15 PROCEDURE — 85025 COMPLETE CBC W/AUTO DIFF WBC: CPT | Performed by: PHYSICIAN ASSISTANT

## 2025-07-15 PROCEDURE — 86140 C-REACTIVE PROTEIN: CPT | Performed by: PHYSICIAN ASSISTANT

## 2025-07-15 PROCEDURE — 82550 ASSAY OF CK (CPK): CPT | Performed by: PHYSICIAN ASSISTANT

## 2025-07-15 PROCEDURE — 80048 BASIC METABOLIC PNL TOTAL CA: CPT | Performed by: PHYSICIAN ASSISTANT

## 2025-07-15 PROCEDURE — 85652 RBC SED RATE AUTOMATED: CPT | Performed by: PHYSICIAN ASSISTANT

## 2025-07-15 NOTE — PROGRESS NOTES
Wound Clinic Note  Patient Identification:  Name:  Ang Jackson  Age:  65 y.o.  Sex:  male  :  1960  MRN:  [id number]   Visit Number:  [id number]  Primary Care Physician:  Deepak Perez     Subjective     Chief complaint:     Multiple pressure injuries     History of presenting illness:     Patient is a 65 y.o. male with past medical history significant for obesity, parplegia,  that presented today for evaluation of bilateral gluteal and sacral pressure injuries.  Reports areas have been present for over a month. He is paraplegic. Has been applying santyl and hydrofera blue to wounds. Denies any fever or chills. Moderate drainage without odor. Low air loss mattress has been ordered by PCP, should be delivered this week.  Denies history of diabetes, or smoking. Finished dose of cipro.     Interval History:   2025: Seen in clinic today for follow-up to pressure injuries to right and left gluteal, and perirectal MASD. Right gluteal wound has worsened, now with DTI down to right groin area. He is quadpraplegic, complicating wound prgoression. He is incontinent of bowel and bladder. Chin catheter in place. Awaiting approval for low-air loss mattress. Denies any fever or chills. No other issues or concerns reported.    2025: Seen in clinic today for follow-up to pressure injuries to right and left gluteal, and perirectal MASD. Right gluteal wound has worsened, now with DTI down to right groin area. He is quadpraplegic, complicating wound prgoression. He is incontinent of bowel and bladder. Chin catheter in place. Awaiting approval for low-air loss mattress. Denies any fever or chills. No other issues or concerns reported.    2025: Seen in clinic today for follow-up to pressure injuries to right and left gluteal, and perirectal MASD. Right gluteal wound is stable. Continues to be severe.. He is quadpraplegic, complicating wound prgoression. He is incontinent of bowel and bladder. Chin  catheter in place. Awaiting approval for low-air loss mattress. Denies any fever or chills. No other issues or concerns reported.    05/07/2025: Seen in clinic today for follow-up to pressure injuries to right and left gluteal, and perirectal MASD. Right gluteal wound is stable. Continues to be severe.. He is quadpraplegic, complicating wound prgoression. He is incontinent of bowel and bladder. Chin catheter in place. Awaiting approval for low-air loss mattress. Denies any fever or chills. No other issues or concerns reported.    05/15/2025: Seen in clinic today for follow-up to pressure injuries or bilateral gluteal. Left gluteal wound continues to worsen with increase In tunnel area. Discussed concern of worsening and possible going to ED. Would like to avoid if possible. Will obtain new wound culture and labs. Pending results may require additional antibiotics. Was scheduled to see infectious disease tomorrow unfortunatley had to cancel. Highly encouraged to make following, which is scheduled for next week .    05/29/2025: Seen in clinic today for follow-up to pressure injuries or bilateral gluteal. Left gluteal wound continues to worsen with increase In tunnel area. Right gluteal wound with necrotic tissue. Sacral wound with necrotic tissue. Wounds continue to be severe. Will attempt debridement. Denies any fever or chills. Does have low-air loss mattress at home.     06/04/2025: Seen in clinic today for follow-up to pressure injuries or bilateral gluteal. Left gluteal wound continues to worsen with increase In tunnel area. Right gluteal wound with necrotic tissue. Sacral wound with necrotic tissue. Wounds continue to be severe. Will attempt debridement. Denies any fever or chills. Does have low-air loss mattress at home.  Hesistate for debridement in OR due to possibility of prolonged hosptial stay. Will attempt debridement today,  and re-evaluate next week for possible debridement in OR at that time.      06/11/2025: Seen in clinic today for follow-up to pressure injuries to bilateral gluteal and sacral wounds. Wounds appear to be worse today. Foul odor present. Case was discussed with Dr. Lara, Will plan to admit patient and take to OR tomrorow.     06/20/2025: Seen in clinic today for follow-up to multiple pressure injuries. He had debridement in OR by Dr. Lara 06/12/2025. Wounds continue to be severe, sacral ulcer the worst at this time. Left gluteal improved. Sacral and right gluteal continue with necrotic tissue. Denies any fever or chills. Has air mattress at home. Reports adequate intake to promote healing.     06/25/2025: Seen in clinic today for follow-up to multiple pressure injuries. Wounds continue to be severe. There continues to be areas of necrosis to sacral and right gluteal. No reported fever or chills. Has low-air loss mattress. Reporrts adequate intake to promote healing.     07/02/2025: Seen in clinic today for follow-up to multiple pressure injuries. Wounds continue to be severe. There continues to be areas of necrosis to sacral and right gluteal. No reported fever or chills. Has low-air loss mattress. Reporrts adequate intake to promote healing.  Dr. Lara present to assess wounds, no plan for surgical intervention at this time.     07/09/2025: Seen in clinic today for follow-up to multiple pressure injuries. Wound continues to be severe. There does continue to be more viable tissue present to base. Denies any fever or chills. Family is present and assisting with wound care needs at home. No new issues or concerns reported. Does have low-air loss mattress. Reports adequate protein intake to promote healing.   ---------------------------------------------------------------------------------------------------------------------   Review of Systems   Constitutional:  Negative for chills and fever.   Respiratory:  Negative for cough and shortness of breath.    Cardiovascular:  Positive for leg  swelling. Negative for chest pain.   Gastrointestinal:  Negative for nausea and vomiting.   Musculoskeletal:  Positive for back pain and gait problem.   Skin:  Positive for wound.      ---------------------------------------------------------------------------------------------------------------------   Past Medical History:   Diagnosis Date    Anesthesia     diffculty adminster spinal block, patient stated with last hip surgery 7-2020 several attempts per awilda and rin molina, resulted in general anesthesia     Anxiety     Arthritis     Rheumatoid    COVID     2020    GERD (gastroesophageal reflux disease)     Hypertension     Lactose intolerance     Low back pain     Sleep apnea     does not wear machine, MILD, DOES NOT HAVE AFTER WGT LOSS SURGERY    Wears reading eyeglasses      Past Surgical History:   Procedure Laterality Date    ABDOMINAL SURGERY      ANTERIOR CERVICAL DISCECTOMY W/ FUSION Right 02/19/2023    Procedure: CERVICAL DISCECTOMY ANTERIOR WITH FUSION C6-7;  Surgeon: Jaiden Aldridge MD;  Location:  [facility];  Service: Neurosurgery;  Laterality: Right;    BACK SURGERY      BARIATRIC SURGERY  4 year ago    CARDIAC CATHETERIZATION      CARPAL TUNNEL RELEASE Bilateral     CARPAL TUNNEL RELEASE Right 06/29/2023    Procedure: CARPAL TUNNEL RELEASE RIGHT;  Surgeon: Jaiden Aldirdge MD;  Location:  [facility];  Service: Neurosurgery;  Laterality: Right;    COLONOSCOPY      5 years ago    COLONOSCOPY N/A 04/05/2022    Procedure: COLONOSCOPY FOR SCREENING;  Surgeon: Luz Galvez MD;  Location:  [facility];  Service: Gastroenterology;  Laterality: N/A;    ENDOSCOPY N/A 04/05/2022    Procedure: ESOPHAGOGASTRODUODENOSCOPY WITH BIOPSY;  Surgeon: Luz Galvez MD;  Location:  [facility];  Service: Gastroenterology;  Laterality: N/A;    GASTRIC SLEEVE LAPAROSCOPIC      2017    HIP SURGERY Right times 2    JOINT REPLACEMENT      KNEE ARTHROSCOPY Left     TONSILLECTOMY      TOTAL HIP  "ARTHROPLASTY Left 01/25/2021    Procedure: TOTAL HIP ARTHROPLASTY LEFT;  Surgeon: Jb Patel MD;  Location:  [facility];  Service: Orthopedics;  Laterality: Left;    TOTAL HIP ARTHROPLASTY REVISION Right 07/20/2020    Procedure: TOTAL HIP ARTHROPLASTY REVISION RIGHT;  Surgeon: Jb Patel MD;  Location:  [facility];  Service: Orthopedics;  Laterality: Right;    WOUND DEBRIDEMENT N/A 05/18/2025    Procedure: DEBRIDEMENT SACRAL ULCER/WOUND;  Surgeon: Deepak Lara MD;  Location:  [facility];  Service: General;  Laterality: N/A;    WOUND DEBRIDEMENT N/A 6/12/2025    Procedure: DEBRIDEMENT SACRAL ULCER/WOUND;  Surgeon: Deepak Lara MD;  Location:  [facility];  Service: General;  Laterality: N/A;  Sacral and other wounds.     Family History   Problem Relation Age of Onset    Heart disease Father     Hypertension Father     Osteoarthritis Father     Cancer Mother     Diabetes Brother     Hypertension Brother     Gout Paternal Grandmother     Diabetes Paternal Grandmother     Colon cancer Maternal Uncle     Colon polyps Maternal Uncle      Social History     Socioeconomic History    Marital status:    Tobacco Use    Smoking status: Never     Passive exposure: Never    Smokeless tobacco: Never   Vaping Use    Vaping status: Never Used   Substance and Sexual Activity    Alcohol use: No    Drug use: Never    Sexual activity: Yes     ---------------------------------------------------------------------------------------------------------------------   Allergies:  Sulfa antibiotics, Codeine, Ketorolac, and Morphine and codeine  ---------------------------------------------------------------------------------------------------------------------  Objective     ---------------------------------------------------------------------------------------------------------------------   Vital Signs:  BP 98/56   Pulse 99   Temp 98.2 °F (36.8 °C) (Infrared)   Resp 16   Ht 172.7 cm (67.99\")   Wt 96.6 kg " "(213 lb)   SpO2 99%   BMI 32.40 kg/m²   Estimated body mass index is 32.4 kg/m² as calculated from the following:    Height as of this encounter: 172.7 cm (67.99\").    Weight as of this encounter: 96.6 kg (213 lb).  Body mass index is 32.4 kg/m².  Wt Readings from Last 3 Encounters:   07/09/25 96.6 kg (213 lb)   07/02/25 96.6 kg (213 lb)   07/01/25 96.6 kg (213 lb)       ---------------------------------------------------------------------------------------------------------------------   Physical Exam  Wound Assessment:  Location: Sacral  Wound Measurements: 8 X 7 X 4cm   Tunneling: No Undermining: No  Dressing Appearance: dressingappearance: clean  Closure: NA  Changes since last exam: no changes  Etiology and classification: MASD  Wound bed structures/characteristics: partial thickness (subcutaneous tissue is not exposed), red  Edges moist  Periwound characteristics: excoriated  Periwound Temperature: normal turgor and temperature  Drainage characteristics: moderate, serous   Perfusion characteristics: NA    Wound Assessment:  Location: Left, gluteal  Wound Measurements: 13 X 7 X 5cm   Tunneling: No Undermining: No  Dressing Appearance: dressingappearance: clean  Closure: NA  Changes since last exam: no changes  Etiology and classification: Unstageable Pressure injury   Wound bed structures/characteristics: full-thickness (subcutaneous tissue is exposed in at least a portion of the wound), black eschar, moist, pink  Edges moist  Periwound characteristics: intact  Periwound Temperature: normal turgor and temperature  Drainage characteristics: moderate, serous   Perfusion characteristics: NA    Wound Assessment:  Location: Right, gluteal  Wound Measurements: 11.5 X 6 X 5cm   Tunneling: No Undermining: No  Dressing Appearance: dressingappearance: clean  Closure: NA  Changes since last exam: ulcer is worsening   Etiology and classification: stage 3 pressure ulcer  Wound bed structures/characteristics: " full-thickness (subcutaneous tissue is exposed in at least a portion of the wound), moist, pink, yellow  Edges moist  Periwound characteristics: Purple, non-blanchable  Periwound Temperature: normal turgor and temperature  Drainage characteristics: moderate, serous   Perfusion characteristics: NA    Wound Goal (s):Free of infection and No further symptoms  Assessment & Plan      Patient Active Problem List    Diagnosis     Pressure injury of right buttock, stage 3 [L89.313]  -Clean with wound cleanser, apply snatyl  to area and cover with suberabsorbent and secure with tape  -Wound vac was attempted but was unable to maintain seal.   -If wound continues to improve with consider wound vac therapy, due to size of wound this would be beneficial to promote healing.   -Offloading pressure induction measures discussed  -Ordered high protein diet 120g/day along with vitamin C 2000mg/day, vitamin A 5000 Units/day, vitamin D3 5000 Units/day, zinc 50mg/day to help promote wound healing   -Will order low-air loss mattress as wounds are continuing to worsen and will benefit from added support       Pressure injury of left buttock unstageable [L89.320]  -clean with wound cleanser, apply santyl to area and cover with silicone border dressing daily and PRN  -Received low-air loss mattress today   -Ordered high protein diet 120g/day along with vitamin C 2000mg/day, vitamin A 5000 Units/day, vitamin D3 5000 Units/day, zinc 50mg/day to help promote wound healing   -This condition is associated with a high risk for non-healing, infection, sepsis, loss of function, reduced quality of life, and increased risk of premature mortality. The patient is at high-risk for additional pressure injury, requiring a high level of vigilance and coordination of care, and frequent re-assessment to prevent adverse outcomes.     Management of this condition is inherently complex, requiring ongoing optimization of many factors to assure the highest  likelihood of a favorable outcome, including pressure relief, bioburden, multiple aspects of nutrition, infection management, and moisture and mechanical factors relevant to wound healing.     Unstageable pressure injury sacral ulcer  -clean with wound cleanser, apply santyl to area and cover with silicone border dressing daily and PRN  -If wound continues to improve with consider wound vac therapy, due to size of wound this would be beneficial to promote healing.   -Offloading measures discussed   -Received low-air loss mattress today   -Ordered high protein diet 120g/day along with vitamin C 2000mg/day, vitamin A 5000 Units/day, vitamin D3 5000 Units/day, zinc 50mg/day to help promote wound healing   -This condition is associated with a high risk for non-healing, infection, sepsis, loss of function, reduced quality of life, and increased risk of premature mortality. The patient is at high-risk for additional pressure injury, requiring a high level of vigilance and coordination of care, and frequent re-assessment to prevent adverse outcomes.     Management of this condition is inherently complex, requiring ongoing optimization of many factors to assure the highest likelihood of a favorable outcome, including pressure relief, bioburden, multiple aspects of nutrition, infection management, and moisture and mechanical factors relevant to wound healing.        Dermatitis associated with moisture [L30.8], Perirectal skin irritation [K62.89]  -Clean with wound cleanser, apply magic barrier to area and leave open to air  -Offloading measures discussed   -keep area clean and dry   -High risk for worsening due to incontinent of bowel and bladder  -Chin catheter is in place and appears to be functioning        Obesity [E66.9]  -An obese person?is at greater risk for wound infection and dehiscence or evisceration.       Diabetes mellitus [E11.9]  --Recommend adequate glycemic control to help promote wound healing  -Poor  glycemic control increases risk of prolonged healing, infection, loss of limb and premature death       Quadriplegia  -Complicates all aspects of care  -Increases risk of wound failure due to decreased mobility        Wound Care Debridement Note   Pre-Procedure  Pre-Procedure Diagnosis: Pressure injury of sacrum with fat layer exposed,  right gluteal pressure injury with fat layer exposed   Checked for Allergies: yes  Consent:Consent obtained, consent given by Patient,Risks Discussed, Alternatives Discussed  Indication: slough, necrotic tissue, and biofilm  Vascular status:ANGELICA testing is not relevant to this wound, as it is not located on the lower extremity.  Time out was called prior to procedure.   Pre procedure Pain assessment: a 1 on a scale of 0-10  Pre debridement measurements:   Right gluteal: 11.5 X 6 X 5cm, volume: 180.641, surface: 54.19  Sacral: 8 X 7 X 4cm, volume: 117.286, surface: 43.98  sinus/tunnelNo, undermining No    Post Procedure  Post-Procedure Diagnosis: Pressure injury of sacrum with fat layer exposed,  right gluteal pressure injury with fat layer exposed,   Post debridement measurements:   Sacrum: 8.1 X 7.1 X 4.1cm, Volume: 123.46, surface: 45.17  Right gluteal: 11.6 X 6.1 X 5.1cm,   sinus/tunnelNo, undermining No  Post procedure Pain assessment: a 1 on a scale of 0-10  Graft/Implant/Prosthetics/Implanted Device/Transplants:  None  Complication(s):  None    Procedure details:  Method of Debridement: excissional (Surgical removal or cutting away, outside or beyond the wound margin devitalized tissue, necrosis or slough.)  Procedure: The site was prepared using clean techniques, subcutaneous tissue was removed by surgical excision.  Instrument(s) used: Curette 4mm  Anesthesia:After checking patient allergies,lidocaine topical 2% was administered to provide anesthesia.  Tissue removed: subuctaneous, Percent Removed 70%  Culture or Biopsy: culture and sensitivity sacral   Estimated Blood Loss:  Small  Hemostasis Obtained: pressure    Clinical Impression:Moderate Complexity    Follow-up: 1 week    TANJA Mejias,CWS  [facility]  07/09/2025  1300

## 2025-07-16 ENCOUNTER — HOSPITAL ENCOUNTER (OUTPATIENT)
Dept: WOUND CARE | Facility: HOSPITAL | Age: 65
Discharge: HOME | End: 2025-07-16
Payer: MEDICARE

## 2025-07-16 ENCOUNTER — OFFICE VISIT (OUTPATIENT)
Dept: INFECTIOUS DISEASES | Facility: CLINIC | Age: 65
End: 2025-07-16
Payer: MEDICARE

## 2025-07-16 VITALS
HEART RATE: 68 BPM | HEIGHT: 60 IN | TEMPERATURE: 97.2 F | BODY MASS INDEX: 41.82 KG/M2 | RESPIRATION RATE: 18 BRPM | DIASTOLIC BLOOD PRESSURE: 58 MMHG | OXYGEN SATURATION: 97 % | SYSTOLIC BLOOD PRESSURE: 92 MMHG | WEIGHT: 213 LBS

## 2025-07-16 VITALS
BODY MASS INDEX: 41.82 KG/M2 | WEIGHT: 213 LBS | TEMPERATURE: 98.7 F | HEART RATE: 59 BPM | HEIGHT: 60 IN | DIASTOLIC BLOOD PRESSURE: 52 MMHG | SYSTOLIC BLOOD PRESSURE: 84 MMHG

## 2025-07-16 DIAGNOSIS — L08.9 INFECTED WOUND: ICD-10-CM

## 2025-07-16 DIAGNOSIS — Z96.642 STATUS POST TOTAL HIP REPLACEMENT, LEFT: ICD-10-CM

## 2025-07-16 DIAGNOSIS — T14.8XXA OPEN WOUND: ICD-10-CM

## 2025-07-16 DIAGNOSIS — Z96.641 STATUS POST TOTAL REPLACEMENT OF RIGHT HIP: ICD-10-CM

## 2025-07-16 DIAGNOSIS — N39.42 URINARY INCONTINENCE WITHOUT SENSORY AWARENESS: ICD-10-CM

## 2025-07-16 DIAGNOSIS — S31.000A WOUND OF SACRAL REGION, INITIAL ENCOUNTER: Primary | ICD-10-CM

## 2025-07-16 DIAGNOSIS — T14.8XXA INFECTED WOUND: ICD-10-CM

## 2025-07-16 DIAGNOSIS — T14.8XXA OPEN WOUND: Primary | ICD-10-CM

## 2025-07-16 DIAGNOSIS — L89.313 PRESSURE INJURY OF RIGHT BUTTOCK, STAGE 3: ICD-10-CM

## 2025-07-16 DIAGNOSIS — L89.320 PRESSURE INJURY OF LEFT BUTTOCK, UNSTAGEABLE: ICD-10-CM

## 2025-07-16 PROCEDURE — A9270 NON-COVERED ITEM OR SERVICE: HCPCS | Performed by: NURSE PRACTITIONER

## 2025-07-16 PROCEDURE — 63710000001 A&D OINTMENT 425 G JAR: Performed by: NURSE PRACTITIONER

## 2025-07-16 PROCEDURE — 63710000001 ZINC OXIDE 20 % OINTMENT 454 G JAR: Performed by: NURSE PRACTITIONER

## 2025-07-16 PROCEDURE — 63710000001 CLOTRIMAZOLE 1 % CREAM 30 G TUBE: Performed by: NURSE PRACTITIONER

## 2025-07-16 PROCEDURE — 63710000001 COLLAGENASE 250 UNIT/GM OINTMENT 30 G TUBE: Performed by: NURSE PRACTITIONER

## 2025-07-16 PROCEDURE — 63710000001 MICONAZOLE 2 % POWDER 85 G BOTTLE: Performed by: NURSE PRACTITIONER

## 2025-07-16 PROCEDURE — 63710000001 ALUMINUM-MAGNESIUM HYDROXIDE-SIMETHICONE 200-200-20 MG/5ML SUSPENSION: Performed by: NURSE PRACTITIONER

## 2025-07-16 RX ORDER — CASTOR OIL AND BALSAM, PERU 788; 87 MG/G; MG/G
1 OINTMENT TOPICAL AS NEEDED
Status: CANCELLED | OUTPATIENT
Start: 2025-07-16

## 2025-07-16 RX ORDER — SILVER SULFADIAZINE 10 MG/G
1 CREAM TOPICAL AS NEEDED
Status: CANCELLED | OUTPATIENT
Start: 2025-07-16

## 2025-07-16 RX ORDER — SODIUM HYPOCHLORITE 2.5 MG/ML
1 SOLUTION TOPICAL AS NEEDED
Status: CANCELLED | OUTPATIENT
Start: 2025-07-16

## 2025-07-16 RX ORDER — LIDOCAINE HYDROCHLORIDE 20 MG/ML
1 JELLY TOPICAL ONCE
Status: CANCELLED | OUTPATIENT
Start: 2025-07-16 | End: 2025-07-16

## 2025-07-16 RX ORDER — DIAPER,BRIEF,INFANT-TODD,DISP
1 EACH MISCELLANEOUS ONCE
Status: CANCELLED | OUTPATIENT
Start: 2025-07-16 | End: 2025-07-16

## 2025-07-16 RX ORDER — LIDOCAINE HYDROCHLORIDE AND EPINEPHRINE BITARTRATE 20; .01 MG/ML; MG/ML
10 INJECTION, SOLUTION SUBCUTANEOUS ONCE
Status: CANCELLED | OUTPATIENT
Start: 2025-07-16 | End: 2025-07-16

## 2025-07-16 RX ORDER — MUPIROCIN 2 %
1 OINTMENT (GRAM) TOPICAL AS NEEDED
Status: CANCELLED | OUTPATIENT
Start: 2025-07-16

## 2025-07-16 RX ORDER — NYSTATIN 100000 [USP'U]/G
1 POWDER TOPICAL ONCE
Status: CANCELLED | OUTPATIENT
Start: 2025-07-16 | End: 2025-07-16

## 2025-07-16 RX ORDER — LIDOCAINE HYDROCHLORIDE 20 MG/ML
JELLY TOPICAL AS NEEDED
Status: CANCELLED | OUTPATIENT
Start: 2025-07-16

## 2025-07-16 RX ORDER — LIDOCAINE HYDROCHLORIDE 20 MG/ML
1 JELLY TOPICAL ONCE
Status: COMPLETED | OUTPATIENT
Start: 2025-07-16 | End: 2025-07-16

## 2025-07-16 RX ORDER — SODIUM HYPOCHLORITE 1.25 MG/ML
1 SOLUTION TOPICAL AS NEEDED
Status: CANCELLED | OUTPATIENT
Start: 2025-07-16

## 2025-07-16 RX ORDER — LIDOCAINE HYDROCHLORIDE 20 MG/ML
10 INJECTION, SOLUTION INFILTRATION; PERINEURAL ONCE
Status: CANCELLED | OUTPATIENT
Start: 2025-07-16 | End: 2025-07-16

## 2025-07-16 RX ADMIN — MICONAZOLE NITRATE 1 APPLICATION: 2 POWDER TOPICAL at 15:39

## 2025-07-16 RX ADMIN — COLLAGENASE SANTYL 1 APPLICATION: 250 OINTMENT TOPICAL at 13:12

## 2025-07-16 RX ADMIN — VITAMINS A AND D OINTMENT 1 APPLICATION: 15.5; 53.4 OINTMENT TOPICAL at 15:25

## 2025-07-16 RX ADMIN — LIDOCAINE HYDROCHLORIDE 1 ML: 20 JELLY TOPICAL at 13:12

## 2025-07-16 NOTE — PROGRESS NOTES
[facility]         Referring Provider: No referring provider defined for this encounter.    Subjective      Chief Complaint  Follow-up (Pressure injury of right buttock, stage 3)    History of Present Illness  Ang Jackson is a 65 y.o. male who presents today to [facility] for infectious disease evaluation and antibiotic management.    Patient is a 64 y.o. male with past medical history significant for  cervical degenerative disc disease status post C6-7 anterior cervical discectomy and fusion in 2023 and nontraumatic cervical spinal cord injury secondary with cervical epidural abscess with cord compression from C3-C5 in October 2024 status post incision and drainage with C4 corpectomy, C3-6 laminectomy and emergent hematoma evacuation. Patient with neurogenic bladder and chronic hanks catheter due to significant spinal trauma. 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  Patient was seen in the wound care clinic outpatient and due to worsening wound advised patient to go to ER.  Patient was evaluated and admitted for wound infection.  Surgery was consulted and wounds were debrided in OR on 5/18.    CRP level improving down to 4.78 and ESR down to 31 along with normal white count at 9.44.      Past Medical History:   Diagnosis Date    Anesthesia     diffculty adminster spinal block, patient stated with last hip surgery 7-2020 several attempts per awilda and no luck, resulted in general anesthesia     Anxiety     Arthritis     Rheumatoid    COVID     2020    GERD (gastroesophageal reflux disease)     Hypertension     Lactose intolerance     Low back pain     Sleep apnea     does not wear machine, MILD, DOES NOT HAVE AFTER WGT LOSS SURGERY    Wears reading eyeglasses        Past  Surgical History:   Procedure Laterality Date    ABDOMINAL SURGERY      ANTERIOR CERVICAL DISCECTOMY W/ FUSION Right 02/19/2023    Procedure: CERVICAL DISCECTOMY ANTERIOR WITH FUSION C6-7;  Surgeon: Jaiden Aldridge MD;  Location:  [facility];  Service: Neurosurgery;  Laterality: Right;    BACK SURGERY      BARIATRIC SURGERY  4 year ago    CARDIAC CATHETERIZATION      CARPAL TUNNEL RELEASE Bilateral     CARPAL TUNNEL RELEASE Right 06/29/2023    Procedure: CARPAL TUNNEL RELEASE RIGHT;  Surgeon: Jaiden Aldridge MD;  Location:  [facility];  Service: Neurosurgery;  Laterality: Right;    COLONOSCOPY      5 years ago    COLONOSCOPY N/A 04/05/2022    Procedure: COLONOSCOPY FOR SCREENING;  Surgeon: Luz Galvez MD;  Location:  [facility];  Service: Gastroenterology;  Laterality: N/A;    ENDOSCOPY N/A 04/05/2022    Procedure: ESOPHAGOGASTRODUODENOSCOPY WITH BIOPSY;  Surgeon: Luz Galvez MD;  Location:  [facility];  Service: Gastroenterology;  Laterality: N/A;    GASTRIC SLEEVE LAPAROSCOPIC      2017    HIP SURGERY Right times 2    JOINT REPLACEMENT      KNEE ARTHROSCOPY Left     TONSILLECTOMY      TOTAL HIP ARTHROPLASTY Left 01/25/2021    Procedure: TOTAL HIP ARTHROPLASTY LEFT;  Surgeon: Jb Patel MD;  Location:  [facility];  Service: Orthopedics;  Laterality: Left;    TOTAL HIP ARTHROPLASTY REVISION Right 07/20/2020    Procedure: TOTAL HIP ARTHROPLASTY REVISION RIGHT;  Surgeon: Jb Patel MD;  Location:  [facility];  Service: Orthopedics;  Laterality: Right;    WOUND DEBRIDEMENT N/A 05/18/2025    Procedure: DEBRIDEMENT SACRAL ULCER/WOUND;  Surgeon: Deepak Lara MD;  Location:  [facility];  Service: General;  Laterality: N/A;    WOUND DEBRIDEMENT N/A 6/12/2025    Procedure: DEBRIDEMENT SACRAL ULCER/WOUND;  Surgeon: Deepak Lara MD;  Location:  [facility];  Service: General;  Laterality: N/A;  Sacral and other wounds.       Social History     Socioeconomic History     "Marital status:    Tobacco Use    Smoking status: Never     Passive exposure: Never    Smokeless tobacco: Never   Vaping Use    Vaping status: Never Used   Substance and Sexual Activity    Alcohol use: No    Drug use: Never    Sexual activity: Yes       Family History  family history includes Cancer in his mother; Colon cancer in his maternal uncle; Colon polyps in his maternal uncle; Diabetes in his brother and paternal grandmother; Gout in his paternal grandmother; Heart disease in his father; Hypertension in his brother and father; Osteoarthritis in his father.    Immunization History   Administered Date(s) Administered    COVID-19 ([employer]) 04/05/2021        Allergies  Allergies   Allergen Reactions    Sulfa Antibiotics Hives     Hives, shortness of breath    Codeine Nausea And Vomiting    Ketorolac Other (See Comments)     Pt reports heavy, \"tight\" feeling in his chest.    Morphine And Codeine Nausea And Vomiting       The medication list has been reviewed and updated.   Current Medications    Current Outpatient Medications:     acetaminophen (TYLENOL) 500 MG tablet, Take 2 tablets by mouth Every 6 (Six) Hours As Needed for Mild Pain., Disp: , Rfl:     ascorbic acid (VITAMIN C) 500 MG tablet, Take 2 tablets by mouth Daily., Disp: , Rfl:     baclofen (LIORESAL) 10 MG tablet, Take 2 tablets by mouth 3 (Three) Times a Day., Disp: , Rfl:     baclofen (LIORESAL) 10 MG tablet, Take 5 tablets by mouth every night at bedtime., Disp: , Rfl:     Brexpiprazole (Rexulti) 2 MG tablet, Take 1 tablet by mouth Daily., Disp: , Rfl:     busPIRone (BUSPAR) 5 MG tablet, Take 1 tablet by mouth 2 (Two) Times a Day., Disp: , Rfl:     clotrimazole (LOTRIMIN) 1 % cream, Apply 1 Application topically to the appropriate area as directed Every Night., Disp: , Rfl:     collagenase 250 UNIT/GM ointment, Apply 1 Application topically to the appropriate area as directed Daily., Disp: , Rfl:     Cyanocobalamin (B-12 Compliance " Injection) 1000 MCG/ML kit, Inject 1 mL as directed Every 14 (Fourteen) Days., Disp: , Rfl:     daptomycin (CUBICIN) solution IVPB, Infuse 50 mL into a venous catheter Daily for 41 days., Disp: , Rfl:     diclofenac (VOLTAREN) 75 MG EC tablet, Take 1 tablet by mouth 2 (Two) Times a Day., Disp: , Rfl:     ertapenem (INVanz) 1 g/50 mL 0.9% NS IVPB, Infuse 50 mL into a venous catheter Daily for 41 days., Disp: , Rfl:     famotidine (PEPCID) 20 MG tablet, Take 1 tablet by mouth 2 (Two) Times a Day., Disp: , Rfl:     ferrous sulfate 325 (65 FE) MG tablet, Take 1 tablet by mouth Daily With Breakfast., Disp: , Rfl:     FLUoxetine (PROzac) 20 MG capsule, Take 1 capsule by mouth Daily., Disp: , Rfl:     gabapentin (NEURONTIN) 300 MG capsule, Take 3 capsules by mouth 4 (Four) Times a Day., Disp: , Rfl:     loratadine (CLARITIN) 10 MG tablet, Take 1 tablet by mouth Daily., Disp: , Rfl:     multivitamin tablet tablet, Take 1 tablet by mouth Daily., Disp: , Rfl:     nystatin (MYCOSTATIN) 775950 UNIT/GM cream, Apply 1 Application topically to the appropriate area as directed Daily As Needed (irritation)., Disp: , Rfl:     oxyCODONE (ROXICODONE) 10 MG tablet, Take 1 tablet by mouth 4 (Four) Times a Day., Disp: , Rfl:     oxyCODONE ER (oxyCONTIN) 20 MG 12 hr tablet, Take 1 tablet by mouth Every Night., Disp: , Rfl:     pantoprazole (PROTONIX) 40 MG EC tablet, Take 1 tablet by mouth 2 (Two) Times a Day., Disp: , Rfl:     tiZANidine (ZANAFLEX) 2 MG tablet, Take 3 tablets by mouth 4 (Four) Times a Day., Disp: , Rfl:     tiZANidine (ZANAFLEX) 2 MG tablet, Take 2 tablets by mouth Every Night., Disp: , Rfl:     triamcinolone (KENALOG) 0.1 % cream, Apply 1 Application topically to the appropriate area as directed Daily As Needed for Irritation or Rash., Disp: , Rfl:     vitamin D3 125 MCG (5000 UT) capsule capsule, Take 1 capsule by mouth Daily., Disp: , Rfl:     zolpidem (Ambien) 5 MG tablet, Take 1 tablet by mouth Every Night., Disp: ,  "Rfl:       Review of Systems    Review of Systems   Constitutional:  Negative for activity change, appetite change, chills, diaphoresis and fever.   HENT:  Negative for congestion, dental problem, drooling, mouth sores, sinus pressure and sneezing.    Eyes:  Negative for blurred vision, double vision, photophobia and discharge.   Respiratory:  Negative for apnea, cough, choking, chest tightness, shortness of breath and wheezing.    Cardiovascular:  Negative for chest pain, palpitations and leg swelling.   Gastrointestinal:  Negative for abdominal distention, abdominal pain, diarrhea, nausea and vomiting.   Genitourinary:  Negative for dysuria, flank pain and frequency.   Musculoskeletal:  Positive for arthralgias, back pain and gait problem. Negative for neck pain and neck stiffness.   Skin:  Positive for wound. Negative for rash.   Neurological:  Negative for dizziness, tremors, seizures, syncope, speech difficulty, numbness, headache and confusion.   Psychiatric/Behavioral:  Negative for agitation, behavioral problems, hallucinations and suicidal ideas.         Objective     Vital Signs:  BP (!) 74/48 (BP Location: Left arm, Patient Position: Sitting, Cuff Size: Adult)   Pulse 68   Temp 97.2 °F (36.2 °C) (Infrared)   Resp 18   Ht 68 cm (26.77\")   Wt 96.6 kg (213 lb)   SpO2 97%   .00 kg/m²   Estimated body mass index is 209 kg/m² as calculated from the following:    Height as of this encounter: 68 cm (26.77\").    Weight as of this encounter: 96.6 kg (213 lb).    Physical Exam  Constitutional:       General: He is not in acute distress.     Appearance: Normal appearance. He is normal weight. He is not ill-appearing, toxic-appearing or diaphoretic.   HENT:      Head: Normocephalic and atraumatic.      Nose: Nose normal. No congestion or rhinorrhea.      Mouth/Throat:      Mouth: Mucous membranes are moist.      Pharynx: Oropharynx is clear.   Eyes:      General: No scleral icterus.     Extraocular " "Movements: Extraocular movements intact.      Pupils: Pupils are equal, round, and reactive to light.   Neck:      Vascular: No carotid bruit.   Cardiovascular:      Rate and Rhythm: Normal rate and regular rhythm.      Pulses: Normal pulses.      Heart sounds: No murmur heard.  Pulmonary:      Effort: Pulmonary effort is normal. No respiratory distress.      Breath sounds: Normal breath sounds. No stridor. No wheezing, rhonchi or rales.   Chest:      Chest wall: No tenderness.   Abdominal:      General: Abdomen is flat. Bowel sounds are normal. There is no distension.      Palpations: Abdomen is soft.      Tenderness: There is no abdominal tenderness. There is no guarding or rebound.   Musculoskeletal:      Cervical back: Normal range of motion and neck supple. No rigidity or tenderness.   Lymphadenopathy:      Cervical: No cervical adenopathy.   Skin:     Coloration: Skin is not jaundiced.      Findings: No lesion or rash.      Comments: Significant necrosis to the wound   Neurological:      Mental Status: He is alert and oriented to person, place, and time. Mental status is at baseline.      Gait: Gait abnormal.      Comments: paraplegia   Psychiatric:         Mood and Affect: Mood normal.         Behavior: Behavior normal.          Result Review :  The following data was reviewed by Babs Ferris MD     Lab Results  Lab Results   Component Value Date    WBC 9.44 07/15/2025    HGB 9.3 (L) 07/15/2025    HCT 31.1 (L) 07/15/2025    MCV 89.6 07/15/2025     07/15/2025     Lab Results   Component Value Date    GLUCOSE 86 07/15/2025    BUN 11.3 07/15/2025    CREATININE 0.33 (L) 07/15/2025    EGFRIFNONA 99 01/26/2021    BCR 34.2 (H) 07/15/2025    K 4.0 07/15/2025    CO2 27.8 07/15/2025    CALCIUM 8.1 (L) 07/15/2025    ALBUMIN 2.9 (L) 07/02/2025    AST 11 07/02/2025    ALT <5 07/02/2025      Lab Results   Component Value Date    CRP 4.71 (H) 07/15/2025        No results found for: \"ACANTHNAEG\", \"AFBCX\", " "\"BPERTUSSISCX\", \"BLOODCX\"  No results found for: \"BCIDPCR\", \"CXREFLEX\", \"CSFCX\", \"CULTURETIS\"  No results found for: \"CULTURES\", \"HSVCX\", \"URCX\"  No results found for: \"EYECULTURE\", \"GCCX\", \"HSVCULTURE\", \"LABHSV\"  No results found for: \"LEGIONELLA\", \"MRSACX\", \"MUMPSCX\", \"MYCOPLASCX\"  No results found for: \"NOCARDIACX\", \"STOOLCX\"  No results found for: \"THROATCX\", \"UNSTIMCULT\", \"URINECX\", \"CULTURE\", \"VZVCULTUR\"  No results found for: \"VIRALCULTU\", \"WOUNDCX\"    Radiology Results                Assessment / Plan        Diagnoses and all orders for this visit:    1. Wound of sacral region, initial encounter (Primary)    2. Pressure injury of right buttock, stage 3    3. Pressure injury of left buttock, unstageable    4. Infected wound    5. Urinary incontinence without sensory awareness    6. Status post total replacement of right hip    7. Status post total hip replacement, left    8. Open wound      On 5/18/25, Intraoperative tissue/bone culture from the sacrum reporting moderate growth of ESBL E. coli.     General surgery operative note reported necrotic tissue and subcutaneous fat sharply excised down to the fascia, not involving fascia.  No mention of bony involvement.  MRI does not report any evidence of osteomyelitis.  Previous wound cultures finalized with E. coli ESBL and Enterococcus faecalis.     Plan completed his course of ertapenem 1 g IV every 24 hours and daptomycin 6 mg/kg IV 24 hours for total of 14 days on 5/30/2025 infected chronic sacral ulcers without evidence of osteomyelitis.    6/25/25  Wound Culture   Lab   Heavy growth (4+) Escherichia coli ESBL Abnormal   [facility]     Consider infectious disease consult.  Susceptibility results may not correlate to clinical outcomes.            Gram Stain  Lab   Few (2+) WBCs seen [facility]   Few (2+) Mixed bacterial morphotypes seen on Gram Stain [facility]           Susceptibility     Escherichia coli ESBL     ABDI     Ciprofloxacin >=4 ug/ml Resistant     " Ertapenem <=0.12 ug/ml Susceptible     Levofloxacin >=8 ug/ml Resistant     Meropenem <=0.25 ug/ml Susceptible     Tetracycline >=16 ug/ml Resistant     Trimethoprim + Sulfamethoxazole >=320 ug/ml Resistant                 C-Reactive Protein 4.71 High  6.66 High  7.97 High  10.60 High      I am concerned about the degree of necrosis inside the wound, the periwound erythema, the rising CRP level up to 10.6 as of 6/25/2025 and new wound culture showing growth of ESBL E. coli.    Patient has just finished a course of daptomycin and ertapenem on 5/30/2025 x 14 days but we will have to restart antibiotic therapy and for possibly a more prolonged course this time x 6 weeks.    I recommend wound VAC placement, more extensive I&D with deep cultures.    Patient's daughter reported that patient has followed up with general surgery Dr. Lara and has not recommended any further debridement of the necrotic areas at this time.    Will have to recheck his blood pressure as it was low at 74/48.  Patient does not seem to be suffering from hypotension clinically at this time.  No adverse reaction with antibiotic therapy and no evidence of clinical sepsis.  No fever, no chills and no diarrhea.        Follow Up   Return in about 1 week (around 7/23/2025) for Follow up, With Dr. Ferris.    Visit Diagnoses:    ICD-10-CM ICD-9-CM   1. Wound of sacral region, initial encounter  S31.000A 959.19   2. Pressure injury of right buttock, stage 3  L89.313 707.05     707.23   3. Pressure injury of left buttock, unstageable  L89.320 707.05     707.25   4. Infected wound  T14.8XXA 958.3    L08.9    5. Urinary incontinence without sensory awareness  N39.42 788.34   6. Status post total replacement of right hip  Z96.641 V43.64   7. Status post total hip replacement, left  Z96.642 V43.64   8. Open wound  T14.8XXA 879.8       Patient was given instructions and counseling regarding his condition or for health maintenance advice. Please see specific  information pulled into the AVS if appropriate.     This document has been electronically signed by Babs Ferris MD   July 16, 2025 12:23 EDT      No orders of the defined types were placed in this encounter.     Dictated Utilizing Dragon Dictation: Part of this note may be an electronic transcription/translation of spoken language to printed text using the Dragon Dictation System.

## 2025-07-23 ENCOUNTER — OFFICE VISIT (OUTPATIENT)
Dept: INFECTIOUS DISEASES | Facility: CLINIC | Age: 65
End: 2025-07-23
Payer: MEDICARE

## 2025-07-23 ENCOUNTER — HOSPITAL ENCOUNTER (OUTPATIENT)
Dept: WOUND CARE | Facility: HOSPITAL | Age: 65
Discharge: HOME | End: 2025-07-23
Payer: MEDICARE

## 2025-07-23 ENCOUNTER — LAB REQUISITION (OUTPATIENT)
Dept: LAB | Facility: HOSPITAL | Age: 65
End: 2025-07-23
Payer: MEDICARE

## 2025-07-23 VITALS
DIASTOLIC BLOOD PRESSURE: 49 MMHG | WEIGHT: 213 LBS | RESPIRATION RATE: 18 BRPM | SYSTOLIC BLOOD PRESSURE: 81 MMHG | HEIGHT: 68 IN | HEART RATE: 59 BPM | TEMPERATURE: 97.3 F | BODY MASS INDEX: 32.28 KG/M2 | OXYGEN SATURATION: 97 %

## 2025-07-23 VITALS
HEART RATE: 52 BPM | TEMPERATURE: 98.5 F | DIASTOLIC BLOOD PRESSURE: 61 MMHG | HEIGHT: 68 IN | WEIGHT: 213 LBS | SYSTOLIC BLOOD PRESSURE: 110 MMHG | BODY MASS INDEX: 32.28 KG/M2

## 2025-07-23 DIAGNOSIS — T14.8XXA OPEN WOUND: Primary | ICD-10-CM

## 2025-07-23 DIAGNOSIS — S31.000A WOUND OF SACRAL REGION, INITIAL ENCOUNTER: ICD-10-CM

## 2025-07-23 DIAGNOSIS — E11.9 TYPE 2 DIABETES MELLITUS WITHOUT COMPLICATIONS: ICD-10-CM

## 2025-07-23 DIAGNOSIS — L89.313 PRESSURE INJURY OF RIGHT BUTTOCK, STAGE 3: ICD-10-CM

## 2025-07-23 DIAGNOSIS — L08.9 INFECTED WOUND: ICD-10-CM

## 2025-07-23 DIAGNOSIS — T14.8XXA INFECTED WOUND: ICD-10-CM

## 2025-07-23 DIAGNOSIS — L89.320 PRESSURE INJURY OF LEFT BUTTOCK, UNSTAGEABLE: ICD-10-CM

## 2025-07-23 LAB
ANION GAP SERPL CALCULATED.3IONS-SCNC: 9.6 MMOL/L (ref 5–15)
BASOPHILS # BLD AUTO: 0.02 10*3/MM3 (ref 0–0.2)
BASOPHILS # BLD AUTO: 0.03 10*3/MM3 (ref 0–0.2)
BASOPHILS NFR BLD AUTO: 0.2 % (ref 0–1.5)
BASOPHILS NFR BLD AUTO: 0.3 % (ref 0–1.5)
BUN SERPL-MCNC: 28.1 MG/DL (ref 8–23)
BUN/CREAT SERPL: 80.3 (ref 7–25)
CALCIUM SPEC-SCNC: 8 MG/DL (ref 8.6–10.5)
CHLORIDE SERPL-SCNC: 104 MMOL/L (ref 98–107)
CK SERPL-CCNC: 25 U/L (ref 20–200)
CK SERPL-CCNC: 36 U/L (ref 20–200)
CO2 SERPL-SCNC: 28.4 MMOL/L (ref 22–29)
CREAT SERPL-MCNC: 0.35 MG/DL (ref 0.76–1.27)
CRP SERPL-MCNC: 10.3 MG/DL (ref 0–0.5)
CRP SERPL-MCNC: 10.3 MG/DL (ref 0–0.5)
DEPRECATED RDW RBC AUTO: 47.9 FL (ref 37–54)
DEPRECATED RDW RBC AUTO: 50.4 FL (ref 37–54)
EGFRCR SERPLBLD CKD-EPI 2021: 126.9 ML/MIN/1.73
EOSINOPHIL # BLD AUTO: 0.31 10*3/MM3 (ref 0–0.4)
EOSINOPHIL # BLD AUTO: 0.37 10*3/MM3 (ref 0–0.4)
EOSINOPHIL NFR BLD AUTO: 3.2 % (ref 0.3–6.2)
EOSINOPHIL NFR BLD AUTO: 4.2 % (ref 0.3–6.2)
ERYTHROCYTE [DISTWIDTH] IN BLOOD BY AUTOMATED COUNT: 14.7 % (ref 12.3–15.4)
ERYTHROCYTE [DISTWIDTH] IN BLOOD BY AUTOMATED COUNT: 15.1 % (ref 12.3–15.4)
ERYTHROCYTE [SEDIMENTATION RATE] IN BLOOD: 26 MM/HR (ref 0–20)
GLUCOSE SERPL-MCNC: 73 MG/DL (ref 65–99)
HCT VFR BLD AUTO: 31.8 % (ref 37.5–51)
HCT VFR BLD AUTO: 32.1 % (ref 37.5–51)
HGB BLD-MCNC: 9.2 G/DL (ref 13–17.7)
HGB BLD-MCNC: 9.6 G/DL (ref 13–17.7)
IMM GRANULOCYTES # BLD AUTO: 0.07 10*3/MM3 (ref 0–0.05)
IMM GRANULOCYTES # BLD AUTO: 0.07 10*3/MM3 (ref 0–0.05)
IMM GRANULOCYTES NFR BLD AUTO: 0.7 % (ref 0–0.5)
IMM GRANULOCYTES NFR BLD AUTO: 0.8 % (ref 0–0.5)
LYMPHOCYTES # BLD AUTO: 1.46 10*3/MM3 (ref 0.7–3.1)
LYMPHOCYTES # BLD AUTO: 1.84 10*3/MM3 (ref 0.7–3.1)
LYMPHOCYTES NFR BLD AUTO: 15.2 % (ref 19.6–45.3)
LYMPHOCYTES NFR BLD AUTO: 20.9 % (ref 19.6–45.3)
MCH RBC QN AUTO: 26.2 PG (ref 26.6–33)
MCH RBC QN AUTO: 26.6 PG (ref 26.6–33)
MCHC RBC AUTO-ENTMCNC: 28.9 G/DL (ref 31.5–35.7)
MCHC RBC AUTO-ENTMCNC: 29.9 G/DL (ref 31.5–35.7)
MCV RBC AUTO: 88.9 FL (ref 79–97)
MCV RBC AUTO: 90.6 FL (ref 79–97)
MONOCYTES # BLD AUTO: 0.62 10*3/MM3 (ref 0.1–0.9)
MONOCYTES # BLD AUTO: 0.67 10*3/MM3 (ref 0.1–0.9)
MONOCYTES NFR BLD AUTO: 6.5 % (ref 5–12)
MONOCYTES NFR BLD AUTO: 7.6 % (ref 5–12)
NEUTROPHILS NFR BLD AUTO: 5.83 10*3/MM3 (ref 1.7–7)
NEUTROPHILS NFR BLD AUTO: 66.3 % (ref 42.7–76)
NEUTROPHILS NFR BLD AUTO: 7.09 10*3/MM3 (ref 1.7–7)
NEUTROPHILS NFR BLD AUTO: 74.1 % (ref 42.7–76)
NRBC BLD AUTO-RTO: 0 /100 WBC (ref 0–0.2)
NRBC BLD AUTO-RTO: 0 /100 WBC (ref 0–0.2)
PLATELET # BLD AUTO: 245 10*3/MM3 (ref 140–450)
PLATELET # BLD AUTO: 265 10*3/MM3 (ref 140–450)
PMV BLD AUTO: 8.9 FL (ref 6–12)
PMV BLD AUTO: 9.4 FL (ref 6–12)
POTASSIUM SERPL-SCNC: 4.2 MMOL/L (ref 3.5–5.2)
RBC # BLD AUTO: 3.51 10*6/MM3 (ref 4.14–5.8)
RBC # BLD AUTO: 3.61 10*6/MM3 (ref 4.14–5.8)
SODIUM SERPL-SCNC: 142 MMOL/L (ref 136–145)
WBC NRBC COR # BLD AUTO: 8.8 10*3/MM3 (ref 3.4–10.8)
WBC NRBC COR # BLD AUTO: 9.58 10*3/MM3 (ref 3.4–10.8)

## 2025-07-23 PROCEDURE — A9270 NON-COVERED ITEM OR SERVICE: HCPCS | Performed by: NURSE PRACTITIONER

## 2025-07-23 PROCEDURE — 80048 BASIC METABOLIC PNL TOTAL CA: CPT | Performed by: PHYSICIAN ASSISTANT

## 2025-07-23 PROCEDURE — 82550 ASSAY OF CK (CPK): CPT | Performed by: PHYSICIAN ASSISTANT

## 2025-07-23 PROCEDURE — 85652 RBC SED RATE AUTOMATED: CPT | Performed by: PHYSICIAN ASSISTANT

## 2025-07-23 PROCEDURE — 85025 COMPLETE CBC W/AUTO DIFF WBC: CPT | Performed by: NURSE PRACTITIONER

## 2025-07-23 PROCEDURE — 85025 COMPLETE CBC W/AUTO DIFF WBC: CPT | Performed by: PHYSICIAN ASSISTANT

## 2025-07-23 PROCEDURE — 86140 C-REACTIVE PROTEIN: CPT | Performed by: NURSE PRACTITIONER

## 2025-07-23 PROCEDURE — 82550 ASSAY OF CK (CPK): CPT | Performed by: NURSE PRACTITIONER

## 2025-07-23 PROCEDURE — 63710000001 COLLAGENASE 250 UNIT/GM OINTMENT 30 G TUBE: Performed by: NURSE PRACTITIONER

## 2025-07-23 PROCEDURE — 86140 C-REACTIVE PROTEIN: CPT | Performed by: PHYSICIAN ASSISTANT

## 2025-07-23 RX ORDER — LIDOCAINE HYDROCHLORIDE 20 MG/ML
1 JELLY TOPICAL ONCE
Status: CANCELLED | OUTPATIENT
Start: 2025-07-23 | End: 2025-07-23

## 2025-07-23 RX ORDER — LIDOCAINE HYDROCHLORIDE 20 MG/ML
JELLY TOPICAL AS NEEDED
OUTPATIENT
Start: 2025-07-23

## 2025-07-23 RX ORDER — CASTOR OIL AND BALSAM, PERU 788; 87 MG/G; MG/G
1 OINTMENT TOPICAL AS NEEDED
OUTPATIENT
Start: 2025-07-23

## 2025-07-23 RX ORDER — LIDOCAINE HYDROCHLORIDE 20 MG/ML
10 INJECTION, SOLUTION INFILTRATION; PERINEURAL ONCE
OUTPATIENT
Start: 2025-07-23 | End: 2025-07-23

## 2025-07-23 RX ORDER — NYSTATIN 100000 [USP'U]/G
1 POWDER TOPICAL ONCE
OUTPATIENT
Start: 2025-07-23 | End: 2025-07-23

## 2025-07-23 RX ORDER — SODIUM HYPOCHLORITE 2.5 MG/ML
1 SOLUTION TOPICAL AS NEEDED
OUTPATIENT
Start: 2025-07-23

## 2025-07-23 RX ORDER — SILVER SULFADIAZINE 10 MG/G
1 CREAM TOPICAL AS NEEDED
OUTPATIENT
Start: 2025-07-23

## 2025-07-23 RX ORDER — LIDOCAINE HYDROCHLORIDE 20 MG/ML
1 JELLY TOPICAL ONCE
Status: COMPLETED | OUTPATIENT
Start: 2025-07-23 | End: 2025-07-23

## 2025-07-23 RX ORDER — MUPIROCIN 2 %
1 OINTMENT (GRAM) TOPICAL AS NEEDED
OUTPATIENT
Start: 2025-07-23

## 2025-07-23 RX ORDER — LIDOCAINE HYDROCHLORIDE AND EPINEPHRINE BITARTRATE 20; .01 MG/ML; MG/ML
10 INJECTION, SOLUTION SUBCUTANEOUS ONCE
OUTPATIENT
Start: 2025-07-23 | End: 2025-07-23

## 2025-07-23 RX ORDER — DIAPER,BRIEF,INFANT-TODD,DISP
1 EACH MISCELLANEOUS ONCE
OUTPATIENT
Start: 2025-07-23 | End: 2025-07-23

## 2025-07-23 RX ORDER — SODIUM HYPOCHLORITE 1.25 MG/ML
1 SOLUTION TOPICAL AS NEEDED
OUTPATIENT
Start: 2025-07-23

## 2025-07-23 RX ADMIN — COLLAGENASE SANTYL 1 APPLICATION: 250 OINTMENT TOPICAL at 14:30

## 2025-07-23 RX ADMIN — LIDOCAINE HYDROCHLORIDE 1 ML: 20 JELLY TOPICAL at 14:30

## 2025-07-23 NOTE — PROGRESS NOTES
[facility]         Referring Provider: No referring provider defined for this encounter.    Subjective      Chief Complaint  Follow-up (Wound of sacral region, initial encounter)    History of Present Illness  Ang Jackson is a 65 y.o. male who presents today to [facility] for infectious disease evaluation and antibiotic management.    History of Present Illness         Patient is a 64 y.o. male with past medical history significant for  cervical degenerative disc disease status post C6-7 anterior cervical discectomy and fusion in 2023 and nontraumatic cervical spinal cord injury secondary with cervical epidural abscess with cord compression from C3-C5 in October 2024 status post incision and drainage with C4 corpectomy, C3-6 laminectomy and emergent hematoma evacuation. Patient with neurogenic bladder and chronic hanks catheter due to significant spinal trauma. 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  Patient was seen in the wound care clinic outpatient and due to worsening wound advised patient to go to ER.  Patient was evaluated and admitted for wound infection.  Surgery was consulted and wounds were debrided in OR on 5/18.    7/23/2025: Patient in wheelchair today accompanied by daughter.  Patient overall doing well since reinitiation of IV antibiotic therapy.  Denies fever, chills, body aches.  Denies diarrhea.  PICC line to right upper extremity with no signs of infection.  Flushes well however does not draw blood.  Most recent laboratory values from 7/16/2025 reviewed with the patient and patient's daughter.           Past Medical History:   Diagnosis Date    Anesthesia     diffculty adminster spinal block, patient stated with last hip surgery 7-2020 several attempts per  anthesia and no luck, resulted in general anesthesia     Anxiety     Arthritis     Rheumatoid    COVID     2020    GERD (gastroesophageal reflux disease)     Hypertension     Lactose intolerance     Low back pain     Sleep apnea     does not wear machine, MILD, DOES NOT HAVE AFTER WGT LOSS SURGERY    Wears reading eyeglasses        Past Surgical History:   Procedure Laterality Date    ABDOMINAL SURGERY      ANTERIOR CERVICAL DISCECTOMY W/ FUSION Right 02/19/2023    Procedure: CERVICAL DISCECTOMY ANTERIOR WITH FUSION C6-7;  Surgeon: Jaiden Aldridge MD;  Location:  [facility];  Service: Neurosurgery;  Laterality: Right;    BACK SURGERY      BARIATRIC SURGERY  4 year ago    CARDIAC CATHETERIZATION      CARPAL TUNNEL RELEASE Bilateral     CARPAL TUNNEL RELEASE Right 06/29/2023    Procedure: CARPAL TUNNEL RELEASE RIGHT;  Surgeon: Jaiden Aldridge MD;  Location:  [facility];  Service: Neurosurgery;  Laterality: Right;    COLONOSCOPY      5 years ago    COLONOSCOPY N/A 04/05/2022    Procedure: COLONOSCOPY FOR SCREENING;  Surgeon: Luz Galvez MD;  Location:  [facility];  Service: Gastroenterology;  Laterality: N/A;    ENDOSCOPY N/A 04/05/2022    Procedure: ESOPHAGOGASTRODUODENOSCOPY WITH BIOPSY;  Surgeon: uLz Galvez MD;  Location:  [facility];  Service: Gastroenterology;  Laterality: N/A;    GASTRIC SLEEVE LAPAROSCOPIC      2017    HIP SURGERY Right times 2    JOINT REPLACEMENT      KNEE ARTHROSCOPY Left     TONSILLECTOMY      TOTAL HIP ARTHROPLASTY Left 01/25/2021    Procedure: TOTAL HIP ARTHROPLASTY LEFT;  Surgeon: Jb Patel MD;  Location:  [facility];  Service: Orthopedics;  Laterality: Left;    TOTAL HIP ARTHROPLASTY REVISION Right 07/20/2020    Procedure: TOTAL HIP ARTHROPLASTY REVISION RIGHT;  Surgeon: Jb Patel MD;  Location:  [facility];  Service: Orthopedics;  Laterality: Right;    WOUND DEBRIDEMENT N/A 05/18/2025    Procedure: DEBRIDEMENT SACRAL ULCER/WOUND;  Surgeon:  "Deepak Lara MD;  Location:  [facility];  Service: General;  Laterality: N/A;    WOUND DEBRIDEMENT N/A 6/12/2025    Procedure: DEBRIDEMENT SACRAL ULCER/WOUND;  Surgeon: Deepak Lara MD;  Location:  [facility];  Service: General;  Laterality: N/A;  Sacral and other wounds.       Social History     Socioeconomic History    Marital status:    Tobacco Use    Smoking status: Never     Passive exposure: Never    Smokeless tobacco: Never   Vaping Use    Vaping status: Never Used   Substance and Sexual Activity    Alcohol use: No    Drug use: Never    Sexual activity: Yes       Family History  family history includes Cancer in his mother; Colon cancer in his maternal uncle; Colon polyps in his maternal uncle; Diabetes in his brother and paternal grandmother; Gout in his paternal grandmother; Heart disease in his father; Hypertension in his brother and father; Osteoarthritis in his father.    Immunization History   Administered Date(s) Administered    COVID-19 ([employer]) 04/05/2021        Allergies  Allergies   Allergen Reactions    Sulfa Antibiotics Hives     Hives, shortness of breath    Codeine Nausea And Vomiting    Ketorolac Other (See Comments)     Pt reports heavy, \"tight\" feeling in his chest.    Morphine And Codeine Nausea And Vomiting       The medication list has been reviewed and updated.   Current Medications    Current Outpatient Medications:     acetaminophen (TYLENOL) 500 MG tablet, Take 2 tablets by mouth Every 6 (Six) Hours As Needed for Mild Pain., Disp: , Rfl:     ascorbic acid (VITAMIN C) 500 MG tablet, Take 2 tablets by mouth Daily., Disp: , Rfl:     baclofen (LIORESAL) 10 MG tablet, Take 2 tablets by mouth 3 (Three) Times a Day., Disp: , Rfl:     baclofen (LIORESAL) 10 MG tablet, Take 5 tablets by mouth every night at bedtime., Disp: , Rfl:     Brexpiprazole (Rexulti) 2 MG tablet, Take 1 tablet by mouth Daily., Disp: , Rfl:     busPIRone (BUSPAR) 5 MG tablet, Take 1 tablet by mouth " 2 (Two) Times a Day., Disp: , Rfl:     clotrimazole (LOTRIMIN) 1 % cream, Apply 1 Application topically to the appropriate area as directed Every Night., Disp: , Rfl:     collagenase 250 UNIT/GM ointment, Apply 1 Application topically to the appropriate area as directed Daily., Disp: , Rfl:     Cyanocobalamin (B-12 Compliance Injection) 1000 MCG/ML kit, Inject 1 mL as directed Every 14 (Fourteen) Days., Disp: , Rfl:     daptomycin (CUBICIN) solution IVPB, Infuse 50 mL into a venous catheter Daily for 41 days., Disp: , Rfl:     diclofenac (VOLTAREN) 75 MG EC tablet, Take 1 tablet by mouth 2 (Two) Times a Day., Disp: , Rfl:     ertapenem (INVanz) 1 g/50 mL 0.9% NS IVPB, Infuse 50 mL into a venous catheter Daily for 41 days., Disp: , Rfl:     famotidine (PEPCID) 20 MG tablet, Take 1 tablet by mouth 2 (Two) Times a Day., Disp: , Rfl:     ferrous sulfate 325 (65 FE) MG tablet, Take 1 tablet by mouth Daily With Breakfast., Disp: , Rfl:     FLUoxetine (PROzac) 20 MG capsule, Take 1 capsule by mouth Daily., Disp: , Rfl:     gabapentin (NEURONTIN) 300 MG capsule, Take 3 capsules by mouth 4 (Four) Times a Day., Disp: , Rfl:     loratadine (CLARITIN) 10 MG tablet, Take 1 tablet by mouth Daily., Disp: , Rfl:     multivitamin tablet tablet, Take 1 tablet by mouth Daily., Disp: , Rfl:     nystatin (MYCOSTATIN) 567072 UNIT/GM cream, Apply 1 Application topically to the appropriate area as directed Daily As Needed (irritation)., Disp: , Rfl:     oxyCODONE (ROXICODONE) 10 MG tablet, Take 1 tablet by mouth 4 (Four) Times a Day., Disp: , Rfl:     oxyCODONE ER (oxyCONTIN) 20 MG 12 hr tablet, Take 1 tablet by mouth Every Night., Disp: , Rfl:     pantoprazole (PROTONIX) 40 MG EC tablet, Take 1 tablet by mouth 2 (Two) Times a Day., Disp: , Rfl:     tiZANidine (ZANAFLEX) 2 MG tablet, Take 3 tablets by mouth 4 (Four) Times a Day., Disp: , Rfl:     tiZANidine (ZANAFLEX) 2 MG tablet, Take 2 tablets by mouth Every Night., Disp: , Rfl:      "triamcinolone (KENALOG) 0.1 % cream, Apply 1 Application topically to the appropriate area as directed Daily As Needed for Irritation or Rash., Disp: , Rfl:     vitamin D3 125 MCG (5000 UT) capsule capsule, Take 1 capsule by mouth Daily., Disp: , Rfl:     zolpidem (Ambien) 5 MG tablet, Take 1 tablet by mouth Every Night., Disp: , Rfl:       Review of Systems    Review of Systems   Constitutional: Negative.    HENT: Negative.     Eyes: Negative.    Respiratory: Negative.     Cardiovascular: Negative.    Gastrointestinal: Negative.    Endocrine: Negative.    Genitourinary:         Chronic Chin catheter   Musculoskeletal:  Positive for gait problem.        Wheelchair-bound.  Upper extremity range of motion.   Skin:  Positive for wound.        Sacral wound care images reviewed.  Patient follows with wound care.   Allergic/Immunologic: Negative.    Hematological: Negative.    Psychiatric/Behavioral: Negative.          Objective     Vital Signs:  BP (!) 81/49 (BP Location: Left arm, Patient Position: Sitting, Cuff Size: Adult)   Pulse 59   Temp 97.3 °F (36.3 °C) (Infrared)   Resp 18   Ht 172.7 cm (68\")   Wt 96.6 kg (213 lb)   SpO2 97%   BMI 32.39 kg/m²   Estimated body mass index is 32.39 kg/m² as calculated from the following:    Height as of this encounter: 172.7 cm (68\").    Weight as of this encounter: 96.6 kg (213 lb).    Physical Exam  Constitutional:       General: He is not in acute distress.     Appearance: Normal appearance. He is normal weight.   HENT:      Head: Normocephalic.      Nose: Nose normal.      Mouth/Throat:      Mouth: Mucous membranes are moist.   Eyes:      Pupils: Pupils are equal, round, and reactive to light.   Cardiovascular:      Rate and Rhythm: Normal rate and regular rhythm.      Pulses: Normal pulses.      Heart sounds: Normal heart sounds. No murmur heard.     Comments: Hypotensive at baseline.  Asymptomatic.  Pulmonary:      Effort: Pulmonary effort is normal. No respiratory " "distress.      Breath sounds: Normal breath sounds.   Abdominal:      General: Bowel sounds are normal. There is no distension.      Palpations: Abdomen is soft.      Tenderness: There is no abdominal tenderness.   Genitourinary:     Comments: Chronic Chin catheter.  Musculoskeletal:      Comments: Wheelchair-bound.  Minimal upper extremity range of motion   Skin:     General: Skin is warm and dry.      Comments: Sacral wound images reviewed.   Neurological:      Mental Status: He is alert and oriented to person, place, and time. Mental status is at baseline.   Psychiatric:         Mood and Affect: Mood normal.         Behavior: Behavior normal.         Thought Content: Thought content normal.          Physical Exam               Result Review :  The following data was reviewed by TANJA Sims     Results              Lab Results  Lab Results   Component Value Date    WBC 9.44 07/15/2025    HGB 9.3 (L) 07/15/2025    HCT 31.1 (L) 07/15/2025    MCV 89.6 07/15/2025     07/15/2025     Lab Results   Component Value Date    GLUCOSE 86 07/15/2025    BUN 11.3 07/15/2025    CREATININE 0.33 (L) 07/15/2025    EGFRIFNONA 99 01/26/2021    BCR 34.2 (H) 07/15/2025    K 4.0 07/15/2025    CO2 27.8 07/15/2025    CALCIUM 8.1 (L) 07/15/2025    ALBUMIN 2.9 (L) 07/02/2025    AST 11 07/02/2025    ALT <5 07/02/2025      Lab Results   Component Value Date    CRP 4.71 (H) 07/15/2025        No results found for: \"ACANTHNAEG\", \"AFBCX\", \"BPERTUSSISCX\", \"BLOODCX\"  No results found for: \"BCIDPCR\", \"CXREFLEX\", \"CSFCX\", \"CULTURETIS\"  No results found for: \"CULTURES\", \"HSVCX\", \"URCX\"  No results found for: \"EYECULTURE\", \"GCCX\", \"HSVCULTURE\", \"LABHSV\"  No results found for: \"LEGIONELLA\", \"MRSACX\", \"MUMPSCX\", \"MYCOPLASCX\"  No results found for: \"NOCARDIACX\", \"STOOLCX\"  No results found for: \"THROATCX\", \"UNSTIMCULT\", \"URINECX\", \"CULTURE\", \"VZVCULTUR\"  No results found for: \"VIRALCULTU\", \"WOUNDCX\"    Radiology Results          "     Assessment / Plan        Diagnoses and all orders for this visit:    1. Open wound (Primary)  -     CK; Future  -     CK    2. Infected wound  -     CBC & Differential; Future  -     C-reactive Protein; Future  -     CBC & Differential  -     C-reactive Protein    3. Pressure injury of right buttock, stage 3    4. Pressure injury of left buttock, unstageable    5. Wound of sacral region, initial encounter    On 5/18/25, Intraoperative tissue/bone culture from the sacrum reporting moderate growth of ESBL E. coli.     General surgery operative note reported necrotic tissue and subcutaneous fat sharply excised down to the fascia, not involving fascia.  No mention of bony involvement.  MRI does not report any evidence of osteomyelitis.  Previous wound cultures finalized with E. coli ESBL and Enterococcus faecalis.     Plan completed his course of ertapenem 1 g IV every 24 hours and daptomycin 6 mg/kg IV 24 hours for total of 14 days on 5/30/2025 infected chronic sacral ulcers without evidence of osteomyelitis.     6/25/25  Wound Culture     Lab   Heavy growth (4+) Escherichia coli ESBL Abnormal   [facility]     Consider infectious disease consult.  Susceptibility results may not correlate to clinical outcomes.             Gram Stain   Lab   Few (2+) WBCs seen [facility]   Few (2+) Mixed bacterial morphotypes seen on Gram Stain [facility]            Susceptibility              Escherichia coli ESBL       ABDI       Ciprofloxacin >=4 ug/ml Resistant       Ertapenem <=0.12 ug/ml Susceptible       Levofloxacin >=8 ug/ml Resistant       Meropenem <=0.25 ug/ml Susceptible       Tetracycline >=16 ug/ml Resistant       Trimethoprim + Sulfamethoxazole >=320 ug/ml Resistant                     Patient has just finished a course of daptomycin and ertapenem on 5/30/2025 x 14 days however, due to wound healing concerns antibiotic therapy was restarted a more prolonged course this time x 6 weeks.     Patient follows with wound care  and has an appointment with wound care following this appointment today    CBC, CRP, CK ordered for today.  Patient and patient's daughter to be notified of any concerning laboratory values.  Patient continues on daptomycin and ertapenem without any adverse reaction at this time.  Patient to follow-up in 1 week.                Follow Up   Return in about 1 week (around 7/30/2025).    Visit Diagnoses:    ICD-10-CM ICD-9-CM   1. Open wound  T14.8XXA 879.8   2. Infected wound  T14.8XXA 958.3    L08.9    3. Pressure injury of right buttock, stage 3  L89.313 707.05     707.23   4. Pressure injury of left buttock, unstageable  L89.320 707.05     707.25   5. Wound of sacral region, initial encounter  S31.000A 959.19       Patient was given instructions and counseling regarding his condition or for health maintenance advice. Please see specific information pulled into the AVS if appropriate.     This document has been electronically signed by TANJA Sims   July 23, 2025 12:37 EDT      No orders of the defined types were placed in this encounter.         Dictated Utilizing Dragon Dictation: Part of this note may be an electronic transcription/translation of spoken language to printed text using the Dragon Dictation System.

## 2025-07-28 ENCOUNTER — TELEPHONE (OUTPATIENT)
Dept: INFECTIOUS DISEASES | Facility: CLINIC | Age: 65
End: 2025-07-28
Payer: MEDICARE

## 2025-07-28 ENCOUNTER — LAB REQUISITION (OUTPATIENT)
Dept: LAB | Facility: HOSPITAL | Age: 65
End: 2025-07-28
Payer: MEDICARE

## 2025-07-28 DIAGNOSIS — Z79.2 LONG TERM (CURRENT) USE OF ANTIBIOTICS: ICD-10-CM

## 2025-07-28 LAB
ANION GAP SERPL CALCULATED.3IONS-SCNC: 8.7 MMOL/L (ref 5–15)
BASOPHILS # BLD AUTO: 0.03 10*3/MM3 (ref 0–0.2)
BASOPHILS NFR BLD AUTO: 0.4 % (ref 0–1.5)
BUN SERPL-MCNC: 23.4 MG/DL (ref 8–23)
BUN/CREAT SERPL: 86.7 (ref 7–25)
CALCIUM SPEC-SCNC: 8.2 MG/DL (ref 8.6–10.5)
CHLORIDE SERPL-SCNC: 105 MMOL/L (ref 98–107)
CK SERPL-CCNC: 35 U/L (ref 20–200)
CO2 SERPL-SCNC: 27.3 MMOL/L (ref 22–29)
CREAT SERPL-MCNC: 0.27 MG/DL (ref 0.76–1.27)
CRP SERPL-MCNC: 7.5 MG/DL (ref 0–0.5)
DEPRECATED RDW RBC AUTO: 50.1 FL (ref 37–54)
EGFRCR SERPLBLD CKD-EPI 2021: 137.2 ML/MIN/1.73
EOSINOPHIL # BLD AUTO: 0.35 10*3/MM3 (ref 0–0.4)
EOSINOPHIL NFR BLD AUTO: 4.4 % (ref 0.3–6.2)
ERYTHROCYTE [DISTWIDTH] IN BLOOD BY AUTOMATED COUNT: 15.2 % (ref 12.3–15.4)
ERYTHROCYTE [SEDIMENTATION RATE] IN BLOOD: 36 MM/HR (ref 0–20)
GLUCOSE SERPL-MCNC: 91 MG/DL (ref 65–99)
HCT VFR BLD AUTO: 30.6 % (ref 37.5–51)
HGB BLD-MCNC: 9.1 G/DL (ref 13–17.7)
IMM GRANULOCYTES # BLD AUTO: 0.07 10*3/MM3 (ref 0–0.05)
IMM GRANULOCYTES NFR BLD AUTO: 0.9 % (ref 0–0.5)
LYMPHOCYTES # BLD AUTO: 1.57 10*3/MM3 (ref 0.7–3.1)
LYMPHOCYTES NFR BLD AUTO: 19.5 % (ref 19.6–45.3)
MCH RBC QN AUTO: 26.7 PG (ref 26.6–33)
MCHC RBC AUTO-ENTMCNC: 29.7 G/DL (ref 31.5–35.7)
MCV RBC AUTO: 89.7 FL (ref 79–97)
MONOCYTES # BLD AUTO: 0.61 10*3/MM3 (ref 0.1–0.9)
MONOCYTES NFR BLD AUTO: 7.6 % (ref 5–12)
NEUTROPHILS NFR BLD AUTO: 5.41 10*3/MM3 (ref 1.7–7)
NEUTROPHILS NFR BLD AUTO: 67.2 % (ref 42.7–76)
NRBC BLD AUTO-RTO: 0 /100 WBC (ref 0–0.2)
PLATELET # BLD AUTO: 231 10*3/MM3 (ref 140–450)
PMV BLD AUTO: 9.3 FL (ref 6–12)
POTASSIUM SERPL-SCNC: 4 MMOL/L (ref 3.5–5.2)
RBC # BLD AUTO: 3.41 10*6/MM3 (ref 4.14–5.8)
SODIUM SERPL-SCNC: 141 MMOL/L (ref 136–145)
WBC NRBC COR # BLD AUTO: 8.04 10*3/MM3 (ref 3.4–10.8)

## 2025-07-28 PROCEDURE — 86140 C-REACTIVE PROTEIN: CPT | Performed by: PHYSICIAN ASSISTANT

## 2025-07-28 PROCEDURE — 85025 COMPLETE CBC W/AUTO DIFF WBC: CPT | Performed by: PHYSICIAN ASSISTANT

## 2025-07-28 PROCEDURE — 80048 BASIC METABOLIC PNL TOTAL CA: CPT | Performed by: PHYSICIAN ASSISTANT

## 2025-07-28 PROCEDURE — 82550 ASSAY OF CK (CPK): CPT | Performed by: PHYSICIAN ASSISTANT

## 2025-07-28 PROCEDURE — 85652 RBC SED RATE AUTOMATED: CPT | Performed by: PHYSICIAN ASSISTANT

## 2025-07-28 NOTE — PROGRESS NOTES
Wound Clinic Note  Patient Identification:  Name:  Ang Jackson  Age:  65 y.o.  Sex:  male  :  1960  MRN:  [id number]   Visit Number:  [id number]  Primary Care Physician:  Deepak Perez     Subjective     Chief complaint:     Multiple pressure injuries     History of presenting illness:     Patient is a 65 y.o. male with past medical history significant for obesity, parplegia,  that presented today for evaluation of bilateral gluteal and sacral pressure injuries.  Reports areas have been present for over a month. He is paraplegic. Has been applying santyl and hydrofera blue to wounds. Denies any fever or chills. Moderate drainage without odor. Low air loss mattress has been ordered by PCP, should be delivered this week.  Denies history of diabetes, or smoking. Finished dose of cipro.     Interval History:   2025: Seen in clinic today for follow-up to pressure injuries to right and left gluteal, and perirectal MASD. Right gluteal wound has worsened, now with DTI down to right groin area. He is quadpraplegic, complicating wound prgoression. He is incontinent of bowel and bladder. Chin catheter in place. Awaiting approval for low-air loss mattress. Denies any fever or chills. No other issues or concerns reported.    2025: Seen in clinic today for follow-up to pressure injuries to right and left gluteal, and perirectal MASD. Right gluteal wound has worsened, now with DTI down to right groin area. He is quadpraplegic, complicating wound prgoression. He is incontinent of bowel and bladder. Chin catheter in place. Awaiting approval for low-air loss mattress. Denies any fever or chills. No other issues or concerns reported.    2025: Seen in clinic today for follow-up to pressure injuries to right and left gluteal, and perirectal MASD. Right gluteal wound is stable. Continues to be severe.. He is quadpraplegic, complicating wound prgoression. He is incontinent of bowel and bladder. Chin  catheter in place. Awaiting approval for low-air loss mattress. Denies any fever or chills. No other issues or concerns reported.    05/07/2025: Seen in clinic today for follow-up to pressure injuries to right and left gluteal, and perirectal MASD. Right gluteal wound is stable. Continues to be severe.. He is quadpraplegic, complicating wound prgoression. He is incontinent of bowel and bladder. Chin catheter in place. Awaiting approval for low-air loss mattress. Denies any fever or chills. No other issues or concerns reported.    05/15/2025: Seen in clinic today for follow-up to pressure injuries or bilateral gluteal. Left gluteal wound continues to worsen with increase In tunnel area. Discussed concern of worsening and possible going to ED. Would like to avoid if possible. Will obtain new wound culture and labs. Pending results may require additional antibiotics. Was scheduled to see infectious disease tomorrow unfortunatley had to cancel. Highly encouraged to make following, which is scheduled for next week .    05/29/2025: Seen in clinic today for follow-up to pressure injuries or bilateral gluteal. Left gluteal wound continues to worsen with increase In tunnel area. Right gluteal wound with necrotic tissue. Sacral wound with necrotic tissue. Wounds continue to be severe. Will attempt debridement. Denies any fever or chills. Does have low-air loss mattress at home.     06/04/2025: Seen in clinic today for follow-up to pressure injuries or bilateral gluteal. Left gluteal wound continues to worsen with increase In tunnel area. Right gluteal wound with necrotic tissue. Sacral wound with necrotic tissue. Wounds continue to be severe. Will attempt debridement. Denies any fever or chills. Does have low-air loss mattress at home.  Hesistate for debridement in OR due to possibility of prolonged hosptial stay. Will attempt debridement today,  and re-evaluate next week for possible debridement in OR at that time.      06/11/2025: Seen in clinic today for follow-up to pressure injuries to bilateral gluteal and sacral wounds. Wounds appear to be worse today. Foul odor present. Case was discussed with Dr. Lara, Will plan to admit patient and take to OR tomrorow.     06/20/2025: Seen in clinic today for follow-up to multiple pressure injuries. He had debridement in OR by Dr. Lara 06/12/2025. Wounds continue to be severe, sacral ulcer the worst at this time. Left gluteal improved. Sacral and right gluteal continue with necrotic tissue. Denies any fever or chills. Has air mattress at home. Reports adequate intake to promote healing.     06/25/2025: Seen in clinic today for follow-up to multiple pressure injuries. Wounds continue to be severe. There continues to be areas of necrosis to sacral and right gluteal. No reported fever or chills. Has low-air loss mattress. Reporrts adequate intake to promote healing.     07/02/2025: Seen in clinic today for follow-up to multiple pressure injuries. Wounds continue to be severe. There continues to be areas of necrosis to sacral and right gluteal. No reported fever or chills. Has low-air loss mattress. Reporrts adequate intake to promote healing.  Dr. Lara present to assess wounds, no plan for surgical intervention at this time.     07/09/2025: Seen in clinic today for follow-up to multiple pressure injuries. Wound continues to be severe. There does continue to be more viable tissue present to base. Denies any fever or chills. Family is present and assisting with wound care needs at home. No new issues or concerns reported. Does have low-air loss mattress. Reports adequate protein intake to promote healing.     07/16/2025: Seen in clinic today for follow-up to multiple pressure injuries. Wound continues to be severe. There does continue to be more viable tissue present to base. Denies any fever or chills. Family is present and assisting with wound care needs at home. No new issues or  concerns reported. Does have low-air loss mattress. Reports adequate protein intake to promote healing.  ---------------------------------------------------------------------------------------------------------------------   Review of Systems   Constitutional:  Negative for chills and fever.   Respiratory:  Negative for cough and shortness of breath.    Cardiovascular:  Positive for leg swelling. Negative for chest pain.   Gastrointestinal:  Negative for nausea and vomiting.   Musculoskeletal:  Positive for back pain and gait problem.   Skin:  Positive for wound.      ---------------------------------------------------------------------------------------------------------------------   Past Medical History:   Diagnosis Date    Anesthesia     diffculty adminster spinal block, patient stated with last hip surgery 7-2020 several attempts per awilda and no luck, resulted in general anesthesia     Anxiety     Arthritis     Rheumatoid    COVID     2020    GERD (gastroesophageal reflux disease)     Hypertension     Lactose intolerance     Low back pain     Sleep apnea     does not wear machine, MILD, DOES NOT HAVE AFTER WGT LOSS SURGERY    Wears reading eyeglasses      Past Surgical History:   Procedure Laterality Date    ABDOMINAL SURGERY      ANTERIOR CERVICAL DISCECTOMY W/ FUSION Right 02/19/2023    Procedure: CERVICAL DISCECTOMY ANTERIOR WITH FUSION C6-7;  Surgeon: Jaiden Aldridge MD;  Location:  [facility];  Service: Neurosurgery;  Laterality: Right;    BACK SURGERY      BARIATRIC SURGERY  4 year ago    CARDIAC CATHETERIZATION      CARPAL TUNNEL RELEASE Bilateral     CARPAL TUNNEL RELEASE Right 06/29/2023    Procedure: CARPAL TUNNEL RELEASE RIGHT;  Surgeon: Jaiden Aldridge MD;  Location: [facility] OR;  Service: Neurosurgery;  Laterality: Right;    COLONOSCOPY      5 years ago    COLONOSCOPY N/A 04/05/2022    Procedure: COLONOSCOPY FOR SCREENING;  Surgeon: Luz Galvez MD;  Location: [facility] OR;  Service:  Gastroenterology;  Laterality: N/A;    ENDOSCOPY N/A 04/05/2022    Procedure: ESOPHAGOGASTRODUODENOSCOPY WITH BIOPSY;  Surgeon: Luz Galvez MD;  Location:  [facility] OR;  Service: Gastroenterology;  Laterality: N/A;    GASTRIC SLEEVE LAPAROSCOPIC      2017    HIP SURGERY Right times 2    JOINT REPLACEMENT      KNEE ARTHROSCOPY Left     TONSILLECTOMY      TOTAL HIP ARTHROPLASTY Left 01/25/2021    Procedure: TOTAL HIP ARTHROPLASTY LEFT;  Surgeon: Jb Patel MD;  Location:  [facility] OR;  Service: Orthopedics;  Laterality: Left;    TOTAL HIP ARTHROPLASTY REVISION Right 07/20/2020    Procedure: TOTAL HIP ARTHROPLASTY REVISION RIGHT;  Surgeon: Jb Patel MD;  Location:  [facility] OR;  Service: Orthopedics;  Laterality: Right;    WOUND DEBRIDEMENT N/A 05/18/2025    Procedure: DEBRIDEMENT SACRAL ULCER/WOUND;  Surgeon: Deepak Lara MD;  Location:  [facility] OR;  Service: General;  Laterality: N/A;    WOUND DEBRIDEMENT N/A 6/12/2025    Procedure: DEBRIDEMENT SACRAL ULCER/WOUND;  Surgeon: Deepak Lara MD;  Location:  [facility] OR;  Service: General;  Laterality: N/A;  Sacral and other wounds.     Family History   Problem Relation Age of Onset    Heart disease Father     Hypertension Father     Osteoarthritis Father     Cancer Mother     Diabetes Brother     Hypertension Brother     Gout Paternal Grandmother     Diabetes Paternal Grandmother     Colon cancer Maternal Uncle     Colon polyps Maternal Uncle      Social History     Socioeconomic History    Marital status:    Tobacco Use    Smoking status: Never     Passive exposure: Never    Smokeless tobacco: Never   Vaping Use    Vaping status: Never Used   Substance and Sexual Activity    Alcohol use: No    Drug use: Never    Sexual activity: Yes     ---------------------------------------------------------------------------------------------------------------------   Allergies:  Sulfa antibiotics, Codeine, Ketorolac, and Morphine and  "codeine  ---------------------------------------------------------------------------------------------------------------------  Objective     ---------------------------------------------------------------------------------------------------------------------   Vital Signs:  BP (!) 84/52   Pulse 59   Temp 98.7 °F (37.1 °C) (Infrared)   Ht 68 cm (26.77\")   Wt 96.6 kg (213 lb)   .97 kg/m²   Estimated body mass index is 208.97 kg/m² as calculated from the following:    Height as of this encounter: 68 cm (26.77\").    Weight as of this encounter: 96.6 kg (213 lb).  Body mass index is 208.97 kg/m².  Wt Readings from Last 3 Encounters:   07/23/25 96.6 kg (213 lb)   07/23/25 96.6 kg (213 lb)   07/16/25 96.6 kg (213 lb)       ---------------------------------------------------------------------------------------------------------------------   Physical Exam  Wound Assessment:  Location: Sacral  Wound Measurements: 6 X 8 X 6cm   Tunneling: No Undermining: No  Dressing Appearance: dressingappearance: clean  Closure: NA  Changes since last exam: no changes  Etiology and classification: MASD  Wound bed structures/characteristics: partial thickness (subcutaneous tissue is not exposed), red  Edges moist  Periwound characteristics: excoriated  Periwound Temperature: normal turgor and temperature  Drainage characteristics: moderate, serous   Perfusion characteristics: NA    Wound Assessment:  Location: Left, gluteal  Wound Measurements: 13 X 7 X 5cm   Tunneling: No Undermining: No  Dressing Appearance: dressingappearance: clean  Closure: NA  Changes since last exam: no changes  Etiology and classification: Unstageable Pressure injury   Wound bed structures/characteristics: full-thickness (subcutaneous tissue is exposed in at least a portion of the wound), black eschar, moist, pink  Edges moist  Periwound characteristics: intact  Periwound Temperature: normal turgor and temperature  Drainage characteristics: moderate, " serous   Perfusion characteristics: NA    Wound Assessment:  Location: Right, gluteal  Wound Measurements: 8 X 7.5 X 5cm   Tunneling: No Undermining: No  Dressing Appearance: dressingappearance: clean  Closure: NA  Changes since last exam: ulcer is worsening   Etiology and classification: stage 3 pressure ulcer  Wound bed structures/characteristics: full-thickness (subcutaneous tissue is exposed in at least a portion of the wound), moist, pink, yellow  Edges moist  Periwound characteristics: Purple, non-blanchable  Periwound Temperature: normal turgor and temperature  Drainage characteristics: moderate, serous   Perfusion characteristics: NA    Wound Goal (s):Free of infection and No further symptoms  Assessment & Plan      Patient Active Problem List    Diagnosis     Pressure injury of right buttock, stage 3 [L89.313]  -Clean with wound cleanser, apply snatyl  to area and cover with suberabsorbent and secure with tape  -Wound vac was attempted but was unable to maintain seal.   -If wound continues to improve with consider wound vac therapy, due to size of wound this would be beneficial to promote healing.   -Offloading pressure induction measures discussed  -Ordered high protein diet 120g/day along with vitamin C 2000mg/day, vitamin A 5000 Units/day, vitamin D3 5000 Units/day, zinc 50mg/day to help promote wound healing   -Will order low-air loss mattress as wounds are continuing to worsen and will benefit from added support       Pressure injury of left buttock unstageable [L89.320]  -clean with wound cleanser, apply santyl to area and cover with silicone border dressing daily and PRN  -Received low-air loss mattress today   -Ordered high protein diet 120g/day along with vitamin C 2000mg/day, vitamin A 5000 Units/day, vitamin D3 5000 Units/day, zinc 50mg/day to help promote wound healing   -This condition is associated with a high risk for non-healing, infection, sepsis, loss of function, reduced quality of life,  and increased risk of premature mortality. The patient is at high-risk for additional pressure injury, requiring a high level of vigilance and coordination of care, and frequent re-assessment to prevent adverse outcomes.     Management of this condition is inherently complex, requiring ongoing optimization of many factors to assure the highest likelihood of a favorable outcome, including pressure relief, bioburden, multiple aspects of nutrition, infection management, and moisture and mechanical factors relevant to wound healing.     Unstageable pressure injury sacral ulcer  -clean with wound cleanser, apply santyl to area and cover with silicone border dressing daily and PRN  -If wound continues to improve with consider wound vac therapy, due to size of wound this would be beneficial to promote healing.   -Offloading measures discussed   -Received low-air loss mattress today   -Ordered high protein diet 120g/day along with vitamin C 2000mg/day, vitamin A 5000 Units/day, vitamin D3 5000 Units/day, zinc 50mg/day to help promote wound healing   -This condition is associated with a high risk for non-healing, infection, sepsis, loss of function, reduced quality of life, and increased risk of premature mortality. The patient is at high-risk for additional pressure injury, requiring a high level of vigilance and coordination of care, and frequent re-assessment to prevent adverse outcomes.     Management of this condition is inherently complex, requiring ongoing optimization of many factors to assure the highest likelihood of a favorable outcome, including pressure relief, bioburden, multiple aspects of nutrition, infection management, and moisture and mechanical factors relevant to wound healing.        Dermatitis associated with moisture [L30.8], Perirectal skin irritation [K62.89]  -Clean with wound cleanser, apply magic barrier to area and leave open to air  -Offloading measures discussed   -keep area clean and dry    -High risk for worsening due to incontinent of bowel and bladder  -Chin catheter is in place and appears to be functioning        Obesity [E66.9]  -An obese person?is at greater risk for wound infection and dehiscence or evisceration.       Diabetes mellitus [E11.9]  --Recommend adequate glycemic control to help promote wound healing  -Poor glycemic control increases risk of prolonged healing, infection, loss of limb and premature death       Quadriplegia  -Complicates all aspects of care  -Increases risk of wound failure due to decreased mobility        Wound Care Debridement Note   Pre-Procedure  Pre-Procedure Diagnosis: Pressure injury of sacrum with fat layer exposed,  right gluteal pressure injury with fat layer exposed   Checked for Allergies: yes  Consent:Consent obtained, consent given by Patient,Risks Discussed, Alternatives Discussed  Indication: slough, necrotic tissue, and biofilm  Vascular status:ANGELICA testing is not relevant to this wound, as it is not located on the lower extremity.  Time out was called prior to procedure.   Pre procedure Pain assessment: a 1 on a scale of 0-10  Pre debridement measurements:   Right gluteal: 8 X 7.5 X 5cm, volume: 157.08, surface: 47.12  Sacral: 6 X 8 X 6cm, volume: 150.796, surface: 37.7  sinus/tunnelNo, undermining No    Post Procedure  Post-Procedure Diagnosis: Pressure injury of sacrum with fat layer exposed,  right gluteal pressure injury with fat layer exposed,   Post debridement measurements:   Sacrum: 6.1 X 8.1 X 6.1cm,   Right gluteal: 8.1 X 7.6 X 5.1cm,   sinus/tunnelNo, undermining No  Post procedure Pain assessment: a 1 on a scale of 0-10  Graft/Implant/Prosthetics/Implanted Device/Transplants:  None  Complication(s):  None    Procedure details:  Method of Debridement: excissional (Surgical removal or cutting away, outside or beyond the wound margin devitalized tissue, necrosis or slough.)  Procedure: The site was prepared using clean techniques,  subcutaneous tissue was removed by surgical excision.  Instrument(s) used: Curette 4mm  Anesthesia:After checking patient allergies,lidocaine topical 2% was administered to provide anesthesia.  Tissue removed: subuctaneous, Percent Removed 70%  Culture or Biopsy: culture and sensitivity sacral   Estimated Blood Loss: Small  Hemostasis Obtained: pressure    Clinical Impression:Moderate Complexity    Follow-up: 1 week    TANJA Mejias,GIULIANAS  [facility]  07/16/2025  1300

## 2025-07-28 NOTE — TELEPHONE ENCOUNTER
Pt daughter Yadira called and asked to see if TANJA had looked at recent lab results that had come back after previous appt. Elyssa states WBC remains normal and that CRP did go up a little bit but nothing to be concerned about at this time and no new changes are needed as of right now. Daughter was made aware and had no further questions at this time.

## 2025-07-30 ENCOUNTER — OFFICE VISIT (OUTPATIENT)
Dept: INFECTIOUS DISEASES | Facility: CLINIC | Age: 65
End: 2025-07-30
Payer: MEDICARE

## 2025-07-30 ENCOUNTER — HOSPITAL ENCOUNTER (OUTPATIENT)
Dept: ULTRASOUND IMAGING | Facility: HOSPITAL | Age: 65
Discharge: HOME | End: 2025-07-30
Payer: MEDICARE

## 2025-07-30 ENCOUNTER — HOSPITAL ENCOUNTER (OUTPATIENT)
Dept: WOUND CARE | Facility: HOSPITAL | Age: 65
Discharge: HOME | End: 2025-07-30
Payer: MEDICARE

## 2025-07-30 VITALS
TEMPERATURE: 98.3 F | HEIGHT: 68 IN | BODY MASS INDEX: 31.07 KG/M2 | SYSTOLIC BLOOD PRESSURE: 91 MMHG | HEART RATE: 68 BPM | DIASTOLIC BLOOD PRESSURE: 53 MMHG | WEIGHT: 205 LBS

## 2025-07-30 VITALS
WEIGHT: 205 LBS | HEART RATE: 73 BPM | DIASTOLIC BLOOD PRESSURE: 62 MMHG | SYSTOLIC BLOOD PRESSURE: 86 MMHG | HEIGHT: 68 IN | BODY MASS INDEX: 31.07 KG/M2 | OXYGEN SATURATION: 98 %

## 2025-07-30 DIAGNOSIS — R60.0 EDEMA OF RIGHT UPPER ARM: ICD-10-CM

## 2025-07-30 DIAGNOSIS — L89.320 PRESSURE INJURY OF LEFT BUTTOCK, UNSTAGEABLE: ICD-10-CM

## 2025-07-30 DIAGNOSIS — T14.8XXA OPEN WOUND: Primary | ICD-10-CM

## 2025-07-30 DIAGNOSIS — L89.313 PRESSURE INJURY OF RIGHT BUTTOCK, STAGE 3: ICD-10-CM

## 2025-07-30 DIAGNOSIS — T14.8XXA INFECTED WOUND: Primary | ICD-10-CM

## 2025-07-30 DIAGNOSIS — T14.8XXA OPEN WOUND: ICD-10-CM

## 2025-07-30 DIAGNOSIS — L08.9 INFECTED WOUND: Primary | ICD-10-CM

## 2025-07-30 LAB
CK SERPL-CCNC: 55 U/L (ref 20–200)
CRP SERPL-MCNC: 7 MG/DL (ref 0–0.5)
DEPRECATED RDW RBC AUTO: 49.7 FL (ref 37–54)
ERYTHROCYTE [DISTWIDTH] IN BLOOD BY AUTOMATED COUNT: 15.4 % (ref 12.3–15.4)
HCT VFR BLD AUTO: 33.7 % (ref 37.5–51)
HGB BLD-MCNC: 9.9 G/DL (ref 13–17.7)
MCH RBC QN AUTO: 26.3 PG (ref 26.6–33)
MCHC RBC AUTO-ENTMCNC: 29.4 G/DL (ref 31.5–35.7)
MCV RBC AUTO: 89.4 FL (ref 79–97)
PLATELET # BLD AUTO: 270 10*3/MM3 (ref 140–450)
PMV BLD AUTO: 9 FL (ref 6–12)
RBC # BLD AUTO: 3.77 10*6/MM3 (ref 4.14–5.8)
WBC NRBC COR # BLD AUTO: 10.28 10*3/MM3 (ref 3.4–10.8)

## 2025-07-30 PROCEDURE — 63710000001 ALUMINUM-MAGNESIUM HYDROXIDE-SIMETHICONE 200-200-20 MG/5ML SUSPENSION: Performed by: NURSE PRACTITIONER

## 2025-07-30 PROCEDURE — A9270 NON-COVERED ITEM OR SERVICE: HCPCS | Performed by: NURSE PRACTITIONER

## 2025-07-30 PROCEDURE — 36415 COLL VENOUS BLD VENIPUNCTURE: CPT | Performed by: NURSE PRACTITIONER

## 2025-07-30 PROCEDURE — 93971 EXTREMITY STUDY: CPT | Performed by: RADIOLOGY

## 2025-07-30 PROCEDURE — 63710000001 COLLAGENASE 250 UNIT/GM OINTMENT 30 G TUBE: Performed by: NURSE PRACTITIONER

## 2025-07-30 PROCEDURE — 93971 EXTREMITY STUDY: CPT

## 2025-07-30 PROCEDURE — 63710000001 CLOTRIMAZOLE 1 % CREAM 30 G TUBE: Performed by: NURSE PRACTITIONER

## 2025-07-30 PROCEDURE — 85027 COMPLETE CBC AUTOMATED: CPT | Performed by: NURSE PRACTITIONER

## 2025-07-30 PROCEDURE — 82550 ASSAY OF CK (CPK): CPT | Performed by: NURSE PRACTITIONER

## 2025-07-30 PROCEDURE — 63710000001 A&D OINTMENT 425 G JAR: Performed by: NURSE PRACTITIONER

## 2025-07-30 PROCEDURE — 63710000001 ZINC OXIDE 20 % OINTMENT 454 G JAR: Performed by: NURSE PRACTITIONER

## 2025-07-30 PROCEDURE — 86140 C-REACTIVE PROTEIN: CPT | Performed by: NURSE PRACTITIONER

## 2025-07-30 RX ORDER — LIDOCAINE HYDROCHLORIDE 20 MG/ML
1 JELLY TOPICAL ONCE
Status: COMPLETED | OUTPATIENT
Start: 2025-07-30 | End: 2025-07-30

## 2025-07-30 RX ORDER — LIDOCAINE HYDROCHLORIDE 20 MG/ML
1 JELLY TOPICAL ONCE
OUTPATIENT
Start: 2025-07-30 | End: 2025-07-30

## 2025-07-30 RX ORDER — LIDOCAINE HYDROCHLORIDE AND EPINEPHRINE BITARTRATE 20; .01 MG/ML; MG/ML
10 INJECTION, SOLUTION SUBCUTANEOUS ONCE
OUTPATIENT
Start: 2025-07-30 | End: 2025-07-30

## 2025-07-30 RX ORDER — SODIUM HYPOCHLORITE 1.25 MG/ML
1 SOLUTION TOPICAL AS NEEDED
OUTPATIENT
Start: 2025-07-30

## 2025-07-30 RX ORDER — CASTOR OIL AND BALSAM, PERU 788; 87 MG/G; MG/G
1 OINTMENT TOPICAL AS NEEDED
OUTPATIENT
Start: 2025-07-30

## 2025-07-30 RX ORDER — SILVER SULFADIAZINE 10 MG/G
1 CREAM TOPICAL AS NEEDED
OUTPATIENT
Start: 2025-07-30

## 2025-07-30 RX ORDER — LIDOCAINE HYDROCHLORIDE 20 MG/ML
10 INJECTION, SOLUTION INFILTRATION; PERINEURAL ONCE
OUTPATIENT
Start: 2025-07-30 | End: 2025-07-30

## 2025-07-30 RX ORDER — DIAPER,BRIEF,INFANT-TODD,DISP
1 EACH MISCELLANEOUS ONCE
OUTPATIENT
Start: 2025-07-30 | End: 2025-07-30

## 2025-07-30 RX ORDER — MUPIROCIN 2 %
1 OINTMENT (GRAM) TOPICAL AS NEEDED
OUTPATIENT
Start: 2025-07-30

## 2025-07-30 RX ORDER — LIDOCAINE HYDROCHLORIDE 20 MG/ML
JELLY TOPICAL AS NEEDED
OUTPATIENT
Start: 2025-07-30

## 2025-07-30 RX ORDER — NYSTATIN 100000 [USP'U]/G
1 POWDER TOPICAL ONCE
OUTPATIENT
Start: 2025-07-30 | End: 2025-07-30

## 2025-07-30 RX ORDER — SODIUM HYPOCHLORITE 2.5 MG/ML
1 SOLUTION TOPICAL AS NEEDED
OUTPATIENT
Start: 2025-07-30

## 2025-07-30 RX ADMIN — COLLAGENASE SANTYL 1 APPLICATION: 250 OINTMENT TOPICAL at 13:31

## 2025-07-30 RX ADMIN — LIDOCAINE HYDROCHLORIDE 1 ML: 20 JELLY TOPICAL at 13:31

## 2025-07-30 RX ADMIN — VITAMINS A AND D OINTMENT 1 APPLICATION: 15.5; 53.4 OINTMENT TOPICAL at 13:31

## 2025-07-30 NOTE — PROGRESS NOTES
Wound Clinic Note  Patient Identification:  Name:  Ang Jackson  Age:  65 y.o.  Sex:  male  :  1960  MRN:  [id number]   Visit Number:  [id number]  Primary Care Physician:  Deepak Perez     Subjective     Chief complaint:     Multiple pressure injuries     History of presenting illness:     Patient is a 65 y.o. male with past medical history significant for obesity, parplegia,  that presented today for evaluation of bilateral gluteal and sacral pressure injuries.  Reports areas have been present for over a month. He is paraplegic. Has been applying santyl and hydrofera blue to wounds. Denies any fever or chills. Moderate drainage without odor. Low air loss mattress has been ordered by PCP, should be delivered this week.  Denies history of diabetes, or smoking. Finished dose of cipro.     Interval History:   2025: Seen in clinic today for follow-up to pressure injuries to right and left gluteal, and perirectal MASD. Right gluteal wound has worsened, now with DTI down to right groin area. He is quadpraplegic, complicating wound prgoression. He is incontinent of bowel and bladder. Chin catheter in place. Awaiting approval for low-air loss mattress. Denies any fever or chills. No other issues or concerns reported.    2025: Seen in clinic today for follow-up to pressure injuries to right and left gluteal, and perirectal MASD. Right gluteal wound has worsened, now with DTI down to right groin area. He is quadpraplegic, complicating wound prgoression. He is incontinent of bowel and bladder. Chin catheter in place. Awaiting approval for low-air loss mattress. Denies any fever or chills. No other issues or concerns reported.    2025: Seen in clinic today for follow-up to pressure injuries to right and left gluteal, and perirectal MASD. Right gluteal wound is stable. Continues to be severe.. He is quadpraplegic, complicating wound prgoression. He is incontinent of bowel and bladder. Chin  catheter in place. Awaiting approval for low-air loss mattress. Denies any fever or chills. No other issues or concerns reported.    05/07/2025: Seen in clinic today for follow-up to pressure injuries to right and left gluteal, and perirectal MASD. Right gluteal wound is stable. Continues to be severe.. He is quadpraplegic, complicating wound prgoression. He is incontinent of bowel and bladder. Chin catheter in place. Awaiting approval for low-air loss mattress. Denies any fever or chills. No other issues or concerns reported.    05/15/2025: Seen in clinic today for follow-up to pressure injuries or bilateral gluteal. Left gluteal wound continues to worsen with increase In tunnel area. Discussed concern of worsening and possible going to ED. Would like to avoid if possible. Will obtain new wound culture and labs. Pending results may require additional antibiotics. Was scheduled to see infectious disease tomorrow unfortunatley had to cancel. Highly encouraged to make following, which is scheduled for next week .    05/29/2025: Seen in clinic today for follow-up to pressure injuries or bilateral gluteal. Left gluteal wound continues to worsen with increase In tunnel area. Right gluteal wound with necrotic tissue. Sacral wound with necrotic tissue. Wounds continue to be severe. Will attempt debridement. Denies any fever or chills. Does have low-air loss mattress at home.     06/04/2025: Seen in clinic today for follow-up to pressure injuries or bilateral gluteal. Left gluteal wound continues to worsen with increase In tunnel area. Right gluteal wound with necrotic tissue. Sacral wound with necrotic tissue. Wounds continue to be severe. Will attempt debridement. Denies any fever or chills. Does have low-air loss mattress at home.  Hesistate for debridement in OR due to possibility of prolonged hosptial stay. Will attempt debridement today,  and re-evaluate next week for possible debridement in OR at that time.      06/11/2025: Seen in clinic today for follow-up to pressure injuries to bilateral gluteal and sacral wounds. Wounds appear to be worse today. Foul odor present. Case was discussed with Dr. Lara, Will plan to admit patient and take to OR tomrorow.     06/20/2025: Seen in clinic today for follow-up to multiple pressure injuries. He had debridement in OR by Dr. Lara 06/12/2025. Wounds continue to be severe, sacral ulcer the worst at this time. Left gluteal improved. Sacral and right gluteal continue with necrotic tissue. Denies any fever or chills. Has air mattress at home. Reports adequate intake to promote healing.     06/25/2025: Seen in clinic today for follow-up to multiple pressure injuries. Wounds continue to be severe. There continues to be areas of necrosis to sacral and right gluteal. No reported fever or chills. Has low-air loss mattress. Reporrts adequate intake to promote healing.     07/02/2025: Seen in clinic today for follow-up to multiple pressure injuries. Wounds continue to be severe. There continues to be areas of necrosis to sacral and right gluteal. No reported fever or chills. Has low-air loss mattress. Reporrts adequate intake to promote healing.  Dr. Lara present to assess wounds, no plan for surgical intervention at this time.     07/09/2025: Seen in clinic today for follow-up to multiple pressure injuries. Wound continues to be severe. There does continue to be more viable tissue present to base. Denies any fever or chills. Family is present and assisting with wound care needs at home. No new issues or concerns reported. Does have low-air loss mattress. Reports adequate protein intake to promote healing.     07/16/2025: Seen in clinic today for follow-up to multiple pressure injuries. Wound continues to be severe. There does continue to be more viable tissue present to base. Denies any fever or chills. Family is present and assisting with wound care needs at home. No new issues or  concerns reported. Does have low-air loss mattress. Reports adequate protein intake to promote healing.    07/23/2025: Seen in clinic today for follow-up to multiple pressure injuries. Wound continues to be severe. There does continue to be more viable tissue present to base. Denies any fever or chills. Family is present and assisting with wound care needs at home. No new issues or concerns reported. Does have low-air loss mattress. Reports adequate protein intake to promote healing.  ---------------------------------------------------------------------------------------------------------------------   Review of Systems   Constitutional:  Negative for chills and fever.   Respiratory:  Negative for cough and shortness of breath.    Cardiovascular:  Positive for leg swelling. Negative for chest pain.   Gastrointestinal:  Negative for nausea and vomiting.   Musculoskeletal:  Positive for back pain and gait problem.   Skin:  Positive for wound.      ---------------------------------------------------------------------------------------------------------------------   Past Medical History:   Diagnosis Date    Anesthesia     diffculty adminster spinal block, patient stated with last hip surgery 7-2020 several attempts per awilda and no lucsridhar, resulted in general anesthesia     Anxiety     Arthritis     Rheumatoid    COVID     2020    GERD (gastroesophageal reflux disease)     Hypertension     Lactose intolerance     Low back pain     Sleep apnea     does not wear machine, MILD, DOES NOT HAVE AFTER WGT LOSS SURGERY    Wears reading eyeglasses      Past Surgical History:   Procedure Laterality Date    ABDOMINAL SURGERY      ANTERIOR CERVICAL DISCECTOMY W/ FUSION Right 02/19/2023    Procedure: CERVICAL DISCECTOMY ANTERIOR WITH FUSION C6-7;  Surgeon: Jaiden Aldridge MD;  Location: [facility];  Service: Neurosurgery;  Laterality: Right;    BACK SURGERY      BARIATRIC SURGERY  4 year ago    CARDIAC CATHETERIZATION      [facility]  TUNNEL RELEASE Bilateral     CARPAL TUNNEL RELEASE Right 06/29/2023    Procedure: CARPAL TUNNEL RELEASE RIGHT;  Surgeon: Jaiden Aldridge MD;  Location:  [facility];  Service: Neurosurgery;  Laterality: Right;    COLONOSCOPY      5 years ago    COLONOSCOPY N/A 04/05/2022    Procedure: COLONOSCOPY FOR SCREENING;  Surgeon: Luz Galvez MD;  Location:  [facility];  Service: Gastroenterology;  Laterality: N/A;    ENDOSCOPY N/A 04/05/2022    Procedure: ESOPHAGOGASTRODUODENOSCOPY WITH BIOPSY;  Surgeon: Luz Galvez MD;  Location:  [facility];  Service: Gastroenterology;  Laterality: N/A;    GASTRIC SLEEVE LAPAROSCOPIC      2017    HIP SURGERY Right times 2    JOINT REPLACEMENT      KNEE ARTHROSCOPY Left     TONSILLECTOMY      TOTAL HIP ARTHROPLASTY Left 01/25/2021    Procedure: TOTAL HIP ARTHROPLASTY LEFT;  Surgeon: Jb Patel MD;  Location:  [facility];  Service: Orthopedics;  Laterality: Left;    TOTAL HIP ARTHROPLASTY REVISION Right 07/20/2020    Procedure: TOTAL HIP ARTHROPLASTY REVISION RIGHT;  Surgeon: Jb Patel MD;  Location:  [facility];  Service: Orthopedics;  Laterality: Right;    WOUND DEBRIDEMENT N/A 05/18/2025    Procedure: DEBRIDEMENT SACRAL ULCER/WOUND;  Surgeon: Deepak Lara MD;  Location:  [facility];  Service: General;  Laterality: N/A;    WOUND DEBRIDEMENT N/A 6/12/2025    Procedure: DEBRIDEMENT SACRAL ULCER/WOUND;  Surgeon: Deepak Lara MD;  Location:  [facility];  Service: General;  Laterality: N/A;  Sacral and other wounds.     Family History   Problem Relation Age of Onset    Heart disease Father     Hypertension Father     Osteoarthritis Father     Cancer Mother     Diabetes Brother     Hypertension Brother     Gout Paternal Grandmother     Diabetes Paternal Grandmother     Colon cancer Maternal Uncle     Colon polyps Maternal Uncle      Social History     Socioeconomic History    Marital status:    Tobacco Use    Smoking status: Never      "Passive exposure: Never    Smokeless tobacco: Never   Vaping Use    Vaping status: Never Used   Substance and Sexual Activity    Alcohol use: No    Drug use: Never    Sexual activity: Yes     ---------------------------------------------------------------------------------------------------------------------   Allergies:  Sulfa antibiotics, Codeine, Ketorolac, and Morphine and codeine  ---------------------------------------------------------------------------------------------------------------------  Objective     ---------------------------------------------------------------------------------------------------------------------   Vital Signs:  /61   Pulse 52   Temp 98.5 °F (36.9 °C) (Infrared)   Ht 172.7 cm (68\")   Wt 96.6 kg (213 lb)   BMI 32.39 kg/m²   Estimated body mass index is 32.39 kg/m² as calculated from the following:    Height as of this encounter: 172.7 cm (68\").    Weight as of this encounter: 96.6 kg (213 lb).  Body mass index is 32.39 kg/m².  Wt Readings from Last 3 Encounters:   07/23/25 96.6 kg (213 lb)   07/23/25 96.6 kg (213 lb)   07/16/25 96.6 kg (213 lb)       ---------------------------------------------------------------------------------------------------------------------   Physical Exam  Wound Assessment:  Location: Sacral  Wound Measurements: 9 X 8 X 5cm   Tunneling: No Undermining: No  Dressing Appearance: dressingappearance: clean  Closure: NA  Changes since last exam: no changes  Etiology and classification: MASD  Wound bed structures/characteristics: partial thickness (subcutaneous tissue is not exposed), red  Edges moist  Periwound characteristics: excoriated  Periwound Temperature: normal turgor and temperature  Drainage characteristics: moderate, serous   Perfusion characteristics: NA    Wound Assessment:  Location: Left, gluteal  Wound Measurements: 11.5 X 10 X 6cm   Tunneling: No Undermining: No  Dressing Appearance: dressingappearance: clean  Closure: NA  Changes " since last exam: no changes  Etiology and classification: Unstageable Pressure injury   Wound bed structures/characteristics: full-thickness (subcutaneous tissue is exposed in at least a portion of the wound), black eschar, moist, pink  Edges moist  Periwound characteristics: intact  Periwound Temperature: normal turgor and temperature  Drainage characteristics: moderate, serous   Perfusion characteristics: NA    Wound Assessment:  Location: Right, gluteal  Wound Measurements: 10 X 8 X 5cm   Tunneling: No Undermining: No  Dressing Appearance: dressingappearance: clean  Closure: NA  Changes since last exam: ulcer is worsening   Etiology and classification: stage 3 pressure ulcer  Wound bed structures/characteristics: full-thickness (subcutaneous tissue is exposed in at least a portion of the wound), moist, pink, yellow  Edges moist  Periwound characteristics: Purple, non-blanchable  Periwound Temperature: normal turgor and temperature  Drainage characteristics: moderate, serous   Perfusion characteristics: NA    Wound Goal (s):Free of infection and No further symptoms  Assessment & Plan      Patient Active Problem List    Diagnosis     Pressure injury of right buttock, stage 3 [L89.313]  -Clean with wound cleanser, apply snatyl  to area and cover with suberabsorbent and secure with tape  -Wound vac was attempted but was unable to maintain seal.   -If wound continues to improve with consider wound vac therapy, due to size of wound this would be beneficial to promote healing.   -Offloading pressure induction measures discussed  -Ordered high protein diet 120g/day along with vitamin C 2000mg/day, vitamin A 5000 Units/day, vitamin D3 5000 Units/day, zinc 50mg/day to help promote wound healing   -Will order low-air loss mattress as wounds are continuing to worsen and will benefit from added support       Pressure injury of left buttock unstageable [L89.320]  -clean with wound cleanser, apply santyl to area and cover with  silicone border dressing daily and PRN  -Received low-air loss mattress today   -Ordered high protein diet 120g/day along with vitamin C 2000mg/day, vitamin A 5000 Units/day, vitamin D3 5000 Units/day, zinc 50mg/day to help promote wound healing   -This condition is associated with a high risk for non-healing, infection, sepsis, loss of function, reduced quality of life, and increased risk of premature mortality. The patient is at high-risk for additional pressure injury, requiring a high level of vigilance and coordination of care, and frequent re-assessment to prevent adverse outcomes.     Management of this condition is inherently complex, requiring ongoing optimization of many factors to assure the highest likelihood of a favorable outcome, including pressure relief, bioburden, multiple aspects of nutrition, infection management, and moisture and mechanical factors relevant to wound healing.     Unstageable pressure injury sacral ulcer  -clean with wound cleanser, apply santyl to area and cover with silicone border dressing daily and PRN  -If wound continues to improve with consider wound vac therapy, due to size of wound this would be beneficial to promote healing.   -Offloading measures discussed   -Received low-air loss mattress today   -Ordered high protein diet 120g/day along with vitamin C 2000mg/day, vitamin A 5000 Units/day, vitamin D3 5000 Units/day, zinc 50mg/day to help promote wound healing   -This condition is associated with a high risk for non-healing, infection, sepsis, loss of function, reduced quality of life, and increased risk of premature mortality. The patient is at high-risk for additional pressure injury, requiring a high level of vigilance and coordination of care, and frequent re-assessment to prevent adverse outcomes.     Management of this condition is inherently complex, requiring ongoing optimization of many factors to assure the highest likelihood of a favorable outcome, including  pressure relief, bioburden, multiple aspects of nutrition, infection management, and moisture and mechanical factors relevant to wound healing.        Dermatitis associated with moisture [L30.8], Perirectal skin irritation [K62.89]  -Clean with wound cleanser, apply magic barrier to area and leave open to air  -Offloading measures discussed   -keep area clean and dry   -High risk for worsening due to incontinent of bowel and bladder  -Chin catheter is in place and appears to be functioning        Obesity [E66.9]  -An obese person?is at greater risk for wound infection and dehiscence or evisceration.       Diabetes mellitus [E11.9]  --Recommend adequate glycemic control to help promote wound healing  -Poor glycemic control increases risk of prolonged healing, infection, loss of limb and premature death       Quadriplegia  -Complicates all aspects of care  -Increases risk of wound failure due to decreased mobility        Wound Care Debridement Note   Pre-Procedure  Pre-Procedure Diagnosis: Pressure injury of sacrum with fat layer exposed,  right gluteal pressure injury with fat layer exposed, left gluteal with fat layer exposed  Checked for Allergies: yes  Consent:Consent obtained, consent given by Patient,Risks Discussed, Alternatives Discussed  Indication: slough, necrotic tissue, and biofilm  Vascular status:ANGELICA testing is not relevant to this wound, as it is not located on the lower extremity.  Time out was called prior to procedure.   Pre procedure Pain assessment: a 1 on a scale of 0-10  Pre debridement measurements:   Right gluteal: 10 X 8 X 5cm, volume: 209.439, surface: 62.83  Sacral: 9 X 8 X 5cm, volume: 188.495, surface: 56.55  Left gluteal: 11.5 X 10 X 6cm, volume: 361.283, surface: 90.32  sinus/tunnelNo, undermining No    Post Procedure  Post-Procedure Diagnosis: Pressure injury of sacrum with fat layer exposed,  right gluteal pressure injury with fat layer exposed, left gluteal with fat layer  exposed  Post debridement measurements:   Sacrum: 9.1 X 8.1 X 5.1cm, Volume: 196.832, surface: 57.89   Right gluteal: 10.1 X 8.1 X 5.1cm, volume: 218.461, surface: 64.25  Left gluteal: 11.51 X 10.1 X 6.1cm, volume: 371.3, surface: 91.3  sinus/tunnelNo, undermining No  Post procedure Pain assessment: a 1 on a scale of 0-10  Graft/Implant/Prosthetics/Implanted Device/Transplants:  None  Complication(s):  None    Procedure details:  Method of Debridement: excissional (Surgical removal or cutting away, outside or beyond the wound margin devitalized tissue, necrosis or slough.)  Procedure: The site was prepared using clean techniques, subcutaneous tissue was removed by surgical excision.  Instrument(s) used: Curette 4mm  Anesthesia:After checking patient allergies,lidocaine topical 2% was administered to provide anesthesia.  Tissue removed: subuctaneous, Percent Removed 70%  Culture or Biopsy: culture and sensitivity sacral   Estimated Blood Loss: Small  Hemostasis Obtained: pressure    Clinical Impression:Moderate Complexity    Follow-up: 1 week    TANJA Mejias,CWS  [facility]  07/23/2025  1300

## 2025-07-30 NOTE — PROGRESS NOTES
Wound Clinic Note  Patient Identification:  Name:  Ang Jackson  Age:  65 y.o.  Sex:  male  :  1960  MRN:  [id number]   Visit Number:  [id number]  Primary Care Physician:  Deepak Perez     Subjective     Chief complaint:     Multiple pressure injuries     History of presenting illness:     Patient is a 65 y.o. male with past medical history significant for obesity, parplegia,  that presented today for evaluation of bilateral gluteal and sacral pressure injuries.  Reports areas have been present for over a month. He is paraplegic. Has been applying santyl and hydrofera blue to wounds. Denies any fever or chills. Moderate drainage without odor. Low air loss mattress has been ordered by PCP, should be delivered this week.  Denies history of diabetes, or smoking. Finished dose of cipro.     Interval History:   2025: Seen in clinic today for follow-up to pressure injuries to right and left gluteal, and perirectal MASD. Right gluteal wound has worsened, now with DTI down to right groin area. He is quadpraplegic, complicating wound prgoression. He is incontinent of bowel and bladder. Chin catheter in place. Awaiting approval for low-air loss mattress. Denies any fever or chills. No other issues or concerns reported.    2025: Seen in clinic today for follow-up to pressure injuries to right and left gluteal, and perirectal MASD. Right gluteal wound has worsened, now with DTI down to right groin area. He is quadpraplegic, complicating wound prgoression. He is incontinent of bowel and bladder. Chin catheter in place. Awaiting approval for low-air loss mattress. Denies any fever or chills. No other issues or concerns reported.    2025: Seen in clinic today for follow-up to pressure injuries to right and left gluteal, and perirectal MASD. Right gluteal wound is stable. Continues to be severe.. He is quadpraplegic, complicating wound prgoression. He is incontinent of bowel and bladder. Chin  catheter in place. Awaiting approval for low-air loss mattress. Denies any fever or chills. No other issues or concerns reported.    05/07/2025: Seen in clinic today for follow-up to pressure injuries to right and left gluteal, and perirectal MASD. Right gluteal wound is stable. Continues to be severe.. He is quadpraplegic, complicating wound prgoression. He is incontinent of bowel and bladder. Chin catheter in place. Awaiting approval for low-air loss mattress. Denies any fever or chills. No other issues or concerns reported.    05/15/2025: Seen in clinic today for follow-up to pressure injuries or bilateral gluteal. Left gluteal wound continues to worsen with increase In tunnel area. Discussed concern of worsening and possible going to ED. Would like to avoid if possible. Will obtain new wound culture and labs. Pending results may require additional antibiotics. Was scheduled to see infectious disease tomorrow unfortunatley had to cancel. Highly encouraged to make following, which is scheduled for next week .    05/29/2025: Seen in clinic today for follow-up to pressure injuries or bilateral gluteal. Left gluteal wound continues to worsen with increase In tunnel area. Right gluteal wound with necrotic tissue. Sacral wound with necrotic tissue. Wounds continue to be severe. Will attempt debridement. Denies any fever or chills. Does have low-air loss mattress at home.     06/04/2025: Seen in clinic today for follow-up to pressure injuries or bilateral gluteal. Left gluteal wound continues to worsen with increase In tunnel area. Right gluteal wound with necrotic tissue. Sacral wound with necrotic tissue. Wounds continue to be severe. Will attempt debridement. Denies any fever or chills. Does have low-air loss mattress at home.  Hesistate for debridement in OR due to possibility of prolonged hosptial stay. Will attempt debridement today,  and re-evaluate next week for possible debridement in OR at that time.      06/11/2025: Seen in clinic today for follow-up to pressure injuries to bilateral gluteal and sacral wounds. Wounds appear to be worse today. Foul odor present. Case was discussed with Dr. Lara, Will plan to admit patient and take to OR tomrorow.     06/20/2025: Seen in clinic today for follow-up to multiple pressure injuries. He had debridement in OR by Dr. Lara 06/12/2025. Wounds continue to be severe, sacral ulcer the worst at this time. Left gluteal improved. Sacral and right gluteal continue with necrotic tissue. Denies any fever or chills. Has air mattress at home. Reports adequate intake to promote healing.     06/25/2025: Seen in clinic today for follow-up to multiple pressure injuries. Wounds continue to be severe. There continues to be areas of necrosis to sacral and right gluteal. No reported fever or chills. Has low-air loss mattress. Reporrts adequate intake to promote healing.     07/02/2025: Seen in clinic today for follow-up to multiple pressure injuries. Wounds continue to be severe. There continues to be areas of necrosis to sacral and right gluteal. No reported fever or chills. Has low-air loss mattress. Reporrts adequate intake to promote healing.  Dr. Lara present to assess wounds, no plan for surgical intervention at this time.     07/09/2025: Seen in clinic today for follow-up to multiple pressure injuries. Wound continues to be severe. There does continue to be more viable tissue present to base. Denies any fever or chills. Family is present and assisting with wound care needs at home. No new issues or concerns reported. Does have low-air loss mattress. Reports adequate protein intake to promote healing.     07/16/2025: Seen in clinic today for follow-up to multiple pressure injuries. Wound continues to be severe. There does continue to be more viable tissue present to base. Denies any fever or chills. Family is present and assisting with wound care needs at home. No new issues or  concerns reported. Does have low-air loss mattress. Reports adequate protein intake to promote healing.    07/23/2025: Seen in clinic today for follow-up to multiple pressure injuries. Wound continues to be severe. There does continue to be more viable tissue present to base. Denies any fever or chills. Family is present and assisting with wound care needs at home. No new issues or concerns reported. Does have low-air loss mattress. Reports adequate protein intake to promote healing.    07/30/2025: Seen in clinic today for follow-up to multiple pressure injuries. Wound continues to be severe. There does continue to be more viable tissue present to base. Denies any fever or chills. Family is present and assisting with wound care needs at home. No new issues or concerns reported. Does have low-air loss mattress. Reports adequate protein intake to promote healing.  ---------------------------------------------------------------------------------------------------------------------   Review of Systems   Constitutional:  Negative for chills and fever.   Respiratory:  Negative for cough and shortness of breath.    Cardiovascular:  Positive for leg swelling. Negative for chest pain.   Gastrointestinal:  Negative for nausea and vomiting.   Musculoskeletal:  Positive for back pain and gait problem.   Skin:  Positive for wound.      ---------------------------------------------------------------------------------------------------------------------   Past Medical History:   Diagnosis Date    Anesthesia     diffculty adminster spinal block, patient stated with last hip surgery 7-2020 several attempts per anthesia and no luck, resulted in general anesthesia     Anxiety     Arthritis     Rheumatoid    COVID     2020    GERD (gastroesophageal reflux disease)     Hypertension     Lactose intolerance     Low back pain     Sleep apnea     does not wear machine, MILD, DOES NOT HAVE AFTER WGT LOSS SURGERY    Wears reading eyeglasses       Past Surgical History:   Procedure Laterality Date    ABDOMINAL SURGERY      ANTERIOR CERVICAL DISCECTOMY W/ FUSION Right 02/19/2023    Procedure: CERVICAL DISCECTOMY ANTERIOR WITH FUSION C6-7;  Surgeon: Jaiden Aldridge MD;  Location:  [facility];  Service: Neurosurgery;  Laterality: Right;    BACK SURGERY      BARIATRIC SURGERY  4 year ago    CARDIAC CATHETERIZATION      CARPAL TUNNEL RELEASE Bilateral     CARPAL TUNNEL RELEASE Right 06/29/2023    Procedure: CARPAL TUNNEL RELEASE RIGHT;  Surgeon: Jaiden Aldridge MD;  Location:  [facility];  Service: Neurosurgery;  Laterality: Right;    COLONOSCOPY      5 years ago    COLONOSCOPY N/A 04/05/2022    Procedure: COLONOSCOPY FOR SCREENING;  Surgeon: Luz Galvez MD;  Location:  [facility];  Service: Gastroenterology;  Laterality: N/A;    ENDOSCOPY N/A 04/05/2022    Procedure: ESOPHAGOGASTRODUODENOSCOPY WITH BIOPSY;  Surgeon: Luz Galvez MD;  Location:  [facility];  Service: Gastroenterology;  Laterality: N/A;    GASTRIC SLEEVE LAPAROSCOPIC      2017    HIP SURGERY Right times 2    JOINT REPLACEMENT      KNEE ARTHROSCOPY Left     TONSILLECTOMY      TOTAL HIP ARTHROPLASTY Left 01/25/2021    Procedure: TOTAL HIP ARTHROPLASTY LEFT;  Surgeon: Jb Patel MD;  Location:  [facility];  Service: Orthopedics;  Laterality: Left;    TOTAL HIP ARTHROPLASTY REVISION Right 07/20/2020    Procedure: TOTAL HIP ARTHROPLASTY REVISION RIGHT;  Surgeon: Jb Patel MD;  Location:  [facility];  Service: Orthopedics;  Laterality: Right;    WOUND DEBRIDEMENT N/A 05/18/2025    Procedure: DEBRIDEMENT SACRAL ULCER/WOUND;  Surgeon: Deepak Lara MD;  Location:  [facility];  Service: General;  Laterality: N/A;    WOUND DEBRIDEMENT N/A 6/12/2025    Procedure: DEBRIDEMENT SACRAL ULCER/WOUND;  Surgeon: Deepak Lara MD;  Location:  [facility];  Service: General;  Laterality: N/A;  Sacral and other wounds.     Family History   Problem Relation Age of  "Onset    Heart disease Father     Hypertension Father     Osteoarthritis Father     Cancer Mother     Diabetes Brother     Hypertension Brother     Gout Paternal Grandmother     Diabetes Paternal Grandmother     Colon cancer Maternal Uncle     Colon polyps Maternal Uncle      Social History     Socioeconomic History    Marital status:    Tobacco Use    Smoking status: Never     Passive exposure: Never    Smokeless tobacco: Never   Vaping Use    Vaping status: Never Used   Substance and Sexual Activity    Alcohol use: No    Drug use: Never    Sexual activity: Yes     ---------------------------------------------------------------------------------------------------------------------   Allergies:  Sulfa antibiotics, Codeine, Ketorolac, and Morphine and codeine  ---------------------------------------------------------------------------------------------------------------------  Objective     ---------------------------------------------------------------------------------------------------------------------   Vital Signs:  BP 91/53   Pulse 68   Temp 98.3 °F (36.8 °C) (Infrared)   Ht 172.7 cm (68\")   Wt 93 kg (205 lb)   BMI 31.17 kg/m²   Estimated body mass index is 31.17 kg/m² as calculated from the following:    Height as of this encounter: 172.7 cm (68\").    Weight as of this encounter: 93 kg (205 lb).  Body mass index is 31.17 kg/m².  Wt Readings from Last 3 Encounters:   07/30/25 93 kg (205 lb)   07/30/25 93 kg (205 lb)   07/23/25 96.6 kg (213 lb)       ---------------------------------------------------------------------------------------------------------------------   Physical Exam  Wound Assessment:  Location: Sacral  Wound Measurements: 8 X 6 X 5.5cm   Tunneling: No Undermining: No  Dressing Appearance: dressingappearance: clean  Closure: NA  Changes since last exam: no changes  Etiology and classification: MASD  Wound bed structures/characteristics: partial thickness (subcutaneous tissue is not " exposed), red  Edges moist  Periwound characteristics: excoriated  Periwound Temperature: normal turgor and temperature  Drainage characteristics: moderate, serous   Perfusion characteristics: NA    Wound Assessment:  Location: Left, gluteal  Wound Measurements: 11.5 X 7.5 X 6cm   Tunneling: No Undermining: No  Dressing Appearance: dressingappearance: clean  Closure: NA  Changes since last exam: no changes  Etiology and classification: Unstageable Pressure injury   Wound bed structures/characteristics: full-thickness (subcutaneous tissue is exposed in at least a portion of the wound), black eschar, moist, pink  Edges moist  Periwound characteristics: intact  Periwound Temperature: normal turgor and temperature  Drainage characteristics: moderate, serous   Perfusion characteristics: NA    Wound Assessment:  Location: Right, gluteal  Wound Measurements: 10 X 7 X 4.8cm   Tunneling: No Undermining: No  Dressing Appearance: dressingappearance: clean  Closure: NA  Changes since last exam: ulcer is worsening   Etiology and classification: stage 3 pressure ulcer  Wound bed structures/characteristics: full-thickness (subcutaneous tissue is exposed in at least a portion of the wound), moist, pink, yellow  Edges moist  Periwound characteristics: Purple, non-blanchable  Periwound Temperature: normal turgor and temperature  Drainage characteristics: moderate, serous   Perfusion characteristics: NA    Wound Goal (s):Free of infection and No further symptoms  Assessment & Plan      Patient Active Problem List    Diagnosis     Pressure injury of right buttock, stage 3 [L89.313]  -Clean with wound cleanser, apply snatyl  to area and cover with suberabsorbent and secure with tape  -Wound vac was attempted but was unable to maintain seal.   -If wound continues to improve with consider wound vac therapy, due to size of wound this would be beneficial to promote healing.   -Offloading pressure induction measures discussed  -Ordered high  protein diet 120g/day along with vitamin C 2000mg/day, vitamin A 5000 Units/day, vitamin D3 5000 Units/day, zinc 50mg/day to help promote wound healing   -Will order low-air loss mattress as wounds are continuing to worsen and will benefit from added support       Pressure injury of left buttock unstageable [L89.320]  -clean with wound cleanser, apply santyl to area and cover with silicone border dressing daily and PRN  -Received low-air loss mattress today   -Ordered high protein diet 120g/day along with vitamin C 2000mg/day, vitamin A 5000 Units/day, vitamin D3 5000 Units/day, zinc 50mg/day to help promote wound healing   -This condition is associated with a high risk for non-healing, infection, sepsis, loss of function, reduced quality of life, and increased risk of premature mortality. The patient is at high-risk for additional pressure injury, requiring a high level of vigilance and coordination of care, and frequent re-assessment to prevent adverse outcomes.     Management of this condition is inherently complex, requiring ongoing optimization of many factors to assure the highest likelihood of a favorable outcome, including pressure relief, bioburden, multiple aspects of nutrition, infection management, and moisture and mechanical factors relevant to wound healing.     Unstageable pressure injury sacral ulcer  -clean with wound cleanser, apply santyl to area and cover with silicone border dressing daily and PRN  -If wound continues to improve with consider wound vac therapy, due to size of wound this would be beneficial to promote healing.   -Offloading measures discussed   -Received low-air loss mattress today   -Ordered high protein diet 120g/day along with vitamin C 2000mg/day, vitamin A 5000 Units/day, vitamin D3 5000 Units/day, zinc 50mg/day to help promote wound healing   -This condition is associated with a high risk for non-healing, infection, sepsis, loss of function, reduced quality of life, and  increased risk of premature mortality. The patient is at high-risk for additional pressure injury, requiring a high level of vigilance and coordination of care, and frequent re-assessment to prevent adverse outcomes.     Management of this condition is inherently complex, requiring ongoing optimization of many factors to assure the highest likelihood of a favorable outcome, including pressure relief, bioburden, multiple aspects of nutrition, infection management, and moisture and mechanical factors relevant to wound healing.        Dermatitis associated with moisture [L30.8], Perirectal skin irritation [K62.89]  -Clean with wound cleanser, apply magic barrier to area and leave open to air  -Offloading measures discussed   -keep area clean and dry   -High risk for worsening due to incontinent of bowel and bladder  -Chin catheter is in place and appears to be functioning        Obesity [E66.9]  -An obese person?is at greater risk for wound infection and dehiscence or evisceration.       Diabetes mellitus [E11.9]  --Recommend adequate glycemic control to help promote wound healing  -Poor glycemic control increases risk of prolonged healing, infection, loss of limb and premature death       Quadriplegia  -Complicates all aspects of care  -Increases risk of wound failure due to decreased mobility        Wound Care Debridement Note   Pre-Procedure  Pre-Procedure Diagnosis: Pressure injury of sacrum with fat layer exposed,  right gluteal pressure injury with fat layer exposed, left gluteal with fat layer exposed  Checked for Allergies: yes  Consent:Consent obtained, consent given by Patient,Risks Discussed, Alternatives Discussed  Indication: slough, necrotic tissue, and biofilm  Vascular status:ANGELICA testing is not relevant to this wound, as it is not located on the lower extremity.  Time out was called prior to procedure.   Pre procedure Pain assessment: a 1 on a scale of 0-10  Pre debridement measurements:   Right  gluteal: 10 X 7 X 4.8cm, volume: 175.929, surface: 54.98  Sacral: 8 X 6 X 5.5cm, volume: 138.23, surface: 37.7  sinus/tunnelNo, undermining No    Post Procedure  Post-Procedure Diagnosis: Pressure injury of sacrum with fat layer exposed,  right gluteal pressure injury with fat layer exposed, left gluteal with fat layer exposed  Post debridement measurements:   Sacrum: 8.1 X 6.1 X 5.6cm, Volume: 144.878, surface: 38.81  Right gluteal: 10.1 X 7.1 X 4.9cm, volume: 183.981, surface: 56.32  sinus/tunnelNo, undermining No  Post procedure Pain assessment: a 1 on a scale of 0-10  Graft/Implant/Prosthetics/Implanted Device/Transplants:  None  Complication(s):  None    Procedure details:  Method of Debridement: excissional (Surgical removal or cutting away, outside or beyond the wound margin devitalized tissue, necrosis or slough.)  Procedure: The site was prepared using clean techniques, subcutaneous tissue was removed by surgical excision.  Instrument(s) used: Curette 4mm  Anesthesia:After checking patient allergies,lidocaine topical 2% was administered to provide anesthesia.  Tissue removed: subuctaneous, Percent Removed 70%  Culture or Biopsy: None  Estimated Blood Loss: Small  Hemostasis Obtained: pressure    Clinical Impression:Moderate Complexity    Follow-up: 1 week    TANJA Mejias,CWS  [facility]  07/30/2025  [id number]     [id number]

## 2025-07-30 NOTE — PROGRESS NOTES
[facility]         Referring Provider: No referring provider defined for this encounter.    Subjective      Chief Complaint  Follow-up    History of Present Illness  Ang Jackson is a 65 y.o. male who presents today to [facility] for infectious disease evaluation and antibiotic management.    History of Present Illness       Patient is a 64 y.o. male with past medical history significant for  cervical degenerative disc disease status post C6-7 anterior cervical discectomy and fusion in 2023 and nontraumatic cervical spinal cord injury secondary with cervical epidural abscess with cord compression from C3-C5 in October 2024 status post incision and drainage with C4 corpectomy, C3-6 laminectomy and emergent hematoma evacuation. Patient with neurogenic bladder and chronic hanks catheter due to significant spinal trauma. 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  Patient was seen in the wound care clinic outpatient and due to worsening wound advised patient to go to ER.  Patient was evaluated and admitted for wound infection.  Surgery was consulted and wounds were debrided in OR on 5/18.     7/23/2025: Patient in wheelchair today accompanied by daughter.  Patient overall doing well since reinitiation of IV antibiotic therapy.  Denies fever, chills, body aches.  Denies diarrhea.  PICC line to right upper extremity with no signs of infection.  Flushes well however does not draw blood.  Most recent laboratory values from 7/16/2025 reviewed with the patient and patient's daughter.    7/30/2025: The patient is sitting up in motorized wheelchair, on room air with no apparent distress.  Accompanied by his daughter.  Overall nontoxic-appearing.  On room air with no apparent distress.  Denies  fever, chills, body aches.  Denies diarrhea.  PICC line to the right upper extremity with no surrounding erythema or drainage.  The patient's daughter shares some concern of edema to the right upper extremity.  Reviewed lab values from 7/28/2025 with the patient and daughter.    Past Medical History:   Diagnosis Date    Anesthesia     diffculty adminster spinal block, patient stated with last hip surgery 7-2020 several attempts per anthesia and no luck, resulted in general anesthesia     Anxiety     Arthritis     Rheumatoid    COVID     2020    GERD (gastroesophageal reflux disease)     Hypertension     Lactose intolerance     Low back pain     Sleep apnea     does not wear machine, MILD, DOES NOT HAVE AFTER WGT LOSS SURGERY    Wears reading eyeglasses        Past Surgical History:   Procedure Laterality Date    ABDOMINAL SURGERY      ANTERIOR CERVICAL DISCECTOMY W/ FUSION Right 02/19/2023    Procedure: CERVICAL DISCECTOMY ANTERIOR WITH FUSION C6-7;  Surgeon: Jaiden Aldridge MD;  Location:  [facility];  Service: Neurosurgery;  Laterality: Right;    BACK SURGERY      BARIATRIC SURGERY  4 year ago    CARDIAC CATHETERIZATION      CARPAL TUNNEL RELEASE Bilateral     CARPAL TUNNEL RELEASE Right 06/29/2023    Procedure: CARPAL TUNNEL RELEASE RIGHT;  Surgeon: Jaiden Aldridge MD;  Location:  [facility];  Service: Neurosurgery;  Laterality: Right;    COLONOSCOPY      5 years ago    COLONOSCOPY N/A 04/05/2022    Procedure: COLONOSCOPY FOR SCREENING;  Surgeon: Luz Galvez MD;  Location:  [facility];  Service: Gastroenterology;  Laterality: N/A;    ENDOSCOPY N/A 04/05/2022    Procedure: ESOPHAGOGASTRODUODENOSCOPY WITH BIOPSY;  Surgeon: Luz Galvez MD;  Location:  [facility];  Service: Gastroenterology;  Laterality: N/A;    GASTRIC SLEEVE LAPAROSCOPIC      2017    HIP SURGERY Right times 2    JOINT REPLACEMENT      KNEE ARTHROSCOPY Left     TONSILLECTOMY      TOTAL HIP ARTHROPLASTY Left 01/25/2021     "Procedure: TOTAL HIP ARTHROPLASTY LEFT;  Surgeon: Jb Patel MD;  Location:  [facility];  Service: Orthopedics;  Laterality: Left;    TOTAL HIP ARTHROPLASTY REVISION Right 07/20/2020    Procedure: TOTAL HIP ARTHROPLASTY REVISION RIGHT;  Surgeon: Jb Patel MD;  Location:  [facility];  Service: Orthopedics;  Laterality: Right;    WOUND DEBRIDEMENT N/A 05/18/2025    Procedure: DEBRIDEMENT SACRAL ULCER/WOUND;  Surgeon: Deepak Lara MD;  Location:  [facility];  Service: General;  Laterality: N/A;    WOUND DEBRIDEMENT N/A 6/12/2025    Procedure: DEBRIDEMENT SACRAL ULCER/WOUND;  Surgeon: Deepak Lara MD;  Location:  [facility];  Service: General;  Laterality: N/A;  Sacral and other wounds.       Social History     Socioeconomic History    Marital status:    Tobacco Use    Smoking status: Never     Passive exposure: Never    Smokeless tobacco: Never   Vaping Use    Vaping status: Never Used   Substance and Sexual Activity    Alcohol use: No    Drug use: Never    Sexual activity: Yes       Family History  family history includes Cancer in his mother; Colon cancer in his maternal uncle; Colon polyps in his maternal uncle; Diabetes in his brother and paternal grandmother; Gout in his paternal grandmother; Heart disease in his father; Hypertension in his brother and father; Osteoarthritis in his father.    Immunization History   Administered Date(s) Administered    COVID-19 ([employer]) 04/05/2021        Allergies  Allergies   Allergen Reactions    Sulfa Antibiotics Hives     Hives, shortness of breath    Codeine Nausea And Vomiting    Ketorolac Other (See Comments)     Pt reports heavy, \"tight\" feeling in his chest.    Morphine And Codeine Nausea And Vomiting       The medication list has been reviewed and updated.   Current Medications    Current Outpatient Medications:     acetaminophen (TYLENOL) 500 MG tablet, Take 2 tablets by mouth Every 6 (Six) Hours As Needed for Mild Pain., Disp: , Rfl: "     ascorbic acid (VITAMIN C) 500 MG tablet, Take 2 tablets by mouth Daily., Disp: , Rfl:     baclofen (LIORESAL) 10 MG tablet, Take 2 tablets by mouth 3 (Three) Times a Day., Disp: , Rfl:     baclofen (LIORESAL) 10 MG tablet, Take 5 tablets by mouth every night at bedtime., Disp: , Rfl:     Brexpiprazole (Rexulti) 2 MG tablet, Take 1 tablet by mouth Daily., Disp: , Rfl:     busPIRone (BUSPAR) 5 MG tablet, Take 1 tablet by mouth 2 (Two) Times a Day., Disp: , Rfl:     clotrimazole (LOTRIMIN) 1 % cream, Apply 1 Application topically to the appropriate area as directed Every Night., Disp: , Rfl:     collagenase 250 UNIT/GM ointment, Apply 1 Application topically to the appropriate area as directed Daily., Disp: , Rfl:     Cyanocobalamin (B-12 Compliance Injection) 1000 MCG/ML kit, Inject 1 mL as directed Every 14 (Fourteen) Days., Disp: , Rfl:     daptomycin (CUBICIN) solution IVPB, Infuse 50 mL into a venous catheter Daily for 41 days., Disp: , Rfl:     diclofenac (VOLTAREN) 75 MG EC tablet, Take 1 tablet by mouth 2 (Two) Times a Day., Disp: , Rfl:     ertapenem (INVanz) 1 g/50 mL 0.9% NS IVPB, Infuse 50 mL into a venous catheter Daily for 41 days., Disp: , Rfl:     famotidine (PEPCID) 20 MG tablet, Take 1 tablet by mouth 2 (Two) Times a Day., Disp: , Rfl:     ferrous sulfate 325 (65 FE) MG tablet, Take 1 tablet by mouth Daily With Breakfast., Disp: , Rfl:     FLUoxetine (PROzac) 20 MG capsule, Take 1 capsule by mouth Daily., Disp: , Rfl:     gabapentin (NEURONTIN) 300 MG capsule, Take 3 capsules by mouth 4 (Four) Times a Day., Disp: , Rfl:     loratadine (CLARITIN) 10 MG tablet, Take 1 tablet by mouth Daily., Disp: , Rfl:     multivitamin tablet tablet, Take 1 tablet by mouth Daily., Disp: , Rfl:     nystatin (MYCOSTATIN) 908381 UNIT/GM cream, Apply 1 Application topically to the appropriate area as directed Daily As Needed (irritation)., Disp: , Rfl:     oxyCODONE (ROXICODONE) 10 MG tablet, Take 1 tablet by mouth 4  "(Four) Times a Day., Disp: , Rfl:     oxyCODONE ER (oxyCONTIN) 20 MG 12 hr tablet, Take 1 tablet by mouth Every Night., Disp: , Rfl:     pantoprazole (PROTONIX) 40 MG EC tablet, Take 1 tablet by mouth 2 (Two) Times a Day., Disp: , Rfl:     tiZANidine (ZANAFLEX) 2 MG tablet, Take 3 tablets by mouth 4 (Four) Times a Day., Disp: , Rfl:     tiZANidine (ZANAFLEX) 2 MG tablet, Take 2 tablets by mouth Every Night., Disp: , Rfl:     triamcinolone (KENALOG) 0.1 % cream, Apply 1 Application topically to the appropriate area as directed Daily As Needed for Irritation or Rash., Disp: , Rfl:     vitamin D3 125 MCG (5000 UT) capsule capsule, Take 1 capsule by mouth Daily., Disp: , Rfl:     zolpidem (Ambien) 5 MG tablet, Take 1 tablet by mouth Every Night., Disp: , Rfl:       Review of Systems    Review of Systems   Constitutional: Negative.    HENT: Negative.     Respiratory: Negative.     Cardiovascular: Negative.    Gastrointestinal: Negative.    Genitourinary: Negative.    Musculoskeletal: Negative.    Skin:  Positive for wound.        Sacral wounds   Neurological: Negative.         Objective     Vital Signs:  BP (!) 86/62 (BP Location: Left arm, Patient Position: Sitting, Cuff Size: Adult)   Pulse 73   Ht 172.7 cm (68\")   Wt 93 kg (205 lb)   SpO2 98%   BMI 31.17 kg/m²   Estimated body mass index is 31.17 kg/m² as calculated from the following:    Height as of this encounter: 172.7 cm (68\").    Weight as of this encounter: 93 kg (205 lb).    Physical Exam  Constitutional:       Appearance: Normal appearance. He is obese.   HENT:      Head: Normocephalic and atraumatic.   Eyes:      Pupils: Pupils are equal, round, and reactive to light.   Cardiovascular:      Rate and Rhythm: Normal rate and regular rhythm.      Pulses: Normal pulses.      Heart sounds: Normal heart sounds.   Pulmonary:      Effort: Pulmonary effort is normal.      Breath sounds: Normal breath sounds.   Abdominal:      General: Bowel sounds are normal.    " "  Palpations: Abdomen is soft.   Musculoskeletal:      Cervical back: Normal range of motion and neck supple.      Comments: History of quadriplegia.  Sitting up in a motorized wheelchair.    Right upper extremity examined, no swelling or erythema appreciated on exam.   Skin:     General: Skin is warm and dry.      Capillary Refill: Capillary refill takes less than 2 seconds.      Comments: Wound care images reviewed from 7/23/25.       Neurological:      Mental Status: He is alert and oriented to person, place, and time.          Physical Exam         Result Review :  The following data was reviewed by TANJA Monroe     Results      Lab Results  Lab Results   Component Value Date    WBC 8.04 07/28/2025    HGB 9.1 (L) 07/28/2025    HCT 30.6 (L) 07/28/2025    MCV 89.7 07/28/2025     07/28/2025     Lab Results   Component Value Date    GLUCOSE 91 07/28/2025    BUN 23.4 (H) 07/28/2025    CREATININE 0.27 (L) 07/28/2025    EGFRIFNONA 99 01/26/2021    BCR 86.7 (H) 07/28/2025    K 4.0 07/28/2025    CO2 27.3 07/28/2025    CALCIUM 8.2 (L) 07/28/2025    ALBUMIN 2.9 (L) 07/02/2025    AST 11 07/02/2025    ALT <5 07/02/2025      Lab Results   Component Value Date    CRP 7.50 (H) 07/28/2025        No results found for: \"ACANTHNAEG\", \"AFBCX\", \"BPERTUSSISCX\", \"BLOODCX\"  No results found for: \"BCIDPCR\", \"CXREFLEX\", \"CSFCX\", \"CULTURETIS\"  No results found for: \"CULTURES\", \"HSVCX\", \"URCX\"  No results found for: \"EYECULTURE\", \"GCCX\", \"HSVCULTURE\", \"LABHSV\"  No results found for: \"LEGIONELLA\", \"MRSACX\", \"MUMPSCX\", \"MYCOPLASCX\"  No results found for: \"NOCARDIACX\", \"STOOLCX\"  No results found for: \"THROATCX\", \"UNSTIMCULT\", \"URINECX\", \"CULTURE\", \"VZVCULTUR\"  No results found for: \"VIRALCULTU\", \"WOUNDCX\"    Radiology Results              Assessment / Plan        Diagnoses and all orders for this visit:    1. Infected wound (Primary)  -     CK; Future  -     C-reactive Protein; Future  -     CBC (No Diff); Future  -     CK  -  "    C-reactive Protein  -     CBC (No Diff)    2. Open wound  -     CK; Future  -     C-reactive Protein; Future  -     CBC (No Diff); Future  -     CK  -     C-reactive Protein  -     CBC (No Diff)    3. Pressure injury of left buttock, unstageable  -     CK; Future  -     C-reactive Protein; Future  -     CBC (No Diff); Future  -     CK  -     C-reactive Protein  -     CBC (No Diff)    4. Pressure injury of right buttock, stage 3  -     CK; Future  -     C-reactive Protein; Future  -     CBC (No Diff); Future  -     CK  -     C-reactive Protein  -     CBC (No Diff)    5. Edema of right upper arm  -     US Venous Doppler Upper Extremity Right (duplex); Future        Assessment & Plan    Wound care images from 7/23/2025 and lab values from 7/28/2025 reviewed with the patient and his daughter.  The patient's daughter expressed concern for edema to the right upper extremity as well as fluctuation of CRP and segmentation rates.    Collect CPK, CRP, CBC today.  Schedule stat venous duplex of the right upper extremity to rule out possibility of DVT in the setting of PICC line.    The patient is overall stable on physical exam.  Continue with daptomycin and ertapenem courses as previously scheduled through 8/12/2025 for total of 6 weeks therapy.    The patient will be seen in the outpatient wound clinic following this appointment.    Follow-up in 1 week.          Follow Up   No follow-ups on file.    Visit Diagnoses:    ICD-10-CM ICD-9-CM   1. Infected wound  T14.8XXA 958.3    L08.9    2. Open wound  T14.8XXA 879.8   3. Pressure injury of left buttock, unstageable  L89.320 707.05     707.25   4. Pressure injury of right buttock, stage 3  L89.313 707.05     707.23   5. Edema of right upper arm  R60.0 782.3       Patient was given instructions and counseling regarding his condition or for health maintenance advice. Please see specific information pulled into the AVS if appropriate.     This document has been electronically  signed by TANJA Monroe   July 30, 2025 12:14 EDT      No orders of the defined types were placed in this encounter.           Dictated Utilizing Dragon Dictation: Part of this note may be an electronic transcription/translation of spoken language to printed text using the Dragon Dictation System.

## 2025-08-04 ENCOUNTER — LAB REQUISITION (OUTPATIENT)
Dept: LAB | Facility: HOSPITAL | Age: 65
End: 2025-08-04
Payer: MEDICARE

## 2025-08-04 DIAGNOSIS — L89.320 PRESSURE ULCER OF LEFT BUTTOCK, UNSTAGEABLE: ICD-10-CM

## 2025-08-04 DIAGNOSIS — Z79.2 LONG TERM (CURRENT) USE OF ANTIBIOTICS: ICD-10-CM

## 2025-08-04 LAB
ANION GAP SERPL CALCULATED.3IONS-SCNC: 7.1 MMOL/L (ref 5–15)
BASOPHILS # BLD AUTO: 0.03 10*3/MM3 (ref 0–0.2)
BASOPHILS NFR BLD AUTO: 0.4 % (ref 0–1.5)
BUN SERPL-MCNC: 17.7 MG/DL (ref 8–23)
BUN/CREAT SERPL: 70.8 (ref 7–25)
CALCIUM SPEC-SCNC: 8 MG/DL (ref 8.6–10.5)
CHLORIDE SERPL-SCNC: 108 MMOL/L (ref 98–107)
CK SERPL-CCNC: 58 U/L (ref 20–200)
CO2 SERPL-SCNC: 27.9 MMOL/L (ref 22–29)
CREAT SERPL-MCNC: 0.25 MG/DL (ref 0.76–1.27)
CRP SERPL-MCNC: 6.06 MG/DL (ref 0–0.5)
DEPRECATED RDW RBC AUTO: 48.9 FL (ref 37–54)
EGFRCR SERPLBLD CKD-EPI 2021: 140.4 ML/MIN/1.73
EOSINOPHIL # BLD AUTO: 0.33 10*3/MM3 (ref 0–0.4)
EOSINOPHIL NFR BLD AUTO: 4.1 % (ref 0.3–6.2)
ERYTHROCYTE [DISTWIDTH] IN BLOOD BY AUTOMATED COUNT: 15.4 % (ref 12.3–15.4)
ERYTHROCYTE [SEDIMENTATION RATE] IN BLOOD: 40 MM/HR (ref 0–20)
GLUCOSE SERPL-MCNC: 117 MG/DL (ref 65–99)
HCT VFR BLD AUTO: 30.5 % (ref 37.5–51)
HGB BLD-MCNC: 9.3 G/DL (ref 13–17.7)
IMM GRANULOCYTES # BLD AUTO: 0.04 10*3/MM3 (ref 0–0.05)
IMM GRANULOCYTES NFR BLD AUTO: 0.5 % (ref 0–0.5)
LYMPHOCYTES # BLD AUTO: 1.11 10*3/MM3 (ref 0.7–3.1)
LYMPHOCYTES NFR BLD AUTO: 13.7 % (ref 19.6–45.3)
MCH RBC QN AUTO: 26.6 PG (ref 26.6–33)
MCHC RBC AUTO-ENTMCNC: 30.5 G/DL (ref 31.5–35.7)
MCV RBC AUTO: 87.1 FL (ref 79–97)
MONOCYTES # BLD AUTO: 0.51 10*3/MM3 (ref 0.1–0.9)
MONOCYTES NFR BLD AUTO: 6.3 % (ref 5–12)
NEUTROPHILS NFR BLD AUTO: 6.11 10*3/MM3 (ref 1.7–7)
NEUTROPHILS NFR BLD AUTO: 75 % (ref 42.7–76)
NRBC BLD AUTO-RTO: 0 /100 WBC (ref 0–0.2)
PLATELET # BLD AUTO: 275 10*3/MM3 (ref 140–450)
PMV BLD AUTO: 8.9 FL (ref 6–12)
POTASSIUM SERPL-SCNC: 4 MMOL/L (ref 3.5–5.2)
RBC # BLD AUTO: 3.5 10*6/MM3 (ref 4.14–5.8)
SODIUM SERPL-SCNC: 143 MMOL/L (ref 136–145)
WBC NRBC COR # BLD AUTO: 8.13 10*3/MM3 (ref 3.4–10.8)

## 2025-08-04 PROCEDURE — 80048 BASIC METABOLIC PNL TOTAL CA: CPT | Performed by: PHYSICIAN ASSISTANT

## 2025-08-04 PROCEDURE — 86140 C-REACTIVE PROTEIN: CPT | Performed by: PHYSICIAN ASSISTANT

## 2025-08-04 PROCEDURE — 82550 ASSAY OF CK (CPK): CPT | Performed by: PHYSICIAN ASSISTANT

## 2025-08-04 PROCEDURE — 85025 COMPLETE CBC W/AUTO DIFF WBC: CPT | Performed by: PHYSICIAN ASSISTANT

## 2025-08-04 PROCEDURE — 85652 RBC SED RATE AUTOMATED: CPT | Performed by: PHYSICIAN ASSISTANT

## 2025-08-06 ENCOUNTER — HOSPITAL ENCOUNTER (OUTPATIENT)
Dept: WOUND CARE | Facility: HOSPITAL | Age: 65
Discharge: HOME | End: 2025-08-06
Payer: MEDICARE

## 2025-08-06 ENCOUNTER — OFFICE VISIT (OUTPATIENT)
Dept: INFECTIOUS DISEASES | Facility: CLINIC | Age: 65
End: 2025-08-06
Payer: MEDICARE

## 2025-08-06 VITALS
TEMPERATURE: 98.5 F | RESPIRATION RATE: 16 BRPM | DIASTOLIC BLOOD PRESSURE: 65 MMHG | WEIGHT: 205 LBS | OXYGEN SATURATION: 98 % | BODY MASS INDEX: 31.07 KG/M2 | HEIGHT: 68 IN | HEART RATE: 69 BPM | SYSTOLIC BLOOD PRESSURE: 107 MMHG

## 2025-08-06 VITALS — DIASTOLIC BLOOD PRESSURE: 62 MMHG | SYSTOLIC BLOOD PRESSURE: 103 MMHG | OXYGEN SATURATION: 97 % | HEART RATE: 67 BPM

## 2025-08-06 DIAGNOSIS — T14.8XXA OPEN WOUND: Primary | ICD-10-CM

## 2025-08-06 DIAGNOSIS — L08.9 INFECTED WOUND: ICD-10-CM

## 2025-08-06 DIAGNOSIS — L89.320 PRESSURE INJURY OF LEFT BUTTOCK, UNSTAGEABLE: Primary | ICD-10-CM

## 2025-08-06 DIAGNOSIS — T14.8XXA INFECTED WOUND: ICD-10-CM

## 2025-08-06 PROCEDURE — A9270 NON-COVERED ITEM OR SERVICE: HCPCS | Performed by: NURSE PRACTITIONER

## 2025-08-06 PROCEDURE — 63710000001 ZINC OXIDE 20 % OINTMENT 454 G JAR: Performed by: NURSE PRACTITIONER

## 2025-08-06 PROCEDURE — 63710000001 A&D OINTMENT 425 G JAR: Performed by: NURSE PRACTITIONER

## 2025-08-06 PROCEDURE — 87205 SMEAR GRAM STAIN: CPT | Performed by: NURSE PRACTITIONER

## 2025-08-06 PROCEDURE — 63710000001 COLLAGENASE 250 UNIT/GM OINTMENT 30 G TUBE: Performed by: NURSE PRACTITIONER

## 2025-08-06 PROCEDURE — 87186 SC STD MICRODIL/AGAR DIL: CPT | Performed by: NURSE PRACTITIONER

## 2025-08-06 PROCEDURE — 63710000001 ALUMINUM-MAGNESIUM HYDROXIDE-SIMETHICONE 200-200-20 MG/5ML SUSPENSION: Performed by: NURSE PRACTITIONER

## 2025-08-06 PROCEDURE — 87077 CULTURE AEROBIC IDENTIFY: CPT | Performed by: NURSE PRACTITIONER

## 2025-08-06 PROCEDURE — 87070 CULTURE OTHR SPECIMN AEROBIC: CPT | Performed by: NURSE PRACTITIONER

## 2025-08-06 PROCEDURE — 87181 SC STD AGAR DILUTION PER AGT: CPT | Performed by: NURSE PRACTITIONER

## 2025-08-06 PROCEDURE — 63710000001 SODIUM HYPOCHLORITE 0.25 % SOLUTION 473 ML BOTTLE: Performed by: NURSE PRACTITIONER

## 2025-08-06 PROCEDURE — 63710000001 CLOTRIMAZOLE 1 % CREAM 30 G TUBE: Performed by: NURSE PRACTITIONER

## 2025-08-06 RX ORDER — LIDOCAINE HYDROCHLORIDE 20 MG/ML
1 JELLY TOPICAL ONCE
Status: CANCELLED | OUTPATIENT
Start: 2025-08-06 | End: 2025-08-06

## 2025-08-06 RX ORDER — LIDOCAINE HYDROCHLORIDE 20 MG/ML
1 JELLY TOPICAL ONCE
Status: COMPLETED | OUTPATIENT
Start: 2025-08-06 | End: 2025-08-06

## 2025-08-06 RX ORDER — LIDOCAINE HYDROCHLORIDE AND EPINEPHRINE BITARTRATE 20; .01 MG/ML; MG/ML
10 INJECTION, SOLUTION SUBCUTANEOUS ONCE
OUTPATIENT
Start: 2025-08-06 | End: 2025-08-06

## 2025-08-06 RX ORDER — NYSTATIN 100000 [USP'U]/G
1 POWDER TOPICAL ONCE
OUTPATIENT
Start: 2025-08-06 | End: 2025-08-06

## 2025-08-06 RX ORDER — LIDOCAINE HYDROCHLORIDE 20 MG/ML
10 INJECTION, SOLUTION INFILTRATION; PERINEURAL ONCE
OUTPATIENT
Start: 2025-08-06 | End: 2025-08-06

## 2025-08-06 RX ORDER — SODIUM HYPOCHLORITE 1.25 MG/ML
1 SOLUTION TOPICAL AS NEEDED
OUTPATIENT
Start: 2025-08-06

## 2025-08-06 RX ORDER — MUPIROCIN 2 %
1 OINTMENT (GRAM) TOPICAL AS NEEDED
OUTPATIENT
Start: 2025-08-06

## 2025-08-06 RX ORDER — SILVER SULFADIAZINE 10 MG/G
1 CREAM TOPICAL AS NEEDED
OUTPATIENT
Start: 2025-08-06

## 2025-08-06 RX ORDER — SODIUM HYPOCHLORITE 2.5 MG/ML
1 SOLUTION TOPICAL AS NEEDED
Status: DISCONTINUED | OUTPATIENT
Start: 2025-08-06 | End: 2025-08-07 | Stop reason: HOSPADM

## 2025-08-06 RX ORDER — SODIUM HYPOCHLORITE 2.5 MG/ML
1 SOLUTION TOPICAL AS NEEDED
Status: CANCELLED | OUTPATIENT
Start: 2025-08-06

## 2025-08-06 RX ORDER — LIDOCAINE HYDROCHLORIDE 20 MG/ML
JELLY TOPICAL AS NEEDED
OUTPATIENT
Start: 2025-08-06

## 2025-08-06 RX ORDER — DIAPER,BRIEF,INFANT-TODD,DISP
1 EACH MISCELLANEOUS ONCE
OUTPATIENT
Start: 2025-08-06 | End: 2025-08-06

## 2025-08-06 RX ORDER — CASTOR OIL AND BALSAM, PERU 788; 87 MG/G; MG/G
1 OINTMENT TOPICAL AS NEEDED
OUTPATIENT
Start: 2025-08-06

## 2025-08-06 RX ADMIN — COLLAGENASE SANTYL 1 APPLICATION: 250 OINTMENT TOPICAL at 12:58

## 2025-08-06 RX ADMIN — VITAMINS A AND D OINTMENT 1 APPLICATION: 15.5; 53.4 OINTMENT TOPICAL at 12:58

## 2025-08-06 RX ADMIN — LIDOCAINE HYDROCHLORIDE 1 ML: 20 JELLY TOPICAL at 12:58

## 2025-08-06 RX ADMIN — HYOSCYAMINE SULFATE 473 ML: 16 SOLUTION at 12:58

## 2025-08-07 ENCOUNTER — TELEPHONE (OUTPATIENT)
Dept: WOUND CARE | Facility: HOSPITAL | Age: 65
End: 2025-08-07
Payer: MEDICARE

## 2025-08-10 LAB
BACTERIA SPEC AEROBE CULT: ABNORMAL
GRAM STN SPEC: ABNORMAL

## 2025-08-11 ENCOUNTER — LAB REQUISITION (OUTPATIENT)
Dept: LAB | Facility: HOSPITAL | Age: 65
End: 2025-08-11
Payer: MEDICARE

## 2025-08-11 ENCOUNTER — TELEPHONE (OUTPATIENT)
Dept: INFECTIOUS DISEASES | Facility: CLINIC | Age: 65
End: 2025-08-11
Payer: MEDICARE

## 2025-08-11 ENCOUNTER — DOCUMENTATION (OUTPATIENT)
Dept: INFECTIOUS DISEASES | Facility: CLINIC | Age: 65
End: 2025-08-11
Payer: MEDICARE

## 2025-08-11 DIAGNOSIS — Z79.2 LONG TERM (CURRENT) USE OF ANTIBIOTICS: ICD-10-CM

## 2025-08-11 LAB
ALBUMIN SERPL-MCNC: 2.7 G/DL (ref 3.5–5.2)
ALBUMIN/GLOB SERPL: 0.9 G/DL
ALP SERPL-CCNC: 94 U/L (ref 39–117)
ALT SERPL W P-5'-P-CCNC: 11 U/L (ref 1–41)
ANION GAP SERPL CALCULATED.3IONS-SCNC: 8.8 MMOL/L (ref 5–15)
AST SERPL-CCNC: 12 U/L (ref 1–40)
BASOPHILS # BLD AUTO: 0.04 10*3/MM3 (ref 0–0.2)
BASOPHILS NFR BLD AUTO: 0.5 % (ref 0–1.5)
BILIRUB SERPL-MCNC: <0.2 MG/DL (ref 0–1.2)
BUN SERPL-MCNC: 12.9 MG/DL (ref 8–23)
BUN/CREAT SERPL: 36.9 (ref 7–25)
CALCIUM SPEC-SCNC: 8.3 MG/DL (ref 8.6–10.5)
CHLORIDE SERPL-SCNC: 105 MMOL/L (ref 98–107)
CK SERPL-CCNC: 27 U/L (ref 20–200)
CO2 SERPL-SCNC: 27.2 MMOL/L (ref 22–29)
CREAT SERPL-MCNC: 0.35 MG/DL (ref 0.76–1.27)
CRP SERPL-MCNC: 6.3 MG/DL (ref 0–0.5)
DEPRECATED RDW RBC AUTO: 49.4 FL (ref 37–54)
EGFRCR SERPLBLD CKD-EPI 2021: 126.9 ML/MIN/1.73
EOSINOPHIL # BLD AUTO: 0.26 10*3/MM3 (ref 0–0.4)
EOSINOPHIL NFR BLD AUTO: 3.3 % (ref 0.3–6.2)
ERYTHROCYTE [DISTWIDTH] IN BLOOD BY AUTOMATED COUNT: 15.5 % (ref 12.3–15.4)
ERYTHROCYTE [SEDIMENTATION RATE] IN BLOOD: 48 MM/HR (ref 0–20)
GLOBULIN UR ELPH-MCNC: 3 GM/DL
GLUCOSE SERPL-MCNC: 134 MG/DL (ref 65–99)
HCT VFR BLD AUTO: 33.5 % (ref 37.5–51)
HGB BLD-MCNC: 10.1 G/DL (ref 13–17.7)
IMM GRANULOCYTES # BLD AUTO: 0.06 10*3/MM3 (ref 0–0.05)
IMM GRANULOCYTES NFR BLD AUTO: 0.8 % (ref 0–0.5)
LYMPHOCYTES # BLD AUTO: 1.41 10*3/MM3 (ref 0.7–3.1)
LYMPHOCYTES NFR BLD AUTO: 17.6 % (ref 19.6–45.3)
MCH RBC QN AUTO: 26.4 PG (ref 26.6–33)
MCHC RBC AUTO-ENTMCNC: 30.1 G/DL (ref 31.5–35.7)
MCV RBC AUTO: 87.7 FL (ref 79–97)
MONOCYTES # BLD AUTO: 0.61 10*3/MM3 (ref 0.1–0.9)
MONOCYTES NFR BLD AUTO: 7.6 % (ref 5–12)
NEUTROPHILS NFR BLD AUTO: 5.62 10*3/MM3 (ref 1.7–7)
NEUTROPHILS NFR BLD AUTO: 70.2 % (ref 42.7–76)
NRBC BLD AUTO-RTO: 0 /100 WBC (ref 0–0.2)
PLATELET # BLD AUTO: 325 10*3/MM3 (ref 140–450)
PMV BLD AUTO: 8.9 FL (ref 6–12)
POTASSIUM SERPL-SCNC: 4.1 MMOL/L (ref 3.5–5.2)
PROT SERPL-MCNC: 5.7 G/DL (ref 6–8.5)
RBC # BLD AUTO: 3.82 10*6/MM3 (ref 4.14–5.8)
SODIUM SERPL-SCNC: 141 MMOL/L (ref 136–145)
WBC NRBC COR # BLD AUTO: 8 10*3/MM3 (ref 3.4–10.8)

## 2025-08-11 PROCEDURE — 80053 COMPREHEN METABOLIC PANEL: CPT | Performed by: INTERNAL MEDICINE

## 2025-08-11 PROCEDURE — 85025 COMPLETE CBC W/AUTO DIFF WBC: CPT | Performed by: INTERNAL MEDICINE

## 2025-08-11 PROCEDURE — 85652 RBC SED RATE AUTOMATED: CPT | Performed by: INTERNAL MEDICINE

## 2025-08-11 PROCEDURE — 82550 ASSAY OF CK (CPK): CPT | Performed by: INTERNAL MEDICINE

## 2025-08-11 PROCEDURE — 86140 C-REACTIVE PROTEIN: CPT | Performed by: INTERNAL MEDICINE

## 2025-08-11 RX ORDER — FLUCONAZOLE 150 MG/1
150 TABLET ORAL DAILY
Qty: 7 TABLET | Refills: 0 | Status: SHIPPED | OUTPATIENT
Start: 2025-08-11 | End: 2025-08-20

## 2025-08-13 ENCOUNTER — OFFICE VISIT (OUTPATIENT)
Dept: INFECTIOUS DISEASES | Facility: CLINIC | Age: 65
End: 2025-08-13
Payer: MEDICARE

## 2025-08-13 ENCOUNTER — HOSPITAL ENCOUNTER (OUTPATIENT)
Dept: WOUND CARE | Facility: HOSPITAL | Age: 65
Discharge: HOME | End: 2025-08-13
Admitting: NURSE PRACTITIONER
Payer: MEDICARE

## 2025-08-13 VITALS
TEMPERATURE: 98.3 F | BODY MASS INDEX: 31.52 KG/M2 | HEART RATE: 67 BPM | RESPIRATION RATE: 18 BRPM | SYSTOLIC BLOOD PRESSURE: 100 MMHG | WEIGHT: 208 LBS | HEIGHT: 68 IN | OXYGEN SATURATION: 97 % | DIASTOLIC BLOOD PRESSURE: 61 MMHG

## 2025-08-13 DIAGNOSIS — T14.8XXA INFECTED WOUND: ICD-10-CM

## 2025-08-13 DIAGNOSIS — L08.9 INFECTED WOUND: ICD-10-CM

## 2025-08-13 DIAGNOSIS — T14.8XXA OPEN WOUND: Primary | ICD-10-CM

## 2025-08-13 DIAGNOSIS — L89.313 PRESSURE INJURY OF RIGHT BUTTOCK, STAGE 3: Primary | ICD-10-CM

## 2025-08-13 PROCEDURE — 63710000001 ALUMINUM-MAGNESIUM HYDROXIDE-SIMETHICONE 200-200-20 MG/5ML SUSPENSION: Performed by: NURSE PRACTITIONER

## 2025-08-13 PROCEDURE — 63710000001 COLLAGENASE 250 UNIT/GM OINTMENT 30 G TUBE: Performed by: NURSE PRACTITIONER

## 2025-08-13 PROCEDURE — A9270 NON-COVERED ITEM OR SERVICE: HCPCS | Performed by: NURSE PRACTITIONER

## 2025-08-13 PROCEDURE — 63710000001 CLOTRIMAZOLE 1 % CREAM 30 G TUBE: Performed by: NURSE PRACTITIONER

## 2025-08-13 PROCEDURE — 63710000001 ZINC OXIDE 20 % OINTMENT 454 G JAR: Performed by: NURSE PRACTITIONER

## 2025-08-13 PROCEDURE — 63710000001 A&D OINTMENT 425 G JAR: Performed by: NURSE PRACTITIONER

## 2025-08-13 PROCEDURE — 63710000001 SODIUM HYPOCHLORITE 0.25 % SOLUTION 473 ML BOTTLE: Performed by: NURSE PRACTITIONER

## 2025-08-13 RX ORDER — LIDOCAINE HYDROCHLORIDE 20 MG/ML
JELLY TOPICAL AS NEEDED
OUTPATIENT
Start: 2025-08-13

## 2025-08-13 RX ORDER — SODIUM HYPOCHLORITE 2.5 MG/ML
1 SOLUTION TOPICAL AS NEEDED
OUTPATIENT
Start: 2025-08-13

## 2025-08-13 RX ORDER — MUPIROCIN 2 %
1 OINTMENT (GRAM) TOPICAL AS NEEDED
OUTPATIENT
Start: 2025-08-13

## 2025-08-13 RX ORDER — SILVER SULFADIAZINE 10 MG/G
1 CREAM TOPICAL AS NEEDED
OUTPATIENT
Start: 2025-08-13

## 2025-08-13 RX ORDER — SODIUM HYPOCHLORITE 1.25 MG/ML
1 SOLUTION TOPICAL AS NEEDED
OUTPATIENT
Start: 2025-08-13

## 2025-08-13 RX ORDER — LIDOCAINE HYDROCHLORIDE 20 MG/ML
1 JELLY TOPICAL ONCE
OUTPATIENT
Start: 2025-08-13 | End: 2025-08-13

## 2025-08-13 RX ORDER — LIDOCAINE HYDROCHLORIDE 20 MG/ML
1 JELLY TOPICAL ONCE
Status: COMPLETED | OUTPATIENT
Start: 2025-08-13 | End: 2025-08-13

## 2025-08-13 RX ORDER — CASTOR OIL AND BALSAM, PERU 788; 87 MG/G; MG/G
1 OINTMENT TOPICAL AS NEEDED
OUTPATIENT
Start: 2025-08-13

## 2025-08-13 RX ORDER — DIAPER,BRIEF,INFANT-TODD,DISP
1 EACH MISCELLANEOUS ONCE
OUTPATIENT
Start: 2025-08-13 | End: 2025-08-13

## 2025-08-13 RX ORDER — NYSTATIN 100000 [USP'U]/G
1 POWDER TOPICAL ONCE
OUTPATIENT
Start: 2025-08-13 | End: 2025-08-13

## 2025-08-13 RX ORDER — SODIUM HYPOCHLORITE 2.5 MG/ML
1 SOLUTION TOPICAL AS NEEDED
Status: DISCONTINUED | OUTPATIENT
Start: 2025-08-13 | End: 2025-08-14 | Stop reason: HOSPADM

## 2025-08-13 RX ORDER — LIDOCAINE HYDROCHLORIDE 20 MG/ML
10 INJECTION, SOLUTION INFILTRATION; PERINEURAL ONCE
OUTPATIENT
Start: 2025-08-13 | End: 2025-08-13

## 2025-08-13 RX ORDER — LIDOCAINE HYDROCHLORIDE AND EPINEPHRINE BITARTRATE 20; .01 MG/ML; MG/ML
10 INJECTION, SOLUTION SUBCUTANEOUS ONCE
OUTPATIENT
Start: 2025-08-13 | End: 2025-08-13

## 2025-08-13 RX ADMIN — COLLAGENASE SANTYL 1 APPLICATION: 250 OINTMENT TOPICAL at 15:53

## 2025-08-13 RX ADMIN — VITAMINS A AND D OINTMENT 1 APPLICATION: 15.5; 53.4 OINTMENT TOPICAL at 15:52

## 2025-08-13 RX ADMIN — SODIUM HYPOCHLORITE 473 ML: 2.5 SOLUTION TOPICAL at 15:52

## 2025-08-13 RX ADMIN — LIDOCAINE HYDROCHLORIDE 1 ML: 20 JELLY TOPICAL at 15:52

## 2025-08-18 ENCOUNTER — LAB REQUISITION (OUTPATIENT)
Dept: LAB | Facility: HOSPITAL | Age: 65
End: 2025-08-18
Payer: MEDICARE

## 2025-08-18 DIAGNOSIS — Z79.2 LONG TERM (CURRENT) USE OF ANTIBIOTICS: ICD-10-CM

## 2025-08-18 LAB
ANION GAP SERPL CALCULATED.3IONS-SCNC: 7.6 MMOL/L (ref 5–15)
BASOPHILS # BLD AUTO: 0.02 10*3/MM3 (ref 0–0.2)
BASOPHILS NFR BLD AUTO: 0.3 % (ref 0–1.5)
BUN SERPL-MCNC: 13.1 MG/DL (ref 8–23)
BUN/CREAT SERPL: 42.3 (ref 7–25)
CALCIUM SPEC-SCNC: 7.7 MG/DL (ref 8.6–10.5)
CHLORIDE SERPL-SCNC: 103 MMOL/L (ref 98–107)
CK SERPL-CCNC: 24 U/L (ref 20–200)
CO2 SERPL-SCNC: 28.4 MMOL/L (ref 22–29)
CREAT SERPL-MCNC: 0.31 MG/DL (ref 0.76–1.27)
CRP SERPL-MCNC: 6.37 MG/DL (ref 0–0.5)
DEPRECATED RDW RBC AUTO: 49.8 FL (ref 37–54)
EGFRCR SERPLBLD CKD-EPI 2021: 131.6 ML/MIN/1.73
EOSINOPHIL # BLD AUTO: 0.18 10*3/MM3 (ref 0–0.4)
EOSINOPHIL NFR BLD AUTO: 2.8 % (ref 0.3–6.2)
ERYTHROCYTE [DISTWIDTH] IN BLOOD BY AUTOMATED COUNT: 15.4 % (ref 12.3–15.4)
ERYTHROCYTE [SEDIMENTATION RATE] IN BLOOD: 40 MM/HR (ref 0–20)
GLUCOSE SERPL-MCNC: 127 MG/DL (ref 65–99)
HCT VFR BLD AUTO: 30 % (ref 37.5–51)
HGB BLD-MCNC: 9 G/DL (ref 13–17.7)
IMM GRANULOCYTES # BLD AUTO: 0.05 10*3/MM3 (ref 0–0.05)
IMM GRANULOCYTES NFR BLD AUTO: 0.8 % (ref 0–0.5)
LYMPHOCYTES # BLD AUTO: 0.95 10*3/MM3 (ref 0.7–3.1)
LYMPHOCYTES NFR BLD AUTO: 14.9 % (ref 19.6–45.3)
MCH RBC QN AUTO: 26.4 PG (ref 26.6–33)
MCHC RBC AUTO-ENTMCNC: 30 G/DL (ref 31.5–35.7)
MCV RBC AUTO: 88 FL (ref 79–97)
MONOCYTES # BLD AUTO: 0.6 10*3/MM3 (ref 0.1–0.9)
MONOCYTES NFR BLD AUTO: 9.4 % (ref 5–12)
NEUTROPHILS NFR BLD AUTO: 4.56 10*3/MM3 (ref 1.7–7)
NEUTROPHILS NFR BLD AUTO: 71.8 % (ref 42.7–76)
NRBC BLD AUTO-RTO: 0 /100 WBC (ref 0–0.2)
PLATELET # BLD AUTO: 233 10*3/MM3 (ref 140–450)
PMV BLD AUTO: 9 FL (ref 6–12)
POTASSIUM SERPL-SCNC: 3.8 MMOL/L (ref 3.5–5.2)
RBC # BLD AUTO: 3.41 10*6/MM3 (ref 4.14–5.8)
SODIUM SERPL-SCNC: 139 MMOL/L (ref 136–145)
WBC NRBC COR # BLD AUTO: 6.36 10*3/MM3 (ref 3.4–10.8)

## 2025-08-18 PROCEDURE — 80048 BASIC METABOLIC PNL TOTAL CA: CPT | Performed by: INTERNAL MEDICINE

## 2025-08-18 PROCEDURE — 82550 ASSAY OF CK (CPK): CPT | Performed by: INTERNAL MEDICINE

## 2025-08-18 PROCEDURE — 86140 C-REACTIVE PROTEIN: CPT | Performed by: INTERNAL MEDICINE

## 2025-08-18 PROCEDURE — 85025 COMPLETE CBC W/AUTO DIFF WBC: CPT | Performed by: INTERNAL MEDICINE

## 2025-08-18 PROCEDURE — 85652 RBC SED RATE AUTOMATED: CPT | Performed by: INTERNAL MEDICINE

## 2025-08-20 ENCOUNTER — OFFICE VISIT (OUTPATIENT)
Dept: INFECTIOUS DISEASES | Facility: CLINIC | Age: 65
End: 2025-08-20
Payer: MEDICARE

## 2025-08-20 ENCOUNTER — HOSPITAL ENCOUNTER (OUTPATIENT)
Dept: WOUND CARE | Facility: HOSPITAL | Age: 65
Discharge: HOME | End: 2025-08-20
Payer: MEDICARE

## 2025-08-20 VITALS
HEIGHT: 64 IN | RESPIRATION RATE: 18 BRPM | SYSTOLIC BLOOD PRESSURE: 118 MMHG | HEART RATE: 64 BPM | DIASTOLIC BLOOD PRESSURE: 62 MMHG | OXYGEN SATURATION: 97 % | BODY MASS INDEX: 35 KG/M2 | TEMPERATURE: 99.1 F | WEIGHT: 205 LBS

## 2025-08-20 DIAGNOSIS — L08.9 INFECTED WOUND: ICD-10-CM

## 2025-08-20 DIAGNOSIS — T14.8XXA INFECTED WOUND: ICD-10-CM

## 2025-08-20 DIAGNOSIS — S31.000A WOUND OF SACRAL REGION, INITIAL ENCOUNTER: Primary | ICD-10-CM

## 2025-08-20 RX ORDER — OXYCODONE HCL 10 MG/1
10 TABLET, FILM COATED, EXTENDED RELEASE ORAL EVERY 4 HOURS PRN
COMMUNITY

## 2025-08-25 ENCOUNTER — LAB REQUISITION (OUTPATIENT)
Dept: LAB | Facility: HOSPITAL | Age: 65
End: 2025-08-25
Payer: MEDICARE

## 2025-08-25 DIAGNOSIS — Z79.2 LONG TERM (CURRENT) USE OF ANTIBIOTICS: ICD-10-CM

## 2025-08-25 LAB
ALBUMIN SERPL-MCNC: 2.4 G/DL (ref 3.5–5.2)
ALBUMIN/GLOB SERPL: 0.8 G/DL
ALP SERPL-CCNC: 83 U/L (ref 39–117)
ALT SERPL W P-5'-P-CCNC: 8 U/L (ref 1–41)
ANION GAP SERPL CALCULATED.3IONS-SCNC: 8.1 MMOL/L (ref 5–15)
AST SERPL-CCNC: 15 U/L (ref 1–40)
BASOPHILS # BLD AUTO: 0.03 10*3/MM3 (ref 0–0.2)
BASOPHILS NFR BLD AUTO: 0.4 % (ref 0–1.5)
BILIRUB SERPL-MCNC: 0.2 MG/DL (ref 0–1.2)
BUN SERPL-MCNC: 24.6 MG/DL (ref 8–23)
BUN/CREAT SERPL: 91.1 (ref 7–25)
CALCIUM SPEC-SCNC: 8.5 MG/DL (ref 8.6–10.5)
CHLORIDE SERPL-SCNC: 105 MMOL/L (ref 98–107)
CK SERPL-CCNC: 40 U/L (ref 20–200)
CO2 SERPL-SCNC: 27.9 MMOL/L (ref 22–29)
CREAT SERPL-MCNC: 0.27 MG/DL (ref 0.76–1.27)
CRP SERPL-MCNC: 9.08 MG/DL (ref 0–0.5)
DEPRECATED RDW RBC AUTO: 47.7 FL (ref 37–54)
EGFRCR SERPLBLD CKD-EPI 2021: 137.2 ML/MIN/1.73
EOSINOPHIL # BLD AUTO: 0.13 10*3/MM3 (ref 0–0.4)
EOSINOPHIL NFR BLD AUTO: 1.6 % (ref 0.3–6.2)
ERYTHROCYTE [DISTWIDTH] IN BLOOD BY AUTOMATED COUNT: 14.8 % (ref 12.3–15.4)
ERYTHROCYTE [SEDIMENTATION RATE] IN BLOOD: 58 MM/HR (ref 0–20)
GLOBULIN UR ELPH-MCNC: 3 GM/DL
GLUCOSE SERPL-MCNC: 95 MG/DL (ref 65–99)
HCT VFR BLD AUTO: 30.7 % (ref 37.5–51)
HGB BLD-MCNC: 9.3 G/DL (ref 13–17.7)
IMM GRANULOCYTES # BLD AUTO: 0.05 10*3/MM3 (ref 0–0.05)
IMM GRANULOCYTES NFR BLD AUTO: 0.6 % (ref 0–0.5)
LYMPHOCYTES # BLD AUTO: 1.64 10*3/MM3 (ref 0.7–3.1)
LYMPHOCYTES NFR BLD AUTO: 20.1 % (ref 19.6–45.3)
MCH RBC QN AUTO: 26.2 PG (ref 26.6–33)
MCHC RBC AUTO-ENTMCNC: 30.3 G/DL (ref 31.5–35.7)
MCV RBC AUTO: 86.5 FL (ref 79–97)
MONOCYTES # BLD AUTO: 0.59 10*3/MM3 (ref 0.1–0.9)
MONOCYTES NFR BLD AUTO: 7.2 % (ref 5–12)
NEUTROPHILS NFR BLD AUTO: 5.72 10*3/MM3 (ref 1.7–7)
NEUTROPHILS NFR BLD AUTO: 70.1 % (ref 42.7–76)
NRBC BLD AUTO-RTO: 0 /100 WBC (ref 0–0.2)
PLATELET # BLD AUTO: 280 10*3/MM3 (ref 140–450)
PMV BLD AUTO: 9.2 FL (ref 6–12)
POTASSIUM SERPL-SCNC: 4.1 MMOL/L (ref 3.5–5.2)
PROT SERPL-MCNC: 5.4 G/DL (ref 6–8.5)
RBC # BLD AUTO: 3.55 10*6/MM3 (ref 4.14–5.8)
SODIUM SERPL-SCNC: 141 MMOL/L (ref 136–145)
WBC NRBC COR # BLD AUTO: 8.16 10*3/MM3 (ref 3.4–10.8)

## 2025-08-25 PROCEDURE — 85652 RBC SED RATE AUTOMATED: CPT | Performed by: INTERNAL MEDICINE

## 2025-08-25 PROCEDURE — 86140 C-REACTIVE PROTEIN: CPT | Performed by: INTERNAL MEDICINE

## 2025-08-25 PROCEDURE — 85025 COMPLETE CBC W/AUTO DIFF WBC: CPT | Performed by: INTERNAL MEDICINE

## 2025-08-25 PROCEDURE — 80053 COMPREHEN METABOLIC PANEL: CPT | Performed by: INTERNAL MEDICINE

## 2025-08-25 PROCEDURE — 82550 ASSAY OF CK (CPK): CPT | Performed by: INTERNAL MEDICINE

## 2025-08-27 ENCOUNTER — HOSPITAL ENCOUNTER (OUTPATIENT)
Dept: WOUND CARE | Facility: HOSPITAL | Age: 65
Discharge: HOME | End: 2025-08-27
Payer: MEDICARE

## 2025-08-27 DIAGNOSIS — T14.8XXA OPEN WOUND: Primary | ICD-10-CM

## 2025-08-27 PROCEDURE — A9270 NON-COVERED ITEM OR SERVICE: HCPCS | Performed by: NURSE PRACTITIONER

## 2025-08-27 PROCEDURE — 63710000001 COLLAGENASE 250 UNIT/GM OINTMENT 30 G TUBE: Performed by: NURSE PRACTITIONER

## 2025-08-27 RX ORDER — SILVER SULFADIAZINE 10 MG/G
1 CREAM TOPICAL AS NEEDED
OUTPATIENT
Start: 2025-08-27

## 2025-08-27 RX ORDER — LIDOCAINE HYDROCHLORIDE 20 MG/ML
1 JELLY TOPICAL ONCE
Status: DISCONTINUED | OUTPATIENT
Start: 2025-08-27 | End: 2025-08-28 | Stop reason: HOSPADM

## 2025-08-27 RX ORDER — MUPIROCIN 2 %
1 OINTMENT (GRAM) TOPICAL AS NEEDED
OUTPATIENT
Start: 2025-08-27

## 2025-08-27 RX ORDER — SODIUM HYPOCHLORITE 2.5 MG/ML
1 SOLUTION TOPICAL AS NEEDED
OUTPATIENT
Start: 2025-08-27

## 2025-08-27 RX ORDER — DIAPER,BRIEF,INFANT-TODD,DISP
1 EACH MISCELLANEOUS ONCE
OUTPATIENT
Start: 2025-08-27 | End: 2025-08-27

## 2025-08-27 RX ORDER — SODIUM HYPOCHLORITE 1.25 MG/ML
1 SOLUTION TOPICAL AS NEEDED
OUTPATIENT
Start: 2025-08-27

## 2025-08-27 RX ORDER — LIDOCAINE HYDROCHLORIDE AND EPINEPHRINE BITARTRATE 20; .01 MG/ML; MG/ML
10 INJECTION, SOLUTION SUBCUTANEOUS ONCE
OUTPATIENT
Start: 2025-08-27 | End: 2025-08-27

## 2025-08-27 RX ORDER — LIDOCAINE HYDROCHLORIDE 20 MG/ML
JELLY TOPICAL AS NEEDED
OUTPATIENT
Start: 2025-08-27

## 2025-08-27 RX ORDER — NYSTATIN 100000 [USP'U]/G
1 POWDER TOPICAL ONCE
OUTPATIENT
Start: 2025-08-27 | End: 2025-08-27

## 2025-08-27 RX ORDER — LIDOCAINE HYDROCHLORIDE 20 MG/ML
10 INJECTION, SOLUTION INFILTRATION; PERINEURAL ONCE
OUTPATIENT
Start: 2025-08-27 | End: 2025-08-27

## 2025-08-27 RX ORDER — LIDOCAINE HYDROCHLORIDE 20 MG/ML
1 JELLY TOPICAL ONCE
OUTPATIENT
Start: 2025-08-27 | End: 2025-08-27

## 2025-08-27 RX ORDER — CASTOR OIL AND BALSAM, PERU 788; 87 MG/G; MG/G
1 OINTMENT TOPICAL AS NEEDED
OUTPATIENT
Start: 2025-08-27

## 2025-08-27 RX ADMIN — COLLAGENASE SANTYL 1 APPLICATION: 250 OINTMENT TOPICAL at 13:30

## (undated) DEVICE — SUT VIC PLS CTD ANTIB BR 3/0 8/18IN 45CM

## (undated) DEVICE — ANTIBACTERIAL UNDYED BRAIDED (POLYGLACTIN 910), SYNTHETIC ABSORBABLE SUTURE: Brand: COATED VICRYL

## (undated) DEVICE — INSTRUMENT 7080902 PLATE HOLDING PIN

## (undated) DEVICE — SUT SILK 2/0 TIES 18IN A185H

## (undated) DEVICE — SPNG GZ WOVN 4X4IN 12PLY 10/BX STRL

## (undated) DEVICE — DRSNG SURG AQUACEL AG 9X25CM

## (undated) DEVICE — STERILE PVP: Brand: MEDLINE INDUSTRIES, INC.

## (undated) DEVICE — NEEDLE, QUINCKE, 20GX3.5": Brand: MEDLINE

## (undated) DEVICE — TOOL MR8-12BA30 MR8 12CM BALL 3MM DIAM: Brand: MIDAS REX MR8

## (undated) DEVICE — ENDOGATOR AUXILIARY WATER JET CONNECTOR: Brand: ENDOGATOR

## (undated) DEVICE — BANDAGE,GAUZE,BULKEE II,2.25"X3YD,STRL: Brand: MEDLINE

## (undated) DEVICE — 3M™ STERI-DRAPE™ INSTRUMENT POUCH 1018: Brand: STERI-DRAPE™

## (undated) DEVICE — HEWSON SUTURE RETRIEVER: Brand: HEWSON SUTURE RETRIEVER

## (undated) DEVICE — UNDERGLV SURG BIOGEL INDICAT PI SZ8 BLU

## (undated) DEVICE — GLV SURG PREMIERPRO MIC LTX PF SZ6.5 BRN

## (undated) DEVICE — CONTAINER,SPECIMEN,OR STERILE,4OZ: Brand: MEDLINE

## (undated) DEVICE — PATIENT RETURN ELECTRODE, SINGLE-USE, CONTACT QUALITY MONITORING, ADULT, WITH 9FT CORD, FOR PATIENTS WEIGING OVER 33LBS. (15KG): Brand: MEGADYNE

## (undated) DEVICE — ELECTRD BLD EZ CLN MOD XLNG 2.75IN

## (undated) DEVICE — COATED BRAIDED POLYESTER: Brand: TI-CRON

## (undated) DEVICE — SPONGE,DISSECTOR,K,XRAY,9/16"X1/4",STRL: Brand: MEDLINE

## (undated) DEVICE — Device

## (undated) DEVICE — STRAP POSTN KN/BDY FM 5X72IN DISP

## (undated) DEVICE — DRILL BIT 7080513 STERILE 13MM

## (undated) DEVICE — DBD-DRAPE,LAP,CHOLE,W/TROUGHS,STERILE: Brand: MEDLINE

## (undated) DEVICE — SUT VIC 1 CTX 36IN OBGYN VCP977H

## (undated) DEVICE — SST TWIST DRILL, STANDARD, 2.4MM DIA. X 127MM: Brand: MICROAIRE®

## (undated) DEVICE — DRSNG PAD ABD 8X10IN STRL

## (undated) DEVICE — PK HIP TOTL UNIV 10

## (undated) DEVICE — HDRST POSTIN FM CRDL TRACH SLOT 9X8X4IN

## (undated) DEVICE — GLV SURG PREMIERPRO MIC LTX PF SZ7.5 BRN

## (undated) DEVICE — BNDG ELAS CO-FLEX SLF ADHR 6IN 5YD LF STRL

## (undated) DEVICE — TUBING, SUCTION, 1/4" X 20', STRAIGHT: Brand: MEDLINE INDUSTRIES, INC.

## (undated) DEVICE — BLD KNIF KARLIN 46 3178

## (undated) DEVICE — BIT DRL TRIDENT2 TRITANIUM 3.3X40MM DISP

## (undated) DEVICE — TOOL MR8-12MH30 MR8 12CM MATCH 3MM: Brand: MIDAS REX MR8

## (undated) DEVICE — DRP CASSET 10 RAY LG 24X40

## (undated) DEVICE — SYR LUERLOK 30CC

## (undated) DEVICE — GLV SURG SIGNATURE TOUCH PF LTX 8 STRL BX/50

## (undated) DEVICE — ADHS SKIN PREMIERPRO EXOFIN TOPICAL HI/VISC .5ML

## (undated) DEVICE — DRP MICROSCOPE 4 BINOCULAR CV 54X150IN

## (undated) DEVICE — BIT DRL 3.3X25MM DISP

## (undated) DEVICE — SINGLE PORT MANIFOLD: Brand: NEPTUNE 2

## (undated) DEVICE — PK BASIC 70

## (undated) DEVICE — GLV SURG SENSICARE PI MIC PF SZ9 LF STRL

## (undated) DEVICE — SNAP KOVER: Brand: UNBRANDED

## (undated) DEVICE — SUT MONOCRYL PLS ANTIB UND 3/0  PS1 27IN

## (undated) DEVICE — PENCL ROCKRSWCH MEGADYNE W/HOLSTR 10FT SS

## (undated) DEVICE — INSTRUMENT 875-088 14MM DSTRCT PIN STRL: Brand: MEDTRONIC REUSABLE INSTRUMENT

## (undated) DEVICE — BANDAGE,GAUZE,BULKEE II,4.5"X4.1YD,STRL: Brand: MEDLINE

## (undated) DEVICE — PEG PAD FOR SURG DISP

## (undated) DEVICE — SKIN AFFIX SURG ADHESIVE 72/CS 0.55ML: Brand: MEDLINE

## (undated) DEVICE — CVR HNDL LIGHT RIGID

## (undated) DEVICE — SKIN PREP TRAY 4 COMPARTM TRAY: Brand: MEDLINE INDUSTRIES, INC.

## (undated) DEVICE — TB SXN FRAZIER 12F STRL

## (undated) DEVICE — PULLOVER TOGA, 2X LARGE: Brand: FLYTE, SURGICOOL

## (undated) DEVICE — PILLW ABD MD

## (undated) DEVICE — STRYKER PERFORMANCE SERIES SAGITTAL BLADE: Brand: STRYKER PERFORMANCE SERIES

## (undated) DEVICE — CONN Y IRR DISP 1P/U

## (undated) DEVICE — GLV SURG SENSICARE PI ORTHO SZ8 LF STRL

## (undated) DEVICE — PK NEURO DISC 10

## (undated) DEVICE — VERSAJET II HYDROSURGERY SYSTEM HANDSET, 45DEG 14MM EXACT: Brand: VERSAJET

## (undated) DEVICE — Device: Brand: DEFENDO AIR/WATER/SUCTION AND BIOPSY VALVE

## (undated) DEVICE — THE BITE BLOCK MAXI, LATEX FREE STRAP IS USED TO PROTECT THE ENDOSCOPE INSERTION TUBE FROM BEING BITTEN BY THE PATIENT.

## (undated) DEVICE — NEEDLE, QUINCKE 22GX3.5": Brand: MEDLINE INDUSTRIES, INC.

## (undated) DEVICE — DRAPE,REIN 53X77,STERILE: Brand: MEDLINE

## (undated) DEVICE — GOWN,REINF,POLY,ECL,PP SLV,XL: Brand: MEDLINE

## (undated) DEVICE — BLANKT WARM LOWR/BDY 100X120CM

## (undated) DEVICE — PENCL SMOKE/EVAC MEGADYNE TELESCP 10FT